# Patient Record
Sex: FEMALE | Race: BLACK OR AFRICAN AMERICAN | NOT HISPANIC OR LATINO | Employment: FULL TIME | URBAN - METROPOLITAN AREA
[De-identification: names, ages, dates, MRNs, and addresses within clinical notes are randomized per-mention and may not be internally consistent; named-entity substitution may affect disease eponyms.]

---

## 2017-02-28 RX ORDER — LEVOTHYROXINE SODIUM 25 UG/1
TABLET ORAL
Qty: 90 TABLET | Refills: 0 | Status: SHIPPED | OUTPATIENT
Start: 2017-02-28 | End: 2017-04-19 | Stop reason: SDUPTHER

## 2017-03-07 RX ORDER — ACYCLOVIR 400 MG/1
TABLET ORAL
Qty: 30 TABLET | Refills: 0 | OUTPATIENT
Start: 2017-03-07

## 2017-03-07 RX ORDER — LEVOTHYROXINE SODIUM 25 UG/1
TABLET ORAL
Qty: 90 TABLET | Refills: 0 | Status: SHIPPED | OUTPATIENT
Start: 2017-03-07 | End: 2017-04-19 | Stop reason: SDUPTHER

## 2017-03-19 RX ORDER — ACYCLOVIR 400 MG/1
TABLET ORAL
Qty: 30 TABLET | Refills: 0 | OUTPATIENT
Start: 2017-03-19

## 2017-03-23 RX ORDER — ACYCLOVIR 400 MG/1
TABLET ORAL
Qty: 30 TABLET | Refills: 0 | OUTPATIENT
Start: 2017-03-23

## 2017-03-27 NOTE — TELEPHONE ENCOUNTER
----- Message from Alyson Crandall sent at 3/27/2017  9:55 AM CDT -----  Contact: Pt  Pt is requesting to speak with the nurse concerning the denial of her Rx for acyclovir. Pt can be reached at [phone]

## 2017-03-28 RX ORDER — ACYCLOVIR 400 MG/1
TABLET ORAL
Qty: 30 TABLET | Refills: 0 | Status: SHIPPED | OUTPATIENT
Start: 2017-03-28 | End: 2017-04-19

## 2017-03-30 ENCOUNTER — TELEPHONE (OUTPATIENT)
Dept: FAMILY MEDICINE | Facility: CLINIC | Age: 52
End: 2017-03-30

## 2017-03-30 NOTE — TELEPHONE ENCOUNTER
----- Message from Sindi Mojica sent at 3/30/2017 12:16 PM CDT -----  Please call pt back at [phone]. Pt was calling you back.

## 2017-03-30 NOTE — TELEPHONE ENCOUNTER
----- Message from Tanner Santana sent at 3/30/2017  9:51 AM CDT -----  Contact: Pt  Pt request call from nurse to get refill on medication for blisters but does not know the name of the medication, I offered to schedule pt with a mid level and pt declined, please contact pt at [phone]

## 2017-04-04 ENCOUNTER — PATIENT OUTREACH (OUTPATIENT)
Dept: ADMINISTRATIVE | Facility: HOSPITAL | Age: 52
End: 2017-04-04

## 2017-04-04 NOTE — LETTER
April 4, 2017    Kylie Urbano  [address]  [place] [address]             [facility]  [address]  [facility] [address]  Phone: [phone] Dear Ms. Urbano:    [facility] is committed to your overall health.  To help you get the most out of each of your visits, we will review your information to make sure you are up to date on all of your recommended tests and/or procedures.      Beverly Parks MD has found that you may be due for   Health Maintenance Due   Topic    TETANUS VACCINE     Pap Smear     Influenza Vaccine     Colonoscopy     Lipid Panel     Mammogram       If you have had any of the above done at another facility, please bring the records or information with you so that your record at [facility] will be complete.    If you are currently taking medication, please bring it with you to your appointment for review.    We will be happy to assist you with scheduling any necessary appointments or you may contact the [facility] appointment desk at [phone] to schedule at your convenience.     Thank you for choosing [facility] for your healthcare needs,      Geno VANCE LPN Care Coordinator  Care Coordination Department  [facility]

## 2017-04-19 ENCOUNTER — OFFICE VISIT (OUTPATIENT)
Dept: FAMILY MEDICINE | Facility: CLINIC | Age: 52
End: 2017-04-19
Payer: COMMERCIAL

## 2017-04-19 ENCOUNTER — LAB VISIT (OUTPATIENT)
Dept: LAB | Facility: HOSPITAL | Age: 52
End: 2017-04-19
Attending: FAMILY MEDICINE
Payer: COMMERCIAL

## 2017-04-19 VITALS
SYSTOLIC BLOOD PRESSURE: 132 MMHG | WEIGHT: 278 LBS | HEART RATE: 75 BPM | HEIGHT: 69 IN | DIASTOLIC BLOOD PRESSURE: 88 MMHG | BODY MASS INDEX: 41.18 KG/M2 | OXYGEN SATURATION: 99 % | TEMPERATURE: 98 F | RESPIRATION RATE: 18 BRPM

## 2017-04-19 DIAGNOSIS — Z00.00 ANNUAL PHYSICAL EXAM: Primary | ICD-10-CM

## 2017-04-19 DIAGNOSIS — E66.01 MORBID OBESITY WITH BMI OF 40.0-44.9, ADULT: ICD-10-CM

## 2017-04-19 DIAGNOSIS — J30.2 SEASONAL ALLERGIC RHINITIS, UNSPECIFIED ALLERGIC RHINITIS TRIGGER: ICD-10-CM

## 2017-04-19 DIAGNOSIS — E89.40 SURGICAL MENOPAUSE: ICD-10-CM

## 2017-04-19 DIAGNOSIS — E03.9 HYPOTHYROIDISM, UNSPECIFIED TYPE: Chronic | ICD-10-CM

## 2017-04-19 DIAGNOSIS — Z12.31 OTHER SCREENING MAMMOGRAM: ICD-10-CM

## 2017-04-19 DIAGNOSIS — B00.9 HSV INFECTION: ICD-10-CM

## 2017-04-19 DIAGNOSIS — Z12.11 COLON CANCER SCREENING: ICD-10-CM

## 2017-04-19 DIAGNOSIS — Z00.00 ANNUAL PHYSICAL EXAM: ICD-10-CM

## 2017-04-19 DIAGNOSIS — E55.9 VITAMIN D DEFICIENCY: ICD-10-CM

## 2017-04-19 DIAGNOSIS — E88.810 INSULIN RESISTANCE SYNDROME: ICD-10-CM

## 2017-04-19 DIAGNOSIS — D50.9 IRON DEFICIENCY ANEMIA, UNSPECIFIED IRON DEFICIENCY ANEMIA TYPE: Chronic | ICD-10-CM

## 2017-04-19 DIAGNOSIS — I10 ESSENTIAL HYPERTENSION: ICD-10-CM

## 2017-04-19 LAB
25(OH)D3+25(OH)D2 SERPL-MCNC: 28 NG/ML
ALBUMIN SERPL BCP-MCNC: 3.8 G/DL
ALP SERPL-CCNC: 101 U/L
ALT SERPL W/O P-5'-P-CCNC: 57 U/L
ANION GAP SERPL CALC-SCNC: 9 MMOL/L
AST SERPL-CCNC: 43 U/L
BASOPHILS # BLD AUTO: 0.01 K/UL
BASOPHILS NFR BLD: 0.2 %
BILIRUB SERPL-MCNC: 0.3 MG/DL
BILIRUB UR QL STRIP: NEGATIVE
BUN SERPL-MCNC: 11 MG/DL
CALCIUM SERPL-MCNC: 9.8 MG/DL
CHLORIDE SERPL-SCNC: 108 MMOL/L
CHOLEST/HDLC SERPL: 3.1 {RATIO}
CLARITY UR REFRACT.AUTO: CLEAR
CO2 SERPL-SCNC: 26 MMOL/L
COLOR UR AUTO: YELLOW
CREAT SERPL-MCNC: 0.8 MG/DL
DIFFERENTIAL METHOD: NORMAL
EOSINOPHIL # BLD AUTO: 0.1 K/UL
EOSINOPHIL NFR BLD: 2.1 %
ERYTHROCYTE [DISTWIDTH] IN BLOOD BY AUTOMATED COUNT: 13.6 %
EST. GFR  (AFRICAN AMERICAN): >60 ML/MIN/1.73 M^2
EST. GFR  (NON AFRICAN AMERICAN): >60 ML/MIN/1.73 M^2
FERRITIN SERPL-MCNC: 49 NG/ML
GLUCOSE SERPL-MCNC: 92 MG/DL
GLUCOSE UR QL STRIP: NEGATIVE
HCT VFR BLD AUTO: 40.7 %
HDL/CHOLESTEROL RATIO: 31.9 %
HDLC SERPL-MCNC: 182 MG/DL
HDLC SERPL-MCNC: 58 MG/DL
HGB BLD-MCNC: 13.8 G/DL
HGB UR QL STRIP: NEGATIVE
INSULIN COLLECTION INTERVAL: NORMAL
INSULIN SERPL-ACNC: 9 UU/ML
KETONES UR QL STRIP: NEGATIVE
LDLC SERPL CALC-MCNC: 108.4 MG/DL
LEUKOCYTE ESTERASE UR QL STRIP: NEGATIVE
LYMPHOCYTES # BLD AUTO: 1.7 K/UL
LYMPHOCYTES NFR BLD: 33.4 %
MCH RBC QN AUTO: 29.2 PG
MCHC RBC AUTO-ENTMCNC: 33.9 %
MCV RBC AUTO: 86 FL
MONOCYTES # BLD AUTO: 0.3 K/UL
MONOCYTES NFR BLD: 4.9 %
NEUTROPHILS # BLD AUTO: 3 K/UL
NEUTROPHILS NFR BLD: 59.2 %
NITRITE UR QL STRIP: NEGATIVE
NONHDLC SERPL-MCNC: 124 MG/DL
PH UR STRIP: 6 [PH] (ref 5–8)
PLATELET # BLD AUTO: 335 K/UL
PMV BLD AUTO: 10.6 FL
POTASSIUM SERPL-SCNC: 4 MMOL/L
PROT SERPL-MCNC: 7.9 G/DL
PROT UR QL STRIP: NEGATIVE
RBC # BLD AUTO: 4.73 M/UL
SODIUM SERPL-SCNC: 143 MMOL/L
SP GR UR STRIP: 1.01 (ref 1–1.03)
T4 FREE SERPL-MCNC: 1.13 NG/DL
TRIGL SERPL-MCNC: 78 MG/DL
TSH SERPL DL<=0.005 MIU/L-ACNC: 4.34 UIU/ML
URN SPEC COLLECT METH UR: NORMAL
UROBILINOGEN UR STRIP-ACNC: NEGATIVE EU/DL
WBC # BLD AUTO: 5.12 K/UL

## 2017-04-19 PROCEDURE — 85025 COMPLETE CBC W/AUTO DIFF WBC: CPT

## 2017-04-19 PROCEDURE — 3075F SYST BP GE 130 - 139MM HG: CPT | Mod: S$GLB,,, | Performed by: FAMILY MEDICINE

## 2017-04-19 PROCEDURE — 99999 PR PBB SHADOW E&M-EST. PATIENT-LVL III: CPT | Mod: PBBFAC,,, | Performed by: FAMILY MEDICINE

## 2017-04-19 PROCEDURE — 84439 ASSAY OF FREE THYROXINE: CPT

## 2017-04-19 PROCEDURE — 3079F DIAST BP 80-89 MM HG: CPT | Mod: S$GLB,,, | Performed by: FAMILY MEDICINE

## 2017-04-19 PROCEDURE — 99396 PREV VISIT EST AGE 40-64: CPT | Mod: S$GLB,,, | Performed by: FAMILY MEDICINE

## 2017-04-19 PROCEDURE — 83036 HEMOGLOBIN GLYCOSYLATED A1C: CPT

## 2017-04-19 PROCEDURE — 82306 VITAMIN D 25 HYDROXY: CPT

## 2017-04-19 PROCEDURE — 82728 ASSAY OF FERRITIN: CPT

## 2017-04-19 PROCEDURE — 80053 COMPREHEN METABOLIC PANEL: CPT

## 2017-04-19 PROCEDURE — 83525 ASSAY OF INSULIN: CPT

## 2017-04-19 PROCEDURE — 36415 COLL VENOUS BLD VENIPUNCTURE: CPT | Mod: PO

## 2017-04-19 PROCEDURE — 84443 ASSAY THYROID STIM HORMONE: CPT

## 2017-04-19 PROCEDURE — 81003 URINALYSIS AUTO W/O SCOPE: CPT

## 2017-04-19 PROCEDURE — 80061 LIPID PANEL: CPT

## 2017-04-19 RX ORDER — LEVOTHYROXINE SODIUM 25 UG/1
25 TABLET ORAL
Qty: 90 TABLET | Refills: 1 | Status: SHIPPED | OUTPATIENT
Start: 2017-04-19 | End: 2017-04-26 | Stop reason: DRUGHIGH

## 2017-04-19 RX ORDER — ACYCLOVIR 400 MG/1
TABLET ORAL
Qty: 30 TABLET | Refills: 1 | Status: SHIPPED | OUTPATIENT
Start: 2017-04-19 | End: 2018-01-04

## 2017-04-19 RX ORDER — LEVOTHYROXINE SODIUM 200 UG/1
200 TABLET ORAL DAILY
Qty: 90 TABLET | Refills: 1 | Status: SHIPPED | OUTPATIENT
Start: 2017-04-19 | End: 2017-06-12 | Stop reason: CLARIF

## 2017-04-19 NOTE — MR AVS SNAPSHOT
[facility]  [address] [address]  Phone: [phone]                  Kylie Urbano   2017 9:30 AM   Office Visit    Description:  Female : 1965   Provider:  Beverly Parks MD   Department:  [facility]           Reason for Visit     Annual Exam           Diagnoses this Visit        Comments    Annual physical exam    -  Primary     Essential hypertension         Insulin resistance syndrome         Hypothyroidism, unspecified type         Vitamin D deficiency         Iron deficiency anemia, unspecified iron deficiency anemia type         HSV infection         Seasonal allergic rhinitis, unspecified allergic rhinitis trigger         Morbid obesity with BMI of 40.0-44.9, adult         Surgical menopause         Colon cancer screening         Other screening mammogram                To Do List           Future Appointments        Provider Department Dept Phone    2017 12:20 PM LABORATORY, [address] [facility] [phone]    2017 9:30 AM [facility]AMMO1-SCR [facility] [phone]    10/19/2017 8:45 AM Beverly Parks MD [facility] [phone]      Goals (5 Years of Data)     None      Follow-Up and Disposition     Return in about 6 months (around 10/19/2017) for b/p, IRS f/u.       These Medications        Disp Refills Start End    SYNTHROID 25 mcg tablet 90 tablet 1 2017     Take 1 tablet (25 mcg total) by mouth before breakfast. - Oral    Pharmacy: [facility] [address] [place] [address] Ph #: [phone]       SYNTHROID 200 mcg tablet 90 tablet 1 2017     Take 1 tablet (200 mcg total) by mouth once daily. - Oral    Pharmacy: [facility] [address] [place] [address] Ph #: [phone]       acyclovir (ZOVIRAX) 400 MG tablet 30 tablet 1 2017     TAKE ONE TABLET BY MOUTH THREE  TIMES DAILY WITH FOOD FOR 5 DAYS PER  EPISODE    Pharmacy: [facility] [address] [place] [address] #: [phone]         [facility] On Call     [facility] On Call Nurse Care Line - 24/7 Assistance  Unless otherwise directed by your provider, please contact [facility], our nurse care line that is available for 24/7 assistance.     Registered nurses in the [facility] provide: appointment scheduling, clinical advisement, health education, and other advisory services.  Call: [phone] (toll free)               Medications           Message regarding Medications     Verify the changes and/or additions to your medication regime listed below are the same as discussed with your clinician today.  If any of these changes or additions are incorrect, please notify your healthcare provider.        CHANGE how you are taking these medications     Start Taking Instead of    SYNTHROID 25 mcg tablet SYNTHROID 25 mcg tablet    Dosage:  Take 1 tablet (25 mcg total) by mouth before breakfast. Dosage:  TAKE ONE TABLET BY MOUTH ONCE DAILY BEFORE BREAKFAST    Reason for Change:  Reorder            Verify that the below list of medications is an accurate representation of the medications you are currently taking.  If none reported, the list may be blank. If incorrect, please contact your healthcare provider. Carry this list with you in case of emergency.           Current Medications     acyclovir (ZOVIRAX) 400 MG tablet TAKE ONE TABLET BY MOUTH THREE TIMES DAILY WITH FOOD FOR 5 DAYS PER  EPISODE    benazepril (LOTENSIN) 10 MG tablet Take 1 tablet (10 mg total) by mouth once daily.    cholecalciferol, vitamin D3, (VITAMIN D) 2,000 unit Cap Take 1.5 capsules by mouth once daily. 1 Capsule Oral Every day    ferrous gluconate (FERGON) 240 (27 FE) MG tablet Take 240 mg by mouth 3 (three) times daily with meals. 1 Tablet Oral Every day    fluticasone (FLONASE) 50 mcg/actuation nasal spray 2 sprays  "by Each Nare route daily as needed for Rhinitis.    hydrochlorothiazide (HYDRODIURIL) 12.5 MG Tab 1 Tablet Oral Every day    ibuprofen (ADVIL,MOTRIN) 800 MG tablet 1 Tablet Oral Twice a day prn  with food    metformin (GLUCOPHAGE) 500 MG tablet TAKE FOUR TABLETS BY MOUTH IN THE EVENING AS DIRECTED    SYNTHROID 200 mcg tablet Take 1 tablet (200 mcg total) by mouth once daily.    SYNTHROID 25 mcg tablet Take 1 tablet (25 mcg total) by mouth before breakfast.    promethazine-dextromethorphan (PROMETHAZINE-DM) 6.25-15 mg/5 mL Syrp Take 1 teaspoon every 4 - 6 hours prn cough.           Clinical Reference Information           Your Vitals Were     BP Pulse Temp Resp Height Weight    132/88 (BP Location: Left arm, Patient Position: Sitting, BP Method: Manual) 75 97.6 °F (36.4 °C) (Tympanic) 18 5' 9" (1.753 m) 126.1 kg (278 lb)    Last Period SpO2 BMI          01/01/2016 99% 41.05 kg/m2        Blood Pressure          Most Recent Value    BP  132/88      Allergies as of 4/19/2017     Lortab [Hydrocodone-acetaminophen]    Amoxicillin    Sulfa (Sulfonamide Antibiotics)      Immunizations Administered on Date of Encounter - 4/19/2017     None      Orders Placed During Today's Visit      Normal Orders This Visit    Case request GI: COLONOSCOPY     Urinalysis     Future Labs/Procedures Expected by Expires    CBC auto differential  4/19/2017 6/18/2018    Comprehensive metabolic panel  4/19/2017 6/18/2018    Ferritin  4/19/2017 6/18/2018    Hemoglobin A1c  4/19/2017 6/18/2018    Insulin, random  4/19/2017 6/18/2018    Lipid panel  4/19/2017 6/18/2018    Mammo Digital Screening Bilat with CAD  4/19/2017 6/19/2018    TSH  4/19/2017 6/18/2018    Vitamin D  4/19/2017 6/18/2018      [facility] Sign-Up     Activating your [facility] account is as easy as 1-2-3!     1) Visit [email or web address], select Sign Up Now, enter this activation code and your date of birth, then select Next.  [email or web address]  Expires: 5/13/2017  9:51 PM      2) Create " a username and password to use when you visit [facility] in the future and select a security question in case you lose your password and select Next.    3) Enter your e-mail address and click Sign Up!    Additional Information  If you have questions, please e-mail [email or web address] or call [phone] to talk to our [employer] staff. Remember, [facility] is NOT to be used for urgent needs. For medical emergencies, dial 911.         Instructions    Please call Dr. Parks for your results.    Thanks.       Language Assistance Services     ATTENTION: Language assistance services are available, free of charge. Please call [phone].      ATENCIÓN: Si betola dhruv, tiene a bailon disposición servicios gratuitos de asistencia lingüística. Llame al [phone].     CHÚ Ý: N?u b?n nói Ti?ng Vi?t, có các d?ch v? h? tr? ngôn ng? mi?n phí dành cho b?n. G?i s? [phone].         [facility] complies with applicable Federal civil rights laws and does not discriminate on the basis of race, color, national origin, age, disability, or sex.

## 2017-04-19 NOTE — PROGRESS NOTES
CHIEF COMPLAINT: Annual wellness examination.     HISTORY OF PRESENT ILLNESS: Ms. Urbano comes in today fasting and with taking blood pressure medication for annual wellness examination.     END OF LIFE DECISION: She has no living will and is not sure about life support (depends on the situation).     Current Outpatient Prescriptions   Medication Sig    acyclovir (ZOVIRAX) 400 MG tablet TAKE ONE TABLET BY MOUTH THREE TIMES DAILY WITH FOOD FOR 5 DAYS PER  EPISODE    benazepril (LOTENSIN) 10 MG tablet Take 1 tablet (10 mg total) by mouth once daily.    cholecalciferol, vitamin D3, (VITAMIN D) 2,000 unit Cap Take 1.5 capsules by mouth once daily. 1 Capsule Oral Every day    ferrous gluconate (FERGON) 240 (27 FE) MG tablet Take 240 mg by mouth 3 (three) times daily with meals. 1 Tablet Oral Every day    fluticasone (FLONASE) 50 mcg/actuation nasal spray 2 sprays by Each Nare route daily as needed for Rhinitis.    hydrochlorothiazide (HYDRODIURIL) 12.5 MG Tab 1 Tablet Oral Every day (Patient taking differently: Take 12.5 mg by mouth every evening. 1 Tablet Oral Every day prn)    ibuprofen (ADVIL,MOTRIN) 800 MG tablet 1 Tablet Oral Twice a day prn  with food (Patient taking differently: Take 800 mg by mouth. 1 Tablet Oral Twice a day prn  with food)    metformin (GLUCOPHAGE) 500 MG tablet TAKE FOUR TABLETS BY MOUTH IN THE EVENING AS DIRECTED (Patient taking differently: TAKE 2 TABLETS BY MOUTH IN THE EVENING AS DIRECTED)    SYNTHROID 200 mcg tablet Take 1 tablet (200 mcg total) by mouth once daily.    SYNTHROID 25 mcg tablet TAKE ONE TABLET BY MOUTH ONCE DAILY BEFORE BREAKFAST    promethazine-dextromethorphan (PROMETHAZINE-DM) 6.25-15 mg/5 mL Syrp Take 1 teaspoon every 4 - 6 hours prn cough.     SCREENINGS:  Cholesterol: November 24, 2015.  FFS/Colonoscopy: Never.  Mammogram: December 15, 2015 - okay.   GYN Exam: November 24, 2015 - okay. Pap smear no longer needed.   Dexa Scan: Never. Will get at age 56.  Eye  "Exam: May 2012.  PPD: Negative in the past.  Immunizations: Tdap - April 22, 2010.  Gardasil - N./A.  Zostavax - N./A.  Pneumovax - Never.  Seasonal Flu - N./A. She declines.     ROS:  GENERAL: Denies fever, chills or unusual weight change. Appetite normal. Weight 123 kg (271 lb 2.7 oz) at June 30, 2016 visit. Occasionally monitors diet but does not exercise. Reports improved fatigue.  SKIN: Denies rashes, itching, changes in mole, color or texture of skin or easy bruising.  HEAD: Denies recent head trauma or headaches.  EYES: Denies change in vision, diplopia, redness or discharge. Wears readers.  EARS: Denies ear pain, discharge, vertigo or decreased hearing.  NOSE: Reports nasal congestion, post-nasal drip; uses OTC nasal spray with help.  MOUTH & THROAT: Denies hoarseness or change in voice. Denies excessive gum bleeding or mouth sores. Denies sore throat.  NODES: Denies swollen glands.  CHEST: Denies SHARIF, wheezing, cough, or sputum production.  CARDIOVASCULAR: Denies chest pain, PND, orthopnea or reduced exercise tolerance. Denies palpitations.  ABDOMEN: Denies diarrhea, constipation, nausea, vomiting, abdominal pain, or blood in stool.  URINARY: Denies flank pain, dysuria or hematuria.  GENITOURINARY: Denies flank pain, dysuria, frequency or hematuria. Occasionally performs monthly breast self exams.  ENDOCRINE: Denies diabetes, cholesterol problems.   HEME/LYMPH: Denies bleeding problems. Reports on iron therapy daily prn.  PERIPHERAL VASCULAR:Denies claudication or cyanosis.  MUSCULOSKELETAL: Denies edema. Reports "aging" joint pain, stiffness.  NEUROLOGIC: Denies history of seizures, tremors, paralysis, alteration of gait or coordination.  PSYCHIATRIC: Denies mood swings, depression, anxiety, homicidal or suicidal thoughts. Denies sleep problems.    PE:   VS: /88 (BP Location: Left arm, Patient Position: Sitting, BP Method: Manual)  Pulse 75  Temp 97.6 °F (36.4 °C) (Tympanic)   Resp 18  Ht 5' 9" " (1.753 m)  Wt 126.1 kg (278 lb)  LMP 01/01/2016  SpO2 99%  BMI 41.05 kg/m2  APPEARANCE: Well nourished, well developed female, obese and pleasant, alert and oriented in no acute distress.  HEAD: Non tender. Full range of motion.  EYES: PERRL, conjunctiva pink, lids no edema.  EARS: External canal patent, no swelling or redness. TM's shiny and clear.  NOSE: Mucosa and turbinates are congested. No discharge. Non tender sinuses.  THROAT: No pharyngeal erythema or exudate. No stridor.  NECK: Supple, no mass, thyroid not enlarged.  NODES: No cervical, axillary lymph node enlargement.  CHEST: Normal respiratory effort. Lungs clear to auscultation.  CARDIOVASCULAR: Normal S1, S2. No rubs, murmurs or gallops. PMI not displaced. No carotid bruit. Pedal pulses palpable bilaterally. No edema.  ABDOMEN: Bowel sounds present. Not distended. Soft. No tenderness, masses or organomegaly.  BREAST EXAM: Symmetrical, no external lesions, no discharge, no masses palpated.  PELVIC EXAM: Not examined as patient has had RAH/BSO due to non cancerous reasons.  RECTAL EXAM: No external hemorrhoids or anal fissures. Heme-negative stool in the rectal vault.  MUSCULOSKELETAL: No joint deformities or stiffness. She is ambulatory without problems.  SKIN: No rashes or suspicious lesions, normal color and turgor.  NEUROLOGIC: Cranial Nerves: II-XII grossly intact. DTR's: Knees, Ankles 2+ and equal bilaterally. Gait & Posture: Normal gait and fine motion.  PSYCHIATRIC: Patient alert, oriented x 3. Mood/Affect normal without acute anxiety and depression noted. Judgment/insight good as she makes appropriate decisions during today's examination.    ASSESSMENT:    ICD-10-CM ICD-9-CM    1. Annual physical exam Z00.00 V70.0 Vitamin D      CBC auto differential      TSH      Urinalysis      Comprehensive metabolic panel      Lipid panel      Insulin, random      Hemoglobin A1c      Ferritin   2. Essential hypertension I10 401.9    3. Insulin resistance  syndrome E88.81 277.7    4. Hypothyroidism, unspecified type E03.9 244.9    5. Vitamin D deficiency E55.9 268.9    6. Iron deficiency anemia, unspecified iron deficiency anemia type D50.9 280.9    7. HSV infection B00.9 054.9    8. Seasonal allergic rhinitis, unspecified allergic rhinitis trigger J30.2 477.9    9. Morbid obesity with BMI of 40.0-44.9, adult E66.01 278.01     Z68.41 V85.41    10. Surgical menopause E89.40 627.4    11. Colon cancer screening Z12.11 V76.51 Case request GI: COLONOSCOPY   12. Other screening mammogram Z12.31 V76.12 Mammo Digital Screening Bilat with CAD     PLAN:  1. Age-appropriate counseling-appropriate low-sodium, low-cholesterol diet and exercise daily as tolerated, monthly breast self exam, annual wellness examination.  2. Patient advised to call for results.  3. Continue medications as noted on medication card.  4. Prescription refills - Acyclovir 400 mg three times daily for 5 days p.r.n. HSV episodes, #30, 1 refill; Synthroid 200 + 25 mcg daily, #90-days, 1 refills.   5. Add MVI daily.  6. See me in 6 months for hypertension, IRS follow up or sooner if medication changes made based on lab results.

## 2017-04-20 LAB
ESTIMATED AVG GLUCOSE: 123 MG/DL
HBA1C MFR BLD HPLC: 5.9 %

## 2017-04-20 RX ORDER — SODIUM, POTASSIUM,MAG SULFATES 17.5-3.13G
SOLUTION, RECONSTITUTED, ORAL ORAL
Qty: 354 ML | Refills: 0 | Status: SHIPPED | OUTPATIENT
Start: 2017-04-20 | End: 2017-06-12 | Stop reason: CLARIF

## 2017-04-26 DIAGNOSIS — R79.89 ELEVATED LIVER FUNCTION TESTS: Primary | ICD-10-CM

## 2017-04-27 RX ORDER — LEVOTHYROXINE SODIUM 50 UG/1
50 TABLET ORAL DAILY
Qty: 90 TABLET | Refills: 1 | Status: SHIPPED | OUTPATIENT
Start: 2017-04-27 | End: 2017-06-12 | Stop reason: CLARIF

## 2017-04-28 ENCOUNTER — HOSPITAL ENCOUNTER (OUTPATIENT)
Dept: RADIOLOGY | Facility: HOSPITAL | Age: 52
Discharge: HOME | End: 2017-04-28
Attending: FAMILY MEDICINE
Payer: COMMERCIAL

## 2017-04-28 DIAGNOSIS — Z12.31 OTHER SCREENING MAMMOGRAM: ICD-10-CM

## 2017-04-28 PROCEDURE — 77063 BREAST TOMOSYNTHESIS BI: CPT | Mod: 26,,, | Performed by: RADIOLOGY

## 2017-04-28 PROCEDURE — 77067 SCR MAMMO BI INCL CAD: CPT | Mod: TC

## 2017-04-28 PROCEDURE — 77067 SCR MAMMO BI INCL CAD: CPT | Mod: 26,,, | Performed by: RADIOLOGY

## 2017-05-01 ENCOUNTER — TELEPHONE (OUTPATIENT)
Dept: RADIOLOGY | Facility: HOSPITAL | Age: 52
End: 2017-05-01

## 2017-05-02 ENCOUNTER — HOSPITAL ENCOUNTER (OUTPATIENT)
Dept: RADIOLOGY | Facility: HOSPITAL | Age: 52
Discharge: HOME | End: 2017-05-02
Attending: FAMILY MEDICINE
Payer: COMMERCIAL

## 2017-05-02 DIAGNOSIS — R79.89 ELEVATED LIVER FUNCTION TESTS: ICD-10-CM

## 2017-05-02 PROCEDURE — 76700 US EXAM ABDOM COMPLETE: CPT | Mod: TC,PO

## 2017-05-02 PROCEDURE — 76700 US EXAM ABDOM COMPLETE: CPT | Mod: 26,,, | Performed by: RADIOLOGY

## 2017-05-03 ENCOUNTER — TELEPHONE (OUTPATIENT)
Dept: RADIOLOGY | Facility: HOSPITAL | Age: 52
End: 2017-05-03

## 2017-05-03 DIAGNOSIS — R92.8 ABNORMAL MAMMOGRAM: Primary | ICD-10-CM

## 2017-05-03 NOTE — TELEPHONE ENCOUNTER
Breast Care Management Follow-Up:    Date of Mammogram:04/28/17    Mammogram Reason:Screening    Mammogram Results:13mm density in the right breast      Referrals/Recommendations:Right dx mammo and u/s - suspicious solid mass in the right breast. Cat 4. Surgery consult rec.        Patient Status:  05/03/17 Left message for pt to call. Results letter and report mailed to pt.  05/04/17 Left message for pt to call. Results given to pt. Mammo and u/s on 5/9/17.  05/10/17 Left message for pt to call. Results letter and report mailed to pt. Results given to pt. Appt with Dr. Mcclendon today.

## 2017-05-08 ENCOUNTER — TELEPHONE (OUTPATIENT)
Dept: RADIOLOGY | Facility: HOSPITAL | Age: 52
End: 2017-05-08

## 2017-05-09 ENCOUNTER — HOSPITAL ENCOUNTER (OUTPATIENT)
Dept: RADIOLOGY | Facility: HOSPITAL | Age: 52
Discharge: HOME | End: 2017-05-09
Attending: FAMILY MEDICINE
Payer: COMMERCIAL

## 2017-05-09 DIAGNOSIS — R92.8 ABNORMAL MAMMOGRAM: ICD-10-CM

## 2017-05-09 PROCEDURE — 76642 ULTRASOUND BREAST LIMITED: CPT | Mod: TC,PO,RT

## 2017-05-09 PROCEDURE — 77061 BREAST TOMOSYNTHESIS UNI: CPT | Mod: TC

## 2017-05-09 PROCEDURE — 77065 DX MAMMO INCL CAD UNI: CPT | Mod: 26,,, | Performed by: RADIOLOGY

## 2017-05-09 PROCEDURE — 77061 BREAST TOMOSYNTHESIS UNI: CPT | Mod: 26,,, | Performed by: RADIOLOGY

## 2017-05-09 PROCEDURE — 76642 ULTRASOUND BREAST LIMITED: CPT | Mod: 26,RT,, | Performed by: RADIOLOGY

## 2017-05-10 ENCOUNTER — OFFICE VISIT (OUTPATIENT)
Dept: SURGERY | Facility: CLINIC | Age: 52
End: 2017-05-10
Payer: COMMERCIAL

## 2017-05-10 VITALS
DIASTOLIC BLOOD PRESSURE: 85 MMHG | BODY MASS INDEX: 40.99 KG/M2 | SYSTOLIC BLOOD PRESSURE: 148 MMHG | HEART RATE: 97 BPM | WEIGHT: 277.56 LBS | TEMPERATURE: 99 F

## 2017-05-10 DIAGNOSIS — N63.10 BREAST MASS, RIGHT: Primary | ICD-10-CM

## 2017-05-10 PROCEDURE — 3079F DIAST BP 80-89 MM HG: CPT | Mod: S$GLB,,, | Performed by: SURGERY

## 2017-05-10 PROCEDURE — 3077F SYST BP >= 140 MM HG: CPT | Mod: S$GLB,,, | Performed by: SURGERY

## 2017-05-10 PROCEDURE — 1160F RVW MEDS BY RX/DR IN RCRD: CPT | Mod: S$GLB,,, | Performed by: SURGERY

## 2017-05-10 PROCEDURE — 99999 PR PBB SHADOW E&M-EST. PATIENT-LVL III: CPT | Mod: PBBFAC,,, | Performed by: SURGERY

## 2017-05-10 PROCEDURE — 99203 OFFICE O/P NEW LOW 30 MIN: CPT | Mod: S$GLB,,, | Performed by: SURGERY

## 2017-05-10 NOTE — MR AVS SNAPSHOT
[facility]  [address] [address]  Phone: [phone]  Fax: [phone]                  Kylie Urbano   5/10/2017 10:20 AM   Office Visit    Description:  Female : 1965   Provider:  Jet Mcclendon MD   Department:  [facility]           Reason for Visit     Consult           Diagnoses this Visit        Comments    Breast mass, right    -  Primary            To Do List           Future Appointments        Provider Department Dept Phone    2017 9:30 AM Jet Mcclendon MD [facility] [phone]    10/19/2017 8:45 AM Beverly Parks MD [facility] [phone]      Your Future Surgeries/Procedures     2017   Surgery with Miguel Felix III, MD   [facility]    [address] [facility] [address] [phone]              Goals (5 Years of Data)     None      [facility]     [facility] Nurse Care Line -  Assistance  Unless otherwise directed by your provider, please contact [facility], our nurse care line that is available for  assistance.     Registered nurses in the [facility] provide: appointment scheduling, clinical advisement, health education, and other advisory services.  Call: [phone] (toll free)               Medications           Message regarding Medications     Verify the changes and/or additions to your medication regime listed below are the same as discussed with your clinician today.  If any of these changes or additions are incorrect, please notify your healthcare provider.             Verify that the below list of medications is an accurate representation of the medications you are currently taking.  If none reported, the list may be blank. If incorrect, please contact your healthcare provider. Carry this list with you in case of emergency.           Current Medications      acyclovir (ZOVIRAX) 400 MG tablet TAKE ONE TABLET BY MOUTH THREE TIMES DAILY WITH FOOD FOR 5 DAYS PER  EPISODE    benazepril (LOTENSIN) 10 MG tablet Take 1 tablet (10 mg total) by mouth once daily.    cholecalciferol, vitamin D3, (VITAMIN D) 2,000 unit Cap Take 1.5 capsules by mouth once daily. 1 Capsule Oral Every day    ferrous gluconate (FERGON) 240 (27 FE) MG tablet Take 240 mg by mouth 3 (three) times daily with meals. 1 Tablet Oral Every day    fluticasone (FLONASE) 50 mcg/actuation nasal spray 2 sprays by Each Nare route daily as needed for Rhinitis.    hydrochlorothiazide (HYDRODIURIL) 12.5 MG Tab 1 Tablet Oral Every day    ibuprofen (ADVIL,MOTRIN) 800 MG tablet 1 Tablet Oral Twice a day prn  with food    levothyroxine (SYNTHROID) 50 MCG tablet Take 1 tablet (50 mcg total) by mouth once daily.    metformin (GLUCOPHAGE) 500 MG tablet TAKE FOUR TABLETS BY MOUTH IN THE EVENING AS DIRECTED    sodium,potassium,mag sulfates (SUPREP BOWEL PREP KIT) 17.5-3.13-1.6 gram SolR Use as directed.    SYNTHROID 200 mcg tablet Take 1 tablet (200 mcg total) by mouth once daily.    promethazine-dextromethorphan (PROMETHAZINE-DM) 6.25-15 mg/5 mL Syrp Take 1 teaspoon every 4 - 6 hours prn cough.           Clinical Reference Information           Your Vitals Were     BP Pulse Temp Weight Last Period BMI    148/85 97 98.9 °F (37.2 °C) (Oral) 125.9 kg (277 lb 9 oz) 01/01/2016 40.99 kg/m2      Blood Pressure          Most Recent Value    BP  (!)  148/85      Allergies as of 5/10/2017     Lortab [Hydrocodone-acetaminophen]    Amoxicillin    Sulfa (Sulfonamide Antibiotics)      Immunizations Administered on Date of Encounter - 5/10/2017     None      Orders Placed During Today's Visit     Future Labs/Procedures Expected by Expires    Mammo Breast Stereotactic Biopsy 1st sit  5/10/2017 7/10/2018      [facility] Sign-Up     Activating your [facility] account is as easy as 1-2-3!     1) Visit [email or web address], select Sign Up Now, enter this  activation code and your date of birth, then select Next.  [email or web address]  Expires: 5/13/2017  9:51 PM      2) Create a username and password to use when you visit [facility] in the future and select a security question in case you lose your password and select Next.    3) Enter your e-mail address and click Sign Up!    Additional Information  If you have questions, please e-mail [email or web address] or call [phone] to talk to our [employer] staff. Remember, [facility] is NOT to be used for urgent needs. For medical emergencies, dial 911.         Language Assistance Services     ATTENTION: Language assistance services are available, free of charge. Please call [phone].      ATENCIÓN: Si habla dhruv, tiene a bailon disposición servicios gratuitos de asistencia lingüística. Llame al [phone].     CHÚ Ý: N?u b?n nói Ti?ng Vi?t, có các d?ch v? h? tr? ngôn ng? mi?n phí dành cho b?n. G?i s? [phone].         [facility] complies with applicable Federal civil rights laws and does not discriminate on the basis of race, color, national origin, age, disability, or sex.

## 2017-05-10 NOTE — LETTER
May 10, 2017      Beverly Parks MD  [address]  [place] [address]           [facility]  [address]  [place] [address]  Phone: [phone]  Fax: [phone]          Patient: Kylie Urbano   MR Number: [id number]   YOB: 1965   Date of Visit: 5/10/2017       Dear Dr. Beverly Parks:    Thank you for referring Kylie Urbano to me for evaluation. Attached you will find relevant portions of my assessment and plan of care.    If you have questions, please do not hesitate to call me. I look forward to following Kylie Urbano along with you.    Sincerely,    Jet Mcclendon MD    Enclosure  CC:  No Recipients    If you would like to receive this communication electronically, please contact [email or web address] or [phone] to request more information on [employer] access.    For providers and/or their staff who would like to refer a patient to [facility], please contact us through our one-stop-shop provider referral line, [facility] , at [phone].    If you feel you have received this communication in error or would no longer like to receive these types of communications, please e-mail [email or web address]

## 2017-05-11 NOTE — PROGRESS NOTES
The patient is seen in consultation for Beverly Parks.    REASON FOR CONSULTATION:  Abnormal mammogram.    HISTORY OF PRESENT ILLNESS:  The patient is a 51-year-old -American   female who was found on routine mammogram to have a somewhat irregular lesion   about 1.5 cm in the upper outer quadrant of the right breast.  The patient has   had no prior biopsies.  She cannot palpate any masses.  She has a cousin with   breast cancer, a sister with ovarian cancer.  The patient denies any nipple   discharge or drainage.  She really denies any breast pain.  She has noticed for   many years that her left breast is slightly larger than the right breast.  She   is not on any hormonal replacement therapy.    PAST MEDICAL HISTORY:  Some hypertension, iron deficiency anemia,   hypothyroidism, insulin resistance, some vitamin D deficiency.    MEDICATIONS:  Include Zovirax, Lotensin, hydrochlorothiazide, occasional Advil,   occasional aspirin.  She is on 250 mg a day of Synthroid.  She takes 1500 mg of   metformin a day.    ALLERGIES:  She is allergic to Lortab, which is not really allergies that causes   nausea and vomiting.  Amoxicillin and sulfa cause a rash.    PAST SURGICAL HISTORY:  Tubal ligation, D and C of the uterus and then, January 2016 underwent a robotic-assisted laparoscopic hysterectomy and BSO.    SOCIAL HISTORY:  She does not smoke, only occasionally drinks alcohol.    FAMILY HISTORY:  She has some family history also of hypertension.    REVIEW OF SYSTEMS:  Denies any chest pains, angina or palpitations.  Denies any   shortness of breath or any cough.  Endocrine:  Hypothyroid and insulin   resistant.  No real significant GI complaints or genitourinary complaints.  No   history of stroke or any seizures.  No significant arthritis or back pains.    Does have some mild anemia.    PHYSICAL EXAMINATION:  VITAL SIGNS:  Blood pressure 140/85, pulse of 97, temperature 98.9.  She is 125   kg and a height of 5 feet 9  inches and puts her BMI of 40.9.  GENERAL:  She is alert and oriented x3.  HEENT:  Extraocular movements were intact.  NECK:  Soft.  There is no adenopathy or bruit.  CHEST:  Clear to auscultation throughout.  HEART:  Reveals normal rate and rhythm.  Both axillae are normal on exam.  BREASTS:  Reveals the left breast is larger than the right breast, but both   breasts, seem appeared to be normal in contour.  Examination of the left breast   reveals some soft fibroglandular changes, but no distinct masses.  The nipple   and areolar are normal.  Examination of the right breast reveals again normal   fibroglandular changes.  Nipple and areola are normal.  However, the upper outer   quadrant of the right breast approximately 10 o'clock position in the same area   as the mammographic abnormality, I do palpate a mass.  It is about 1.5 cm, only   a few centimeters deep to the skin.  It is ill-defined, but it is firm and   irregular.  There is no skin dimpling.   I cannot palpate any other masses there   nor I cannot palpate any abnormalities in the axilla.  ABDOMEN:  Mildly obese.  It is soft and nontender.  EXTREMITIES:  Reveal adequate strength and range of motion.  NEUROLOGIC:  Gross neuro is intact.  No gross deformities in the back.  PSYCHIATRIC:  Appeared to be normal other than she is mildly upset at the   abdomen.  Normal mammogram findings.    IMPRESSION:  Very suspicious lesion, upper outer quadrant of the right breast.    It is irregular and suspicious on mammogram and on physical exam.    RECOMMENDATIONS:  Core biopsy of the mass with ultrasound of the axilla.    Clinically, this is a T1 lesion    I did tell the patient that the next step is the core biopsy and if this shows   malignancy, then we need to consider after I removed the tumor, possibly check   the lymph nodes, but I told he, let us find out what this is exactly first and I   can go over the treatment from there.  I did tell her that the vast  majority is   a breast cancer.  The vast majority are cured with therapy.  She is to hold her   aspirin and ibuprofen over the next week.  We will get a core biopsy.  She will   come back if we find the results and she will notify [facility] or breast   screening nurse over the next week because I will be out that week, but I will   see her back and go ahead and if surgery is needed, we will proceed from there.      LESLIE/BRAYAN  dd: 05/10/2017 19:15:31 (CDT)  td: 05/11/2017 12:46:26 (CDT)  Doc ID   #[id number]  Job ID #[id number]    CC: Beverly Parks M.D

## 2017-05-24 ENCOUNTER — DOCUMENTATION ONLY (OUTPATIENT)
Dept: SURGERY | Facility: CLINIC | Age: 52
End: 2017-05-24

## 2017-05-24 NOTE — PROGRESS NOTES
Notified pt of pathology results from core biopsy performed at [facility] on 5/22/17.    Right breast mass UOQ- positive for mammary adenocarcinoma involving lymphatc spaces.    Right axillary lymph node- positive for metastatic mammary carcinoma    ER - positive  DC - positive  Herceptin - negative    Explained need to follow-up with Dr. Mcclendon next week and to see an Oncologist for recommendations. Explained that it may be recommended that she be treated with chemotherapy first - before any surgery. Pt verbalized understanding. Explained we would arrange for her to see the oncologist after she sees Dr. Mcclendon on Wed. Pt verbalized understanding. States she will be ok. Contact numbers given to call for any questions.

## 2017-05-31 ENCOUNTER — OFFICE VISIT (OUTPATIENT)
Dept: SURGERY | Facility: CLINIC | Age: 52
End: 2017-05-31
Payer: COMMERCIAL

## 2017-05-31 VITALS
HEART RATE: 124 BPM | WEIGHT: 274.69 LBS | SYSTOLIC BLOOD PRESSURE: 161 MMHG | TEMPERATURE: 99 F | BODY MASS INDEX: 40.57 KG/M2 | DIASTOLIC BLOOD PRESSURE: 94 MMHG

## 2017-05-31 DIAGNOSIS — C50.412 MALIGNANT NEOPLASM OF UPPER-OUTER QUADRANT OF LEFT FEMALE BREAST: Primary | ICD-10-CM

## 2017-05-31 DIAGNOSIS — C50.911 BREAST CANCER, RIGHT BREAST: ICD-10-CM

## 2017-05-31 PROCEDURE — 99212 OFFICE O/P EST SF 10 MIN: CPT | Mod: S$GLB,,, | Performed by: SURGERY

## 2017-05-31 PROCEDURE — 99999 PR PBB SHADOW E&M-EST. PATIENT-LVL III: CPT | Mod: PBBFAC,,, | Performed by: SURGERY

## 2017-05-31 RX ORDER — SODIUM CHLORIDE 9 MG/ML
INJECTION, SOLUTION INTRAVENOUS CONTINUOUS
Status: CANCELLED | OUTPATIENT
Start: 2017-05-31

## 2017-06-01 ENCOUNTER — OFFICE VISIT (OUTPATIENT)
Dept: HEMATOLOGY/ONCOLOGY | Facility: CLINIC | Age: 52
End: 2017-06-01
Payer: COMMERCIAL

## 2017-06-01 ENCOUNTER — LAB VISIT (OUTPATIENT)
Dept: LAB | Facility: HOSPITAL | Age: 52
End: 2017-06-01
Attending: INTERNAL MEDICINE
Payer: COMMERCIAL

## 2017-06-01 ENCOUNTER — RESEARCH ENCOUNTER (OUTPATIENT)
Dept: RESEARCH | Facility: HOSPITAL | Age: 52
End: 2017-06-01

## 2017-06-01 VITALS
HEART RATE: 114 BPM | DIASTOLIC BLOOD PRESSURE: 92 MMHG | OXYGEN SATURATION: 99 % | WEIGHT: 276.25 LBS | HEIGHT: 69 IN | RESPIRATION RATE: 20 BRPM | SYSTOLIC BLOOD PRESSURE: 170 MMHG | TEMPERATURE: 98 F | BODY MASS INDEX: 40.91 KG/M2

## 2017-06-01 DIAGNOSIS — Z00.6 RESEARCH SUBJECT: ICD-10-CM

## 2017-06-01 DIAGNOSIS — C50.911 MALIGNANT NEOPLASM OF RIGHT FEMALE BREAST, UNSPECIFIED SITE OF BREAST: Primary | ICD-10-CM

## 2017-06-01 DIAGNOSIS — C50.411 MALIGNANT NEOPLASM OF UPPER-OUTER QUADRANT OF RIGHT FEMALE BREAST: ICD-10-CM

## 2017-06-01 DIAGNOSIS — C50.911 MALIGNANT NEOPLASM OF RIGHT FEMALE BREAST, UNSPECIFIED SITE OF BREAST: ICD-10-CM

## 2017-06-01 DIAGNOSIS — C50.411 MALIGNANT NEOPLASM OF UPPER-OUTER QUADRANT OF RIGHT FEMALE BREAST: Primary | ICD-10-CM

## 2017-06-01 LAB
ALBUMIN SERPL BCP-MCNC: 3.7 G/DL
ALP SERPL-CCNC: 100 U/L
ALT SERPL W/O P-5'-P-CCNC: 43 U/L
ANION GAP SERPL CALC-SCNC: 7 MMOL/L
AST SERPL-CCNC: 26 U/L
BASOPHILS # BLD AUTO: 0.02 K/UL
BASOPHILS NFR BLD: 0.3 %
BILIRUB SERPL-MCNC: 0.2 MG/DL
BUN SERPL-MCNC: 12 MG/DL
CALCIUM SERPL-MCNC: 10.1 MG/DL
CHLORIDE SERPL-SCNC: 105 MMOL/L
CO2 SERPL-SCNC: 30 MMOL/L
CREAT SERPL-MCNC: 0.8 MG/DL
DIFFERENTIAL METHOD: NORMAL
DRUG STUDY SPECIMEN TYPE: NORMAL
EOSINOPHIL # BLD AUTO: 0.1 K/UL
EOSINOPHIL NFR BLD: 1.3 %
ERYTHROCYTE [DISTWIDTH] IN BLOOD BY AUTOMATED COUNT: 13.6 %
EST. GFR  (AFRICAN AMERICAN): >60 ML/MIN/1.73 M^2
EST. GFR  (NON AFRICAN AMERICAN): >60 ML/MIN/1.73 M^2
GLUCOSE SERPL-MCNC: 98 MG/DL
HCT VFR BLD AUTO: 40.7 %
HGB BLD-MCNC: 13.8 G/DL
LDH SERPL L TO P-CCNC: 264 U/L
LYMPHOCYTES # BLD AUTO: 1.8 K/UL
LYMPHOCYTES NFR BLD: 23.9 %
MCH RBC QN AUTO: 29.2 PG
MCHC RBC AUTO-ENTMCNC: 33.9 %
MCV RBC AUTO: 86 FL
MONOCYTES # BLD AUTO: 0.5 K/UL
MONOCYTES NFR BLD: 6.8 %
NEUTROPHILS # BLD AUTO: 5.1 K/UL
NEUTROPHILS NFR BLD: 67.7 %
PLATELET # BLD AUTO: 275 K/UL
PMV BLD AUTO: 9.7 FL
POTASSIUM SERPL-SCNC: 4 MMOL/L
PROT SERPL-MCNC: 8.3 G/DL
RBC # BLD AUTO: 4.72 M/UL
SODIUM SERPL-SCNC: 142 MMOL/L
WBC # BLD AUTO: 7.48 K/UL

## 2017-06-01 PROCEDURE — 86803 HEPATITIS C AB TEST: CPT

## 2017-06-01 PROCEDURE — 87340 HEPATITIS B SURFACE AG IA: CPT

## 2017-06-01 PROCEDURE — 83615 LACTATE (LD) (LDH) ENZYME: CPT | Mod: PO

## 2017-06-01 PROCEDURE — 86703 HIV-1/HIV-2 1 RESULT ANTBDY: CPT

## 2017-06-01 PROCEDURE — 85025 COMPLETE CBC W/AUTO DIFF WBC: CPT | Mod: PO

## 2017-06-01 PROCEDURE — 36415 COLL VENOUS BLD VENIPUNCTURE: CPT | Mod: PO

## 2017-06-01 PROCEDURE — 86704 HEP B CORE ANTIBODY TOTAL: CPT

## 2017-06-01 PROCEDURE — 99245 OFF/OP CONSLTJ NEW/EST HI 55: CPT | Mod: S$GLB,,, | Performed by: INTERNAL MEDICINE

## 2017-06-01 PROCEDURE — 80053 COMPREHEN METABOLIC PANEL: CPT | Mod: PO

## 2017-06-01 PROCEDURE — 99999 PR PBB SHADOW E&M-EST. PATIENT-LVL III: CPT | Mod: PBBFAC,,, | Performed by: INTERNAL MEDICINE

## 2017-06-01 NOTE — LETTER
June 1, 2017      Raquel Bergeron NP  [address] [facility] [address] [place] [address]           [facility] - Hemotology Oncology  [address]  [place] [address]  Phone: [phone]  Fax: [phone]          Patient: Kylie Urbano   MR Number: [id number]   YOB: 1965   Date of Visit: 6/1/2017       Dear Raquel Bergeron:    Thank you for referring Kylie Urbano to me for evaluation. Attached you will find relevant portions of my assessment and plan of care.    If you have questions, please do not hesitate to call me. I look forward to following Kylie Urbano along with you.    Sincerely,    Mason Martinez Jr., MD    Enclosure  CC:  No Recipients    If you would like to receive this communication electronically, please contact [email or web address] or [phone] to request more information on [employer] Link access.    For providers and/or their staff who would like to refer a patient to [facility], please contact us through our one-stop-shop provider referral line, [facility], at [phone].    If you feel you have received this communication in error or would no longer like to receive these types of communications, please e-mail [email or web address]

## 2017-06-01 NOTE — PROGRESS NOTES
Date: 06/01/2017     Study: B-55: A Randomized, Double-Blind, Parallel Group, Placebo-Controlled Multi-Pelham Phase III Study to Assess the Efficacy and Safety of Olaparib vs. Placebo as Adjuvant Treatment in Patients with Germline BRCA 1/2 Mutations and HIgh Risk HER2 Negative Primary Breast Cancer who have Completed Definitive Local Treatment and Neoadjuvant or Adjuvant Chemotherapy.   IRB#[id number]     Consent Note BRCA Testing for Patient with Unknown BRCA Mutation Status for Participation in the B-55 Study  I met with patient today at her appointment with Dr. Martinez to discuss the B-55 study. She was with two family members. Prior to signing the following aspects of the consent were discussed in detail:  -the purpose of this portion of the study, how it will or will not make her eligible for the B-55 study.   -The length of this portion of the study, how many participants will be consented nationwide  -The procedure to obtain the BRCA test, [facility] [employer] and [facility] and what each tube of blood collected will be used for.   -The risks and benefits associated with BRCA testing  -That there will be no cost to her for the BRCA testing, that all other tests and procedures related to her cancer treatment will be billed to her insurance in the normal way (except the tasks associated with the 68793 study, differentiated which tasks will be paid for by that study and which will be billed to insurance when she was consented for that study).  -That she will not be paid to participate  -Alternative options she has related to treatment following chemotherapy  -The contact information for Dr. Jeffers, Dr. Jenkins, and the [facility].   -That participation in this study is completely voluntary, that she may withdraw her consent at any time. That Dr. Kennedy can withdraw her consent if she no longer deems it medically appropriate for her.   -Confidentiality and HIPAA authorization for this study, who may view the information we sent  and why. Examples of what we might send and why. How we try and keep her information as private as possible   -that we keep all documents we share with the study for 10 years after the study closes.      After all of the above was discussed, patient was given the opportunity to ask questions. Verbalized understanding of all information. Patient signed consent, copy of consent given to patient along with my card. Encouraged her to call with any concerns.     After registering patient, she walked to the second floor lab at [facility] where the phlebotomist bethany Ms. Urbano's blood and placed it in the two purple top tubes provided by the study. I then packaged and shipped the ambient BRCA sample to [facility] [employer] via [employer] overnight and froze the additional sample per study protocol.

## 2017-06-01 NOTE — PROGRESS NOTES
Please see my previous notes.  The patient is a 51-year-old black female who was   found on a mammogram to have a 1.5 cm lesion in the upper outer quadrant of the   right breast at about the 10 o'clock position.  She could not palpate it, but I   could palpate it on a physical exam.  At that time, there was a question of any   axillary abnormality.  It is difficult to palpate for certain as the patient is   somewhat obese.  The patient has underwent core biopsy of these lesions at this   area and the upper outer quadrant mass in the right breast was invasive mammary   carcinoma.  There is also noted to be a 1.5 x 0.8 cm lymph node and core biopsy   of this also showed invasive mammary carcinoma.  She is ER/NC positive and   HER-2/bao negative.  The patient had some minimal soreness post-biopsy, but some   tape reactions.    On physical exam today, blood pressure 161/94, pulse is 100, temperature 99.6.    She is 124 kg.  She is alert, somewhat anxious.  Family members are with her,   however.  Her extraocular movements were intact.  Her neck is soft without any   adenopathy.  There is an area in the right axilla, which appears to be about a   centimeter to 2 in size, which is at the axillary biopsy site.  The right upper   outer quadrant mass is unchanged with no significant hematoma in the area.    IMPRESSION:  Invasive mammary carcinoma upper outer quadrant of the right   breast, ER/NC positive with spread to lymph node, which was about 1.5 cm in   size.    RECOMMENDATIONS:  I had a long discussion with the patient and the family.  I   told them options.  I told them there are four modalities of therapy; to be   surgery, chemotherapy, radiation therapy and endocrine therapy.  I told her due   to the fact that there is involvement of the axillary nodes, I would recommend   proceeding with chemotherapy first.  This would possibly sterilize the axillary   nodes so a complete node dissection would not be needed.  It  also would give a   systemic therapy first in case there is any microscopic spread.  I told her that   I would proceed with lumpectomy, sentinel node biopsy and possible node   dissection after the chemotherapy and then we would proceed with radiation   therapy and endocrine therapy.  I did talk to them about what the lumpectomy   would involve and the risks of the lumpectomy.  She is going to see Oncology   tomorrow and if they want to proceed with neoadjuvant chemotherapy, I will place   a MediPort next week.  I counseled the patient as to the placement of the   MediPort, pre and postoperative course, the risks including bleeding, infection   either thrombosis or malposition of the catheter over the vein.  I counseled   possible pneumothorax and the patient agrees with the procedure.  I did stress   to the patient and the family that the vast majority patients with her condition   are going to be cured with the therapy.      LESLIE/BRAYAN  dd: 05/31/2017 19:12:52 (CDT)  td: 06/01/2017 18:31:05 (CDT)  Doc ID   #[id number]  Job ID #[id number]    CC: Beverly Martinez M.D.

## 2017-06-01 NOTE — PROGRESS NOTES
Hematology/Oncology Office Note    Reason for referral:  Locally advanced breast cancer with biopsy proven axillary lymph node involvement    CC:    Referred by:  Raquel Bergeron NP    Diagnosis:  Right sided locally advanced infiltrating ductal carcinoma with biopsy-proven axillary lymph node involvement (ER positive at 95%, IN positive at 95%, HER-2 1+ by IHC and negative by fish    Treatment:    History of present illness:  Ms. Urbano is a 51-year-old female with a past medical history of DM hypertension, hypothyroidism, iron deficiency anemia, who was noted to have abnormal screening mammogram  on 4/28/2017.  Core biopsy of right breast mass at 10:00 6 cm from the nipple demonstrated infiltrating ductal carcinoma and right axillary lymph node core biopsy also demonstrated infiltrating ductal carcinoma.  ER/IN positive, HER-2 negative markers.  The patient has a first cousin with a history of breast cancer and one aunt with a history of breast cancer.  She has some soreness at previous biopsy site, however, denies other significant symptoms.      Past Medical History:   Diagnosis Date    Allergic rhinitis, cause unspecified     Carpal tunnel syndrome of left wrist     Elevated liver function tests     in the past    Fibroid uterus     10/2014    History of sciatica     History of vitamin D deficiency     HSV infection     Type 1 and 2    HTN (hypertension)     Hypothyroidism     Insulin resistance     Iron deficiency anemia, unspecified     Obesity     Tension headache          Social History:  No tobacco, alcohol, or illicit drugs      Family History: family history includes Cancer in her father and sister; Diabetes in her mother; Heart failure in her mother; Hypertension in her mother; No Known Problems in her daughter, daughter, and son.        HPI  Review of Systems   Constitutional: Negative for activity change, appetite change, fatigue, fever and unexpected weight change.   HENT: Negative  for congestion, facial swelling, mouth sores, nosebleeds, sore throat, trouble swallowing and voice change.    Eyes: Negative.  Negative for visual disturbance.   Respiratory: Negative for apnea, cough, choking, chest tightness and shortness of breath.    Cardiovascular: Negative for chest pain, palpitations and leg swelling.   Gastrointestinal: Negative for abdominal distention, abdominal pain, anal bleeding, blood in stool, constipation, diarrhea, nausea and vomiting.   Endocrine: Negative.    Genitourinary: Negative for difficulty urinating, dyspareunia, dysuria, flank pain, frequency, hematuria, pelvic pain and vaginal bleeding.   Musculoskeletal: Negative for arthralgias, back pain, gait problem, joint swelling, myalgias and neck pain.   Skin: Negative for pallor, rash and wound.   Allergic/Immunologic: Negative for environmental allergies and immunocompromised state.   Neurological: Negative for dizziness, tremors, seizures, syncope, facial asymmetry, speech difficulty, weakness, light-headedness, numbness and headaches.   Hematological: Negative for adenopathy. Does not bruise/bleed easily.   Psychiatric/Behavioral: Negative for agitation, behavioral problems, confusion, dysphoric mood and hallucinations. The patient is not nervous/anxious and is not hyperactive.        Objective:      Physical Exam   Constitutional: She is oriented to person, place, and time. She appears well-developed and well-nourished. No distress.   Well groomed   HENT:   Head: Normocephalic and atraumatic.   Right Ear: Tympanic membrane and ear canal normal.   Left Ear: Tympanic membrane and ear canal normal.   Nose: Nose normal. Right sinus exhibits no maxillary sinus tenderness and no frontal sinus tenderness. Left sinus exhibits no maxillary sinus tenderness and no frontal sinus tenderness.   Mouth/Throat: Oropharynx is clear and moist and mucous membranes are normal. No oral lesions. Normal dentition. No oropharyngeal exudate.    Eyes: Conjunctivae, EOM and lids are normal. Pupils are equal, round, and reactive to light. Lids are everted and swept, no foreign bodies found. No scleral icterus.   Neck: Trachea normal and normal range of motion. Neck supple. No JVD present. No tracheal deviation present. No thyroid mass and no thyromegaly present.   No crepitus   Cardiovascular: Normal rate, regular rhythm, normal heart sounds, intact distal pulses and normal pulses.  Exam reveals no gallop and no friction rub.    No murmur heard.  Pulmonary/Chest: Effort normal and breath sounds normal. She has no wheezes. She has no rales. She exhibits no tenderness.   Right breast: Ill-defined induration without discrete mass palpated, 2 cm right axillary node  Left breast: No masses, skin changes, lymphadenopathy   Abdominal: Soft. Normal appearance and bowel sounds are normal. She exhibits no distension and no mass. There is no hepatosplenomegaly. There is no tenderness. There is no rebound and no guarding.   Musculoskeletal: Normal range of motion. She exhibits no edema or tenderness.   Lymphadenopathy:        Head (right side): No submental, no submandibular and no tonsillar adenopathy present.        Head (left side): No submental, no submandibular and no tonsillar adenopathy present.     She has no cervical adenopathy.     She has axillary adenopathy.        Right: No supraclavicular adenopathy present.        Left: No supraclavicular adenopathy present.   Right axillary node 2 cm   Neurological: She is alert and oriented to person, place, and time. No cranial nerve deficit. She exhibits normal muscle tone. Coordination normal.   Skin: Skin is warm and dry. No rash noted. She is not diaphoretic. No cyanosis or erythema. No pallor. Nails show no clubbing.   Psychiatric: She has a normal mood and affect. Her behavior is normal. Judgment and thought content normal.       Lab Results   Component Value Date    WBC 7.48 06/01/2017    HGB 13.8 06/01/2017     HCT 40.7 06/01/2017    MCV 86 06/01/2017     06/01/2017     Lab Results   Component Value Date    CREATININE 0.8 06/01/2017    BUN 12 06/01/2017     06/01/2017    K 4.0 06/01/2017     06/01/2017    CO2 30 (H) 06/01/2017     Lab Results   Component Value Date    ALT 43 06/01/2017    AST 26 06/01/2017    ALKPHOS 100 06/01/2017    BILITOT 0.2 06/01/2017         Assessment:         51-year-old female with a new diagnosis of right-sided infiltrating ductal carcinoma ER/CT positive, HER-2 negative with biopsy-proven right axillary lymph node involvement.  Patient was consented for BRCA testing for participation and B-55 study and BRCA testing will be sent today.  I discussed neoadjuvant therapy at length and the benefits of preoperative chemotherapy.  The patient is agreeable to proceeding with treatment and we will plan to start  ddAC--> following port placement.  We will need to obtain an echocardiogram prior to beginning treatment and the patient will receive chemotherapy teaching.    ER/CT positive, HER-2 negative infiltrating ductal carcinoma the right breast with biopsy proven lymph node involvement clinically stage II/III.  --CT of the chest  --CBC, CMP, HIV, hepatitis B/C serologies  --Echocardiogram  --Mediport placement  --Chemotherapy teaching  --BRCA testing via B-55 Study  --ddAC--> taxol following port placement.     Hypertension:  Continue Lotensin/HCTZ    Hypothyroidism:  Continue Synthroid    Distress Screening Results: Psychosocial Distress screening score of Distress Score: 0 noted and reviewed. No intervention indicated.

## 2017-06-02 ENCOUNTER — TELEPHONE (OUTPATIENT)
Dept: SURGERY | Facility: CLINIC | Age: 52
End: 2017-06-02

## 2017-06-02 LAB
HBV CORE AB SERPL QL IA: NEGATIVE
HBV SURFACE AG SERPL QL IA: NEGATIVE
HCV AB SERPL QL IA: NEGATIVE
HIV 1+2 AB+HIV1 P24 AG SERPL QL IA: NEGATIVE

## 2017-06-05 ENCOUNTER — TELEPHONE (OUTPATIENT)
Dept: SURGERY | Facility: CLINIC | Age: 52
End: 2017-06-05

## 2017-06-05 PROBLEM — C50.411 MALIGNANT NEOPLASM OF UPPER-OUTER QUADRANT OF RIGHT FEMALE BREAST: Status: ACTIVE | Noted: 2017-06-05

## 2017-06-05 NOTE — TELEPHONE ENCOUNTER
----- Message from Alyson Crandall sent at 6/5/2017  7:30 AM CDT -----  Contact: Pt   Pt states she is returning a missed call to reschedule her procedure../ Pt can be reached at [phone] (odbv)

## 2017-06-06 ENCOUNTER — TELEPHONE (OUTPATIENT)
Dept: RADIOLOGY | Facility: HOSPITAL | Age: 52
End: 2017-06-06

## 2017-06-07 ENCOUNTER — CLINICAL SUPPORT (OUTPATIENT)
Dept: CARDIOLOGY | Facility: CLINIC | Age: 52
End: 2017-06-07
Payer: COMMERCIAL

## 2017-06-07 ENCOUNTER — OFFICE VISIT (OUTPATIENT)
Dept: SURGERY | Facility: CLINIC | Age: 52
End: 2017-06-07
Payer: COMMERCIAL

## 2017-06-07 ENCOUNTER — HOSPITAL ENCOUNTER (OUTPATIENT)
Dept: RADIOLOGY | Facility: HOSPITAL | Age: 52
Discharge: HOME | End: 2017-06-07
Attending: INTERNAL MEDICINE
Payer: COMMERCIAL

## 2017-06-07 VITALS — RESPIRATION RATE: 18 BRPM | WEIGHT: 276.44 LBS | BODY MASS INDEX: 41.9 KG/M2 | HEIGHT: 68 IN

## 2017-06-07 DIAGNOSIS — Z71.89 ENCOUNTER FOR MEDICATION COUNSELING: ICD-10-CM

## 2017-06-07 DIAGNOSIS — C50.411 MALIGNANT NEOPLASM OF UPPER-OUTER QUADRANT OF RIGHT FEMALE BREAST: ICD-10-CM

## 2017-06-07 DIAGNOSIS — Z55.9 EDUCATIONAL CIRCUMSTANCE: ICD-10-CM

## 2017-06-07 DIAGNOSIS — I51.7 CARDIOMEGALY: ICD-10-CM

## 2017-06-07 DIAGNOSIS — C50.411 MALIGNANT NEOPLASM OF UPPER-OUTER QUADRANT OF RIGHT FEMALE BREAST: Primary | ICD-10-CM

## 2017-06-07 DIAGNOSIS — Z79.899 HIGH RISK MEDICATION USE: ICD-10-CM

## 2017-06-07 LAB
DIASTOLIC DYSFUNCTION: NO
RETIRED EF AND QEF - SEE NOTES: 60 (ref 55–65)

## 2017-06-07 PROCEDURE — 93306 TTE W/DOPPLER COMPLETE: CPT | Mod: S$GLB,,, | Performed by: INTERNAL MEDICINE

## 2017-06-07 PROCEDURE — 25500020 PHARM REV CODE 255: Mod: PO | Performed by: INTERNAL MEDICINE

## 2017-06-07 PROCEDURE — 99215 OFFICE O/P EST HI 40 MIN: CPT | Mod: S$GLB,,, | Performed by: NURSE PRACTITIONER

## 2017-06-07 PROCEDURE — 71260 CT THORAX DX C+: CPT | Mod: 26,,, | Performed by: RADIOLOGY

## 2017-06-07 PROCEDURE — 71260 CT THORAX DX C+: CPT | Mod: TC,PO

## 2017-06-07 PROCEDURE — 99999 PR PBB SHADOW E&M-EST. PATIENT-LVL III: CPT | Mod: PBBFAC,,, | Performed by: NURSE PRACTITIONER

## 2017-06-07 RX ADMIN — IOHEXOL 75 ML: 350 INJECTION, SOLUTION INTRAVENOUS at 02:06

## 2017-06-07 SDOH — SOCIAL DETERMINANTS OF HEALTH (SDOH): PROBLEMS RELATED TO EDUCATION AND LITERACY, UNSPECIFIED: Z55.9

## 2017-06-07 NOTE — PRE ADMISSION SCREENING
Pre op instructions reviewed with patient per phone:    To confirm, Your surgeon has instructed you:  Surgery is scheduled 06/09/17 at 1300.      Please report to [facility] 1st floor main lobby by 1130  Pre admit office to call afternoon prior to surgery with final arrival time.      INSTRUCTIONS IMPORTANT!!!  ¨ Do not eat, drink, or smoke after 12 midnight, may have water and apple juice until 3 h prior to surgery OK to brush teeth, no gum, candy or mints!    ¨ Take only these medicines with a small swallow of water-morning of surgery.  Synthroid, Benazepril      ____  Do not wear makeup, including mascara.  ____  No powder, lotions or creams to surgical area.  ____  Please remove all jewelry, including piercings and leave at home.  ____  No money or valuables needed. Please leave at home.  ____  Please bring identification and insurance information to hospital.  ____  If going home the same day, arrange for a ride home. You will not be able to   drive if Anesthesia was used.  ____  Children, under 12 years old, must remain in the waiting room with an adult.  They are not allowed in patient areas.  ____  Wear loose fitting clothing. Allow for dressings, bandages.  ____  Stop Aspirin, Ibuprofen, Motrin and Aleve at least 5-7 days before surgery, unless otherwise instructed by your doctor, or the nurse.   You MAY use Tylenol/acetaminophen until day of surgery.  ____  If you take diabetic medication, do not take am of surgery unless instructed by   Doctor.  ____ Stop taking any Fish Oil supplement or any Vitamins that contain Vitamin E at least 5 days prior to surgery.          Bathing Instructions-- The night before surgery and the morning prior to coming to the hospital:   -Do not shave the surgical area.   -Shower and wash your hair and body as usual with anti-bacterial  soap and shampoo.   -Rinse your hair and body completely.   -Use one packet of hibiclens to wash the surgical site  (using your hand) gently for 5 minutes.  Do not scrub you skin too hard.   -Do not use hibiclens on your head, face, or genitals.   -Do not wash with anti-bacterial soap after you use the hibiclens.   -Rinse your body thoroughly.   -Dry with clean, soft towel.  Do not use lotion, cream, deodorant, or powders on   the surgical site.    Use antibacterial soap in place of hibiclens if your surgery is on the head, face or genitals.         Surgical Site Infection    Prevention of surgical site infections:     -Keep incisions clean and dry.   -Do not soak/submerge incisions in water until completely healed.   -Do not apply lotions, powders, creams, or deodorants to site.   -Always make sure hands are cleaned with antibacterial soap/ alcohol-based   prior to touching the surgical site.  (This includes doctors, nurses, staff, and yourself.)    Signs and symptoms:   -Redness and pain around the area where you had surgery   -Drainage of cloudy fluid from your surgical wound   -Fever over 100.4  I have read or had read and explained to me, and understand the above information.

## 2017-06-07 NOTE — PROGRESS NOTES
50 y/o female presents for chemotherapy teaching. Pt given the Navigation Notebook. Explained how to use notebook. Discussed with pt rationale for chemotherapy, how works, the process of treatment, potential side effects and symptoms to report.     Reviewed the specific medications and gave her a handout describing the side effects of each medication.     Discussed potential side effects such as:  Nausea and vomiting  Myelosuppression - neulasta side effects and how to take claritin 10 mg daily to prevent bone pain  Fatigue  Anorexia  Alopecia  Stomatitis  Diarrhea - how to manage with immodium  Cystitis  Gastritis  Fever > 100.0  Antiemetics instructions  Skin care  Constipation  Rash  Hyperpigmentation  Rash  Photosensitivity  Sunscreen  Small freq meals  Increased protein  Increased calories  Vitamin support   Taste alterations  Neuropathys  Hydration  Renal toxicity  Port management  Community resources  Thrombocytopenia precautions  Hand and foot syndrome- symptoms and self care tips  Importance of monitoring blood sugars if diabetic and reporting elevations or lows  Reporting red urine if persists more than 2 days if receiving Adriamycin only    Pt verbalized understanding of the information given.    Verbalizes understanding of contact information during and after clinic hours.     Pt listened and took some notes. Allowed to ask questions.     Community and social resources brought to her attention. Look Good Feel Better.     Time spent with pt and wife was 60 minutes. Please see further documentation in the pt education flow sheet.

## 2017-06-08 ENCOUNTER — ANESTHESIA EVENT (OUTPATIENT)
Dept: SURGERY | Facility: HOSPITAL | Age: 52
End: 2017-06-08
Payer: COMMERCIAL

## 2017-06-09 ENCOUNTER — ANESTHESIA (OUTPATIENT)
Dept: SURGERY | Facility: HOSPITAL | Age: 52
End: 2017-06-09
Payer: COMMERCIAL

## 2017-06-09 ENCOUNTER — SURGERY (OUTPATIENT)
Age: 52
End: 2017-06-09

## 2017-06-09 ENCOUNTER — HOSPITAL ENCOUNTER (OUTPATIENT)
Facility: HOSPITAL | Age: 52
Discharge: HOME | End: 2017-06-09
Attending: SURGERY | Admitting: SURGERY
Payer: COMMERCIAL

## 2017-06-09 VITALS
OXYGEN SATURATION: 100 % | TEMPERATURE: 98 F | RESPIRATION RATE: 18 BRPM | BODY MASS INDEX: 41.46 KG/M2 | HEIGHT: 68 IN | DIASTOLIC BLOOD PRESSURE: 72 MMHG | SYSTOLIC BLOOD PRESSURE: 145 MMHG | HEART RATE: 98 BPM | WEIGHT: 273.56 LBS

## 2017-06-09 DIAGNOSIS — C50.412 MALIGNANT NEOPLASM OF UPPER-OUTER QUADRANT OF LEFT FEMALE BREAST: ICD-10-CM

## 2017-06-09 DIAGNOSIS — C50.411 MALIGNANT NEOPLASM OF UPPER-OUTER QUADRANT OF RIGHT FEMALE BREAST: Primary | ICD-10-CM

## 2017-06-09 DIAGNOSIS — C50.911 BREAST CANCER, RIGHT BREAST: ICD-10-CM

## 2017-06-09 LAB — POCT GLUCOSE: 107 MG/DL (ref 70–110)

## 2017-06-09 PROCEDURE — 36561 INSERT TUNNELED CV CATH: CPT | Mod: ,,, | Performed by: SURGERY

## 2017-06-09 PROCEDURE — 63600175 PHARM REV CODE 636 W HCPCS: Performed by: NURSE ANESTHETIST, CERTIFIED REGISTERED

## 2017-06-09 PROCEDURE — 36000706: Performed by: SURGERY

## 2017-06-09 PROCEDURE — 71000033 HC RECOVERY, INTIAL HOUR: Performed by: SURGERY

## 2017-06-09 PROCEDURE — 71000015 HC POSTOP RECOV 1ST HR: Performed by: SURGERY

## 2017-06-09 PROCEDURE — 25000003 PHARM REV CODE 250: Performed by: ANESTHESIOLOGY

## 2017-06-09 PROCEDURE — 77001 FLUOROGUIDE FOR VEIN DEVICE: CPT | Mod: 26,,, | Performed by: SURGERY

## 2017-06-09 PROCEDURE — 25000003 PHARM REV CODE 250: Performed by: NURSE ANESTHETIST, CERTIFIED REGISTERED

## 2017-06-09 PROCEDURE — 36000707: Performed by: SURGERY

## 2017-06-09 PROCEDURE — 25000003 PHARM REV CODE 250: Performed by: SURGERY

## 2017-06-09 PROCEDURE — 37000008 HC ANESTHESIA 1ST 15 MINUTES: Performed by: SURGERY

## 2017-06-09 PROCEDURE — 37000009 HC ANESTHESIA EA ADD 15 MINS: Performed by: SURGERY

## 2017-06-09 PROCEDURE — C1788 PORT, INDWELLING, IMP: HCPCS | Performed by: SURGERY

## 2017-06-09 PROCEDURE — 63600175 PHARM REV CODE 636 W HCPCS: Performed by: SURGERY

## 2017-06-09 DEVICE — PORT POWER CLEAR VIEW: Type: IMPLANTABLE DEVICE | Site: SUBCLAVIAN | Status: NON-FUNCTIONAL

## 2017-06-09 RX ORDER — PROPOFOL 10 MG/ML
VIAL (ML) INTRAVENOUS
Status: DISCONTINUED | OUTPATIENT
Start: 2017-06-09 | End: 2017-06-09

## 2017-06-09 RX ORDER — HEPARIN SODIUM 1000 [USP'U]/ML
INJECTION, SOLUTION INTRAVENOUS; SUBCUTANEOUS
Status: DISCONTINUED | OUTPATIENT
Start: 2017-06-09 | End: 2017-06-09 | Stop reason: HOSPADM

## 2017-06-09 RX ORDER — HYDROCODONE BITARTRATE AND ACETAMINOPHEN 5; 325 MG/1; MG/1
1 TABLET ORAL EVERY 4 HOURS PRN
Status: DISCONTINUED | OUTPATIENT
Start: 2017-06-09 | End: 2017-06-09 | Stop reason: HOSPADM

## 2017-06-09 RX ORDER — HEPARIN 100 UNIT/ML
SYRINGE INTRAVENOUS
Status: DISCONTINUED | OUTPATIENT
Start: 2017-06-09 | End: 2017-06-09 | Stop reason: HOSPADM

## 2017-06-09 RX ORDER — LIDOCAINE HCL/PF 100 MG/5ML
SYRINGE (ML) INTRAVENOUS
Status: DISCONTINUED | OUTPATIENT
Start: 2017-06-09 | End: 2017-06-09

## 2017-06-09 RX ORDER — MORPHINE SULFATE 10 MG/ML
2 INJECTION INTRAMUSCULAR; INTRAVENOUS; SUBCUTANEOUS EVERY 5 MIN PRN
Status: DISCONTINUED | OUTPATIENT
Start: 2017-06-09 | End: 2017-06-09 | Stop reason: HOSPADM

## 2017-06-09 RX ORDER — CEFAZOLIN SODIUM 2 G/50ML
2 SOLUTION INTRAVENOUS
Status: COMPLETED | OUTPATIENT
Start: 2017-06-09 | End: 2017-06-09

## 2017-06-09 RX ORDER — FENTANYL CITRATE 50 UG/ML
INJECTION, SOLUTION INTRAMUSCULAR; INTRAVENOUS
Status: DISCONTINUED | OUTPATIENT
Start: 2017-06-09 | End: 2017-06-09

## 2017-06-09 RX ORDER — MIDAZOLAM HYDROCHLORIDE 1 MG/ML
INJECTION, SOLUTION INTRAMUSCULAR; INTRAVENOUS
Status: DISCONTINUED | OUTPATIENT
Start: 2017-06-09 | End: 2017-06-09

## 2017-06-09 RX ORDER — LIDOCAINE HYDROCHLORIDE AND EPINEPHRINE 10; 10 MG/ML; UG/ML
INJECTION, SOLUTION INFILTRATION; PERINEURAL
Status: DISCONTINUED | OUTPATIENT
Start: 2017-06-09 | End: 2017-06-09 | Stop reason: HOSPADM

## 2017-06-09 RX ORDER — ONDANSETRON 2 MG/ML
INJECTION INTRAMUSCULAR; INTRAVENOUS
Status: DISCONTINUED | OUTPATIENT
Start: 2017-06-09 | End: 2017-06-09

## 2017-06-09 RX ORDER — ROCURONIUM BROMIDE 10 MG/ML
INJECTION, SOLUTION INTRAVENOUS
Status: DISCONTINUED | OUTPATIENT
Start: 2017-06-09 | End: 2017-06-09

## 2017-06-09 RX ORDER — ONDANSETRON 8 MG/1
8 TABLET, ORALLY DISINTEGRATING ORAL EVERY 8 HOURS PRN
Status: DISCONTINUED | OUTPATIENT
Start: 2017-06-09 | End: 2017-06-09 | Stop reason: HOSPADM

## 2017-06-09 RX ORDER — MEPERIDINE HYDROCHLORIDE 50 MG/ML
12.5 INJECTION INTRAMUSCULAR; INTRAVENOUS; SUBCUTANEOUS ONCE AS NEEDED
Status: DISCONTINUED | OUTPATIENT
Start: 2017-06-09 | End: 2017-06-09 | Stop reason: HOSPADM

## 2017-06-09 RX ORDER — OXYCODONE HYDROCHLORIDE 5 MG/1
5 TABLET ORAL
Status: DISCONTINUED | OUTPATIENT
Start: 2017-06-09 | End: 2017-06-09 | Stop reason: HOSPADM

## 2017-06-09 RX ORDER — SODIUM CHLORIDE 9 MG/ML
INJECTION, SOLUTION INTRAVENOUS CONTINUOUS
Status: DISCONTINUED | OUTPATIENT
Start: 2017-06-09 | End: 2017-06-09 | Stop reason: HOSPADM

## 2017-06-09 RX ORDER — SODIUM CHLORIDE 9 MG/ML
3 INJECTION, SOLUTION INTRAMUSCULAR; INTRAVENOUS; SUBCUTANEOUS
Status: DISCONTINUED | OUTPATIENT
Start: 2017-06-09 | End: 2017-06-09 | Stop reason: HOSPADM

## 2017-06-09 RX ORDER — LIDOCAINE HYDROCHLORIDE 10 MG/ML
1 INJECTION, SOLUTION EPIDURAL; INFILTRATION; INTRACAUDAL; PERINEURAL ONCE
Status: DISCONTINUED | OUTPATIENT
Start: 2017-06-09 | End: 2017-06-09 | Stop reason: HOSPADM

## 2017-06-09 RX ORDER — SODIUM CHLORIDE, SODIUM LACTATE, POTASSIUM CHLORIDE, CALCIUM CHLORIDE 600; 310; 30; 20 MG/100ML; MG/100ML; MG/100ML; MG/100ML
INJECTION, SOLUTION INTRAVENOUS CONTINUOUS
Status: DISCONTINUED | OUTPATIENT
Start: 2017-06-09 | End: 2017-06-09 | Stop reason: HOSPADM

## 2017-06-09 RX ORDER — BUPIVACAINE HYDROCHLORIDE AND EPINEPHRINE 2.5; 5 MG/ML; UG/ML
INJECTION, SOLUTION EPIDURAL; INFILTRATION; INTRACAUDAL; PERINEURAL
Status: DISCONTINUED | OUTPATIENT
Start: 2017-06-09 | End: 2017-06-09 | Stop reason: HOSPADM

## 2017-06-09 RX ADMIN — SODIUM CHLORIDE, SODIUM LACTATE, POTASSIUM CHLORIDE, AND CALCIUM CHLORIDE: 600; 310; 30; 20 INJECTION, SOLUTION INTRAVENOUS at 01:06

## 2017-06-09 RX ADMIN — ROCURONIUM BROMIDE 20 MG: 10 INJECTION, SOLUTION INTRAVENOUS at 02:06

## 2017-06-09 RX ADMIN — PROPOFOL 100 MG: 10 INJECTION, EMULSION INTRAVENOUS at 02:06

## 2017-06-09 RX ADMIN — OXYCODONE HYDROCHLORIDE 5 MG: 5 TABLET ORAL at 03:06

## 2017-06-09 RX ADMIN — MIDAZOLAM HYDROCHLORIDE 2 MG: 1 INJECTION, SOLUTION INTRAMUSCULAR; INTRAVENOUS at 01:06

## 2017-06-09 RX ADMIN — PROPOFOL 200 MG: 10 INJECTION, EMULSION INTRAVENOUS at 01:06

## 2017-06-09 RX ADMIN — LIDOCAINE HYDROCHLORIDE 40 MG: 20 INJECTION, SOLUTION INTRAVENOUS at 01:06

## 2017-06-09 RX ADMIN — LIDOCAINE HYDROCHLORIDE AND EPINEPHRINE 30 ML: 10; 10 INJECTION, SOLUTION INFILTRATION; PERINEURAL at 02:06

## 2017-06-09 RX ADMIN — ONDANSETRON 4 MG: 2 INJECTION, SOLUTION INTRAMUSCULAR; INTRAVENOUS at 02:06

## 2017-06-09 RX ADMIN — CEFAZOLIN SODIUM 2 G: 2 SOLUTION INTRAVENOUS at 01:06

## 2017-06-09 RX ADMIN — FENTANYL CITRATE 100 MCG: 50 INJECTION, SOLUTION INTRAMUSCULAR; INTRAVENOUS at 01:06

## 2017-06-09 RX ADMIN — BUPIVACAINE HYDROCHLORIDE AND EPINEPHRINE BITARTRATE 30 ML: 2.5; .0091 INJECTION, SOLUTION EPIDURAL; INFILTRATION; INTRACAUDAL; PERINEURAL at 02:06

## 2017-06-09 RX ADMIN — HEPARIN SODIUM 10000 UNITS: 1000 INJECTION, SOLUTION INTRAVENOUS; SUBCUTANEOUS at 02:06

## 2017-06-09 RX ADMIN — HEPARIN 15 UNITS: 100 SYRINGE at 02:06

## 2017-06-09 NOTE — PLAN OF CARE
Pt will maintain vital signs WDL. Pt will maintain a patent airway. Pt will remain in the perioperative care setting until PACU discharge criteria is satisfied. Goals will be mutually agreed upon with the pt.

## 2017-06-09 NOTE — BRIEF OP NOTE
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]    Procedure(s) (LRB):  INSERTION-PORT (Left)subclavian vein    Anesthesia: General and 18ml of 2%lido with epi    Description of the findings of the procedure:      Findings/Key Components:      Estimated Blood Loss: 10ml       Specimens:   Specimen (12h ago through future)    None          Discharge Note    SUMMARY     Admit Date: 6/9/2017    Discharge Date and Time:  06/09/2017 3:13 PM    Hospital Course (synopsis of major diagnoses, care, treatment, and services provided during the course of the hospital stay): tolerated procedure well     Final Diagnosis: Post-Op Diagnosis Codes:     * Malignant neoplasm of upper-outer quadrant of left female breast [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  Qty: 90 tablet, Refills: 1      cholecalciferol, vitamin D3, (VITAMIN D) 2,000 unit Cap Take 1.5 capsules by mouth once daily. 1 Capsule Oral Every day      ferrous gluconate (FERGON) 240 (27 FE) MG tablet Take 240 mg by mouth 3 (three) times daily with meals. 1 Tablet Oral Every day      fluticasone (FLONASE) 50 mcg/actuation nasal spray 2 sprays by Each Nare route daily as needed for Rhinitis.  Qty: 16 g, Refills: 5      ibuprofen (ADVIL,MOTRIN) 800 MG tablet 1 Tablet Oral Twice a day prn  with food  Qty: 30 tablet, Refills: 1      !! levothyroxine  (SYNTHROID) 50 MCG tablet Take 1 tablet (50 mcg total) by mouth once daily.  Qty: 90 tablet, Refills: 1      metformin (GLUCOPHAGE) 500 MG tablet TAKE FOUR TABLETS BY MOUTH IN THE EVENING AS DIRECTED  Qty: 360 tablet, Refills: 0      !! SYNTHROID 200 mcg tablet Take 1 tablet (200 mcg total) by mouth once daily.  Qty: 90 tablet, Refills: 1      acyclovir (ZOVIRAX) 400 MG tablet TAKE ONE TABLET BY MOUTH THREE TIMES DAILY WITH FOOD FOR 5 DAYS PER  EPISODE  Qty: 30 tablet, Refills: 1      hydrochlorothiazide (HYDRODIURIL) 12.5 MG Tab 1 Tablet Oral Every day  Qty: 90 tablet, Refills: 1      promethazine-dextromethorphan (PROMETHAZINE-DM) 6.25-15 mg/5 mL Syrp Take 1 teaspoon every 4 - 6 hours prn cough.  Qty: 240 mL, Refills: 0      sodium,potassium,mag sulfates (SUPREP BOWEL PREP KIT) 17.5-3.13-1.6 gram SolR Use as directed.  Qty: 354 mL, Refills: 0       !! - Potential duplicate medications found. Please discuss with provider.          Discharge Procedure Orders  Diet general     Activity as tolerated     Shower on day dressing removed (No bath)     Lifting restrictions     Call MD for:  temperature >100.4     Call MD for:  difficulty breathing, headache or visual disturbances     Call MD for:  redness, tenderness, or signs of infection (pain, swelling, redness, odor or green/yellow discharge around incision site)     Remove dressing in 48 hours       Follow-up Information     Please follow up.    Why:  has appt. with oncology

## 2017-06-09 NOTE — PROGRESS NOTES
Had Dr. Mcclendon assess red raised bumps on patient's right chest wall. No new orders. Ok for patient to go to surgery.

## 2017-06-09 NOTE — INTERVAL H&P NOTE
The patient has been examined and the H&P has been reviewed:    I concur with the findings and no changes have occurred since H&P was written.    Anesthesia/Surgery risks, benefits and alternative options discussed and understood by patient/family.          Active Hospital Problems    Diagnosis  POA    Breast cancer, right breast [C50.911]  Yes      Resolved Hospital Problems    Diagnosis Date Resolved POA   No resolved problems to display.

## 2017-06-09 NOTE — ANESTHESIA POSTPROCEDURE EVALUATION
"Anesthesia Post Evaluation    Patient: Kylie Urbano    Procedure(s) Performed: Procedure(s) (LRB):  INSERTION-PORT (Left)    Final Anesthesia Type: general  Patient location during evaluation: PACU  Patient participation: Yes- Able to Participate  Level of consciousness: awake and alert  Post-procedure vital signs: reviewed and stable  Pain management: adequate  Airway patency: patent  PONV status at discharge: No PONV  Anesthetic complications: no      Cardiovascular status: blood pressure returned to baseline  Respiratory status: unassisted  Hydration status: euvolemic  Follow-up not needed.        Visit Vitals  BP (!) 148/75   Pulse 94   Temp 36.8 °C (98.2 °F) (Temporal)   Resp 18   Ht 5' 8" (1.727 m)   Wt 124.1 kg (273 lb 9.5 oz)   LMP 01/01/2016   SpO2 99%   Breastfeeding? No   BMI 41.60 kg/m²       Pain/Daija Score: Pain Assessment Performed: Yes (6/9/2017  3:30 PM)  Presence of Pain: denies (6/9/2017  3:30 PM)  Pain Rating Prior to Med Admin: 0 (6/9/2017  3:30 PM)  Daija Score: 10 (6/9/2017  3:30 PM)      "

## 2017-06-09 NOTE — ANESTHESIA PREPROCEDURE EVALUATION
06/09/2017  Kylie Urbano is a 51 y.o., female.    Anesthesia Evaluation    I have reviewed the Patient Summary Reports.    I have reviewed the Nursing Notes.   I have reviewed the Medications.     Review of Systems  Anesthesia Hx:  No problems with previous Anesthesia  Neg history of prior surgery. Denies Family Hx of Anesthesia complications.   Denies Personal Hx of Anesthesia complications.   Social:  Non-Smoker, Social Alcohol Use    Hematology/Oncology:  Hematology Normal   Oncology Normal     EENT/Dental:EENT/Dental Normal   Cardiovascular:   Exercise tolerance: poor Hypertension, poorly controlled CONCLUSIONS     1 - Normal left ventricular systolic function (EF 60-65%).     2 - Normal left ventricular diastolic function.     3 - Normal right ventricular systolic function .     4 - Concentric hypertrophy.     Sinus tachycardia  Abnormal R wave progression in the precordial leads  Abnormal ECG   Pulmonary:  Pulmonary Normal    Renal/:  Renal/ Normal     Hepatic/GI:  Hepatic/GI Normal    Neurological:   Neuromuscular Disease, Headaches    Endocrine:   Diabetes (insulin resistance) Hypothyroidism        Physical Exam  General:  Obesity    Airway/Jaw/Neck:  Airway Findings: Mouth Opening: Normal TM Distance: Normal, at least 6 cm        Eyes/Ears/Nose:  EYES/EARS/NOSE FINDINGS: Normal   Dental:  Dental Findings: In tact   Chest/Lungs:  Chest/Lungs Findings: Clear to auscultation, Normal Respiratory Rate     Heart/Vascular:  Heart Findings: Rate: Normal  Rhythm: Regular Rhythm  Sounds: Normal  Heart murmur: negative Vascular Findings: Normal    Abdomen:  Abdomen Findings:  Soft     Musculoskeletal:  Musculoskeletal Findings: Normal   Skin:  Skin Findings: Normal    Mental Status:  Mental Status Findings:  Alert and Oriented, Cooperative         Anesthesia Plan  Type of Anesthesia, risks & benefits  discussed:  Anesthesia Type:  general  Patient's Preference:   Intra-op Monitoring Plan:   Intra-op Monitoring Plan Comments:   Post Op Pain Control Plan:   Post Op Pain Control Plan Comments:   Induction:   IV  Beta Blocker:  Patient is not currently on a Beta-Blocker (No further documentation required).       Informed Consent: Patient understands risks and agrees with Anesthesia plan.  Questions answered. Anesthesia consent signed with patient.  ASA Score: 2     Day of Surgery Review of History & Physical: I have interviewed and examined the patient. I have reviewed the patient's H&P dated:  There are no significant changes. Significant changes noted: Surgeon notified.          Ready For Surgery From Anesthesia Perspective.

## 2017-06-09 NOTE — PLAN OF CARE
Patient and family given discharge instructions and verbalize understanding.  Patient denies pain and vital signs are stable.

## 2017-06-09 NOTE — OP NOTE
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 Item Serial No.  Lot No. LRB No. Used   PORT POWER CLEAR VIEW - [id number]   PORT POWER CLEAR VIEW   [employer] [id number] Left 1       Specimens:   Specimen (12h ago through future)    None                  The patient was placed supine . SCDs were placed for DVT prophylaxis and antibiotics were given  before the incision. SCD on the lower extremities.  The upper chest and neck were prepped and draped with sterile technique.     A time out was completed.    The central catheter was flushed with heparin to ensure function. Landmarks were identified and a skin entry site was chosen 1-2 cm inferior to the distal third of the clavicle. The left chest  wallv skin and subcutaneous tissue were anesthetized with lidocaine. Local anesthesia was carried down to the periosteum of the clavicle.     The patient's head was turned in the contralateral direction and, as the ipsilateral arm was retracted caudad.  The bed was placed in slight Trendelenburg position.  The **left* subclavian vein was then located with the needle and a 10-mL syringe. The needle was inserted at the chosen site with the bevel down and directed toward the sternal notch. With continuous negative pressure in the syringe, the needle was advanced in a stepwise fashion down  the clavicle. The vein was located as the needle passed beneath the bone. As soon as the syringe began to fill with venous blood, the needle was rotated 90 degrees so the bevel was pointed at the foot of the bed. The needle position was secured and the syringe removed. The hub was occluded to prevent venous air embolus. The guide wire passed easily, and its position in the superior vena cava was confirmed by fluoroscopy. The needle was removed while the wire was held in place. A small incision was then made at the point of wire entry. A 3-cm incision was made on the anterior chest wall, and electrocautery was used to create a subcutaneous pocket for the port.     The catheter was then tunneled under the skin from the chest wall incision to the guide wire entry site. The port was placed in the subcutaneous pocket, and the catheter was cut at a suitable length to ensure correct placement of the tip in the superior vena cava. The dilator/introducer was placed over the wire and the tract gently dilated. The dilator and wire were then removed, and the catheter inserted into the introducer, which was split and removed. The tip of the catheter was confirmed in the superior vena cava by fluoroscopy.    The port was secured in place in the subcutaneous pocket with 0-vicryl sutures. The port was aspirated to ensure adequate blood flow and then flushed with heparinized saline. Both skin incisions were then closed in two layers with interrupted 3-0 Vicryl sutures and running 4-0vicryl sutures.     The patient tolerated the procedure well and was taken to the postanesthesia care unit in stable condition.  An upright chest x-ray was ordered to document location of the catheter tip and check for pneumothorax.

## 2017-06-09 NOTE — TRANSFER OF CARE
"Anesthesia Transfer of Care Note    Patient: Kylie Urbano    Procedure(s) Performed: Procedure(s) (LRB):  INSERTION-PORT (Left)    Patient location: PACU    Anesthesia Type: general    Transport from OR: Transported from OR on room air with adequate spontaneous ventilation    Post pain: adequate analgesia    Post assessment: no apparent anesthetic complications    Post vital signs: stable    Level of consciousness: awake    Nausea/Vomiting: no nausea/vomiting    Complications: none    Transfer of care protocol was followed      Last vitals:   Visit Vitals  BP (!) 187/101 (BP Location: Right arm, Patient Position: Sitting, BP Method: Automatic)   Pulse (!) 111   Temp 36.7 °C (98.1 °F) (Temporal)   Resp 18   Ht 5' 8" (1.727 m)   Wt 124.1 kg (273 lb 9.5 oz)   LMP 01/01/2016   Breastfeeding? No   BMI 41.60 kg/m²     "

## 2017-06-09 NOTE — ANESTHESIA RELEASE NOTE
"Anesthesia Release from PACU Note    Patient: Kylie Urbano    Procedure(s) Performed: Procedure(s) (LRB):  INSERTION-PORT (Left)    Anesthesia type: general    Post pain: Adequate analgesia    Post assessment: no apparent anesthetic complications    Last Vitals:   Visit Vitals  BP (!) 187/101 (BP Location: Right arm, Patient Position: Sitting, BP Method: Automatic)   Pulse (!) 111   Temp 36.7 °C (98.1 °F) (Temporal)   Resp 18   Ht 5' 8" (1.727 m)   Wt 124.1 kg (273 lb 9.5 oz)   LMP 01/01/2016   Breastfeeding? No   BMI 41.60 kg/m²       Post vital signs: stable    Level of consciousness: awake    Nausea/Vomiting: no nausea/no vomiting    Complications: none    Airway Patency: patent    Respiratory: unassisted    Cardiovascular: stable and blood pressure at baseline    Hydration: euvolemic  "

## 2017-06-09 NOTE — DISCHARGE INSTRUCTIONS
General Information:    1.  Do not drink alcoholic beverages including beer for 24 hours or as long as you are on pain medication..  2.  Do not drive a motor vehicle, operate machinery or power tools, or signs legal papers for 24 hours or as long as you are on pain medication.   3.  You may experience light-headedness, dizziness, and sleepiness following surgery. Please do not stay alone. A responsible adult should be with you for this 24 hour period.  4.  Go home and rest.    5. Progress slowly to a normal diet unless instructed.  Otherwise, begin with liquids such as soft drinks, then soup and crackers working up to solid foods. Drink plenty of nonalcoholic fluids.  6.  Certain anesthetics and pain medications produce nausea and vomiting in certain       individuals. If nausea becomes a problem at home, call you doctor.    7. A nurse will be calling you sometime after surgery. Do not be alarmed. This is our way of finding out how you are doing.    8. Several times every hour while you are awake, take 2-3 deep breaths and cough. If you had stomach surgery hold a pillow or rolled towel firmly against your stomach before you cough. This will help with any pain the cough might cause.  9. Several times every hour while you are awake, pump and flex your feet 5-6 times and do foot circles. This will help prevent blood clots.    10.Call your doctor for severe pain, bleeding, fever, or signs or symptoms of infection (pain, swelling, redness, foul odor, drainage).    11.You can contact your doctor anytime by [phone] for the [facility] (at [facility]) or [phone] for the [facility] on [facility].   [email or web address] is another way to contact your doctor if you are an active participant online with [facility].        Vascular Access Port Implantation   Port implantation is surgery to place (implant) a port under the skin. For vascular access, it is placed into a vein. The port allows medicines  or nutrition to be sent right into your bloodstream. Blood can also be taken or given through the port. During the procedure, a long, thin tube called a catheter is threaded into one of your large veins. The tube is then attached to the port. This usually sits under the skin of your chest and causes a small bump. To use the port, a special needle is passed through your skin and into the port. The needle can stay in your skin for up to 7 days, if needed. A port can stay in place for weeks or months or longer.    Why is a vascular access port needed?  A vascular access port may allow healthcare providers to give you:  · Chemotherapy or other cancer-fighting drugs  · IV treatments, such as antibiotics or nutrition  · Hemodialysis (for kidney failure)  The port may also be used to draw blood.  Before the procedure  Follow any instructions you are given on how to prepare.  Tell your provider about any medicines you are taking. This includes:  · All prescription medicines  · Over-the-counter medicines such as aspirin or ibuprofen  · Herbs, vitamins, and other supplements  Also be sure your provider knows:  · If you are pregnant or think you may be pregnant  · If you are allergic to any medicines or substances, especially local anesthetics or iodine  · Your full medical history, including why you will need the port  · If you plan on doing any contact sports  During the procedure  · Before the procedure, an IV may be put into a vein in your arm or hand. This gives you fluids and medicines. You may be given medicine through the IV to help you relax during the procedure. This is called sedation. But some surgeons place ports using general anesthesia.  · The chest is used most often for the port. In some cases, your belly (abdomen) or arm will be used instead.  · The skin over the insertion area is numbed with local anesthetic.  · Ultrasound or X-rays are used to help the healthcare provider guide the catheter into the proper  location during the procedure.  · A cut (incision) is made in the skin where the port will be placed. A small pocket for the port is formed under the skin.  · A second small incision is made in the skin near the first incision. A tunnel under the skin is created. The catheter is put through the tunnel and into the blood vessel.  · The skin is closed over the port. It is held shut with stitches (sutures) or surgical glue or tape. The second small incision is also closed.  · A chest X-ray may be done to make sure the port is placed properly.  After the procedure  You may be taken to a recovery room where youll recover from the sedation. Nurses will check on you as you rest. If you have pain, nurses can give you medicine. If you are not staying in the hospital overnight, you will be sent home a few hours after the procedure is done. A healthcare provider will tell you when you can go home. An adult family member or friend will need to drive you home.  Recovering at home  · Take pain medicine as directed by your healthcare provider.  · Take it easy for 24 hours after the procedure. Avoid physical activity and heavy lifting until your healthcare provider says its OK.  · Keep the port clean and dry. Ask when you can shower again. You will need to keep the port dry by covering it when you shower.  · Care for the insertion site as you are directed.  · Dont swim, bathe, or do other activities that cause water to cover the insertion site.  · To keep the port from getting blocked with blood clots, flush it as often as directed. You should be shown the proper way to flush the port before you go home. It is important to follow these directions.     Risks and possible complications of implantation  · Bleeding  · Infection of the insertion site  · Damage to a blood vessel  · Nerve injury or irritation  · Collapsed lung (for chest port placements)  · Skin breakdown over the port  Risks and possible complications of having a  port  · Blocked  port or catheter  · Leakage or breakage of the port or catheter  · The port moves out of position  · Blood clot  · Skin or bloodstream infection  · Skin breakdown over the port      When to seek medical care  Call your healthcare provider right away if you have any of the following:  · A fever of 100.4°F (38.0°C) or higher  · You can't access or use the port properly  · You can't flush the port or get a blood return  · The skin near the port is red, warm, swollen, or broken  · You have shoulder pain on the side where the port is located  · You feel a heart flutter or racing heart   · Swollen arm, if the port is placed in your arm   Date Last Reviewed: 7/1/2016  © 5305-0739 [employer]. [address] [place] [address], [place] [address]. All rights reserved. This information is not intended as a substitute for professional medical care. Always follow your healthcare professional's instructions.

## 2017-06-09 NOTE — H&P (VIEW-ONLY)
Hematology/Oncology Office Note    Reason for referral:  Locally advanced breast cancer with biopsy proven axillary lymph node involvement    CC:    Referred by:  Raquel Bergeron NP    Diagnosis:  Right sided locally advanced infiltrating ductal carcinoma with biopsy-proven axillary lymph node involvement (ER positive at 95%, OH positive at 95%, HER-2 1+ by IHC and negative by fish    Treatment:    History of present illness:  Ms. Urbano is a 51-year-old female with a past medical history of DM hypertension, hypothyroidism, iron deficiency anemia, who was noted to have abnormal screening mammogram  on 4/28/2017.  Core biopsy of right breast mass at 10:00 6 cm from the nipple demonstrated infiltrating ductal carcinoma and right axillary lymph node core biopsy also demonstrated infiltrating ductal carcinoma.  ER/OH positive, HER-2 negative markers.  The patient has a first cousin with a history of breast cancer and one aunt with a history of breast cancer.  She has some soreness at previous biopsy site, however, denies other significant symptoms.      Past Medical History:   Diagnosis Date    Allergic rhinitis, cause unspecified     Carpal tunnel syndrome of left wrist     Elevated liver function tests     in the past    Fibroid uterus     10/2014    History of sciatica     History of vitamin D deficiency     HSV infection     Type 1 and 2    HTN (hypertension)     Hypothyroidism     Insulin resistance     Iron deficiency anemia, unspecified     Obesity     Tension headache          Social History:  No tobacco, alcohol, or illicit drugs      Family History: family history includes Cancer in her father and sister; Diabetes in her mother; Heart failure in her mother; Hypertension in her mother; No Known Problems in her daughter, daughter, and son.        HPI  Review of Systems   Constitutional: Negative for activity change, appetite change, fatigue, fever and unexpected weight change.   HENT: Negative  for congestion, facial swelling, mouth sores, nosebleeds, sore throat, trouble swallowing and voice change.    Eyes: Negative.  Negative for visual disturbance.   Respiratory: Negative for apnea, cough, choking, chest tightness and shortness of breath.    Cardiovascular: Negative for chest pain, palpitations and leg swelling.   Gastrointestinal: Negative for abdominal distention, abdominal pain, anal bleeding, blood in stool, constipation, diarrhea, nausea and vomiting.   Endocrine: Negative.    Genitourinary: Negative for difficulty urinating, dyspareunia, dysuria, flank pain, frequency, hematuria, pelvic pain and vaginal bleeding.   Musculoskeletal: Negative for arthralgias, back pain, gait problem, joint swelling, myalgias and neck pain.   Skin: Negative for pallor, rash and wound.   Allergic/Immunologic: Negative for environmental allergies and immunocompromised state.   Neurological: Negative for dizziness, tremors, seizures, syncope, facial asymmetry, speech difficulty, weakness, light-headedness, numbness and headaches.   Hematological: Negative for adenopathy. Does not bruise/bleed easily.   Psychiatric/Behavioral: Negative for agitation, behavioral problems, confusion, dysphoric mood and hallucinations. The patient is not nervous/anxious and is not hyperactive.        Objective:      Physical Exam   Constitutional: She is oriented to person, place, and time. She appears well-developed and well-nourished. No distress.   Well groomed   HENT:   Head: Normocephalic and atraumatic.   Right Ear: Tympanic membrane and ear canal normal.   Left Ear: Tympanic membrane and ear canal normal.   Nose: Nose normal. Right sinus exhibits no maxillary sinus tenderness and no frontal sinus tenderness. Left sinus exhibits no maxillary sinus tenderness and no frontal sinus tenderness.   Mouth/Throat: Oropharynx is clear and moist and mucous membranes are normal. No oral lesions. Normal dentition. No oropharyngeal exudate.    Eyes: Conjunctivae, EOM and lids are normal. Pupils are equal, round, and reactive to light. Lids are everted and swept, no foreign bodies found. No scleral icterus.   Neck: Trachea normal and normal range of motion. Neck supple. No JVD present. No tracheal deviation present. No thyroid mass and no thyromegaly present.   No crepitus   Cardiovascular: Normal rate, regular rhythm, normal heart sounds, intact distal pulses and normal pulses.  Exam reveals no gallop and no friction rub.    No murmur heard.  Pulmonary/Chest: Effort normal and breath sounds normal. She has no wheezes. She has no rales. She exhibits no tenderness.   Right breast: Ill-defined induration without discrete mass palpated, 2 cm right axillary node  Left breast: No masses, skin changes, lymphadenopathy   Abdominal: Soft. Normal appearance and bowel sounds are normal. She exhibits no distension and no mass. There is no hepatosplenomegaly. There is no tenderness. There is no rebound and no guarding.   Musculoskeletal: Normal range of motion. She exhibits no edema or tenderness.   Lymphadenopathy:        Head (right side): No submental, no submandibular and no tonsillar adenopathy present.        Head (left side): No submental, no submandibular and no tonsillar adenopathy present.     She has no cervical adenopathy.     She has axillary adenopathy.        Right: No supraclavicular adenopathy present.        Left: No supraclavicular adenopathy present.   Right axillary node 2 cm   Neurological: She is alert and oriented to person, place, and time. No cranial nerve deficit. She exhibits normal muscle tone. Coordination normal.   Skin: Skin is warm and dry. No rash noted. She is not diaphoretic. No cyanosis or erythema. No pallor. Nails show no clubbing.   Psychiatric: She has a normal mood and affect. Her behavior is normal. Judgment and thought content normal.       Lab Results   Component Value Date    WBC 7.48 06/01/2017    HGB 13.8 06/01/2017     HCT 40.7 06/01/2017    MCV 86 06/01/2017     06/01/2017     Lab Results   Component Value Date    CREATININE 0.8 06/01/2017    BUN 12 06/01/2017     06/01/2017    K 4.0 06/01/2017     06/01/2017    CO2 30 (H) 06/01/2017     Lab Results   Component Value Date    ALT 43 06/01/2017    AST 26 06/01/2017    ALKPHOS 100 06/01/2017    BILITOT 0.2 06/01/2017         Assessment:         51-year-old female with a new diagnosis of right-sided infiltrating ductal carcinoma ER/GA positive, HER-2 negative with biopsy-proven right axillary lymph node involvement.  Patient was consented for BRCA testing for participation and B-55 study and BRCA testing will be sent today.  I discussed neoadjuvant therapy at length and the benefits of preoperative chemotherapy.  The patient is agreeable to proceeding with treatment and we will plan to start  ddAC--> following port placement.  We will need to obtain an echocardiogram prior to beginning treatment and the patient will receive chemotherapy teaching.    ER/GA positive, HER-2 negative infiltrating ductal carcinoma the right breast with biopsy proven lymph node involvement clinically stage II/III.  --CT of the chest  --CBC, CMP, HIV, hepatitis B/C serologies  --Echocardiogram  --Mediport placement  --Chemotherapy teaching  --BRCA testing via B-55 Study  --ddAC--> taxol following port placement.     Hypertension:  Continue Lotensin/HCTZ    Hypothyroidism:  Continue Synthroid    Distress Screening Results: Psychosocial Distress screening score of Distress Score: 0 noted and reviewed. No intervention indicated.

## 2017-06-12 ENCOUNTER — SOCIAL WORK (OUTPATIENT)
Dept: HEMATOLOGY/ONCOLOGY | Facility: CLINIC | Age: 52
End: 2017-06-12

## 2017-06-12 ENCOUNTER — INFUSION (OUTPATIENT)
Dept: INFUSION THERAPY | Facility: HOSPITAL | Age: 52
End: 2017-06-12
Attending: INTERNAL MEDICINE
Payer: COMMERCIAL

## 2017-06-12 ENCOUNTER — OFFICE VISIT (OUTPATIENT)
Dept: HEMATOLOGY/ONCOLOGY | Facility: CLINIC | Age: 52
End: 2017-06-12
Payer: COMMERCIAL

## 2017-06-12 VITALS
DIASTOLIC BLOOD PRESSURE: 90 MMHG | HEIGHT: 68 IN | BODY MASS INDEX: 41.8 KG/M2 | HEART RATE: 110 BPM | WEIGHT: 275.81 LBS | RESPIRATION RATE: 18 BRPM | TEMPERATURE: 98 F | SYSTOLIC BLOOD PRESSURE: 160 MMHG | OXYGEN SATURATION: 99 %

## 2017-06-12 VITALS — WEIGHT: 275.81 LBS | HEIGHT: 68 IN | BODY MASS INDEX: 41.8 KG/M2

## 2017-06-12 DIAGNOSIS — C50.411 MALIGNANT NEOPLASM OF UPPER-OUTER QUADRANT OF RIGHT FEMALE BREAST: ICD-10-CM

## 2017-06-12 DIAGNOSIS — C50.911 MALIGNANT NEOPLASM OF RIGHT FEMALE BREAST, UNSPECIFIED SITE OF BREAST: Primary | ICD-10-CM

## 2017-06-12 DIAGNOSIS — C50.411 MALIGNANT NEOPLASM OF UPPER-OUTER QUADRANT OF RIGHT FEMALE BREAST: Primary | ICD-10-CM

## 2017-06-12 PROCEDURE — 96411 CHEMO IV PUSH ADDL DRUG: CPT

## 2017-06-12 PROCEDURE — 96413 CHEMO IV INFUSION 1 HR: CPT

## 2017-06-12 PROCEDURE — 99999 PR PBB SHADOW E&M-EST. PATIENT-LVL III: CPT | Mod: PBBFAC,,, | Performed by: INTERNAL MEDICINE

## 2017-06-12 PROCEDURE — 25000003 PHARM REV CODE 250: Performed by: INTERNAL MEDICINE

## 2017-06-12 PROCEDURE — 96367 TX/PROPH/DG ADDL SEQ IV INF: CPT

## 2017-06-12 PROCEDURE — 99215 OFFICE O/P EST HI 40 MIN: CPT | Mod: S$GLB,,, | Performed by: INTERNAL MEDICINE

## 2017-06-12 PROCEDURE — 96375 TX/PRO/DX INJ NEW DRUG ADDON: CPT

## 2017-06-12 PROCEDURE — 63600175 PHARM REV CODE 636 W HCPCS: Performed by: INTERNAL MEDICINE

## 2017-06-12 RX ORDER — DOXORUBICIN HYDROCHLORIDE 2 MG/ML
60 INJECTION, SOLUTION INTRAVENOUS
Status: COMPLETED | OUTPATIENT
Start: 2017-06-12 | End: 2017-06-12

## 2017-06-12 RX ORDER — ACETAMINOPHEN 500 MG
500-1000 TABLET ORAL EVERY 6 HOURS PRN
COMMUNITY
End: 2017-11-13

## 2017-06-12 RX ORDER — DOXORUBICIN HYDROCHLORIDE 2 MG/ML
60 INJECTION, SOLUTION INTRAVENOUS
Status: CANCELLED | OUTPATIENT
Start: 2017-06-12

## 2017-06-12 RX ORDER — PALONOSETRON 0.05 MG/ML
0.25 INJECTION, SOLUTION INTRAVENOUS
Status: COMPLETED | OUTPATIENT
Start: 2017-06-12 | End: 2017-06-12

## 2017-06-12 RX ORDER — SODIUM CHLORIDE 0.9 % (FLUSH) 0.9 %
10 SYRINGE (ML) INJECTION
Status: CANCELLED | OUTPATIENT
Start: 2017-06-12

## 2017-06-12 RX ORDER — IBUPROFEN 200 MG
200-400 TABLET ORAL EVERY 8 HOURS PRN
COMMUNITY
End: 2020-03-26

## 2017-06-12 RX ORDER — HEPARIN 100 UNIT/ML
500 SYRINGE INTRAVENOUS
Status: DISCONTINUED | OUTPATIENT
Start: 2017-06-12 | End: 2017-06-12 | Stop reason: HOSPADM

## 2017-06-12 RX ORDER — PROCHLORPERAZINE MALEATE 10 MG
10 TABLET ORAL EVERY 6 HOURS PRN
Qty: 30 TABLET | Refills: 2 | Status: SHIPPED | OUTPATIENT
Start: 2017-06-12 | End: 2018-06-12

## 2017-06-12 RX ORDER — HEPARIN 100 UNIT/ML
500 SYRINGE INTRAVENOUS
Status: CANCELLED | OUTPATIENT
Start: 2017-06-12

## 2017-06-12 RX ORDER — LEVOTHYROXINE SODIUM 200 UG/1
250 TABLET ORAL DAILY
COMMUNITY
End: 2018-01-04 | Stop reason: SDUPTHER

## 2017-06-12 RX ORDER — ONDANSETRON 8 MG/1
8 TABLET, ORALLY DISINTEGRATING ORAL EVERY 8 HOURS PRN
Qty: 30 TABLET | Refills: 2 | Status: SHIPPED | OUTPATIENT
Start: 2017-06-12 | End: 2017-11-17

## 2017-06-12 RX ADMIN — CYCLOPHOSPHAMIDE 1470 MG: 500 INJECTION, POWDER, FOR SOLUTION INTRAVENOUS; ORAL at 04:06

## 2017-06-12 RX ADMIN — PALONOSETRON HYDROCHLORIDE 0.25 MG: 0.25 INJECTION INTRAVENOUS at 02:06

## 2017-06-12 RX ADMIN — SODIUM CHLORIDE: 0.9 INJECTION, SOLUTION INTRAVENOUS at 02:06

## 2017-06-12 RX ADMIN — DOXORUBICIN HYDROCHLORIDE 148 MG: 2 INJECTION, SOLUTION INTRAVENOUS at 03:06

## 2017-06-12 RX ADMIN — DEXAMETHASONE SODIUM PHOSPHATE: 4 INJECTION, SOLUTION INTRAMUSCULAR; INTRAVENOUS at 03:06

## 2017-06-12 RX ADMIN — SODIUM CHLORIDE 150 MG: 900 INJECTION, SOLUTION INTRAVENOUS at 03:06

## 2017-06-12 RX ADMIN — HEPARIN 500 UNITS: 100 SYRINGE at 05:06

## 2017-06-12 NOTE — PROGRESS NOTES
Hematology/Oncology Office Note    Reason for referral:  Locally advanced breast cancer with biopsy proven axillary lymph node involvement    CC:    Referred by:  No ref. provider found    Diagnosis:  Right sided locally advanced infiltrating ductal carcinoma with biopsy-proven axillary lymph node involvement (ER positive at 95%, OR positive at 95%, HER-2 1+ by IHC and negative by fish    Treatment:    History of present illness:  Ms. Urbano is a 51-year-old female with a past medical history of DM hypertension, hypothyroidism, iron deficiency anemia, who was noted to have abnormal screening mammogram  on 4/28/2017.  Core biopsy of right breast mass at 10:00 6 cm from the nipple demonstrated infiltrating ductal carcinoma and right axillary lymph node core biopsy also demonstrated infiltrating ductal carcinoma.  ER/OR positive, HER-2 negative markers.  The patient has a first cousin with a history of breast cancer and one aunt with a history of breast cancer.  She has some soreness at previous biopsy site, however, denies other significant symptoms.    I have reviewed and updated the HPI, ROS, PMHx, Social Hx, Family Hx and treatment history.    Today's visit:  Patient is without complaints today      Past Medical History:   Diagnosis Date    Allergic rhinitis, cause unspecified     Cancer     R Breast Cancer    Carpal tunnel syndrome of left wrist     Elevated liver function tests     in the past    Fibroid uterus     10/2014    History of sciatica     HSV infection     Type 1 and 2    HTN (hypertension)     Hypothyroidism     Insulin resistance     Iron deficiency anemia, unspecified     Obesity     Tension headache     Vitamin D deficiency          Social History:  No tobacco, alcohol, or illicit drugs      Family History: family history includes Cancer in her father and sister; Diabetes in her mother; Heart failure in her mother; Hypertension in her mother; No Known Problems in her daughter,  daughter, and son.        HPI    Review of Systems   Constitutional: Negative for activity change, appetite change, fatigue, fever and unexpected weight change.   HENT: Negative for congestion, dental problem, drooling, ear pain, facial swelling, mouth sores, nosebleeds, sore throat, trouble swallowing and voice change.    Eyes: Negative.    Respiratory: Negative for apnea, cough, choking, chest tightness and shortness of breath.    Cardiovascular: Negative for chest pain and palpitations.   Gastrointestinal: Negative for abdominal distention, abdominal pain, anal bleeding, blood in stool, constipation, nausea and vomiting.   Endocrine: Negative.    Genitourinary: Negative for difficulty urinating, dyspareunia, dysuria, flank pain, frequency, hematuria, pelvic pain and vaginal bleeding.   Musculoskeletal: Negative for arthralgias, back pain, gait problem, joint swelling, myalgias and neck pain.   Skin: Negative for pallor, rash and wound.   Allergic/Immunologic: Negative.    Neurological: Negative for dizziness, tremors, seizures, syncope, facial asymmetry, speech difficulty, weakness, light-headedness, numbness and headaches.   Hematological: Negative for adenopathy. Does not bruise/bleed easily.   Psychiatric/Behavioral: Negative for agitation, behavioral problems, confusion, dysphoric mood and hallucinations. The patient is nervous/anxious. The patient is not hyperactive.        Objective:      Physical Exam   Constitutional: She is oriented to person, place, and time. She appears well-developed and well-nourished. No distress. She is not intubated.   Well groomed   HENT:   Head: Normocephalic and atraumatic.   Right Ear: Tympanic membrane and ear canal normal.   Left Ear: Tympanic membrane and ear canal normal.   Nose: Nose normal. Right sinus exhibits no maxillary sinus tenderness and no frontal sinus tenderness. Left sinus exhibits no maxillary sinus tenderness and no frontal sinus tenderness.   Mouth/Throat:  Oropharynx is clear and moist and mucous membranes are normal. Mucous membranes are not pale and not dry. No oral lesions. Normal dentition. No oropharyngeal exudate.   Eyes: Conjunctivae, EOM and lids are normal. Pupils are equal, round, and reactive to light. Lids are everted and swept, no foreign bodies found. Right eye exhibits no discharge. Left eye exhibits no discharge. No scleral icterus.   Neck: Trachea normal and normal range of motion. Neck supple. No JVD present. No tracheal deviation present. No thyroid mass and no thyromegaly present.   No crepitus   Cardiovascular: Normal rate, regular rhythm, normal heart sounds, intact distal pulses and normal pulses.  Exam reveals no gallop and no friction rub.    No murmur heard.  Pulmonary/Chest: Effort normal and breath sounds normal. No accessory muscle usage. She is not intubated. No respiratory distress. She has no decreased breath sounds. She has no wheezes. She has no rhonchi. She has no rales. She exhibits no tenderness.   Right breast: Ill-defined induration without discrete mass palpated, 2 cm right axillary node  Left breast: No masses, skin changes, lymphadenopathy   Abdominal: Soft. Normal appearance and bowel sounds are normal. She exhibits no distension. There is no hepatosplenomegaly. There is no tenderness. There is no guarding.   Musculoskeletal: Normal range of motion. She exhibits no edema or tenderness.   Lymphadenopathy:        Head (right side): No submental, no submandibular and no tonsillar adenopathy present.        Head (left side): No submental, no submandibular and no tonsillar adenopathy present.     She has no cervical adenopathy.     She has axillary adenopathy.        Right: No supraclavicular adenopathy present.        Left: No supraclavicular adenopathy present.   Right axillary node 2 cm   Neurological: She is alert and oriented to person, place, and time. No cranial nerve deficit. She exhibits normal muscle tone. Coordination  normal.   Skin: Skin is warm, dry and intact. Capillary refill takes less than 2 seconds. No rash noted. She is not diaphoretic. No erythema. No pallor. Nails show no clubbing.   Psychiatric: She has a normal mood and affect. Her behavior is normal. Judgment and thought content normal.       Lab Results   Component Value Date    WBC 7.48 06/01/2017    HGB 13.8 06/01/2017    HCT 40.7 06/01/2017    MCV 86 06/01/2017     06/01/2017     Lab Results   Component Value Date    CREATININE 0.8 06/01/2017    BUN 12 06/01/2017     06/01/2017    K 4.0 06/01/2017     06/01/2017    CO2 30 (H) 06/01/2017     Lab Results   Component Value Date    ALT 43 06/01/2017    AST 26 06/01/2017    ALKPHOS 100 06/01/2017    BILITOT 0.2 06/01/2017         Assessment:         51-year-old female with a new diagnosis of right-sided infiltrating ductal carcinoma ER/WV positive, HER-2 negative with biopsy-proven right axillary lymph node involvement.  Patient was consented for BRCA testing for participation and B-55 study and BRCA testing will be sent today.  I discussed neoadjuvant therapy at length and the benefits of preoperative chemotherapy.  The patient is agreeable to proceeding with treatment and we will plan to start  ddAC today.  She will receive Neulasta tomorrow in follow-up next week to evaluate for toxicity.          ER/WV positive, HER-2 negative infiltrating ductal carcinoma the right breast with biopsy proven lymph node involvement clinically stage II/III.  --BRCA testing via B-55 Study pending  --ddAC--> taxol   cycle 1 day 1 today  --Neulasta tomorrow  --Follow-up next week to evaluate for toxicity    Hypertension:  Continue Lotensin/HCTZ    Hypothyroidism:  Continue Synthroid    4 mm right upper lobe pulmonary nodule and subpleural nodularity bilateral bases:  --Repeat CT scan  after treatment

## 2017-06-12 NOTE — PLAN OF CARE
Problem: Patient Care Overview  Goal: Plan of Care Review  Outcome: Ongoing (interventions implemented as appropriate)  Pt reports that she is just nervous to get started with treatment.

## 2017-06-12 NOTE — PATIENT INSTRUCTIONS
[facility]  [address]  [address] [address]  [phone] phone     [phone] fax  Hours of Operation: Monday- Friday 8:00am- 5:00pm  After hours phone  [phone]  Hematology / Oncology Physicians on call      Dr. Kulwinder Kennedy                      Please call with any concerns regarding your appointment today.    FALL PREVENTION   Falls often occur due to slipping, tripping or losing your balance. Here are ways to reduce your risk of falling again.   Was there anything that caused your fall that can be fixed, removed or replaced?   Make your home safe by keeping walkways clear of objects you may trip over.   Use non-slip pads under rugs.   Do not walk in poorly lit areas.   Do not stand on chairs or wobbly ladders.   Use caution when reaching overhead or looking upward. This position can cause a loss of balance.   Be sure your shoes fit properly, have non-slip bottoms and are in good condition.   Be cautious when going up and down stairs, curbs, and when walking on uneven sidewalks.   If your balance is poor, consider using a cane or walker.   If your fall was related to alcohol use, stop or limit alcohol intake.   If your fall was related to use of sleeping medicines, talk to your doctor about this. You may need to reduce your dosage at bedtime if you awaken during the night to go to the bathroom.   To reduce the need for nighttime bathroom trips:   Avoid drinking fluids for several hours before going to bed   Empty your bladder before going to bed   Men can keep a urinal at the bedside   © 3513-9652 [employer], [address] [place] [address], [place] [address]. All rights reserved. This information is not intended as a substitute for professional medical care. Always follow your healthcare professional's instructions.    HOME CARE AFTER CHEMOTHERAPY   Meals   Many patients feel sick and lose their appetites during treatment.  Eat small meals several times a day. Choose bland foods with little taste or smell if you have problems with nausea. Be sure to cook all food thoroughly. This kills bacteria and helps you avoid intestinal infection. Soft foods are easier to swallow and digest.   Activity   Exercise keeps you strong and keeps your heart and lungs active. Talk to your doctor about an appropriate exercise program for you.   Skin Care   To prevent a skin infection, bathe or shower once a day. Use a moisturizing soap and wash with warm water. Avoid very hot or cold water. Chemotherapy can make your skin dry . Apply moisturizing lotion to help relieve dry skin. Some drugs used in high doses can cause slight burns to appear (like sunburn). Ask for a special cream to help relieve the burn and protect your skin.   Prevent Mouth Sores   During chemotherapy, many people get mouth sores. Do the following to help prevent mouth sores or to ease discomfort.   Brush your teeth with a soft-bristle toothbrush after every meal.  Don't use dental floss if your platelet count is below 50,000. Your doctor or nurse will tell you if this is the case.  Use an oral swab or special soft toothbrush if your gums bleed during regular brushing.  Use mouthwash as directed. If you can't tolerate commercial mouthwash, use salt and baking soda to clean your mouth. Mix 1 teaspoon of salt and 1 teaspoon of baking soda into a glass of water. Swish and spit.  Call your doctor or return to this facility if you develop any of the following:   Sore throat   White patches in the mouth or throat   Fever of 100.4ºF (38ºC) or higher, or as directed by your healthcare provider  © 2939-3566 [employer], [address] [place] [address], [place] [address]. All rights reserved. This information is not intended as a substitute for professional medical care. Always follow your healthcare professional's     YOU HAVE STARTED ON CHEMOTHERAPY. IF YOU EXPERIENCE ANY OF THE FOLLOWING PROBLEMS,  CALL THE OFFICE IMMEDIATELY.    *FEVER .0 OR GREATER    *CHILLS, ESPECIALLY SHAKING CHILLS, OR SWEATING    *A SEVERE COUGH OR SORE THROAT, OR SINUS PAIN/     PRESSURE    *REDNESS, SWELLING, OR TENDERNESS AROUND A WOUND,     SORE, PIMPLE, RECTAL AREA, OR IV SITE    *SORES OR ULCERS IN THE MOUTH    *BLISTERS ON THE LIPS OR SKIN    *FREQUENT URGENCY TO URINATE OR A BURNING FEELING   WHEN YOU URINATE    *BLOOD IN THE URINE OR STOOL    *ANY UNEXPLAINED BRUISING OR PROLONGED BLEEDING,     (NOSEBLEEDS OR BLEEDING GUMS)    *LOOSE BOWEL MOVEMENTS THAT DO NOT RESPOND TO     IMODIUM OR MORE THAN THREE TIMES A DAY    *VOMITING UNRESPONSIVE TO ANTINAUSEA MEDICINE    *ANY UNUSUAL PHYSICAL SYMPTOMS THAT BEGAN AFTER     CHEMOTHERAPY    DURING WEEKDAYS, CALL AND ASK TO SPEAK DIRECTLY TO A NURSE.  AT OTHER TIMES, CALL THE OFFICE PHONE NUMBER; THE ANSWERING SERVICE WILL CONTACT THE ON-CALL PHYSICIAN.  SOMEONE IS AVAILABLE 24 HOURS A DAY, SEVEN DAYS A WEEK.

## 2017-06-12 NOTE — PROGRESS NOTES
Updated allergy and patient home medication list.    Reviewed schedule and potential side effects from dose dense AC chemotherapy with the patient and family.  This included cardiotoxicity, red urine, nausea/vomiting, hair loss, neutropenia, anemia, thrombocytopenia, insomnia, and increased blood sugar.    Also touched on paclitaxel therapy including additional premedications, infusion reaction, nail changes, and peripheral neuropathy.    Anjali Delaney, [place]va[place], BCPS

## 2017-06-12 NOTE — ADDENDUM NOTE
Encounter addended by: Mason Martinez Jr., MD on: 6/12/2017  5:16 PM<BR>    Actions taken: Diagnosis association updated

## 2017-06-13 ENCOUNTER — INFUSION (OUTPATIENT)
Dept: INFUSION THERAPY | Facility: HOSPITAL | Age: 52
End: 2017-06-13
Attending: INTERNAL MEDICINE
Payer: COMMERCIAL

## 2017-06-13 VITALS
HEART RATE: 101 BPM | DIASTOLIC BLOOD PRESSURE: 87 MMHG | TEMPERATURE: 98 F | RESPIRATION RATE: 18 BRPM | SYSTOLIC BLOOD PRESSURE: 155 MMHG

## 2017-06-13 DIAGNOSIS — C50.411 MALIGNANT NEOPLASM OF UPPER-OUTER QUADRANT OF RIGHT FEMALE BREAST: Primary | ICD-10-CM

## 2017-06-13 PROCEDURE — 96372 THER/PROPH/DIAG INJ SC/IM: CPT | Mod: PO

## 2017-06-13 PROCEDURE — 63600175 PHARM REV CODE 636 W HCPCS: Mod: PO | Performed by: INTERNAL MEDICINE

## 2017-06-13 RX ADMIN — PEGFILGRASTIM 6 MG: 6 INJECTION SUBCUTANEOUS at 03:06

## 2017-06-13 NOTE — PROGRESS NOTES
Oncology Social Work New Patient Assessment     Demographic Information   Name:Kylie Urbano  MRN: [id number]  SSN:    : 1965 Age: 51 y.o. Sex: female   Race: Black or  Marital status:             Home address: [address] [place] [id number]  Mailing address: same  Patient Contact Information:   [phone] (cell)    No e-mail address on record    Emergency Contact Information/Next of kin:  Extended Emergency Contact Information  Primary Emergency Contact: Octavia Urbano (Bianca)  Address: [address] [place] [address] [id number] [place] [address]  Mobile Phone: [phone]  Relation: Daughter  Additional contacts:None    Medical Information   PCP: Beverly Parks MD Primary oncologist: Dr. Martinez     Patient Active Problem List   Diagnosis    HTN (hypertension)    Iron deficiency anemia    Hypothyroidism    Insulin resistance syndrome    Allergic rhinitis    Vitamin D deficiency    HSV infection    Uterine leiomyoma    Status post laparoscopic hysterectomy    Malignant neoplasm of upper-outer quadrant of right female breast    Breast cancer, right breast     Date of cancer diagnosis: 2017 Treatment start date: 2017   New Diagnosis (y/n): yes Previous diagnosis/treatment: N/A   Current treatment meds: Cytoxan Treatment frequency: every 2 weeks   Radiation Treatment: pending chemo  Radiation Provider: N/A   Surgery: pending chemo      Preferred Pharmacy:   [id number]  [address]  [address] [facility]  [address] [facility] [address]  Phone: [phone] Fax: [phone]    [facility]  [address]  [address]  [address] [facility] [address]  Phone: [phone] Fax: [phone]    [facility] - [address]  [address] [place] [address] [address] [address]  [place] [address]  Phone: [phone] Fax: [phone]        Insurance/Benefits Information   Primary Insurance: BCBSLA Secondary Insurance: None   Policy #: [id number] Policy #:    Group #: [id number] Group #:    Subscriber: pt Subscriber:        Prescription Coverage: BCBSLA Additional Policies/Benefits: None   RxID #: [id number] Disability Insurance:    RxBIN #:  Cancer policy:    RxGrp #:   benefits:    RxPCN #:  Long Term Care Benefits:     Other:      Employment/Income/Financial Assistance Information   Employment status: Working p/t currently, but fall will return to f/t Employer/job: [employer]   Sources of income: employment Financial concerns: yes   Available assistance: CS, ACS, CancerCare Met with financial services: No, but informed of their services   Medicaid screening: No      Psychosocial History   Mental status: Alert, oriented x 3 Education level: 12th grade   Work History: [employer] Family involvement: daughter present today    Living Arrangements: pt lives with children Rent/Mortgage: Not addressed   Transportation Concerns: None Nutrition/weight loss concerns: Pt is interested in [employer]    HH: No HME: None, but informed about CS equipment and supplies   ADLs: Mostly independent Literate (y/n): yes   Primary language: English Need : No   Psychiatric history: Not indicated Legal concerns: None; pt was provided information on Advance Directives and encouraged to complete and return   Sources of support: Family, Hindu members, Friends Emotional concerns: Not indicated; however, pt was quiet and seemed anxious throughout the conversation   Druze/spiritual endorsement/concerns: Not indicated Restorationist/Spirituality: Yazidi   Understanding of diagnosis: Good Treatment expectations: Not addressed   Main Concern(s): Financial Communication style: Quiet, friendly, but not talkative   Distress Screening Score: 0 Date given: 6/1/2017     Additional history:   Social History     Social History    Marital status:      Spouse  name: N/A    Number of children: 3    Years of education: N/A     Occupational History    [facility] [employer] [facility]     Social History Main Topics    Smoking status: Never Smoker    Smokeless tobacco: Never Used    Alcohol use 0.0 oz/week      Comment: occasionally  No alchohol for 72h prior to surgery    Drug use: No    Sexual activity: Not Currently     Other Topics Concern    Not on file     Social History Narrative    She wears seatbelt.       Referrals  Cancer Services: Referred 6/12/2017 American Cancer Society: Referred 6/12/2017 Financial Services: Notified on 6/12/2017, but pt will wait until she rec's a bill form [facility] to contact   Medicaid Screening/Application: Not addressed Hem/Onc/Psych: Not indicated Support Groups: Not indicated   Counseling/other supportive care needs: None reported       Additional comments/notes: Met with pt and her daughter Octavia (she referred to her as Bianca, which is her middle name), who is her oldest of three children. Pt reported she also has two younger sons (25, 23). Pt is currently still employed part-time at [employer] as a cook. Pt explained she plans to return to full-time in the fall when school is back in session. Pt does not express need for FMLA paperwork. Her daughter is on worker's comp, but she will ask her brothers if they need any FMLA paperwork done in case they have to bring their mother to her appts. Pt listened quietly as SW explained all available resources. Pt only expressed interest in financial assistance. Pt explained she is not  and relies on her own income to support her (the system indicated she is ). SW indicated CS has one-time financial assistance as well as CancerCare. SW will f/u with pt after she receives her first bill from [facility] to see if any other funding resources are available at that time. Pt was informed about HEM/ONC psychology, [facility] group and  support groups through . She did not express an interest in any of these services. SW provided her contact info and encouraged the pt to contact her should she need any assistance in the future.     Plan: SW will f/u with pt in about a month to see if she needs any additional financial assistance.

## 2017-06-13 NOTE — PATIENT INSTRUCTIONS
[facility]  [address]  [address] [address]  [phone] phone     [phone] fax  Hours of Operation: Monday- Friday 8:00am- 5:00pm  After hours phone  [phone]  Hematology / Oncology Physicians on call      Dr. Kulwinder Kennedy                      Please call with any concerns regarding your appointment today.  HOME CARE AFTER CHEMOTHERAPY   Meals   Many patients feel sick and lose their appetites during treatment. Eat small meals several times a day. Choose bland foods with little taste or smell if you have problems with nausea. Be sure to cook all food thoroughly. This kills bacteria and helps you avoid intestinal infection. Soft foods are easier to swallow and digest.   Activity   Exercise keeps you strong and keeps your heart and lungs active. Talk to your doctor about an appropriate exercise program for you.   Skin Care   To prevent a skin infection, bathe or shower once a day. Use a moisturizing soap and wash with warm water. Avoid very hot or cold water. Chemotherapy can make your skin dry . Apply moisturizing lotion to help relieve dry skin. Some drugs used in high doses can cause slight burns to appear (like sunburn). Ask for a special cream to help relieve the burn and protect your skin.   Prevent Mouth Sores   During chemotherapy, many people get mouth sores. Do the following to help prevent mouth sores or to ease discomfort.   Brush your teeth with a soft-bristle toothbrush after every meal.  Don't use dental floss if your platelet count is below 50,000. Your doctor or nurse will tell you if this is the case.  Use an oral swab or special soft toothbrush if your gums bleed during regular brushing.  Use mouthwash as directed. If you can't tolerate commercial mouthwash, use salt and baking soda to clean your mouth. Mix 1 teaspoon of salt and 1 teaspoon of baking soda into a glass of water. Swish and spit.  Call  your doctor or return to this facility if you develop any of the following:   Sore throat   White patches in the mouth or throat   Fever of 100.4ºF (38ºC) or higher, or as directed by your healthcare provider  © 2000-2011 [facility], [address] [place] [address]. All rights reserved. This information is not intended as a substitute for professional medical care. Always follow your healthcare professional's   FALL PREVENTION   Falls often occur due to slipping, tripping or losing your balance. Here are ways to reduce your risk of falling again.   Was there anything that caused your fall that can be fixed, removed or replaced?   Make your home safe by keeping walkways clear of objects you may trip over.   Use non-slip pads under rugs.   Do not walk in poorly lit areas.   Do not stand on chairs or wobbly ladders.   Use caution when reaching overhead or looking upward. This position can cause a loss of balance.   Be sure your shoes fit properly, have non-slip bottoms and are in good condition.   Be cautious when going up and down stairs, curbs, and when walking on uneven sidewalks.   If your balance is poor, consider using a cane or walker.   If your fall was related to alcohol use, stop or limit alcohol intake.   If your fall was related to use of sleeping medicines, talk to your doctor about this. You may need to reduce your dosage at bedtime if you awaken during the night to go to the bathroom.   To reduce the need for nighttime bathroom trips:   Avoid drinking fluids for several hours before going to bed   Empty your bladder before going to bed   Men can keep a urinal at the bedside   © 2000-2011 [facility], [address] [place] [address]. All rights reserved. This information is not intended as a substitute for professional medical care. Always follow your healthcare professional's instructions.  WAYS TO HELP PREVENT INFECTION         WASH YOUR HANDS OFTEN DURING THE DAY,  ESPECIALLY BEFORE YOU EAT, AFTER USING THE BATHROOM, AND AFTER TOUCHING ANIMALS     STAY AWAY FROM PEOPLE WHO HAVE ILLNESSES YOU CAN CATCH; SUCH AS COLDS, FLU, CHICKEN POX     TRY TO AVOID CROWDS     STAY AWAY FROM CHILDREN WHO RECENTLY HAVE RECEIVED LIVE VIRUS VACCINES     MAINTAIN GOOD MOUTH CARE     DO NOT SQUEEZE OR SCRATCH PIMPLES     CLEAN CUTS & SCRAPES RIGHT AWAY AND DAILY UNTIL HEALED WITH WARM WATER, SOAP & AN ANTISEPTIC     AVOID CONTACT WITH LITTER BOXES, BIRD CAGES, & FISH TANKS     AVOID STANDING WATER, IE., BIRD BATHS, FLOWER POTS/VASES, OR HUMIDIFIERS     WEAR GLOVES WHEN GARDENING OR CLEANING UP AFTER OTHERS, ESPECIALLY BABIES & SMALL CHILDREN     DO NOT EAT RAW FISH, SEAFOOD, MEAT, OR EGGS

## 2017-06-20 ENCOUNTER — OFFICE VISIT (OUTPATIENT)
Dept: HEMATOLOGY/ONCOLOGY | Facility: CLINIC | Age: 52
End: 2017-06-20
Payer: COMMERCIAL

## 2017-06-20 VITALS
HEIGHT: 68 IN | SYSTOLIC BLOOD PRESSURE: 126 MMHG | RESPIRATION RATE: 18 BRPM | TEMPERATURE: 98 F | WEIGHT: 278.25 LBS | BODY MASS INDEX: 42.17 KG/M2 | OXYGEN SATURATION: 99 % | HEART RATE: 97 BPM | DIASTOLIC BLOOD PRESSURE: 78 MMHG

## 2017-06-20 DIAGNOSIS — C50.911 MALIGNANT NEOPLASM OF RIGHT FEMALE BREAST, UNSPECIFIED SITE OF BREAST: Primary | ICD-10-CM

## 2017-06-20 DIAGNOSIS — C50.411 MALIGNANT NEOPLASM OF UPPER-OUTER QUADRANT OF RIGHT FEMALE BREAST: ICD-10-CM

## 2017-06-20 PROCEDURE — 99999 PR PBB SHADOW E&M-EST. PATIENT-LVL III: CPT | Mod: PBBFAC,,, | Performed by: INTERNAL MEDICINE

## 2017-06-20 PROCEDURE — 99214 OFFICE O/P EST MOD 30 MIN: CPT | Mod: S$GLB,,, | Performed by: INTERNAL MEDICINE

## 2017-06-20 RX ORDER — POLYETHYLENE GLYCOL 3350 17 G/17G
POWDER, FOR SOLUTION ORAL
COMMUNITY
End: 2017-11-13

## 2017-06-20 NOTE — PROGRESS NOTES
Hematology/Oncology Office Note    Reason for referral:  Locally advanced breast cancer with biopsy proven axillary lymph node involvement    CC:    Referred by:  No ref. provider found    Diagnosis:  Right sided locally advanced infiltrating ductal carcinoma with biopsy-proven axillary lymph node involvement (ER positive at 95%, VT positive at 95%, HER-2 1+ by IHC and negative by fish    Treatment:    History of present illness:  Ms. Urbano is a 51-year-old female with a past medical history of DM hypertension, hypothyroidism, iron deficiency anemia, who was noted to have abnormal screening mammogram  on 4/28/2017.  Core biopsy of right breast mass at 10:00 6 cm from the nipple demonstrated infiltrating ductal carcinoma and right axillary lymph node core biopsy also demonstrated infiltrating ductal carcinoma.  ER/VT positive, HER-2 negative markers.  The patient has a first cousin with a history of breast cancer and one aunt with a history of breast cancer.  She has some soreness at previous biopsy site, however, denies other significant symptoms.    I have reviewed and updated the HPI, ROS, PMHx, Social Hx, Family Hx and treatment history.    Today's visit:  Patient is without complaints today.  She specifically denies fever, chills, nausea/vomiting/diarrhea.  She experienced mild fatigue which progressed day 3 through 7.      Past Medical History:   Diagnosis Date    Allergic rhinitis, cause unspecified     Cancer     R Breast Cancer    Carpal tunnel syndrome of left wrist     Elevated liver function tests     in the past    Fibroid uterus     10/2014    History of sciatica     HSV infection     Type 1 and 2    HTN (hypertension)     Hypothyroidism     Insulin resistance     Iron deficiency anemia, unspecified     Obesity     Tension headache     Vitamin D deficiency          Social History:  No tobacco, alcohol, or illicit drugs      Family History: family history includes Cancer in her father and  sister; Diabetes in her mother; Heart failure in her mother; Hypertension in her mother; No Known Problems in her daughter, daughter, and son.        HPI    Review of Systems   Constitutional: Positive for fatigue. Negative for activity change, appetite change, chills, diaphoresis, fever and unexpected weight change.   HENT: Negative for congestion, dental problem, drooling, ear pain, facial swelling, mouth sores, nosebleeds, sinus pressure, sneezing, sore throat, tinnitus, trouble swallowing and voice change.    Eyes: Negative.    Respiratory: Negative for apnea, cough, chest tightness, shortness of breath, wheezing and stridor.    Cardiovascular: Negative for chest pain and palpitations.   Gastrointestinal: Negative for abdominal distention, abdominal pain, anal bleeding, blood in stool, constipation, nausea and vomiting.   Endocrine: Negative.    Genitourinary: Negative for difficulty urinating, dyspareunia, dysuria, flank pain, hematuria, pelvic pain, urgency and vaginal bleeding.   Musculoskeletal: Negative for arthralgias, back pain, gait problem, joint swelling, myalgias and neck pain.   Skin: Negative for pallor, rash and wound.   Allergic/Immunologic: Negative.    Neurological: Negative for dizziness, tremors, seizures, syncope, facial asymmetry, speech difficulty, weakness and numbness.   Hematological: Negative for adenopathy. Does not bruise/bleed easily.   Psychiatric/Behavioral: Negative for agitation, behavioral problems, confusion, dysphoric mood and hallucinations. The patient is nervous/anxious. The patient is not hyperactive.        Objective:      Physical Exam   Constitutional: She is oriented to person, place, and time. She appears well-developed and well-nourished. No distress. She is not intubated.   Well groomed   HENT:   Head: Normocephalic and atraumatic.   Right Ear: Tympanic membrane and ear canal normal.   Left Ear: Tympanic membrane and ear canal normal.   Nose: Nose normal. Right  sinus exhibits no maxillary sinus tenderness and no frontal sinus tenderness. Left sinus exhibits no maxillary sinus tenderness and no frontal sinus tenderness.   Mouth/Throat: Oropharynx is clear and moist and mucous membranes are normal. Mucous membranes are not pale and not dry. No oral lesions. Normal dentition. No oropharyngeal exudate.   Eyes: Conjunctivae, EOM and lids are normal. Pupils are equal, round, and reactive to light. Lids are everted and swept, no foreign bodies found. No scleral icterus.   Neck: Trachea normal and normal range of motion. Neck supple. No JVD present. No tracheal deviation present. No thyroid mass and no thyromegaly present.   No crepitus   Cardiovascular: Normal rate, regular rhythm, normal heart sounds, intact distal pulses and normal pulses.  Exam reveals no gallop and no friction rub.    No murmur heard.  Pulmonary/Chest: Effort normal and breath sounds normal. No accessory muscle usage. She is not intubated. No respiratory distress. She has no decreased breath sounds. She has no wheezes. She has no rhonchi. She has no rales. She exhibits no tenderness.   Right breast: Ill-defined induration without discrete mass palpated, 2 cm right axillary node  Left breast: No masses, skin changes, lymphadenopathy   Abdominal: Soft. Normal appearance and bowel sounds are normal. She exhibits no distension. There is no hepatosplenomegaly. There is no tenderness. There is no guarding.   Musculoskeletal: Normal range of motion. She exhibits no edema.   Lymphadenopathy:        Head (right side): No submental, no submandibular and no tonsillar adenopathy present.        Head (left side): No submental, no submandibular and no tonsillar adenopathy present.     She has no cervical adenopathy.     She has axillary adenopathy.        Right: No supraclavicular adenopathy present.        Left: No supraclavicular adenopathy present.   Right axillary node 2 cm   Neurological: She is alert and oriented to  person, place, and time. No cranial nerve deficit. She exhibits normal muscle tone. Coordination normal.   Skin: Skin is warm, dry and intact. Capillary refill takes less than 2 seconds. No abrasion, no bruising and no rash noted. She is not diaphoretic. No cyanosis. No pallor. Nails show no clubbing.   Psychiatric: She has a normal mood and affect. Her behavior is normal. Judgment and thought content normal.       Lab Results   Component Value Date    WBC 3.26 (L) 06/19/2017    HGB 13.0 06/19/2017    HCT 38.6 06/19/2017    MCV 85 06/19/2017     06/19/2017     Lab Results   Component Value Date    CREATININE 0.8 06/19/2017    BUN 9 06/19/2017     06/19/2017    K 4.3 06/19/2017     06/19/2017    CO2 27 06/19/2017     Lab Results   Component Value Date    ALT 45 (H) 06/19/2017    AST 27 06/19/2017    ALKPHOS 131 06/19/2017    BILITOT 0.2 06/19/2017         Assessment:         51-year-old female with a new diagnosis of right-sided infiltrating ductal carcinoma ER/MO positive, HER-2 negative with biopsy-proven right axillary lymph node involvement.  Patient was consented for BRCA testing for participation and B-55 study and BRCA testing will be sent today.  I discussed neoadjuvant therapy at length and the benefits of preoperative chemotherapy.    She received cycle 1 of dense dose Adriamycin/Cytoxan on 6/12/2017 and tolerated treatment exceptionally well.  She will follow-up on 6/26/2017 for cycle 2.  BRCA screening was negative and I discussed results with patient today.            ER/MO positive, HER-2 negative infiltrating ductal carcinoma the right breast with biopsy proven lymph node involvement clinically stage II/III.  --BRCA testing via B-55 Study is negative for mutation  --ddAC--> taxol   cycle 1 day 8 today  --6/26/2017 cycle 2    Hypertension:  Continue Lotensin/HCTZ    Hypothyroidism:  Continue Synthroid    4 mm right upper lobe pulmonary nodule and subpleural nodularity bilateral  bases:  --Repeat CT scan  after treatment

## 2017-06-26 ENCOUNTER — LAB VISIT (OUTPATIENT)
Dept: LAB | Facility: HOSPITAL | Age: 52
End: 2017-06-26
Attending: INTERNAL MEDICINE
Payer: COMMERCIAL

## 2017-06-26 ENCOUNTER — OFFICE VISIT (OUTPATIENT)
Dept: HEMATOLOGY/ONCOLOGY | Facility: CLINIC | Age: 52
End: 2017-06-26
Payer: COMMERCIAL

## 2017-06-26 ENCOUNTER — INFUSION (OUTPATIENT)
Dept: INFUSION THERAPY | Facility: HOSPITAL | Age: 52
End: 2017-06-26
Attending: INTERNAL MEDICINE
Payer: COMMERCIAL

## 2017-06-26 VITALS
BODY MASS INDEX: 41.8 KG/M2 | DIASTOLIC BLOOD PRESSURE: 91 MMHG | OXYGEN SATURATION: 98 % | RESPIRATION RATE: 18 BRPM | SYSTOLIC BLOOD PRESSURE: 155 MMHG | TEMPERATURE: 98 F | HEIGHT: 68 IN | WEIGHT: 275.81 LBS | HEART RATE: 111 BPM

## 2017-06-26 DIAGNOSIS — C50.911 MALIGNANT NEOPLASM OF RIGHT FEMALE BREAST, UNSPECIFIED SITE OF BREAST: Primary | ICD-10-CM

## 2017-06-26 DIAGNOSIS — C50.411 MALIGNANT NEOPLASM OF UPPER-OUTER QUADRANT OF RIGHT FEMALE BREAST: ICD-10-CM

## 2017-06-26 DIAGNOSIS — C50.411 MALIGNANT NEOPLASM OF UPPER-OUTER QUADRANT OF RIGHT FEMALE BREAST: Primary | ICD-10-CM

## 2017-06-26 DIAGNOSIS — C50.911 MALIGNANT NEOPLASM OF RIGHT FEMALE BREAST, UNSPECIFIED SITE OF BREAST: ICD-10-CM

## 2017-06-26 LAB
ALBUMIN SERPL BCP-MCNC: 3.6 G/DL
ALP SERPL-CCNC: 109 U/L
ALT SERPL W/O P-5'-P-CCNC: 53 U/L
ANION GAP SERPL CALC-SCNC: 10 MMOL/L
ANISOCYTOSIS BLD QL SMEAR: SLIGHT
AST SERPL-CCNC: 31 U/L
BASOPHILS # BLD AUTO: ABNORMAL K/UL
BASOPHILS NFR BLD: 1 %
BILIRUB SERPL-MCNC: 0.1 MG/DL
BUN SERPL-MCNC: 12 MG/DL
CALCIUM SERPL-MCNC: 9.5 MG/DL
CHLORIDE SERPL-SCNC: 108 MMOL/L
CO2 SERPL-SCNC: 25 MMOL/L
CREAT SERPL-MCNC: 0.7 MG/DL
DIFFERENTIAL METHOD: ABNORMAL
EOSINOPHIL # BLD AUTO: ABNORMAL K/UL
EOSINOPHIL NFR BLD: 0 %
ERYTHROCYTE [DISTWIDTH] IN BLOOD BY AUTOMATED COUNT: 13.7 %
EST. GFR  (AFRICAN AMERICAN): >60 ML/MIN/1.73 M^2
EST. GFR  (NON AFRICAN AMERICAN): >60 ML/MIN/1.73 M^2
GLUCOSE SERPL-MCNC: 135 MG/DL
HCT VFR BLD AUTO: 37.8 %
HGB BLD-MCNC: 13.1 G/DL
LYMPHOCYTES # BLD AUTO: ABNORMAL K/UL
LYMPHOCYTES NFR BLD: 15 %
MCH RBC QN AUTO: 29.4 PG
MCHC RBC AUTO-ENTMCNC: 34.7 %
MCV RBC AUTO: 85 FL
MONOCYTES # BLD AUTO: ABNORMAL K/UL
MONOCYTES NFR BLD: 3 %
NEUTROPHILS NFR BLD: 81 %
PLATELET # BLD AUTO: 173 K/UL
PLATELET BLD QL SMEAR: ABNORMAL
PMV BLD AUTO: 9 FL
POLYCHROMASIA BLD QL SMEAR: ABNORMAL
POTASSIUM SERPL-SCNC: 3.9 MMOL/L
PROT SERPL-MCNC: 7.8 G/DL
RBC # BLD AUTO: 4.45 M/UL
SODIUM SERPL-SCNC: 143 MMOL/L
WBC # BLD AUTO: 15.54 K/UL

## 2017-06-26 PROCEDURE — 96375 TX/PRO/DX INJ NEW DRUG ADDON: CPT

## 2017-06-26 PROCEDURE — 96411 CHEMO IV PUSH ADDL DRUG: CPT

## 2017-06-26 PROCEDURE — 25000003 PHARM REV CODE 250: Performed by: INTERNAL MEDICINE

## 2017-06-26 PROCEDURE — 96367 TX/PROPH/DG ADDL SEQ IV INF: CPT

## 2017-06-26 PROCEDURE — 36415 COLL VENOUS BLD VENIPUNCTURE: CPT | Mod: PO

## 2017-06-26 PROCEDURE — 99999 PR PBB SHADOW E&M-EST. PATIENT-LVL III: CPT | Mod: PBBFAC,,, | Performed by: INTERNAL MEDICINE

## 2017-06-26 PROCEDURE — 85007 BL SMEAR W/DIFF WBC COUNT: CPT

## 2017-06-26 PROCEDURE — 96377 APPLICATON ON-BODY INJECTOR: CPT

## 2017-06-26 PROCEDURE — 80053 COMPREHEN METABOLIC PANEL: CPT

## 2017-06-26 PROCEDURE — 63600175 PHARM REV CODE 636 W HCPCS: Performed by: INTERNAL MEDICINE

## 2017-06-26 PROCEDURE — 99215 OFFICE O/P EST HI 40 MIN: CPT | Mod: S$GLB,,, | Performed by: INTERNAL MEDICINE

## 2017-06-26 PROCEDURE — 96413 CHEMO IV INFUSION 1 HR: CPT

## 2017-06-26 PROCEDURE — 85027 COMPLETE CBC AUTOMATED: CPT

## 2017-06-26 RX ORDER — HEPARIN 100 UNIT/ML
500 SYRINGE INTRAVENOUS
Status: DISCONTINUED | OUTPATIENT
Start: 2017-06-26 | End: 2017-06-26 | Stop reason: HOSPADM

## 2017-06-26 RX ORDER — HEPARIN 100 UNIT/ML
500 SYRINGE INTRAVENOUS
Status: CANCELLED | OUTPATIENT
Start: 2017-06-26

## 2017-06-26 RX ORDER — DOXORUBICIN HYDROCHLORIDE 2 MG/ML
60 INJECTION, SOLUTION INTRAVENOUS
Status: CANCELLED | OUTPATIENT
Start: 2017-06-26

## 2017-06-26 RX ORDER — SODIUM CHLORIDE 0.9 % (FLUSH) 0.9 %
10 SYRINGE (ML) INJECTION
Status: CANCELLED | OUTPATIENT
Start: 2017-06-26

## 2017-06-26 RX ORDER — SODIUM CHLORIDE 9 MG/ML
10 INJECTION, SOLUTION INTRAMUSCULAR; INTRAVENOUS; SUBCUTANEOUS
Status: DISCONTINUED | OUTPATIENT
Start: 2017-06-26 | End: 2017-06-26 | Stop reason: HOSPADM

## 2017-06-26 RX ORDER — PALONOSETRON 0.05 MG/ML
0.25 INJECTION, SOLUTION INTRAVENOUS
Status: CANCELLED
Start: 2017-06-26 | End: 2017-06-26

## 2017-06-26 RX ORDER — DOXORUBICIN HYDROCHLORIDE 2 MG/ML
60 INJECTION, SOLUTION INTRAVENOUS
Status: COMPLETED | OUTPATIENT
Start: 2017-06-26 | End: 2017-06-26

## 2017-06-26 RX ORDER — PALONOSETRON 0.05 MG/ML
0.25 INJECTION, SOLUTION INTRAVENOUS
Status: COMPLETED | OUTPATIENT
Start: 2017-06-26 | End: 2017-06-26

## 2017-06-26 RX ADMIN — DOXORUBICIN HYDROCHLORIDE 148 MG: 2 INJECTION, SOLUTION INTRAVENOUS at 01:06

## 2017-06-26 RX ADMIN — CYCLOPHOSPHAMIDE 1470 MG: 1 INJECTION, POWDER, FOR SOLUTION INTRAVENOUS; ORAL at 01:06

## 2017-06-26 RX ADMIN — HEPARIN 500 UNITS: 100 SYRINGE at 02:06

## 2017-06-26 RX ADMIN — DEXAMETHASONE SODIUM PHOSPHATE: 4 INJECTION, SOLUTION INTRAMUSCULAR; INTRAVENOUS at 12:06

## 2017-06-26 RX ADMIN — SODIUM CHLORIDE: 0.9 INJECTION, SOLUTION INTRAVENOUS at 11:06

## 2017-06-26 RX ADMIN — PALONOSETRON HYDROCHLORIDE 0.25 MG: 0.25 INJECTION INTRAVENOUS at 12:06

## 2017-06-26 RX ADMIN — PEGFILGRASTIM 6 MG: KIT SUBCUTANEOUS at 02:06

## 2017-06-26 RX ADMIN — SODIUM CHLORIDE 150 MG: 900 INJECTION, SOLUTION INTRAVENOUS at 12:06

## 2017-06-26 NOTE — PROGRESS NOTES
Hematology/Oncology Office Note    Reason for referral:  Locally advanced breast cancer with biopsy proven axillary lymph node involvement    CC:    Referred by:  No ref. provider found    Diagnosis:  Right sided locally advanced infiltrating ductal carcinoma with biopsy-proven axillary lymph node involvement (ER positive at 95%, ND positive at 95%, HER-2 1+ by IHC and negative by fish    Treatment:    History of present illness:  Ms. Urbano is a 51-year-old female with a past medical history of DM hypertension, hypothyroidism, iron deficiency anemia, who was noted to have abnormal screening mammogram  on 4/28/2017.  Core biopsy of right breast mass at 10:00 6 cm from the nipple demonstrated infiltrating ductal carcinoma and right axillary lymph node core biopsy also demonstrated infiltrating ductal carcinoma.  ER/ND positive, HER-2 negative markers.  The patient has a first cousin with a history of breast cancer and one aunt with a history of breast cancer.  She has some soreness at previous biopsy site, however, denies other significant symptoms.    I have reviewed and updated the HPI, ROS, PMHx, Social Hx, Family Hx and treatment history.    Today's visit:  Patient is without complaints today.  She specifically denies fever, chills, nausea/vomiting/diarrhea.    Past Medical History:   Diagnosis Date    Allergic rhinitis, cause unspecified     Cancer     R Breast Cancer    Carpal tunnel syndrome of left wrist     Elevated liver function tests     in the past    Fibroid uterus     10/2014    History of sciatica     HSV infection     Type 1 and 2    HTN (hypertension)     Hypothyroidism     Insulin resistance     Iron deficiency anemia, unspecified     Obesity     Tension headache     Vitamin D deficiency          Social History:  No tobacco, alcohol, or illicit drugs      Family History: family history includes Cancer in her father and sister; Diabetes in her mother; Heart failure in her mother;  Hypertension in her mother; No Known Problems in her daughter, daughter, and son.        HPI    Review of Systems   Constitutional: Negative for activity change, appetite change, diaphoresis, fatigue, fever and unexpected weight change.   HENT: Negative for congestion, drooling, ear pain, facial swelling, hearing loss, mouth sores, sinus pressure, sneezing, sore throat, trouble swallowing and voice change.    Eyes: Negative.    Respiratory: Negative for apnea, cough, choking, shortness of breath, wheezing and stridor.    Cardiovascular: Negative for chest pain and palpitations.   Gastrointestinal: Negative for abdominal distention, abdominal pain, blood in stool, constipation, nausea and vomiting.   Endocrine: Negative.    Genitourinary: Negative for difficulty urinating, dyspareunia, dysuria, enuresis, flank pain, hematuria, pelvic pain, urgency and vaginal bleeding.   Musculoskeletal: Negative for arthralgias, back pain, gait problem, joint swelling, myalgias, neck pain and neck stiffness.   Skin: Negative for pallor, rash and wound.   Allergic/Immunologic: Negative.    Neurological: Negative for dizziness, tremors, seizures, facial asymmetry, weakness and numbness.   Hematological: Negative for adenopathy. Does not bruise/bleed easily.   Psychiatric/Behavioral: Negative for agitation, behavioral problems, confusion, dysphoric mood and hallucinations. The patient is nervous/anxious. The patient is not hyperactive.        Objective:      Physical Exam   Constitutional: She is oriented to person, place, and time. She appears well-developed and well-nourished. No distress. She is not intubated.   Well groomed   HENT:   Head: Normocephalic and atraumatic.   Right Ear: Tympanic membrane and ear canal normal.   Left Ear: Tympanic membrane and ear canal normal.   Nose: Nose normal. Right sinus exhibits no maxillary sinus tenderness and no frontal sinus tenderness. Left sinus exhibits no maxillary sinus tenderness and no  frontal sinus tenderness.   Mouth/Throat: Oropharynx is clear and moist and mucous membranes are normal. Mucous membranes are not pale and not dry. No oral lesions. Normal dentition. No oropharyngeal exudate.   Eyes: Conjunctivae, EOM and lids are normal. Pupils are equal, round, and reactive to light. Lids are everted and swept, no foreign bodies found. No scleral icterus.   Neck: Trachea normal and normal range of motion. Neck supple. No JVD present. No tracheal deviation present. No thyroid mass and no thyromegaly present.   No crepitus   Cardiovascular: Normal rate, regular rhythm, normal heart sounds, intact distal pulses and normal pulses.  Exam reveals no gallop and no friction rub.    No murmur heard.  Pulmonary/Chest: Effort normal and breath sounds normal. No accessory muscle usage. She is not intubated. No respiratory distress. She has no decreased breath sounds. She has no wheezes. She has no rhonchi. She has no rales. She exhibits no tenderness.   Right breast: Ill-defined induration without discrete mass palpated, 2 cm right axillary node  Left breast: No masses, skin changes, lymphadenopathy   Abdominal: Soft. Normal appearance and bowel sounds are normal. She exhibits no distension. There is no hepatosplenomegaly. There is no tenderness. There is no guarding.   Musculoskeletal: Normal range of motion. She exhibits no edema.   Lymphadenopathy:        Head (right side): No submental, no submandibular and no tonsillar adenopathy present.        Head (left side): No submental, no submandibular and no tonsillar adenopathy present.     She has no cervical adenopathy.     She has axillary adenopathy.        Right: No supraclavicular adenopathy present.        Left: No supraclavicular adenopathy present.   Right axillary node 1.5 cm   Neurological: She is alert and oriented to person, place, and time. No cranial nerve deficit. She exhibits normal muscle tone. Coordination normal.   Skin: Skin is warm, dry  and intact. Capillary refill takes less than 2 seconds. No abrasion, no bruising and no rash noted. She is not diaphoretic. No cyanosis. No pallor. Nails show no clubbing.   Psychiatric: She has a normal mood and affect. Her behavior is normal. Judgment and thought content normal.       Lab Results   Component Value Date    WBC 15.54 (H) 06/26/2017    HGB 13.1 06/26/2017    HCT 37.8 06/26/2017    MCV 85 06/26/2017     06/26/2017     Lab Results   Component Value Date    CREATININE 0.8 06/19/2017    BUN 9 06/19/2017     06/19/2017    K 4.3 06/19/2017     06/19/2017    CO2 27 06/19/2017     Lab Results   Component Value Date    ALT 45 (H) 06/19/2017    AST 27 06/19/2017    ALKPHOS 131 06/19/2017    BILITOT 0.2 06/19/2017         Assessment:         51-year-old female with a new diagnosis of right-sided infiltrating ductal carcinoma ER/DC positive, HER-2 negative with biopsy-proven right axillary lymph node involvement.  Patient was consented for BRCA testing for participation and B-55 study and BRCA testing will be sent today.  I discussed neoadjuvant therapy at length and the benefits of preoperative chemotherapy.    She received cycle 1 of dense dose Adriamycin/Cytoxan on 6/12/2017 and tolerated treatment exceptionally well.  Right axillary lymph node has slightly decreased in size likely representing positive response to   She treatment. presents today for cycle 2.  Counts are adequate for treatment today and we will proceed with treatment as scheduled.  She will receive Neulasta via on body injector with cycle 2.  She will follow-up in 2 weeks for cycle 3.          ER/DC positive, HER-2 negative infiltrating ductal carcinoma the right breast with biopsy proven lymph node involvement clinically stage II/III.  --BRCA testing via B-55 Study is negative for mutation  --ddAC--> taxol   cycle 1 day 8 today  --6/26/2017 cycle 2; follow-up in 2 weeks for cycle 3   --Neulasta via on body injector with  cycle 2     Hypertension:  Continue Lotensin/HCTZ    Hypothyroidism:  Continue Synthroid    4 mm right upper lobe pulmonary nodule and subpleural nodularity bilateral bases:  --Repeat CT scan  after treatment

## 2017-06-26 NOTE — PLAN OF CARE
Problem: Patient Care Overview  Goal: Plan of Care Review  Outcome: Ongoing (interventions implemented as appropriate)  States she has had some fatigue and nausea.

## 2017-07-05 RX ORDER — METFORMIN HYDROCHLORIDE 500 MG/1
TABLET ORAL
Qty: 360 TABLET | Refills: 1 | Status: SHIPPED | OUTPATIENT
Start: 2017-07-05 | End: 2018-06-11 | Stop reason: SDUPTHER

## 2017-07-05 RX ORDER — BENAZEPRIL HYDROCHLORIDE 10 MG/1
TABLET ORAL
Qty: 90 TABLET | Refills: 1 | Status: SHIPPED | OUTPATIENT
Start: 2017-07-05 | End: 2017-11-17 | Stop reason: SDUPTHER

## 2017-07-10 ENCOUNTER — OFFICE VISIT (OUTPATIENT)
Dept: HEMATOLOGY/ONCOLOGY | Facility: CLINIC | Age: 52
End: 2017-07-10
Payer: COMMERCIAL

## 2017-07-10 ENCOUNTER — INFUSION (OUTPATIENT)
Dept: INFUSION THERAPY | Facility: HOSPITAL | Age: 52
End: 2017-07-10
Attending: INTERNAL MEDICINE
Payer: COMMERCIAL

## 2017-07-10 VITALS
DIASTOLIC BLOOD PRESSURE: 84 MMHG | WEIGHT: 280.63 LBS | HEIGHT: 68 IN | OXYGEN SATURATION: 99 % | TEMPERATURE: 99 F | RESPIRATION RATE: 18 BRPM | BODY MASS INDEX: 42.53 KG/M2 | HEART RATE: 99 BPM | SYSTOLIC BLOOD PRESSURE: 144 MMHG

## 2017-07-10 VITALS — HEIGHT: 68 IN | WEIGHT: 280.63 LBS | BODY MASS INDEX: 42.53 KG/M2

## 2017-07-10 DIAGNOSIS — C50.411 MALIGNANT NEOPLASM OF UPPER-OUTER QUADRANT OF RIGHT BREAST IN FEMALE, ESTROGEN RECEPTOR POSITIVE: Primary | ICD-10-CM

## 2017-07-10 DIAGNOSIS — Z17.0 MALIGNANT NEOPLASM OF UPPER-OUTER QUADRANT OF RIGHT BREAST IN FEMALE, ESTROGEN RECEPTOR POSITIVE: Primary | ICD-10-CM

## 2017-07-10 PROCEDURE — 96375 TX/PRO/DX INJ NEW DRUG ADDON: CPT | Mod: PO

## 2017-07-10 PROCEDURE — 96413 CHEMO IV INFUSION 1 HR: CPT | Mod: PO

## 2017-07-10 PROCEDURE — 99999 PR PBB SHADOW E&M-EST. PATIENT-LVL III: CPT | Mod: PBBFAC,,, | Performed by: INTERNAL MEDICINE

## 2017-07-10 PROCEDURE — 25000003 PHARM REV CODE 250: Mod: PO | Performed by: INTERNAL MEDICINE

## 2017-07-10 PROCEDURE — 63600175 PHARM REV CODE 636 W HCPCS: Mod: PO | Performed by: INTERNAL MEDICINE

## 2017-07-10 PROCEDURE — 96367 TX/PROPH/DG ADDL SEQ IV INF: CPT | Mod: PO

## 2017-07-10 PROCEDURE — 96377 APPLICATON ON-BODY INJECTOR: CPT | Mod: PO

## 2017-07-10 PROCEDURE — 99215 OFFICE O/P EST HI 40 MIN: CPT | Mod: S$GLB,,, | Performed by: INTERNAL MEDICINE

## 2017-07-10 PROCEDURE — 96411 CHEMO IV PUSH ADDL DRUG: CPT | Mod: PO

## 2017-07-10 RX ORDER — DOXORUBICIN HYDROCHLORIDE 2 MG/ML
60 INJECTION, SOLUTION INTRAVENOUS
Status: CANCELLED | OUTPATIENT
Start: 2017-07-10

## 2017-07-10 RX ORDER — PALONOSETRON 0.05 MG/ML
0.25 INJECTION, SOLUTION INTRAVENOUS
Status: CANCELLED
Start: 2017-07-10 | End: 2017-07-10

## 2017-07-10 RX ORDER — HEPARIN 100 UNIT/ML
500 SYRINGE INTRAVENOUS
Status: DISCONTINUED | OUTPATIENT
Start: 2017-07-10 | End: 2017-07-10 | Stop reason: HOSPADM

## 2017-07-10 RX ORDER — HEPARIN 100 UNIT/ML
500 SYRINGE INTRAVENOUS
Status: CANCELLED | OUTPATIENT
Start: 2017-07-10

## 2017-07-10 RX ORDER — DOXORUBICIN HYDROCHLORIDE 2 MG/ML
60 INJECTION, SOLUTION INTRAVENOUS
Status: COMPLETED | OUTPATIENT
Start: 2017-07-10 | End: 2017-07-10

## 2017-07-10 RX ORDER — PALONOSETRON 0.05 MG/ML
0.25 INJECTION, SOLUTION INTRAVENOUS
Status: COMPLETED | OUTPATIENT
Start: 2017-07-10 | End: 2017-07-10

## 2017-07-10 RX ORDER — SODIUM CHLORIDE 0.9 % (FLUSH) 0.9 %
10 SYRINGE (ML) INJECTION
Status: CANCELLED | OUTPATIENT
Start: 2017-07-10

## 2017-07-10 RX ADMIN — DOXORUBICIN HYDROCHLORIDE 148 MG: 2 INJECTION, SOLUTION INTRAVENOUS at 11:07

## 2017-07-10 RX ADMIN — HEPARIN 500 UNITS: 100 SYRINGE at 12:07

## 2017-07-10 RX ADMIN — PALONOSETRON HYDROCHLORIDE 0.25 MG: 0.25 INJECTION INTRAVENOUS at 10:07

## 2017-07-10 RX ADMIN — CYCLOPHOSPHAMIDE 1480 MG: 1 INJECTION, POWDER, FOR SOLUTION INTRAVENOUS; ORAL at 11:07

## 2017-07-10 RX ADMIN — PEGFILGRASTIM 6 MG: KIT SUBCUTANEOUS at 12:07

## 2017-07-10 RX ADMIN — SODIUM CHLORIDE: 0.9 INJECTION, SOLUTION INTRAVENOUS at 10:07

## 2017-07-10 RX ADMIN — DEXAMETHASONE SODIUM PHOSPHATE: 4 INJECTION, SOLUTION INTRA-ARTICULAR; INTRALESIONAL; INTRAMUSCULAR; INTRAVENOUS; SOFT TISSUE at 10:07

## 2017-07-10 NOTE — PATIENT INSTRUCTIONS
[facility]  [address] [facility]  [id number] [place] [address] Drive  [phone] phone     [phone] fax  Hours of Operation: Monday- Friday 8:00am- 5:00pm  After hours phone  [phone]  Hematology / Oncology Physicians on call      Dr. Kulwinder Kennedy                      Please call with any concerns regarding your appointment today.    IF YOU EXPERIENCE ANY OF THE FOLLOWING PROBLEMS, CALL THE OFFICE IMMEDIATELY.    *FEVER .0 OR GREATER    *CHILLS, ESPECIALLY SHAKING CHILLS, OR SWEATING    *A SEVERE COUGH OR SORE THROAT, OR SINUS PAIN/     PRESSURE    *REDNESS, SWELLING, OR TENDERNESS AROUND A WOUND,     SORE, PIMPLE, RECTAL AREA, OR IV SITE    *SORES OR ULCERS IN THE MOUTH    *BLISTERS ON THE LIPS OR SKIN    *FREQUENT URGENCY TO URINATE OR A BURNING FEELING   WHEN YOU URINATE    *BLOOD IN THE URINE OR STOOL    *ANY UNEXPLAINED BRUISING OR PROLONGED BLEEDING,     (NOSEBLEEDS OR BLEEDING GUMS)    *LOOSE BOWEL MOVEMENTS THAT DO NOT RESPOND TO     IMODIUM OR MORE THAN THREE TIMES A DAY    *VOMITING UNRESPONSIVE TO ANTINAUSEA MEDICINE    *ANY UNUSUAL PHYSICAL SYMPTOMS THAT BEGAN AFTER     CHEMOTHERAPY    DURING WEEKDAYS, CALL AND ASK TO SPEAK DIRECTLY TO A NURSE.  AT OTHER TIMES, CALL THE OFFICE PHONE NUMBER; THE ANSWERING SERVICE WILL CONTACT THE ON-CALL PHYSICIAN.  SOMEONE IS AVAILABLE 24 HOURS A DAY, SEVEN DAYS A WEEK.

## 2017-07-10 NOTE — PLAN OF CARE
Problem: Patient Care Overview  Goal: Plan of Care Review  Pt states she feels well today.  She is feeling emotional about chemo today.  Support given.

## 2017-07-10 NOTE — PROGRESS NOTES
Hematology/Oncology Office Note    Reason for referral:  Locally advanced breast cancer with biopsy proven axillary lymph node involvement    CC:    Referred by:  No ref. provider found    Diagnosis:  Right sided locally advanced infiltrating ductal carcinoma with biopsy-proven axillary lymph node involvement (ER positive at 95%, RI positive at 95%, HER-2 1+ by IHC and negative by fish    Treatment:    History of present illness:  Ms. Urbano is a 51-year-old female with a past medical history of DM hypertension, hypothyroidism, iron deficiency anemia, who was noted to have abnormal screening mammogram  on 4/28/2017.  Core biopsy of right breast mass at 10:00 6 cm from the nipple demonstrated infiltrating ductal carcinoma and right axillary lymph node core biopsy also demonstrated infiltrating ductal carcinoma.  ER/RI positive, HER-2 negative markers.  The patient has a first cousin with a history of breast cancer and one aunt with a history of breast cancer.  She has some soreness at previous biopsy site, however, denies other significant symptoms.    I have reviewed and updated the HPI, ROS, PMHx, Social Hx, Family Hx and treatment history.    Today's visit:  Patient presents for cycle 3 of dose dense Adriamycin/Cytoxan.  She continues to tolerate treatment very well and denies nausea/vomiting/diarrhea, fever, chills, shortness of breath or lower extremity edema.    Past Medical History:   Diagnosis Date    Allergic rhinitis, cause unspecified     Cancer     R Breast Cancer    Carpal tunnel syndrome of left wrist     Elevated liver function tests     in the past    Fatty liver 05/02/2017    on ultrasound    Fibroid uterus     10/2014    Hepatomegaly 05/02/2017    on ultrasound    History of sciatica     HSV infection     Type 1 and 2    HTN (hypertension)     Hypothyroidism     Insulin resistance     Iron deficiency anemia, unspecified     Obesity     Tension headache     Vitamin D deficiency           Social History:  No tobacco, alcohol, or illicit drugs      Family History: family history includes Cancer in her father and sister; Diabetes in her mother; Heart failure in her mother; Hypertension in her mother; No Known Problems in her daughter, daughter, and son.        HPI    Review of Systems   Constitutional: Negative for activity change, appetite change, diaphoresis, fever and unexpected weight change.   HENT: Negative for congestion, drooling, ear pain, facial swelling, mouth sores, nosebleeds, sinus pressure, sneezing, sore throat, trouble swallowing and voice change.    Eyes: Negative.    Respiratory: Negative for apnea, cough, shortness of breath, wheezing and stridor.    Cardiovascular: Negative for chest pain and palpitations.   Gastrointestinal: Negative for abdominal distention, abdominal pain, blood in stool, constipation, diarrhea, nausea, rectal pain and vomiting.   Endocrine: Negative.    Genitourinary: Negative for difficulty urinating, dyspareunia, dysuria, enuresis, flank pain, frequency, genital sores, hematuria, pelvic pain, urgency and vaginal bleeding.   Musculoskeletal: Negative for arthralgias, gait problem, joint swelling, myalgias, neck pain and neck stiffness.   Skin: Negative for pallor, rash and wound.   Allergic/Immunologic: Negative.    Neurological: Negative for dizziness, tremors, seizures, syncope, facial asymmetry, speech difficulty, weakness, light-headedness, numbness and headaches.   Hematological: Negative for adenopathy. Does not bruise/bleed easily.   Psychiatric/Behavioral: Negative for agitation, behavioral problems, confusion, decreased concentration, dysphoric mood and hallucinations. The patient is nervous/anxious. The patient is not hyperactive.        Objective:      Physical Exam   Constitutional: She is oriented to person, place, and time. She appears well-developed and well-nourished. No distress. She is not intubated.   Well groomed   HENT:   Head:  Normocephalic and atraumatic.   Right Ear: Tympanic membrane and ear canal normal.   Left Ear: Tympanic membrane and ear canal normal.   Nose: Nose normal. Right sinus exhibits no maxillary sinus tenderness and no frontal sinus tenderness. Left sinus exhibits no maxillary sinus tenderness and no frontal sinus tenderness.   Mouth/Throat: Oropharynx is clear and moist and mucous membranes are normal. Mucous membranes are not pale and not dry. No oral lesions. Normal dentition. No oropharyngeal exudate.   Eyes: Conjunctivae, EOM and lids are normal. Pupils are equal, round, and reactive to light. Lids are everted and swept, no foreign bodies found. No scleral icterus.   Neck: Trachea normal and normal range of motion. Neck supple. No JVD present. No tracheal deviation present. No thyroid mass and no thyromegaly present.   No crepitus   Cardiovascular: Normal rate, regular rhythm, normal heart sounds, intact distal pulses and normal pulses.  Exam reveals no gallop and no friction rub.    No murmur heard.  Pulmonary/Chest: Effort normal and breath sounds normal. No accessory muscle usage. She is not intubated. No respiratory distress. She has no decreased breath sounds. She has no wheezes. She has no rhonchi. She has no rales. She exhibits no tenderness.   Right breast: Ill-defined induration without discrete mass palpated, 1 cm right axillary node  Left breast: No masses, skin changes, lymphadenopathy   Abdominal: Soft. Normal appearance and bowel sounds are normal. She exhibits no distension. There is no hepatosplenomegaly. There is no tenderness. There is no guarding.   Musculoskeletal: Normal range of motion. She exhibits no edema.   Lymphadenopathy:     She has no cervical adenopathy.     She has axillary adenopathy.        Right: No supraclavicular adenopathy present.        Left: No supraclavicular adenopathy present.   Right axillary node 1.0 cm   Neurological: She is alert and oriented to person, place, and  time. No cranial nerve deficit. She exhibits normal muscle tone. Coordination normal.   Skin: Skin is warm, dry and intact. Capillary refill takes less than 2 seconds. No abrasion, no bruising and no rash noted. Rash is not pustular and not urticarial. She is not diaphoretic. No cyanosis. No pallor. Nails show no clubbing.   Psychiatric: She has a normal mood and affect. Her behavior is normal. Judgment and thought content normal.       Lab Results   Component Value Date    WBC 11.74 07/10/2017    HGB 12.1 07/10/2017    HCT 36.3 (L) 07/10/2017    MCV 87 07/10/2017     (H) 07/10/2017     Lab Results   Component Value Date    CREATININE 0.7 06/26/2017    BUN 12 06/26/2017     06/26/2017    K 3.9 06/26/2017     06/26/2017    CO2 25 06/26/2017     Lab Results   Component Value Date    ALT 53 (H) 06/26/2017    AST 31 06/26/2017    ALKPHOS 109 06/26/2017    BILITOT 0.1 06/26/2017         Assessment:         51-year-old female with a new diagnosis of right-sided infiltrating ductal carcinoma ER/OR positive, HER-2 negative with biopsy-proven right axillary lymph node involvement.  Patient was consented for BRCA testing for participation and B-55 study and BRCA testing will be sent today.  I discussed neoadjuvant therapy at length and the benefits of preoperative chemotherapy.    She received cycle 1 of dense dose Adriamycin/Cytoxan on 6/12/2017 and tolerated treatment exceptionally well.  Right axillary lymph node continues to decrease in size likely representing positive response to  treatment. She presents today for cycle 3 and we will continue treatment as scheduled.  The patient will follow-up in 2 weeks to complete dose dense Adriamycin/Cytoxan.  We will follow with dose dense Taxol.      ER/OR positive, HER-2 negative infiltrating ductal carcinoma the right breast with biopsy proven lymph node involvement clinically stage II/III.  --BRCA testing via B-55 Study is negative for mutation  --ddAC-->  taxol   cycle 1 day 8 today  --6/26/2017 cycle 2; follow-up in 2 weeks for cycle 4   --Neulasta via on body injector  --Follow-up in 2 weeks for final dose of dose dense Adriamycin/Cytoxan    Hypertension:  Continue Lotensin/HCTZ    Hypothyroidism:  Continue Synthroid    4 mm right upper lobe pulmonary nodule and subpleural nodularity bilateral bases:  --Repeat CT scan  after treatment

## 2017-07-24 ENCOUNTER — OFFICE VISIT (OUTPATIENT)
Dept: HEMATOLOGY/ONCOLOGY | Facility: CLINIC | Age: 52
End: 2017-07-24
Payer: COMMERCIAL

## 2017-07-24 ENCOUNTER — INFUSION (OUTPATIENT)
Dept: INFUSION THERAPY | Facility: HOSPITAL | Age: 52
End: 2017-07-24
Attending: INTERNAL MEDICINE
Payer: COMMERCIAL

## 2017-07-24 VITALS
HEART RATE: 111 BPM | WEIGHT: 283.5 LBS | SYSTOLIC BLOOD PRESSURE: 130 MMHG | BODY MASS INDEX: 42.97 KG/M2 | BODY MASS INDEX: 42.89 KG/M2 | TEMPERATURE: 98 F | RESPIRATION RATE: 18 BRPM | DIASTOLIC BLOOD PRESSURE: 80 MMHG | WEIGHT: 283 LBS | HEIGHT: 68 IN | OXYGEN SATURATION: 99 % | HEIGHT: 68 IN

## 2017-07-24 DIAGNOSIS — Z17.0 MALIGNANT NEOPLASM OF UPPER-OUTER QUADRANT OF RIGHT BREAST IN FEMALE, ESTROGEN RECEPTOR POSITIVE: Primary | ICD-10-CM

## 2017-07-24 DIAGNOSIS — C50.411 MALIGNANT NEOPLASM OF UPPER-OUTER QUADRANT OF RIGHT BREAST IN FEMALE, ESTROGEN RECEPTOR POSITIVE: Primary | ICD-10-CM

## 2017-07-24 PROCEDURE — 99215 OFFICE O/P EST HI 40 MIN: CPT | Mod: S$GLB,,, | Performed by: INTERNAL MEDICINE

## 2017-07-24 PROCEDURE — 96411 CHEMO IV PUSH ADDL DRUG: CPT | Mod: PO

## 2017-07-24 PROCEDURE — 63600175 PHARM REV CODE 636 W HCPCS: Mod: PO | Performed by: INTERNAL MEDICINE

## 2017-07-24 PROCEDURE — 96377 APPLICATON ON-BODY INJECTOR: CPT | Mod: PO

## 2017-07-24 PROCEDURE — 96367 TX/PROPH/DG ADDL SEQ IV INF: CPT | Mod: PO

## 2017-07-24 PROCEDURE — 25000003 PHARM REV CODE 250: Mod: PO | Performed by: INTERNAL MEDICINE

## 2017-07-24 PROCEDURE — 96375 TX/PRO/DX INJ NEW DRUG ADDON: CPT | Mod: PO

## 2017-07-24 PROCEDURE — 99999 PR PBB SHADOW E&M-EST. PATIENT-LVL III: CPT | Mod: PBBFAC,,, | Performed by: INTERNAL MEDICINE

## 2017-07-24 PROCEDURE — 96413 CHEMO IV INFUSION 1 HR: CPT | Mod: PO

## 2017-07-24 RX ORDER — HEPARIN 100 UNIT/ML
500 SYRINGE INTRAVENOUS
Status: DISCONTINUED | OUTPATIENT
Start: 2017-07-24 | End: 2017-07-24 | Stop reason: HOSPADM

## 2017-07-24 RX ORDER — PALONOSETRON 0.05 MG/ML
0.25 INJECTION, SOLUTION INTRAVENOUS
Status: COMPLETED | OUTPATIENT
Start: 2017-07-24 | End: 2017-07-24

## 2017-07-24 RX ORDER — PALONOSETRON 0.05 MG/ML
0.25 INJECTION, SOLUTION INTRAVENOUS
Status: CANCELLED
Start: 2017-07-24 | End: 2017-07-24

## 2017-07-24 RX ORDER — DOXORUBICIN HYDROCHLORIDE 2 MG/ML
60 INJECTION, SOLUTION INTRAVENOUS
Status: CANCELLED | OUTPATIENT
Start: 2017-07-24

## 2017-07-24 RX ORDER — HEPARIN 100 UNIT/ML
500 SYRINGE INTRAVENOUS
Status: CANCELLED | OUTPATIENT
Start: 2017-07-24

## 2017-07-24 RX ORDER — DOXORUBICIN HYDROCHLORIDE 2 MG/ML
60 INJECTION, SOLUTION INTRAVENOUS
Status: COMPLETED | OUTPATIENT
Start: 2017-07-24 | End: 2017-07-24

## 2017-07-24 RX ORDER — DEXAMETHASONE 4 MG/1
TABLET ORAL
Qty: 20 TABLET | Refills: 1 | Status: ON HOLD | OUTPATIENT
Start: 2017-07-24 | End: 2017-10-31 | Stop reason: HOSPADM

## 2017-07-24 RX ORDER — SODIUM CHLORIDE 0.9 % (FLUSH) 0.9 %
10 SYRINGE (ML) INJECTION
Status: CANCELLED | OUTPATIENT
Start: 2017-07-24

## 2017-07-24 RX ADMIN — PALONOSETRON HYDROCHLORIDE 0.25 MG: 0.25 INJECTION INTRAVENOUS at 09:07

## 2017-07-24 RX ADMIN — DEXAMETHASONE SODIUM PHOSPHATE: 4 INJECTION, SOLUTION INTRA-ARTICULAR; INTRALESIONAL; INTRAMUSCULAR; INTRAVENOUS; SOFT TISSUE at 09:07

## 2017-07-24 RX ADMIN — HEPARIN 500 UNITS: 100 SYRINGE at 11:07

## 2017-07-24 RX ADMIN — PEGFILGRASTIM 6 MG: KIT SUBCUTANEOUS at 10:07

## 2017-07-24 RX ADMIN — CYCLOPHOSPHAMIDE 1490 MG: 1 INJECTION, POWDER, FOR SOLUTION INTRAVENOUS; ORAL at 09:07

## 2017-07-24 RX ADMIN — DOXORUBICIN HYDROCHLORIDE 148 MG: 2 INJECTION, SOLUTION INTRAVENOUS at 09:07

## 2017-07-24 RX ADMIN — SODIUM CHLORIDE: 900 INJECTION, SOLUTION INTRAVENOUS at 09:07

## 2017-07-24 NOTE — PROGRESS NOTES
Hematology/Oncology Office Note    Reason for referral:  Locally advanced breast cancer with biopsy proven axillary lymph node involvement    CC:    Referred by:  No ref. provider found    Diagnosis:  Right sided locally advanced infiltrating ductal carcinoma with biopsy-proven axillary lymph node involvement (ER positive at 95%, MI positive at 95%, HER-2 1+ by IHC and negative by fish    Treatment:    History of present illness:  Ms. Urbano is a 51-year-old female with a past medical history of DM hypertension, hypothyroidism, iron deficiency anemia, who was noted to have abnormal screening mammogram  on 4/28/2017.  Core biopsy of right breast mass at 10:00 6 cm from the nipple demonstrated infiltrating ductal carcinoma and right axillary lymph node core biopsy also demonstrated infiltrating ductal carcinoma.  ER/MI positive, HER-2 negative markers.  The patient has a first cousin with a history of breast cancer and one aunt with a history of breast cancer.  She has some soreness at previous biopsy site, however, denies other significant symptoms.    I have reviewed and updated the HPI, ROS, PMHx, Social Hx, Family Hx and treatment history.    Today's visit:  Patient presents for cycle 4 of dose dense Adriamycin/Cytoxan.    She continues to tolerate treatment extremely well and specifically denies fever, chills, nausea/vomiting/diarrhea.    Past Medical History:   Diagnosis Date    Allergic rhinitis, cause unspecified     Cancer     R Breast Cancer    Carpal tunnel syndrome of left wrist     Elevated liver function tests     in the past    Fatty liver 05/02/2017    on ultrasound    Fibroid uterus     10/2014    Hepatomegaly 05/02/2017    on ultrasound    History of sciatica     HSV infection     Type 1 and 2    HTN (hypertension)     Hypothyroidism     Insulin resistance     Iron deficiency anemia, unspecified     Obesity     Tension headache     Vitamin D deficiency          Social History:  No  tobacco, alcohol, or illicit drugs      Family History: family history includes Cancer in her father and sister; Diabetes in her mother; Heart failure in her mother; Hypertension in her mother; No Known Problems in her daughter, daughter, and son.        HPI    Review of Systems   Constitutional: Negative for activity change, appetite change, diaphoresis, fever and unexpected weight change.   HENT: Negative for congestion, drooling, ear pain, facial swelling, mouth sores, nosebleeds, postnasal drip, sinus pressure, sneezing, sore throat, trouble swallowing and voice change.    Eyes: Negative.    Respiratory: Negative for apnea, cough, shortness of breath, wheezing and stridor.    Cardiovascular: Negative for chest pain, palpitations and leg swelling.   Gastrointestinal: Negative for abdominal distention, abdominal pain, blood in stool, constipation, diarrhea, nausea, rectal pain and vomiting.   Endocrine: Negative.    Genitourinary: Negative for difficulty urinating, enuresis, flank pain, frequency, genital sores, hematuria, pelvic pain, urgency and vaginal bleeding.   Musculoskeletal: Negative for arthralgias, gait problem, joint swelling, myalgias, neck pain and neck stiffness.   Skin: Negative for pallor, rash and wound.   Allergic/Immunologic: Negative.    Neurological: Negative for dizziness, tremors, seizures, syncope, weakness, light-headedness and numbness.   Hematological: Negative for adenopathy. Does not bruise/bleed easily.   Psychiatric/Behavioral: Negative for agitation, behavioral problems, confusion, decreased concentration, dysphoric mood and hallucinations. The patient is nervous/anxious. The patient is not hyperactive.        Objective:      Physical Exam   Constitutional: She is oriented to person, place, and time. She appears well-developed and well-nourished. No distress. She is not intubated.   Well groomed   HENT:   Head: Normocephalic and atraumatic.   Right Ear: Tympanic membrane and ear  canal normal.   Left Ear: Tympanic membrane and ear canal normal.   Nose: Nose normal. Right sinus exhibits no maxillary sinus tenderness and no frontal sinus tenderness. Left sinus exhibits no maxillary sinus tenderness and no frontal sinus tenderness.   Mouth/Throat: Oropharynx is clear and moist and mucous membranes are normal. Mucous membranes are not pale and not dry. No oral lesions. Normal dentition. No oropharyngeal exudate.   Eyes: Conjunctivae, EOM and lids are normal. Pupils are equal, round, and reactive to light. Lids are everted and swept, no foreign bodies found.   Neck: Trachea normal and normal range of motion. Neck supple. No tracheal deviation present. No thyroid mass and no thyromegaly present.   No crepitus   Cardiovascular: Normal rate, regular rhythm, normal heart sounds, intact distal pulses and normal pulses.  Exam reveals no gallop and no friction rub.    No murmur heard.  Pulmonary/Chest: Effort normal and breath sounds normal. No accessory muscle usage. She is not intubated. No respiratory distress. She has no decreased breath sounds. She has no wheezes. She has no rhonchi. She has no rales. She exhibits no tenderness.   Right breast: Ill-defined induration without discrete mass palpated, 1 cm right axillary node  Left breast: No masses, skin changes, lymphadenopathy   Abdominal: Soft. Normal appearance and bowel sounds are normal. She exhibits no distension. There is no hepatosplenomegaly. There is no tenderness. There is no guarding.   Musculoskeletal: Normal range of motion.   Lymphadenopathy:     She has no cervical adenopathy.     She has axillary adenopathy.        Right: No supraclavicular adenopathy present.        Left: No supraclavicular adenopathy present.   Right axillary node 1.0 cm   Neurological: She is alert and oriented to person, place, and time. No cranial nerve deficit. She exhibits normal muscle tone. Coordination normal.   Skin: Skin is warm and dry. Capillary refill  takes less than 2 seconds. No abrasion, no bruising and no rash noted. She is not diaphoretic. No cyanosis. No pallor.   Psychiatric: She has a normal mood and affect. Her behavior is normal. Judgment and thought content normal.       Lab Results   Component Value Date    WBC 9.86 07/24/2017    HGB 12.2 07/24/2017    HCT 36.6 (L) 07/24/2017    MCV 87 07/24/2017     07/24/2017     Lab Results   Component Value Date    CREATININE 0.8 07/24/2017    BUN 6 07/24/2017     07/24/2017    K 4.0 07/24/2017     07/24/2017    CO2 26 07/24/2017     Lab Results   Component Value Date    ALT 41 07/24/2017    AST 27 07/24/2017    ALKPHOS 118 07/24/2017    BILITOT 0.2 07/24/2017         Assessment:         51-year-old female with a new diagnosis of right-sided infiltrating ductal carcinoma ER/ND positive, HER-2 negative with biopsy-proven right axillary lymph node involvement.  Patient was consented for BRCA testing for participation and B-55 study and BRCA testing will be sent today.  I discussed neoadjuvant therapy at length and the benefits of preoperative chemotherapy.    She received cycle 1 of dense dose Adriamycin/Cytoxan on 6/12/2017 and tolerated treatment exceptionally well.  Right axillary lymph node continues to decrease in size likely representing positive response to  treatment. She presents today for cycle 4 and we will continue treatment as scheduled.    She will follow-up in 2 weeks for dose dense Taxol and I have provided the patient a prescription for Decadron 20 mg 12 and 6 hours prior to Taxol.  She will receive Neulasta via on body injector with treatment.    ER/ND positive, HER-2 negative infiltrating ductal carcinoma the right breast with biopsy proven lymph node involvement clinically stage II/III.  --BRCA testing via B-55 Study is negative for mutation  --ddAC--> taxol   cycle 1--- 6/12/2017  --Cycle 4 on 7/24/2017  --Follow-up in 2 weeks for dose dense Taxol  --Decadron 20 mg by mouth 12  and 6 hours prior to Taxol      Hypertension:  Continue Lotensin/HCTZ    Hypothyroidism:  Continue Synthroid    4 mm right upper lobe pulmonary nodule and subpleural nodularity bilateral bases:  --Repeat CT scan  after treatment

## 2017-07-24 NOTE — PATIENT INSTRUCTIONS
.  [facility]  [address]  [address] [address]  [phone] phone     [phone] fax  Hours of Operation: Monday- Friday 8:00am- 5:00pm  After hours phone  [phone]  Hematology / Oncology Physicians on call      Dr. Kulwinder Kennedy    Please call with any concerns regarding your appointment today.    .FALL PREVENTION   Falls often occur due to slipping, tripping or losing your balance. Here are ways to reduce your risk of falling again.   Was there anything that caused your fall that can be fixed, removed or replaced?   Make your home safe by keeping walkways clear of objects you may trip over.   Use non-slip pads under rugs.   Do not walk in poorly lit areas.   Do not stand on chairs or wobbly ladders.   Use caution when reaching overhead or looking upward. This position can cause a loss of balance.   Be sure your shoes fit properly, have non-slip bottoms and are in good condition.   Be cautious when going up and down stairs, curbs, and when walking on uneven sidewalks.   If your balance is poor, consider using a cane or walker.   If your fall was related to alcohol use, stop or limit alcohol intake.   If your fall was related to use of sleeping medicines, talk to your doctor about this. You may need to reduce your dosage at bedtime if you awaken during the night to go to the bathroom.   To reduce the need for nighttime bathroom trips:   Avoid drinking fluids for several hours before going to bed   Empty your bladder before going to bed   Men can keep a urinal at the bedside   © 7915-1899 [employer], [address] [place] [address], [place] [address]. All rights reserved. This information is not intended as a substitute for professional medical care. Always follow your healthcare professional's instructions.    .WAYS TO HELP PREVENT INFECTION         WASH YOUR HANDS OFTEN DURING THE DAY, ESPECIALLY BEFORE YOU EAT, AFTER USING THE  BATHROOM, AND AFTER TOUCHING ANIMALS     STAY AWAY FROM PEOPLE WHO HAVE ILLNESSES YOU CAN CATCH; SUCH AS COLDS, FLU, CHICKEN POX     TRY TO AVOID CROWDS     STAY AWAY FROM CHILDREN WHO RECENTLY HAVE RECEIVED LIVE VIRUS VACCINES     MAINTAIN GOOD MOUTH CARE     DO NOT SQUEEZE OR SCRATCH PIMPLES     CLEAN CUTS & SCRAPES RIGHT AWAY AND DAILY UNTIL HEALED WITH WARM WATER, SOAP & AN ANTISEPTIC     AVOID CONTACT WITH LITTER BOXES, BIRD CAGES, & FISH TANKS     AVOID STANDING WATER, IE., BIRD BATHS, FLOWER POTS/VASES, OR HUMIDIFIERS     WEAR GLOVES WHEN GARDENING OR CLEANING UP AFTER OTHERS, ESPECIALLY BABIES & SMALL CHILDREN     DO NOT EAT RAW FISH, SEAFOOD, MEAT, OR EGGS    .HOME CARE AFTER CHEMOTHERAPY   Meals   Many patients feel sick and lose their appetites during treatment. Eat small meals several times a day. Choose bland foods with little taste or smell if you have problems with nausea. Be sure to cook all food thoroughly. This kills bacteria and helps you avoid intestinal infection. Soft foods are easier to swallow and digest.   Activity   Exercise keeps you strong and keeps your heart and lungs active. Talk to your doctor about an appropriate exercise program for you.   Skin Care   To prevent a skin infection, bathe or shower once a day. Use a moisturizing soap and wash with warm water. Avoid very hot or cold water. Chemotherapy can make your skin dry . Apply moisturizing lotion to help relieve dry skin. Some drugs used in high doses can cause slight burns to appear (like sunburn). Ask for a special cream to help relieve the burn and protect your skin.   Prevent Mouth Sores   During chemotherapy, many people get mouth sores. Do the following to help prevent mouth sores or to ease discomfort.   Brush your teeth with a soft-bristle toothbrush after every meal.  Don't use dental floss if your platelet count is below 50,000. Your doctor or nurse will tell you if this is the case.  Use an oral swab or  special soft toothbrush if your gums bleed during regular brushing.  Use mouthwash as directed. If you can't tolerate commercial mouthwash, use salt and baking soda to clean your mouth. Mix 1 teaspoon of salt and 1 teaspoon of baking soda into a glass of water. Swish and spit.  Call your doctor or return to this facility if you develop any of the following:   Sore throat   White patches in the mouth or throat   Fever of 100.4ºF (38ºC) or higher, or as directed by your healthcare provider  © 9202-0434 [employer], [address] [place] [address], [place] [address]. All rights reserved. This information is not intended as a substitute for professional medical care. Always follow your healthcare professional's

## 2017-08-07 ENCOUNTER — OFFICE VISIT (OUTPATIENT)
Dept: HEMATOLOGY/ONCOLOGY | Facility: CLINIC | Age: 52
End: 2017-08-07
Payer: COMMERCIAL

## 2017-08-07 ENCOUNTER — INFUSION (OUTPATIENT)
Dept: INFUSION THERAPY | Facility: HOSPITAL | Age: 52
End: 2017-08-07
Attending: INTERNAL MEDICINE
Payer: COMMERCIAL

## 2017-08-07 ENCOUNTER — LAB VISIT (OUTPATIENT)
Dept: LAB | Facility: HOSPITAL | Age: 52
End: 2017-08-07
Attending: INTERNAL MEDICINE
Payer: COMMERCIAL

## 2017-08-07 VITALS — DIASTOLIC BLOOD PRESSURE: 81 MMHG | HEART RATE: 108 BPM | SYSTOLIC BLOOD PRESSURE: 134 MMHG

## 2017-08-07 VITALS
HEIGHT: 68 IN | TEMPERATURE: 99 F | OXYGEN SATURATION: 98 % | DIASTOLIC BLOOD PRESSURE: 90 MMHG | RESPIRATION RATE: 18 BRPM | BODY MASS INDEX: 42.44 KG/M2 | HEART RATE: 128 BPM | WEIGHT: 280 LBS | SYSTOLIC BLOOD PRESSURE: 172 MMHG

## 2017-08-07 DIAGNOSIS — Z17.0 MALIGNANT NEOPLASM OF UPPER-OUTER QUADRANT OF RIGHT BREAST IN FEMALE, ESTROGEN RECEPTOR POSITIVE: Primary | ICD-10-CM

## 2017-08-07 DIAGNOSIS — C50.411 MALIGNANT NEOPLASM OF UPPER-OUTER QUADRANT OF RIGHT BREAST IN FEMALE, ESTROGEN RECEPTOR POSITIVE: Primary | ICD-10-CM

## 2017-08-07 DIAGNOSIS — Z17.0 MALIGNANT NEOPLASM OF UPPER-OUTER QUADRANT OF RIGHT BREAST IN FEMALE, ESTROGEN RECEPTOR POSITIVE: ICD-10-CM

## 2017-08-07 DIAGNOSIS — C50.411 MALIGNANT NEOPLASM OF UPPER-OUTER QUADRANT OF RIGHT BREAST IN FEMALE, ESTROGEN RECEPTOR POSITIVE: ICD-10-CM

## 2017-08-07 LAB
ALBUMIN SERPL BCP-MCNC: 3.8 G/DL
ALP SERPL-CCNC: 123 U/L
ALT SERPL W/O P-5'-P-CCNC: 43 U/L
ANION GAP SERPL CALC-SCNC: 14 MMOL/L
ANISOCYTOSIS BLD QL SMEAR: SLIGHT
AST SERPL-CCNC: 27 U/L
BASOPHILS NFR BLD: 0 %
BILIRUB SERPL-MCNC: 0.2 MG/DL
BUN SERPL-MCNC: 12 MG/DL
CALCIUM SERPL-MCNC: 10.2 MG/DL
CHLORIDE SERPL-SCNC: 106 MMOL/L
CO2 SERPL-SCNC: 21 MMOL/L
CREAT SERPL-MCNC: 0.8 MG/DL
DIFFERENTIAL METHOD: ABNORMAL
EOSINOPHIL NFR BLD: 0 %
ERYTHROCYTE [DISTWIDTH] IN BLOOD BY AUTOMATED COUNT: 17.7 %
EST. GFR  (AFRICAN AMERICAN): >60 ML/MIN/1.73 M^2
EST. GFR  (NON AFRICAN AMERICAN): >60 ML/MIN/1.73 M^2
GLUCOSE SERPL-MCNC: 214 MG/DL
HCT VFR BLD AUTO: 38.2 %
HGB BLD-MCNC: 13 G/DL
HOWELL-JOLLY BOD BLD QL SMEAR: ABNORMAL
LYMPHOCYTES NFR BLD: 12 %
MCH RBC QN AUTO: 30.1 PG
MCHC RBC AUTO-ENTMCNC: 34 G/DL
MCV RBC AUTO: 88 FL
METAMYELOCYTES NFR BLD MANUAL: 1 %
MONOCYTES NFR BLD: 2 %
MYELOCYTES NFR BLD MANUAL: 7 %
NEUTROPHILS NFR BLD: 68 %
NEUTS BAND NFR BLD MANUAL: 10 %
PLATELET # BLD AUTO: 366 K/UL
PLATELET BLD QL SMEAR: ABNORMAL
PMV BLD AUTO: 9 FL
POIKILOCYTOSIS BLD QL SMEAR: SLIGHT
POLYCHROMASIA BLD QL SMEAR: ABNORMAL
POTASSIUM SERPL-SCNC: 4.4 MMOL/L
PROT SERPL-MCNC: 8 G/DL
RBC # BLD AUTO: 4.32 M/UL
SODIUM SERPL-SCNC: 141 MMOL/L
WBC # BLD AUTO: 15.39 K/UL

## 2017-08-07 PROCEDURE — 96375 TX/PRO/DX INJ NEW DRUG ADDON: CPT | Mod: PO

## 2017-08-07 PROCEDURE — 99215 OFFICE O/P EST HI 40 MIN: CPT | Mod: S$GLB,,, | Performed by: INTERNAL MEDICINE

## 2017-08-07 PROCEDURE — 63600175 PHARM REV CODE 636 W HCPCS: Mod: PO | Performed by: INTERNAL MEDICINE

## 2017-08-07 PROCEDURE — 85027 COMPLETE CBC AUTOMATED: CPT | Mod: PO

## 2017-08-07 PROCEDURE — 96367 TX/PROPH/DG ADDL SEQ IV INF: CPT | Mod: PO

## 2017-08-07 PROCEDURE — 36415 COLL VENOUS BLD VENIPUNCTURE: CPT | Mod: PO

## 2017-08-07 PROCEDURE — S0028 INJECTION, FAMOTIDINE, 20 MG: HCPCS | Mod: PO | Performed by: INTERNAL MEDICINE

## 2017-08-07 PROCEDURE — 3008F BODY MASS INDEX DOCD: CPT | Mod: S$GLB,,, | Performed by: INTERNAL MEDICINE

## 2017-08-07 PROCEDURE — 96413 CHEMO IV INFUSION 1 HR: CPT | Mod: PO

## 2017-08-07 PROCEDURE — 80053 COMPREHEN METABOLIC PANEL: CPT | Mod: PO

## 2017-08-07 PROCEDURE — 96377 APPLICATON ON-BODY INJECTOR: CPT | Mod: PO

## 2017-08-07 PROCEDURE — 96415 CHEMO IV INFUSION ADDL HR: CPT | Mod: PO

## 2017-08-07 PROCEDURE — 85007 BL SMEAR W/DIFF WBC COUNT: CPT | Mod: PO

## 2017-08-07 PROCEDURE — 25000003 PHARM REV CODE 250: Mod: PO | Performed by: INTERNAL MEDICINE

## 2017-08-07 PROCEDURE — 99999 PR PBB SHADOW E&M-EST. PATIENT-LVL III: CPT | Mod: PBBFAC,,, | Performed by: INTERNAL MEDICINE

## 2017-08-07 RX ORDER — DIPHENHYDRAMINE HYDROCHLORIDE 50 MG/ML
50 INJECTION INTRAMUSCULAR; INTRAVENOUS ONCE AS NEEDED
Status: CANCELLED | OUTPATIENT
Start: 2017-08-07 | End: 2017-08-07

## 2017-08-07 RX ORDER — EPINEPHRINE 0.3 MG/.3ML
0.3 INJECTION SUBCUTANEOUS ONCE AS NEEDED
Status: CANCELLED | OUTPATIENT
Start: 2017-08-07 | End: 2017-08-07

## 2017-08-07 RX ORDER — FAMOTIDINE 20 MG/50ML
20 INJECTION, SOLUTION INTRAVENOUS
Status: COMPLETED | OUTPATIENT
Start: 2017-08-07 | End: 2017-08-07

## 2017-08-07 RX ORDER — PALONOSETRON 0.05 MG/ML
0.25 INJECTION, SOLUTION INTRAVENOUS
Status: COMPLETED | OUTPATIENT
Start: 2017-08-07 | End: 2017-08-07

## 2017-08-07 RX ORDER — HEPARIN 100 UNIT/ML
500 SYRINGE INTRAVENOUS
Status: DISCONTINUED | OUTPATIENT
Start: 2017-08-07 | End: 2017-08-07 | Stop reason: HOSPADM

## 2017-08-07 RX ORDER — SODIUM CHLORIDE 0.9 % (FLUSH) 0.9 %
10 SYRINGE (ML) INJECTION
Status: CANCELLED | OUTPATIENT
Start: 2017-08-07

## 2017-08-07 RX ORDER — PALONOSETRON 0.05 MG/ML
0.25 INJECTION, SOLUTION INTRAVENOUS
Status: CANCELLED
Start: 2017-08-07 | End: 2017-08-07

## 2017-08-07 RX ORDER — FAMOTIDINE 20 MG/50ML
20 INJECTION, SOLUTION INTRAVENOUS
Status: CANCELLED
Start: 2017-08-07

## 2017-08-07 RX ORDER — HEPARIN 100 UNIT/ML
500 SYRINGE INTRAVENOUS
Status: CANCELLED | OUTPATIENT
Start: 2017-08-07

## 2017-08-07 RX ADMIN — PACLITAXEL 432 MG: 6 INJECTION, SOLUTION, CONCENTRATE INTRAVENOUS at 10:08

## 2017-08-07 RX ADMIN — PALONOSETRON HYDROCHLORIDE 0.25 MG: 0.25 INJECTION INTRAVENOUS at 09:08

## 2017-08-07 RX ADMIN — DEXAMETHASONE SODIUM PHOSPHATE 20 MG: 4 INJECTION, SOLUTION INTRA-ARTICULAR; INTRALESIONAL; INTRAMUSCULAR; INTRAVENOUS; SOFT TISSUE at 09:08

## 2017-08-07 RX ADMIN — HEPARIN 500 UNITS: 100 SYRINGE at 02:08

## 2017-08-07 RX ADMIN — FAMOTIDINE 20 MG: 20 INJECTION, SOLUTION INTRAVENOUS at 10:08

## 2017-08-07 RX ADMIN — DIPHENHYDRAMINE HYDROCHLORIDE 50 MG: 50 INJECTION INTRAMUSCULAR; INTRAVENOUS at 10:08

## 2017-08-07 RX ADMIN — PEGFILGRASTIM 6 MG: KIT SUBCUTANEOUS at 01:08

## 2017-08-07 NOTE — PLAN OF CARE
Problem: Patient Care Overview  Goal: Plan of Care Review  Outcome: Ongoing (interventions implemented as appropriate)  I feel ok , I took my steroids last night and this morning and I didn't sleep to well

## 2017-08-07 NOTE — PATIENT INSTRUCTIONS
[facility]  [address]  [address] [address]  [phone] phone     [phone] fax  Hours of Operation: Monday- Friday 8:00am- 5:00pm  After hours phone  [phone]  Hematology / Oncology Physicians on call      Dr. Kulwinder Kennedy                      Please call with any concerns regarding your appointment today.  FALL PREVENTION   Falls often occur due to slipping, tripping or losing your balance. Here are ways to reduce your risk of falling again.   Was there anything that caused your fall that can be fixed, removed or replaced?   Make your home safe by keeping walkways clear of objects you may trip over.   Use non-slip pads under rugs.   Do not walk in poorly lit areas.   Do not stand on chairs or wobbly ladders.   Use caution when reaching overhead or looking upward. This position can cause a loss of balance.   Be sure your shoes fit properly, have non-slip bottoms and are in good condition.   Be cautious when going up and down stairs, curbs, and when walking on uneven sidewalks.   If your balance is poor, consider using a cane or walker.   If your fall was related to alcohol use, stop or limit alcohol intake.   If your fall was related to use of sleeping medicines, talk to your doctor about this. You may need to reduce your dosage at bedtime if you awaken during the night to go to the bathroom.   To reduce the need for nighttime bathroom trips:   Avoid drinking fluids for several hours before going to bed   Empty your bladder before going to bed   Men can keep a urinal at the bedside   © 6232-7895 [employer] Black, [address] [place] [address], [place] [address]. All rights reserved. This information is not intended as a substitute for professional medical care. Always follow your healthcare professional's instructions.  HOME CARE AFTER CHEMOTHERAPY   Meals   Many patients feel sick and lose their appetites during treatment. Eat  small meals several times a day. Choose bland foods with little taste or smell if you have problems with nausea. Be sure to cook all food thoroughly. This kills bacteria and helps you avoid intestinal infection. Soft foods are easier to swallow and digest.   Activity   Exercise keeps you strong and keeps your heart and lungs active. Talk to your doctor about an appropriate exercise program for you.   Skin Care   To prevent a skin infection, bathe or shower once a day. Use a moisturizing soap and wash with warm water. Avoid very hot or cold water. Chemotherapy can make your skin dry . Apply moisturizing lotion to help relieve dry skin. Some drugs used in high doses can cause slight burns to appear (like sunburn). Ask for a special cream to help relieve the burn and protect your skin.   Prevent Mouth Sores   During chemotherapy, many people get mouth sores. Do the following to help prevent mouth sores or to ease discomfort.   Brush your teeth with a soft-bristle toothbrush after every meal.  Don't use dental floss if your platelet count is below 50,000. Your doctor or nurse will tell you if this is the case.  Use an oral swab or special soft toothbrush if your gums bleed during regular brushing.  Use mouthwash as directed. If you can't tolerate commercial mouthwash, use salt and baking soda to clean your mouth. Mix 1 teaspoon of salt and 1 teaspoon of baking soda into a glass of water. Swish and spit.  Call your doctor or return to this facility if you develop any of the following:   Sore throat   White patches in the mouth or throat   Fever of 100.4ºF (38ºC) or higher, or as directed by your healthcare provider  © 7484-4229 Lesa Black, [address] [place] [address], [place] [address]. All rights reserved. This information is not intended as a substitute for professional medical care. Always follow your healthcare professional's   WAYS TO HELP PREVENT INFECTION         WASH YOUR HANDS OFTEN DURING THE DAY, ESPECIALLY  BEFORE YOU EAT, AFTER USING THE BATHROOM, AND AFTER TOUCHING ANIMALS     STAY AWAY FROM PEOPLE WHO HAVE ILLNESSES YOU CAN CATCH; SUCH AS COLDS, FLU, CHICKEN POX     TRY TO AVOID CROWDS     STAY AWAY FROM CHILDREN WHO RECENTLY HAVE RECEIVED LIVE VIRUS VACCINES     MAINTAIN GOOD MOUTH CARE     DO NOT SQUEEZE OR SCRATCH PIMPLES     CLEAN CUTS & SCRAPES RIGHT AWAY AND DAILY UNTIL HEALED WITH WARM WATER, SOAP & AN ANTISEPTIC     AVOID CONTACT WITH LITTER BOXES, BIRD CAGES, & FISH TANKS     AVOID STANDING WATER, IE., BIRD BATHS, FLOWER POTS/VASES, OR HUMIDIFIERS     WEAR GLOVES WHEN GARDENING OR CLEANING UP AFTER OTHERS, ESPECIALLY BABIES & SMALL CHILDREN    DO NOT EAT RAW FISH, SEAFOOD, MEAT, OR EGGS  Constipation (Adult)  Constipation means that you have bowel movements that are less frequent than usual. Stools often become very hard and difficult to pass.  Constipation is very common. At some point in life it affects almost everyone. Since everyone's bowel habits are different, what is constipation to one person may not be to another. Your healthcare provider may do tests to diagnose constipation. It depends on what he or she finds when evaluating you.    Symptoms of constipation include:  Abdominal pain  Bloating  Vomiting  Painful bowel movements  Itching, swelling, bleeding, or pain around the anus  Causes  Constipation can have many causes. These include:  Diet low in fiber  Too much dairy  Not drinking enough liquids  Lack of exercise or physical activity. This is especially true for older adults.  Changes in lifestyle or daily routine, including pregnancy, aging, work, and travel  Frequent use or misuse of laxatives  Ignoring the urge to have a bowel movement or delaying it until later  Medicines, such as certain prescription pain medicines, iron supplements, antacids, certain antidepressants, and calcium supplements  Diseases like irritable bowel syndrome, bowel obstructions, stroke, diabetes, thyroid  disease, Parkinson disease, hemorrhoids, and colon cancer  Complications  Potential complications of constipation can include:  Hemorrhoids  Rectal bleeding from hemorrhoids or anal fissures (skin tears)  Hernias  Dependency on laxatives  Chronic constipation  Fecal impaction  Bowel obstruction or perforation  Home care  All treatment should be done after talking with your healthcare provider. This is especially true if you have another medical problems, are taking prescription medicines, or are an older adult. Treatment most often involves lifestyle changes. You may also need medicines. Your healthcare provider will tell you which will work best for you. Follow the advice below to help avoid this problem in the future.  Lifestyle changes  These lifestyle changes can help prevent constipation:  Diet. Eat a high-fiber diet, with fresh fruit and vegetables, and reduce dairy intake, meats, and processed foods  Fluids. It's important to get enough fluids each day. Drink plenty of water when you eat more fiber. If you are on diet that limits the amount of fluid you can have, talk about this with your healthcare provider.  Regular exercise. Check with your healthcare provider first.  Medications  Take any medicines as directed. Some laxatives are safe to use only every now and then. Others can be taken on a regular basis. Talk with your doctor or pharmacist if you have questions.  Prescription pain medicines can cause constipation. If you are taking this kind of medicine, ask your healthcare provider if you should also take a stool softener.  Medicines you may take to treat constipation include:  Fiber supplements  Stool softeners  Laxatives  Enemas  Rectal suppositories  Follow-up care  Follow up with your healthcare provider if symptoms don't get better in the next few days. You may need to have more tests or see a specialist.  Call 911  Call 911 if any of these occur:  Trouble breathing  Stiff, rigid abdomen that is  severely painful to touch  Confusion  Fainting or loss of consciousness  Rapid heart rate  Chest pain  When to seek medical advice  Call your healthcare provider right away if any of these occur:  Fever over 100.4°F (38°C)  Failure to resume normal bowel movements  Pain in your abdomen or back gets worse  Nausea or vomiting  Swelling in your abdomen  Blood in the stool  Black, tarry stool  Involuntary weight loss  Weakness  Date Last Reviewed: 12/30/2015 © 2000-2016 [employer] [address] [place] [address], [place] [address]. All rights reserved. This information is not intended as a substitute for professional medical care. Always follow your healthcare professional's instructions.        Constipation (Adult)  Constipation means that you have bowel movements that are less frequent than usual. Stools often become very hard and difficult to pass.  Constipation is very common. At some point in life it affects almost everyone. Since everyone's bowel habits are different, what is constipation to one person may not be to another. Your healthcare provider may do tests to diagnose constipation. It depends on what he or she finds when evaluating you.    Symptoms of constipation include:  Abdominal pain  Bloating  Vomiting  Painful bowel movements  Itching, swelling, bleeding, or pain around the anus  Causes  Constipation can have many causes. These include:  Diet low in fiber  Too much dairy  Not drinking enough liquids  Lack of exercise or physical activity. This is especially true for older adults.  Changes in lifestyle or daily routine, including pregnancy, aging, work, and travel  Frequent use or misuse of laxatives  Ignoring the urge to have a bowel movement or delaying it until later  Medicines, such as certain prescription pain medicines, iron supplements, antacids, certain antidepressants, and calcium supplements  Diseases like irritable bowel syndrome, bowel obstructions, stroke, diabetes, thyroid  severely painful to touch  Confusion  Fainting or loss of consciousness  Rapid heart rate  Chest pain  When to seek medical advice  Call your healthcare provider right away if any of these occur:  Fever over 100.4°F (38°C)  Failure to resume normal bowel movements  Pain in your abdomen or back gets worse  Nausea or vomiting  Swelling in your abdomen  Blood in the stool  Black, tarry stool  Involuntary weight loss  Weakness  Date Last Reviewed: 12/30/2015  © 4562-8265 [employer]. [address] [place] [address], [place] [id number]. All rights reserved. This information is not intended as a substitute for professional medical care. Always follow your healthcare professional's instructions.      

## 2017-08-07 NOTE — NURSING
12:55 pt went to bathroom with assistance from nurse. Pt returned to chair and noticed the line was wet and end piece was cracked. Infusion was stopped and bag was sent to pharmacy to have line changed. Area was cleaned with sani cloth. Patient was not exposed.

## 2017-08-07 NOTE — PROGRESS NOTES
Hematology/Oncology Office Note    Reason for referral:  Locally advanced breast cancer with biopsy proven axillary lymph node involvement    CC:    Referred by:  No ref. provider found    Diagnosis:  Right sided locally advanced infiltrating ductal carcinoma with biopsy-proven axillary lymph node involvement (ER positive at 95%, IN positive at 95%, HER-2 1+ by IHC and negative by fish    Treatment:    History of present illness:  Ms. Urbano is a 51-year-old female with a past medical history of DM hypertension, hypothyroidism, iron deficiency anemia, who was noted to have abnormal screening mammogram  on 4/28/2017.  Core biopsy of right breast mass at 10:00 6 cm from the nipple demonstrated infiltrating ductal carcinoma and right axillary lymph node core biopsy also demonstrated infiltrating ductal carcinoma.  ER/IN positive, HER-2 negative markers.  The patient has a first cousin with a history of breast cancer and one aunt with a history of breast cancer.  She has some soreness at previous biopsy site, however, denies other significant symptoms.    I have reviewed and updated the HPI, ROS, PMHx, Social Hx, Family Hx and treatment history.    Today's visit:  Patient presents for cycle 1 of dose dense Taxol.  She has no complaints today and specifically denies fever, chills, night sweats or weight loss.    Past Medical History:   Diagnosis Date    Allergic rhinitis, cause unspecified     Cancer     R Breast Cancer    Carpal tunnel syndrome of left wrist     Elevated liver function tests     in the past    Fatty liver 05/02/2017    on ultrasound    Fibroid uterus     10/2014    Hepatomegaly 05/02/2017    on ultrasound    History of sciatica     HSV infection     Type 1 and 2    HTN (hypertension)     Hypothyroidism     Insulin resistance     Iron deficiency anemia, unspecified     Obesity     Tension headache     Vitamin D deficiency          Social History:  No tobacco, alcohol, or illicit  "drugs      Family History: family history includes Cancer in her father and sister; Diabetes in her mother; Heart failure in her mother; Hypertension in her mother; No Known Problems in her daughter, daughter, and son.        HPI    Review of Systems   Constitutional: Negative for activity change, appetite change, chills, diaphoresis and unexpected weight change.   HENT: Negative for congestion, drooling, ear pain, facial swelling, nosebleeds, postnasal drip, sneezing, sore throat, trouble swallowing and voice change.    Eyes: Negative.    Respiratory: Negative for apnea, chest tightness, shortness of breath, wheezing and stridor.    Cardiovascular: Negative for chest pain, palpitations and leg swelling.   Gastrointestinal: Negative for abdominal distention, abdominal pain, blood in stool, constipation, diarrhea, nausea, rectal pain and vomiting.   Endocrine: Negative.    Genitourinary: Negative for difficulty urinating, dysuria, enuresis, frequency, hematuria and urgency.   Musculoskeletal: Negative for arthralgias, gait problem, joint swelling, myalgias, neck pain and neck stiffness.   Skin: Negative for color change, pallor, rash and wound.   Allergic/Immunologic: Negative.    Neurological: Negative for dizziness, tremors, syncope, facial asymmetry, weakness, light-headedness and headaches.   Hematological: Negative for adenopathy. Does not bruise/bleed easily.   Psychiatric/Behavioral: Negative for agitation, behavioral problems, confusion, decreased concentration, dysphoric mood, hallucinations and sleep disturbance. The patient is nervous/anxious. The patient is not hyperactive.        Objective:       Vitals:    08/07/17 0851   BP: (!) 172/90   Pulse: (!) 128   Resp: 18   Temp: 98.5 °F (36.9 °C)   SpO2: 98%   Weight: 127 kg (279 lb 15.8 oz)   Height: 5' 8" (1.727 m)         Physical Exam   Constitutional: She is oriented to person, place, and time. She appears well-developed and well-nourished. No distress. "   Well groomed   HENT:   Head: Normocephalic and atraumatic.   Right Ear: Tympanic membrane and ear canal normal.   Left Ear: Tympanic membrane and ear canal normal.   Nose: Nose normal. Right sinus exhibits no maxillary sinus tenderness and no frontal sinus tenderness. Left sinus exhibits no maxillary sinus tenderness and no frontal sinus tenderness.   Mouth/Throat: Oropharynx is clear and moist and mucous membranes are normal. Mucous membranes are not pale and not dry. No oral lesions. Normal dentition. No oropharyngeal exudate.   Eyes: Conjunctivae, EOM and lids are normal. Pupils are equal, round, and reactive to light. Lids are everted and swept, no foreign bodies found.   Neck: Trachea normal and normal range of motion. Neck supple. No tracheal deviation present. No thyroid mass and no thyromegaly present.   No crepitus   Cardiovascular: Normal rate, regular rhythm, normal heart sounds, intact distal pulses and normal pulses.  Exam reveals no gallop and no friction rub.    No murmur heard.  Pulmonary/Chest: Effort normal and breath sounds normal. No accessory muscle usage. No respiratory distress. She has no decreased breath sounds. She has no wheezes. She has no rhonchi. She has no rales. She exhibits no tenderness.   Right breast: Ill-defined induration without discrete mass palpated, 1 cm right axillary node   Abdominal: Soft. Normal appearance and bowel sounds are normal. She exhibits no distension. There is no hepatosplenomegaly. There is no tenderness. There is no guarding.   Musculoskeletal: Normal range of motion.   Lymphadenopathy:        Head (right side): No submental adenopathy present.        Head (left side): No submental adenopathy present.     She has no cervical adenopathy.     She has no axillary adenopathy.        Right: No supraclavicular adenopathy present.        Left: No supraclavicular adenopathy present.   Right axillary node 1.0 cm   Neurological: She is alert and oriented to person,  place, and time. No cranial nerve deficit. She exhibits normal muscle tone. Coordination normal.   Skin: Skin is warm, dry and intact. Capillary refill takes less than 2 seconds. She is not diaphoretic.   Psychiatric: She has a normal mood and affect. Her behavior is normal. Judgment and thought content normal.       Lab Results   Component Value Date    WBC 15.39 (H) 08/07/2017    HGB 13.0 08/07/2017    HCT 38.2 08/07/2017    MCV 88 08/07/2017     (H) 08/07/2017     Lab Results   Component Value Date    CREATININE 0.8 08/07/2017    BUN 12 08/07/2017     08/07/2017    K 4.4 08/07/2017     08/07/2017    CO2 21 (L) 08/07/2017     Lab Results   Component Value Date    ALT 43 08/07/2017    AST 27 08/07/2017    ALKPHOS 123 08/07/2017    BILITOT 0.2 08/07/2017         Assessment:         51-year-old female with a new diagnosis of right-sided infiltrating ductal carcinoma ER/IN positive, HER-2 negative with biopsy-proven right axillary lymph node involvement.  Patient was consented for BRCA testing for participation and B-55 study and BRCA testing will be sent today.  I discussed neoadjuvant therapy at length and the benefits of preoperative chemotherapy.    She received cycle 1 of dense dose Adriamycin/Cytoxan on 6/12/2017 and tolerated treatment exceptionally well.  Right axillary lymph node continues to decrease in size likely representing positive response to  treatment.   She presents for cycle 1 of dose dense Taxol.  Counts are adequate to proceed with treatment with Neulasta via on body injector.  She will follow-up in 2 weeks for cycle 2.      ER/IN positive, HER-2 negative infiltrating ductal carcinoma the right breast with biopsy proven lymph node involvement clinically stage II/III.  --BRCA testing via B-55 Study is negative for mutation  --ddAC--> taxol   cycle 1--- 6/12/2017  --Cycle 4 on 7/24/2017  --Proceed with cycle 1 of dose dense Taxol 8/7/2017      Hypertension:  Continue  Lotensin/HCTZ  --Blood pressure poorly controlled this morning we'll continue to monitor gel's medications as needed    Hypothyroidism:  Continue Synthroid    4 mm right upper lobe pulmonary nodule and subpleural nodularity bilateral bases:  --Repeat CT scan  after treatment

## 2017-08-21 ENCOUNTER — INFUSION (OUTPATIENT)
Dept: INFUSION THERAPY | Facility: HOSPITAL | Age: 52
End: 2017-08-21
Attending: INTERNAL MEDICINE
Payer: COMMERCIAL

## 2017-08-21 ENCOUNTER — OFFICE VISIT (OUTPATIENT)
Dept: HEMATOLOGY/ONCOLOGY | Facility: CLINIC | Age: 52
End: 2017-08-21
Payer: COMMERCIAL

## 2017-08-21 VITALS
OXYGEN SATURATION: 96 % | HEIGHT: 68 IN | BODY MASS INDEX: 43.1 KG/M2 | TEMPERATURE: 99 F | WEIGHT: 284.38 LBS | RESPIRATION RATE: 18 BRPM | SYSTOLIC BLOOD PRESSURE: 155 MMHG | DIASTOLIC BLOOD PRESSURE: 96 MMHG | HEART RATE: 133 BPM

## 2017-08-21 VITALS
HEART RATE: 107 BPM | HEIGHT: 68 IN | WEIGHT: 284.38 LBS | DIASTOLIC BLOOD PRESSURE: 86 MMHG | SYSTOLIC BLOOD PRESSURE: 139 MMHG | BODY MASS INDEX: 43.1 KG/M2

## 2017-08-21 DIAGNOSIS — Z17.0 MALIGNANT NEOPLASM OF UPPER-OUTER QUADRANT OF RIGHT BREAST IN FEMALE, ESTROGEN RECEPTOR POSITIVE: ICD-10-CM

## 2017-08-21 DIAGNOSIS — C50.411 MALIGNANT NEOPLASM OF UPPER-OUTER QUADRANT OF RIGHT BREAST IN FEMALE, ESTROGEN RECEPTOR POSITIVE: ICD-10-CM

## 2017-08-21 DIAGNOSIS — G62.9 NEUROPATHY: Primary | ICD-10-CM

## 2017-08-21 DIAGNOSIS — C50.411 MALIGNANT NEOPLASM OF UPPER-OUTER QUADRANT OF RIGHT BREAST IN FEMALE, ESTROGEN RECEPTOR POSITIVE: Primary | ICD-10-CM

## 2017-08-21 DIAGNOSIS — Z17.0 MALIGNANT NEOPLASM OF UPPER-OUTER QUADRANT OF RIGHT BREAST IN FEMALE, ESTROGEN RECEPTOR POSITIVE: Primary | ICD-10-CM

## 2017-08-21 PROCEDURE — 96415 CHEMO IV INFUSION ADDL HR: CPT

## 2017-08-21 PROCEDURE — 3077F SYST BP >= 140 MM HG: CPT | Mod: S$GLB,,, | Performed by: INTERNAL MEDICINE

## 2017-08-21 PROCEDURE — 96375 TX/PRO/DX INJ NEW DRUG ADDON: CPT

## 2017-08-21 PROCEDURE — 63600175 PHARM REV CODE 636 W HCPCS: Performed by: INTERNAL MEDICINE

## 2017-08-21 PROCEDURE — 96377 APPLICATON ON-BODY INJECTOR: CPT

## 2017-08-21 PROCEDURE — 3008F BODY MASS INDEX DOCD: CPT | Mod: S$GLB,,, | Performed by: INTERNAL MEDICINE

## 2017-08-21 PROCEDURE — 99999 PR PBB SHADOW E&M-EST. PATIENT-LVL III: CPT | Mod: PBBFAC,,, | Performed by: INTERNAL MEDICINE

## 2017-08-21 PROCEDURE — 3080F DIAST BP >= 90 MM HG: CPT | Mod: S$GLB,,, | Performed by: INTERNAL MEDICINE

## 2017-08-21 PROCEDURE — 96413 CHEMO IV INFUSION 1 HR: CPT

## 2017-08-21 PROCEDURE — S0028 INJECTION, FAMOTIDINE, 20 MG: HCPCS | Performed by: INTERNAL MEDICINE

## 2017-08-21 PROCEDURE — 25000003 PHARM REV CODE 250: Performed by: INTERNAL MEDICINE

## 2017-08-21 PROCEDURE — 99215 OFFICE O/P EST HI 40 MIN: CPT | Mod: S$GLB,,, | Performed by: INTERNAL MEDICINE

## 2017-08-21 PROCEDURE — 96367 TX/PROPH/DG ADDL SEQ IV INF: CPT

## 2017-08-21 RX ORDER — FAMOTIDINE 20 MG/50ML
20 INJECTION, SOLUTION INTRAVENOUS
Status: CANCELLED
Start: 2017-08-21

## 2017-08-21 RX ORDER — PALONOSETRON 0.05 MG/ML
0.25 INJECTION, SOLUTION INTRAVENOUS
Status: COMPLETED | OUTPATIENT
Start: 2017-08-21 | End: 2017-08-21

## 2017-08-21 RX ORDER — HEPARIN 100 UNIT/ML
500 SYRINGE INTRAVENOUS
Status: DISCONTINUED | OUTPATIENT
Start: 2017-08-21 | End: 2017-08-21 | Stop reason: HOSPADM

## 2017-08-21 RX ORDER — SODIUM CHLORIDE 9 MG/ML
10 INJECTION, SOLUTION INTRAMUSCULAR; INTRAVENOUS; SUBCUTANEOUS
Status: DISCONTINUED | OUTPATIENT
Start: 2017-08-21 | End: 2017-08-21 | Stop reason: HOSPADM

## 2017-08-21 RX ORDER — EPINEPHRINE 0.3 MG/.3ML
0.3 INJECTION SUBCUTANEOUS ONCE AS NEEDED
Status: CANCELLED | OUTPATIENT
Start: 2017-08-21 | End: 2017-08-21

## 2017-08-21 RX ORDER — PALONOSETRON 0.05 MG/ML
0.25 INJECTION, SOLUTION INTRAVENOUS
Status: CANCELLED
Start: 2017-08-21 | End: 2017-08-21

## 2017-08-21 RX ORDER — FAMOTIDINE 20 MG/50ML
20 INJECTION, SOLUTION INTRAVENOUS
Status: COMPLETED | OUTPATIENT
Start: 2017-08-21 | End: 2017-08-21

## 2017-08-21 RX ORDER — DIPHENHYDRAMINE HYDROCHLORIDE 50 MG/ML
50 INJECTION INTRAMUSCULAR; INTRAVENOUS ONCE AS NEEDED
Status: CANCELLED | OUTPATIENT
Start: 2017-08-21 | End: 2017-08-21

## 2017-08-21 RX ORDER — HEPARIN 100 UNIT/ML
500 SYRINGE INTRAVENOUS
Status: CANCELLED | OUTPATIENT
Start: 2017-08-21

## 2017-08-21 RX ORDER — SODIUM CHLORIDE 0.9 % (FLUSH) 0.9 %
10 SYRINGE (ML) INJECTION
Status: CANCELLED | OUTPATIENT
Start: 2017-08-21

## 2017-08-21 RX ORDER — GABAPENTIN 100 MG/1
100 CAPSULE ORAL 3 TIMES DAILY
Qty: 90 CAPSULE | Refills: 0 | Status: SHIPPED | OUTPATIENT
Start: 2017-08-21 | End: 2017-11-17

## 2017-08-21 RX ADMIN — FAMOTIDINE 20 MG: 20 INJECTION, SOLUTION INTRAVENOUS at 11:08

## 2017-08-21 RX ADMIN — PACLITAXEL 432 MG: 6 INJECTION, SOLUTION, CONCENTRATE INTRAVENOUS at 12:08

## 2017-08-21 RX ADMIN — HEPARIN 500 UNITS: 100 SYRINGE at 04:08

## 2017-08-21 RX ADMIN — DIPHENHYDRAMINE HYDROCHLORIDE 50 MG: 50 INJECTION INTRAMUSCULAR; INTRAVENOUS at 11:08

## 2017-08-21 RX ADMIN — DEXAMETHASONE SODIUM PHOSPHATE 20 MG: 4 INJECTION, SOLUTION INTRA-ARTICULAR; INTRALESIONAL; INTRAMUSCULAR; INTRAVENOUS; SOFT TISSUE at 11:08

## 2017-08-21 RX ADMIN — PEGFILGRASTIM 6 MG: KIT SUBCUTANEOUS at 04:08

## 2017-08-21 RX ADMIN — PALONOSETRON HYDROCHLORIDE 0.25 MG: 0.25 INJECTION INTRAVENOUS at 10:08

## 2017-08-21 NOTE — PATIENT INSTRUCTIONS
[facility]  [address]  [address]  [phone] phone     [phone] fax  Hours of Operation: Monday- Friday 8:00am- 5:00pm  After hours phone  [phone]  Hematology / Oncology Physicians on call      Dr. Kulwinder Martinez                        Please call with any concerns regarding your appointment today.      HOME CARE AFTER CHEMOTHERAPY   Meals   Many patients feel sick and lose their appetites during treatment. Eat small meals several times a day. Choose bland foods with little taste or smell if you have problems with nausea. Be sure to cook all food thoroughly. This kills bacteria and helps you avoid intestinal infection. Soft foods are easier to swallow and digest.   Activity   Exercise keeps you strong and keeps your heart and lungs active. Talk to your doctor about an appropriate exercise program for you.   Skin Care   To prevent a skin infection, bathe or shower once a day. Use a moisturizing soap and wash with warm water. Avoid very hot or cold water. Chemotherapy can make your skin dry . Apply moisturizing lotion to help relieve dry skin. Some drugs used in high doses can cause slight burns to appear (like sunburn). Ask for a special cream to help relieve the burn and protect your skin.   Prevent Mouth Sores   During chemotherapy, many people get mouth sores. Do the following to help prevent mouth sores or to ease discomfort.   Brush your teeth with a soft-bristle toothbrush after every meal.  Don't use dental floss if your platelet count is below 50,000. Your doctor or nurse will tell you if this is the case.  Use an oral swab or special soft toothbrush if your gums bleed during regular brushing.  Use mouthwash as directed. If you can't tolerate commercial mouthwash, use salt and baking soda to clean your mouth. Mix 1 teaspoon of salt and 1 teaspoon of baking soda into a glass of water. Swish and spit.  Call your  doctor or return to this facility if you develop any of the following:   Sore throat   White patches in the mouth or throat   Fever of 100.4ºF (38ºC) or higher, or as directed by your healthcare provider  © 6963-9612 [employer], [address] [place] [address], [place] [address]. All rights reserved. This information is not intended as a substitute for professional medical care. Always follow your healthcare professional's       YOU HAVE STARTED ON CHEMOTHERAPY. IF YOU EXPERIENCE ANY OF THE FOLLOWING PROBLEMS, CALL THE OFFICE IMMEDIATELY.    *FEVER .0 OR GREATER    *CHILLS, ESPECIALLY SHAKING CHILLS, OR SWEATING    *A SEVERE COUGH OR SORE THROAT, OR SINUS PAIN/     PRESSURE    *REDNESS, SWELLING, OR TENDERNESS AROUND A WOUND,     SORE, PIMPLE, RECTAL AREA, OR IV SITE    *SORES OR ULCERS IN THE MOUTH    *BLISTERS ON THE LIPS OR SKIN    *FREQUENT URGENCY TO URINATE OR A BURNING FEELING   WHEN YOU URINATE    *BLOOD IN THE URINE OR STOOL    *ANY UNEXPLAINED BRUISING OR PROLONGED BLEEDING,     (NOSEBLEEDS OR BLEEDING GUMS)    *LOOSE BOWEL MOVEMENTS THAT DO NOT RESPOND TO     IMODIUM OR MORE THAN THREE TIMES A DAY    *VOMITING UNRESPONSIVE TO ANTINAUSEA MEDICINE    *ANY UNUSUAL PHYSICAL SYMPTOMS THAT BEGAN AFTER     CHEMOTHERAPY    DURING WEEKDAYS, CALL AND ASK TO SPEAK DIRECTLY TO A NURSE.  AT OTHER TIMES, CALL THE OFFICE PHONE NUMBER; THE ANSWERING SERVICE WILL CONTACT THE ON-CALL PHYSICIAN.  SOMEONE IS AVAILABLE 24 HOURS A DAY, SEVEN DAYS A WEEK.    WAYS TO HELP PREVENT INFECTION         WASH YOUR HANDS OFTEN DURING THE DAY, ESPECIALLY BEFORE YOU EAT, AFTER USING THE BATHROOM, AND AFTER TOUCHING ANIMALS     STAY AWAY FROM PEOPLE WHO HAVE ILLNESSES YOU CAN CATCH; SUCH AS COLDS, FLU, CHICKEN POX     TRY TO AVOID CROWDS     STAY AWAY FROM CHILDREN WHO RECENTLY HAVE RECEIVED LIVE VIRUS VACCINES     MAINTAIN GOOD MOUTH CARE     DO NOT SQUEEZE OR SCRATCH PIMPLES     CLEAN CUTS & SCRAPES RIGHT AWAY AND DAILY UNTIL  HEALED WITH WARM WATER, SOAP & AN ANTISEPTIC     AVOID CONTACT WITH LITTER BOXES, BIRD CAGES, & FISH TANKS     AVOID STANDING WATER, IE., BIRD BATHS, FLOWER POTS/VASES, OR HUMIDIFIERS     WEAR GLOVES WHEN GARDENING OR CLEANING UP AFTER OTHERS, ESPECIALLY BABIES & SMALL CHILDREN     DO NOT EAT RAW FISH, SEAFOOD, MEAT, OR EGGS    FALL PREVENTION   Falls often occur due to slipping, tripping or losing your balance. Here are ways to reduce your risk of falling again.   Was there anything that caused your fall that can be fixed, removed or replaced?   Make your home safe by keeping walkways clear of objects you may trip over.   Use non-slip pads under rugs.   Do not walk in poorly lit areas.   Do not stand on chairs or wobbly ladders.   Use caution when reaching overhead or looking upward. This position can cause a loss of balance.   Be sure your shoes fit properly, have non-slip bottoms and are in good condition.   Be cautious when going up and down stairs, curbs, and when walking on uneven sidewalks.   If your balance is poor, consider using a cane or walker.   If your fall was related to alcohol use, stop or limit alcohol intake.   If your fall was related to use of sleeping medicines, talk to your doctor about this. You may need to reduce your dosage at bedtime if you awaken during the night to go to the bathroom.   To reduce the need for nighttime bathroom trips:   Avoid drinking fluids for several hours before going to bed   Empty your bladder before going to bed   Men can keep a urinal at the bedside   © 7843-5602 [employer] [facility], [address] [place] [address], [place] [address]. All rights reserved. This information is not intended as a substitute for professional medical care. Always follow your healthcare professional's instructions.

## 2017-08-21 NOTE — PROGRESS NOTES
Hematology/Oncology Office Note    Reason for referral:  Locally advanced breast cancer with biopsy proven axillary lymph node involvement    CC:    Referred by:  No ref. provider found    Diagnosis:  Right sided locally advanced infiltrating ductal carcinoma with biopsy-proven axillary lymph node involvement (ER positive at 95%, SD positive at 95%, HER-2 1+ by IHC and negative by fish    Treatment:    History of present illness:  Ms. Urbano is a 51-year-old female with a past medical history of DM hypertension, hypothyroidism, iron deficiency anemia, who was noted to have abnormal screening mammogram  on 4/28/2017.  Core biopsy of right breast mass at 10:00 6 cm from the nipple demonstrated infiltrating ductal carcinoma and right axillary lymph node core biopsy also demonstrated infiltrating ductal carcinoma.  ER/SD positive, HER-2 negative markers.  The patient has a first cousin with a history of breast cancer and one aunt with a history of breast cancer.  She has some soreness at previous biopsy site, however, denies other significant symptoms.    I have reviewed and updated the HPI, ROS, PMHx, Social Hx, Family Hx and treatment history.    Today's visit:  Patient presents for cycle 2 of ddTaxol.     Past Medical History:   Diagnosis Date    Allergic rhinitis, cause unspecified     Cancer     R Breast Cancer    Carpal tunnel syndrome of left wrist     Elevated liver function tests     in the past    Fatty liver 05/02/2017    on ultrasound    Fibroid uterus     10/2014    Hepatomegaly 05/02/2017    on ultrasound    History of sciatica     HSV infection     Type 1 and 2    HTN (hypertension)     Hypothyroidism     Insulin resistance     Iron deficiency anemia, unspecified     Obesity     Tension headache     Vitamin D deficiency          Social History:  No tobacco, alcohol, or illicit drugs      Family History: family history includes Cancer in her father and sister; Diabetes in her mother; Heart  "failure in her mother; Hypertension in her mother; No Known Problems in her daughter, daughter, and son.        HPI    Review of Systems   Constitutional: Negative for activity change, appetite change, chills, diaphoresis and unexpected weight change.   HENT: Negative for congestion, drooling, ear pain, facial swelling, hearing loss, mouth sores, nosebleeds, postnasal drip, sneezing and sore throat.    Eyes: Negative.    Respiratory: Negative for apnea, cough, chest tightness, shortness of breath, wheezing and stridor.    Cardiovascular: Negative for chest pain, palpitations and leg swelling.   Gastrointestinal: Negative for abdominal distention, blood in stool, constipation, diarrhea, nausea, rectal pain and vomiting.   Endocrine: Negative.    Genitourinary: Negative for difficulty urinating, dysuria, enuresis, frequency, hematuria and urgency.   Musculoskeletal: Negative for arthralgias, back pain, gait problem, joint swelling, myalgias, neck pain and neck stiffness.   Skin: Negative for color change, pallor, rash and wound.   Allergic/Immunologic: Negative.    Neurological: Negative for dizziness, seizures, syncope, facial asymmetry, speech difficulty, light-headedness, numbness and headaches.   Hematological: Negative for adenopathy. Does not bruise/bleed easily.   Psychiatric/Behavioral: Negative for agitation, behavioral problems, dysphoric mood, hallucinations, self-injury and sleep disturbance. The patient is not nervous/anxious and is not hyperactive.        Objective:       Vitals:    08/21/17 0924   BP: (!) 155/96   Pulse: (!) 133   Resp: 18   Temp: 98.5 °F (36.9 °C)   TempSrc: Oral   SpO2: 96%   Weight: 129 kg (284 lb 6.3 oz)   Height: 5' 8" (1.727 m)         Physical Exam   Constitutional: She is oriented to person, place, and time. She appears well-developed and well-nourished. No distress.   Well groomed   HENT:   Head: Normocephalic and atraumatic. Not macrocephalic.   Right Ear: Tympanic " membrane and ear canal normal.   Left Ear: Tympanic membrane and ear canal normal.   Nose: Nose normal. Right sinus exhibits no maxillary sinus tenderness and no frontal sinus tenderness. Left sinus exhibits no maxillary sinus tenderness and no frontal sinus tenderness.   Mouth/Throat: Oropharynx is clear and moist and mucous membranes are normal. Mucous membranes are not pale and not dry. No oral lesions. Normal dentition. No oropharyngeal exudate.   Eyes: Conjunctivae, EOM and lids are normal. Pupils are equal, round, and reactive to light. Lids are everted and swept, no foreign bodies found. Right eye exhibits no discharge. Left eye exhibits no discharge.   Neck: Trachea normal and normal range of motion. Neck supple. No tracheal deviation present. No thyroid mass and no thyromegaly present.   No crepitus   Cardiovascular: Normal rate, regular rhythm, normal heart sounds, intact distal pulses and normal pulses.  Exam reveals no gallop and no friction rub.    No murmur heard.  Pulmonary/Chest: Effort normal and breath sounds normal. No accessory muscle usage. No respiratory distress. She has no decreased breath sounds. She has no wheezes. She has no rhonchi. She has no rales. She exhibits no tenderness.   Right breast: Ill-defined induration without discrete mass    Abdominal: Soft. Normal appearance and bowel sounds are normal. She exhibits no distension and no mass. There is no hepatosplenomegaly. There is no tenderness. There is no rebound and no guarding.   Musculoskeletal: Normal range of motion. She exhibits no edema or tenderness.   Lymphadenopathy:        Head (right side): No submental adenopathy present.        Head (left side): No submental adenopathy present.     She has no cervical adenopathy.     She has no axillary adenopathy.        Right: No supraclavicular adenopathy present.        Left: No supraclavicular adenopathy present.   Neurological: She is alert and oriented to person, place, and time.  No cranial nerve deficit. She exhibits normal muscle tone. Coordination normal.   Skin: Skin is warm, dry and intact. Capillary refill takes less than 2 seconds. She is not diaphoretic. No cyanosis. No pallor. Nails show no clubbing.   Psychiatric: She has a normal mood and affect. Her behavior is normal. Judgment and thought content normal.       Lab Results   Component Value Date    WBC 37.22 (H) 08/21/2017    HGB 13.0 08/21/2017    HCT 39.5 08/21/2017    MCV 89 08/21/2017     08/21/2017     Lab Results   Component Value Date    CREATININE 0.8 08/21/2017    BUN 11 08/21/2017     08/21/2017    K 3.9 08/21/2017     08/21/2017    CO2 23 08/21/2017     Lab Results   Component Value Date    ALT 55 (H) 08/21/2017    AST 30 08/21/2017    ALKPHOS 127 08/21/2017    BILITOT 0.3 08/21/2017         Assessment:         51-year-old female with a new diagnosis of right-sided infiltrating ductal carcinoma ER/CO positive, HER-2 negative with biopsy-proven right axillary lymph node involvement.  Patient was consented for BRCA testing for participation and B-55 study and BRCA testing will be sent today.  I discussed neoadjuvant therapy at length and the benefits of preoperative chemotherapy.    She received cycle 1 of dense dose Adriamycin/Cytoxan on 6/12/2017 and tolerated treatment exceptionally well.  Right axillary lymph node continues to decrease in size likely representing positive response to  treatment.   She presents for cycle 1 of dose dense Taxol.  Counts are adequate to proceed with treatment with Neulasta via on body injector.  She will follow-up in 2 weeks for cycle 2.      ER/CO positive, HER-2 negative infiltrating ductal carcinoma the right breast with biopsy proven lymph node involvement clinically stage II/III.  --BRCA testing via B-55 Study is negative for mutation  --ddAC--> taxol   cycle 1--- 6/12/2017  --Cycle 4 on 7/24/2017  --Proceed with cycle 1 of dose dense Taxol  8/7/2017      Hypertension:  Continue Lotensin/HCTZ  --Blood pressure poorly controlled this morning we'll continue to monitor gel's medications as needed    Hypothyroidism:  Continue Synthroid    4 mm right upper lobe pulmonary nodule and subpleural nodularity bilateral bases:  --Repeat CT scan  after treatment

## 2017-08-21 NOTE — PLAN OF CARE
"Problem: Patient Care Overview  Goal: Plan of Care Review  Outcome: Ongoing (interventions implemented as appropriate)  Pt states "tired" and neuropathy in feet.        "

## 2017-08-22 DIAGNOSIS — G62.9 NEUROPATHY: Primary | ICD-10-CM

## 2017-08-22 DIAGNOSIS — C50.411 MALIGNANT NEOPLASM OF UPPER-OUTER QUADRANT OF RIGHT BREAST IN FEMALE, ESTROGEN RECEPTOR POSITIVE: ICD-10-CM

## 2017-08-22 DIAGNOSIS — Z17.0 MALIGNANT NEOPLASM OF UPPER-OUTER QUADRANT OF RIGHT BREAST IN FEMALE, ESTROGEN RECEPTOR POSITIVE: ICD-10-CM

## 2017-09-05 ENCOUNTER — OFFICE VISIT (OUTPATIENT)
Dept: HEMATOLOGY/ONCOLOGY | Facility: CLINIC | Age: 52
End: 2017-09-05
Payer: COMMERCIAL

## 2017-09-05 ENCOUNTER — INFUSION (OUTPATIENT)
Dept: INFUSION THERAPY | Facility: HOSPITAL | Age: 52
End: 2017-09-05
Attending: INTERNAL MEDICINE
Payer: COMMERCIAL

## 2017-09-05 ENCOUNTER — LAB VISIT (OUTPATIENT)
Dept: LAB | Facility: HOSPITAL | Age: 52
End: 2017-09-05
Attending: INTERNAL MEDICINE
Payer: COMMERCIAL

## 2017-09-05 VITALS
HEART RATE: 103 BPM | SYSTOLIC BLOOD PRESSURE: 156 MMHG | TEMPERATURE: 99 F | RESPIRATION RATE: 20 BRPM | OXYGEN SATURATION: 93 % | DIASTOLIC BLOOD PRESSURE: 94 MMHG

## 2017-09-05 VITALS
DIASTOLIC BLOOD PRESSURE: 95 MMHG | RESPIRATION RATE: 18 BRPM | HEART RATE: 121 BPM | WEIGHT: 285.25 LBS | OXYGEN SATURATION: 98 % | TEMPERATURE: 99 F | HEIGHT: 68 IN | SYSTOLIC BLOOD PRESSURE: 171 MMHG | BODY MASS INDEX: 43.23 KG/M2

## 2017-09-05 DIAGNOSIS — G62.9 NEUROPATHY: ICD-10-CM

## 2017-09-05 DIAGNOSIS — C50.411 MALIGNANT NEOPLASM OF UPPER-OUTER QUADRANT OF RIGHT BREAST IN FEMALE, ESTROGEN RECEPTOR POSITIVE: ICD-10-CM

## 2017-09-05 DIAGNOSIS — G62.9 NEUROPATHY: Primary | ICD-10-CM

## 2017-09-05 DIAGNOSIS — Z17.0 MALIGNANT NEOPLASM OF UPPER-OUTER QUADRANT OF RIGHT BREAST IN FEMALE, ESTROGEN RECEPTOR POSITIVE: Primary | ICD-10-CM

## 2017-09-05 DIAGNOSIS — Z17.0 MALIGNANT NEOPLASM OF UPPER-OUTER QUADRANT OF RIGHT BREAST IN FEMALE, ESTROGEN RECEPTOR POSITIVE: ICD-10-CM

## 2017-09-05 DIAGNOSIS — C50.411 MALIGNANT NEOPLASM OF UPPER-OUTER QUADRANT OF RIGHT BREAST IN FEMALE, ESTROGEN RECEPTOR POSITIVE: Primary | ICD-10-CM

## 2017-09-05 LAB
ALBUMIN SERPL BCP-MCNC: 3.7 G/DL
ALP SERPL-CCNC: 127 U/L
ALT SERPL W/O P-5'-P-CCNC: 47 U/L
ANION GAP SERPL CALC-SCNC: 12 MMOL/L
ANISOCYTOSIS BLD QL SMEAR: SLIGHT
AST SERPL-CCNC: 31 U/L
BASOPHILS NFR BLD: 0 %
BILIRUB SERPL-MCNC: 0.3 MG/DL
BUN SERPL-MCNC: 17 MG/DL
CALCIUM SERPL-MCNC: 10 MG/DL
CHLORIDE SERPL-SCNC: 109 MMOL/L
CO2 SERPL-SCNC: 19 MMOL/L
CREAT SERPL-MCNC: 0.8 MG/DL
DACRYOCYTES BLD QL SMEAR: ABNORMAL
DIFFERENTIAL METHOD: ABNORMAL
EOSINOPHIL NFR BLD: 0 %
ERYTHROCYTE [DISTWIDTH] IN BLOOD BY AUTOMATED COUNT: 18.3 %
EST. GFR  (AFRICAN AMERICAN): >60 ML/MIN/1.73 M^2
EST. GFR  (NON AFRICAN AMERICAN): >60 ML/MIN/1.73 M^2
GLUCOSE SERPL-MCNC: 233 MG/DL
HCT VFR BLD AUTO: 36 %
HGB BLD-MCNC: 12.2 G/DL
HYPOCHROMIA BLD QL SMEAR: ABNORMAL
LYMPHOCYTES NFR BLD: 3 %
MCH RBC QN AUTO: 30 PG
MCHC RBC AUTO-ENTMCNC: 33.9 G/DL
MCV RBC AUTO: 89 FL
METAMYELOCYTES NFR BLD MANUAL: 2 %
MONOCYTES NFR BLD: 0 %
MYELOCYTES NFR BLD MANUAL: 1 %
NEUTROPHILS NFR BLD: 88 %
NEUTS BAND NFR BLD MANUAL: 6 %
OVALOCYTES BLD QL SMEAR: ABNORMAL
PLATELET # BLD AUTO: 211 K/UL
PLATELET BLD QL SMEAR: ABNORMAL
PMV BLD AUTO: 9.4 FL
POIKILOCYTOSIS BLD QL SMEAR: SLIGHT
POLYCHROMASIA BLD QL SMEAR: ABNORMAL
POTASSIUM SERPL-SCNC: 3.8 MMOL/L
PROT SERPL-MCNC: 8.2 G/DL
RBC # BLD AUTO: 4.06 M/UL
SODIUM SERPL-SCNC: 140 MMOL/L
WBC # BLD AUTO: 29.83 K/UL

## 2017-09-05 PROCEDURE — 3077F SYST BP >= 140 MM HG: CPT | Mod: S$GLB,,, | Performed by: INTERNAL MEDICINE

## 2017-09-05 PROCEDURE — 3080F DIAST BP >= 90 MM HG: CPT | Mod: S$GLB,,, | Performed by: INTERNAL MEDICINE

## 2017-09-05 PROCEDURE — 25000003 PHARM REV CODE 250: Performed by: INTERNAL MEDICINE

## 2017-09-05 PROCEDURE — 63600175 PHARM REV CODE 636 W HCPCS: Performed by: INTERNAL MEDICINE

## 2017-09-05 PROCEDURE — 99999 PR PBB SHADOW E&M-EST. PATIENT-LVL III: CPT | Mod: PBBFAC,,, | Performed by: INTERNAL MEDICINE

## 2017-09-05 PROCEDURE — 85027 COMPLETE CBC AUTOMATED: CPT

## 2017-09-05 PROCEDURE — 96377 APPLICATON ON-BODY INJECTOR: CPT

## 2017-09-05 PROCEDURE — 36415 COLL VENOUS BLD VENIPUNCTURE: CPT | Mod: PO

## 2017-09-05 PROCEDURE — 96367 TX/PROPH/DG ADDL SEQ IV INF: CPT

## 2017-09-05 PROCEDURE — 96413 CHEMO IV INFUSION 1 HR: CPT

## 2017-09-05 PROCEDURE — S0028 INJECTION, FAMOTIDINE, 20 MG: HCPCS | Performed by: INTERNAL MEDICINE

## 2017-09-05 PROCEDURE — 96415 CHEMO IV INFUSION ADDL HR: CPT

## 2017-09-05 PROCEDURE — 80053 COMPREHEN METABOLIC PANEL: CPT

## 2017-09-05 PROCEDURE — 96375 TX/PRO/DX INJ NEW DRUG ADDON: CPT

## 2017-09-05 PROCEDURE — 85007 BL SMEAR W/DIFF WBC COUNT: CPT

## 2017-09-05 PROCEDURE — 3008F BODY MASS INDEX DOCD: CPT | Mod: S$GLB,,, | Performed by: INTERNAL MEDICINE

## 2017-09-05 PROCEDURE — 99215 OFFICE O/P EST HI 40 MIN: CPT | Mod: S$GLB,,, | Performed by: INTERNAL MEDICINE

## 2017-09-05 RX ORDER — FAMOTIDINE 20 MG/50ML
20 INJECTION, SOLUTION INTRAVENOUS
Status: CANCELLED
Start: 2017-09-05

## 2017-09-05 RX ORDER — HEPARIN 100 UNIT/ML
500 SYRINGE INTRAVENOUS
Status: CANCELLED | OUTPATIENT
Start: 2017-09-05

## 2017-09-05 RX ORDER — FAMOTIDINE 20 MG/50ML
20 INJECTION, SOLUTION INTRAVENOUS
Status: COMPLETED | OUTPATIENT
Start: 2017-09-05 | End: 2017-09-05

## 2017-09-05 RX ORDER — DIPHENHYDRAMINE HYDROCHLORIDE 50 MG/ML
50 INJECTION INTRAMUSCULAR; INTRAVENOUS ONCE AS NEEDED
Status: CANCELLED | OUTPATIENT
Start: 2017-09-05 | End: 2017-09-05

## 2017-09-05 RX ORDER — HEPARIN 100 UNIT/ML
500 SYRINGE INTRAVENOUS
Status: DISCONTINUED | OUTPATIENT
Start: 2017-09-05 | End: 2017-09-05 | Stop reason: HOSPADM

## 2017-09-05 RX ORDER — EPINEPHRINE 0.3 MG/.3ML
0.3 INJECTION SUBCUTANEOUS ONCE AS NEEDED
Status: CANCELLED | OUTPATIENT
Start: 2017-09-05 | End: 2017-09-05

## 2017-09-05 RX ORDER — SODIUM CHLORIDE 0.9 % (FLUSH) 0.9 %
10 SYRINGE (ML) INJECTION
Status: CANCELLED | OUTPATIENT
Start: 2017-09-05

## 2017-09-05 RX ORDER — PALONOSETRON 0.05 MG/ML
0.25 INJECTION, SOLUTION INTRAVENOUS
Status: CANCELLED
Start: 2017-09-05 | End: 2017-09-05

## 2017-09-05 RX ORDER — PALONOSETRON 0.05 MG/ML
0.25 INJECTION, SOLUTION INTRAVENOUS
Status: COMPLETED | OUTPATIENT
Start: 2017-09-05 | End: 2017-09-05

## 2017-09-05 RX ADMIN — HEPARIN 500 UNITS: 100 SYRINGE at 04:09

## 2017-09-05 RX ADMIN — PALONOSETRON HYDROCHLORIDE 0.25 MG: 0.25 INJECTION INTRAVENOUS at 11:09

## 2017-09-05 RX ADMIN — PEGFILGRASTIM 6 MG: KIT SUBCUTANEOUS at 04:09

## 2017-09-05 RX ADMIN — DEXAMETHASONE SODIUM PHOSPHATE 20 MG: 4 INJECTION, SOLUTION INTRA-ARTICULAR; INTRALESIONAL; INTRAMUSCULAR; INTRAVENOUS; SOFT TISSUE at 11:09

## 2017-09-05 RX ADMIN — DIPHENHYDRAMINE HYDROCHLORIDE 50 MG: 50 INJECTION INTRAMUSCULAR; INTRAVENOUS at 11:09

## 2017-09-05 RX ADMIN — FAMOTIDINE 20 MG: 20 INJECTION, SOLUTION INTRAVENOUS at 12:09

## 2017-09-05 RX ADMIN — PACLITAXEL 348 MG: 6 INJECTION, SOLUTION INTRAVENOUS at 12:09

## 2017-09-05 NOTE — PLAN OF CARE
Problem: Patient Care Overview  Goal: Plan of Care Review  Outcome: Ongoing (interventions implemented as appropriate)  I feel pretty good , this last chemo made my tingling more in my hands and feet

## 2017-09-05 NOTE — PROGRESS NOTES
Hematology/Oncology Office Note    Reason for referral:  Locally advanced breast cancer with biopsy proven axillary lymph node involvement    CC:    Referred by:  No ref. provider found    Diagnosis:  Right sided locally advanced infiltrating ductal carcinoma with biopsy-proven axillary lymph node involvement (ER positive at 95%, OK positive at 95%, HER-2 1+ by IHC and negative by fish    Treatment:    History of present illness:  Ms. Urbano is a 51-year-old female with a past medical history of DM hypertension, hypothyroidism, iron deficiency anemia, who was noted to have abnormal screening mammogram  on 4/28/2017.  Core biopsy of right breast mass at 10:00 6 cm from the nipple demonstrated infiltrating ductal carcinoma and right axillary lymph node core biopsy also demonstrated infiltrating ductal carcinoma.  ER/OK positive, HER-2 negative markers.  The patient has a first cousin with a history of breast cancer and one aunt with a history of breast cancer.  She has some soreness at previous biopsy site, however, denies other significant symptoms.    I have reviewed and updated the HPI, ROS, PMHx, Social Hx, Family Hx and treatment history.    Today's visit:  Patient presents for cycle 3 of ddTaxol.   She has complaints of severe fatigue and severe neuropathy which caused her significant dysfunction the week following treatment.  It is since improved but remains grade 1/2.    Past Medical History:   Diagnosis Date    Allergic rhinitis, cause unspecified     Cancer     R Breast Cancer    Carpal tunnel syndrome of left wrist     Elevated liver function tests     in the past    Fatty liver 05/02/2017    on ultrasound    Fibroid uterus     10/2014    Hepatomegaly 05/02/2017    on ultrasound    History of sciatica     HSV infection     Type 1 and 2    HTN (hypertension)     Hypothyroidism     Insulin resistance     Iron deficiency anemia, unspecified     Obesity     Tension headache     Vitamin D  "deficiency          Social History:  No tobacco, alcohol, or illicit drugs      Family History: family history includes Cancer in her father and sister; Diabetes in her mother; Heart failure in her mother; Hypertension in her mother; No Known Problems in her daughter, daughter, and son.        HPI    Review of Systems   Constitutional: Positive for activity change and fatigue. Negative for appetite change, chills, diaphoresis, fever and unexpected weight change.   HENT: Negative for congestion, drooling, ear pain, facial swelling, hearing loss, mouth sores, nosebleeds and sore throat.    Eyes: Negative.    Respiratory: Negative for apnea, shortness of breath, wheezing and stridor.    Cardiovascular: Negative for chest pain, palpitations and leg swelling.   Gastrointestinal: Negative for abdominal distention, blood in stool, constipation, diarrhea, nausea, rectal pain and vomiting.   Endocrine: Negative.    Genitourinary: Negative for difficulty urinating, dysuria, enuresis, frequency, hematuria and urgency.   Musculoskeletal: Negative for arthralgias, joint swelling, myalgias, neck pain and neck stiffness.   Skin: Negative for color change, pallor, rash and wound.   Allergic/Immunologic: Negative.    Neurological: Positive for numbness (improved over the previous week). Negative for dizziness, seizures, syncope, facial asymmetry, speech difficulty, light-headedness and headaches.   Hematological: Negative for adenopathy. Does not bruise/bleed easily.   Psychiatric/Behavioral: Negative for agitation, behavioral problems, dysphoric mood, hallucinations, self-injury and sleep disturbance. The patient is not nervous/anxious and is not hyperactive.        Objective:       Vitals:    09/05/17 1020   BP: (!) 171/95   Pulse: (!) 121   Resp: 18   Temp: 99.1 °F (37.3 °C)   TempSrc: Oral   SpO2: 98%   Weight: 129.4 kg (285 lb 4.4 oz)   Height: 5' 8" (1.727 m)         Physical Exam   Constitutional: She is oriented to " person, place, and time. She appears well-developed and well-nourished. No distress.   Well groomed   HENT:   Head: Normocephalic and atraumatic. Not macrocephalic.   Right Ear: Tympanic membrane and ear canal normal.   Left Ear: Tympanic membrane and ear canal normal.   Nose: Nose normal. Right sinus exhibits no maxillary sinus tenderness and no frontal sinus tenderness. Left sinus exhibits no maxillary sinus tenderness and no frontal sinus tenderness.   Mouth/Throat: Oropharynx is clear and moist and mucous membranes are normal. Mucous membranes are not pale and not dry. No oral lesions. Normal dentition. No oropharyngeal exudate.   Eyes: Conjunctivae, EOM and lids are normal. Pupils are equal, round, and reactive to light. Lids are everted and swept, no foreign bodies found. Right eye exhibits no discharge. Left eye exhibits no discharge.   Neck: Trachea normal and normal range of motion. Neck supple. No tracheal deviation present. No thyroid mass and no thyromegaly present.   No crepitus   Cardiovascular: Normal rate, regular rhythm, normal heart sounds, intact distal pulses and normal pulses.  Exam reveals no gallop and no friction rub.    No murmur heard.  Pulmonary/Chest: Effort normal and breath sounds normal. No accessory muscle usage. No respiratory distress. She has no decreased breath sounds. She has no wheezes. She has no rhonchi. She has no rales. She exhibits no tenderness.   Right breast: Ill-defined induration without discrete mass    Abdominal: Soft. Normal appearance and bowel sounds are normal. She exhibits no distension and no mass. There is no hepatosplenomegaly. There is no tenderness. There is no rebound and no guarding.   Musculoskeletal: Normal range of motion. She exhibits no edema or tenderness.   Lymphadenopathy:        Head (right side): No submental and no submandibular adenopathy present.        Head (left side): No submental and no submandibular adenopathy present.     She has no  cervical adenopathy.        Right cervical: No superficial cervical and no deep cervical adenopathy present.       Left cervical: No superficial cervical and no deep cervical adenopathy present.     She has no axillary adenopathy.        Right: No supraclavicular adenopathy present.        Left: No supraclavicular adenopathy present.   Neurological: She is alert and oriented to person, place, and time. No cranial nerve deficit. She exhibits normal muscle tone. Coordination normal.   Skin: Skin is warm, dry and intact. Capillary refill takes less than 2 seconds. No abrasion, no bruising and no ecchymosis noted. She is not diaphoretic. No cyanosis. No pallor. Nails show no clubbing.   Psychiatric: She has a normal mood and affect. Her behavior is normal. Judgment and thought content normal.       Lab Results   Component Value Date    WBC 29.83 (H) 09/05/2017    HGB 12.2 09/05/2017    HCT 36.0 (L) 09/05/2017    MCV 89 09/05/2017     09/05/2017     Lab Results   Component Value Date    CREATININE 0.8 09/05/2017    BUN 17 09/05/2017     09/05/2017    K 3.8 09/05/2017     09/05/2017    CO2 19 (L) 09/05/2017     Lab Results   Component Value Date    ALT 47 (H) 09/05/2017    AST 31 09/05/2017    ALKPHOS 127 09/05/2017    BILITOT 0.3 09/05/2017         Assessment:         51-year-old female with a new diagnosis of right-sided infiltrating ductal carcinoma ER/NC positive, HER-2 negative with biopsy-proven right axillary lymph node involvement.  Patient was consented for BRCA testing for participation and B-55 study and BRCA testing will be sent today.  I discussed neoadjuvant therapy at length and the benefits of preoperative chemotherapy.    She received cycle 1 of dense dose Adriamycin/Cytoxan on 6/12/2017 and tolerated treatment exceptionally well.  Right axillary lymph node continues to decrease in size likely representing positive response to  treatment.   She presents for cycle 3 of dose dense Taxol.   She developed significant neuropathy and fatigue following cycle 2.  Neuropathy has improved over the previous 5 days, however, she was unable to work or carry on normal activities for the week following treatment.  We will proceed with an percent dose reduction with future cycles.  She will proceed with cycle 3 today    ER/NH positive, HER-2 negative infiltrating ductal carcinoma the right breast with biopsy proven lymph node involvement clinically stage II/III.  --BRCA testing via B-55 Study is negative for mutation  --ddAC--> taxol   cycle 1--- 6/12/2017  --Cycle 4 on 7/24/2017  --10% dose reduction of Taxol due to neuropathy/severe fatigue  --Proceed with cycle 3 of dose dense Taxol 8/7/2017  --Follow-up in 2 weeks for final dose of dose dense Taxol      Hypertension:  Continue Lotensin/HCTZ  --Blood pressure poorly controlled this morning we'll continue to monitor gel's medications as needed    Hypothyroidism:  Continue Synthroid    4 mm right upper lobe pulmonary nodule and subpleural nodularity bilateral bases:  --Repeat CT scan  after treatment

## 2017-09-05 NOTE — PATIENT INSTRUCTIONS
[facility]  [address]  [address] [address]  [phone] phone     [phone] fax  Hours of Operation: Monday- Friday 8:00am- 5:00pm  After hours phone  [phone]  Hematology / Oncology Physicians on call      Dr. Kulwinder Martinez                        Please call with any concerns regarding your appointment today.  FALL PREVENTION   Falls often occur due to slipping, tripping or losing your balance. Here are ways to reduce your risk of falling again.   Was there anything that caused your fall that can be fixed, removed or replaced?   Make your home safe by keeping walkways clear of objects you may trip over.   Use non-slip pads under rugs.   Do not walk in poorly lit areas.   Do not stand on chairs or wobbly ladders.   Use caution when reaching overhead or looking upward. This position can cause a loss of balance.   Be sure your shoes fit properly, have non-slip bottoms and are in good condition.   Be cautious when going up and down stairs, curbs, and when walking on uneven sidewalks.   If your balance is poor, consider using a cane or walker.   If your fall was related to alcohol use, stop or limit alcohol intake.   If your fall was related to use of sleeping medicines, talk to your doctor about this. You may need to reduce your dosage at bedtime if you awaken during the night to go to the bathroom.   To reduce the need for nighttime bathroom trips:   Avoid drinking fluids for several hours before going to bed   Empty your bladder before going to bed   Men can keep a urinal at the bedside   © 0322-4037 [facility], [address] [place] [address]. All rights reserved. This information is not intended as a substitute for professional medical care. Always follow your healthcare professional's instructions.  HOME CARE AFTER CHEMOTHERAPY   Meals   Many patients feel sick and lose their appetites during treatment. Eat small  meals several times a day. Choose bland foods with little taste or smell if you have problems with nausea. Be sure to cook all food thoroughly. This kills bacteria and helps you avoid intestinal infection. Soft foods are easier to swallow and digest.   Activity   Exercise keeps you strong and keeps your heart and lungs active. Talk to your doctor about an appropriate exercise program for you.   Skin Care   To prevent a skin infection, bathe or shower once a day. Use a moisturizing soap and wash with warm water. Avoid very hot or cold water. Chemotherapy can make your skin dry . Apply moisturizing lotion to help relieve dry skin. Some drugs used in high doses can cause slight burns to appear (like sunburn). Ask for a special cream to help relieve the burn and protect your skin.   Prevent Mouth Sores   During chemotherapy, many people get mouth sores. Do the following to help prevent mouth sores or to ease discomfort.   Brush your teeth with a soft-bristle toothbrush after every meal.  Don't use dental floss if your platelet count is below 50,000. Your doctor or nurse will tell you if this is the case.  Use an oral swab or special soft toothbrush if your gums bleed during regular brushing.  Use mouthwash as directed. If you can't tolerate commercial mouthwash, use salt and baking soda to clean your mouth. Mix 1 teaspoon of salt and 1 teaspoon of baking soda into a glass of water. Swish and spit.  Call your doctor or return to this facility if you develop any of the following:   Sore throat   White patches in the mouth or throat   Fever of 100.4ºF (38ºC) or higher, or as directed by your healthcare provider  © 7490-2914 [facility], [address] [place] [address], [place] [address]. All rights reserved. This information is not intended as a substitute for professional medical care. Always follow your healthcare professional's   WAYS TO HELP PREVENT INFECTION         WASH YOUR HANDS OFTEN DURING THE DAY, ESPECIALLY BEFORE  YOU EAT, AFTER USING THE BATHROOM, AND AFTER TOUCHING ANIMALS     STAY AWAY FROM PEOPLE WHO HAVE ILLNESSES YOU CAN CATCH; SUCH AS COLDS, FLU, CHICKEN POX     TRY TO AVOID CROWDS     STAY AWAY FROM CHILDREN WHO RECENTLY HAVE RECEIVED LIVE VIRUS VACCINES     MAINTAIN GOOD MOUTH CARE     DO NOT SQUEEZE OR SCRATCH PIMPLES     CLEAN CUTS & SCRAPES RIGHT AWAY AND DAILY UNTIL HEALED WITH WARM WATER, SOAP & AN ANTISEPTIC     AVOID CONTACT WITH LITTER BOXES, BIRD CAGES, & FISH TANKS     AVOID STANDING WATER, IE., BIRD BATHS, FLOWER POTS/VASES, OR HUMIDIFIERS     WEAR GLOVES WHEN GARDENING OR CLEANING UP AFTER OTHERS, ESPECIALLY BABIES & SMALL CHILDREN     DO NOT EAT RAW FISH, SEAFOOD, MEAT, OR EGGS

## 2017-09-19 ENCOUNTER — OFFICE VISIT (OUTPATIENT)
Dept: HEMATOLOGY/ONCOLOGY | Facility: CLINIC | Age: 52
End: 2017-09-19
Payer: COMMERCIAL

## 2017-09-19 ENCOUNTER — INFUSION (OUTPATIENT)
Dept: INFUSION THERAPY | Facility: HOSPITAL | Age: 52
End: 2017-09-19
Attending: INTERNAL MEDICINE
Payer: COMMERCIAL

## 2017-09-19 ENCOUNTER — SOCIAL WORK (OUTPATIENT)
Dept: HEMATOLOGY/ONCOLOGY | Facility: CLINIC | Age: 52
End: 2017-09-19

## 2017-09-19 VITALS
WEIGHT: 284.63 LBS | DIASTOLIC BLOOD PRESSURE: 95 MMHG | HEIGHT: 68 IN | RESPIRATION RATE: 20 BRPM | BODY MASS INDEX: 43.14 KG/M2 | HEART RATE: 113 BPM | SYSTOLIC BLOOD PRESSURE: 176 MMHG | TEMPERATURE: 99 F | OXYGEN SATURATION: 96 %

## 2017-09-19 VITALS
SYSTOLIC BLOOD PRESSURE: 172 MMHG | OXYGEN SATURATION: 98 % | WEIGHT: 284.63 LBS | BODY MASS INDEX: 43.14 KG/M2 | HEIGHT: 68 IN | TEMPERATURE: 99 F | HEART RATE: 125 BPM | DIASTOLIC BLOOD PRESSURE: 96 MMHG | RESPIRATION RATE: 18 BRPM

## 2017-09-19 DIAGNOSIS — Z17.0 MALIGNANT NEOPLASM OF UPPER-OUTER QUADRANT OF RIGHT BREAST IN FEMALE, ESTROGEN RECEPTOR POSITIVE: ICD-10-CM

## 2017-09-19 DIAGNOSIS — C50.411 MALIGNANT NEOPLASM OF UPPER-OUTER QUADRANT OF RIGHT BREAST IN FEMALE, ESTROGEN RECEPTOR POSITIVE: Primary | ICD-10-CM

## 2017-09-19 DIAGNOSIS — C50.411 MALIGNANT NEOPLASM OF UPPER-OUTER QUADRANT OF RIGHT BREAST IN FEMALE, ESTROGEN RECEPTOR POSITIVE: ICD-10-CM

## 2017-09-19 DIAGNOSIS — Z17.0 MALIGNANT NEOPLASM OF UPPER-OUTER QUADRANT OF RIGHT BREAST IN FEMALE, ESTROGEN RECEPTOR POSITIVE: Primary | ICD-10-CM

## 2017-09-19 DIAGNOSIS — G62.9 NEUROPATHY: Primary | ICD-10-CM

## 2017-09-19 PROCEDURE — 96415 CHEMO IV INFUSION ADDL HR: CPT | Mod: PO

## 2017-09-19 PROCEDURE — 3080F DIAST BP >= 90 MM HG: CPT | Mod: S$GLB,,, | Performed by: INTERNAL MEDICINE

## 2017-09-19 PROCEDURE — 3008F BODY MASS INDEX DOCD: CPT | Mod: S$GLB,,, | Performed by: INTERNAL MEDICINE

## 2017-09-19 PROCEDURE — 96377 APPLICATON ON-BODY INJECTOR: CPT | Mod: PO

## 2017-09-19 PROCEDURE — 96367 TX/PROPH/DG ADDL SEQ IV INF: CPT | Mod: PO

## 2017-09-19 PROCEDURE — 63600175 PHARM REV CODE 636 W HCPCS: Mod: PO | Performed by: INTERNAL MEDICINE

## 2017-09-19 PROCEDURE — 96413 CHEMO IV INFUSION 1 HR: CPT | Mod: PO

## 2017-09-19 PROCEDURE — 99999 PR PBB SHADOW E&M-EST. PATIENT-LVL III: CPT | Mod: PBBFAC,,, | Performed by: INTERNAL MEDICINE

## 2017-09-19 PROCEDURE — S0028 INJECTION, FAMOTIDINE, 20 MG: HCPCS | Mod: PO | Performed by: INTERNAL MEDICINE

## 2017-09-19 PROCEDURE — 96368 THER/DIAG CONCURRENT INF: CPT | Mod: PO

## 2017-09-19 PROCEDURE — 99215 OFFICE O/P EST HI 40 MIN: CPT | Mod: S$GLB,,, | Performed by: INTERNAL MEDICINE

## 2017-09-19 PROCEDURE — 96375 TX/PRO/DX INJ NEW DRUG ADDON: CPT | Mod: PO

## 2017-09-19 PROCEDURE — 3077F SYST BP >= 140 MM HG: CPT | Mod: S$GLB,,, | Performed by: INTERNAL MEDICINE

## 2017-09-19 PROCEDURE — 25000003 PHARM REV CODE 250: Mod: PO | Performed by: INTERNAL MEDICINE

## 2017-09-19 RX ORDER — FAMOTIDINE 20 MG/50ML
20 INJECTION, SOLUTION INTRAVENOUS
Status: COMPLETED | OUTPATIENT
Start: 2017-09-19 | End: 2017-09-19

## 2017-09-19 RX ORDER — PALONOSETRON 0.05 MG/ML
0.25 INJECTION, SOLUTION INTRAVENOUS
Status: COMPLETED | OUTPATIENT
Start: 2017-09-19 | End: 2017-09-19

## 2017-09-19 RX ORDER — SODIUM CHLORIDE 0.9 % (FLUSH) 0.9 %
10 SYRINGE (ML) INJECTION
Status: CANCELLED | OUTPATIENT
Start: 2017-09-19

## 2017-09-19 RX ORDER — DIPHENHYDRAMINE HYDROCHLORIDE 50 MG/ML
50 INJECTION INTRAMUSCULAR; INTRAVENOUS ONCE AS NEEDED
Status: CANCELLED | OUTPATIENT
Start: 2017-09-19 | End: 2017-09-19

## 2017-09-19 RX ORDER — EPINEPHRINE 0.3 MG/.3ML
0.3 INJECTION SUBCUTANEOUS ONCE AS NEEDED
Status: CANCELLED | OUTPATIENT
Start: 2017-09-19 | End: 2017-09-19

## 2017-09-19 RX ORDER — HEPARIN 100 UNIT/ML
500 SYRINGE INTRAVENOUS
Status: CANCELLED | OUTPATIENT
Start: 2017-09-19

## 2017-09-19 RX ORDER — PALONOSETRON 0.05 MG/ML
0.25 INJECTION, SOLUTION INTRAVENOUS
Status: CANCELLED
Start: 2017-09-19 | End: 2017-09-19

## 2017-09-19 RX ORDER — FAMOTIDINE 20 MG/50ML
20 INJECTION, SOLUTION INTRAVENOUS
Status: CANCELLED
Start: 2017-09-19

## 2017-09-19 RX ORDER — HEPARIN 100 UNIT/ML
500 SYRINGE INTRAVENOUS
Status: DISCONTINUED | OUTPATIENT
Start: 2017-09-19 | End: 2017-09-19 | Stop reason: HOSPADM

## 2017-09-19 RX ADMIN — FAMOTIDINE 20 MG: 20 INJECTION, SOLUTION INTRAVENOUS at 09:09

## 2017-09-19 RX ADMIN — PEGFILGRASTIM 6 MG: KIT SUBCUTANEOUS at 12:09

## 2017-09-19 RX ADMIN — DIPHENHYDRAMINE HYDROCHLORIDE 50 MG: 50 INJECTION INTRAMUSCULAR; INTRAVENOUS at 09:09

## 2017-09-19 RX ADMIN — SODIUM CHLORIDE: 900 INJECTION, SOLUTION INTRAVENOUS at 09:09

## 2017-09-19 RX ADMIN — PACLITAXEL 306 MG: 6 INJECTION, SOLUTION, CONCENTRATE INTRAVENOUS at 10:09

## 2017-09-19 RX ADMIN — DEXAMETHASONE SODIUM PHOSPHATE: 4 INJECTION, SOLUTION INTRA-ARTICULAR; INTRALESIONAL; INTRAMUSCULAR; INTRAVENOUS; SOFT TISSUE at 10:09

## 2017-09-19 RX ADMIN — HEPARIN 500 UNITS: 100 SYRINGE at 02:09

## 2017-09-19 RX ADMIN — PALONOSETRON HYDROCHLORIDE 0.25 MG: 0.25 INJECTION INTRAVENOUS at 09:09

## 2017-09-19 NOTE — PROGRESS NOTES
Hematology/Oncology Office Note    Reason for referral:  Locally advanced breast cancer with biopsy proven axillary lymph node involvement    CC:    Referred by:  No ref. provider found    Diagnosis:  Right sided locally advanced infiltrating ductal carcinoma with biopsy-proven axillary lymph node involvement (ER positive at 95%, DE positive at 95%, HER-2 1+ by IHC and negative by fish    Treatment:    History of present illness:  Ms. Urbano is a 51-year-old female with a past medical history of DM hypertension, hypothyroidism, iron deficiency anemia, who was noted to have abnormal screening mammogram  on 4/28/2017.  Core biopsy of right breast mass at 10:00 6 cm from the nipple demonstrated infiltrating ductal carcinoma and right axillary lymph node core biopsy also demonstrated infiltrating ductal carcinoma.  ER/DE positive, HER-2 negative markers.  The patient has a first cousin with a history of breast cancer and one aunt with a history of breast cancer.  She has some soreness at previous biopsy site, however, denies other significant symptoms.    I have reviewed and updated the HPI, ROS, PMHx, Social Hx, Family Hx and treatment history.    Today's visit:  Patient presents for cycle 4 (final cycle) of ddTaxol.   She continues to have grade 1/2 neuropathy.  She denies fever, chills, nausea/vomiting/diarrhea.    Past Medical History:   Diagnosis Date    Allergic rhinitis, cause unspecified     Cancer     R Breast Cancer    Carpal tunnel syndrome of left wrist     Elevated liver function tests     in the past    Fatty liver 05/02/2017    on ultrasound    Fibroid uterus     10/2014    Hepatomegaly 05/02/2017    on ultrasound    History of sciatica     HSV infection     Type 1 and 2    HTN (hypertension)     Hypothyroidism     Insulin resistance     Iron deficiency anemia, unspecified     Obesity     Tension headache     Vitamin D deficiency          Social History:  No tobacco, alcohol, or illicit  "drugs      Family History: family history includes Cancer in her father and sister; Diabetes in her mother; Heart failure in her mother; Hypertension in her mother; No Known Problems in her daughter, daughter, and son.        HPI    Review of Systems   Constitutional: Positive for activity change and fatigue. Negative for appetite change, chills, diaphoresis, fever and unexpected weight change.   HENT: Negative for congestion, drooling, ear pain, facial swelling, hearing loss, mouth sores, nosebleeds, sneezing and sore throat.    Eyes: Negative.    Respiratory: Negative for apnea, shortness of breath, wheezing and stridor.    Cardiovascular: Negative for chest pain, palpitations and leg swelling.   Gastrointestinal: Negative for abdominal distention, blood in stool, constipation, diarrhea, nausea and rectal pain.   Endocrine: Negative.    Genitourinary: Negative for difficulty urinating, dysuria, enuresis, frequency, hematuria, menstrual problem, pelvic pain and urgency.   Musculoskeletal: Negative for arthralgias, joint swelling, myalgias, neck pain and neck stiffness.   Skin: Negative for color change, pallor, rash and wound.   Allergic/Immunologic: Negative.    Neurological: Positive for numbness (Grade 1/2 neuropathy). Negative for dizziness, seizures, syncope, facial asymmetry, speech difficulty, light-headedness and headaches.   Hematological: Negative for adenopathy. Does not bruise/bleed easily.   Psychiatric/Behavioral: Negative for agitation, behavioral problems and suicidal ideas.       Objective:       Vitals:    09/19/17 0854   BP: (!) 172/96   Pulse: (!) 125   Resp: 18   Temp: 99.4 °F (37.4 °C)   TempSrc: Oral   SpO2: 98%   Weight: 129.1 kg (284 lb 9.8 oz)   Height: 5' 8" (1.727 m)         Physical Exam   Constitutional: She is oriented to person, place, and time. She appears well-developed and well-nourished. No distress.   Well groomed   HENT:   Head: Normocephalic and atraumatic. Not " macrocephalic.   Right Ear: Tympanic membrane and ear canal normal.   Left Ear: Tympanic membrane and ear canal normal.   Nose: Nose normal. Right sinus exhibits no maxillary sinus tenderness and no frontal sinus tenderness. Left sinus exhibits no maxillary sinus tenderness and no frontal sinus tenderness.   Mouth/Throat: Oropharynx is clear and moist and mucous membranes are normal. Mucous membranes are not pale and not dry. No oral lesions. Normal dentition. No oropharyngeal exudate.   Eyes: Conjunctivae, EOM and lids are normal. Pupils are equal, round, and reactive to light. Lids are everted and swept, no foreign bodies found. Right eye exhibits no discharge. Left eye exhibits no discharge.   Neck: Trachea normal and normal range of motion. Neck supple. No tracheal deviation present. No thyroid mass and no thyromegaly present.   No crepitus   Cardiovascular: Normal rate, regular rhythm, normal heart sounds, intact distal pulses and normal pulses.  Exam reveals no gallop and no friction rub.    No murmur heard.  Pulmonary/Chest: Effort normal and breath sounds normal. No accessory muscle usage. No respiratory distress. She has no decreased breath sounds. She has no wheezes. She has no rhonchi. She has no rales. She exhibits no tenderness.   Right breast: Ill-defined induration without discrete mass    Abdominal: Soft. Normal appearance and bowel sounds are normal. She exhibits no distension and no mass. There is no hepatosplenomegaly. There is no tenderness. There is no rebound and no guarding.   Musculoskeletal: Normal range of motion. She exhibits no edema or tenderness.   Lymphadenopathy:        Head (right side): No submental and no submandibular adenopathy present.        Head (left side): No submental and no submandibular adenopathy present.     She has no cervical adenopathy.        Right cervical: No superficial cervical and no deep cervical adenopathy present.       Left cervical: No superficial cervical  and no deep cervical adenopathy present.     She has no axillary adenopathy.        Right: No supraclavicular adenopathy present.        Left: No supraclavicular adenopathy present.   Neurological: She is alert and oriented to person, place, and time. No cranial nerve deficit. She exhibits normal muscle tone. Coordination normal.   Skin: Skin is warm, dry and intact. Capillary refill takes less than 2 seconds. No abrasion, no bruising and no ecchymosis noted. She is not diaphoretic. No cyanosis. No pallor. Nails show no clubbing.   Psychiatric: She has a normal mood and affect. Her behavior is normal. Judgment and thought content normal.       Lab Results   Component Value Date    WBC 40.11 (H) 09/19/2017    HGB 12.1 09/19/2017    HCT 36.0 (L) 09/19/2017    MCV 91 09/19/2017     09/19/2017     Lab Results   Component Value Date    CREATININE 0.7 09/19/2017    BUN 13 09/19/2017     09/19/2017    K 4.1 09/19/2017     (H) 09/19/2017    CO2 19 (L) 09/19/2017     Lab Results   Component Value Date    ALT 53 (H) 09/19/2017    AST 38 09/19/2017    ALKPHOS 136 (H) 09/19/2017    BILITOT 0.3 09/19/2017         Assessment:         51-year-old female with a new diagnosis of right-sided infiltrating ductal carcinoma ER/GA positive, HER-2 negative with biopsy-proven right axillary lymph node involvement.  Patient was consented for BRCA testing for participation and B-55 study and BRCA testing will be sent today.  I discussed neoadjuvant therapy at length and the benefits of preoperative chemotherapy.    She received cycle 1 of dense dose Adriamycin/Cytoxan on 6/12/2017 and tolerated treatment exceptionally well.  Right axillary lymph node continues to decrease in size likely representing positive response to  treatment.   She presents for cycle 3 of dose dense Taxol.  She developed significant neuropathy and fatigue following cycle 2 which required 20% dose reduction.  We will proceed with final cycle of dose  dense Taxol today and refer back for surgery.    ER/WV positive, HER-2 negative infiltrating ductal carcinoma the right breast with biopsy proven lymph node involvement clinically stage II/III.  --BRCA testing via B-55 Study is negative for mutation  --ddAC--> taxol   cycle 1--- 6/12/2017  --Cycle 4 on 7/24/2017  --20% dose reduction of Taxol due to neuropathy/severe fatigue  --Proceed with cycle 4 of dose dense Taxol today 9/19/2017  --Follow-up in 2 weeks to check counts      Hypertension:  Continue Lotensin/HCTZ  --Blood pressure poorly controlled this morning we'll continue to monitor and adjust medications as needed    Hypothyroidism:  Continue Synthroid    4 mm right upper lobe pulmonary nodule and subpleural nodularity bilateral bases:  --Repeat CT scan  after treatment

## 2017-09-19 NOTE — PLAN OF CARE
Problem: Patient Care Overview  Goal: Plan of Care Review  Outcome: Ongoing (interventions implemented as appropriate)  Pt states she feels well today, except for neuropathy in fingers and toes.  She is happy yet anxious that today is her last chemo treatment.

## 2017-09-19 NOTE — PROGRESS NOTES
Pt requested to meet with SW regarding paperwork for her employer. Pt thought SW had forms, but SW explained she has to request the forms from her HR dept at her job. SW explained SW can assist with completing forms and returning them to her employer when she returns with them to the clinic. Pt verbalized understanding and will bring them next clinic visit.

## 2017-10-05 ENCOUNTER — PATIENT OUTREACH (OUTPATIENT)
Dept: ADMINISTRATIVE | Facility: HOSPITAL | Age: 52
End: 2017-10-05

## 2017-10-05 ENCOUNTER — OFFICE VISIT (OUTPATIENT)
Dept: HEMATOLOGY/ONCOLOGY | Facility: CLINIC | Age: 52
End: 2017-10-05
Payer: COMMERCIAL

## 2017-10-05 ENCOUNTER — LAB VISIT (OUTPATIENT)
Dept: LAB | Facility: HOSPITAL | Age: 52
End: 2017-10-05
Attending: INTERNAL MEDICINE
Payer: COMMERCIAL

## 2017-10-05 VITALS
WEIGHT: 282.44 LBS | HEART RATE: 92 BPM | SYSTOLIC BLOOD PRESSURE: 140 MMHG | DIASTOLIC BLOOD PRESSURE: 90 MMHG | TEMPERATURE: 99 F | OXYGEN SATURATION: 98 % | RESPIRATION RATE: 18 BRPM | BODY MASS INDEX: 42.8 KG/M2 | HEIGHT: 68 IN

## 2017-10-05 DIAGNOSIS — Z17.0 MALIGNANT NEOPLASM OF UPPER-OUTER QUADRANT OF RIGHT BREAST IN FEMALE, ESTROGEN RECEPTOR POSITIVE: ICD-10-CM

## 2017-10-05 DIAGNOSIS — C50.411 MALIGNANT NEOPLASM OF UPPER-OUTER QUADRANT OF RIGHT BREAST IN FEMALE, ESTROGEN RECEPTOR POSITIVE: ICD-10-CM

## 2017-10-05 DIAGNOSIS — Z17.0 MALIGNANT NEOPLASM OF UPPER-OUTER QUADRANT OF RIGHT BREAST IN FEMALE, ESTROGEN RECEPTOR POSITIVE: Primary | ICD-10-CM

## 2017-10-05 DIAGNOSIS — G62.9 NEUROPATHY: ICD-10-CM

## 2017-10-05 DIAGNOSIS — R91.1 PULMONARY NODULE: ICD-10-CM

## 2017-10-05 DIAGNOSIS — C50.411 MALIGNANT NEOPLASM OF UPPER-OUTER QUADRANT OF RIGHT BREAST IN FEMALE, ESTROGEN RECEPTOR POSITIVE: Primary | ICD-10-CM

## 2017-10-05 LAB
ALBUMIN SERPL BCP-MCNC: 3.6 G/DL
ALP SERPL-CCNC: 110 U/L
ALT SERPL W/O P-5'-P-CCNC: 48 U/L
ANION GAP SERPL CALC-SCNC: 6 MMOL/L
AST SERPL-CCNC: 30 U/L
BASOPHILS # BLD AUTO: 0.02 K/UL
BASOPHILS NFR BLD: 0.3 %
BILIRUB SERPL-MCNC: 0.3 MG/DL
BUN SERPL-MCNC: 12 MG/DL
CALCIUM SERPL-MCNC: 9.8 MG/DL
CHLORIDE SERPL-SCNC: 109 MMOL/L
CO2 SERPL-SCNC: 27 MMOL/L
CREAT SERPL-MCNC: 0.8 MG/DL
DIFFERENTIAL METHOD: ABNORMAL
EOSINOPHIL # BLD AUTO: 0.2 K/UL
EOSINOPHIL NFR BLD: 2.2 %
ERYTHROCYTE [DISTWIDTH] IN BLOOD BY AUTOMATED COUNT: 16 %
EST. GFR  (AFRICAN AMERICAN): >60 ML/MIN/1.73 M^2
EST. GFR  (NON AFRICAN AMERICAN): >60 ML/MIN/1.73 M^2
GLUCOSE SERPL-MCNC: 104 MG/DL
HCT VFR BLD AUTO: 33 %
HGB BLD-MCNC: 11.1 G/DL
LYMPHOCYTES # BLD AUTO: 1.3 K/UL
LYMPHOCYTES NFR BLD: 18 %
MCH RBC QN AUTO: 30.4 PG
MCHC RBC AUTO-ENTMCNC: 33.6 G/DL
MCV RBC AUTO: 90 FL
MONOCYTES # BLD AUTO: 0.8 K/UL
MONOCYTES NFR BLD: 10.8 %
NEUTROPHILS # BLD AUTO: 4.9 K/UL
NEUTROPHILS NFR BLD: 68.7 %
PLATELET # BLD AUTO: 212 K/UL
PMV BLD AUTO: 9.2 FL
POTASSIUM SERPL-SCNC: 3.7 MMOL/L
PROT SERPL-MCNC: 7.3 G/DL
RBC # BLD AUTO: 3.65 M/UL
SODIUM SERPL-SCNC: 142 MMOL/L
WBC # BLD AUTO: 7.13 K/UL

## 2017-10-05 PROCEDURE — 99214 OFFICE O/P EST MOD 30 MIN: CPT | Mod: S$GLB,,, | Performed by: INTERNAL MEDICINE

## 2017-10-05 PROCEDURE — 99999 PR PBB SHADOW E&M-EST. PATIENT-LVL III: CPT | Mod: PBBFAC,,, | Performed by: INTERNAL MEDICINE

## 2017-10-05 PROCEDURE — 80053 COMPREHEN METABOLIC PANEL: CPT | Mod: PO

## 2017-10-05 PROCEDURE — 36415 COLL VENOUS BLD VENIPUNCTURE: CPT | Mod: PO

## 2017-10-05 PROCEDURE — 85025 COMPLETE CBC W/AUTO DIFF WBC: CPT | Mod: PO

## 2017-10-06 NOTE — PROGRESS NOTES
Hematology/Oncology Office Note    Reason for referral:  Locally advanced breast cancer with biopsy proven axillary lymph node involvement    CC:    Referred by:  No ref. provider found    Diagnosis:  Right sided locally advanced infiltrating ductal carcinoma with biopsy-proven axillary lymph node involvement (ER positive at 95%, NY positive at 95%, HER-2 1+ by IHC and negative by fish    Treatment:    History of present illness:  Ms. Urbano is a 51-year-old female with a past medical history of DM hypertension, hypothyroidism, iron deficiency anemia, who was noted to have abnormal screening mammogram  on 4/28/2017.  Core biopsy of right breast mass at 10:00 6 cm from the nipple demonstrated infiltrating ductal carcinoma and right axillary lymph node core biopsy also demonstrated infiltrating ductal carcinoma.  ER/NY positive, HER-2 negative markers.  The patient has a first cousin with a history of breast cancer and one aunt with a history of breast cancer.  She has some soreness at previous biopsy site, however, denies other significant symptoms.    I have reviewed and updated the HPI, ROS, PMHx, Social Hx, Family Hx and treatment history.    Today's visit:  Patient has completed neoadjuvant dose dense CA/Taxol.  She denies fever, chills, nausea/vomiting/diarrhea.  She continues to have fatigue but reports this is improving.  Stable grade 1 neuropathy    Past Medical History:   Diagnosis Date    Allergic rhinitis, cause unspecified     Cancer     R Breast Cancer    Carpal tunnel syndrome of left wrist     Elevated liver function tests     in the past    Fatty liver 05/02/2017    on ultrasound    Fibroid uterus     10/2014    Hepatomegaly 05/02/2017    on ultrasound    History of sciatica     HSV infection     Type 1 and 2    HTN (hypertension)     Hypothyroidism     Insulin resistance     Iron deficiency anemia, unspecified     Obesity     Tension headache     Vitamin D deficiency          Social  "History:  No tobacco, alcohol, or illicit drugs      Family History: family history includes Cancer in her father and sister; Diabetes in her mother; Heart failure in her mother; Hypertension in her mother; No Known Problems in her daughter, daughter, and son.        HPI    Review of Systems   Constitutional: Positive for fatigue. Negative for activity change, appetite change, chills, diaphoresis, fever and unexpected weight change.   HENT: Negative for congestion, dental problem, drooling, ear discharge, ear pain, facial swelling, hearing loss, mouth sores, nosebleeds, sneezing and sore throat.    Eyes: Negative.    Respiratory: Negative for apnea, shortness of breath, wheezing and stridor.    Cardiovascular: Negative for chest pain, palpitations and leg swelling.   Gastrointestinal: Negative for abdominal distention, blood in stool, constipation, diarrhea, nausea and rectal pain.   Endocrine: Negative.    Genitourinary: Negative for difficulty urinating, dysuria, enuresis, frequency, hematuria, menstrual problem, pelvic pain and urgency.   Musculoskeletal: Negative for arthralgias, joint swelling, myalgias, neck pain and neck stiffness.   Skin: Negative for color change, pallor, rash and wound.   Allergic/Immunologic: Negative.    Neurological: Positive for numbness (Grade 1/2 neuropathy). Negative for dizziness, seizures, syncope, facial asymmetry, speech difficulty, light-headedness and headaches.   Hematological: Negative for adenopathy. Does not bruise/bleed easily.   Psychiatric/Behavioral: Negative for agitation, behavioral problems and suicidal ideas.       Objective:       Vitals:    10/05/17 1409   BP: (!) 140/90   BP Location: Right arm   Patient Position: Sitting   BP Method: Large (Manual)   Pulse: 92   Resp: 18   Temp: 98.6 °F (37 °C)   TempSrc: Oral   SpO2: 98%   Weight: 128.1 kg (282 lb 6.6 oz)   Height: 5' 8" (1.727 m)         Physical Exam   Constitutional: She is oriented to person, place, " and time. She appears well-developed and well-nourished. No distress.   Well groomed   HENT:   Head: Normocephalic and atraumatic. Not macrocephalic.   Right Ear: Tympanic membrane and ear canal normal.   Left Ear: Tympanic membrane and ear canal normal.   Nose: Nose normal. Right sinus exhibits no maxillary sinus tenderness and no frontal sinus tenderness. Left sinus exhibits no maxillary sinus tenderness and no frontal sinus tenderness.   Mouth/Throat: Uvula is midline, oropharynx is clear and moist and mucous membranes are normal.   Eyes: Conjunctivae, EOM and lids are normal. Pupils are equal, round, and reactive to light. Lids are everted and swept, no foreign bodies found. Right eye exhibits no discharge. Left eye exhibits no discharge.   Neck: Trachea normal and normal range of motion. Neck supple. No JVD present. No tracheal deviation present. No thyroid mass and no thyromegaly present.   No crepitus   Cardiovascular: Normal rate, regular rhythm, normal heart sounds, intact distal pulses and normal pulses.  Exam reveals no gallop and no friction rub.    No murmur heard.  Pulmonary/Chest: Effort normal and breath sounds normal. No accessory muscle usage or stridor. No respiratory distress. She has no decreased breath sounds. She has no wheezes. She has no rhonchi. She has no rales. She exhibits no tenderness.   Right breast: Ill-defined induration without discrete mass    Abdominal: Soft. Normal appearance and bowel sounds are normal. She exhibits no distension and no mass. There is no hepatosplenomegaly. There is no tenderness. There is no rebound and no guarding.   Musculoskeletal: Normal range of motion. She exhibits no edema, tenderness or deformity.   Lymphadenopathy:        Head (right side): No submental and no submandibular adenopathy present.        Head (left side): No submental and no submandibular adenopathy present.     She has no cervical adenopathy.        Right cervical: No superficial  cervical and no deep cervical adenopathy present.       Left cervical: No superficial cervical and no deep cervical adenopathy present.     She has no axillary adenopathy.        Right: No supraclavicular adenopathy present.        Left: No supraclavicular adenopathy present.   Neurological: She is alert and oriented to person, place, and time. She displays normal reflexes. No cranial nerve deficit. She exhibits normal muscle tone. Coordination normal.   Skin: Skin is warm, dry and intact. Capillary refill takes less than 2 seconds. She is not diaphoretic. No pallor.   Psychiatric: She has a normal mood and affect. Her behavior is normal. Judgment and thought content normal.       Lab Results   Component Value Date    WBC 7.13 10/05/2017    HGB 11.1 (L) 10/05/2017    HCT 33.0 (L) 10/05/2017    MCV 90 10/05/2017     10/05/2017     Lab Results   Component Value Date    CREATININE 0.8 10/05/2017    BUN 12 10/05/2017     10/05/2017    K 3.7 10/05/2017     10/05/2017    CO2 27 10/05/2017     Lab Results   Component Value Date    ALT 48 (H) 10/05/2017    AST 30 10/05/2017    ALKPHOS 110 10/05/2017    BILITOT 0.3 10/05/2017         Assessment:         51-year-old female with a new diagnosis of right-sided infiltrating ductal carcinoma ER/MT positive, HER-2 negative with biopsy-proven right axillary lymph node involvement.  Patient was consented for BRCA testing for participation and B-55 study and BRCA testing will be sent today.  I discussed neoadjuvant therapy at length and the benefits of preoperative chemotherapy.    She received cycle 1 of dense dose Adriamycin/Cytoxan on 6/12/2017 and tolerated treatment exceptionally well.  Right axillary lymph node continues to decrease in size likely representing positive response to  treatment.   She presents after completing dose dense Taxol and we will refer the patient back to Dr. Rivas for definitive surgery.  She will follow-up in 6 weeks to discuss  final pathology and additional adjuvant treatment with radiation as indicated and anti-estrogen therapy.  Repeat CT of chest to evaluate small pulmonary nodule.        ER/HI positive, HER-2 negative infiltrating ductal carcinoma the right breast with biopsy proven lymph node involvement clinically stage II/III.  --BRCA testing via B-55 Study is negative for mutation  --ddAC--> taxol   cycle 1--- 6/12/2017  --Final cycle Cycle 4 on 9/19/2017  --20% dose reduction of Taxol due to neuropathy/severe fatigue  --Proceed with cycle 4 of dose dense Taxol today 9/19/2017  --Follow-up in 2 weeks to check counts      Hypertension:  Continue Lotensin/HCTZ  --Blood pressure poorly controlled this morning we'll continue to monitor and adjust medications as needed    Hypothyroidism:  Continue Synthroid    4 mm right upper lobe pulmonary nodule and subpleural nodularity bilateral bases:  --Repeat CT scan

## 2017-10-10 ENCOUNTER — TELEPHONE (OUTPATIENT)
Dept: RADIOLOGY | Facility: HOSPITAL | Age: 52
End: 2017-10-10

## 2017-10-11 ENCOUNTER — HOSPITAL ENCOUNTER (OUTPATIENT)
Dept: RADIOLOGY | Facility: HOSPITAL | Age: 52
Discharge: HOME | End: 2017-10-11
Attending: INTERNAL MEDICINE
Payer: COMMERCIAL

## 2017-10-11 ENCOUNTER — OFFICE VISIT (OUTPATIENT)
Dept: SURGERY | Facility: CLINIC | Age: 52
End: 2017-10-11
Payer: COMMERCIAL

## 2017-10-11 ENCOUNTER — CLINICAL SUPPORT (OUTPATIENT)
Dept: CARDIOLOGY | Facility: CLINIC | Age: 52
End: 2017-10-11
Payer: COMMERCIAL

## 2017-10-11 VITALS
TEMPERATURE: 98 F | SYSTOLIC BLOOD PRESSURE: 194 MMHG | WEIGHT: 281.06 LBS | HEART RATE: 90 BPM | BODY MASS INDEX: 42.74 KG/M2 | DIASTOLIC BLOOD PRESSURE: 101 MMHG

## 2017-10-11 DIAGNOSIS — Z17.0 MALIGNANT NEOPLASM OF UPPER-OUTER QUADRANT OF RIGHT BREAST IN FEMALE, ESTROGEN RECEPTOR POSITIVE: Primary | ICD-10-CM

## 2017-10-11 DIAGNOSIS — R91.1 PULMONARY NODULE: ICD-10-CM

## 2017-10-11 DIAGNOSIS — C50.411 MALIGNANT NEOPLASM OF UPPER-OUTER QUADRANT OF RIGHT BREAST IN FEMALE, ESTROGEN RECEPTOR POSITIVE: Primary | ICD-10-CM

## 2017-10-11 DIAGNOSIS — Z17.0 MALIGNANT NEOPLASM OF UPPER-OUTER QUADRANT OF RIGHT BREAST IN FEMALE, ESTROGEN RECEPTOR POSITIVE: ICD-10-CM

## 2017-10-11 DIAGNOSIS — C50.411 MALIGNANT NEOPLASM OF UPPER-OUTER QUADRANT OF RIGHT BREAST IN FEMALE, ESTROGEN RECEPTOR POSITIVE: ICD-10-CM

## 2017-10-11 PROCEDURE — 93000 ELECTROCARDIOGRAM COMPLETE: CPT | Mod: S$GLB,,, | Performed by: INTERNAL MEDICINE

## 2017-10-11 PROCEDURE — 71260 CT THORAX DX C+: CPT | Mod: TC,PO

## 2017-10-11 PROCEDURE — 99999 PR PBB SHADOW E&M-EST. PATIENT-LVL IV: CPT | Mod: PBBFAC,,, | Performed by: SURGERY

## 2017-10-11 PROCEDURE — 71260 CT THORAX DX C+: CPT | Mod: 26,,, | Performed by: RADIOLOGY

## 2017-10-11 PROCEDURE — 25500020 PHARM REV CODE 255: Mod: PO | Performed by: INTERNAL MEDICINE

## 2017-10-11 PROCEDURE — 99212 OFFICE O/P EST SF 10 MIN: CPT | Mod: S$GLB,,, | Performed by: SURGERY

## 2017-10-11 RX ORDER — ONDANSETRON 4 MG/1
8 TABLET, ORALLY DISINTEGRATING ORAL EVERY 8 HOURS PRN
Status: CANCELLED | OUTPATIENT
Start: 2017-10-11

## 2017-10-11 RX ORDER — SODIUM CHLORIDE 9 MG/ML
INJECTION, SOLUTION INTRAVENOUS CONTINUOUS
Status: CANCELLED | OUTPATIENT
Start: 2017-10-11

## 2017-10-11 RX ADMIN — IOHEXOL 75 ML: 350 INJECTION, SOLUTION INTRAVENOUS at 12:10

## 2017-10-13 NOTE — PROGRESS NOTES
Patient returns now in follow-up.  5 months ago she was found to have a 1.5 cm mass in the upper outer quadrant of her right breast.  [facility] this mass to contain infiltrating carcinoma that was ER/FL positive and HER-2 negative.  In addition she had a 2 cm axillary lymph node which was also malignant.  The patient about 3 weeks ago finished her neoadjuvant chemotherapy.  She presents now for surgical resection.  The patient does have a cousin with breast cancer and a sister with ovarian cancer.  The patient underwent genetic testing and was found not to have any genetic mutations.        Past medical history is reviewed and stable.  She has hypertension iron deficiency anemia hypothyroidism insulin resistance syndrome.  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  Past surgical history was reviewed.  She's had a laparoscopic hysterectomy BSO and tubal ligation in the past.  Patient doesn't smoke or drink alcohol.  Review of systems is unchanged.  She has no cardiac or respiratory problems.  No significant GI or  complaints.  Medications were reviewed.  Physical exam reveals vital signs are stable except mildly elevated blood pressure.  Patient is alert and oriented ×3.  Extraocular motor intact.  Neck is soft without any adenopathy.  Chest is clear to auscultation with good respiratory excursion.  Heart reveals normal rate and rhythm.  Abdomen is mildly obese soft and nontender.  Breast exam: Reveals normal left breast with no palpable abnormalities.  Right breast is slightly smaller than the left breast has been that way since birth.  At this point I cannot palpate the breast cancer nor the axillary lymph node that was malignant.    Impression: Patient with upper outer quadrant right breast was cancer and positive right axillary lymph node.  She has had an excellent response to chemotherapy with decrease in the size of lesion to the point where I cannot palpate  any more.  Recommendations: As to options with the patient including mastectomy versus partial mastectomy and the need for sentinel node biopsy and possible axillary node dissection.  This point desires a partial mastectomy and sentinel node biopsy.  Did tell her as the lesion is no longer palpable she will need needle localization of the breast lesion preoperatively.  I also told her although he no longer feel the axillary node most the time will remain positive on dissection.  If it is positive then she'll need a complete axillary node dissection.  Counseled her to that surgery.  Postoperative course including bleeding infection scarring distortion of the breast need for a drain in full axillary node dissection as done which would require stay in hospital overnight.  Also counseled about possible possible postoperative arm swelling and shoulder weakness.  Patient agrees with the procedure.  She is to hold her NSAIDs for 5 days before surgery.  On the morning of surgery she is just to take her blood pressure pill but not her diuretic or metformin.

## 2017-10-27 NOTE — PRE-PROCEDURE INSTRUCTIONS
Pre op instructions reviewed with patient per phone:    To confirm, Your surgeon has instructed you:  Surgery is scheduled 10/31/17 at 1300 .      Please report to [facility] 1st floor main lobby by 0930 To Rad @ 1100 Pre admit office will call afternoon prior to surgery for final arrival time.      INSTRUCTIONS IMPORTANT!!!  ¨ No smoking after 12 midnight, the night before surgery.  ¨ No solid food after 12 midnight, but you may have clear liquids up until 3 hours prior to surgery.  This includes: grape, cranberry, and apple juice (not orange, and no coffee.)   ¨ OK to brush teeth, but no gum, candy or mints!    ¨ Take only these medicines with a small swallow of water-morning of surgery.  Benazepril, Synthroid    ____  Do not wear makeup, including mascara.  ____  No powder, lotions or creams to surgical area.  ____  Please remove all jewelry, including piercings and leave at home.  ____  No money or valuables needed. Please leave at home.  ____  Please bring identification and insurance information to hospital.  ____  If going home the same day, arrange for a ride home. You will not be able to   drive if Anesthesia was used.  ____  Children, under 12 years old, must remain in the waiting room with an adult.  They are not allowed in patient areas.  ____  Wear loose fitting clothing. Allow for dressings, bandages.  ____  Stop Aspirin, Ibuprofen, Motrin and Aleve at least 5-7 days before surgery, unless otherwise instructed by your doctor, or the nurse.   You MAY use Tylenol/acetaminophen until day of surgery.  ____  If you take diabetic medication, do not take am of surgery unless instructed by   Doctor.  ____ Stop taking any Fish Oil supplement or any Vitamins that contain Vitamin E at least 5 days prior to surgery.          Bathing Instructions-- The night before surgery and the morning prior to coming to the hospital:   -Do not shave the surgical area.   -Shower and wash your hair and  body as usual with your regular soap and shampoo.   -Rinse your hair and body completely.   -Use one packet of hibiclens to wash the surgical site (using your hand) gently for 5 minutes.  Do not scrub you skin too hard.   -Do not use hibiclens on your head, face, or genitals.   -Do not wash with regular soap after you use the hibiclens.   -Rinse your body thoroughly.   -Dry with clean, soft towel.  Do not use lotion, cream, deodorant, or powders on   the surgical site.    Use antibacterial soap in place of hibiclens if your surgery is on the head, face or genitals.         Surgical Site Infection    Prevention of surgical site infections:     -Keep incisions clean and dry.   -Do not soak/submerge incisions in water until completely healed.   -Do not apply lotions, powders, creams, or deodorants to site.   -Always make sure hands are cleaned with antibacterial soap/ alcohol-based   prior to touching the surgical site.  (This includes doctors, nurses, staff, and yourself.)    Signs and symptoms:   -Redness and pain around the area where you had surgery   -Drainage of cloudy fluid from your surgical wound   -Fever over 100.4  I have read or had read and explained to me, and understand the above information.

## 2017-10-30 ENCOUNTER — ANESTHESIA EVENT (OUTPATIENT)
Dept: SURGERY | Facility: HOSPITAL | Age: 52
End: 2017-10-30
Payer: COMMERCIAL

## 2017-10-31 ENCOUNTER — HOSPITAL ENCOUNTER (OUTPATIENT)
Dept: RADIOLOGY | Facility: HOSPITAL | Age: 52
Discharge: HOME | End: 2017-10-31
Attending: SURGERY | Admitting: SURGERY
Payer: COMMERCIAL

## 2017-10-31 ENCOUNTER — ANESTHESIA (OUTPATIENT)
Dept: SURGERY | Facility: HOSPITAL | Age: 52
End: 2017-10-31
Payer: COMMERCIAL

## 2017-10-31 ENCOUNTER — SURGERY (OUTPATIENT)
Age: 52
End: 2017-10-31

## 2017-10-31 ENCOUNTER — HOSPITAL ENCOUNTER (OUTPATIENT)
Facility: HOSPITAL | Age: 52
Discharge: HOME | End: 2017-10-31
Attending: SURGERY | Admitting: SURGERY
Payer: COMMERCIAL

## 2017-10-31 DIAGNOSIS — C50.411 MALIGNANT NEOPLASM OF UPPER-OUTER QUADRANT OF RIGHT BREAST IN FEMALE, ESTROGEN RECEPTOR POSITIVE: ICD-10-CM

## 2017-10-31 DIAGNOSIS — Z17.0 MALIGNANT NEOPLASM OF UPPER-OUTER QUADRANT OF RIGHT BREAST IN FEMALE, ESTROGEN RECEPTOR POSITIVE: ICD-10-CM

## 2017-10-31 PROCEDURE — 71000033 HC RECOVERY, INTIAL HOUR: Performed by: SURGERY

## 2017-10-31 PROCEDURE — 38900 IO MAP OF SENT LYMPH NODE: CPT | Mod: RT,,, | Performed by: SURGERY

## 2017-10-31 PROCEDURE — 71000039 HC RECOVERY, EACH ADD'L HOUR: Performed by: SURGERY

## 2017-10-31 PROCEDURE — 63600175 PHARM REV CODE 636 W HCPCS: Performed by: SURGERY

## 2017-10-31 PROCEDURE — 19281 PERQ DEVICE BREAST 1ST IMAG: CPT | Mod: TC

## 2017-10-31 PROCEDURE — C1769 GUIDE WIRE: HCPCS

## 2017-10-31 PROCEDURE — 88305 TISSUE EXAM BY PATHOLOGIST: CPT | Performed by: PATHOLOGY

## 2017-10-31 PROCEDURE — 63600175 PHARM REV CODE 636 W HCPCS: Performed by: NURSE ANESTHETIST, CERTIFIED REGISTERED

## 2017-10-31 PROCEDURE — 88331 PATH CONSLTJ SURG 1 BLK 1SPC: CPT | Mod: 26,,, | Performed by: PATHOLOGY

## 2017-10-31 PROCEDURE — 63600175 PHARM REV CODE 636 W HCPCS: Performed by: ANESTHESIOLOGY

## 2017-10-31 PROCEDURE — 37000009 HC ANESTHESIA EA ADD 15 MINS: Performed by: SURGERY

## 2017-10-31 PROCEDURE — 36000707: Performed by: SURGERY

## 2017-10-31 PROCEDURE — 88342 IMHCHEM/IMCYTCHM 1ST ANTB: CPT | Mod: 26,,, | Performed by: PATHOLOGY

## 2017-10-31 PROCEDURE — 88305 TISSUE EXAM BY PATHOLOGIST: CPT | Mod: 26,,, | Performed by: PATHOLOGY

## 2017-10-31 PROCEDURE — 88341 IMHCHEM/IMCYTCHM EA ADD ANTB: CPT | Mod: 26,,, | Performed by: PATHOLOGY

## 2017-10-31 PROCEDURE — 25000003 PHARM REV CODE 250: Performed by: NURSE ANESTHETIST, CERTIFIED REGISTERED

## 2017-10-31 PROCEDURE — 88307 TISSUE EXAM BY PATHOLOGIST: CPT | Mod: 26,,, | Performed by: PATHOLOGY

## 2017-10-31 PROCEDURE — A9520 TC99 TILMANOCEPT DIAG 0.5MCI: HCPCS

## 2017-10-31 PROCEDURE — 36000706: Performed by: SURGERY

## 2017-10-31 PROCEDURE — 19303 MAST SIMPLE COMPLETE: CPT | Mod: RT,,, | Performed by: SURGERY

## 2017-10-31 PROCEDURE — 37000008 HC ANESTHESIA 1ST 15 MINUTES: Performed by: SURGERY

## 2017-10-31 PROCEDURE — 88331 PATH CONSLTJ SURG 1 BLK 1SPC: CPT | Performed by: PATHOLOGY

## 2017-10-31 PROCEDURE — 25000003 PHARM REV CODE 250: Performed by: SURGERY

## 2017-10-31 PROCEDURE — 38525 BIOPSY/REMOVAL LYMPH NODES: CPT | Mod: 51,RT,, | Performed by: SURGERY

## 2017-10-31 PROCEDURE — 71000015 HC POSTOP RECOV 1ST HR: Performed by: SURGERY

## 2017-10-31 RX ORDER — DIPHENHYDRAMINE HYDROCHLORIDE 50 MG/ML
25 INJECTION INTRAMUSCULAR; INTRAVENOUS EVERY 6 HOURS PRN
Status: DISCONTINUED | OUTPATIENT
Start: 2017-10-31 | End: 2017-10-31 | Stop reason: HOSPADM

## 2017-10-31 RX ORDER — ISOSULFAN BLUE 50 MG/5ML
INJECTION, SOLUTION SUBCUTANEOUS
Status: DISCONTINUED | OUTPATIENT
Start: 2017-10-31 | End: 2017-10-31 | Stop reason: HOSPADM

## 2017-10-31 RX ORDER — SUCCINYLCHOLINE CHLORIDE 20 MG/ML
INJECTION INTRAMUSCULAR; INTRAVENOUS
Status: DISCONTINUED | OUTPATIENT
Start: 2017-10-31 | End: 2017-10-31

## 2017-10-31 RX ORDER — ONDANSETRON 2 MG/ML
4 INJECTION INTRAMUSCULAR; INTRAVENOUS DAILY PRN
Status: DISCONTINUED | OUTPATIENT
Start: 2017-10-31 | End: 2017-10-31 | Stop reason: HOSPADM

## 2017-10-31 RX ORDER — SODIUM CHLORIDE 9 MG/ML
INJECTION, SOLUTION INTRAVENOUS CONTINUOUS
Status: DISCONTINUED | OUTPATIENT
Start: 2017-10-31 | End: 2017-10-31 | Stop reason: HOSPADM

## 2017-10-31 RX ORDER — FENTANYL CITRATE 50 UG/ML
25 INJECTION, SOLUTION INTRAMUSCULAR; INTRAVENOUS EVERY 5 MIN PRN
Status: DISCONTINUED | OUTPATIENT
Start: 2017-10-31 | End: 2017-10-31 | Stop reason: HOSPADM

## 2017-10-31 RX ORDER — HYDROMORPHONE HYDROCHLORIDE 1 MG/ML
0.2 INJECTION, SOLUTION INTRAMUSCULAR; INTRAVENOUS; SUBCUTANEOUS EVERY 5 MIN PRN
Status: DISCONTINUED | OUTPATIENT
Start: 2017-10-31 | End: 2017-10-31 | Stop reason: HOSPADM

## 2017-10-31 RX ORDER — DEXAMETHASONE SODIUM PHOSPHATE 4 MG/ML
INJECTION, SOLUTION INTRA-ARTICULAR; INTRALESIONAL; INTRAMUSCULAR; INTRAVENOUS; SOFT TISSUE
Status: DISCONTINUED | OUTPATIENT
Start: 2017-10-31 | End: 2017-10-31

## 2017-10-31 RX ORDER — ONDANSETRON 8 MG/1
8 TABLET, ORALLY DISINTEGRATING ORAL EVERY 8 HOURS PRN
Status: DISCONTINUED | OUTPATIENT
Start: 2017-10-31 | End: 2017-10-31 | Stop reason: HOSPADM

## 2017-10-31 RX ORDER — ONDANSETRON 2 MG/ML
INJECTION INTRAMUSCULAR; INTRAVENOUS
Status: DISCONTINUED | OUTPATIENT
Start: 2017-10-31 | End: 2017-10-31

## 2017-10-31 RX ORDER — MEPERIDINE HYDROCHLORIDE 50 MG/ML
12.5 INJECTION INTRAMUSCULAR; INTRAVENOUS; SUBCUTANEOUS ONCE AS NEEDED
Status: DISCONTINUED | OUTPATIENT
Start: 2017-10-31 | End: 2017-10-31 | Stop reason: HOSPADM

## 2017-10-31 RX ORDER — ROCURONIUM BROMIDE 10 MG/ML
INJECTION, SOLUTION INTRAVENOUS
Status: DISCONTINUED | OUTPATIENT
Start: 2017-10-31 | End: 2017-10-31

## 2017-10-31 RX ORDER — FENTANYL CITRATE 50 UG/ML
INJECTION, SOLUTION INTRAMUSCULAR; INTRAVENOUS
Status: DISCONTINUED | OUTPATIENT
Start: 2017-10-31 | End: 2017-10-31

## 2017-10-31 RX ORDER — OXYCODONE AND ACETAMINOPHEN 5; 325 MG/1; MG/1
1 TABLET ORAL
Qty: 15 TABLET | Refills: 0 | Status: SHIPPED | OUTPATIENT
Start: 2017-10-31 | End: 2017-11-13 | Stop reason: ALTCHOICE

## 2017-10-31 RX ORDER — METHYLENE BLUE 5 MG/ML
5 INJECTION INTRAVENOUS ONCE
Status: DISCONTINUED | OUTPATIENT
Start: 2017-10-31 | End: 2017-10-31 | Stop reason: HOSPADM

## 2017-10-31 RX ORDER — LIDOCAINE HYDROCHLORIDE 10 MG/ML
1 INJECTION, SOLUTION EPIDURAL; INFILTRATION; INTRACAUDAL; PERINEURAL ONCE
Status: DISCONTINUED | OUTPATIENT
Start: 2017-10-31 | End: 2017-10-31 | Stop reason: HOSPADM

## 2017-10-31 RX ORDER — MIDAZOLAM HYDROCHLORIDE 1 MG/ML
INJECTION, SOLUTION INTRAMUSCULAR; INTRAVENOUS
Status: DISCONTINUED | OUTPATIENT
Start: 2017-10-31 | End: 2017-10-31

## 2017-10-31 RX ORDER — SODIUM CHLORIDE, SODIUM LACTATE, POTASSIUM CHLORIDE, CALCIUM CHLORIDE 600; 310; 30; 20 MG/100ML; MG/100ML; MG/100ML; MG/100ML
INJECTION, SOLUTION INTRAVENOUS CONTINUOUS PRN
Status: DISCONTINUED | OUTPATIENT
Start: 2017-10-31 | End: 2017-10-31

## 2017-10-31 RX ORDER — PROPOFOL 10 MG/ML
VIAL (ML) INTRAVENOUS
Status: DISCONTINUED | OUTPATIENT
Start: 2017-10-31 | End: 2017-10-31

## 2017-10-31 RX ORDER — LIDOCAINE HCL/PF 100 MG/5ML
SYRINGE (ML) INTRAVENOUS
Status: DISCONTINUED | OUTPATIENT
Start: 2017-10-31 | End: 2017-10-31

## 2017-10-31 RX ADMIN — ONDANSETRON 4 MG: 2 INJECTION, SOLUTION INTRAMUSCULAR; INTRAVENOUS at 02:10

## 2017-10-31 RX ADMIN — LIDOCAINE HYDROCHLORIDE 60 MG: 20 INJECTION, SOLUTION INTRAVENOUS at 01:10

## 2017-10-31 RX ADMIN — FENTANYL CITRATE 50 MCG: 50 INJECTION, SOLUTION INTRAMUSCULAR; INTRAVENOUS at 04:10

## 2017-10-31 RX ADMIN — SODIUM CHLORIDE, SODIUM LACTATE, POTASSIUM CHLORIDE, AND CALCIUM CHLORIDE: 600; 310; 30; 20 INJECTION, SOLUTION INTRAVENOUS at 01:10

## 2017-10-31 RX ADMIN — MIDAZOLAM HYDROCHLORIDE 2 MG: 1 INJECTION, SOLUTION INTRAMUSCULAR; INTRAVENOUS at 01:10

## 2017-10-31 RX ADMIN — SUCCINYLCHOLINE CHLORIDE 120 MG: 20 INJECTION, SOLUTION INTRAMUSCULAR; INTRAVENOUS at 01:10

## 2017-10-31 RX ADMIN — DEXAMETHASONE SODIUM PHOSPHATE 4 MG: 4 INJECTION, SOLUTION INTRA-ARTICULAR; INTRALESIONAL; INTRAMUSCULAR; INTRAVENOUS; SOFT TISSUE at 01:10

## 2017-10-31 RX ADMIN — SODIUM CHLORIDE, SODIUM LACTATE, POTASSIUM CHLORIDE, AND CALCIUM CHLORIDE: 600; 310; 30; 20 INJECTION, SOLUTION INTRAVENOUS at 02:10

## 2017-10-31 RX ADMIN — LIDOCAINE HYDROCHLORIDE 30 ML: 10; .005 INJECTION, SOLUTION EPIDURAL; INFILTRATION; INTRACAUDAL; PERINEURAL at 02:10

## 2017-10-31 RX ADMIN — ONDANSETRON 4 MG: 2 INJECTION INTRAMUSCULAR; INTRAVENOUS at 04:10

## 2017-10-31 RX ADMIN — FENTANYL CITRATE 50 MCG: 50 INJECTION, SOLUTION INTRAMUSCULAR; INTRAVENOUS at 03:10

## 2017-10-31 RX ADMIN — PROPOFOL 200 MG: 10 INJECTION, EMULSION INTRAVENOUS at 01:10

## 2017-10-31 RX ADMIN — PROPOFOL 50 MG: 10 INJECTION, EMULSION INTRAVENOUS at 03:10

## 2017-10-31 RX ADMIN — FENTANYL CITRATE 25 MCG: 50 INJECTION INTRAMUSCULAR; INTRAVENOUS at 05:10

## 2017-10-31 RX ADMIN — ISOSULFAN BLUE 5 MG: 10 INJECTION, SOLUTION SUBCUTANEOUS at 02:10

## 2017-10-31 RX ADMIN — ROCURONIUM BROMIDE 5 MG: 10 INJECTION, SOLUTION INTRAVENOUS at 01:10

## 2017-10-31 RX ADMIN — FENTANYL CITRATE 100 MCG: 50 INJECTION, SOLUTION INTRAMUSCULAR; INTRAVENOUS at 01:10

## 2017-10-31 RX ADMIN — CEFAZOLIN SODIUM 3 ML: 2 SOLUTION INTRAVENOUS at 01:10

## 2017-10-31 NOTE — BRIEF OP NOTE
[facility]  Brief Operative Note     SUMMARY     Surgery Date: 10/31/2017     Surgeon(s) and Role:     * Jet Mcclendon MD - Primary    Assisting Surgeon: None    Pre-op Diagnosis:  Malignant neoplasm of upper-outer quadrant of right breast in female, estrogen receptor positive [C50.411, Z17.0]    Post-op Diagnosis:  Post-Op Diagnosis Codes:     * Malignant neoplasm of upper-outer quadrant of right breast in female, estrogen receptor positive [C50.411, Z17.0]    Procedure(s) (LRB):  MASTECTOMY-PARTIAL (Right)  BIOPSY-LYMPH NODE-SENTINEL possible node dissection (Right)    Anesthesia: General and 28 ml of marcaine .25% locally    Description of the findings of the procedure:      Findings/Key Components: 2 sentinela nodes were normal and one non sentinel node . Sn counts 14k  And 43 k    Estimated Blood Loss: 10ml       Specimens:   Specimen (12h ago through future)    Start     Ordered    10/31/17 1540  Specimen to Pathology - Surgery  Once     Comments:  1.  [place] node #1 right breast (frozen)2.  Non-sentinel node #1 (frozen)3.  [place] node #2 right breast (frozen)4.  Right breast partial mastectomy--long suture lateral, short superior (fresh for margins)5. Right breast - blue markings, new medial inferior margins (perm)Dx:  Right breast cancer      10/31/17 1541          Discharge Note    SUMMARY     Admit Date: 10/31/2017    Discharge Date and Time:  10/31/2017 4:19 PM    Hospital Course (synopsis of major diagnoses, care, treatment, and services provided during the course of the hospital stay): tolerated procedure well     Final Diagnosis: Post-Op Diagnosis Codes:     * Malignant neoplasm of upper-outer quadrant of right breast in female, estrogen receptor positive [C50.411, Z17.0]  [place] nodes were normal.   Disposition: Home or Self Care    Follow Up/Patient Instructions:     Medications:  Reconciled Home Medications:   Current Discharge Medication List      START taking these  medications    Details   oxyCODONE-acetaminophen (PERCOCET) 5-325 mg per tablet Take 1 tablet by mouth every 4 to 6 hours as needed for Pain.  Qty: 15 tablet, Refills: 0         CONTINUE these medications which have NOT CHANGED    Details   acetaminophen (TYLENOL) 500 MG tablet Take 500-1,000 mg by mouth every 6 (six) hours as needed for Pain.      benazepril (LOTENSIN) 10 MG tablet TAKE ONE TABLET BY MOUTH ONCE DAILY  Qty: 90 tablet, Refills: 1      cholecalciferol, vitamin D3, (VITAMIN D) 2,000 unit Cap Take 1.5 capsules by mouth once daily. 1 Capsule Oral Every day      ferrous gluconate (FERGON) 240 (27 FE) MG tablet Take 240 mg by mouth 3 (three) times daily with meals. 1 Tablet Oral Every day      fluticasone (FLONASE) 50 mcg/actuation nasal spray 2 sprays by Each Nare route daily as needed for Rhinitis.  Qty: 16 g, Refills: 5      ibuprofen (ADVIL,MOTRIN) 200 MG tablet Take 200-400 mg by mouth every 8 (eight) hours as needed for Pain.      levothyroxine (SYNTHROID) 200 MCG tablet Take 250 mcg by mouth once daily. Takes one 200 mcg and one 50 mcg tablet for a total of 250 mcg daily      metformin (GLUCOPHAGE) 500 MG tablet TAKE FOUR TABLETS BY MOUTH IN THE EVENING AS DIRECTED  Qty: 360 tablet, Refills: 1      ondansetron (ZOFRAN-ODT) 8 MG TbDL Take 1 tablet (8 mg total) by mouth every 8 (eight) hours as needed.  Qty: 30 tablet, Refills: 2    Associated Diagnoses: Malignant neoplasm of right female breast, unspecified site of breast; Malignant neoplasm of upper-outer quadrant of right female breast      polyethylene glycol (GLYCOLAX) 17 gram PwPk Take by mouth.      prochlorperazine (COMPAZINE) 10 MG tablet Take 1 tablet (10 mg total) by mouth every 6 (six) hours as needed.  Qty: 30 tablet, Refills: 2    Associated Diagnoses: Malignant neoplasm of right female breast, unspecified site of breast; Malignant neoplasm of upper-outer quadrant of right female breast      acyclovir (ZOVIRAX) 400 MG tablet TAKE ONE  TABLET BY MOUTH THREE TIMES DAILY WITH FOOD FOR 5 DAYS PER  EPISODE  Qty: 30 tablet, Refills: 1      gabapentin (NEURONTIN) 100 MG capsule Take 1 capsule (100 mg total) by mouth 3 (three) times daily.  Qty: 90 capsule, Refills: 0    Associated Diagnoses: Neuropathy      hydrochlorothiazide (HYDRODIURIL) 12.5 MG Tab 1 Tablet Oral Every day  Qty: 90 tablet, Refills: 1         STOP taking these medications       dexamethasone (DECADRON) 4 MG Tab Comments:   Reason for Stopping:               Discharge Procedure Orders  Diet general     Activity as tolerated     Shower on day dressing removed (No bath)     Ice to affected area     Lifting restrictions     Call MD for:  temperature >100.4     Call MD for:  persistent nausea and vomiting     Call MD for:  redness, tenderness, or signs of infection (pain, swelling, redness, odor or green/yellow discharge around incision site)     Remove dressing in 48 hours     WAIT 24 HRS BEFORE RESTARTING MOTRIN AND METFORMIN  Follow-up Information     Jet Mcclendon MD. Schedule an appointment as soon as possible for a visit in 10 days.    Specialty:  General Surgery  Contact information:  [address]  [place] [address] [phone]

## 2017-10-31 NOTE — H&P (VIEW-ONLY)
Patient returns now in follow-up.  5 months ago she was found to have a 1.5 cm mass in the upper outer quadrant of her right breast.  [facility] showed this mass to contain infiltrating carcinoma that was ER/VT positive and HER-2 negative.  In addition she had a 2 cm axillary lymph node which was also malignant.  The patient about 3 weeks ago finished her neoadjuvant chemotherapy.  She presents now for surgical resection.  The patient does have a cousin with breast cancer and a sister with ovarian cancer.  The patient underwent genetic testing and was found not to have any genetic mutations.        Past medical history is reviewed and stable.  She has hypertension iron deficiency anemia hypothyroidism insulin resistance syndrome.  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  Past surgical history was reviewed.  She's had a laparoscopic hysterectomy BSO and tubal ligation in the past.  Patient doesn't smoke or drink alcohol.  Review of systems is unchanged.  She has no cardiac or respiratory problems.  No significant GI or  complaints.  Medications were reviewed.  Physical exam reveals vital signs are stable except mildly elevated blood pressure.  Patient is alert and oriented ×3.  Extraocular motor intact.  Neck is soft without any adenopathy.  Chest is clear to auscultation with good respiratory excursion.  Heart reveals normal rate and rhythm.  Abdomen is mildly obese soft and nontender.  Breast exam: Reveals normal left breast with no palpable abnormalities.  Right breast is slightly smaller than the left breast has been that way since birth.  At this point I cannot palpate the breast cancer nor the axillary lymph node that was malignant.    Impression: Patient with upper outer quadrant right breast was cancer and positive right axillary lymph node.  She has had an excellent response to chemotherapy with decrease in the size of lesion to the point where I cannot palpate  any more.  Recommendations: As to options with the patient including mastectomy versus partial mastectomy and the need for sentinel node biopsy and possible axillary node dissection.  This point desires a partial mastectomy and sentinel node biopsy.  Did tell her as the lesion is no longer palpable she will need needle localization of the breast lesion preoperatively.  I also told her although he no longer feel the axillary node most the time will remain positive on dissection.  If it is positive then she'll need a complete axillary node dissection.  Counseled her to that surgery.  Postoperative course including bleeding infection scarring distortion of the breast need for a drain in full axillary node dissection as done which would require stay in hospital overnight.  Also counseled about possible possible postoperative arm swelling and shoulder weakness.  Patient agrees with the procedure.  She is to hold her NSAIDs for 5 days before surgery.  On the morning of surgery she is just to take her blood pressure pill but not her diuretic or metformin.

## 2017-10-31 NOTE — ANESTHESIA PREPROCEDURE EVALUATION
10/31/2017  Kylie Urbano is a 52 y.o., female.    Anesthesia Evaluation    I have reviewed the Patient Summary Reports.    I have reviewed the Nursing Notes.   I have reviewed the Medications.     Review of Systems  Anesthesia Hx:  No problems with previous Anesthesia    Hematology/Oncology:        Current/Recent Cancer. Breast right   Cardiovascular:   Hypertension ECG has been reviewed.    Pulmonary:  Pulmonary Normal    Renal/:  Renal/ Normal     Hepatic/GI:   Liver Disease, (Fatty liver)    Neurological:   Headaches    Endocrine:   Hypothyroidism Insulin resistance       Physical Exam  General:  Obesity    Airway/Jaw/Neck:  Airway Findings: Mouth Opening: Normal Tongue: Normal  General Airway Assessment: Adult  Mallampati: I  TM Distance: Normal, at least 6 cm  Jaw/Neck Findings:  Neck ROM: Normal ROM      Dental:  Dental Findings: In tact   Chest/Lungs:  Chest/Lungs Findings: Clear to auscultation, Normal Respiratory Rate     Heart/Vascular:  Heart Findings: Rate: Normal  Rhythm: Regular Rhythm  Sounds: Normal        Mental Status:  Mental Status Findings:  Cooperative, Alert and Oriented         Anesthesia Plan  Type of Anesthesia, risks & benefits discussed:  Anesthesia Type:  general  Patient's Preference:   Intra-op Monitoring Plan:   Intra-op Monitoring Plan Comments:   Post Op Pain Control Plan: multimodal analgesia  Post Op Pain Control Plan Comments:   Induction:   IV  Beta Blocker:  Patient is not currently on a Beta-Blocker (No further documentation required).       Informed Consent: Patient understands risks and agrees with Anesthesia plan.  Questions answered. Anesthesia consent signed with patient.  ASA Score: 2     Day of Surgery Review of History & Physical: I have interviewed and examined the patient. I have reviewed the patient's H&P dated:  There are no significant changes.           Ready For Surgery From Anesthesia Perspective.

## 2017-10-31 NOTE — ANESTHESIA POSTPROCEDURE EVALUATION
"Anesthesia Post Evaluation    Patient: Kylie Urbano    Procedure(s) Performed: Procedure(s) (LRB):  MASTECTOMY-PARTIAL (Right)  BIOPSY-LYMPH NODE-SENTINEL possible node dissection (Right)    Final Anesthesia Type: general  Patient location during evaluation: PACU  Patient participation: Yes- Able to Participate  Level of consciousness: awake  Post-procedure vital signs: reviewed and stable  Pain management: adequate  Airway patency: patent  PONV status at discharge: No PONV  Anesthetic complications: no      Cardiovascular status: blood pressure returned to baseline  Respiratory status: unassisted, spontaneous ventilation and room air  Hydration status: euvolemic  Follow-up not needed.        Visit Vitals  BP (!) 156/70   Pulse 108   Temp 36.4 °C (97.5 °F) (Temporal)   Resp 20   Ht 5' 8" (1.727 m)   Wt 125 kg (275 lb 9.2 oz)   LMP 01/01/2016   SpO2 99%   Breastfeeding? No   BMI 41.90 kg/m²       Pain/Daija Score: Pain Assessment Performed: Yes (10/31/2017  5:15 PM)  Presence of Pain: complains of pain/discomfort (10/31/2017  5:15 PM)  Pain Rating Prior to Med Admin: 7 (10/31/2017  5:02 PM)  Daija Score: 10 (10/31/2017  5:15 PM)      "

## 2017-10-31 NOTE — DISCHARGE INSTRUCTIONS
Wait 24 hours before resuming Motrin and Metformin    General Information:  1.  Do not drink alcoholic beverages including beer for 24 hours or as long as you are on pain medication..  2.  Do not drive a motor vehicle, operate machinery or power tools, or signs legal papers for 24 hours or as long as you are on pain medication.   3.  You may experience light-headedness, dizziness, and sleepiness following surgery. Please do not stay alone. A responsible adult should be with you for this 24 hour period.  4.  Go home and rest.  5. Progress slowly to a normal diet unless instructed.  Otherwise, begin with liquids such as soft drinks, then soup and crackers working up to solid foods. Drink plenty of nonalcoholic fluids.  6.  Certain anesthetics and pain medications produce nausea and vomiting in certain       individuals. If nausea becomes a problem at home, call you doctor.  7. A nurse will be calling you sometime after surgery. Do not be alarmed. This is our way of finding out how you are doing.  8. Several times every hour while you are awake, take 2-3 deep breaths and cough. If you had stomach surgery hold a pillow or rolled towel firmly against your stomach before you cough. This will help with any pain the cough might cause.  9. Several times every hour while you are awake, pump and flex your feet 5-6 times and do foot circles. This will help prevent blood clots.  10.Call your doctor for severe pain, bleeding, fever, or signs or symptoms of infection (pain, swelling, redness, foul odor, drainage).  11.You can contact your doctor anytime by [phone] for the [facility] (at [facility]) or [phone] for the [facility] on [facility].   [email or web address] is another way to contact your doctor if you are an active participant online with [facility].

## 2017-10-31 NOTE — INTERVAL H&P NOTE
The patient has been examined and the H&P has been reviewed:    I concur with the findings and no changes have occurred since H&P was written.    Anesthesia/Surgery risks, benefits and alternative options discussed and understood by patient/family.          Active Hospital Problems    Diagnosis  POA    Malignant neoplasm of upper-outer quadrant of right breast in female, estrogen receptor positive [C50.411, Z17.0]  Not Applicable      Resolved Hospital Problems    Diagnosis Date Resolved POA   No resolved problems to display.

## 2017-10-31 NOTE — TRANSFER OF CARE
"Anesthesia Transfer of Care Note    Patient: Kylie Urbano    Procedure(s) Performed: Procedure(s) (LRB):  MASTECTOMY-PARTIAL (Right)  BIOPSY-LYMPH NODE-SENTINEL possible node dissection (Right)    Patient location: GI    Anesthesia Type: MAC    Transport from OR: Transported from OR on room air with adequate spontaneous ventilation    Post pain: adequate analgesia    Post assessment: no apparent anesthetic complications    Post vital signs: stable    Level of consciousness: awake, alert and oriented    Nausea/Vomiting: no nausea/vomiting    Complications: none    Transfer of care protocol was followed      Last vitals:   Visit Vitals  /69 (BP Location: Left arm, Patient Position: Lying)   Pulse 97   Temp 36.5 °C (97.7 °F) (Temporal)   Resp 19   Ht 5' 8" (1.727 m)   Wt 125 kg (275 lb 9.2 oz)   LMP 01/01/2016   SpO2 97%   Breastfeeding? No   BMI 41.90 kg/m²     "

## 2017-10-31 NOTE — OP NOTE
Operative Note       SURGERY DATE:  10/31/2017     PRE-OP DIAGNOSIS:  Malignant neoplasm of upper-outer quadrant of right breast in female, estrogen receptor positive [C50.411, Z17.0]    POST-OP DIAGNOSIS:  Malignant neoplasm of upper-outer quadrant of right breast in female, estrogen receptor positive [C50.411, Z17.0]    Procedure(s) (LRB):  MASTECTOMY-PARTIAL (Right)  BIOPSY-LYMPH NODE-SENTINEL possible node dissection (Right)    Surgeon(s) and Role:     * Jet Mcclendon MD - Primary    ASSISTANT:  None    TASKS PERFORMED BY ASSISTANT:  No assistant    ANESTHESIA: General and 28 mL's of quarter percent Marcaine injected locally    FINDINGS:  2 sentinel nodes and an additional third node was palpated.  The sentinel lymph nodes had counts of 14, and 43,000.  These nodes were benign on frozen section.  The patient had had prior neoadjuvant chemotherapy and her primary tumor in the upper outer quadrant of the right breast had shrunk considerably to where was no longer palpable.  Needle localization was done preoperatively.  At the time of surgery no distinct lump could be palpated at the excision site.  The clip placed at the time of core biopsy of this right upper outer quadrant cancer, was contained in today's partial mastectomy specimen.  Additional margins appeared to be a little close to the medial inferior margin and several more tissue was removed in this area.    GRAFTS/IMPLANTS:  None    ESTIMATED BLOOD LOSS: 10 mL              COMPLICATIONS:  None    SPECIMEN:  2 sentinel nodes, one non-sentinel node which were benign on frozen section.  Right partial mastectomy specimen and additional inferomedial margin.    INDICATION:   Removal of right breast cancer and biopsy of the sentinel nodes    DESCRIPTION OF PROCEDURE:     After returning from the radiology suite having lymphoscintigraphy performed for done of education of sentinel node and needle localization at the site of the right upper outer quadrant  breast cancer, the patient was then taken to the operating room.  After adequate general endotracheal anesthesia obtained the patient's right breast and right upper extremity and axilla were prepped and draped in a sterile fashion.  Preoperatively she had some SCD hose and IV antibiotics.  The gamma counter was then placed in the axillary area and high counts were noted in an area of the axilla and the inferior hairbearing area.  A small transverse incision was made directly over this area.  Scared to skin using knife and into the subcutaneous tissue using cautery.  Using careful blunt dissection dissection was carried deeper into the axilla.  Using the gamma counter as a guide dissection was carried deeper than the lymphatics were identified.  This blue lymphatic was dissected out and traced to a fairly large lymph node about a centimeter in size.  Using careful blunt dissection this node was dissected out.  There were couple of different lymphatics that were going into this node and these were all clamped cut and tied with 2-0 silk ties and the node was removed.  It was sent and on all counts were consistent with a positive sentinel node.  Dissection was carried deeper no other counts were noted although there was a palpable node which appeared to have elevated counts.  This node was deeper in the axilla.  It was dissected out and clamped at its pedicle and ligated with 2-0 silk.  After removing the node however the counts appeared to be in the normal range and it did not appear to be a distinct sentinel node.  The gamma counter was then placed in the axilla again only activity was at the area of the previous lymphatic channels.  These lymphatic channels were actually between the breast and the previous lymph node.  On palpation appeared to be another lymph node and among these lymphatic channels.  This lymph node was also clamped at its base ligated with 2-0 silk and removed.  This counts were also consistent  with a sentinel node.  All 3 notes frozen section returned benign.  The wound was then irrigated and then injected with local anesthetic.  The deep axillary area was closed with interrupted 3-0 Vicryl.  The subcutaneous tissues tissue was closed using running 3-0 Vicryl.  In the skin was closed using running 4-0 Vicryl subcuticular.  Attention was then turned toward the breast.  An incision was made curvilinear on the upper outer quadrant of the right breast.  Scared to skin using knife into the breast parenchyma using cautery.  The guidewire was just adjacent to this area.  External portion wire was excised and then the internal portion could be seen going superiorly and medially.  Using careful blunt dissection and the cautery core of breast tissue was excised around the guidewire and mainly anteriorly guidewire.  This core tissue was then sent for specimen radiography and appeared the clip was well within the specimen.  The specimen was sent to pathologist and he felt that medially and inferiorly the fibrotic area where the tumor had been was close.  Therefore a further margin was obtained inferiorly medially and this was also marked and sent for permanent.  After this was done the wound was irrigated.  The deep breast parenchyma was closed using interrupted 3-0 Vicryl suture subcuticular tissue was closed using running 3-0 Vicryl suture.  Skin was closed using running 4-0 Vicryl subcuticular.  Steri-Strips and pressure dressing consisting of fluff 4 x 4's were applied and Surgi-Bra.  Patient was then awakened in the operating room taken recovery in stable condition.               CONDITION: Good    DISPOSITION: PACU - hemodynamically stable.

## 2017-10-31 NOTE — PLAN OF CARE
Gave home care instructions to patient, daughter and sisters, verbalized understanding.  All questions answered to the satisfaction of all.  To POV via WC, into POV without difficulty, distress or assist.

## 2017-11-08 ENCOUNTER — OFFICE VISIT (OUTPATIENT)
Dept: SURGERY | Facility: CLINIC | Age: 52
End: 2017-11-08
Payer: COMMERCIAL

## 2017-11-08 VITALS
WEIGHT: 280.63 LBS | TEMPERATURE: 99 F | HEART RATE: 95 BPM | DIASTOLIC BLOOD PRESSURE: 90 MMHG | BODY MASS INDEX: 42.67 KG/M2 | SYSTOLIC BLOOD PRESSURE: 156 MMHG

## 2017-11-08 VITALS
SYSTOLIC BLOOD PRESSURE: 156 MMHG | OXYGEN SATURATION: 99 % | TEMPERATURE: 98 F | RESPIRATION RATE: 20 BRPM | HEART RATE: 108 BPM | WEIGHT: 275.56 LBS | HEIGHT: 68 IN | BODY MASS INDEX: 41.76 KG/M2 | DIASTOLIC BLOOD PRESSURE: 70 MMHG

## 2017-11-08 DIAGNOSIS — Z98.890 POSTOPERATIVE STATE: Primary | ICD-10-CM

## 2017-11-08 PROCEDURE — 99999 PR PBB SHADOW E&M-EST. PATIENT-LVL III: CPT | Mod: PBBFAC,,, | Performed by: SURGERY

## 2017-11-08 PROCEDURE — 99024 POSTOP FOLLOW-UP VISIT: CPT | Mod: S$GLB,,, | Performed by: SURGERY

## 2017-11-09 NOTE — PROGRESS NOTES
Patient returns now 8 days status post right partial mastectomy and sentinel node biopsy.  She had neoadjuvant chemotherapy.  Postchemotherapy the right upper outer quadrant breast cancer and the axillary lymph node that was also palpable became no longer palpable.  Patient denies really any significant complaints except for some mild soreness postoperatively.    Objective: Vital signs are stable.  She is alert and oriented ×3.  Neck is soft.  Chest is clear and heart reveals normal rate.  The right breast partial mastectomy incision is clean and dry with no bruising or seroma.  The right axillary incision appears to be healing in well without any fluid in the axilla.    Pathology report: Initially at the time of surgery the margin on the partial mastectomy site appeared to be close to the anterior medial inferior margin so a wraparound margin area was obtained and that margin is now clear see #6 on the pathology report it was only 8 mm residual tumor there.  The lymph nodes showed that one sentinel node had a 3 mm residual focus of cancer.  There was some distortion probably from the prior chemotherapy.  Another sentinel node showed a micrometastasis.  Third node removed was benign.    Impression: Stable status post right partial mastectomy with sentinel node biopsy.  The final margin at the partial mastectomy site is clear.  However, she does have 2 nodes that shows some residual tumor.  One shows a focus of 3 mm and the other is a micrometastasis.  Frozen section of the time of surgery was benign.    Plan: Patient was encouraged to continue range of motion exercises.  The told the patient about the pathology results.  I think at this point should have her see radiation therapy and decided on the next step therapy.  The margin and the breast is clear on final margin and so she would definitely need radiation to the breast.  I will discuss with the radiation therapist and Dr. Ramírez whether we should perform  completion axillary dissection and then actually radiation would just proceed with axillary radiation only as she will receive breast radiation and lower axillary radiation anyway.  The patient will see me back in 3-4 weeks.

## 2017-11-13 ENCOUNTER — INITIAL CONSULT (OUTPATIENT)
Dept: RADIATION ONCOLOGY | Facility: CLINIC | Age: 52
End: 2017-11-13
Payer: COMMERCIAL

## 2017-11-13 VITALS
HEART RATE: 95 BPM | TEMPERATURE: 98 F | RESPIRATION RATE: 18 BRPM | HEIGHT: 68 IN | DIASTOLIC BLOOD PRESSURE: 92 MMHG | BODY MASS INDEX: 42.44 KG/M2 | WEIGHT: 280 LBS | SYSTOLIC BLOOD PRESSURE: 145 MMHG

## 2017-11-13 DIAGNOSIS — Z17.0 MALIGNANT NEOPLASM OF UPPER-OUTER QUADRANT OF RIGHT BREAST IN FEMALE, ESTROGEN RECEPTOR POSITIVE: Primary | ICD-10-CM

## 2017-11-13 DIAGNOSIS — C50.411 MALIGNANT NEOPLASM OF UPPER-OUTER QUADRANT OF RIGHT BREAST IN FEMALE, ESTROGEN RECEPTOR POSITIVE: Primary | ICD-10-CM

## 2017-11-13 PROCEDURE — 99203 OFFICE O/P NEW LOW 30 MIN: CPT | Mod: S$GLB,,, | Performed by: RADIOLOGY

## 2017-11-13 PROCEDURE — 99999 PR PBB SHADOW E&M-EST. PATIENT-LVL III: CPT | Mod: PBBFAC,,, | Performed by: RADIOLOGY

## 2017-11-13 NOTE — LETTER
November 13, 2017      Jet Mcclendon MD  [address]  [facility] [address]           [facility]iation [facility]  [address] [place] [address]  Phone: [phone]  Fax: [phone]          Patient: Kylie Urbano   MR Number: [id number]   YOB: 1965   Date of Visit: 11/13/2017       Dear Dr. Jet Mcclendon:    Thank you for referring Kylie Urbano to me for evaluation. Attached you will find relevant portions of my assessment and plan of care.    If you have questions, please do not hesitate to call me. I look forward to following Kylie Urbano along with you.    Sincerely,    Arik Wynn Jr., MD    Enclosure  CC:  No Recipients    If you would like to receive this communication electronically, please contact [email or web address] or [phone] to request more information on [facility] [facility] access.    For providers and/or their staff who would like to refer a patient to [facility], please contact us through our one-stop-shop provider referral line, [facility], at [phone].    If you feel you have received this communication in error or would no longer like to receive these types of communications, please e-mail [email or web address]

## 2017-11-13 NOTE — PROGRESS NOTES
HISTORY OF PRESENT ILLNESS:   This patient presents for discussion of adjuvant therapy for an infiltrating ductal carcinoma of the Rt. breast.      Ms. Urbano was referred for further evaluation after diagnostic MMG on 5/9/17 confirmed a focal asymmetry measuring 15 mm in the posterior region of the Rt. breast at the 10 o'clock position.  The mass was solid on ultrasound.  Core biopsies of the mass and an enlarged Rt. axillary node were performed at [facility] on 5/22/17.  Pathology revealed ductal carcinoma with lymphovascular invasion.  The tumor was strongly (95% ) ER and HI positive, Her - 2 was negative.  Biopsy of the lymph node revealed metastatic mammary carcinoma.  The patient was referred for neoadjuvant chemotherapy.  CT of the chest on 6/7/17 revealed a 1.4 cm axillary node with the biopsy clip.  There was also a mildly prominent subpectoral node measuring 7 mm.  There was no mediastinal or hilar adenopathy.  The was a faint 4 mm Rt. upper lobe  pulmonary nodule of low suspicion and no other evidence of metastatic disease.  The patient completed a course of neoadjuvant chemotherapy with dose dense AC followed by Taxol. She was referred to Dr. Mcclendon on underwent Rt. partial mastectomy with sentinel node biopsy on 10/31/17.  Pathology from the Rt. breast revealed an 8 mm focus of infiltrating ductal carcinoma, histologic grade 3, nuclear grade 3 and mitotic index 2.  There was no associated DCIS.  There was lymphovascular invasion. Tumor was present at the anterior margin.  Two sentinel and one non sentinel nodes were examined, one sentinel node contained a 3 mm focus of metastatic carcinoma, extranodal extension was indeterminate.  The other sentinel node was positive for a micrometastatic deposit measuring 1 mm without extra capsular extension. The non sentinel node was negative for tumor involvement.  The patient is now referred for discussion of adjuvant radiotherapy.  Today, the patient  states she feels well.     REVIEW OF SYSTEMS:   Review of Systems   Constitutional: Negative for chills, diaphoresis, fever and malaise/fatigue.   HENT: Negative for sinus pain and sore throat.    Respiratory: Negative for cough, hemoptysis and shortness of breath.    Cardiovascular: Negative for chest pain and palpitations.   Gastrointestinal: Negative for abdominal pain, nausea and vomiting.       GYN HISTORY:  menarche at age 13   first child at age 21.  Status post robotic assisted hysterectomy with BSO in .  No history of HRT.     PAST MEDICAL HISTORY:  Past Medical History:   Diagnosis Date    Allergic rhinitis, cause unspecified     Cancer     R Breast Cancer    Carpal tunnel syndrome of left wrist     Elevated liver function tests     in the past    Fatty liver 2017    on ultrasound    Fibroid uterus     10/2014    Hepatomegaly 2017    on ultrasound    History of sciatica     HSV infection     Type 1 and 2    HTN (hypertension)     Hypothyroidism     Insulin resistance     Iron deficiency anemia, unspecified     Obesity     Tension headache     Vitamin D deficiency        PAST SURGICAL HISTORY:  Past Surgical History:   Procedure Laterality Date    BTL      DILATION AND CURETTAGE OF UTERUS      x 1    HYSTERECTOMY      Laproscopic robot assissted with BSO       ALLERGIES:   Review of patient's allergies indicates:   Allergen Reactions    Lortab [hydrocodone-acetaminophen] Nausea And Vomiting     Historical.    Amoxicillin Rash     Historical    Sulfa (sulfonamide antibiotics) Rash       MEDICATIONS:  Current Outpatient Prescriptions   Medication Sig    acyclovir (ZOVIRAX) 400 MG tablet TAKE ONE TABLET BY MOUTH THREE TIMES DAILY WITH FOOD FOR 5 DAYS PER  EPISODE (Patient taking differently: Take 400 mg by mouth. TAKE ONE TABLET BY MOUTH THREE TIMES DAILY WITH FOOD FOR 5 DAYS PER  EPISODE)    benazepril (LOTENSIN) 10 MG tablet TAKE ONE TABLET BY MOUTH ONCE DAILY     cholecalciferol, vitamin D3, (VITAMIN D) 2,000 unit Cap Take 1.5 capsules by mouth once daily. 1 Capsule Oral Every day    ferrous gluconate (FERGON) 240 (27 FE) MG tablet Take 240 mg by mouth 3 (three) times daily with meals. 1 Tablet Oral Every day    fluticasone (FLONASE) 50 mcg/actuation nasal spray 2 sprays by Each Nare route daily as needed for Rhinitis.    gabapentin (NEURONTIN) 100 MG capsule Take 1 capsule (100 mg total) by mouth 3 (three) times daily.    ibuprofen (ADVIL,MOTRIN) 200 MG tablet Take 200-400 mg by mouth every 8 (eight) hours as needed for Pain.    levothyroxine (SYNTHROID) 200 MCG tablet Take 250 mcg by mouth once daily. Takes one 200 mcg and one 50 mcg tablet for a total of 250 mcg daily    metformin (GLUCOPHAGE) 500 MG tablet TAKE FOUR TABLETS BY MOUTH IN THE EVENING AS DIRECTED (Patient taking differently: TAKE TWO TABLETS BY MOUTH IN THE EVENING AS DIRECTED)    prochlorperazine (COMPAZINE) 10 MG tablet Take 1 tablet (10 mg total) by mouth every 6 (six) hours as needed.    ondansetron (ZOFRAN-ODT) 8 MG TbDL Take 1 tablet (8 mg total) by mouth every 8 (eight) hours as needed.     No current facility-administered medications for this visit.        SOCIAL HISTORY:  Social History     Social History    Marital status: Single     Spouse name: N/A    Number of children: 3    Years of education: N/A     Occupational History    [employer]     Social History Main Topics    Smoking status: Never Smoker    Smokeless tobacco: Never Used    Alcohol use 0.0 oz/week      Comment: occasionally  No alchohol for 72h prior to surgery    Drug use: No    Sexual activity: Not Currently     Other Topics Concern    Not on file     Social History Narrative    She wears seatbelt.       FAMILY HISTORY:  Family History   Problem Relation Age of Onset    Diabetes Mother     Hypertension Mother     Heart failure Mother     Cancer Father       Stomach cancer    Cancer Sister      Ovarian cancer    No Known Problems Daughter     No Known Problems Son     No Known Problems Daughter     Stroke Neg Hx     Heart disease Neg Hx      MI/CAD         PHYSICAL EXAMINATION:  Vitals:    17 0914   BP: (!) 145/92   Pulse: 95   Resp: 18   Temp: 98 °F (36.7 °C)     Physical Exam   Constitutional: She is well-developed, well-nourished, and in no distress.   HENT:   Head: Normocephalic and atraumatic.   Eyes: EOM are normal. Pupils are equal, round, and reactive to light.   Neck: Normal range of motion. Neck supple.   Pulmonary/Chest:       Lymphadenopathy:     She has no cervical adenopathy.     She has no axillary adenopathy.        Right: No supraclavicular adenopathy present.        Left: No supraclavicular adenopathy present.       ASSESSMENT/PLAN:  clinical stage IIA (T1c, N1, M0) yp stage T1b, N1b ER positive breast cancer    ECO    I had a long discussion with the patient and her family.  Discussed the significance of positive disease following neoadjuvant chemotherapy.   Discussed the options of radiotherapy to the breast and regional  lymphatics.   We discussed whether further surgery was necessary in her case.  Explained that despite the fact her disease is strongly ER and MN positive, given her age and the finding of disease in more than one lymph node, I would favor further surgery with axillary dissection.  Discussed the procedures, risks and benefits of radiotherapy including the long term risk of lymphedema following radiotherapy and axillary dissection.  The patient was agreeable to proceeding with surgery.  Will plan to discuss further with Jaun Mcclendon and Michelle.      I spent approximately 40 minutes reviewing the available records and evaluating the patient, out of which over 50% of the time was spent face to face with the patient in counseling and coordinating this patient's care.

## 2017-11-17 ENCOUNTER — OFFICE VISIT (OUTPATIENT)
Dept: FAMILY MEDICINE | Facility: CLINIC | Age: 52
End: 2017-11-17
Payer: COMMERCIAL

## 2017-11-17 ENCOUNTER — LAB VISIT (OUTPATIENT)
Dept: LAB | Facility: HOSPITAL | Age: 52
End: 2017-11-17
Attending: FAMILY MEDICINE
Payer: COMMERCIAL

## 2017-11-17 VITALS
BODY MASS INDEX: 42.2 KG/M2 | HEART RATE: 80 BPM | HEIGHT: 68 IN | SYSTOLIC BLOOD PRESSURE: 142 MMHG | OXYGEN SATURATION: 98 % | RESPIRATION RATE: 18 BRPM | DIASTOLIC BLOOD PRESSURE: 90 MMHG | TEMPERATURE: 97 F | WEIGHT: 278.44 LBS

## 2017-11-17 DIAGNOSIS — C50.411 MALIGNANT NEOPLASM OF UPPER-OUTER QUADRANT OF RIGHT BREAST IN FEMALE, ESTROGEN RECEPTOR POSITIVE: ICD-10-CM

## 2017-11-17 DIAGNOSIS — I10 ESSENTIAL HYPERTENSION: Chronic | ICD-10-CM

## 2017-11-17 DIAGNOSIS — E03.9 HYPOTHYROIDISM, UNSPECIFIED TYPE: Chronic | ICD-10-CM

## 2017-11-17 DIAGNOSIS — Z17.0 MALIGNANT NEOPLASM OF UPPER-OUTER QUADRANT OF RIGHT BREAST IN FEMALE, ESTROGEN RECEPTOR POSITIVE: ICD-10-CM

## 2017-11-17 DIAGNOSIS — E88.810 INSULIN RESISTANCE SYNDROME: ICD-10-CM

## 2017-11-17 LAB
ESTIMATED AVG GLUCOSE: 108 MG/DL
HBA1C MFR BLD HPLC: 5.4 %
TSH SERPL DL<=0.005 MIU/L-ACNC: 0.61 UIU/ML

## 2017-11-17 PROCEDURE — 36415 COLL VENOUS BLD VENIPUNCTURE: CPT | Mod: PO

## 2017-11-17 PROCEDURE — 99214 OFFICE O/P EST MOD 30 MIN: CPT | Mod: S$GLB,,, | Performed by: FAMILY MEDICINE

## 2017-11-17 PROCEDURE — 99999 PR PBB SHADOW E&M-EST. PATIENT-LVL III: CPT | Mod: PBBFAC,,, | Performed by: FAMILY MEDICINE

## 2017-11-17 PROCEDURE — 84443 ASSAY THYROID STIM HORMONE: CPT

## 2017-11-17 PROCEDURE — 83036 HEMOGLOBIN GLYCOSYLATED A1C: CPT

## 2017-11-17 RX ORDER — BENAZEPRIL HYDROCHLORIDE 20 MG/1
20 TABLET ORAL DAILY
Qty: 90 TABLET | Refills: 1 | Status: SHIPPED | OUTPATIENT
Start: 2017-11-17 | End: 2018-06-25 | Stop reason: SDUPTHER

## 2017-11-17 NOTE — PROGRESS NOTES
Subjective:       Patient ID: Kylie Urbano is a 52 y.o. female.    Chief Complaint: Insulin Resistance; Hypertension; Hypothyroidism; and Follow-up    Ms. Urbano comes in today for 6-month hypertension, insulin resistance syndrome, hypothyroidism follow-up.  She has taken medication today.  She is not fasting.  She does not exercise but monitors her diet.    She reports having headache today but states her blood pressure was up this morning (152/104); she states she took Lotensin 10 mg ×2 today.  She reports having occasional palpitations without chest pain, leg swelling, shortness of breath, cough, wheezing, fever, chills, fatigue, abdominal pain, nausea, vomiting, diarrhea, constipation, unusual urinary symptoms, cold intolerance, back pain, headaches, anxiety, depression, homicidal or suicidal thoughts.    She reports having hot flashes and states she has hand and feet neuropathy due to 8 rounds of chemotherapy for treatment of right breast cancer status post lumpectomy.  She states she has not been taking over-the-counter iron or vitamin D for 2 weeks following surgery.    She saw Dr. Martinez, oncologist, on October 5, 2017 for surveillance and treatment of malignant neoplasm of upper-outer quadrant of right breast in female, estrogen receptor positive and pulmonary nodule.  She states she received chemotherapy, then lumpectomy and is now waiting on radiation therapy but states she may need surgery again.      Current Outpatient Prescriptions:  acyclovir (ZOVIRAX) 400 MG tablet, TAKE ONE TABLET BY MOUTH THREE TIMES DAILY WITH FOOD FOR 5 DAYS PER  EPISODE (Patient taking differently: Take 400 mg by mouth. TAKE ONE TABLET BY MOUTH THREE TIMES DAILY WITH FOOD FOR 5 DAYS PER  EPISODE)  benazepril (LOTENSIN) 10 MG tablet, TAKE ONE TABLET BY MOUTH ONCE DAILY  fluticasone (FLONASE) 50 mcg/actuation nasal spray, 2 sprays by Each Nare route daily as needed for Rhinitis.  ibuprofen (ADVIL,MOTRIN) 200 MG tablet, Take  200-400 mg by mouth every 8 (eight) hours as needed for Pain.  levothyroxine (SYNTHROID) 200 MCG tablet, Take 250 mcg by mouth once daily. Takes one 200 mcg and one 50 mcg tablet for a total of 250 mcg daily  metformin (GLUCOPHAGE) 500 MG tablet, TAKE FOUR TABLETS BY MOUTH IN THE EVENING AS DIRECTED (Patient taking differently: TAKE TWO TABLETS BY MOUTH IN THE EVENING AS DIRECTED)  prochlorperazine (COMPAZINE) 10 MG tablet, Take 1 tablet (10 mg total) by mouth every 6 (six) hours as needed.  cholecalciferol, vitamin D3, (VITAMIN D) 2,000 unit Cap, Take 1.5 capsules by mouth once daily. 1 Capsule Oral Every day  ferrous gluconate (FERGON) 240 (27 FE) MG tablet, Take 240 mg by mouth 3 (three) times daily with meals. 1 Tablet Oral Every day    Labs:                   WBC                      7.13                10/05/2017                 HGB                      11.1 (L)            10/05/2017                 HCT                      33.0 (L)            10/05/2017                 PLT                      212                 10/05/2017                  LDLCALC                  108.4               04/19/2017                         CHOL                     182                 04/19/2017                 TRIG                     78                  04/19/2017                 HDL                      58                  04/19/2017                 ALT                      48 (H)              10/05/2017                 AST                      30                  10/05/2017                 NA                       142                 10/05/2017                 K                        3.7                 10/05/2017                 CL                       109                 10/05/2017                 CREATININE               0.8                 10/05/2017                 BUN                      12                  10/05/2017                 CO2                      27                  10/05/2017                 TSH                       4.340 (H)           04/19/2017                 GLUF                     82                  04/06/2005                 HGBA1C                   5.9                 04/19/2017                  Hypertension   Associated symptoms include headaches and palpitations. Pertinent negatives include no chest pain or shortness of breath.     Review of Systems   Constitutional: Negative for activity change, appetite change, chills, fatigue and fever.        Weight 126.1 kg (278 lb) at April 19, 2017 visit.   Respiratory: Negative for cough, shortness of breath and wheezing.    Cardiovascular: Positive for palpitations. Negative for chest pain and leg swelling.   Gastrointestinal: Negative for abdominal pain, constipation, diarrhea, nausea and vomiting.   Endocrine: Positive for heat intolerance. Negative for cold intolerance.        See history of present illness.   Genitourinary: Negative for difficulty urinating.        See history of present illness.   Musculoskeletal: Negative for back pain.        Occasional muscle spasms.   Neurological: Positive for numbness and headaches.   Hematological:        See history of present illness.   Psychiatric/Behavioral: Negative for dysphoric mood and suicidal ideas. The patient is not nervous/anxious.         Negative for homicidal thoughts.         Objective:      Physical Exam   Constitutional: She is oriented to person, place, and time. She appears well-developed and well-nourished. No distress.   Pleasant.   Eyes: EOM are normal.   Neck: Normal range of motion. Neck supple. No thyromegaly present.   Cardiovascular: Normal rate, regular rhythm, normal heart sounds and intact distal pulses.    No murmur heard.  Pulmonary/Chest: Effort normal and breath sounds normal. No respiratory distress. She has no wheezes.   Abdominal: Soft. Bowel sounds are normal. She exhibits no distension and no mass. There is no tenderness. There is no rebound and no guarding.   Musculoskeletal:  Normal range of motion. She exhibits no edema or tenderness.   She is ambulatory without problems.   Lymphadenopathy:     She has no cervical adenopathy.   Neurological: She is alert and oriented to person, place, and time. She has normal reflexes.   Skin: She is not diaphoretic.   Psychiatric: She has a normal mood and affect. Her behavior is normal. Judgment and thought content normal.   Vitals reviewed.      Assessment:       1. Insulin resistance syndrome    2. Essential hypertension    3. Hypothyroidism, unspecified type    4. Malignant neoplasm of upper-outer quadrant of right breast in female, estrogen receptor positive        Plan:       1.  Labs:  TSH, A1c.  Patient advised to call for results.  2.  Increase Lotensin 10 mg daily to 20 mg daily, #90, 1 refill.  3.  Continue current medications, follow low sodium, low cholesterol, low carb diet, daily walks.  4.  Keep follow up with specialists.  5.  Patient will check with oncologist for time to receive flu shot.  6.  See me in April 2018 for fasting annual wellness examination.

## 2017-11-21 ENCOUNTER — TELEPHONE (OUTPATIENT)
Dept: FAMILY MEDICINE | Facility: CLINIC | Age: 52
End: 2017-11-21

## 2017-11-21 ENCOUNTER — OFFICE VISIT (OUTPATIENT)
Dept: HEMATOLOGY/ONCOLOGY | Facility: CLINIC | Age: 52
End: 2017-11-21
Payer: COMMERCIAL

## 2017-11-21 VITALS
WEIGHT: 282.88 LBS | BODY MASS INDEX: 43.01 KG/M2 | OXYGEN SATURATION: 98 % | TEMPERATURE: 99 F | RESPIRATION RATE: 18 BRPM | SYSTOLIC BLOOD PRESSURE: 140 MMHG | HEART RATE: 86 BPM | DIASTOLIC BLOOD PRESSURE: 84 MMHG

## 2017-11-21 DIAGNOSIS — G62.9 NEUROPATHY: ICD-10-CM

## 2017-11-21 DIAGNOSIS — C50.411 MALIGNANT NEOPLASM OF UPPER-OUTER QUADRANT OF RIGHT BREAST IN FEMALE, ESTROGEN RECEPTOR POSITIVE: Primary | ICD-10-CM

## 2017-11-21 DIAGNOSIS — Z17.0 MALIGNANT NEOPLASM OF UPPER-OUTER QUADRANT OF RIGHT BREAST IN FEMALE, ESTROGEN RECEPTOR POSITIVE: Primary | ICD-10-CM

## 2017-11-21 PROCEDURE — 99999 PR PBB SHADOW E&M-EST. PATIENT-LVL III: CPT | Mod: PBBFAC,,, | Performed by: INTERNAL MEDICINE

## 2017-11-21 PROCEDURE — 99215 OFFICE O/P EST HI 40 MIN: CPT | Mod: S$GLB,,, | Performed by: INTERNAL MEDICINE

## 2017-11-21 NOTE — PROGRESS NOTES
Hematology/Oncology Office Note    Reason for referral:  Locally advanced breast cancer with biopsy proven axillary lymph node involvement    CC:    Referred by:  No ref. provider found    Diagnosis:  Right sided locally advanced infiltrating ductal carcinoma with biopsy-proven axillary lymph node involvement (ER positive at 95%, LA positive at 95%, HER-2 1+ by IHC and negative by fish    Treatment:    History of present illness:  Ms. Urbano is a 51-year-old female with a past medical history of DM hypertension, hypothyroidism, iron deficiency anemia, who was noted to have abnormal screening mammogram  on 4/28/2017.  Core biopsy of right breast mass at 10:00 6 cm from the nipple demonstrated infiltrating ductal carcinoma and right axillary lymph node core biopsy also demonstrated infiltrating ductal carcinoma.  ER/LA positive, HER-2 negative markers.  The patient has a first cousin with a history of breast cancer and one aunt with a history of breast cancer.  She has some soreness at previous biopsy site, however, denies other significant symptoms.    I have reviewed and updated the HPI, ROS, PMHx, Social Hx, Family Hx and treatment history.    Today's visit:  Patient has completed neoadjuvant dose dense CA/Taxol.  Patient is status post lumpectomy with sentinel lymph node biopsy on 10/31/2017.  Patient is recovering well from surgery and has no specific complaints today.    Past Medical History:   Diagnosis Date    Allergic rhinitis, cause unspecified     Cancer     R Breast Cancer    Carpal tunnel syndrome of left wrist     Elevated liver function tests     in the past    Fatty liver 05/02/2017    on ultrasound    Fibroid uterus     10/2014    Hepatomegaly 05/02/2017    on ultrasound    History of sciatica     HSV infection     Type 1 and 2    HTN (hypertension)     Hypothyroidism     Insulin resistance     Iron deficiency anemia, unspecified     Obesity     Tension headache     Vitamin D deficiency           Social History:  No tobacco, alcohol, or illicit drugs      Family History: family history includes Cancer in her father and sister; Diabetes in her mother; Heart failure in her mother; Hypertension in her mother; No Known Problems in her daughter, daughter, and son.        HPI    Review of Systems   Constitutional: Positive for fatigue. Negative for activity change, appetite change, chills, diaphoresis, fever and unexpected weight change.   HENT: Negative for congestion, dental problem, drooling, ear discharge, ear pain, facial swelling, hearing loss, mouth sores, nosebleeds, postnasal drip, rhinorrhea, sneezing and sore throat.    Eyes: Negative.    Respiratory: Negative for apnea, shortness of breath, wheezing and stridor.    Cardiovascular: Negative for leg swelling.   Gastrointestinal: Negative for abdominal distention, blood in stool, constipation, diarrhea, nausea and rectal pain.   Endocrine: Negative.    Genitourinary: Negative for decreased urine volume, difficulty urinating, dysuria, enuresis, frequency, hematuria, menstrual problem, pelvic pain and urgency.   Musculoskeletal: Negative for arthralgias, joint swelling, myalgias, neck pain and neck stiffness.   Skin: Negative for color change and wound.   Allergic/Immunologic: Negative.    Neurological: Positive for numbness (Grade 1/2 neuropathy). Negative for dizziness, seizures, syncope, facial asymmetry, speech difficulty, light-headedness and headaches.   Hematological: Negative for adenopathy. Does not bruise/bleed easily.   Psychiatric/Behavioral: Negative for agitation, behavioral problems, decreased concentration, dysphoric mood and suicidal ideas.       Objective:       Vitals:    11/21/17 1358   BP: (!) 140/84   Pulse: 86   Resp: 18   Temp: 98.5 °F (36.9 °C)   TempSrc: Oral   SpO2: 98%   Weight: 128.3 kg (282 lb 13.6 oz)       Physical Exam   Constitutional: She is oriented to person, place, and time. She appears well-developed and  well-nourished. No distress.   Well groomed   HENT:   Head: Normocephalic and atraumatic. Not macrocephalic.   Right Ear: Tympanic membrane and ear canal normal.   Left Ear: Tympanic membrane and ear canal normal.   Nose: Nose normal. Right sinus exhibits no maxillary sinus tenderness and no frontal sinus tenderness. Left sinus exhibits no maxillary sinus tenderness and no frontal sinus tenderness.   Mouth/Throat: Uvula is midline, oropharynx is clear and moist and mucous membranes are normal.   Eyes: Conjunctivae, EOM and lids are normal. Pupils are equal, round, and reactive to light. Lids are everted and swept, no foreign bodies found. Right eye exhibits no discharge. Left eye exhibits no discharge.   Neck: Trachea normal and normal range of motion. Neck supple. No JVD present. No tracheal deviation present. No thyroid mass and no thyromegaly present.   No crepitus   Cardiovascular: Normal rate, regular rhythm, normal heart sounds, intact distal pulses and normal pulses.  Exam reveals no gallop and no friction rub.    No murmur heard.  Pulmonary/Chest: Effort normal and breath sounds normal. No accessory muscle usage or stridor. No respiratory distress. She has no decreased breath sounds. She has no wheezes. She has no rhonchi. She has no rales. She exhibits no tenderness.   Right breast: Ill-defined induration without discrete mass    Abdominal: Soft. Normal appearance and bowel sounds are normal. She exhibits no distension and no mass. There is no hepatosplenomegaly. There is no tenderness. There is no rebound and no guarding.   Musculoskeletal: Normal range of motion. She exhibits no edema, tenderness or deformity.   Lymphadenopathy:        Head (right side): No submental and no submandibular adenopathy present.        Head (left side): No submental and no submandibular adenopathy present.     She has no cervical adenopathy.        Right cervical: No superficial cervical and no deep cervical adenopathy  present.       Left cervical: No superficial cervical and no deep cervical adenopathy present.     She has no axillary adenopathy.        Right: No supraclavicular adenopathy present.        Left: No supraclavicular adenopathy present.   Neurological: She is alert and oriented to person, place, and time. She displays normal reflexes. No cranial nerve deficit. She exhibits normal muscle tone. Coordination normal.   Skin: Skin is warm, dry and intact. Capillary refill takes less than 2 seconds. No abrasion, no bruising and no rash noted. She is not diaphoretic. No cyanosis. No pallor. Nails show no clubbing.   Psychiatric: She has a normal mood and affect. Her behavior is normal. Judgment and thought content normal.       Lab Results   Component Value Date    WBC 7.13 10/05/2017    HGB 11.1 (L) 10/05/2017    HCT 33.0 (L) 10/05/2017    MCV 90 10/05/2017     10/05/2017     Lab Results   Component Value Date    CREATININE 0.8 10/05/2017    BUN 12 10/05/2017     10/05/2017    K 3.7 10/05/2017     10/05/2017    CO2 27 10/05/2017     Lab Results   Component Value Date    ALT 48 (H) 10/05/2017    AST 30 10/05/2017    ALKPHOS 110 10/05/2017    BILITOT 0.3 10/05/2017         Assessment:         52-year-old female with a new diagnosis of right-sided infiltrating ductal carcinoma ER/VT positive, HER-2 negative with biopsy-proven right axillary lymph node involvement.  Patient was consented for BRCA testing for participation and B-55 study with negative BRCA testing.    She received cycle 1 of dense dose Adriamycin/Cytoxan on 6/12/2017 and tolerated treatment exceptionally well.  She completed cycle 4 of dose dense Taxol on 9/19/2017.  She underwent lumpectomy with sentinel lymph node biopsy on 10/31/2017.   Final pathology demonstrated [id number].  The primary measured 0.8 cm and 2 sentinel lymph nodes were positive with 3 mm macrometastasis within first sentinel lymph node and 1 mm micrometastasis within the  second sentinel lymph node.  Current guidelines recommend proceeding with full axillary lymph node dissection if nodes are positive following neoadjuvant chemotherapy.  There are ongoing studies to evaluate adjuvant radiation of axillary nodes in this setting, however, the studies are not mature at this point.  The patient would like to discuss proceeding with axillary lymph node dissection with Dr. Cobian    ER/PA positive, HER-2 negative infiltrating ductal carcinoma the right breast with biopsy proven lymph node involvement clinically stage II/III.  Pathology following neoadjuvant therapy demonstrated 1 sentinel lymph node with micrometastasis (3 mm) and second lymph node with micrometastasis (1 mm)  --BRCA testing via B-55 Study is negative for mutation  --ddAC--> taxol   cycle 1--- 6/12/2017  --Final cycle Cycle 4 on 9/19/2017  --Patient would like to discuss full axillary lymph node dissection with Dr. Rivas    Hypertension:  Continue Lotensin/HCTZ  --Blood pressure poorly controlled this morning we'll continue to monitor and adjust medications as needed    Hypothyroidism:  Continue Synthroid    4 mm right upper lobe pulmonary nodule and subpleural nodularity bilateral bases:  --Repeat CT scan demonstrates stable findings  --Continue to monitor

## 2017-11-21 NOTE — TELEPHONE ENCOUNTER
----- Message from Abigail Cintron sent at 11/21/2017  1:18 PM CST -----  Contact: self [phone]  Would like to consult with nurse regarding lab results.  Please call back at [phone].  Md Marti

## 2017-11-22 ENCOUNTER — OFFICE VISIT (OUTPATIENT)
Dept: SURGERY | Facility: CLINIC | Age: 52
End: 2017-11-22
Payer: COMMERCIAL

## 2017-11-22 ENCOUNTER — LAB VISIT (OUTPATIENT)
Dept: LAB | Facility: HOSPITAL | Age: 52
End: 2017-11-22
Attending: SURGERY
Payer: COMMERCIAL

## 2017-11-22 VITALS
DIASTOLIC BLOOD PRESSURE: 83 MMHG | RESPIRATION RATE: 18 BRPM | HEIGHT: 68 IN | WEIGHT: 282.44 LBS | HEART RATE: 78 BPM | TEMPERATURE: 99 F | SYSTOLIC BLOOD PRESSURE: 150 MMHG | BODY MASS INDEX: 42.8 KG/M2

## 2017-11-22 DIAGNOSIS — C50.411 MALIGNANT NEOPLASM OF UPPER-OUTER QUADRANT OF RIGHT BREAST IN FEMALE, ESTROGEN RECEPTOR POSITIVE: ICD-10-CM

## 2017-11-22 DIAGNOSIS — C50.411 MALIGNANT NEOPLASM OF UPPER-OUTER QUADRANT OF RIGHT BREAST IN FEMALE, ESTROGEN RECEPTOR POSITIVE: Primary | ICD-10-CM

## 2017-11-22 DIAGNOSIS — Z17.0 MALIGNANT NEOPLASM OF UPPER-OUTER QUADRANT OF RIGHT BREAST IN FEMALE, ESTROGEN RECEPTOR POSITIVE: ICD-10-CM

## 2017-11-22 DIAGNOSIS — Z17.0 MALIGNANT NEOPLASM OF UPPER-OUTER QUADRANT OF RIGHT BREAST IN FEMALE, ESTROGEN RECEPTOR POSITIVE: Primary | ICD-10-CM

## 2017-11-22 LAB
BASOPHILS # BLD AUTO: 0.01 K/UL
BASOPHILS NFR BLD: 0.2 %
DIFFERENTIAL METHOD: NORMAL
EOSINOPHIL # BLD AUTO: 0.2 K/UL
EOSINOPHIL NFR BLD: 4.1 %
ERYTHROCYTE [DISTWIDTH] IN BLOOD BY AUTOMATED COUNT: 12.8 %
HCT VFR BLD AUTO: 40.1 %
HGB BLD-MCNC: 13.5 G/DL
LYMPHOCYTES # BLD AUTO: 2.3 K/UL
LYMPHOCYTES NFR BLD: 38.5 %
MCH RBC QN AUTO: 29.2 PG
MCHC RBC AUTO-ENTMCNC: 33.7 G/DL
MCV RBC AUTO: 87 FL
MONOCYTES # BLD AUTO: 0.4 K/UL
MONOCYTES NFR BLD: 6.8 %
NEUTROPHILS # BLD AUTO: 3 K/UL
NEUTROPHILS NFR BLD: 50.4 %
PLATELET # BLD AUTO: 243 K/UL
PMV BLD AUTO: 9.9 FL
RBC # BLD AUTO: 4.63 M/UL
WBC # BLD AUTO: 5.92 K/UL

## 2017-11-22 PROCEDURE — 80053 COMPREHEN METABOLIC PANEL: CPT | Mod: PO

## 2017-11-22 PROCEDURE — 99999 PR PBB SHADOW E&M-EST. PATIENT-LVL IV: CPT | Mod: PBBFAC,,, | Performed by: SURGERY

## 2017-11-22 PROCEDURE — 85025 COMPLETE CBC W/AUTO DIFF WBC: CPT | Mod: PO

## 2017-11-22 PROCEDURE — 36415 COLL VENOUS BLD VENIPUNCTURE: CPT | Mod: PO

## 2017-11-22 PROCEDURE — 99024 POSTOP FOLLOW-UP VISIT: CPT | Mod: S$GLB,,, | Performed by: SURGERY

## 2017-11-22 RX ORDER — ONDANSETRON 4 MG/1
8 TABLET, ORALLY DISINTEGRATING ORAL EVERY 8 HOURS PRN
Status: CANCELLED | OUTPATIENT
Start: 2017-11-22

## 2017-11-22 RX ORDER — SODIUM CHLORIDE 9 MG/ML
INJECTION, SOLUTION INTRAVENOUS CONTINUOUS
Status: CANCELLED | OUTPATIENT
Start: 2017-11-22

## 2017-11-22 NOTE — PROGRESS NOTES
Patient returns now in follow-up.  She is now 22 days status post right partial mastectomy and sentinel node biopsy for right breast cancer.  The patient had neoadjuvant chemotherapy before this.  After the neoadjuvant chemotherapy the palpable upper outer quadrant right breast cancer and palpable axillary lymph node were no longer palpable.  At surgery the lymph nodes were normal on frozen section.  Initially at the time of lumpectomy the anterior medial margin appeared to be close and so a new margin was obtained.  C #5 on the pathology report this new margin was normal.  However on final pathology there was one node with a macro metastases and one node with a Micromet and one normal node.  The patient saw Dr. Wynn for evaluation for radiation of the breast and possible axillary node radiation.  Dr. Wynn recommended completion axillary node dissection.  Currently the patient feels well with some minimal soreness in the right partial mastectomy site.  She denies any limitations in arm function.  She denies any arm swelling.    Objective blood pressure 150/83 temperature and pulse are normal.  She is alert and oriented ×3.  Neck is soft without any adenopathy.  Heart reveals normal rate and she has normal respirations.  The right breast incision is clean and dry healing well without any palpable seroma.  Right axillary incision is clean and dry healing well with some minimal firmness just deep to the incision.  There is no arm edema and she has good range of motion of the right upper extremity    Impression: Patient is doing well status post right partial mastectomy and sentinel node biopsy.  The final margins at the lumpectomy site( see #5 on pathology report) are clear.  However she does have a macro metastases and a Micromet in her two sentinel nodes.  I told the patient this time standard of care is to do a completion axillary node dissection.  Did  her as to the surgery the pre-and  postoperative course and risks including arm numbness or weakness on an arm swelling.  I also counseled her that she'll have a drain in place for around 5-8 days on average.  She can use her arm but no heavy lifting or straining during that time.  Also counseled her as to the risk of bleeding and infection.  Patient agrees with the surgery.  She is to hold her Advil for 5 days before and take her blood pressure pill on the morning of surgery

## 2017-11-24 RX ORDER — LEVOTHYROXINE SODIUM 50 UG/1
TABLET ORAL
Qty: 90 TABLET | Refills: 1 | Status: SHIPPED | OUTPATIENT
Start: 2017-11-24 | End: 2018-06-25 | Stop reason: SDUPTHER

## 2017-11-24 RX ORDER — LEVOTHYROXINE SODIUM 200 UG/1
TABLET ORAL
Qty: 90 TABLET | Refills: 1 | Status: SHIPPED | OUTPATIENT
Start: 2017-11-24 | End: 2018-06-25 | Stop reason: SDUPTHER

## 2017-11-27 LAB
ALBUMIN SERPL BCP-MCNC: 3.9 G/DL
ALP SERPL-CCNC: 97 U/L
ALT SERPL W/O P-5'-P-CCNC: 45 U/L
ANION GAP SERPL CALC-SCNC: 9 MMOL/L
AST SERPL-CCNC: 33 U/L
BILIRUB SERPL-MCNC: 0.2 MG/DL
BUN SERPL-MCNC: 9 MG/DL
CALCIUM SERPL-MCNC: 10.2 MG/DL
CHLORIDE SERPL-SCNC: 105 MMOL/L
CO2 SERPL-SCNC: 29 MMOL/L
CREAT SERPL-MCNC: 0.7 MG/DL
EST. GFR  (AFRICAN AMERICAN): >60 ML/MIN/1.73 M^2
EST. GFR  (NON AFRICAN AMERICAN): >60 ML/MIN/1.73 M^2
GLUCOSE SERPL-MCNC: 84 MG/DL
POTASSIUM SERPL-SCNC: 3.9 MMOL/L
PROT SERPL-MCNC: 8 G/DL
SODIUM SERPL-SCNC: 143 MMOL/L

## 2017-11-28 ENCOUNTER — TELEPHONE (OUTPATIENT)
Dept: SURGERY | Facility: CLINIC | Age: 52
End: 2017-11-28

## 2017-11-28 NOTE — TELEPHONE ENCOUNTER
----- Message from Nai Amaya sent at 11/28/2017  9:44 AM CST -----  Contact: Pt   Pt called and requested a release form to go back to work faxed to [phone] callback number to verify..[phone] (home)   Need to have yesterdays date and any restrictions.

## 2017-12-06 ENCOUNTER — ANESTHESIA EVENT (OUTPATIENT)
Dept: SURGERY | Facility: HOSPITAL | Age: 52
End: 2017-12-06
Payer: COMMERCIAL

## 2017-12-06 NOTE — PRE-PROCEDURE INSTRUCTIONS
Pre op instructions reviewed with patient per phone:    To confirm, Your surgeon has instructed you:  Surgery is scheduled 12/7/17 at 1200.      Please report to [facility] 1st floor main lobby by 1030 Pre admit office will call this afternoon only if arrival time for surgery changes.      INSTRUCTIONS IMPORTANT!!!  ¨ No smoking after 12 midnight, the night before surgery.  ¨ No solid food after 12 midnight, but you may have clear liquids up until 3 hours prior to surgery.  This includes: grape, cranberry, and apple juice (not orange, and no coffee.)   ¨ OK to brush teeth, but no gum, candy or mints!    ¨ Take only these medicines with a small swallow of water-morning of surgery.  Benazepril, Synthroid    ____  Do not wear makeup, including mascara.  ____  No powder, lotions or creams to surgical area.  ____  Please remove all jewelry, including piercings and leave at home.  ____  No money or valuables needed. Please leave at home.  ____  Please bring identification and insurance information to hospital.  ____  If going home the same day, arrange for a ride home. You will not be able to   drive if Anesthesia was used.  ____  Children, under 12 years old, must remain in the waiting room with an adult.  They are not allowed in patient areas.  ____  Wear loose fitting clothing. Allow for dressings, bandages.  ____  Stop Aspirin, Ibuprofen, Motrin and Aleve at least 5-7 days before surgery, unless otherwise instructed by your doctor, or the nurse.   You MAY use Tylenol/acetaminophen until day of surgery.  ____  If you take diabetic medication, do not take am of surgery unless instructed by   Doctor.  ____ Stop taking any Fish Oil supplement or any Vitamins that contain Vitamin E at least 5 days prior to surgery.          Bathing Instructions-- The night before surgery and the morning prior to coming to the hospital:   -Do not shave the surgical area.   -Shower and wash your hair and body as  usual with your regular soap and shampoo.   -Rinse your hair and body completely.   -Use one packet of hibiclens to wash the surgical site (using your hand) gently for 5 minutes.  Do not scrub you skin too hard.   -Do not use hibiclens on your head, face, or genitals.   -Do not wash with regular soap after you use the hibiclens.   -Rinse your body thoroughly.   -Dry with clean, soft towel.  Do not use lotion, cream, deodorant, or powders on   the surgical site.    Use antibacterial soap in place of hibiclens if your surgery is on the head, face or genitals.         Surgical Site Infection    Prevention of surgical site infections:     -Keep incisions clean and dry.   -Do not soak/submerge incisions in water until completely healed.   -Do not apply lotions, powders, creams, or deodorants to site.   -Always make sure hands are cleaned with antibacterial soap/ alcohol-based   prior to touching the surgical site.  (This includes doctors, nurses, staff, and yourself.)    Signs and symptoms:   -Redness and pain around the area where you had surgery   -Drainage of cloudy fluid from your surgical wound   -Fever over 100.4  I have read or had read and explained to me, and understand the above information.

## 2017-12-07 ENCOUNTER — ANESTHESIA (OUTPATIENT)
Dept: SURGERY | Facility: HOSPITAL | Age: 52
End: 2017-12-07
Payer: COMMERCIAL

## 2017-12-07 ENCOUNTER — HOSPITAL ENCOUNTER (OUTPATIENT)
Facility: HOSPITAL | Age: 52
Discharge: HOME | End: 2017-12-08
Attending: SURGERY | Admitting: SURGERY
Payer: COMMERCIAL

## 2017-12-07 DIAGNOSIS — Z17.0 MALIGNANT NEOPLASM OF UPPER-OUTER QUADRANT OF RIGHT BREAST IN FEMALE, ESTROGEN RECEPTOR POSITIVE: ICD-10-CM

## 2017-12-07 DIAGNOSIS — C50.411 MALIGNANT NEOPLASM OF UPPER-OUTER QUADRANT OF RIGHT BREAST IN FEMALE, ESTROGEN RECEPTOR POSITIVE: ICD-10-CM

## 2017-12-07 LAB — POCT GLUCOSE: 107 MG/DL (ref 70–110)

## 2017-12-07 PROCEDURE — 63600175 PHARM REV CODE 636 W HCPCS: Performed by: NURSE ANESTHETIST, CERTIFIED REGISTERED

## 2017-12-07 PROCEDURE — G0378 HOSPITAL OBSERVATION PER HR: HCPCS

## 2017-12-07 PROCEDURE — 25000003 PHARM REV CODE 250: Performed by: SURGERY

## 2017-12-07 PROCEDURE — 38745 REMOVE ARMPIT LYMPH NODES: CPT | Mod: 58,RT,, | Performed by: SURGERY

## 2017-12-07 PROCEDURE — 88307 TISSUE EXAM BY PATHOLOGIST: CPT | Mod: 26,,, | Performed by: PATHOLOGY

## 2017-12-07 PROCEDURE — 37000009 HC ANESTHESIA EA ADD 15 MINS: Performed by: SURGERY

## 2017-12-07 PROCEDURE — 25000003 PHARM REV CODE 250: Performed by: ANESTHESIOLOGY

## 2017-12-07 PROCEDURE — 63600175 PHARM REV CODE 636 W HCPCS: Performed by: SURGERY

## 2017-12-07 PROCEDURE — 63600175 PHARM REV CODE 636 W HCPCS: Performed by: ANESTHESIOLOGY

## 2017-12-07 PROCEDURE — 37000008 HC ANESTHESIA 1ST 15 MINUTES: Performed by: SURGERY

## 2017-12-07 PROCEDURE — 71000039 HC RECOVERY, EACH ADD'L HOUR: Performed by: SURGERY

## 2017-12-07 PROCEDURE — 88307 TISSUE EXAM BY PATHOLOGIST: CPT | Performed by: PATHOLOGY

## 2017-12-07 PROCEDURE — 94799 UNLISTED PULMONARY SVC/PX: CPT

## 2017-12-07 PROCEDURE — 27201423 OPTIME MED/SURG SUP & DEVICES STERILE SUPPLY: Performed by: SURGERY

## 2017-12-07 PROCEDURE — 71000033 HC RECOVERY, INTIAL HOUR: Performed by: SURGERY

## 2017-12-07 PROCEDURE — 36000706: Performed by: SURGERY

## 2017-12-07 PROCEDURE — 38745 REMOVE ARMPIT LYMPH NODES: CPT | Mod: AS,58,RT, | Performed by: PHYSICIAN ASSISTANT

## 2017-12-07 PROCEDURE — C1729 CATH, DRAINAGE: HCPCS | Performed by: SURGERY

## 2017-12-07 PROCEDURE — 36000707: Performed by: SURGERY

## 2017-12-07 PROCEDURE — 25000003 PHARM REV CODE 250: Performed by: NURSE ANESTHETIST, CERTIFIED REGISTERED

## 2017-12-07 RX ORDER — HYDRALAZINE HYDROCHLORIDE 20 MG/ML
10 INJECTION INTRAMUSCULAR; INTRAVENOUS ONCE
Status: COMPLETED | OUTPATIENT
Start: 2017-12-07 | End: 2017-12-07

## 2017-12-07 RX ORDER — HYDROMORPHONE HYDROCHLORIDE 1 MG/ML
1 INJECTION, SOLUTION INTRAMUSCULAR; INTRAVENOUS; SUBCUTANEOUS EVERY 4 HOURS PRN
Status: DISCONTINUED | OUTPATIENT
Start: 2017-12-07 | End: 2017-12-08 | Stop reason: HOSPADM

## 2017-12-07 RX ORDER — MIDAZOLAM HYDROCHLORIDE 1 MG/ML
INJECTION, SOLUTION INTRAMUSCULAR; INTRAVENOUS
Status: DISCONTINUED | OUTPATIENT
Start: 2017-12-07 | End: 2017-12-07

## 2017-12-07 RX ORDER — SODIUM CHLORIDE, SODIUM LACTATE, POTASSIUM CHLORIDE, CALCIUM CHLORIDE 600; 310; 30; 20 MG/100ML; MG/100ML; MG/100ML; MG/100ML
INJECTION, SOLUTION INTRAVENOUS CONTINUOUS
Status: DISCONTINUED | OUTPATIENT
Start: 2017-12-07 | End: 2017-12-07

## 2017-12-07 RX ORDER — LIDOCAINE HYDROCHLORIDE 10 MG/ML
INJECTION INFILTRATION; PERINEURAL
Status: DISCONTINUED | OUTPATIENT
Start: 2017-12-07 | End: 2017-12-07

## 2017-12-07 RX ORDER — KETOROLAC TROMETHAMINE 30 MG/ML
INJECTION, SOLUTION INTRAMUSCULAR; INTRAVENOUS
Status: DISCONTINUED | OUTPATIENT
Start: 2017-12-07 | End: 2017-12-07

## 2017-12-07 RX ORDER — SUCCINYLCHOLINE CHLORIDE 20 MG/ML
INJECTION INTRAMUSCULAR; INTRAVENOUS
Status: DISCONTINUED | OUTPATIENT
Start: 2017-12-07 | End: 2017-12-07

## 2017-12-07 RX ORDER — LEVOTHYROXINE SODIUM 100 UG/1
200 TABLET ORAL
Status: DISCONTINUED | OUTPATIENT
Start: 2017-12-08 | End: 2017-12-08 | Stop reason: HOSPADM

## 2017-12-07 RX ORDER — PROPOFOL 10 MG/ML
VIAL (ML) INTRAVENOUS
Status: DISCONTINUED | OUTPATIENT
Start: 2017-12-07 | End: 2017-12-07

## 2017-12-07 RX ORDER — MEPERIDINE HYDROCHLORIDE 50 MG/ML
12.5 INJECTION INTRAMUSCULAR; INTRAVENOUS; SUBCUTANEOUS ONCE AS NEEDED
Status: DISCONTINUED | OUTPATIENT
Start: 2017-12-07 | End: 2017-12-07 | Stop reason: HOSPADM

## 2017-12-07 RX ORDER — SODIUM CHLORIDE 0.9 % (FLUSH) 0.9 %
3 SYRINGE (ML) INJECTION
Status: DISCONTINUED | OUTPATIENT
Start: 2017-12-07 | End: 2017-12-07 | Stop reason: HOSPADM

## 2017-12-07 RX ORDER — FENTANYL CITRATE 50 UG/ML
INJECTION, SOLUTION INTRAMUSCULAR; INTRAVENOUS
Status: DISCONTINUED | OUTPATIENT
Start: 2017-12-07 | End: 2017-12-07

## 2017-12-07 RX ORDER — ROCURONIUM BROMIDE 10 MG/ML
INJECTION, SOLUTION INTRAVENOUS
Status: DISCONTINUED | OUTPATIENT
Start: 2017-12-07 | End: 2017-12-07

## 2017-12-07 RX ORDER — CLONIDINE HYDROCHLORIDE 0.2 MG/1
0.2 TABLET ORAL EVERY 6 HOURS PRN
Status: DISCONTINUED | OUTPATIENT
Start: 2017-12-07 | End: 2017-12-08 | Stop reason: HOSPADM

## 2017-12-07 RX ORDER — HYDROMORPHONE HYDROCHLORIDE 1 MG/ML
0.2 INJECTION, SOLUTION INTRAMUSCULAR; INTRAVENOUS; SUBCUTANEOUS EVERY 5 MIN PRN
Status: DISCONTINUED | OUTPATIENT
Start: 2017-12-07 | End: 2017-12-07 | Stop reason: HOSPADM

## 2017-12-07 RX ORDER — ONDANSETRON 8 MG/1
8 TABLET, ORALLY DISINTEGRATING ORAL EVERY 8 HOURS PRN
Status: DISCONTINUED | OUTPATIENT
Start: 2017-12-07 | End: 2017-12-07 | Stop reason: HOSPADM

## 2017-12-07 RX ORDER — ONDANSETRON 2 MG/ML
4 INJECTION INTRAMUSCULAR; INTRAVENOUS ONCE AS NEEDED
Status: COMPLETED | OUTPATIENT
Start: 2017-12-07 | End: 2017-12-07

## 2017-12-07 RX ORDER — PHENYLEPHRINE HYDROCHLORIDE 10 MG/ML
INJECTION INTRAVENOUS
Status: DISCONTINUED | OUTPATIENT
Start: 2017-12-07 | End: 2017-12-07

## 2017-12-07 RX ORDER — LABETALOL HYDROCHLORIDE 5 MG/ML
5 INJECTION, SOLUTION INTRAVENOUS EVERY 5 MIN PRN
Status: COMPLETED | OUTPATIENT
Start: 2017-12-07 | End: 2017-12-07

## 2017-12-07 RX ORDER — SODIUM CHLORIDE, SODIUM LACTATE, POTASSIUM CHLORIDE, CALCIUM CHLORIDE 600; 310; 30; 20 MG/100ML; MG/100ML; MG/100ML; MG/100ML
1000 INJECTION, SOLUTION INTRAVENOUS CONTINUOUS
Status: ACTIVE | OUTPATIENT
Start: 2017-12-07 | End: 2017-12-08

## 2017-12-07 RX ORDER — LEVOTHYROXINE SODIUM 50 UG/1
50 TABLET ORAL
Status: DISCONTINUED | OUTPATIENT
Start: 2017-12-08 | End: 2017-12-08 | Stop reason: HOSPADM

## 2017-12-07 RX ORDER — SCOLOPAMINE TRANSDERMAL SYSTEM 1 MG/1
1 PATCH, EXTENDED RELEASE TRANSDERMAL ONCE
Status: COMPLETED | OUTPATIENT
Start: 2017-12-07 | End: 2017-12-07

## 2017-12-07 RX ORDER — ONDANSETRON 2 MG/ML
INJECTION INTRAMUSCULAR; INTRAVENOUS
Status: DISCONTINUED | OUTPATIENT
Start: 2017-12-07 | End: 2017-12-07

## 2017-12-07 RX ORDER — FENTANYL CITRATE 50 UG/ML
25 INJECTION, SOLUTION INTRAMUSCULAR; INTRAVENOUS EVERY 5 MIN PRN
Status: COMPLETED | OUTPATIENT
Start: 2017-12-07 | End: 2017-12-07

## 2017-12-07 RX ORDER — HYDRALAZINE HYDROCHLORIDE 20 MG/ML
10 INJECTION INTRAMUSCULAR; INTRAVENOUS EVERY 6 HOURS PRN
Status: DISCONTINUED | OUTPATIENT
Start: 2017-12-07 | End: 2017-12-08 | Stop reason: HOSPADM

## 2017-12-07 RX ORDER — BUPIVACAINE HYDROCHLORIDE 2.5 MG/ML
INJECTION, SOLUTION EPIDURAL; INFILTRATION; INTRACAUDAL
Status: DISCONTINUED | OUTPATIENT
Start: 2017-12-07 | End: 2017-12-07 | Stop reason: HOSPADM

## 2017-12-07 RX ORDER — OXYCODONE AND ACETAMINOPHEN 5; 325 MG/1; MG/1
1 TABLET ORAL EVERY 4 HOURS PRN
Status: DISCONTINUED | OUTPATIENT
Start: 2017-12-07 | End: 2017-12-08 | Stop reason: HOSPADM

## 2017-12-07 RX ORDER — KETOROLAC TROMETHAMINE 30 MG/ML
30 INJECTION, SOLUTION INTRAMUSCULAR; INTRAVENOUS EVERY 6 HOURS
Status: COMPLETED | OUTPATIENT
Start: 2017-12-07 | End: 2017-12-08

## 2017-12-07 RX ORDER — CHLORHEXIDINE GLUCONATE ORAL RINSE 1.2 MG/ML
10 SOLUTION DENTAL 2 TIMES DAILY
Status: DISCONTINUED | OUTPATIENT
Start: 2017-12-07 | End: 2017-12-08 | Stop reason: HOSPADM

## 2017-12-07 RX ORDER — SODIUM CHLORIDE 9 MG/ML
INJECTION, SOLUTION INTRAVENOUS CONTINUOUS
Status: DISCONTINUED | OUTPATIENT
Start: 2017-12-07 | End: 2017-12-07

## 2017-12-07 RX ORDER — CEFAZOLIN SODIUM 1 G/3ML
3 INJECTION, POWDER, FOR SOLUTION INTRAMUSCULAR; INTRAVENOUS ONCE
Status: COMPLETED | OUTPATIENT
Start: 2017-12-07 | End: 2017-12-07

## 2017-12-07 RX ORDER — PROCHLORPERAZINE MALEATE 5 MG
10 TABLET ORAL 3 TIMES DAILY PRN
Status: DISCONTINUED | OUTPATIENT
Start: 2017-12-07 | End: 2017-12-08 | Stop reason: HOSPADM

## 2017-12-07 RX ORDER — BENAZEPRIL HYDROCHLORIDE 20 MG/1
20 TABLET ORAL DAILY
Status: DISCONTINUED | OUTPATIENT
Start: 2017-12-08 | End: 2017-12-08 | Stop reason: HOSPADM

## 2017-12-07 RX ORDER — FLUTICASONE PROPIONATE 50 MCG
2 SPRAY, SUSPENSION (ML) NASAL DAILY PRN
Status: DISCONTINUED | OUTPATIENT
Start: 2017-12-07 | End: 2017-12-08 | Stop reason: HOSPADM

## 2017-12-07 RX ADMIN — FENTANYL CITRATE 25 MCG: 50 INJECTION INTRAMUSCULAR; INTRAVENOUS at 12:12

## 2017-12-07 RX ADMIN — SUCCINYLCHOLINE CHLORIDE 120 MG: 20 INJECTION, SOLUTION INTRAMUSCULAR; INTRAVENOUS at 09:12

## 2017-12-07 RX ADMIN — HYDRALAZINE HYDROCHLORIDE 10 MG: 20 INJECTION INTRAMUSCULAR; INTRAVENOUS at 12:12

## 2017-12-07 RX ADMIN — PROPOFOL 100 MG: 10 INJECTION, EMULSION INTRAVENOUS at 09:12

## 2017-12-07 RX ADMIN — CHLORHEXIDINE GLUCONATE 10 ML: 1.2 RINSE ORAL at 09:12

## 2017-12-07 RX ADMIN — SCOPOLAMINE 1 PATCH: 1 PATCH, EXTENDED RELEASE TRANSDERMAL at 09:12

## 2017-12-07 RX ADMIN — LIDOCAINE HYDROCHLORIDE 80 MG: 10 INJECTION, SOLUTION INFILTRATION; PERINEURAL at 09:12

## 2017-12-07 RX ADMIN — KETOROLAC TROMETHAMINE 30 MG: 30 INJECTION, SOLUTION INTRAMUSCULAR; INTRAVENOUS at 11:12

## 2017-12-07 RX ADMIN — KETOROLAC TROMETHAMINE 30 MG: 30 INJECTION, SOLUTION INTRAMUSCULAR at 05:12

## 2017-12-07 RX ADMIN — LABETALOL HYDROCHLORIDE 5 MG: 5 INJECTION, SOLUTION INTRAVENOUS at 12:12

## 2017-12-07 RX ADMIN — Medication 1 MG: at 09:12

## 2017-12-07 RX ADMIN — SODIUM CHLORIDE, SODIUM LACTATE, POTASSIUM CHLORIDE, AND CALCIUM CHLORIDE: 600; 310; 30; 20 INJECTION, SOLUTION INTRAVENOUS at 09:12

## 2017-12-07 RX ADMIN — MIDAZOLAM HYDROCHLORIDE 2 MG: 1 INJECTION, SOLUTION INTRAMUSCULAR; INTRAVENOUS at 09:12

## 2017-12-07 RX ADMIN — SODIUM CHLORIDE, SODIUM LACTATE, POTASSIUM CHLORIDE, AND CALCIUM CHLORIDE 1000 ML: .6; .31; .03; .02 INJECTION, SOLUTION INTRAVENOUS at 01:12

## 2017-12-07 RX ADMIN — PHENYLEPHRINE HYDROCHLORIDE 100 MCG: 10 INJECTION INTRAVENOUS at 10:12

## 2017-12-07 RX ADMIN — FENTANYL CITRATE 100 MCG: 50 INJECTION, SOLUTION INTRAMUSCULAR; INTRAVENOUS at 09:12

## 2017-12-07 RX ADMIN — CEFAZOLIN 3 G: 1 INJECTION, POWDER, FOR SOLUTION INTRAMUSCULAR; INTRAVENOUS at 10:12

## 2017-12-07 RX ADMIN — PROPOFOL 200 MG: 10 INJECTION, EMULSION INTRAVENOUS at 09:12

## 2017-12-07 RX ADMIN — ONDANSETRON 4 MG: 2 INJECTION, SOLUTION INTRAMUSCULAR; INTRAVENOUS at 11:12

## 2017-12-07 RX ADMIN — ONDANSETRON 4 MG: 2 SOLUTION INTRAMUSCULAR; INTRAVENOUS at 12:12

## 2017-12-07 RX ADMIN — ROCURONIUM BROMIDE 5 MG: 10 INJECTION, SOLUTION INTRAVENOUS at 09:12

## 2017-12-07 NOTE — BRIEF OP NOTE
[facility]  Brief Operative Note    SUMMARY     Surgery Date: 12/7/2017     Surgeon(s) and Role:     * Jet Mcclendon MD - Primary    Assisting Surgeon:yehuda fischer    Pre-op Diagnosis:  Malignant neoplasm of upper-outer quadrant of right breast in female, estrogen receptor positive [C50.411, Z17.0]  With 2 positive lymph nodes on sentinel node biopsy  Post-op Diagnosis:  Post-Op Diagnosis Codes:     * Malignant neoplasm of upper-outer quadrant of right breast in female, estrogen receptor positive [C50.411, Z17.0]  With 2 positive lymph nodes for cancer on sentinel node biopsy  Procedure(s) (LRB):  DISSECTION-LYMPH NODE-AXILLARY (Right) complete    Anesthesia: General and 15 mL's of quarter percent Marcaine to inject muscle fascia subcutaneous and skin    Description of Procedure:      Description of the findings of the procedure: Some mild scarring at prior sentinel node biopsy site some palpable lymph nodes but they were soft    Estimated Blood Loss: 25 mL's       Specimens:   Specimen (12h ago through future)    Start     Ordered    12/07/17 1125  Specimen to Pathology - Surgery  Once     Comments:  1. Right axillary contents (pt had previous SNB) Dx: Malignant neoplasm of upper-outer quadrant of right breast      12/07/17 1132

## 2017-12-07 NOTE — INTERVAL H&P NOTE
The patient has been examined and the H&P has been reviewed:    I concur with the findings and no changes have occurred since H&P was written.see my recent counseling note    Anesthesia/Surgery risks, benefits and alternative options discussed and understood by patient/family.          Active Hospital Problems    Diagnosis  POA    Malignant neoplasm of upper-outer quadrant of right breast in female, estrogen receptor positive [C50.411, Z17.0]  Not Applicable      Resolved Hospital Problems    Diagnosis Date Resolved POA   No resolved problems to display.

## 2017-12-07 NOTE — OP NOTE
Operative Note       SURGERY DATE:  12/7/2017     PRE-OP DIAGNOSIS:  Malignant neoplasm of upper-outer quadrant of right breast in female, estrogen receptor positive [C50.411, Z17.0]    POST-OP DIAGNOSIS:  Malignant neoplasm of upper-outer quadrant of right breast in female, estrogen receptor positive [C50.411, Z17.0]    Procedure(s) (LRB):  DISSECTION-LYMPH NODE-AXILLARY (Right)    Surgeon(s) and Role:     * Jet Mcclendon MD - Primary    ASSISTANT:  Nancy Hernandez PA-C (Assistant needed due to complexity of the case)    TASKS PERFORMED BY ASSISTANT:  Retraction, Exposure, Hemostasis, Closure    ANESTHESIA: General and 15 mL's of quarter percent Marcaine injected locally    FINDINGS:  Some mild scarring from prior sentinel node biopsy.  Some soft lymph nodes in axilla.    GRAFTS/IMPLANTS:  None    ESTIMATED BLOOD LOSS: 25 mL's mL              COMPLICATIONS:  None    SPECIMEN:  Right axillary contents    DESCRIPTION OF PROCEDURE:     Patient was taken the operating room for completion axillary node dissection.  The patient received neoadjuvant chemotherapy and then underwent right partial mastectomy and sentinel node biopsy.  The sentinel node biopsy on frozen section was okay however, permanent sections to the sentinel nodes had malignancy so it was felt that she would do best with completion right axillary node dissection.  After adequate general endotracheal anesthesia retained in the supine position the patient's right breast right chest axilla and proximal upper arm were prepped and draped in sterile fashion.  Preoperatively she had SCD hose and IV antibiotics.  Next a transverse incision was made in the right axilla carrying it through the prior scar from 6 weeks ago.  Incision was carried to the skin using knife through the subcutaneous tissue using cautery down to the axillary fat.  Superiorly the axillary fat was elevated away from the underlying axillary contents were a few centimeters in order to expose  the axillary contents.  The prior node biopsy area was identified.  Using careful blunt dissection, cautery this node biopsy area was  away from the tail of the breast.  This was done the pectoralis muscle was elevated superiorly and medially axillary contents behind it and medial to it were dissected vertically down to the chest wall.  Next dissection was carried superiorly down to the axillary vein and all dissection was carried inferior to this axillary vein.  After this was done the axillary contents were dissected away from the lateral chest wall.  Small vessels were transected using the Harmonic scalpel.  Using careful blunt dissection intercostal brachial nerve was identified and preserved.  Dissection was carried up to the area of level II and just above.  His highest nodes were dissected out and lymphatics tumor divided proximally so the nodes could be swept inferolaterally.  In dissecting the contents inferolaterally the long thoracic nerve was identified and preserved and contents were dissected off of the nerve down to the latissimus dorsi.  Dissection was then carried further laterally deep and the thoracodorsal artery nerve and vein were identified and dissection was carried anterior to them laterally.  Harmonic scalpel was used to divide the lymphatics and small vessels.  After this was done all other x-ray contents inferior to the vein and superficial to the nerves were dissected out using Harmonic.  Smaller veins were clipped proximally twice and then divided with Harmonic scalpel.  After this was done contents of then swept laterally to latissimus dorsi and using Harmonic scalpel the last contents removed from the axilla.  The axilla was irrigated and no bleeding was noted.  The nerves were checked and were functional.  The large flat Phill-Romo drain was placed into the axilla and brought out through a separate stab wound inferior to the incision and secured to the skin with 2-0 silk  suture.  The wound was then injected with the Marcaine.  The deep axillary portion of the wound was closed by reapproximating the axillary fat using interrupted 3-0 Vicryl suture.  The subcutaneous tissue was closed using running 3-0 Vicryl suture.  The skin was closed using 4-0 Vicryl subcuticular.  Steri-Strips are applied 4 x 4's and tapes around the drain the patient was awakened in the operating room taken recovery room in stable condition.           CONDITION: Good    DISPOSITION: PACU - hemodynamically stable.

## 2017-12-07 NOTE — PLAN OF CARE
Attempted to meet with patient for Discharge Assessment and patient nauseated and requests that this  return tomorrow to see her.

## 2017-12-07 NOTE — ANESTHESIA PREPROCEDURE EVALUATION
12/07/2017  Kylie Urbano is a 52 y.o., female.    Anesthesia Evaluation    I have reviewed the Patient Summary Reports.    I have reviewed the Nursing Notes.   I have reviewed the Medications.     Review of Systems  Anesthesia Hx:  No problems with previous Anesthesia  History of prior surgery of interest to airway management or planning: Denies Family Hx of Anesthesia complications.   Denies Personal Hx of Anesthesia complications.   Social:  Non-Smoker    Hematology/Oncology:         -- Anemia: Current/Recent Cancer. Breast right   Cardiovascular:   Hypertension ECG has been reviewed. Echo 06/17  CONCLUSIONS     1 - Normal left ventricular systolic function (EF 60-65%).     2 - Normal left ventricular diastolic function.     3 - Normal right ventricular systolic function .     4 - Concentric hypertrophy.    Pulmonary:  Pulmonary Normal    Renal/:  Renal/ Normal     Hepatic/GI:   Liver Disease,    Musculoskeletal:  Musculoskeletal Normal    Neurological:   Neuromuscular Disease, Headaches Hx Sciatica   Endocrine:   Hypothyroidism    Psych:  Psychiatric Normal           Physical Exam  General:  Obesity    Airway/Jaw/Neck:  Airway Findings: Mouth Opening: Normal Tongue: Normal  General Airway Assessment: Adult  Mallampati: II  TM Distance: Normal, at least 6 cm          Chest/Lungs:  Chest/Lungs Findings: Normal Respiratory Rate     Heart/Vascular:  Heart Findings: Rate: Normal             Anesthesia Plan  Type of Anesthesia, risks & benefits discussed:  Anesthesia Type:  general  Patient's Preference:   Intra-op Monitoring Plan: standard ASA monitors  Intra-op Monitoring Plan Comments:   Post Op Pain Control Plan:   Post Op Pain Control Plan Comments:   Induction:   IV  Beta Blocker:  Patient is not currently on a Beta-Blocker (No further documentation required).       Informed Consent: Patient  understands risks and agrees with Anesthesia plan.  Questions answered. Anesthesia consent signed with patient.  ASA Score: 3     Day of Surgery Review of History & Physical:    H&P update referred to the surgeon.         Ready For Surgery From Anesthesia Perspective.

## 2017-12-07 NOTE — TRANSFER OF CARE
"Anesthesia Transfer of Care Note    Patient: Kylie Urbano    Procedure(s) Performed: Procedure(s) (LRB):  DISSECTION-LYMPH NODE-AXILLARY (Right)    Patient location: PACU    Anesthesia Type: general    Transport from OR: Transported from OR on room air with adequate spontaneous ventilation    Post pain: adequate analgesia    Post assessment: no apparent anesthetic complications    Post vital signs: stable    Level of consciousness: awake    Nausea/Vomiting: no nausea/vomiting    Complications: none    Transfer of care protocol was followed      Last vitals:   Visit Vitals  BP (!) 190/88 (BP Location: Left arm, Patient Position: Sitting)   Pulse 104   Temp 37.1 °C (98.8 °F) (Oral)   Resp 18   Ht 5' 9" (1.753 m)   Wt 127.2 kg (280 lb 6.8 oz)   LMP 01/01/2015   Breastfeeding? No   BMI 41.41 kg/m²     "

## 2017-12-07 NOTE — PROGRESS NOTES
RT educated patient about IS and its uses. IS X 5 @ 1750 per patient tolerance at this time. Please reinforce patient education with continued attempts.   Thank you much, Respiratory.

## 2017-12-07 NOTE — PROGRESS NOTES
Received patient from recovery via stretcher, bedside report given by LISSY Ramirez. Patient is nauseated and gagging. j-P drain noted  To right axillary with serosanguinous drainage.

## 2017-12-07 NOTE — ANESTHESIA POSTPROCEDURE EVALUATION
"Anesthesia Post Evaluation    Patient: Kylie Urbano    Procedure(s) Performed: Procedure(s) (LRB):  DISSECTION-LYMPH NODE-AXILLARY (Right)    Final Anesthesia Type: general  Patient location during evaluation: PACU  Patient participation: Yes- Able to Participate  Level of consciousness: awake and alert  Post-procedure vital signs: reviewed and stable  Pain management: adequate  Airway patency: patent  PONV status at discharge: No PONV  Anesthetic complications: no      Cardiovascular status: blood pressure returned to baseline  Respiratory status: unassisted and spontaneous ventilation  Hydration status: euvolemic  Follow-up not needed.        Visit Vitals  BP (!) 162/83 (Patient Position: Lying)   Pulse 106   Temp 36.6 °C (97.8 °F) (Axillary)   Resp 18   Ht 5' 9" (1.753 m)   Wt 127.2 kg (280 lb 6.8 oz)   LMP 01/01/2015   SpO2 100%   Breastfeeding? No   BMI 41.41 kg/m²       Pain/Daija Score: Pain Assessment Performed: Yes (12/7/2017  1:25 PM)  Presence of Pain: denies (12/7/2017  1:25 PM)  Pain Rating Prior to Med Admin: 7 (12/7/2017 12:55 PM)  Daija Score: 8 (12/7/2017  1:00 PM)      "

## 2017-12-08 VITALS
HEART RATE: 95 BPM | SYSTOLIC BLOOD PRESSURE: 149 MMHG | HEIGHT: 69 IN | WEIGHT: 280.44 LBS | OXYGEN SATURATION: 98 % | DIASTOLIC BLOOD PRESSURE: 71 MMHG | BODY MASS INDEX: 41.54 KG/M2 | RESPIRATION RATE: 18 BRPM | TEMPERATURE: 98 F

## 2017-12-08 PROCEDURE — 63600175 PHARM REV CODE 636 W HCPCS: Performed by: SURGERY

## 2017-12-08 PROCEDURE — 25000003 PHARM REV CODE 250: Performed by: SURGERY

## 2017-12-08 PROCEDURE — 94761 N-INVAS EAR/PLS OXIMETRY MLT: CPT

## 2017-12-08 PROCEDURE — 94799 UNLISTED PULMONARY SVC/PX: CPT

## 2017-12-08 RX ORDER — OXYCODONE AND ACETAMINOPHEN 5; 325 MG/1; MG/1
1 TABLET ORAL
Qty: 15 TABLET | Refills: 0 | Status: SHIPPED | OUTPATIENT
Start: 2017-12-08 | End: 2018-07-12 | Stop reason: SDUPTHER

## 2017-12-08 RX ADMIN — KETOROLAC TROMETHAMINE 30 MG: 30 INJECTION, SOLUTION INTRAMUSCULAR at 01:12

## 2017-12-08 RX ADMIN — OXYCODONE HYDROCHLORIDE AND ACETAMINOPHEN 1 TABLET: 5; 325 TABLET ORAL at 05:12

## 2017-12-08 RX ADMIN — LEVOTHYROXINE SODIUM 200 MCG: 100 TABLET ORAL at 05:12

## 2017-12-08 RX ADMIN — KETOROLAC TROMETHAMINE 30 MG: 30 INJECTION, SOLUTION INTRAMUSCULAR at 05:12

## 2017-12-08 RX ADMIN — CHLORHEXIDINE GLUCONATE 10 ML: 1.2 RINSE ORAL at 08:12

## 2017-12-08 RX ADMIN — BENAZEPRIL HYDROCHLORIDE 20 MG: 20 TABLET, FILM COATED ORAL at 08:12

## 2017-12-08 RX ADMIN — LEVOTHYROXINE SODIUM 50 MCG: 50 TABLET ORAL at 05:12

## 2017-12-08 NOTE — DISCHARGE SUMMARY
[facility] -   General Surgery  Discharge Summary      Patient Name: Kylie Urbano  MRN: [id number]  Admission Date: 12/7/2017  Hospital Length of Stay: 0 days  Discharge Date and Time: 12/8/2017 11:40 AM  Attending Physician: No att. providers found   Discharging Provider: Jet Mcclendon MD  Primary Care Provider: Beverly Parks MD     HPI:  See history and physical.  The patient 6 weeks ago had a right partial mastectomy and sentinel node biopsy.  Final margins at the partial mastectomy site were clear.  However to the sentinel nodes which were benign by frozen section turned out to be malignant by permanent.  The patient presents now today for completion right axillary lymph node dissection    Procedure(s) (LRB):  DISSECTION-LYMPH NODE-AXILLARY (Right)     Hospital Course: On date of admission the patient underwent completion right axillary lymph node dissection.  This was done through previous right sentinel node biopsy incision.  She had a drain placed at the time of surgery.  By the first postoperative day she was eating well, ambulating well, voiding well and moving her arm well.  Incision was clean and dry and the drain fluid was getting clear and not an excessive amount.  He was instructed in care and measuring of the drain.  Her vital signs are stable and she was discharged home.  She'll keep the wound clean and dry and measure the drainage twice a day.  She will call in 3 days about drain output.    Consults:     Significant Diagnostic Studies: None    Pending Diagnostic Studies:     None        Final Active Diagnoses:    Diagnosis Date Noted POA    PRINCIPAL PROBLEM:  Malignant neoplasm of upper-outer quadrant of right female breast [C50.411] 06/05/2017 Yes    Malignant neoplasm of upper-outer quadrant of right breast in female, estrogen receptor positive [C50.411, Z17.0] 10/31/2017 Not Applicable    HTN (hypertension) [I10] 08/20/2013 Yes     Chronic    Iron deficiency anemia [D50.9]  08/20/2013 Yes     Chronic      Problems Resolved During this Admission:    Diagnosis Date Noted Date Resolved POA      Discharged Condition: good    Disposition: Home or Self Care    Follow Up:  Follow-up Information     Please follow up.    Why:  DO NOT MAKE APPT. PATIENT WILL CALL IN AM MONDAY WITH DRAIN OUTPUT               Patient Instructions:     Diet general     Activity as tolerated     Sponge bath only until clinic visit     Weight bearing restrictions (specify)     Lifting restrictions     Call MD for:  temperature >100.4     Call MD for:  redness, tenderness, or signs of infection (pain, swelling, redness, odor or green/yellow discharge around incision site)     Leave dressing on - Keep it clean, dry, and intact until clinic visit       Medications:  Reconciled Home Medications:   Discharge Medication List as of 12/8/2017  8:48 AM      START taking these medications    Details   oxyCODONE-acetaminophen (PERCOCET) 5-325 mg per tablet Take 1 tablet by mouth every 4 to 6 hours as needed., Starting Fri 12/8/2017, Print         CONTINUE these medications which have NOT CHANGED    Details   acyclovir (ZOVIRAX) 400 MG tablet TAKE ONE TABLET BY MOUTH THREE TIMES DAILY WITH FOOD FOR 5 DAYS PER  EPISODE, Normal      benazepril (LOTENSIN) 20 MG tablet Take 1 tablet (20 mg total) by mouth once daily., Starting Fri 11/17/2017, Normal      cholecalciferol, vitamin D3, (VITAMIN D) 2,000 unit Cap Take 1.5 capsules by mouth once daily. 1 Capsule Oral Every day, Until Discontinued, Historical Med      ferrous gluconate (FERGON) 240 (27 FE) MG tablet Take 240 mg by mouth 3 (three) times daily with meals. 1 Tablet Oral Every day, Until Discontinued, Historical Med      fluticasone (FLONASE) 50 mcg/actuation nasal spray 2 sprays by Each Nare route daily as needed for Rhinitis., Starting 11/25/2015, Until Discontinued, Normal      ibuprofen (ADVIL,MOTRIN) 200 MG tablet Take 200-400 mg by mouth every 8 (eight) hours as needed  for Pain., Historical Med      !! levothyroxine (SYNTHROID) 200 MCG tablet Take 250 mcg by mouth once daily. Takes one 200 mcg and one 50 mcg tablet for a total of 250 mcg daily, Historical Med      metformin (GLUCOPHAGE) 500 MG tablet TAKE FOUR TABLETS BY MOUTH IN THE EVENING AS DIRECTED, Normal      prochlorperazine (COMPAZINE) 10 MG tablet Take 1 tablet (10 mg total) by mouth every 6 (six) hours as needed., Starting Mon 6/12/2017, Until Tue 6/12/2018, Normal      !! SYNTHROID 200 mcg tablet TAKE ONE TABLET BY MOUTH ONCE DAILY, Normal      !! SYNTHROID 50 mcg tablet TAKE ONE TABLET BY MOUTH ONCE DAILY, Normal       !! - Potential duplicate medications found. Please discuss with provider.          Jet Mcclendon MD  General Surgery  [facility] [place]

## 2017-12-08 NOTE — PLAN OF CARE
Problem: Patient Care Overview  Goal: Plan of Care Review  Outcome: Ongoing (interventions implemented as appropriate)  Patient on room air tolerating well. No distress noted at this time.

## 2017-12-08 NOTE — PROGRESS NOTES
Went over discharge instructions with patient.   Stressed importance of making and keeping all follow ups.   Educated patient on ADRIEL drainage.  Patient verbalized understanding and has no questions in regards to discharge.  IV removed, catheter intact.  Telemetry box removed.  Patient dressed and awaiting ride.

## 2017-12-08 NOTE — PLAN OF CARE
Problem: Patient Care Overview  Goal: Plan of Care Review  Outcome: Ongoing (interventions implemented as appropriate)  Patient stable. Plan of care reviewed. Patient verbalizes understanding.Pain controlled. No episodes of vomiting noted. Bed low, wheels locked, bed alarm on, call light in reach. Patient instructed to call for assistance. Will continue to monitor.

## 2017-12-08 NOTE — PLAN OF CARE
Problem: Patient Care Overview  Goal: Plan of Care Review  Outcome: Ongoing (interventions implemented as appropriate)  Patient vomited 200 ml of emesis  Patient c/o slight nausea after eating dinner  IV fluids  Patient ambulated to the bathroom  40 ml drained from J-P drain, teaching readiness assessed.

## 2017-12-11 ENCOUNTER — TELEPHONE (OUTPATIENT)
Dept: SURGERY | Facility: CLINIC | Age: 52
End: 2017-12-11

## 2017-12-11 NOTE — TELEPHONE ENCOUNTER
----- Message from Emmy Crandall sent at 12/11/2017 12:20 PM CST -----  Contact: pt  The pt request a return call, no additional info given, pt can be reached at [phone]///[email or web address]

## 2017-12-13 ENCOUNTER — OFFICE VISIT (OUTPATIENT)
Dept: SURGERY | Facility: CLINIC | Age: 52
End: 2017-12-13
Payer: COMMERCIAL

## 2017-12-13 VITALS
WEIGHT: 283.94 LBS | BODY MASS INDEX: 42.05 KG/M2 | DIASTOLIC BLOOD PRESSURE: 94 MMHG | SYSTOLIC BLOOD PRESSURE: 154 MMHG | HEART RATE: 94 BPM | HEIGHT: 69 IN | TEMPERATURE: 99 F

## 2017-12-13 DIAGNOSIS — Z98.890 POSTOPERATIVE STATE: Primary | ICD-10-CM

## 2017-12-13 PROCEDURE — 99999 PR PBB SHADOW E&M-EST. PATIENT-LVL III: CPT | Mod: PBBFAC,,, | Performed by: SURGERY

## 2017-12-13 PROCEDURE — 99024 POSTOP FOLLOW-UP VISIT: CPT | Mod: S$GLB,,, | Performed by: SURGERY

## 2017-12-13 NOTE — PROGRESS NOTES
Patient returns now on follow-up.  She is 1 week status post right axillary lymph node dissection.  She feels well, is eating well ambulating well.  Over the last couple days he's had minimal drainage out her axillary drain.  Only complaint is some burning on the upper inner aspect of her right upper extremity.  Otherwise she moves arm well.    Objective: Vital signs are stable patient is alert and oriented.  Chest is clear throughout.  Heart reveals normal rate and rhythm.  The axillary incision is clean and dry.  There is no seroma or fluid collection palpable in the axilla.  The ADRIEL drainage is serosanguineous and only about 10 mL's over the last 24 hours.    Pathology returned that she had 10 lymph nodes residual in the axilla and all 10 nodes were normal.    Impression is stable status post completion right axillary lymph node dissection.  It is explained to her that there is a small nerve that runs through the axilla to the skin in the upper inner arm.  I told this irritation should go away over the next few weeks.    Plan: I removed the axillary drain.  She may start range of motion exercises tomorrow.  I will see her back in 1 month.  Hopefully in about 3 weeks she'll be ready for radiation treatment to the breast and axilla.  I will see her back in 1 month.

## 2017-12-29 ENCOUNTER — TELEPHONE (OUTPATIENT)
Dept: RADIATION ONCOLOGY | Facility: CLINIC | Age: 52
End: 2017-12-29

## 2017-12-29 NOTE — TELEPHONE ENCOUNTER
Called pt re: missed appt today.  No answer at home.  Called mobile; s/w family member who said she would relay message to call back and reschedule.  Left cancer center #.  Verbalized understanding.  Will f.u.

## 2018-01-03 ENCOUNTER — HOSPITAL ENCOUNTER (EMERGENCY)
Facility: HOSPITAL | Age: 53
Discharge: HOME | End: 2018-01-03
Attending: EMERGENCY MEDICINE
Payer: COMMERCIAL

## 2018-01-03 VITALS
HEIGHT: 68 IN | HEART RATE: 79 BPM | WEIGHT: 289.56 LBS | TEMPERATURE: 98 F | RESPIRATION RATE: 20 BRPM | DIASTOLIC BLOOD PRESSURE: 69 MMHG | OXYGEN SATURATION: 98 % | BODY MASS INDEX: 43.88 KG/M2 | SYSTOLIC BLOOD PRESSURE: 155 MMHG

## 2018-01-03 DIAGNOSIS — R07.9 CHEST PAIN, UNSPECIFIED TYPE: Primary | ICD-10-CM

## 2018-01-03 DIAGNOSIS — I10 ESSENTIAL HYPERTENSION: ICD-10-CM

## 2018-01-03 LAB
ALBUMIN SERPL BCP-MCNC: 3.4 G/DL
ALP SERPL-CCNC: 92 U/L
ALT SERPL W/O P-5'-P-CCNC: 34 U/L
AMYLASE SERPL-CCNC: 66 U/L
ANION GAP SERPL CALC-SCNC: 11 MMOL/L
AST SERPL-CCNC: 25 U/L
BASOPHILS # BLD AUTO: 0.01 K/UL
BASOPHILS NFR BLD: 0.2 %
BILIRUB SERPL-MCNC: 0.3 MG/DL
BILIRUB UR QL STRIP: NEGATIVE
BNP SERPL-MCNC: 48 PG/ML
BUN SERPL-MCNC: 7 MG/DL
CALCIUM SERPL-MCNC: 9.6 MG/DL
CHLORIDE SERPL-SCNC: 107 MMOL/L
CK MB SERPL-MCNC: 1.7 NG/ML
CK MB SERPL-RTO: 0.6 %
CK SERPL-CCNC: 298 U/L
CLARITY UR: CLEAR
CO2 SERPL-SCNC: 24 MMOL/L
COLOR UR: YELLOW
CREAT SERPL-MCNC: 0.8 MG/DL
DIFFERENTIAL METHOD: NORMAL
EOSINOPHIL # BLD AUTO: 0.2 K/UL
EOSINOPHIL NFR BLD: 3.4 %
ERYTHROCYTE [DISTWIDTH] IN BLOOD BY AUTOMATED COUNT: 14 %
EST. GFR  (AFRICAN AMERICAN): >60 ML/MIN/1.73 M^2
EST. GFR  (NON AFRICAN AMERICAN): >60 ML/MIN/1.73 M^2
GLUCOSE SERPL-MCNC: 119 MG/DL
GLUCOSE UR QL STRIP: NEGATIVE
HCT VFR BLD AUTO: 38.3 %
HGB BLD-MCNC: 13.1 G/DL
HGB UR QL STRIP: NEGATIVE
KETONES UR QL STRIP: NEGATIVE
LEUKOCYTE ESTERASE UR QL STRIP: NEGATIVE
LIPASE SERPL-CCNC: 23 U/L
LYMPHOCYTES # BLD AUTO: 1.8 K/UL
LYMPHOCYTES NFR BLD: 29.2 %
MCH RBC QN AUTO: 27.9 PG
MCHC RBC AUTO-ENTMCNC: 34.2 G/DL
MCV RBC AUTO: 82 FL
MONOCYTES # BLD AUTO: 0.6 K/UL
MONOCYTES NFR BLD: 10.1 %
NEUTROPHILS # BLD AUTO: 3.5 K/UL
NEUTROPHILS NFR BLD: 57.1 %
NITRITE UR QL STRIP: NEGATIVE
PH UR STRIP: 7 [PH] (ref 5–8)
PLATELET # BLD AUTO: 256 K/UL
PMV BLD AUTO: 9.3 FL
POTASSIUM SERPL-SCNC: 3.7 MMOL/L
PROT SERPL-MCNC: 7.5 G/DL
PROT UR QL STRIP: NEGATIVE
RBC # BLD AUTO: 4.69 M/UL
SODIUM SERPL-SCNC: 142 MMOL/L
SP GR UR STRIP: <=1.005 (ref 1–1.03)
TROPONIN I SERPL DL<=0.01 NG/ML-MCNC: <0.006 NG/ML
URN SPEC COLLECT METH UR: ABNORMAL
UROBILINOGEN UR STRIP-ACNC: NEGATIVE EU/DL
WBC # BLD AUTO: 6.14 K/UL

## 2018-01-03 PROCEDURE — 36000 PLACE NEEDLE IN VEIN: CPT

## 2018-01-03 PROCEDURE — 84484 ASSAY OF TROPONIN QUANT: CPT

## 2018-01-03 PROCEDURE — 82553 CREATINE MB FRACTION: CPT

## 2018-01-03 PROCEDURE — 99284 EMERGENCY DEPT VISIT MOD MDM: CPT | Mod: 25

## 2018-01-03 PROCEDURE — 81003 URINALYSIS AUTO W/O SCOPE: CPT

## 2018-01-03 PROCEDURE — 85025 COMPLETE CBC W/AUTO DIFF WBC: CPT

## 2018-01-03 PROCEDURE — 83880 ASSAY OF NATRIURETIC PEPTIDE: CPT

## 2018-01-03 PROCEDURE — 93010 ELECTROCARDIOGRAM REPORT: CPT | Mod: ,,, | Performed by: INTERNAL MEDICINE

## 2018-01-03 PROCEDURE — 83690 ASSAY OF LIPASE: CPT

## 2018-01-03 PROCEDURE — 82150 ASSAY OF AMYLASE: CPT

## 2018-01-03 PROCEDURE — 25000003 PHARM REV CODE 250: Performed by: EMERGENCY MEDICINE

## 2018-01-03 PROCEDURE — 80053 COMPREHEN METABOLIC PANEL: CPT

## 2018-01-03 RX ORDER — OMEPRAZOLE 20 MG/1
20 CAPSULE, DELAYED RELEASE ORAL DAILY
Qty: 30 CAPSULE | Refills: 11 | Status: SHIPPED | OUTPATIENT
Start: 2018-01-03 | End: 2018-08-01

## 2018-01-03 RX ADMIN — DICYCLOMINE HYDROCHLORIDE 50 ML: 10 SOLUTION ORAL at 04:01

## 2018-01-03 NOTE — ED PROVIDER NOTES
"SCRIBE #1 NOTE: I, Jody Sequeira, am scribing for, and in the presence of, Alban Muniz MD. I have scribed the entire note.      History      Chief Complaint   Patient presents with    Hypertension     " my chest aches"        Review of patient's allergies indicates:   Allergen Reactions    Lortab [hydrocodone-acetaminophen] Nausea And Vomiting     Historical.    Amoxicillin Rash     Historical    Sulfa (sulfonamide antibiotics) Rash        HPI   HPI    1/3/2018, 3:51 AM   History obtained from the patient      History of Present Illness: Kylie Urbano is a 52 y.o. female patient with PMHx of HTN and breast cancer presents to the Emergency Department for abdominal pain which onset gradually a couple days ago. Symptoms are constant and moderate in severity. Pt reports that her BP has been high. Pain is located to epigastric region of abdomen and radiates up to chest. No mitigating or exacerbating factors reported. Associated sxs include HA. Patient denies any fever, n/v/d, blood in stool, SOB, cough, recent travel, leg swelling, chills, dizziness, and all other sxs at this time. No further complaints or concerns at this time.     Arrival mode: Personal vehicle    PCP: Beverly Parks MD       Past Medical History:  Past Medical History:   Diagnosis Date    Allergic rhinitis, cause unspecified     Cancer     R Breast Cancer    Carpal tunnel syndrome of left wrist     Elevated liver function tests     in the past    Fatty liver 05/02/2017    on ultrasound    Fibroid uterus     10/2014    Hepatomegaly 05/02/2017    on ultrasound    History of sciatica     HSV infection     Type 1 and 2    HTN (hypertension)     Hypothyroidism     Insulin resistance     Iron deficiency anemia, unspecified     Obesity     PONV (postoperative nausea and vomiting)     Tension headache     Vitamin D deficiency        Past Surgical History:  Past Surgical History:   Procedure Laterality Date    BREAST LUMPECTOMY " Right     BTL      DILATION AND CURETTAGE OF UTERUS      x 1    HYSTERECTOMY      Laproscopic robot assissted with BSO         Family History:  Family History   Problem Relation Age of Onset    Diabetes Mother     Hypertension Mother     Heart failure Mother     Cancer Father      Stomach cancer    Cancer Sister      Ovarian cancer    No Known Problems Daughter     No Known Problems Son     No Known Problems Daughter     Stroke Neg Hx     Heart disease Neg Hx      MI/CAD       Social History:  Social History     Social History Main Topics    Smoking status: Never Smoker    Smokeless tobacco: Never Used    Alcohol use 0.0 oz/week      Comment: occasionally     Drug use: No    Sexual activity: Not Currently       ROS   Review of Systems   Constitutional: Negative for fever.        (+) HTN   HENT: Negative for sore throat.    Respiratory: Negative for cough and shortness of breath.    Cardiovascular: Negative for chest pain and palpitations.   Gastrointestinal: Positive for abdominal pain. Negative for nausea and vomiting.   Genitourinary: Negative for dysuria.   Musculoskeletal: Negative for back pain.   Skin: Negative for rash.   Neurological: Positive for headaches. Negative for weakness.   Hematological: Does not bruise/bleed easily.       Physical Exam      Initial Vitals [01/03/18 0344]   BP Pulse Resp Temp SpO2   (!) 187/93 99 20 98.3 °F (36.8 °C) 98 %      MAP       124.33          Physical Exam  Nursing Notes and Vital Signs Reviewed.  Constitutional: Patient is in no apparent distress. Well-developed and well-nourished.  Head: Atraumatic. Normocephalic.  Eyes: PERRL. EOM intact. Conjunctivae are not pale. No scleral icterus.  ENT: Mucous membranes are moist. Oropharynx is clear and symmetric.    Neck: Supple. Full ROM. No lymphadenopathy.  Cardiovascular: Regular rate. Regular rhythm. No murmurs, rubs, or gallops. Distal pulses are 2+ and symmetric.  Pulmonary/Chest: No respiratory distress.  "Clear to auscultation bilaterally. No wheezing or rales.  Abdominal: Soft and non-distended.  There is no tenderness.  No rebound, guarding, or rigidity. Good bowel sounds.  Genitourinary: No CVA tenderness  Musculoskeletal: Moves all extremities. No obvious deformities. No edema. No calf tenderness.  Skin: Warm and dry.  Neurological:  Alert, awake, and appropriate.  Normal speech.  No acute focal neurological deficits are appreciated.  Psychiatric: Normal affect. Good eye contact. Appropriate in content.    ED Course    Procedures  ED Vital Signs:  Vitals:    01/03/18 0344 01/03/18 0358 01/03/18 0446   BP: (!) 187/93 (!) 180/88    Pulse: 99 86 92   Resp: 20 20    Temp: 98.3 °F (36.8 °C)     TempSrc: Oral     SpO2: 98% 97%    Weight: 131.4 kg (289 lb 9.2 oz)     Height: 5' 8" (1.727 m)         Abnormal Lab Results:  Labs Reviewed   URINALYSIS - Abnormal; Notable for the following:        Result Value    Specific Gravity, UA <=1.005 (*)     All other components within normal limits   COMPREHENSIVE METABOLIC PANEL - Abnormal; Notable for the following:     Glucose 119 (*)     Albumin 3.4 (*)     All other components within normal limits   CK-MB - Abnormal; Notable for the following:      (*)     All other components within normal limits   CBC W/ AUTO DIFFERENTIAL   B-TYPE NATRIURETIC PEPTIDE   LIPASE   AMYLASE   TROPONIN I        All Lab Results:  Results for orders placed or performed during the hospital encounter of 01/03/18   CBC auto differential   Result Value Ref Range    WBC 6.14 3.90 - 12.70 K/uL    RBC 4.69 4.00 - 5.40 M/uL    Hemoglobin 13.1 12.0 - 16.0 g/dL    Hematocrit 38.3 37.0 - 48.5 %    MCV 82 82 - 98 fL    MCH 27.9 27.0 - 31.0 pg    MCHC 34.2 32.0 - 36.0 g/dL    RDW 14.0 11.5 - 14.5 %    Platelets 256 150 - 350 K/uL    MPV 9.3 9.2 - 12.9 fL    Gran # 3.5 1.8 - 7.7 K/uL    Lymph # 1.8 1.0 - 4.8 K/uL    Mono # 0.6 0.3 - 1.0 K/uL    Eos # 0.2 0.0 - 0.5 K/uL    Baso # 0.01 0.00 - 0.20 K/uL    " Gran% 57.1 38.0 - 73.0 %    Lymph% 29.2 18.0 - 48.0 %    Mono% 10.1 4.0 - 15.0 %    Eosinophil% 3.4 0.0 - 8.0 %    Basophil% 0.2 0.0 - 1.9 %    Differential Method Automated    Urinalysis   Result Value Ref Range    Specimen UA Urine, Clean Catch     Color, UA Yellow Yellow, Straw, Angie    Appearance, UA Clear Clear    pH, UA 7.0 5.0 - 8.0    Specific Gravity, UA <=1.005 (A) 1.005 - 1.030    Protein, UA Negative Negative    Glucose, UA Negative Negative    Ketones, UA Negative Negative    Bilirubin (UA) Negative Negative    Occult Blood UA Negative Negative    Nitrite, UA Negative Negative    Urobilinogen, UA Negative <2.0 EU/dL    Leukocytes, UA Negative Negative   Brain natriuretic peptide   Result Value Ref Range    BNP 48 0 - 99 pg/mL   Comprehensive metabolic panel   Result Value Ref Range    Sodium 142 136 - 145 mmol/L    Potassium 3.7 3.5 - 5.1 mmol/L    Chloride 107 95 - 110 mmol/L    CO2 24 23 - 29 mmol/L    Glucose 119 (H) 70 - 110 mg/dL    BUN, Bld 7 6 - 20 mg/dL    Creatinine 0.8 0.5 - 1.4 mg/dL    Calcium 9.6 8.7 - 10.5 mg/dL    Total Protein 7.5 6.0 - 8.4 g/dL    Albumin 3.4 (L) 3.5 - 5.2 g/dL    Total Bilirubin 0.3 0.1 - 1.0 mg/dL    Alkaline Phosphatase 92 55 - 135 U/L    AST 25 10 - 40 U/L    ALT 34 10 - 44 U/L    Anion Gap 11 8 - 16 mmol/L    eGFR if African American >60 >60 mL/min/1.73 m^2    eGFR if non African American >60 >60 mL/min/1.73 m^2   Lipase   Result Value Ref Range    Lipase 23 4 - 60 U/L   Amylase   Result Value Ref Range    Amylase 66 20 - 110 U/L   Troponin I   Result Value Ref Range    Troponin I <0.006 0.000 - 0.026 ng/mL   CK-MB   Result Value Ref Range     (H) 20 - 180 U/L    CPK MB 1.7 0.1 - 6.5 ng/mL    MB% 0.6 0.0 - 5.0 %         Imaging Results:  Imaging Results          X-Ray Chest PA And Lateral (In process)                The EKG was ordered, reviewed, and independently interpreted by the ED provider.  Interpretation time: 4:04  Rate: 87 BPM  Rhythm: normal sinus  rhythm  Interpretation: no acute ST changes. No STEMI.    Ordered, reviewed, and independently interpreted by the ED provider.  Study: X-Ray Chest PA And Lateral  Findings: normal           The Emergency Provider reviewed the vital signs and test results, which are outlined above.    ED Discussion       5:18 AM: Reassessed pt at this time. Pt is in no distress. Discussed with pt all pertinent ED information and results. Discussed pt diagnosis and plan of treatment. Gave pt all f/u and return to the ED instructions. All questions and concerns were addressed at this time. Pt expresses understanding of information and instructions, and is comfortable with plan to discharge. Pt is stable for discharge.    I discussed with patient and/or family/caretaker that evaluation in the ED does not suggest any emergent or life threatening medical conditions requiring immediate intervention beyond what was provided in the ED, and I believe patient is safe for discharge.  Regardless, an unremarkable evaluation in the ED does not preclude the development or presence of a serious of life threatening condition. As such, patient was instructed to return immediately for any worsening or change in current symptoms.        ED Medication(s):  Medications   GI cocktail (mylanta 30 mL, lidocaine 2 % viscous 10 mL, dicyclomine 10 mL) 50 mL (50 mLs Oral Given 1/3/18 0405)       New Prescriptions    OMEPRAZOLE (PRILOSEC) 20 MG CAPSULE    Take 1 capsule (20 mg total) by mouth once daily.       Follow-up Information     Beverly Parks MD In 2 days.    Specialty:  Family Medicine  Contact information:  [address] [place] [address] [phone]                     Medical Decision Making    Medical Decision Making:   Clinical Tests:   Lab Tests: Reviewed and Ordered  Radiological Study: Reviewed and Ordered           Scribe Attestation:   Scribe #1: I performed the above scribed service and the documentation accurately describes the  services I performed. I attest to the accuracy of the note.    Attending:   Physician Attestation Statement for Scribe #1: I, Alban Muniz MD, personally performed the services described in this documentation, as scribed by Jody Sequeira, in my presence, and it is both accurate and complete.          Clinical Impression       ICD-10-CM ICD-9-CM   1. Chest pain, unspecified type R07.9 786.50   2. Essential hypertension I10 401.9       Disposition:   Disposition: Discharged  Condition: Stable           Alban Muniz MD  01/03/18 0520

## 2018-01-04 ENCOUNTER — DOCUMENTATION ONLY (OUTPATIENT)
Dept: RADIATION ONCOLOGY | Facility: CLINIC | Age: 53
End: 2018-01-04

## 2018-01-04 ENCOUNTER — OFFICE VISIT (OUTPATIENT)
Dept: RADIATION ONCOLOGY | Facility: CLINIC | Age: 53
End: 2018-01-04
Payer: COMMERCIAL

## 2018-01-04 ENCOUNTER — HOSPITAL ENCOUNTER (OUTPATIENT)
Dept: RADIATION THERAPY | Facility: HOSPITAL | Age: 53
Discharge: HOME | End: 2018-01-04
Attending: SURGERY
Payer: COMMERCIAL

## 2018-01-04 ENCOUNTER — HOSPITAL ENCOUNTER (OUTPATIENT)
Dept: RADIOLOGY | Facility: HOSPITAL | Age: 53
Discharge: HOME | End: 2018-01-04
Attending: RADIOLOGY
Payer: COMMERCIAL

## 2018-01-04 VITALS
BODY MASS INDEX: 43.54 KG/M2 | HEART RATE: 111 BPM | DIASTOLIC BLOOD PRESSURE: 120 MMHG | WEIGHT: 286.38 LBS | TEMPERATURE: 99 F | RESPIRATION RATE: 20 BRPM | SYSTOLIC BLOOD PRESSURE: 195 MMHG

## 2018-01-04 DIAGNOSIS — Z17.0 MALIGNANT NEOPLASM OF UPPER-OUTER QUADRANT OF RIGHT BREAST IN FEMALE, ESTROGEN RECEPTOR POSITIVE: Primary | ICD-10-CM

## 2018-01-04 DIAGNOSIS — C50.411 MALIGNANT NEOPLASM OF UPPER-OUTER QUADRANT OF RIGHT BREAST IN FEMALE, ESTROGEN RECEPTOR POSITIVE: Primary | ICD-10-CM

## 2018-01-04 PROCEDURE — 77290 THER RAD SIMULAJ FIELD CPLX: CPT | Mod: TC | Performed by: RADIOLOGY

## 2018-01-04 PROCEDURE — 77334 RADIATION TREATMENT AID(S): CPT | Mod: TC | Performed by: RADIOLOGY

## 2018-01-04 PROCEDURE — 77263 THER RADIOLOGY TX PLNG CPLX: CPT | Mod: ,,, | Performed by: RADIOLOGY

## 2018-01-04 PROCEDURE — 99212 OFFICE O/P EST SF 10 MIN: CPT | Mod: S$GLB,,, | Performed by: RADIOLOGY

## 2018-01-04 PROCEDURE — 99999 PR PBB SHADOW E&M-EST. PATIENT-LVL III: CPT | Mod: PBBFAC,,, | Performed by: RADIOLOGY

## 2018-01-04 PROCEDURE — 77290 THER RAD SIMULAJ FIELD CPLX: CPT | Mod: 26,,, | Performed by: RADIOLOGY

## 2018-01-04 PROCEDURE — 77334 RADIATION TREATMENT AID(S): CPT | Mod: 26,,, | Performed by: RADIOLOGY

## 2018-01-04 PROCEDURE — 77014 HC CT GUIDANCE RADIATION THERAPY FLDS PLACEMENT: CPT | Mod: TC | Performed by: RADIOLOGY

## 2018-01-04 NOTE — PROGRESS NOTES
Subjective:       Patient ID: Kylie Urbano is a 52 y.o. female.    Chief Complaint: Breast Cancer (Radiation Planning)    This patient returns to begin her radiotherapy.     Ms. Urbano has a history of clinical stage IIA (T1c, N1, M0) yp stage T1b, N1b ductal carcinoma of the Rt. breast.  The was referred for further evaluation after diagnostic MMG on 5/9/17 confirmed a focal asymmetry measuring 15 mm in the posterior region of the Rt. breast at the 10 o'clock position.  The mass was solid on ultrasound.  Core biopsies of the mass and an enlarged Rt. axillary node were performed at [facility] on 5/22/17.  Pathology revealed ductal carcinoma with lymphovascular invasion.  The tumor was strongly (95% ) ER and UT positive, Her - 2 was negative.  Biopsy of the lymph node revealed metastatic mammary carcinoma.  The patient was referred for neoadjuvant chemotherapy.  CT of the chest on 6/7/17 revealed a 1.4 cm axillary node with the biopsy clip.  There was also a mildly prominent subpectoral node measuring 7 mm.  There was no mediastinal or hilar adenopathy.  The was a faint 4 mm Rt. upper lobe  pulmonary nodule of low suspicion and no other evidence of metastatic disease.  The patient completed a course of neoadjuvant chemotherapy with dose dense AC followed by Taxol. She was referred to Dr. Mcclendon on underwent Rt. partial mastectomy with sentinel node biopsy on 10/31/17.  Pathology from the Rt. breast revealed an 8 mm focus of infiltrating ductal carcinoma, histologic grade 3, nuclear grade 3 and mitotic index 2.  There was no associated DCIS.  There was lymphovascular invasion. Tumor was present at the anterior margin.  Two sentinel and one non sentinel nodes were examined, one sentinel node contained a 3 mm focus of metastatic carcinoma, extranodal extension was indeterminate.  The other sentinel node was positive for a micrometastatic deposit measuring 1 mm without extra capsular extension. The non  sentinel node was negative for tumor involvement. The patient returned to OR and underwent completion Rt. axillary dissection.  Pathology revealed 10 negative lymph nodes.  She presents today to begin her radiotherapy.  Today, the patient states she feels well.   She noted increased B/P at home yesterday which prompted a visit to the ED.  Work up was negative.  No complaints of chest pain today.  The patient returned to work today.        Review of Systems   Constitutional: Negative for activity change, appetite change, chills, fatigue and fever.   HENT: Negative for sore throat and trouble swallowing.    Respiratory: Negative for cough, choking, chest tightness and shortness of breath.    Cardiovascular: Negative for chest pain, palpitations and leg swelling.   Gastrointestinal: Negative for abdominal pain, nausea and vomiting.       Objective:      Physical Exam   Constitutional: She appears well-developed and well-nourished. No distress.   Neck: Normal range of motion. Neck supple.   Pulmonary/Chest: Effort normal. No respiratory distress.       Lymphadenopathy:     She has no cervical adenopathy.     I repeated her B/P which returned at 180 /100 manually.    Assessment:       Infiltrating ductal carcinoma of the Rt. breast.    Plan:       Again discussed the rational for postoperative radiotherapy in this setting.  Discussed the procedures, risks and benefits of therapy.  Will plan simulation today with treatment to begin thereafter.  The patient is planned to receive 50.4 Gy followed by a boost to the primary site.

## 2018-01-05 ENCOUNTER — OFFICE VISIT (OUTPATIENT)
Dept: FAMILY MEDICINE | Facility: CLINIC | Age: 53
End: 2018-01-05
Payer: COMMERCIAL

## 2018-01-05 VITALS
OXYGEN SATURATION: 99 % | RESPIRATION RATE: 18 BRPM | DIASTOLIC BLOOD PRESSURE: 90 MMHG | BODY MASS INDEX: 42.66 KG/M2 | WEIGHT: 281.5 LBS | HEART RATE: 97 BPM | TEMPERATURE: 99 F | SYSTOLIC BLOOD PRESSURE: 144 MMHG | HEIGHT: 68 IN

## 2018-01-05 DIAGNOSIS — C50.411 MALIGNANT NEOPLASM OF UPPER-OUTER QUADRANT OF RIGHT BREAST IN FEMALE, ESTROGEN RECEPTOR POSITIVE: ICD-10-CM

## 2018-01-05 DIAGNOSIS — I10 ESSENTIAL HYPERTENSION: Primary | Chronic | ICD-10-CM

## 2018-01-05 DIAGNOSIS — Z17.0 MALIGNANT NEOPLASM OF UPPER-OUTER QUADRANT OF RIGHT BREAST IN FEMALE, ESTROGEN RECEPTOR POSITIVE: ICD-10-CM

## 2018-01-05 PROCEDURE — 99999 PR PBB SHADOW E&M-EST. PATIENT-LVL III: CPT | Mod: PBBFAC,,, | Performed by: FAMILY MEDICINE

## 2018-01-05 PROCEDURE — 99214 OFFICE O/P EST MOD 30 MIN: CPT | Mod: S$GLB,,, | Performed by: FAMILY MEDICINE

## 2018-01-05 RX ORDER — AMLODIPINE BESYLATE 5 MG/1
5 TABLET ORAL DAILY
Qty: 90 TABLET | Refills: 1 | Status: SHIPPED | OUTPATIENT
Start: 2018-01-05 | End: 2018-06-26 | Stop reason: SDUPTHER

## 2018-01-05 NOTE — PROGRESS NOTES
Subjective:       Patient ID: Kylie Urbano is a 52 y.o. female.    Chief Complaint: Hypertension    Ms. Urbano comes in today for evaluation of elevated blood pressure.  She states for the last few days her blood pressure has been elevated as checked by her home wrist blood pressure monitor.  She reports blood pressure 159/102 on yesterday but 141/97 this morning.  Also, yesterday during visit with radiation-oncologist Dr. Arik Wynn Jr. her blood pressure was 195/120.  She was advised to follow-up with me for further evaluation.  She states she is not scheduled to start radiation for treatment of right breast cancer and 2 January 15, 2018.    She was evaluated at [facility] [place] [place] [facility] on January 3, 2018 for chest pain, hypertension with unremarkable workup and was advised to see me in 2 days for follow-up.    She states she has slight headache.  She denies having chest pain, palpitations, leg swelling, shortness of breath, cough, wheezing, fever, chills, fatigue, change of appetite, unusual urinary symptoms, abdominal pain, nausea, vomiting, diarrhea, constipation, sinus symptoms, numbness, back pain.    Ms. Urbano checked her blood pressure during visit with wrist monitor at left wrist with the following level noted: 162/102; I then checked her blood pressure using office wall monitor with the following level noted: 144/90.    Current Outpatient Prescriptions:  benazepril (LOTENSIN) 20 MG tablet, Take 1 tablet (20 mg total) by mouth once daily.  cholecalciferol, vitamin D3, (VITAMIN D) 2,000 unit Cap, Take 1.5 capsules by mouth once daily. 1 Capsule Oral Every day  ferrous gluconate (FERGON) 240 (27 FE) MG tablet, Take 240 mg by mouth 3 (three) times daily with meals. 1 Tablet Oral Every day  fluticasone (FLONASE) 50 mcg/actuation nasal spray, 2 sprays by Each Nare route daily as needed for Rhinitis.  ibuprofen (ADVIL,MOTRIN) 200 MG tablet, Take 200-400 mg by mouth every 8 (eight) hours as needed for Pain.  metformin  (GLUCOPHAGE) 500 MG tablet, TAKE FOUR TABLETS BY MOUTH IN THE EVENING AS DIRECTED (Patient taking differently: TAKE TWO TABLETS BY MOUTH IN THE EVENING AS DIRECTED)  omeprazole (PRILOSEC) 20 MG capsule, Take 1 capsule (20 mg total) by mouth once daily.  oxyCODONE-acetaminophen (PERCOCET) 5-325 mg per tablet, Take 1 tablet by mouth every 4 to 6 hours as needed.  prochlorperazine (COMPAZINE) 10 MG tablet, Take 1 tablet (10 mg total) by mouth every 6 (six) hours as needed.  SYNTHROID 200 mcg tablet, TAKE ONE TABLET BY MOUTH ONCE DAILY  SYNTHROID 50 mcg tablet, TAKE ONE TABLET BY MOUTH ONCE DAILY            Hypertension   Associated symptoms include headaches. Pertinent negatives include no chest pain, palpitations or shortness of breath.     Review of Systems   Constitutional: Negative for appetite change, chills, fatigue and fever.   Respiratory: Negative for cough, shortness of breath and wheezing.    Cardiovascular: Negative for chest pain, palpitations and leg swelling.        See history of present illness.   Gastrointestinal: Negative for abdominal pain, constipation, diarrhea, nausea and vomiting.   Genitourinary: Negative for difficulty urinating.   Musculoskeletal: Negative for back pain.   Neurological: Positive for headaches.       Objective:      Physical Exam   Constitutional: She is oriented to person, place, and time. She appears well-developed and well-nourished. No distress.   Pleasant.   Eyes: EOM are normal.   Neck: Normal range of motion. Neck supple. No thyromegaly present.   Cardiovascular: Normal rate, regular rhythm, normal heart sounds and intact distal pulses.    No murmur heard.  Pulmonary/Chest: Effort normal and breath sounds normal. No respiratory distress. She has no wheezes.   Abdominal: Soft. Bowel sounds are normal. She exhibits no distension and no mass. There is no tenderness. There is no rebound and no guarding.   Musculoskeletal: Normal range of motion. She exhibits no edema or  tenderness.   She is ambulatory without problems.   Lymphadenopathy:     She has no cervical adenopathy.   Neurological: She is alert and oriented to person, place, and time. She has normal reflexes.   Skin: She is not diaphoretic.   Psychiatric: She has a normal mood and affect. Her behavior is normal. Judgment and thought content normal.   Vitals reviewed.      Assessment:       1. Essential hypertension    2. Malignant neoplasm of upper-outer quadrant of right breast in female, estrogen receptor positive        Plan:       1.  Add Amlodipine 5 mg daily, #90, 1 refill; medication precautions discussed patient.  2.  Continue current medications, follow low sodium, low cholesterol, low carb diet, daily walks.  3.  Continue home blood pressure monitoring.  4.  Keep follow up with specialists.  5.  Keep 4/20/2018 scheduled appointment with me for hypertension follow up but call sooner if problems.

## 2018-01-09 ENCOUNTER — OFFICE VISIT (OUTPATIENT)
Dept: HEMATOLOGY/ONCOLOGY | Facility: CLINIC | Age: 53
End: 2018-01-09
Payer: COMMERCIAL

## 2018-01-09 ENCOUNTER — LAB VISIT (OUTPATIENT)
Dept: LAB | Facility: HOSPITAL | Age: 53
End: 2018-01-09
Attending: INTERNAL MEDICINE
Payer: COMMERCIAL

## 2018-01-09 VITALS
HEIGHT: 68 IN | SYSTOLIC BLOOD PRESSURE: 124 MMHG | DIASTOLIC BLOOD PRESSURE: 84 MMHG | TEMPERATURE: 99 F | OXYGEN SATURATION: 97 % | HEART RATE: 96 BPM | BODY MASS INDEX: 43.2 KG/M2 | RESPIRATION RATE: 18 BRPM | WEIGHT: 285.06 LBS

## 2018-01-09 DIAGNOSIS — G62.9 NEUROPATHY: ICD-10-CM

## 2018-01-09 DIAGNOSIS — Z17.0 MALIGNANT NEOPLASM OF UPPER-OUTER QUADRANT OF RIGHT BREAST IN FEMALE, ESTROGEN RECEPTOR POSITIVE: Primary | ICD-10-CM

## 2018-01-09 DIAGNOSIS — C50.411 MALIGNANT NEOPLASM OF UPPER-OUTER QUADRANT OF RIGHT BREAST IN FEMALE, ESTROGEN RECEPTOR POSITIVE: Primary | ICD-10-CM

## 2018-01-09 DIAGNOSIS — C50.411 MALIGNANT NEOPLASM OF UPPER-OUTER QUADRANT OF RIGHT BREAST IN FEMALE, ESTROGEN RECEPTOR POSITIVE: ICD-10-CM

## 2018-01-09 DIAGNOSIS — Z17.0 MALIGNANT NEOPLASM OF UPPER-OUTER QUADRANT OF RIGHT BREAST IN FEMALE, ESTROGEN RECEPTOR POSITIVE: ICD-10-CM

## 2018-01-09 LAB
ALBUMIN SERPL BCP-MCNC: 3.9 G/DL
ALP SERPL-CCNC: 97 U/L
ALT SERPL W/O P-5'-P-CCNC: 47 U/L
ANION GAP SERPL CALC-SCNC: 9 MMOL/L
AST SERPL-CCNC: 37 U/L
BASOPHILS # BLD AUTO: 0.02 K/UL
BASOPHILS NFR BLD: 0.4 %
BILIRUB SERPL-MCNC: 0.2 MG/DL
BUN SERPL-MCNC: 10 MG/DL
CALCIUM SERPL-MCNC: 9.8 MG/DL
CHLORIDE SERPL-SCNC: 106 MMOL/L
CO2 SERPL-SCNC: 24 MMOL/L
CREAT SERPL-MCNC: 0.7 MG/DL
DIFFERENTIAL METHOD: NORMAL
EOSINOPHIL # BLD AUTO: 0.2 K/UL
EOSINOPHIL NFR BLD: 2.9 %
ERYTHROCYTE [DISTWIDTH] IN BLOOD BY AUTOMATED COUNT: 13.8 %
EST. GFR  (AFRICAN AMERICAN): >60 ML/MIN/1.73 M^2
EST. GFR  (NON AFRICAN AMERICAN): >60 ML/MIN/1.73 M^2
GLUCOSE SERPL-MCNC: 94 MG/DL
HCT VFR BLD AUTO: 38.2 %
HGB BLD-MCNC: 13.1 G/DL
LYMPHOCYTES # BLD AUTO: 2.2 K/UL
LYMPHOCYTES NFR BLD: 40.5 %
MCH RBC QN AUTO: 28.2 PG
MCHC RBC AUTO-ENTMCNC: 34.3 G/DL
MCV RBC AUTO: 82 FL
MONOCYTES # BLD AUTO: 0.5 K/UL
MONOCYTES NFR BLD: 8.4 %
NEUTROPHILS # BLD AUTO: 2.6 K/UL
NEUTROPHILS NFR BLD: 47.8 %
PLATELET # BLD AUTO: 242 K/UL
PMV BLD AUTO: 9.6 FL
POTASSIUM SERPL-SCNC: 3.8 MMOL/L
PROT SERPL-MCNC: 7.6 G/DL
RBC # BLD AUTO: 4.65 M/UL
SODIUM SERPL-SCNC: 139 MMOL/L
WBC # BLD AUTO: 5.5 K/UL

## 2018-01-09 PROCEDURE — 86300 IMMUNOASSAY TUMOR CA 15-3: CPT

## 2018-01-09 PROCEDURE — 36415 COLL VENOUS BLD VENIPUNCTURE: CPT | Mod: PO

## 2018-01-09 PROCEDURE — 85025 COMPLETE CBC W/AUTO DIFF WBC: CPT | Mod: PO

## 2018-01-09 PROCEDURE — 99214 OFFICE O/P EST MOD 30 MIN: CPT | Mod: S$GLB,,, | Performed by: INTERNAL MEDICINE

## 2018-01-09 PROCEDURE — 99999 PR PBB SHADOW E&M-EST. PATIENT-LVL III: CPT | Mod: PBBFAC,,, | Performed by: INTERNAL MEDICINE

## 2018-01-09 PROCEDURE — 80053 COMPREHEN METABOLIC PANEL: CPT | Mod: PO

## 2018-01-09 NOTE — PROGRESS NOTES
Hematology/Oncology Office Note    Reason for referral:  Locally advanced breast cancer with biopsy proven axillary lymph node involvement    CC:    Referred by:  No ref. provider found    Diagnosis:  Right sided locally advanced infiltrating ductal carcinoma with biopsy-proven axillary lymph node involvement (ER positive at 95%, MT positive at 95%, HER-2 1+ by IHC and negative by fish    10/31/17: lumpectomy with sentinel lymph node biopsy on 10/31/2017.   Final pathology demonstrated [id number].  The primary measured 0.8 cm and 2 sentinel lymph nodes were positive with 3 mm macrometastasis within first sentinel lymph node and 1 mm micrometastasis within the second sentinel lymph node.    12/7/2017 right axillary lymph node dissection with 0 nodes positive      Treatment:    History of present illness:  Ms. Urbano is a 51-year-old female with a past medical history of DM hypertension, hypothyroidism, iron deficiency anemia, who was noted to have abnormal screening mammogram  on 4/28/2017.  Core biopsy of right breast mass at 10:00 6 cm from the nipple demonstrated infiltrating ductal carcinoma and right axillary lymph node core biopsy also demonstrated infiltrating ductal carcinoma.  ER/MT positive, HER-2 negative markers.  The patient has a first cousin with a history of breast cancer and one aunt with a history of breast cancer.  She has some soreness at previous biopsy site, however, denies other significant symptoms.    I have reviewed and updated the HPI, ROS, PMHx, Social Hx, Family Hx and treatment history.    Today's visit:  Patient is without complaints today and planning to start adjuvant radiation next week.  Recovered well from right axillary lymph node dissection.    Past Medical History:   Diagnosis Date    Allergic rhinitis, cause unspecified     Cancer     R Breast Cancer    Carpal tunnel syndrome of left wrist     Elevated liver function tests     in the past    Fatty liver 05/02/2017    on  ultrasound    Fibroid uterus     10/2014    Hepatomegaly 05/02/2017    on ultrasound    History of sciatica     HSV infection     Type 1 and 2    HTN (hypertension)     Hypothyroidism     Insulin resistance     Iron deficiency anemia, unspecified     Obesity     PONV (postoperative nausea and vomiting)     Tension headache     Vitamin D deficiency          Social History:  No tobacco, alcohol, or illicit drugs      Family History: family history includes Cancer in her father and sister; Diabetes in her mother; Heart failure in her mother; Hypertension in her mother; No Known Problems in her daughter, daughter, and son.        HPI    Review of Systems   Constitutional: Positive for fatigue. Negative for activity change, appetite change, chills, diaphoresis, fever and unexpected weight change.   HENT: Negative for congestion, ear discharge, hearing loss, mouth sores, nosebleeds, postnasal drip, rhinorrhea, sneezing and sore throat.    Eyes: Negative.    Respiratory: Negative for apnea, shortness of breath, wheezing and stridor.    Cardiovascular: Negative for leg swelling.   Gastrointestinal: Negative for abdominal distention, blood in stool, diarrhea, nausea and rectal pain.   Endocrine: Negative.    Genitourinary: Negative for decreased urine volume, difficulty urinating, dyspareunia, dysuria, enuresis, frequency, pelvic pain and urgency.   Musculoskeletal: Negative for arthralgias, back pain, gait problem, joint swelling, myalgias, neck pain and neck stiffness.   Skin: Negative for color change and wound.   Allergic/Immunologic: Negative.    Neurological: Positive for numbness (Grade 1/2 neuropathy). Negative for dizziness, seizures, syncope, speech difficulty and light-headedness.   Hematological: Negative for adenopathy. Does not bruise/bleed easily.   Psychiatric/Behavioral: Negative for agitation, behavioral problems, confusion, decreased concentration, dysphoric mood and suicidal ideas.      "  Objective:       Vitals:    01/09/18 1408   BP: 124/84   Pulse: 96   Resp: 18   Temp: 98.7 °F (37.1 °C)   TempSrc: Oral   SpO2: 97%   Weight: 129.3 kg (285 lb 0.9 oz)   Height: 5' 8" (1.727 m)       Physical Exam   Constitutional: She is oriented to person, place, and time. She appears well-developed and well-nourished. No distress.   Well groomed   HENT:   Head: Normocephalic and atraumatic. Not macrocephalic.   Right Ear: Tympanic membrane and ear canal normal.   Left Ear: Tympanic membrane and ear canal normal.   Nose: Nose normal. Right sinus exhibits no maxillary sinus tenderness and no frontal sinus tenderness. Left sinus exhibits no maxillary sinus tenderness and no frontal sinus tenderness.   Mouth/Throat: Uvula is midline, oropharynx is clear and moist and mucous membranes are normal.   Eyes: Conjunctivae, EOM and lids are normal. Pupils are equal, round, and reactive to light. Lids are everted and swept, no foreign bodies found. Right eye exhibits no discharge. Left eye exhibits no discharge.   Neck: Trachea normal and normal range of motion. Neck supple. No JVD present. No tracheal deviation present. No thyroid mass and no thyromegaly present.   No crepitus   Cardiovascular: Normal rate, regular rhythm, normal heart sounds, intact distal pulses and normal pulses.  Exam reveals no gallop and no friction rub.    No murmur heard.  Pulmonary/Chest: Effort normal and breath sounds normal. No accessory muscle usage or stridor. No respiratory distress. She has no decreased breath sounds. She has no wheezes. She has no rhonchi. She has no rales. She exhibits no tenderness.   Right breast: Ill-defined induration without discrete mass    Abdominal: Soft. Normal appearance and bowel sounds are normal. She exhibits no distension and no mass. There is no hepatosplenomegaly. There is no tenderness. There is no rebound and no guarding.   Musculoskeletal: Normal range of motion. She exhibits no edema, tenderness or " deformity.   Lymphadenopathy:        Head (right side): No submental and no submandibular adenopathy present.        Head (left side): No submental and no submandibular adenopathy present.     She has no cervical adenopathy.        Right cervical: No superficial cervical and no deep cervical adenopathy present.       Left cervical: No superficial cervical and no deep cervical adenopathy present.     She has no axillary adenopathy.        Right: No supraclavicular adenopathy present.        Left: No supraclavicular adenopathy present.   Neurological: She is alert and oriented to person, place, and time. She displays normal reflexes. No cranial nerve deficit. She exhibits normal muscle tone. Coordination normal.   Skin: Skin is warm, dry and intact. Capillary refill takes less than 2 seconds. No rash noted. Rash is not pustular and not urticarial. She is not diaphoretic. No cyanosis. No pallor. Nails show no clubbing.   Psychiatric: She has a normal mood and affect. Her behavior is normal. Judgment and thought content normal.       Lab Results   Component Value Date    WBC 5.50 01/09/2018    HGB 13.1 01/09/2018    HCT 38.2 01/09/2018    MCV 82 01/09/2018     01/09/2018     Lab Results   Component Value Date    CREATININE 0.7 01/09/2018    BUN 10 01/09/2018     01/09/2018    K 3.8 01/09/2018     01/09/2018    CO2 24 01/09/2018     Lab Results   Component Value Date    ALT 47 (H) 01/09/2018    AST 37 01/09/2018    ALKPHOS 97 01/09/2018    BILITOT 0.2 01/09/2018         Assessment:         52-year-old female with a new diagnosis of right-sided infiltrating ductal carcinoma ER/[place] positive, HER-2 negative with biopsy-proven right axillary lymph node involvement.  Patient was consented for BRCA testing for participation and B-55 study with negative BRCA testing.    She received cycle 1 of dense dose Adriamycin/Cytoxan on 6/12/2017 and tolerated treatment exceptionally well.  She completed cycle 4 of dose  dense Taxol on 9/19/2017.  She underwent lumpectomy with sentinel lymph node biopsy on 10/31/2017.   Final pathology demonstrated [id number].  The primary measured 0.8 cm and 2 sentinel lymph nodes were positive with 3 mm macrometastasis within first sentinel lymph node and 1 mm micrometastasis within the second sentinel lymph node.  She underwent a full right axillary lymph node dissection on 11/8/2017 which demonstrated 0 positive nodes.  She has been evaluated by Dr. Wynn with plans to initiate adjuvant radiation next week.  We will schedule the patient to follow-up in 2 months at which time we will begin antiestrogen therapy if radiation is complete.    ER/GA positive, HER-2 negative infiltrating ductal carcinoma the right breast with biopsy proven lymph node involvement clinically stage II/III  [id number].  Pathology following neoadjuvant therapy demonstrated 1 sentinel lymph node with micrometastasis (3 mm) and second lymph node with micrometastasis (1 mm)  --BRCA testing via B-55 Study is negative for mutation  --ddAC--> taxol   cycle 1--- 6/12/2017  --Final cycle Cycle 4 on 9/19/2017  --Planning to start adjuvant radiation next week  --Follow-up in 2 months to begin antiestrogen therapy at the completion of radiation    Hypertension:  --Continue current medical management    Hypothyroidism:  Continue Synthroid    4 mm right upper lobe pulmonary nodule and subpleural nodularity bilateral bases:  --Repeat CT scan demonstrates stable findings  --Continue to monitor

## 2018-01-11 LAB — CANCER AG27-29 SERPL-ACNC: 22.1 U/ML

## 2018-01-15 PROCEDURE — 77280 THER RAD SIMULAJ FIELD SMPL: CPT | Mod: TC | Performed by: RADIOLOGY

## 2018-01-15 PROCEDURE — 77280 THER RAD SIMULAJ FIELD SMPL: CPT | Mod: 26,,, | Performed by: RADIOLOGY

## 2018-01-16 PROCEDURE — 77338 DESIGN MLC DEVICE FOR IMRT: CPT | Mod: TC | Performed by: RADIOLOGY

## 2018-01-16 PROCEDURE — 77300 RADIATION THERAPY DOSE PLAN: CPT | Mod: TC | Performed by: RADIOLOGY

## 2018-01-16 PROCEDURE — 77385 HC IMRT, SIMPLE: CPT | Performed by: RADIOLOGY

## 2018-01-16 PROCEDURE — 77387 GUIDANCE FOR RADJ TX DLVR: CPT | Mod: 26,,, | Performed by: RADIOLOGY

## 2018-01-16 PROCEDURE — 77301 RADIOTHERAPY DOSE PLAN IMRT: CPT | Mod: 26,,, | Performed by: RADIOLOGY

## 2018-01-16 PROCEDURE — 77300 RADIATION THERAPY DOSE PLAN: CPT | Mod: 26,,, | Performed by: RADIOLOGY

## 2018-01-16 PROCEDURE — 77338 DESIGN MLC DEVICE FOR IMRT: CPT | Mod: 26,,, | Performed by: RADIOLOGY

## 2018-01-16 PROCEDURE — 77301 RADIOTHERAPY DOSE PLAN IMRT: CPT | Mod: TC | Performed by: RADIOLOGY

## 2018-01-18 PROCEDURE — 77387 GUIDANCE FOR RADJ TX DLVR: CPT | Mod: 26,,, | Performed by: RADIOLOGY

## 2018-01-18 PROCEDURE — 77385 HC IMRT, SIMPLE: CPT | Performed by: RADIOLOGY

## 2018-01-19 ENCOUNTER — OFFICE VISIT (OUTPATIENT)
Dept: SURGERY | Facility: CLINIC | Age: 53
End: 2018-01-19
Payer: COMMERCIAL

## 2018-01-19 VITALS
WEIGHT: 285.06 LBS | HEART RATE: 102 BPM | BODY MASS INDEX: 43.34 KG/M2 | TEMPERATURE: 98 F | SYSTOLIC BLOOD PRESSURE: 157 MMHG | DIASTOLIC BLOOD PRESSURE: 99 MMHG

## 2018-01-19 DIAGNOSIS — Z98.890 POSTOPERATIVE STATE: Primary | ICD-10-CM

## 2018-01-19 PROCEDURE — 99024 POSTOP FOLLOW-UP VISIT: CPT | Mod: S$GLB,,, | Performed by: SURGERY

## 2018-01-19 PROCEDURE — 77385 HC IMRT, SIMPLE: CPT | Performed by: RADIOLOGY

## 2018-01-19 PROCEDURE — 77387 GUIDANCE FOR RADJ TX DLVR: CPT | Mod: 26,,, | Performed by: RADIOLOGY

## 2018-01-19 PROCEDURE — 99999 PR PBB SHADOW E&M-EST. PATIENT-LVL II: CPT | Mod: PBBFAC,,, | Performed by: SURGERY

## 2018-01-19 NOTE — PROGRESS NOTES
Patient returns now in follow-up.  She is one month status post right axillary lymph node dissection.  She had neoadjuvant chemotherapy starting in June of last year.  At the end of neoadjuvant chemotherapy she had right breast partial mastectomy and sentinel node biopsy on 10/31/17.  The final margins on the lumpectomy site were clear see past notes.  However she did have a 3 mm metastasis in 1 sentinel nodes which was read as normal on frozen section and a micro-met another sentinel node which was read as normal on frozen section.  After discussion with radiation therapy was felt that she needed a completion axillary node dissection which occurred on 12/7/2017.  She is ER/TX positive.  She returns now doing well with some minimal right upper medial arm occasional discomfort.  She is eating well ambulating well and denies any wound problems.    Objective vital signs are stable.  Patient is alert and oriented.  The right axillary incision and the right breast partial mastectomy incisions are clean and dry and healing in well.  There is no significant breast distortion and there is no underlying fluid collection under the incisions.  She has good range of motion of the right upper extremity.  There is no breast or right upper extremity edema.    Impression: Doing well status post right partial mastectomy and subsequent axillary node dissection.  Plan: She may resume all her regular physical activities he is return to see me when necessary.  I told the patient that when Dr. Allen refills her Mediport easily be removed we can do that in the office in the future.

## 2018-01-22 PROCEDURE — 77385 HC IMRT, SIMPLE: CPT | Performed by: RADIOLOGY

## 2018-01-22 PROCEDURE — 77336 RADIATION PHYSICS CONSULT: CPT | Performed by: RADIOLOGY

## 2018-01-22 PROCEDURE — 77387 GUIDANCE FOR RADJ TX DLVR: CPT | Mod: 26,,, | Performed by: RADIOLOGY

## 2018-01-23 ENCOUNTER — DOCUMENTATION ONLY (OUTPATIENT)
Dept: RADIATION ONCOLOGY | Facility: CLINIC | Age: 53
End: 2018-01-23

## 2018-01-23 PROCEDURE — 77387 GUIDANCE FOR RADJ TX DLVR: CPT | Mod: 26,,, | Performed by: RADIOLOGY

## 2018-01-23 PROCEDURE — 77385 HC IMRT, SIMPLE: CPT | Performed by: RADIOLOGY

## 2018-01-23 NOTE — PLAN OF CARE
Problem: Patient Care Overview  Goal: Plan of Care Review  Outcome: Ongoing (interventions implemented as appropriate)  Pt here for RT to R breast and SC #5/28.  Tolerating well with intact skin and no complaints at this time. Reminded continued usage of miaderm.  Verbalized understanding.  Saw Dr. Wynn today.  Will continue to monitor and f.u.

## 2018-01-23 NOTE — PLAN OF CARE
Problem: Patient Care Overview  Goal: Plan of Care Review  Outcome: Ongoing (interventions implemented as appropriate)  Breast radiation side effects reviewed and h/o given as well as miaderm with instructions and clinic numbers/when to call; all to which pt verbalized understanding.  Will continue to monitor.

## 2018-01-24 PROCEDURE — 77417 THER RADIOLOGY PORT IMAGE(S): CPT | Performed by: RADIOLOGY

## 2018-01-24 PROCEDURE — 77385 HC IMRT, SIMPLE: CPT | Performed by: RADIOLOGY

## 2018-01-24 PROCEDURE — 77387 GUIDANCE FOR RADJ TX DLVR: CPT | Mod: 26,,, | Performed by: RADIOLOGY

## 2018-01-25 PROCEDURE — 77387 GUIDANCE FOR RADJ TX DLVR: CPT | Mod: 26,,, | Performed by: RADIOLOGY

## 2018-01-25 PROCEDURE — 77385 HC IMRT, SIMPLE: CPT | Performed by: RADIOLOGY

## 2018-01-26 PROCEDURE — 77385 HC IMRT, SIMPLE: CPT | Performed by: RADIOLOGY

## 2018-01-26 PROCEDURE — 77387 GUIDANCE FOR RADJ TX DLVR: CPT | Mod: 26,,, | Performed by: RADIOLOGY

## 2018-01-29 ENCOUNTER — DOCUMENTATION ONLY (OUTPATIENT)
Dept: RADIATION ONCOLOGY | Facility: CLINIC | Age: 53
End: 2018-01-29

## 2018-01-29 PROCEDURE — 77387 GUIDANCE FOR RADJ TX DLVR: CPT | Mod: 26,,, | Performed by: RADIOLOGY

## 2018-01-29 PROCEDURE — 77336 RADIATION PHYSICS CONSULT: CPT | Performed by: RADIOLOGY

## 2018-01-29 PROCEDURE — 77385 HC IMRT, SIMPLE: CPT | Performed by: RADIOLOGY

## 2018-01-30 PROCEDURE — 77387 GUIDANCE FOR RADJ TX DLVR: CPT | Mod: 26,,, | Performed by: RADIOLOGY

## 2018-01-30 PROCEDURE — 77385 HC IMRT, SIMPLE: CPT | Performed by: RADIOLOGY

## 2018-01-31 PROCEDURE — 77387 GUIDANCE FOR RADJ TX DLVR: CPT | Mod: 26,,, | Performed by: RADIOLOGY

## 2018-01-31 PROCEDURE — 77385 HC IMRT, SIMPLE: CPT | Performed by: RADIOLOGY

## 2018-01-31 PROCEDURE — 77417 THER RADIOLOGY PORT IMAGE(S): CPT | Performed by: RADIOLOGY

## 2018-02-01 ENCOUNTER — HOSPITAL ENCOUNTER (OUTPATIENT)
Dept: RADIATION THERAPY | Facility: HOSPITAL | Age: 53
Discharge: HOME | End: 2018-02-01
Attending: RADIOLOGY
Payer: COMMERCIAL

## 2018-02-01 PROCEDURE — 77385 HC IMRT, SIMPLE: CPT | Performed by: RADIOLOGY

## 2018-02-01 PROCEDURE — 77387 GUIDANCE FOR RADJ TX DLVR: CPT | Mod: 26,,, | Performed by: RADIOLOGY

## 2018-02-02 PROCEDURE — 77385 HC IMRT, SIMPLE: CPT | Performed by: RADIOLOGY

## 2018-02-02 PROCEDURE — 77387 GUIDANCE FOR RADJ TX DLVR: CPT | Mod: 26,,, | Performed by: RADIOLOGY

## 2018-02-05 PROCEDURE — 77387 GUIDANCE FOR RADJ TX DLVR: CPT | Mod: 26,,, | Performed by: RADIOLOGY

## 2018-02-05 PROCEDURE — 77336 RADIATION PHYSICS CONSULT: CPT | Performed by: RADIOLOGY

## 2018-02-05 PROCEDURE — 77385 HC IMRT, SIMPLE: CPT | Performed by: RADIOLOGY

## 2018-02-05 NOTE — PLAN OF CARE
Problem: Patient Care Overview  Goal: Plan of Care Review  Outcome: Ongoing (interventions implemented as appropriate)  Pt here for RT to R Br/Ac/Sc #9/28.  Tolerating well so far with no complaints or skin changes at this time.  Using miaderm.  Saw Dr. Wynn today.  Will continue to monitor and f.u daily.

## 2018-02-06 ENCOUNTER — DOCUMENTATION ONLY (OUTPATIENT)
Dept: RADIATION ONCOLOGY | Facility: CLINIC | Age: 53
End: 2018-02-06

## 2018-02-06 PROCEDURE — 77387 GUIDANCE FOR RADJ TX DLVR: CPT | Mod: 26,,, | Performed by: RADIOLOGY

## 2018-02-06 PROCEDURE — 77385 HC IMRT, SIMPLE: CPT | Performed by: RADIOLOGY

## 2018-02-07 PROCEDURE — 77385 HC IMRT, SIMPLE: CPT | Performed by: RADIOLOGY

## 2018-02-07 PROCEDURE — 77387 GUIDANCE FOR RADJ TX DLVR: CPT | Mod: 26,,, | Performed by: RADIOLOGY

## 2018-02-08 PROCEDURE — 77417 THER RADIOLOGY PORT IMAGE(S): CPT | Performed by: RADIOLOGY

## 2018-02-08 PROCEDURE — 77387 GUIDANCE FOR RADJ TX DLVR: CPT | Mod: 26,,, | Performed by: RADIOLOGY

## 2018-02-08 PROCEDURE — 77385 HC IMRT, SIMPLE: CPT | Performed by: RADIOLOGY

## 2018-02-09 PROCEDURE — 77385 HC IMRT, SIMPLE: CPT | Performed by: RADIOLOGY

## 2018-02-09 PROCEDURE — 77387 GUIDANCE FOR RADJ TX DLVR: CPT | Mod: 26,,, | Performed by: RADIOLOGY

## 2018-02-09 NOTE — PLAN OF CARE
Problem: Patient Care Overview  Goal: Plan of Care Review  Outcome: Ongoing (interventions implemented as appropriate)  Pt here for RT #15/28 to R breast.  Tolerating well so far.  Faint erythema noted.  Using miaderm as instructed.  Saw Dr. Wynn today.  Will continue to monitor and f.u .

## 2018-02-12 PROCEDURE — 77387 GUIDANCE FOR RADJ TX DLVR: CPT | Mod: 26,,, | Performed by: RADIOLOGY

## 2018-02-12 PROCEDURE — 77385 HC IMRT, SIMPLE: CPT | Performed by: RADIOLOGY

## 2018-02-13 PROCEDURE — 77385 HC IMRT, SIMPLE: CPT | Performed by: RADIOLOGY

## 2018-02-13 PROCEDURE — 77387 GUIDANCE FOR RADJ TX DLVR: CPT | Mod: 26,,, | Performed by: RADIOLOGY

## 2018-02-14 PROCEDURE — 77387 GUIDANCE FOR RADJ TX DLVR: CPT | Mod: 26,,, | Performed by: RADIOLOGY

## 2018-02-14 PROCEDURE — 77385 HC IMRT, SIMPLE: CPT | Performed by: RADIOLOGY

## 2018-02-14 PROCEDURE — 77336 RADIATION PHYSICS CONSULT: CPT | Performed by: RADIOLOGY

## 2018-02-14 PROCEDURE — 77417 THER RADIOLOGY PORT IMAGE(S): CPT | Performed by: RADIOLOGY

## 2018-02-15 PROCEDURE — 77387 GUIDANCE FOR RADJ TX DLVR: CPT | Mod: 26,,, | Performed by: RADIOLOGY

## 2018-02-15 PROCEDURE — 77385 HC IMRT, SIMPLE: CPT | Performed by: RADIOLOGY

## 2018-02-16 ENCOUNTER — DOCUMENTATION ONLY (OUTPATIENT)
Dept: RADIATION ONCOLOGY | Facility: CLINIC | Age: 53
End: 2018-02-16

## 2018-02-16 PROCEDURE — 77385 HC IMRT, SIMPLE: CPT | Performed by: RADIOLOGY

## 2018-02-16 PROCEDURE — 77387 GUIDANCE FOR RADJ TX DLVR: CPT | Mod: 26,,, | Performed by: RADIOLOGY

## 2018-02-16 NOTE — PLAN OF CARE
Problem: Patient Care Overview  Goal: Plan of Care Review  Outcome: Ongoing (interventions implemented as appropriate)  Day 23 XRT right breast. Tolerating therapy well. No skin break downs

## 2018-02-19 PROCEDURE — 77387 GUIDANCE FOR RADJ TX DLVR: CPT | Mod: 26,,, | Performed by: RADIOLOGY

## 2018-02-19 PROCEDURE — 77385 HC IMRT, SIMPLE: CPT | Performed by: RADIOLOGY

## 2018-02-20 PROCEDURE — 77300 RADIATION THERAPY DOSE PLAN: CPT | Mod: TC | Performed by: RADIOLOGY

## 2018-02-20 PROCEDURE — 77385 HC IMRT, SIMPLE: CPT | Performed by: RADIOLOGY

## 2018-02-20 PROCEDURE — 77387 GUIDANCE FOR RADJ TX DLVR: CPT | Mod: 26,,, | Performed by: RADIOLOGY

## 2018-02-20 PROCEDURE — 77300 RADIATION THERAPY DOSE PLAN: CPT | Mod: 26,,, | Performed by: RADIOLOGY

## 2018-02-21 PROCEDURE — 77385 HC IMRT, SIMPLE: CPT | Performed by: RADIOLOGY

## 2018-02-21 PROCEDURE — 77387 GUIDANCE FOR RADJ TX DLVR: CPT | Mod: 26,,, | Performed by: RADIOLOGY

## 2018-02-21 PROCEDURE — 77336 RADIATION PHYSICS CONSULT: CPT | Performed by: RADIOLOGY

## 2018-02-22 ENCOUNTER — DOCUMENTATION ONLY (OUTPATIENT)
Dept: RADIATION ONCOLOGY | Facility: CLINIC | Age: 53
End: 2018-02-22

## 2018-02-22 DIAGNOSIS — Z17.0 MALIGNANT NEOPLASM OF UPPER-OUTER QUADRANT OF RIGHT BREAST IN FEMALE, ESTROGEN RECEPTOR POSITIVE: Primary | ICD-10-CM

## 2018-02-22 DIAGNOSIS — C50.411 MALIGNANT NEOPLASM OF UPPER-OUTER QUADRANT OF RIGHT BREAST IN FEMALE, ESTROGEN RECEPTOR POSITIVE: Primary | ICD-10-CM

## 2018-02-22 PROCEDURE — 77387 GUIDANCE FOR RADJ TX DLVR: CPT | Mod: 26,,, | Performed by: RADIOLOGY

## 2018-02-22 PROCEDURE — 77385 HC IMRT, SIMPLE: CPT | Performed by: RADIOLOGY

## 2018-02-22 RX ORDER — TRIAMCINOLONE ACETONIDE 1 MG/G
CREAM TOPICAL 2 TIMES DAILY
Qty: 45 G | Refills: 0 | Status: SHIPPED | OUTPATIENT
Start: 2018-02-22 | End: 2018-10-26

## 2018-02-22 NOTE — PLAN OF CARE
Problem: Patient Care Overview  Goal: Plan of Care Review  Outcome: Ongoing (interventions implemented as appropriate)  Day 27 XRT to right breast/axilla. C/o nipple discomfort and right axillary discomfort. Using miaderm cream and gel packs. No desquamation. Brisk hyperpigmentation. Will continue to monitor.

## 2018-02-23 PROCEDURE — 77385 HC IMRT, SIMPLE: CPT | Performed by: RADIOLOGY

## 2018-02-23 PROCEDURE — 77387 GUIDANCE FOR RADJ TX DLVR: CPT | Mod: 26,,, | Performed by: RADIOLOGY

## 2018-02-26 PROCEDURE — 77014 HC CT GUIDANCE RADIATION THERAPY FLDS PLACEMENT: CPT | Mod: TC | Performed by: RADIOLOGY

## 2018-02-26 PROCEDURE — 77385 HC IMRT, SIMPLE: CPT | Performed by: RADIOLOGY

## 2018-02-27 PROCEDURE — 77014 HC CT GUIDANCE RADIATION THERAPY FLDS PLACEMENT: CPT | Mod: TC | Performed by: RADIOLOGY

## 2018-02-27 PROCEDURE — 77385 HC IMRT, SIMPLE: CPT | Performed by: RADIOLOGY

## 2018-02-28 PROCEDURE — 77014 HC CT GUIDANCE RADIATION THERAPY FLDS PLACEMENT: CPT | Mod: TC | Performed by: RADIOLOGY

## 2018-02-28 PROCEDURE — 77385 HC IMRT, SIMPLE: CPT | Performed by: RADIOLOGY

## 2018-02-28 PROCEDURE — 77336 RADIATION PHYSICS CONSULT: CPT | Performed by: RADIOLOGY

## 2018-02-28 RX ORDER — ACYCLOVIR 400 MG/1
TABLET ORAL
Qty: 30 TABLET | Refills: 1 | Status: SHIPPED | OUTPATIENT
Start: 2018-02-28 | End: 2018-09-19 | Stop reason: SDUPTHER

## 2018-03-01 ENCOUNTER — HOSPITAL ENCOUNTER (OUTPATIENT)
Dept: RADIATION THERAPY | Facility: HOSPITAL | Age: 53
Discharge: HOME | End: 2018-03-01
Attending: RADIOLOGY
Payer: COMMERCIAL

## 2018-03-01 ENCOUNTER — DOCUMENTATION ONLY (OUTPATIENT)
Dept: RADIATION ONCOLOGY | Facility: CLINIC | Age: 53
End: 2018-03-01

## 2018-03-01 PROCEDURE — 77385 HC IMRT, SIMPLE: CPT | Performed by: RADIOLOGY

## 2018-03-01 PROCEDURE — 77014 HC CT GUIDANCE RADIATION THERAPY FLDS PLACEMENT: CPT | Mod: TC | Performed by: RADIOLOGY

## 2018-03-01 NOTE — PLAN OF CARE
Problem: Patient Care Overview  Goal: Plan of Care Review  Outcome: Ongoing (interventions implemented as appropriate)  Day 32 XRT to right breast. Tolerating therapy well. Some tenderness to area, no desquamation. Will continue to monitor.

## 2018-03-02 ENCOUNTER — DOCUMENTATION ONLY (OUTPATIENT)
Dept: RADIATION ONCOLOGY | Facility: CLINIC | Age: 53
End: 2018-03-02

## 2018-03-02 PROCEDURE — 77385 HC IMRT, SIMPLE: CPT | Performed by: RADIOLOGY

## 2018-03-02 PROCEDURE — 77014 HC CT GUIDANCE RADIATION THERAPY FLDS PLACEMENT: CPT | Mod: TC | Performed by: RADIOLOGY

## 2018-03-02 NOTE — PLAN OF CARE
Problem: Patient Care Overview  Goal: Plan of Care Review  Outcome: Outcome(s) achieved Date Met: 03/02/18  Completed XRT to right breast today 3-2-18. F/u appt made

## 2018-03-09 ENCOUNTER — TELEPHONE (OUTPATIENT)
Dept: RADIATION ONCOLOGY | Facility: CLINIC | Age: 53
End: 2018-03-09

## 2018-03-09 NOTE — TELEPHONE ENCOUNTER
Made 1 week f/u call to Mrs Urbano. No answer, left message on VM to call back if she had any questions or concerns.

## 2018-03-13 ENCOUNTER — LAB VISIT (OUTPATIENT)
Dept: LAB | Facility: HOSPITAL | Age: 53
End: 2018-03-13
Attending: INTERNAL MEDICINE
Payer: COMMERCIAL

## 2018-03-13 ENCOUNTER — OFFICE VISIT (OUTPATIENT)
Dept: HEMATOLOGY/ONCOLOGY | Facility: CLINIC | Age: 53
End: 2018-03-13
Payer: COMMERCIAL

## 2018-03-13 VITALS
RESPIRATION RATE: 18 BRPM | BODY MASS INDEX: 44.21 KG/M2 | TEMPERATURE: 98 F | HEIGHT: 68 IN | HEART RATE: 97 BPM | OXYGEN SATURATION: 99 % | WEIGHT: 291.69 LBS | SYSTOLIC BLOOD PRESSURE: 124 MMHG | DIASTOLIC BLOOD PRESSURE: 80 MMHG

## 2018-03-13 DIAGNOSIS — R91.1 PULMONARY NODULE: ICD-10-CM

## 2018-03-13 DIAGNOSIS — C50.411 MALIGNANT NEOPLASM OF UPPER-OUTER QUADRANT OF RIGHT BREAST IN FEMALE, ESTROGEN RECEPTOR POSITIVE: Primary | ICD-10-CM

## 2018-03-13 DIAGNOSIS — Z17.0 MALIGNANT NEOPLASM OF UPPER-OUTER QUADRANT OF RIGHT BREAST IN FEMALE, ESTROGEN RECEPTOR POSITIVE: ICD-10-CM

## 2018-03-13 DIAGNOSIS — Z17.0 MALIGNANT NEOPLASM OF UPPER-OUTER QUADRANT OF RIGHT BREAST IN FEMALE, ESTROGEN RECEPTOR POSITIVE: Primary | ICD-10-CM

## 2018-03-13 DIAGNOSIS — G62.9 NEUROPATHY: ICD-10-CM

## 2018-03-13 DIAGNOSIS — C50.411 MALIGNANT NEOPLASM OF UPPER-OUTER QUADRANT OF RIGHT BREAST IN FEMALE, ESTROGEN RECEPTOR POSITIVE: ICD-10-CM

## 2018-03-13 DIAGNOSIS — N91.2 AMENORRHEA: ICD-10-CM

## 2018-03-13 LAB
ALBUMIN SERPL BCP-MCNC: 3.7 G/DL
ALP SERPL-CCNC: 110 U/L
ALT SERPL W/O P-5'-P-CCNC: 78 U/L
ANION GAP SERPL CALC-SCNC: 5 MMOL/L
AST SERPL-CCNC: 49 U/L
BASOPHILS # BLD AUTO: 0.01 K/UL
BASOPHILS NFR BLD: 0.3 %
BILIRUB SERPL-MCNC: 0.2 MG/DL
BUN SERPL-MCNC: 14 MG/DL
CALCIUM SERPL-MCNC: 9.6 MG/DL
CHLORIDE SERPL-SCNC: 106 MMOL/L
CO2 SERPL-SCNC: 29 MMOL/L
CREAT SERPL-MCNC: 0.7 MG/DL
DIFFERENTIAL METHOD: ABNORMAL
EOSINOPHIL # BLD AUTO: 0.1 K/UL
EOSINOPHIL NFR BLD: 3.5 %
ERYTHROCYTE [DISTWIDTH] IN BLOOD BY AUTOMATED COUNT: 15.8 %
EST. GFR  (AFRICAN AMERICAN): >60 ML/MIN/1.73 M^2
EST. GFR  (NON AFRICAN AMERICAN): >60 ML/MIN/1.73 M^2
FSH SERPL-ACNC: 44.5 MIU/ML
GLUCOSE SERPL-MCNC: 103 MG/DL
HCT VFR BLD AUTO: 36.6 %
HGB BLD-MCNC: 12.5 G/DL
LYMPHOCYTES # BLD AUTO: 0.8 K/UL
LYMPHOCYTES NFR BLD: 21.3 %
MCH RBC QN AUTO: 29 PG
MCHC RBC AUTO-ENTMCNC: 34.2 G/DL
MCV RBC AUTO: 85 FL
MONOCYTES # BLD AUTO: 0.3 K/UL
MONOCYTES NFR BLD: 7.2 %
NEUTROPHILS # BLD AUTO: 2.6 K/UL
NEUTROPHILS NFR BLD: 67.7 %
PLATELET # BLD AUTO: 194 K/UL
PMV BLD AUTO: 8.9 FL
POTASSIUM SERPL-SCNC: 3.7 MMOL/L
PROT SERPL-MCNC: 7.5 G/DL
RBC # BLD AUTO: 4.31 M/UL
SODIUM SERPL-SCNC: 140 MMOL/L
WBC # BLD AUTO: 3.76 K/UL

## 2018-03-13 PROCEDURE — 83001 ASSAY OF GONADOTROPIN (FSH): CPT

## 2018-03-13 PROCEDURE — 99214 OFFICE O/P EST MOD 30 MIN: CPT | Mod: S$GLB,,, | Performed by: INTERNAL MEDICINE

## 2018-03-13 PROCEDURE — 99999 PR PBB SHADOW E&M-EST. PATIENT-LVL III: CPT | Mod: PBBFAC,,, | Performed by: INTERNAL MEDICINE

## 2018-03-13 PROCEDURE — 86300 IMMUNOASSAY TUMOR CA 15-3: CPT

## 2018-03-13 PROCEDURE — 80053 COMPREHEN METABOLIC PANEL: CPT | Mod: PO

## 2018-03-13 PROCEDURE — 36415 COLL VENOUS BLD VENIPUNCTURE: CPT | Mod: PO

## 2018-03-13 PROCEDURE — 3079F DIAST BP 80-89 MM HG: CPT | Mod: CPTII,S$GLB,, | Performed by: INTERNAL MEDICINE

## 2018-03-13 PROCEDURE — 85025 COMPLETE CBC W/AUTO DIFF WBC: CPT | Mod: PO

## 2018-03-13 PROCEDURE — 3074F SYST BP LT 130 MM HG: CPT | Mod: CPTII,S$GLB,, | Performed by: INTERNAL MEDICINE

## 2018-03-13 NOTE — PROGRESS NOTES
Hematology/Oncology Office Note    Reason for referral:  Locally advanced breast cancer with biopsy proven axillary lymph node involvement    CC:    Referred by:  No ref. provider found    Diagnosis:  Right sided locally advanced infiltrating ductal carcinoma with biopsy-proven axillary lymph node involvement (ER positive at 95%, GA positive at 95%, HER-2 1+ by IHC and negative by fish    10/31/17: lumpectomy with sentinel lymph node biopsy on 10/31/2017.   Final pathology demonstrated [id number].  The primary measured 0.8 cm and 2 sentinel lymph nodes were positive with 3 mm macrometastasis within first sentinel lymph node and 1 mm micrometastasis within the second sentinel lymph node.    12/7/2017 right axillary lymph node dissection with 0 nodes positive      Treatment:    History of present illness:  Ms. Urbano is a 51-year-old female with a past medical history of DM hypertension, hypothyroidism, iron deficiency anemia, who was noted to have abnormal screening mammogram  on 4/28/2017.  Core biopsy of right breast mass at 10:00 6 cm from the nipple demonstrated infiltrating ductal carcinoma and right axillary lymph node core biopsy also demonstrated infiltrating ductal carcinoma.  ER/GA positive, HER-2 negative markers.  The patient has a first cousin with a history of breast cancer and one aunt with a history of breast cancer.  She has some soreness at previous biopsy site, however, denies other significant symptoms.    I have reviewed and updated the HPI, ROS, PMHx, Social Hx, Family Hx and treatment history.    Today's visit:  Patient is without complaints today.  She completed adjuvant radiation on 2/27/2018  --She presents today to discuss adjuvant treatment has no new complaints today    Past Medical History:   Diagnosis Date    Allergic rhinitis, cause unspecified     Cancer     R Breast Cancer    Carpal tunnel syndrome of left wrist     Elevated liver function tests     in the past    Fatty liver  05/02/2017    on ultrasound    Fibroid uterus     10/2014    Hepatomegaly 05/02/2017    on ultrasound    History of sciatica     HSV infection     Type 1 and 2    HTN (hypertension)     Hypothyroidism     Insulin resistance     Iron deficiency anemia, unspecified     Obesity     PONV (postoperative nausea and vomiting)     Tension headache     Vitamin D deficiency          Social History:  No tobacco, alcohol, or illicit drugs      Family History: family history includes Cancer in her father and sister; Diabetes in her mother; Heart failure in her mother; Hypertension in her mother; No Known Problems in her daughter, daughter, and son.        HPI    Review of Systems   Constitutional: Positive for fatigue. Negative for activity change, appetite change, chills, diaphoresis, fever and unexpected weight change.   HENT: Negative for congestion, dental problem, drooling, ear pain, facial swelling, nosebleeds, postnasal drip, rhinorrhea, sneezing and sore throat.    Eyes: Negative.    Respiratory: Negative for apnea, cough, choking, shortness of breath, wheezing and stridor.    Cardiovascular: Negative for chest pain, palpitations and leg swelling.   Gastrointestinal: Negative for abdominal distention, blood in stool, diarrhea, nausea and rectal pain.   Endocrine: Negative.    Genitourinary: Negative for decreased urine volume, difficulty urinating, enuresis, frequency, pelvic pain and urgency.   Musculoskeletal: Negative for arthralgias, gait problem, joint swelling, myalgias and neck stiffness.   Skin: Negative for color change and wound.   Allergic/Immunologic: Negative.    Neurological: Positive for numbness (Grade 1/2 neuropathy). Negative for dizziness, seizures, syncope, speech difficulty and light-headedness.   Hematological: Negative for adenopathy. Does not bruise/bleed easily.   Psychiatric/Behavioral: Negative for agitation, confusion, decreased concentration, dysphoric mood, hallucinations and  "suicidal ideas. The patient is not nervous/anxious and is not hyperactive.        Objective:       Vitals:    03/13/18 1401   BP: 124/80   BP Location: Right arm   Patient Position: Sitting   BP Method: Large (Manual)   Pulse: 97   Resp: 18   Temp: 98.1 °F (36.7 °C)   TempSrc: Oral   SpO2: 99%   Weight: 132.3 kg (291 lb 10.7 oz)   Height: 5' 8" (1.727 m)       Physical Exam   Constitutional: She is oriented to person, place, and time. She appears well-developed and well-nourished. No distress.   Well groomed   HENT:   Head: Normocephalic and atraumatic. Not macrocephalic.   Right Ear: Tympanic membrane and ear canal normal.   Left Ear: Tympanic membrane and ear canal normal.   Nose: Nose normal. Right sinus exhibits no maxillary sinus tenderness and no frontal sinus tenderness. Left sinus exhibits no maxillary sinus tenderness and no frontal sinus tenderness.   Mouth/Throat: Uvula is midline, oropharynx is clear and moist and mucous membranes are normal.   Eyes: Conjunctivae, EOM and lids are normal. Pupils are equal, round, and reactive to light. Lids are everted and swept, no foreign bodies found. Right eye exhibits no discharge. Left eye exhibits no discharge.   Neck: Trachea normal and normal range of motion. Neck supple. No JVD present. No tracheal deviation present. No thyroid mass and no thyromegaly present.   No crepitus   Cardiovascular: Normal rate, regular rhythm, normal heart sounds, intact distal pulses and normal pulses.  Exam reveals no gallop and no friction rub.    No murmur heard.  Pulmonary/Chest: Effort normal and breath sounds normal. No accessory muscle usage or stridor. No respiratory distress. She has no decreased breath sounds. She has no wheezes. She has no rhonchi. She has no rales. She exhibits no tenderness.   Right breast: Ill-defined induration without discrete mass    Abdominal: Soft. Normal appearance and bowel sounds are normal. She exhibits no distension and no mass. There is no " hepatosplenomegaly. There is no tenderness. There is no rebound and no guarding.   Musculoskeletal: Normal range of motion. She exhibits no edema, tenderness or deformity.   Lymphadenopathy:        Head (right side): No submental and no submandibular adenopathy present.        Head (left side): No submental and no submandibular adenopathy present.     She has no cervical adenopathy.        Right cervical: No superficial cervical and no deep cervical adenopathy present.       Left cervical: No superficial cervical and no deep cervical adenopathy present.     She has no axillary adenopathy.        Right: No supraclavicular adenopathy present.        Left: No supraclavicular adenopathy present.   Neurological: She is alert and oriented to person, place, and time. She displays normal reflexes. No cranial nerve deficit. She exhibits normal muscle tone. Coordination normal.   Skin: Skin is warm, dry and intact. Capillary refill takes less than 2 seconds. No abrasion, no bruising and no rash noted. She is not diaphoretic. No cyanosis. No pallor. Nails show no clubbing.   Psychiatric: She has a normal mood and affect. Her behavior is normal. Judgment and thought content normal.       Lab Results   Component Value Date    WBC 3.76 (L) 03/13/2018    HGB 12.5 03/13/2018    HCT 36.6 (L) 03/13/2018    MCV 85 03/13/2018     03/13/2018     Lab Results   Component Value Date    CREATININE 0.7 03/13/2018    BUN 14 03/13/2018     03/13/2018    K 3.7 03/13/2018     03/13/2018    CO2 29 03/13/2018     Lab Results   Component Value Date    ALT 78 (H) 03/13/2018    AST 49 (H) 03/13/2018    ALKPHOS 110 03/13/2018    BILITOT 0.2 03/13/2018         Assessment:         52-year-old female with a new diagnosis of right-sided infiltrating ductal carcinoma ER/[place] positive, HER-2 negative with biopsy-proven right axillary lymph node involvement.  Patient was consented for BRCA testing for participation and B-55 study with  negative BRCA testing.    She received cycle 1 of dense dose Adriamycin/Cytoxan on 6/12/2017 and tolerated treatment exceptionally well.  She completed cycle 4 of dose dense Taxol on 9/19/2017.  She underwent lumpectomy with sentinel lymph node biopsy on 10/31/2017.   Final pathology demonstrated [id number].  The primary measured 0.8 cm and 2 sentinel lymph nodes were positive with 3 mm macrometastasis within first sentinel lymph node and 1 mm micrometastasis within the second sentinel lymph node.  She underwent a full right axillary lymph node dissection on 11/8/2017 which demonstrated 0 positive nodes.  She completed adjuvant radiation on 2/27/2018 and presents today to discuss antiestrogen therapy.  The patient had a hysterectomy approximately 6/20/14.  We will check FSH level, however, I suspect that she is perimenopausal and not a candidate for AI treatment.  We will also check bone density study and she will follow-up in 2-3 weeks to discuss results.      ER/UT positive, HER-2 negative infiltrating ductal carcinoma the right breast with biopsy proven lymph node involvement clinically stage II/III  [id number].  Pathology following neoadjuvant therapy demonstrated 1 sentinel lymph node with micrometastasis (3 mm) and second lymph node with micrometastasis (1 mm)  --BRCA testing via B-55 Study is negative for mutation  --ddAC--> taxol   cycle 1--- 6/12/2017  --Final cycle Cycle 4 on 9/19/2017  --Radiation completed on 2/27/2018  --Check FSH with results determining tamoxifen versus AI  --Bone density study    Hypertension:  --Continue current medical management    Hypothyroidism:  Continue Synthroid    4 mm right upper lobe pulmonary nodule and subpleural nodularity bilateral bases:  --Repeat CT scan demonstrates stable findings  --Continue to monitor

## 2018-03-15 LAB — CANCER AG27-29 SERPL-ACNC: 18 U/ML

## 2018-03-16 ENCOUNTER — TELEPHONE (OUTPATIENT)
Dept: RADIATION ONCOLOGY | Facility: CLINIC | Age: 53
End: 2018-03-16

## 2018-03-19 ENCOUNTER — TELEPHONE (OUTPATIENT)
Dept: RADIATION ONCOLOGY | Facility: CLINIC | Age: 53
End: 2018-03-19

## 2018-03-21 ENCOUNTER — TELEPHONE (OUTPATIENT)
Dept: RADIATION ONCOLOGY | Facility: CLINIC | Age: 53
End: 2018-03-21

## 2018-03-21 NOTE — TELEPHONE ENCOUNTER
Made call to patient to schedule after treatment f/u with Dr Wynn. No answer, voicemail not available. Will try again at a later date.

## 2018-03-27 ENCOUNTER — OFFICE VISIT (OUTPATIENT)
Dept: HEMATOLOGY/ONCOLOGY | Facility: CLINIC | Age: 53
End: 2018-03-27
Payer: COMMERCIAL

## 2018-03-27 VITALS
HEIGHT: 68 IN | SYSTOLIC BLOOD PRESSURE: 156 MMHG | HEART RATE: 91 BPM | RESPIRATION RATE: 20 BRPM | BODY MASS INDEX: 44.07 KG/M2 | TEMPERATURE: 99 F | DIASTOLIC BLOOD PRESSURE: 86 MMHG | WEIGHT: 290.81 LBS | OXYGEN SATURATION: 96 %

## 2018-03-27 DIAGNOSIS — C50.411 MALIGNANT NEOPLASM OF UPPER-OUTER QUADRANT OF RIGHT BREAST IN FEMALE, ESTROGEN RECEPTOR POSITIVE: Primary | ICD-10-CM

## 2018-03-27 DIAGNOSIS — Z17.0 MALIGNANT NEOPLASM OF UPPER-OUTER QUADRANT OF RIGHT BREAST IN FEMALE, ESTROGEN RECEPTOR POSITIVE: Primary | ICD-10-CM

## 2018-03-27 PROCEDURE — 99214 OFFICE O/P EST MOD 30 MIN: CPT | Mod: S$GLB,,, | Performed by: INTERNAL MEDICINE

## 2018-03-27 PROCEDURE — 3077F SYST BP >= 140 MM HG: CPT | Mod: CPTII,S$GLB,, | Performed by: INTERNAL MEDICINE

## 2018-03-27 PROCEDURE — 99999 PR PBB SHADOW E&M-EST. PATIENT-LVL III: CPT | Mod: PBBFAC,,, | Performed by: INTERNAL MEDICINE

## 2018-03-27 PROCEDURE — 3079F DIAST BP 80-89 MM HG: CPT | Mod: CPTII,S$GLB,, | Performed by: INTERNAL MEDICINE

## 2018-03-27 RX ORDER — TAMOXIFEN CITRATE 20 MG/1
20 TABLET ORAL DAILY
Qty: 30 TABLET | Refills: 11 | Status: SHIPPED | OUTPATIENT
Start: 2018-03-27 | End: 2019-02-18

## 2018-03-27 NOTE — PROGRESS NOTES
Hematology/Oncology Office Note    Reason for referral:  Locally advanced breast cancer with biopsy proven axillary lymph node involvement    CC:    Referred by:  No ref. provider found    Diagnosis:  Right sided locally advanced infiltrating ductal carcinoma with biopsy-proven axillary lymph node involvement (ER positive at 95%, KS positive at 95%, HER-2 1+ by IHC and negative by fish    10/31/17: lumpectomy with sentinel lymph node biopsy on 10/31/2017.   Final pathology demonstrated [id number].  The primary measured 0.8 cm and 2 sentinel lymph nodes were positive with 3 mm macrometastasis within first sentinel lymph node and 1 mm micrometastasis within the second sentinel lymph node.    12/7/2017 right axillary lymph node dissection with 0 nodes positive      Treatment:    History of present illness:  Ms. Urbano is a 51-year-old female with a past medical history of DM hypertension, hypothyroidism, iron deficiency anemia, who was noted to have abnormal screening mammogram  on 4/28/2017.  Core biopsy of right breast mass at 10:00 6 cm from the nipple demonstrated infiltrating ductal carcinoma and right axillary lymph node core biopsy also demonstrated infiltrating ductal carcinoma.  ER/KS positive, HER-2 negative markers.  The patient has a first cousin with a history of breast cancer and one aunt with a history of breast cancer.  She has some soreness at previous biopsy site, however, denies other significant symptoms.    I have reviewed and updated the HPI, ROS, PMHx, Social Hx, Family Hx and treatment history.    Today's visit:  Patient is without complaints today.  She completed adjuvant radiation on 2/27/2018  She presents today to discuss antiestrogen therapy.  Bone density performed last month was within normal limits    Past Medical History:   Diagnosis Date    Allergic rhinitis, cause unspecified     Cancer     R Breast Cancer    Carpal tunnel syndrome of left wrist     Elevated liver function tests      in the past    Fatty liver 05/02/2017    on ultrasound    Fibroid uterus     10/2014    Hepatomegaly 05/02/2017    on ultrasound    History of sciatica     HSV infection     Type 1 and 2    HTN (hypertension)     Hypothyroidism     Insulin resistance     Iron deficiency anemia, unspecified     Obesity     PONV (postoperative nausea and vomiting)     Tension headache     Vitamin D deficiency          Social History:  No tobacco, alcohol, or illicit drugs      Family History: family history includes Cancer in her father and sister; Diabetes in her mother; Heart failure in her mother; Hypertension in her mother; No Known Problems in her daughter, daughter, and son.        HPI    Review of Systems   Constitutional: Positive for fatigue. Negative for activity change, appetite change, chills, diaphoresis, fever and unexpected weight change.   HENT: Negative for congestion, dental problem, ear pain, facial swelling, mouth sores, nosebleeds, postnasal drip, rhinorrhea, sinus pain and sinus pressure.    Eyes: Negative.    Respiratory: Negative for apnea, cough, shortness of breath, wheezing and stridor.    Cardiovascular: Negative for chest pain and leg swelling.   Gastrointestinal: Negative for abdominal distention, anal bleeding, blood in stool, diarrhea, nausea and rectal pain.   Endocrine: Negative.    Genitourinary: Negative for difficulty urinating, dyspareunia, dysuria, enuresis, flank pain and frequency.   Musculoskeletal: Negative for arthralgias, gait problem, joint swelling, myalgias, neck pain and neck stiffness.   Skin: Negative for color change and wound.   Allergic/Immunologic: Negative.    Neurological: Positive for numbness (Grade 1/2 neuropathy). Negative for dizziness, tremors, seizures, syncope, speech difficulty, weakness and light-headedness.   Hematological: Negative for adenopathy. Does not bruise/bleed easily.   Psychiatric/Behavioral: Negative for agitation, behavioral problems,  "confusion, decreased concentration, dysphoric mood, hallucinations and suicidal ideas. The patient is not nervous/anxious and is not hyperactive.        Objective:       Vitals:    03/27/18 1455   BP: (!) 156/86   Pulse: 91   Resp: 20   Temp: 98.8 °F (37.1 °C)   TempSrc: Oral   SpO2: 96%   Weight: 131.9 kg (290 lb 12.6 oz)   Height: 5' 8" (1.727 m)       Physical Exam   Constitutional: She is oriented to person, place, and time. She appears well-developed and well-nourished. No distress.   Well groomed   HENT:   Head: Normocephalic and atraumatic. Not macrocephalic.   Right Ear: Tympanic membrane and ear canal normal.   Left Ear: Tympanic membrane and ear canal normal.   Nose: Nose normal. Right sinus exhibits no maxillary sinus tenderness and no frontal sinus tenderness. Left sinus exhibits no maxillary sinus tenderness and no frontal sinus tenderness.   Mouth/Throat: Uvula is midline, oropharynx is clear and moist and mucous membranes are normal.   Eyes: Conjunctivae, EOM and lids are normal. Pupils are equal, round, and reactive to light. Lids are everted and swept, no foreign bodies found. Right eye exhibits no discharge. Left eye exhibits no discharge.   Neck: Trachea normal and normal range of motion. Neck supple. No JVD present. No tracheal deviation present. No thyroid mass and no thyromegaly present.   No crepitus   Cardiovascular: Normal rate, regular rhythm, normal heart sounds, intact distal pulses and normal pulses.  Exam reveals no gallop and no friction rub.    No murmur heard.  Pulmonary/Chest: Effort normal and breath sounds normal. No accessory muscle usage or stridor. No respiratory distress. She has no decreased breath sounds. She has no wheezes. She has no rhonchi. She has no rales. She exhibits no tenderness.   Right breast: Ill-defined induration without discrete mass    Abdominal: Soft. Normal appearance and bowel sounds are normal. She exhibits no distension and no mass. There is no " hepatosplenomegaly. There is no tenderness. There is no rebound and no guarding.   Musculoskeletal: Normal range of motion. She exhibits no edema, tenderness or deformity.   Lymphadenopathy:        Head (right side): No submental and no submandibular adenopathy present.        Head (left side): No submental and no submandibular adenopathy present.     She has no cervical adenopathy.        Right cervical: No superficial cervical and no deep cervical adenopathy present.       Left cervical: No superficial cervical and no deep cervical adenopathy present.     She has no axillary adenopathy.        Right: No supraclavicular adenopathy present.        Left: No supraclavicular adenopathy present.   Neurological: She is alert and oriented to person, place, and time. She displays normal reflexes. No cranial nerve deficit. She exhibits normal muscle tone. Coordination normal.   Skin: Skin is warm, dry and intact. Capillary refill takes less than 2 seconds. No abrasion and no rash noted. She is not diaphoretic. No pallor.   Psychiatric: She has a normal mood and affect. Her behavior is normal. Judgment and thought content normal.       Lab Results   Component Value Date    WBC 3.76 (L) 03/13/2018    HGB 12.5 03/13/2018    HCT 36.6 (L) 03/13/2018    MCV 85 03/13/2018     03/13/2018     Lab Results   Component Value Date    CREATININE 0.7 03/13/2018    BUN 14 03/13/2018     03/13/2018    K 3.7 03/13/2018     03/13/2018    CO2 29 03/13/2018     Lab Results   Component Value Date    ALT 78 (H) 03/13/2018    AST 49 (H) 03/13/2018    ALKPHOS 110 03/13/2018    BILITOT 0.2 03/13/2018         Assessment:         52-year-old female with a new diagnosis of right-sided infiltrating ductal carcinoma ER/NH positive, HER-2 negative with biopsy-proven right axillary lymph node involvement.  Patient was consented for BRCA testing for participation and B-55 study with negative BRCA testing.    She received cycle 1 of dense  dose Adriamycin/Cytoxan on 6/12/2017 and tolerated treatment exceptionally well.  She completed cycle 4 of dose dense Taxol on 9/19/2017.  She underwent lumpectomy with sentinel lymph node biopsy on 10/31/2017.   Final pathology demonstrated [id number].  The primary measured 0.8 cm and 2 sentinel lymph nodes were positive with 3 mm macrometastasis within first sentinel lymph node and 1 mm micrometastasis within the second sentinel lymph node.  She underwent a full right axillary lymph node dissection on 11/8/2017 which demonstrated 0 positive nodes.  She completed adjuvant radiation on 2/27/2018 and presents today to discuss antiestrogen therapy.  The patient had a hysterectomy approximately 6/20/14.    We will proceed with tamoxifen 20 mg daily ×2 years and then transitioned to Arimidex 1 mg by mouth daily.  She will follow-up in 4-6 weeks to evaluate for toxicity/side effects.    ER/IA positive, HER-2 negative infiltrating ductal carcinoma the right breast with biopsy proven lymph node involvement clinically stage II/III  [id number].  Pathology following neoadjuvant therapy demonstrated 1 sentinel lymph node with micrometastasis (3 mm) and second lymph node with micrometastasis (1 mm)  --BRCA testing via B-55 Study is negative for mutation  --ddAC--> taxol   cycle 1--- 6/12/2017  --Final cycle Cycle 4 on 9/19/2017  --Radiation completed on 2/27/2018  --Tamoxifen 20 mg by mouth daily  --Follow-up in 4-6 weeks to evaluate for toxicity    Hypertension:  --Continue current medical management    Hypothyroidism:  Continue Synthroid    4 mm right upper lobe pulmonary nodule and subpleural nodularity bilateral bases:  --Repeat CT scan demonstrates stable findings  --Continue to monitor

## 2018-04-16 ENCOUNTER — OFFICE VISIT (OUTPATIENT)
Dept: HEMATOLOGY/ONCOLOGY | Facility: CLINIC | Age: 53
End: 2018-04-16
Payer: COMMERCIAL

## 2018-04-16 ENCOUNTER — LAB VISIT (OUTPATIENT)
Dept: LAB | Facility: HOSPITAL | Age: 53
End: 2018-04-16
Attending: INTERNAL MEDICINE
Payer: COMMERCIAL

## 2018-04-16 VITALS
DIASTOLIC BLOOD PRESSURE: 82 MMHG | SYSTOLIC BLOOD PRESSURE: 124 MMHG | HEART RATE: 96 BPM | TEMPERATURE: 99 F | OXYGEN SATURATION: 99 % | HEIGHT: 68 IN | RESPIRATION RATE: 18 BRPM | WEIGHT: 288.13 LBS | BODY MASS INDEX: 43.67 KG/M2

## 2018-04-16 DIAGNOSIS — C50.411 MALIGNANT NEOPLASM OF UPPER-OUTER QUADRANT OF RIGHT BREAST IN FEMALE, ESTROGEN RECEPTOR POSITIVE: Primary | ICD-10-CM

## 2018-04-16 DIAGNOSIS — Z17.0 MALIGNANT NEOPLASM OF UPPER-OUTER QUADRANT OF RIGHT BREAST IN FEMALE, ESTROGEN RECEPTOR POSITIVE: ICD-10-CM

## 2018-04-16 DIAGNOSIS — Z17.0 MALIGNANT NEOPLASM OF UPPER-OUTER QUADRANT OF RIGHT BREAST IN FEMALE, ESTROGEN RECEPTOR POSITIVE: Primary | ICD-10-CM

## 2018-04-16 DIAGNOSIS — C50.411 MALIGNANT NEOPLASM OF UPPER-OUTER QUADRANT OF RIGHT BREAST IN FEMALE, ESTROGEN RECEPTOR POSITIVE: ICD-10-CM

## 2018-04-16 LAB
ALBUMIN SERPL BCP-MCNC: 3.7 G/DL
ALP SERPL-CCNC: 99 U/L
ALT SERPL W/O P-5'-P-CCNC: 37 U/L
ANION GAP SERPL CALC-SCNC: 7 MMOL/L
AST SERPL-CCNC: 28 U/L
BASOPHILS # BLD AUTO: 0.01 K/UL
BASOPHILS NFR BLD: 0.2 %
BILIRUB SERPL-MCNC: 0.3 MG/DL
BUN SERPL-MCNC: 12 MG/DL
CALCIUM SERPL-MCNC: 9.9 MG/DL
CHLORIDE SERPL-SCNC: 107 MMOL/L
CO2 SERPL-SCNC: 27 MMOL/L
CREAT SERPL-MCNC: 0.7 MG/DL
DIFFERENTIAL METHOD: NORMAL
EOSINOPHIL # BLD AUTO: 0.1 K/UL
EOSINOPHIL NFR BLD: 2.7 %
ERYTHROCYTE [DISTWIDTH] IN BLOOD BY AUTOMATED COUNT: 14.1 %
EST. GFR  (AFRICAN AMERICAN): >60 ML/MIN/1.73 M^2
EST. GFR  (NON AFRICAN AMERICAN): >60 ML/MIN/1.73 M^2
GLUCOSE SERPL-MCNC: 98 MG/DL
HCT VFR BLD AUTO: 39.4 %
HGB BLD-MCNC: 13.6 G/DL
LYMPHOCYTES # BLD AUTO: 1.3 K/UL
LYMPHOCYTES NFR BLD: 26.1 %
MCH RBC QN AUTO: 29.3 PG
MCHC RBC AUTO-ENTMCNC: 34.5 G/DL
MCV RBC AUTO: 85 FL
MONOCYTES # BLD AUTO: 0.5 K/UL
MONOCYTES NFR BLD: 11.2 %
NEUTROPHILS # BLD AUTO: 2.9 K/UL
NEUTROPHILS NFR BLD: 59.8 %
PLATELET # BLD AUTO: 194 K/UL
PMV BLD AUTO: 9.5 FL
POTASSIUM SERPL-SCNC: 3.7 MMOL/L
PROT SERPL-MCNC: 7.6 G/DL
RBC # BLD AUTO: 4.64 M/UL
SODIUM SERPL-SCNC: 141 MMOL/L
WBC # BLD AUTO: 4.83 K/UL

## 2018-04-16 PROCEDURE — 80053 COMPREHEN METABOLIC PANEL: CPT | Mod: PO

## 2018-04-16 PROCEDURE — 3079F DIAST BP 80-89 MM HG: CPT | Mod: CPTII,S$GLB,, | Performed by: INTERNAL MEDICINE

## 2018-04-16 PROCEDURE — 99214 OFFICE O/P EST MOD 30 MIN: CPT | Mod: S$GLB,,, | Performed by: INTERNAL MEDICINE

## 2018-04-16 PROCEDURE — 86300 IMMUNOASSAY TUMOR CA 15-3: CPT

## 2018-04-16 PROCEDURE — 85025 COMPLETE CBC W/AUTO DIFF WBC: CPT | Mod: PO

## 2018-04-16 PROCEDURE — 36415 COLL VENOUS BLD VENIPUNCTURE: CPT | Mod: PO

## 2018-04-16 PROCEDURE — 3074F SYST BP LT 130 MM HG: CPT | Mod: CPTII,S$GLB,, | Performed by: INTERNAL MEDICINE

## 2018-04-16 PROCEDURE — 99999 PR PBB SHADOW E&M-EST. PATIENT-LVL III: CPT | Mod: PBBFAC,,, | Performed by: INTERNAL MEDICINE

## 2018-04-18 LAB — CANCER AG27-29 SERPL-ACNC: 28.3 U/ML

## 2018-04-19 ENCOUNTER — OFFICE VISIT (OUTPATIENT)
Dept: RADIATION ONCOLOGY | Facility: CLINIC | Age: 53
End: 2018-04-19
Payer: COMMERCIAL

## 2018-04-19 VITALS
DIASTOLIC BLOOD PRESSURE: 93 MMHG | SYSTOLIC BLOOD PRESSURE: 148 MMHG | TEMPERATURE: 98 F | BODY MASS INDEX: 44.3 KG/M2 | RESPIRATION RATE: 18 BRPM | HEART RATE: 90 BPM | HEIGHT: 68 IN | WEIGHT: 292.31 LBS

## 2018-04-19 DIAGNOSIS — Z17.0 MALIGNANT NEOPLASM OF UPPER-OUTER QUADRANT OF RIGHT BREAST IN FEMALE, ESTROGEN RECEPTOR POSITIVE: Primary | ICD-10-CM

## 2018-04-19 DIAGNOSIS — C50.411 MALIGNANT NEOPLASM OF UPPER-OUTER QUADRANT OF RIGHT BREAST IN FEMALE, ESTROGEN RECEPTOR POSITIVE: Primary | ICD-10-CM

## 2018-04-19 PROCEDURE — 99999 PR PBB SHADOW E&M-EST. PATIENT-LVL III: CPT | Mod: PBBFAC,,, | Performed by: RADIOLOGY

## 2018-04-19 PROCEDURE — 99499 UNLISTED E&M SERVICE: CPT | Mod: S$GLB,,, | Performed by: RADIOLOGY

## 2018-04-19 NOTE — PROGRESS NOTES
Subjective:       Patient ID: Kylie Urbano is a 52 y.o. female.    Chief Complaint: Follow-up (8 wk f/u after tx)    This patient presents for her initial follow up visit.     Ms. Urbano has a history of clinical stage IIA (T1c, N1, M0) yp stage T1b, N1b ductal carcinoma of the Rt. breast.  The was referred for further evaluation after diagnostic MMG on 5/9/17 confirmed a focal asymmetry measuring 15 mm in the posterior region of the Rt. breast at the 10 o'clock position.  The mass was solid on ultrasound.  Core biopsies of the mass and an enlarged Rt. axillary node were performed at [facility] on 5/22/17.  Pathology revealed ductal carcinoma with lymphovascular invasion.  The tumor was strongly (95% ) ER and UT positive, Her - 2 was negative.  Biopsy of the lymph node revealed metastatic mammary carcinoma.  The patient was referred for neoadjuvant chemotherapy.  CT of the chest on 6/7/17 revealed a 1.4 cm axillary node with the biopsy clip.  There was also a mildly prominent subpectoral node measuring 7 mm.  There was no mediastinal or hilar adenopathy.  The was a faint 4 mm Rt. upper lobe  pulmonary nodule of low suspicion and no other evidence of metastatic disease.  The patient completed a course of neoadjuvant chemotherapy with dose dense AC followed by Taxol. She was referred to Dr. Mcclendon on underwent Rt. partial mastectomy with sentinel node biopsy on 10/31/17.  Pathology from the Rt. breast revealed an 8 mm focus of infiltrating ductal carcinoma, histologic grade 3, nuclear grade 3 and mitotic index 2.  There was no associated DCIS.  There was lymphovascular invasion. Tumor was present at the anterior margin.  Two sentinel and one non sentinel nodes were examined, one sentinel node contained a 3 mm focus of metastatic carcinoma, extranodal extension was indeterminate.  The other sentinel node was positive for a micrometastatic deposit measuring 1 mm without extra capsular extension. The non  sentinel node was negative for tumor involvement. The patient returned to OR and underwent completion Rt. axillary dissection.  Pathology revealed 10 negative lymph nodes. Postoperatively she was referred to our department and completed adjuvant breast irradiation on 3/2/18.  Today the patient states she feels well.  No breast complaints.  She is currently receiving hormonal therapy with Tamoxifen.       Review of Systems   Constitutional: Negative for activity change, appetite change, chills and fatigue.   HENT: Negative for sore throat and trouble swallowing.    Respiratory: Negative for cough and shortness of breath.    Cardiovascular: Negative for chest pain and palpitations.   Gastrointestinal: Negative for abdominal pain, nausea and vomiting.       Objective:      Physical Exam   Constitutional: She appears well-developed and well-nourished.   Neck: Normal range of motion. Neck supple.   Pulmonary/Chest: Effort normal. No respiratory distress. Right breast exhibits skin change (mild tanning). Right breast exhibits no inverted nipple, no mass, no nipple discharge and no tenderness.       Abdominal: Soft. She exhibits no distension.       Assessment:       1. Malignant neoplasm of upper-outer quadrant of right breast in female, estrogen receptor positive        Plan:       recovered from the acute effects of radiotherapy. She is planned for MMG in May.  She will continue follow up with medical oncology.  Plan follow up with us PRN.

## 2018-05-10 ENCOUNTER — HOSPITAL ENCOUNTER (OUTPATIENT)
Dept: RADIOLOGY | Facility: HOSPITAL | Age: 53
Discharge: HOME | End: 2018-05-10
Attending: INTERNAL MEDICINE
Payer: COMMERCIAL

## 2018-05-10 DIAGNOSIS — Z17.0 MALIGNANT NEOPLASM OF UPPER-OUTER QUADRANT OF RIGHT BREAST IN FEMALE, ESTROGEN RECEPTOR POSITIVE: ICD-10-CM

## 2018-05-10 DIAGNOSIS — C50.411 MALIGNANT NEOPLASM OF UPPER-OUTER QUADRANT OF RIGHT BREAST IN FEMALE, ESTROGEN RECEPTOR POSITIVE: ICD-10-CM

## 2018-05-10 PROCEDURE — 77062 BREAST TOMOSYNTHESIS BI: CPT | Mod: 26,,, | Performed by: RADIOLOGY

## 2018-05-10 PROCEDURE — 77066 DX MAMMO INCL CAD BI: CPT | Mod: TC,PO

## 2018-05-10 PROCEDURE — 77066 DX MAMMO INCL CAD BI: CPT | Mod: 26,,, | Performed by: RADIOLOGY

## 2018-06-11 NOTE — TELEPHONE ENCOUNTER
Advise pt needs/overdue for physical and lab w/me. Confirm how she takes Metformin. Will only be able to continue to honor refills if sees me

## 2018-06-20 RX ORDER — METFORMIN HYDROCHLORIDE 500 MG/1
TABLET ORAL
Qty: 360 TABLET | Refills: 1 | Status: SHIPPED | OUTPATIENT
Start: 2018-06-20 | End: 2019-07-24 | Stop reason: SDUPTHER

## 2018-06-25 RX ORDER — BENAZEPRIL HYDROCHLORIDE 20 MG/1
TABLET ORAL
Qty: 90 TABLET | Refills: 1 | Status: SHIPPED | OUTPATIENT
Start: 2018-06-25 | End: 2018-06-28 | Stop reason: SDUPTHER

## 2018-06-25 RX ORDER — LEVOTHYROXINE SODIUM 50 UG/1
TABLET ORAL
Qty: 90 TABLET | Refills: 1 | Status: SHIPPED | OUTPATIENT
Start: 2018-06-25 | End: 2018-06-28 | Stop reason: SDUPTHER

## 2018-06-25 RX ORDER — LEVOTHYROXINE SODIUM 200 UG/1
TABLET ORAL
Qty: 90 TABLET | Refills: 1 | Status: SHIPPED | OUTPATIENT
Start: 2018-06-25 | End: 2018-06-28 | Stop reason: SDUPTHER

## 2018-06-26 RX ORDER — AMLODIPINE BESYLATE 5 MG/1
TABLET ORAL
Qty: 90 TABLET | Refills: 1 | Status: SHIPPED | OUTPATIENT
Start: 2018-06-26 | End: 2018-12-21 | Stop reason: SDUPTHER

## 2018-06-28 ENCOUNTER — OFFICE VISIT (OUTPATIENT)
Dept: HEMATOLOGY/ONCOLOGY | Facility: CLINIC | Age: 53
End: 2018-06-28
Payer: COMMERCIAL

## 2018-06-28 ENCOUNTER — LAB VISIT (OUTPATIENT)
Dept: LAB | Facility: HOSPITAL | Age: 53
End: 2018-06-28
Attending: INTERNAL MEDICINE
Payer: COMMERCIAL

## 2018-06-28 VITALS
SYSTOLIC BLOOD PRESSURE: 134 MMHG | HEART RATE: 95 BPM | RESPIRATION RATE: 18 BRPM | DIASTOLIC BLOOD PRESSURE: 82 MMHG | WEIGHT: 290.38 LBS | TEMPERATURE: 98 F | BODY MASS INDEX: 44.01 KG/M2 | OXYGEN SATURATION: 98 % | HEIGHT: 68 IN

## 2018-06-28 DIAGNOSIS — C50.411 MALIGNANT NEOPLASM OF UPPER-OUTER QUADRANT OF RIGHT BREAST IN FEMALE, ESTROGEN RECEPTOR POSITIVE: ICD-10-CM

## 2018-06-28 DIAGNOSIS — R91.1 PULMONARY NODULE: ICD-10-CM

## 2018-06-28 DIAGNOSIS — C50.411 MALIGNANT NEOPLASM OF UPPER-OUTER QUADRANT OF RIGHT BREAST IN FEMALE, ESTROGEN RECEPTOR POSITIVE: Primary | ICD-10-CM

## 2018-06-28 DIAGNOSIS — Z17.0 MALIGNANT NEOPLASM OF UPPER-OUTER QUADRANT OF RIGHT BREAST IN FEMALE, ESTROGEN RECEPTOR POSITIVE: ICD-10-CM

## 2018-06-28 DIAGNOSIS — Z17.0 MALIGNANT NEOPLASM OF UPPER-OUTER QUADRANT OF RIGHT BREAST IN FEMALE, ESTROGEN RECEPTOR POSITIVE: Primary | ICD-10-CM

## 2018-06-28 LAB
ALBUMIN SERPL BCP-MCNC: 3.7 G/DL
ALP SERPL-CCNC: 76 U/L
ALT SERPL W/O P-5'-P-CCNC: 37 U/L
ANION GAP SERPL CALC-SCNC: 11 MMOL/L
AST SERPL-CCNC: 28 U/L
BASOPHILS # BLD AUTO: 0.01 K/UL
BASOPHILS NFR BLD: 0.1 %
BILIRUB SERPL-MCNC: 0.3 MG/DL
BUN SERPL-MCNC: 18 MG/DL
CALCIUM SERPL-MCNC: 9.4 MG/DL
CHLORIDE SERPL-SCNC: 108 MMOL/L
CO2 SERPL-SCNC: 21 MMOL/L
CREAT SERPL-MCNC: 0.7 MG/DL
DIFFERENTIAL METHOD: NORMAL
EOSINOPHIL # BLD AUTO: 0.1 K/UL
EOSINOPHIL NFR BLD: 1.8 %
ERYTHROCYTE [DISTWIDTH] IN BLOOD BY AUTOMATED COUNT: 14.3 %
EST. GFR  (AFRICAN AMERICAN): >60 ML/MIN/1.73 M^2
EST. GFR  (NON AFRICAN AMERICAN): >60 ML/MIN/1.73 M^2
GLUCOSE SERPL-MCNC: 88 MG/DL
HCT VFR BLD AUTO: 38.9 %
HGB BLD-MCNC: 13.2 G/DL
LYMPHOCYTES # BLD AUTO: 1.6 K/UL
LYMPHOCYTES NFR BLD: 23.3 %
MCH RBC QN AUTO: 29.1 PG
MCHC RBC AUTO-ENTMCNC: 33.9 G/DL
MCV RBC AUTO: 86 FL
MONOCYTES # BLD AUTO: 0.4 K/UL
MONOCYTES NFR BLD: 5.7 %
NEUTROPHILS # BLD AUTO: 4.9 K/UL
NEUTROPHILS NFR BLD: 69.1 %
PLATELET # BLD AUTO: 221 K/UL
PMV BLD AUTO: 9.2 FL
POTASSIUM SERPL-SCNC: 3.7 MMOL/L
PROT SERPL-MCNC: 7.7 G/DL
RBC # BLD AUTO: 4.54 M/UL
SODIUM SERPL-SCNC: 140 MMOL/L
WBC # BLD AUTO: 7.04 K/UL

## 2018-06-28 PROCEDURE — 3008F BODY MASS INDEX DOCD: CPT | Mod: CPTII,S$GLB,, | Performed by: INTERNAL MEDICINE

## 2018-06-28 PROCEDURE — 99999 PR PBB SHADOW E&M-EST. PATIENT-LVL III: CPT | Mod: PBBFAC,,, | Performed by: INTERNAL MEDICINE

## 2018-06-28 PROCEDURE — 85025 COMPLETE CBC W/AUTO DIFF WBC: CPT | Mod: PO

## 2018-06-28 PROCEDURE — 36415 COLL VENOUS BLD VENIPUNCTURE: CPT | Mod: PO

## 2018-06-28 PROCEDURE — 86300 IMMUNOASSAY TUMOR CA 15-3: CPT

## 2018-06-28 PROCEDURE — 3075F SYST BP GE 130 - 139MM HG: CPT | Mod: CPTII,S$GLB,, | Performed by: INTERNAL MEDICINE

## 2018-06-28 PROCEDURE — 99214 OFFICE O/P EST MOD 30 MIN: CPT | Mod: S$GLB,,, | Performed by: INTERNAL MEDICINE

## 2018-06-28 PROCEDURE — 80053 COMPREHEN METABOLIC PANEL: CPT | Mod: PO

## 2018-06-28 PROCEDURE — 3079F DIAST BP 80-89 MM HG: CPT | Mod: CPTII,S$GLB,, | Performed by: INTERNAL MEDICINE

## 2018-06-28 RX ORDER — BENAZEPRIL HYDROCHLORIDE 20 MG/1
20 TABLET ORAL DAILY
Qty: 90 TABLET | Refills: 1 | Status: SHIPPED | OUTPATIENT
Start: 2018-06-28 | End: 2019-12-20

## 2018-06-28 RX ORDER — LEVOTHYROXINE SODIUM 50 UG/1
50 TABLET ORAL DAILY
Qty: 90 TABLET | Refills: 1 | Status: SHIPPED | OUTPATIENT
Start: 2018-06-28 | End: 2018-10-26

## 2018-06-28 RX ORDER — LEVOTHYROXINE SODIUM 200 UG/1
200 TABLET ORAL DAILY
Qty: 90 TABLET | Refills: 1 | Status: SHIPPED | OUTPATIENT
Start: 2018-06-28 | End: 2018-10-26 | Stop reason: SDUPTHER

## 2018-06-28 NOTE — PROGRESS NOTES
Hematology/Oncology Office Note    Reason for referral:  Locally advanced breast cancer with biopsy proven axillary lymph node involvement    CC:    Referred by:  No ref. provider found    Diagnosis:  Right sided locally advanced infiltrating ductal carcinoma with biopsy-proven axillary lymph node involvement (ER positive at 95%, IL positive at 95%, HER-2 1+ by IHC and negative by fish    10/31/17: lumpectomy with sentinel lymph node biopsy on 10/31/2017.   Final pathology demonstrated [id number].  The primary measured 0.8 cm and 2 sentinel lymph nodes were positive with 3 mm macrometastasis within first sentinel lymph node and 1 mm micrometastasis within the second sentinel lymph node.    12/7/2017 right axillary lymph node dissection with 0 nodes positive      Treatment:    History of present illness:  Ms. Urbano is a 51-year-old female with a past medical history of DM hypertension, hypothyroidism, iron deficiency anemia, who was noted to have abnormal screening mammogram  on 4/28/2017.  Core biopsy of right breast mass at 10:00 6 cm from the nipple demonstrated infiltrating ductal carcinoma and right axillary lymph node core biopsy also demonstrated infiltrating ductal carcinoma.  ER/IL positive, HER-2 negative markers.  The patient has a first cousin with a history of breast cancer and one aunt with a history of breast cancer.  She has some soreness at previous biopsy site, however, denies other significant symptoms.    I have reviewed and updated the HPI, ROS, PMHx, Social Hx, Family Hx and treatment history.    Today's visit:  Patient is without complaints today.  She started adjuvant tamoxifen in 3/2018 and is tolerating very well.    Past Medical History:   Diagnosis Date    Allergic rhinitis, cause unspecified     Breast cancer 2017    Cancer     R Breast Cancer    Carpal tunnel syndrome of left wrist     Elevated liver function tests     in the past    Fatty liver 05/02/2017    on ultrasound     Fibroid uterus     10/2014    Hepatomegaly 05/02/2017    on ultrasound    History of sciatica     HSV infection     Type 1 and 2    HTN (hypertension)     Hypothyroidism     Insulin resistance     Iron deficiency anemia, unspecified     Obesity     PONV (postoperative nausea and vomiting)     Tension headache     Vitamin D deficiency          Social History:  No tobacco, alcohol, or illicit drugs      Family History: family history includes Breast cancer in her paternal cousin; Cancer in her father and sister; Diabetes in her mother; Heart failure in her mother; Hypertension in her mother; No Known Problems in her daughter, daughter, and son.        HPI    Review of Systems   Constitutional: Positive for fatigue. Negative for chills, diaphoresis, fever and unexpected weight change.   HENT: Negative for congestion, dental problem, drooling, ear pain, facial swelling, hearing loss, mouth sores, nosebleeds, sinus pressure, sore throat, tinnitus and trouble swallowing.    Eyes: Negative.    Respiratory: Negative for apnea, cough, shortness of breath, wheezing and stridor.    Cardiovascular: Negative for chest pain and leg swelling.   Gastrointestinal: Negative for abdominal distention, blood in stool, constipation, diarrhea and nausea.   Endocrine: Negative.    Genitourinary: Negative for difficulty urinating, dyspareunia, dysuria, enuresis, flank pain, frequency, genital sores and hematuria.   Musculoskeletal: Negative for arthralgias, gait problem, joint swelling, myalgias, neck pain and neck stiffness.   Skin: Negative for color change, pallor, rash and wound.   Allergic/Immunologic: Negative.    Neurological: Negative for dizziness, seizures, syncope, facial asymmetry, speech difficulty, light-headedness, numbness (Grade 1/2 neuropathy) and headaches.   Hematological: Negative for adenopathy. Does not bruise/bleed easily.   Psychiatric/Behavioral: Negative for agitation, behavioral problems, dysphoric  "mood, self-injury, sleep disturbance and suicidal ideas. The patient is not nervous/anxious and is not hyperactive.        Objective:       Vitals:    06/28/18 1432   BP: 134/82   Pulse: 95   Resp: 18   Temp: 98.1 °F (36.7 °C)   TempSrc: Oral   SpO2: 98%   Weight: 131.7 kg (290 lb 5.5 oz)   Height: 5' 8" (1.727 m)       Physical Exam   Constitutional: She is oriented to person, place, and time. She appears well-developed and well-nourished. No distress.   Well groomed   HENT:   Head: Normocephalic and atraumatic. Not macrocephalic.   Right Ear: Tympanic membrane, external ear and ear canal normal.   Left Ear: Tympanic membrane, external ear and ear canal normal.   Nose: Nose normal. Right sinus exhibits no maxillary sinus tenderness and no frontal sinus tenderness. Left sinus exhibits no maxillary sinus tenderness and no frontal sinus tenderness.   Mouth/Throat: Uvula is midline, oropharynx is clear and moist and mucous membranes are normal. No oropharyngeal exudate.   Eyes: Conjunctivae, EOM and lids are normal. Pupils are equal, round, and reactive to light. Lids are everted and swept, no foreign bodies found. Right eye exhibits no discharge. Left eye exhibits no discharge.   Neck: Trachea normal and normal range of motion. Neck supple. No tracheal deviation present. No thyroid mass and no thyromegaly present.   No crepitus   Cardiovascular: Normal rate, regular rhythm, normal heart sounds, intact distal pulses and normal pulses.  Exam reveals no gallop and no friction rub.    No murmur heard.  Pulmonary/Chest: Effort normal and breath sounds normal. No accessory muscle usage. No respiratory distress. She has no decreased breath sounds. She has no wheezes. She has no rhonchi. She has no rales. She exhibits no tenderness.   Right breast: Ill-defined induration without discrete mass    Abdominal: Soft. Normal appearance and bowel sounds are normal. She exhibits no distension and no mass. There is no " hepatosplenomegaly. There is no tenderness. There is no guarding.   Musculoskeletal: Normal range of motion. She exhibits no edema, tenderness or deformity.   Lymphadenopathy:        Head (right side): No submental and no submandibular adenopathy present.        Head (left side): No submental and no submandibular adenopathy present.     She has no cervical adenopathy.        Right cervical: No superficial cervical and no deep cervical adenopathy present.       Left cervical: No superficial cervical and no deep cervical adenopathy present.     She has no axillary adenopathy.        Right: No supraclavicular adenopathy present.        Left: No supraclavicular adenopathy present.   Neurological: She is alert and oriented to person, place, and time. She displays normal reflexes. No cranial nerve deficit or sensory deficit. She exhibits normal muscle tone. Coordination normal.   Skin: Skin is warm, dry and intact. Capillary refill takes less than 2 seconds. No rash noted. She is not diaphoretic. No cyanosis or erythema. No pallor. Nails show no clubbing.   Psychiatric: She has a normal mood and affect. Her behavior is normal. Judgment and thought content normal.       Lab Results   Component Value Date    WBC 7.04 06/28/2018    HGB 13.2 06/28/2018    HCT 38.9 06/28/2018    MCV 86 06/28/2018     06/28/2018     Lab Results   Component Value Date    CREATININE 0.7 06/28/2018    BUN 18 06/28/2018     06/28/2018    K 3.7 06/28/2018     06/28/2018    CO2 21 (L) 06/28/2018     Lab Results   Component Value Date    ALT 37 06/28/2018    AST 28 06/28/2018    ALKPHOS 76 06/28/2018    BILITOT 0.3 06/28/2018         Assessment:         52-year-old female with a new diagnosis of right-sided infiltrating ductal carcinoma ER/MD positive, HER-2 negative with biopsy-proven right axillary lymph node involvement.  Patient was consented for BRCA testing for participation and B-55 study with negative BRCA testing.    She  received cycle 1 of dense dose Adriamycin/Cytoxan on 6/12/2017 and tolerated treatment exceptionally well.  She completed cycle 4 of dose dense Taxol on 9/19/2017.  She underwent lumpectomy with sentinel lymph node biopsy on 10/31/2017.   Final pathology demonstrated [id number].  The primary measured 0.8 cm and 2 sentinel lymph nodes were positive with 3 mm macrometastasis within first sentinel lymph node and 1 mm micrometastasis within the second sentinel lymph node.  She underwent a full right axillary lymph node dissection on 11/8/2017 which demonstrated 0 positive nodes.  She completed adjuvant radiation on 2/27/2018 and presents today to discuss antiestrogen therapy.  The patient had a hysterectomy approximately 6/20/14.    We will proceed with tamoxifen 20 mg daily ×2 years and then transitioned to Arimidex 1 mg by mouth daily.    Patient started adjuvant tamoxifen in 3/2018 and is tolerating very well.    Surveillance mammograms remain in ED and we will have the patient follow up in 4 months with repeat labs/FSH.  Plan to transition the AI at 2 year estefani if patient is clearly postmenopausal.    ER/SC positive, HER-2 negative infiltrating ductal carcinoma the right breast with biopsy proven lymph node involvement clinically stage II/III  [id number].  Pathology following neoadjuvant therapy demonstrated 1 sentinel lymph node with micrometastasis (3 mm) and second lymph node with micrometastasis (1 mm)  --BRCA testing via B-55 Study is negative for mutation  --ddAC--> taxol   cycle 1--- 6/12/2017  --Final cycle Cycle 4 on 9/19/2017  --Radiation completed on 2/27/2018  --Tamoxifen 20 mg by mouth daily started in 3/2018  --follow-up in 4 months with repeat labs    Hypertension:  --Continue current medical management    Hypothyroidism:  Continue Synthroid    4 mm right upper lobe pulmonary nodule and subpleural nodularity bilateral bases:  --Repeat CT scan demonstrates stable findings--no need for additional scans at  this point

## 2018-06-30 LAB — CANCER AG27-29 SERPL-ACNC: 22.4 U/ML

## 2018-07-12 ENCOUNTER — PROCEDURE VISIT (OUTPATIENT)
Dept: SURGERY | Facility: CLINIC | Age: 53
End: 2018-07-12
Payer: COMMERCIAL

## 2018-07-12 VITALS
WEIGHT: 289.44 LBS | SYSTOLIC BLOOD PRESSURE: 145 MMHG | HEIGHT: 68 IN | DIASTOLIC BLOOD PRESSURE: 81 MMHG | BODY MASS INDEX: 43.87 KG/M2 | HEART RATE: 100 BPM | TEMPERATURE: 99 F

## 2018-07-12 DIAGNOSIS — C50.411 MALIGNANT NEOPLASM OF UPPER-OUTER QUADRANT OF RIGHT BREAST IN FEMALE, ESTROGEN RECEPTOR POSITIVE: Primary | ICD-10-CM

## 2018-07-12 DIAGNOSIS — Z17.0 MALIGNANT NEOPLASM OF UPPER-OUTER QUADRANT OF RIGHT BREAST IN FEMALE, ESTROGEN RECEPTOR POSITIVE: Primary | ICD-10-CM

## 2018-07-12 PROCEDURE — 36590 REMOVAL TUNNELED CV CATH: CPT | Mod: S$GLB,,, | Performed by: SURGERY

## 2018-07-12 RX ORDER — OXYCODONE AND ACETAMINOPHEN 5; 325 MG/1; MG/1
1 TABLET ORAL
Qty: 15 TABLET | Refills: 0 | Status: SHIPPED | OUTPATIENT
Start: 2018-07-12 | End: 2019-02-18

## 2018-07-12 NOTE — PROCEDURES
"Removal, [place]  Date/Time: 7/12/2018 5:06 PM  Performed by: FATMATA DUBON  Authorized by: FATMATA DUBON     Consent Done?:  Yes (Written)  Time out: Immediately prior to procedure a "time out" was called to verify the correct patient, procedure, equipment, support staff and site/side marked as required.      STAFF:  Assistants?: No    INDICATIONS:: MediPort removal.  LOCATION:  Body area:  Chestleft    PREP:Position:  Supine    ANESTHESIA:  Anesthesia:  Local infiltration  Local anesthetic:  Lidocaine 1% with epinephrine    PROCEDURE DETAILS:  Excision type:  Skin  Incision type:  Single straight  Specimens?: No      Hemostasis was obtained.  Estimated blood loss (cc):  5  Wound closure:  Intermediate layered  Wound repair size (cm):  3  Sutures:  3-0 Vicryl (For 0 Vicryl in the skin)  Needle, instrument, and sponge counts were correct.  Patient tolerated the procedure well with no immediate complications.  Post-operative instructions were provided for the patient.  Patient was discharged and will follow up for wound check and pathology results. and Patient was discharged and will follow up for wound check.   MediPort Removal    Informed consent was obtained.  MediPort location:  Left chest wall  1% lidocaine with epinephrine:  8 mL    The left chest was prepped and draped in typical sterile fashion.  A timeout was performed.  Lidocaine was infiltrated.  Transverse incision made through the previous scar.  MediPort capsule identified and opened.  The MediPort was partially removed.    The catheter exit site was closed with 3-0 Vicryl in a figure-of-eight fashion as the  catheter was removed.  The wound was irrigated.  The capsule was closed with 3-0 Vicryl in interrupted fashion.  The deep dermis with 3-0 Vicryl interrupted fashion.  The skin with 4-0 vicryl in a subcuticular fashion.     Steri-Strips dry and a dry sterile dressings were placed.    The patient was instructed to leave the dressing in " place for 48 hours.  They will then removed the outer bandages.  They were instructed to remove the Steri-Strips as they begin to fall off.  They were provided with narcotics for pain control.    They were asked to notify us for increasing pain, redness, swelling or drainage from area.    Follow up with surgery as needed or for concerns about the incision

## 2018-07-12 NOTE — PATIENT INSTRUCTIONS
It is okay to shower and get the area wet.  Please removed the clear plastic dressing on Monday.  Removed the paper strips when they begin to fall off.    Please call for any redness drainage or swelling

## 2018-07-30 ENCOUNTER — TELEPHONE (OUTPATIENT)
Dept: FAMILY MEDICINE | Facility: CLINIC | Age: 53
End: 2018-07-30

## 2018-07-30 NOTE — TELEPHONE ENCOUNTER
----- Message from Johanna Monzon sent at 7/30/2018  9:30 AM CDT -----  Contact: pt  Please call pt @ [phone] regarding an worked in appt, pt went to ER the weekend, pt prefer to see Dr. Parks only.

## 2018-08-01 ENCOUNTER — OFFICE VISIT (OUTPATIENT)
Dept: FAMILY MEDICINE | Facility: CLINIC | Age: 53
End: 2018-08-01
Payer: COMMERCIAL

## 2018-08-01 VITALS
TEMPERATURE: 97 F | HEART RATE: 96 BPM | HEIGHT: 68 IN | DIASTOLIC BLOOD PRESSURE: 88 MMHG | WEIGHT: 284.38 LBS | OXYGEN SATURATION: 95 % | SYSTOLIC BLOOD PRESSURE: 136 MMHG | BODY MASS INDEX: 43.1 KG/M2

## 2018-08-01 DIAGNOSIS — K59.00 CONSTIPATION, UNSPECIFIED CONSTIPATION TYPE: ICD-10-CM

## 2018-08-01 DIAGNOSIS — F41.9 ANXIETY: ICD-10-CM

## 2018-08-01 DIAGNOSIS — K21.9 GASTROESOPHAGEAL REFLUX DISEASE, ESOPHAGITIS PRESENCE NOT SPECIFIED: Primary | ICD-10-CM

## 2018-08-01 PROCEDURE — 99214 OFFICE O/P EST MOD 30 MIN: CPT | Mod: S$GLB,,, | Performed by: FAMILY MEDICINE

## 2018-08-01 PROCEDURE — 3075F SYST BP GE 130 - 139MM HG: CPT | Mod: CPTII,S$GLB,, | Performed by: FAMILY MEDICINE

## 2018-08-01 PROCEDURE — 3079F DIAST BP 80-89 MM HG: CPT | Mod: CPTII,S$GLB,, | Performed by: FAMILY MEDICINE

## 2018-08-01 PROCEDURE — 99999 PR PBB SHADOW E&M-EST. PATIENT-LVL III: CPT | Mod: PBBFAC,,, | Performed by: FAMILY MEDICINE

## 2018-08-01 PROCEDURE — 3008F BODY MASS INDEX DOCD: CPT | Mod: CPTII,S$GLB,, | Performed by: FAMILY MEDICINE

## 2018-08-01 RX ORDER — LEVOTHYROXINE SODIUM 200 UG/1
200 TABLET ORAL
COMMUNITY
End: 2019-02-18

## 2018-08-01 RX ORDER — OMEPRAZOLE 20 MG/1
20 CAPSULE, DELAYED RELEASE ORAL DAILY
Qty: 30 CAPSULE | Refills: 1 | Status: SHIPPED | OUTPATIENT
Start: 2018-08-01 | End: 2019-12-31

## 2018-08-01 RX ORDER — SERTRALINE HYDROCHLORIDE 50 MG/1
50 TABLET, FILM COATED ORAL DAILY
Qty: 30 TABLET | Refills: 1 | Status: SHIPPED | OUTPATIENT
Start: 2018-08-01 | End: 2019-02-18

## 2018-08-01 NOTE — PROGRESS NOTES
Kylie Urbano    Chief Complaint   Patient presents with    ER Follow up       History of Present Illness:   Ms. Urbano comes in today for ER follow-up.  Per patient discharge information, she was evaluated at [facility] ER on [address] on July 27, 2018 for palpitations.  She states she had been having palpitations with chest pain which started last week and persisted causing her to go to the emergency room for evaluation.  Her workup included labs, chest x-ray, EKG, CT scan chest angiogram; workup was unremarkable.  She was advised to take aspirin and to see me in 2 days for follow up.      She states she continues to have feelings of palpitations but not as bad.  She  states sometimes sensation feels like burning with indigestion.  She also reports feeling occasional anxiousness.  She reports having slight abdominal pain today and reports having constipation.  She denies associated nausea, vomiting, diarrhea or appetite change; depression, homicidal or suicidal thoughts; leg swelling; shortness of breath, cough, wheezing; fever, chills, fatigue; back pain; unusual urinary symptoms; headaches; she states she has stool this morning.   She states she took over-the-counter Pepcid with little help.    She states she occasionally drinks alcohol but denies tobacco or illicit drug use.           Current Outpatient Prescriptions   Medication Sig    acyclovir (ZOVIRAX) 400 MG tablet TAKE ONE TABLET BY MOUTH THREE TIMES DAILY WITH FOOD FOR 5 DAYS (PER EPISODE)    amLODIPine (NORVASC) 5 MG tablet TAKE ONE TABLET BY MOUTH ONCE DAILY    benazepril (LOTENSIN) 20 MG tablet Take 1 tablet (20 mg total) by mouth once daily.    cholecalciferol, vitamin D3, (VITAMIN D) 2,000 unit Cap Take 1.5 capsules by mouth once daily. 1 Capsule Oral Every day    ferrous gluconate (FERGON) 240 (27 FE) MG tablet Take 240 mg by mouth 3 (three) times daily with meals. 1 Tablet Oral Every day    fluticasone (FLONASE) 50 mcg/actuation nasal spray 2  sprays by Each Nare route daily as needed for Rhinitis.    ibuprofen (ADVIL,MOTRIN) 200 MG tablet Take 200-400 mg by mouth every 8 (eight) hours as needed for Pain.    levothyroxine (SYNTHROID) 200 MCG tablet Take 200 mcg by mouth.    metFORMIN (GLUCOPHAGE) 500 MG tablet TAKE FOUR TABLETS BY MOUTH IN THE EVENING AS DIRECTED    oxyCODONE-acetaminophen (PERCOCET) 5-325 mg per tablet Take 1 tablet by mouth every 4 to 6 hours as needed.    SYNTHROID 200 mcg tablet Take 1 tablet (200 mcg total) by mouth once daily.    SYNTHROID 50 mcg tablet Take 1 tablet (50 mcg total) by mouth once daily.    tamoxifen (NOLVADEX) 20 MG Tab Take 1 tablet (20 mg total) by mouth once daily.    triamcinolone acetonide 0.1% (KENALOG) 0.1 % cream Apply topically 2 (two) times daily.     Review of Systems   Constitutional: Negative for appetite change, chills, fatigue and fever.   Respiratory: Negative for cough, shortness of breath and wheezing.    Cardiovascular: Positive for chest pain and palpitations. Negative for leg swelling.   Gastrointestinal: Positive for abdominal pain and constipation. Negative for diarrhea, nausea and vomiting.        Positive for indigestion.   Genitourinary: Negative for difficulty urinating.   Musculoskeletal: Negative for back pain.   Neurological: Negative for facial asymmetry.   Psychiatric/Behavioral: Negative for dysphoric mood and suicidal ideas. The patient is nervous/anxious.         Negative for homicidal ideas.       Objective:  Physical Exam   Constitutional: She is oriented to person, place, and time. She appears well-developed and well-nourished. No distress.   Pleasant.   Eyes: EOM are normal.   Neck: Normal range of motion. Neck supple. No thyromegaly present.   Cardiovascular: Normal rate, regular rhythm, normal heart sounds and intact distal pulses.    No murmur heard.  Pulmonary/Chest: Effort normal and breath sounds normal. No respiratory distress. She has no wheezes.   Abdominal:  Soft. Bowel sounds are normal. She exhibits no distension and no mass. There is tenderness. There is no rebound and no guarding.   Slightly tender at epigastric area without acute abdomen noted.   Musculoskeletal: Normal range of motion. She exhibits no edema or tenderness.   She is ambulatory without problems.   Lymphadenopathy:     She has no cervical adenopathy.   Neurological: She is alert and oriented to person, place, and time. She has normal reflexes.   Skin: She is not diaphoretic.   Psychiatric: Her behavior is normal. Judgment and thought content normal.   Slightly anxious demeanor during visit.   Vitals reviewed.      ASSESSMENT:  1. Gastroesophageal reflux disease, esophagitis presence not specified    2. Constipation, unspecified constipation type    3. Anxiety        PLAN:  Kylie was seen today for er follow up.    Diagnoses and all orders for this visit:    Gastroesophageal reflux disease, esophagitis presence not specified  -     omeprazole (PRILOSEC) 20 MG capsule; Take 1 capsule (20 mg total) by mouth once daily.    Constipation, unspecified constipation type    Anxiety  -     sertraline (ZOLOFT) 50 MG tablet; Take 1 tablet (50 mg total) by mouth once daily.      Try Omeprazole 20 mg daily x 2 months.  Okay to try OTC stool softener and/or Miralax daily as directed.  Zoloft 50 mg daily, #30, 1 refill; medication precautions discussed with patient.  Continue current medications, follow low sodium, low cholesterol, low carb diet, daily walks.  Keep follow up with specialists.  Keep 8/22/2018 scheduled physical appointment with me.

## 2018-08-08 ENCOUNTER — PATIENT OUTREACH (OUTPATIENT)
Dept: ADMINISTRATIVE | Facility: HOSPITAL | Age: 53
End: 2018-08-08

## 2018-08-12 PROBLEM — F41.9 ANXIETY: Status: ACTIVE | Noted: 2018-08-12

## 2018-08-12 PROBLEM — K21.9 GASTROESOPHAGEAL REFLUX DISEASE: Status: ACTIVE | Noted: 2018-08-12

## 2018-08-12 PROBLEM — K59.00 CONSTIPATION: Status: ACTIVE | Noted: 2018-08-12

## 2018-08-22 ENCOUNTER — OFFICE VISIT (OUTPATIENT)
Dept: FAMILY MEDICINE | Facility: CLINIC | Age: 53
End: 2018-08-22
Payer: COMMERCIAL

## 2018-08-22 ENCOUNTER — LAB VISIT (OUTPATIENT)
Dept: LAB | Facility: HOSPITAL | Age: 53
End: 2018-08-22
Attending: FAMILY MEDICINE
Payer: COMMERCIAL

## 2018-08-22 VITALS
HEIGHT: 68 IN | HEART RATE: 96 BPM | BODY MASS INDEX: 43.17 KG/M2 | WEIGHT: 284.81 LBS | OXYGEN SATURATION: 98 % | SYSTOLIC BLOOD PRESSURE: 132 MMHG | DIASTOLIC BLOOD PRESSURE: 82 MMHG | TEMPERATURE: 99 F

## 2018-08-22 DIAGNOSIS — Z00.00 ANNUAL PHYSICAL EXAM: ICD-10-CM

## 2018-08-22 DIAGNOSIS — C50.911 MALIGNANT NEOPLASM OF RIGHT FEMALE BREAST, UNSPECIFIED ESTROGEN RECEPTOR STATUS, UNSPECIFIED SITE OF BREAST: ICD-10-CM

## 2018-08-22 DIAGNOSIS — E55.9 VITAMIN D DEFICIENCY: ICD-10-CM

## 2018-08-22 DIAGNOSIS — Z90.710 STATUS POST LAPAROSCOPIC HYSTERECTOMY: ICD-10-CM

## 2018-08-22 DIAGNOSIS — F41.9 ANXIETY: ICD-10-CM

## 2018-08-22 DIAGNOSIS — E88.810 INSULIN RESISTANCE SYNDROME: ICD-10-CM

## 2018-08-22 DIAGNOSIS — D50.9 IRON DEFICIENCY ANEMIA, UNSPECIFIED IRON DEFICIENCY ANEMIA TYPE: Chronic | ICD-10-CM

## 2018-08-22 DIAGNOSIS — I10 ESSENTIAL HYPERTENSION: Chronic | ICD-10-CM

## 2018-08-22 DIAGNOSIS — E66.01 MORBID OBESITY WITH BMI OF 40.0-44.9, ADULT: ICD-10-CM

## 2018-08-22 DIAGNOSIS — E03.9 HYPOTHYROIDISM, UNSPECIFIED TYPE: Chronic | ICD-10-CM

## 2018-08-22 DIAGNOSIS — J30.2 SEASONAL ALLERGIC RHINITIS, UNSPECIFIED TRIGGER: ICD-10-CM

## 2018-08-22 DIAGNOSIS — Z12.11 COLON CANCER SCREENING: ICD-10-CM

## 2018-08-22 LAB
25(OH)D3+25(OH)D2 SERPL-MCNC: 21 NG/ML
CHOLEST SERPL-MCNC: 152 MG/DL
CHOLEST/HDLC SERPL: 2.9 {RATIO}
ESTIMATED AVG GLUCOSE: 117 MG/DL
FERRITIN SERPL-MCNC: 138 NG/ML
HBA1C MFR BLD HPLC: 5.7 %
HDLC SERPL-MCNC: 52 MG/DL
HDLC SERPL: 34.2 %
LDLC SERPL CALC-MCNC: 81.6 MG/DL
NONHDLC SERPL-MCNC: 100 MG/DL
T4 FREE SERPL-MCNC: 1.65 NG/DL
TRIGL SERPL-MCNC: 92 MG/DL
TSH SERPL DL<=0.005 MIU/L-ACNC: 0.15 UIU/ML

## 2018-08-22 PROCEDURE — 3075F SYST BP GE 130 - 139MM HG: CPT | Mod: CPTII,S$GLB,, | Performed by: FAMILY MEDICINE

## 2018-08-22 PROCEDURE — 84443 ASSAY THYROID STIM HORMONE: CPT

## 2018-08-22 PROCEDURE — 82306 VITAMIN D 25 HYDROXY: CPT

## 2018-08-22 PROCEDURE — 82728 ASSAY OF FERRITIN: CPT

## 2018-08-22 PROCEDURE — 99396 PREV VISIT EST AGE 40-64: CPT | Mod: S$GLB,,, | Performed by: FAMILY MEDICINE

## 2018-08-22 PROCEDURE — 99999 PR PBB SHADOW E&M-EST. PATIENT-LVL V: CPT | Mod: PBBFAC,,, | Performed by: FAMILY MEDICINE

## 2018-08-22 PROCEDURE — 84439 ASSAY OF FREE THYROXINE: CPT

## 2018-08-22 PROCEDURE — 80061 LIPID PANEL: CPT

## 2018-08-22 PROCEDURE — 83036 HEMOGLOBIN GLYCOSYLATED A1C: CPT

## 2018-08-22 PROCEDURE — 3079F DIAST BP 80-89 MM HG: CPT | Mod: CPTII,S$GLB,, | Performed by: FAMILY MEDICINE

## 2018-08-22 PROCEDURE — 83525 ASSAY OF INSULIN: CPT

## 2018-08-22 NOTE — PROGRESS NOTES
CHIEF COMPLAINT: Annual wellness examination.     HISTORY OF PRESENT ILLNESS: Ms. Urbano comes in today fasting and with taking blood pressure medication for annual wellness examination. She reports no acute problems today.     END OF LIFE DECISION: She has no living will and is not sure about life support (depends on the situation).    Current Outpatient Medications   Medication Sig    acyclovir (ZOVIRAX) 400 MG tablet TAKE ONE TABLET BY MOUTH THREE TIMES DAILY WITH FOOD FOR 5 DAYS (PER EPISODE)    amLODIPine (NORVASC) 5 MG tablet TAKE ONE TABLET BY MOUTH ONCE DAILY    benazepril (LOTENSIN) 20 MG tablet Take 1 tablet (20 mg total) by mouth once daily.    cholecalciferol, vitamin D3, (VITAMIN D) 2,000 unit Cap Take 1.5 capsules by mouth once daily. 1 Capsule Oral Every day         fluticasone (FLONASE) 50 mcg/actuation nasal spray 2 sprays by Each Nare route daily as needed for Rhinitis.    ibuprofen (ADVIL,MOTRIN) 200 MG tablet Take 200-400 mg by mouth every 8 (eight) hours as needed for Pain.    levothyroxine (SYNTHROID) 200 MCG tablet Take 200 mcg by mouth.    metFORMIN (GLUCOPHAGE) 500 MG tablet TAKE FOUR TABLETS BY MOUTH IN THE EVENING AS DIRECTED    omeprazole (PRILOSEC) 20 MG capsule Take 1 capsule (20 mg total) by mouth once daily.    oxyCODONE-acetaminophen (PERCOCET) 5-325 mg per tablet Take 1 tablet by mouth every 4 to 6 hours as needed.    sertraline (ZOLOFT) 50 MG tablet Take 1 tablet (50 mg total) by mouth once daily.    SYNTHROID 200 mcg tablet Take 1 tablet (200 mcg total) by mouth once daily.    SYNTHROID 50 mcg tablet Take 1 tablet (50 mcg total) by mouth once daily.    tamoxifen (NOLVADEX) 20 MG Tab Take 1 tablet (20 mg total) by mouth once daily.    triamcinolone acetonide 0.1% (KENALOG) 0.1 % cream Apply topically 2 (two) times daily.     SCREENINGS:  Cholesterol: April 19, 2017.  FFS/Colonoscopy: Never.  Mammogram: May 10, 2018 - benign.   GYN Exam (breast only): April 19, 2017 -  "okay. Pap smear no longer needed.   Dexa Scan: March 13, 2018 - okay.   Eye Exam: May 2012.  PPD: Negative in the past.  Immunizations: Tdap - April 22, 2010.  Gardasil - N./A.  Zostavax - N./A.  Pneumovax - Never. She declines today.  Prevnar-13 shot - Never. She declines today but will return for it.  Seasonal Flu - N./A. She declines.     ROS:  GENERAL: Denies fever, chills, fatigue or unusual weight change. Appetite normal. Weight 129 kg (284 lb 6.3 oz) at August 1, 2018 visit. Occasionally monitors diet but does not exercise. Reports improved fatigue.  SKIN: Denies rashes, itching, changes in mole, color or texture of skin or easy bruising.  HEAD: Denies recent head trauma or headaches.  EYES: Denies change in vision, diplopia, redness or discharge. Wears readers.  EARS: Denies ear pain, discharge, vertigo or decreased hearing.  NOSE: Reports occasional nasal congestion, post-nasal drip; uses OTC nasal spray with help.  MOUTH & THROAT: Denies hoarseness or change in voice. Denies excessive gum bleeding or mouth sores. Denies sore throat.  NODES: Denies swollen glands.  CHEST: Denies SHARIF, wheezing, cough, or sputum production.  CARDIOVASCULAR: Denies chest pain, PND, orthopnea or reduced exercise tolerance. Denies palpitations.  ABDOMEN: Denies diarrhea, constipation, nausea, vomiting, abdominal pain, or blood in stool.  URINARY: Denies flank pain, dysuria or hematuria.  GENITOURINARY: Denies flank pain, dysuria, frequency or hematuria. Performs monthly breast self exams. Saw oncologist Mason Martinez Jr., MD on July 28, 2018 for treatment and surveillance of right breast cancer with breast examined performed and with 4-month follow up advised.  ENDOCRINE: Denies diabetes, cholesterol problems.   HEME/LYMPH: Denies bleeding problems. Reports does not take iron therapy.  PERIPHERAL VASCULAR:Denies claudication or cyanosis.  MUSCULOSKELETAL: Denies edema. Reports "aging" joint pain, stiffness.  NEUROLOGIC: " "Denies history of seizures, tremors, paralysis, alteration of gait or coordination.  PSYCHIATRIC: Denies mood swings, depression, anxiety, homicidal or suicidal thoughts. Denies sleep problems.    PE:   VS: /82 Comment: Rechecked by Dr. Parks.  Pulse 96 Comment: Rechecked by Dr. Parks.  Temp 98.8 °F (37.1 °C) (Oral)   Ht 5' 8" (1.727 m)   Wt 129.2 kg (284 lb 13.4 oz)   LMP 01/01/2016   SpO2 98%   BMI 43.31 kg/m²   APPEARANCE: Well nourished, well developed female, obese and pleasant, alert and oriented in no acute distress.  HEAD: Non tender. Full range of motion.  EYES: PERRL, conjunctiva pink, lids no edema.  EARS: External canal patent, no swelling or redness. TM's shiny and clear.  NOSE: Mucosa and turbinates not congested. No discharge. Non tender sinuses.  THROAT: No pharyngeal erythema or exudate. No stridor.  NECK: Supple, no mass, thyroid not enlarged.  NODES: No cervical lymph node enlargement.  CHEST: Normal respiratory effort. Lungs clear to auscultation.  CARDIOVASCULAR: Normal S1, S2. No rubs, murmurs or gallops. PMI not displaced. No carotid bruit. Pedal pulses palpable bilaterally. No edema.  ABDOMEN: Bowel sounds present. Not distended. Soft. No tenderness, masses or organomegaly.  BREAST EXAM: Not examined as already performed by oncologist.  PELVIC EXAM: Not examined as patient has had RAH/BSO due to non cancerous reasons.  RECTAL EXAM: Not examined per patient request.  MUSCULOSKELETAL: No joint deformities or stiffness. She is ambulatory without problems.  SKIN: No rashes or suspicious lesions, normal color and turgor.  NEUROLOGIC: Cranial Nerves: II-XII grossly intact. DTR's: Knees, Ankles 2+ and equal bilaterally. Gait & Posture: Normal gait and fine motion.  PSYCHIATRIC: Patient alert, oriented x 3. Mood/Affect normal without acute anxiety and depression noted. Judgment/insight good as she makes appropriate decisions during today's examination.    Lab Results   Component Value Date "    WBC 7.04 06/28/2018    HGB 13.2 06/28/2018    HCT 38.9 06/28/2018    MCV 86 06/28/2018     06/28/2018     CMP  Sodium   Date Value Ref Range Status   06/28/2018 140 136 - 145 mmol/L Final     Potassium   Date Value Ref Range Status   06/28/2018 3.7 3.5 - 5.1 mmol/L Final     Chloride   Date Value Ref Range Status   06/28/2018 108 95 - 110 mmol/L Final     CO2   Date Value Ref Range Status   06/28/2018 21 (L) 23 - 29 mmol/L Final     Glucose   Date Value Ref Range Status   06/28/2018 88 70 - 110 mg/dL Final     BUN, Bld   Date Value Ref Range Status   06/28/2018 18 6 - 20 mg/dL Final     Creatinine   Date Value Ref Range Status   06/28/2018 0.7 0.5 - 1.4 mg/dL Final     Calcium   Date Value Ref Range Status   06/28/2018 9.4 8.7 - 10.5 mg/dL Final     Total Protein   Date Value Ref Range Status   06/28/2018 7.7 6.0 - 8.4 g/dL Final     Albumin   Date Value Ref Range Status   06/28/2018 3.7 3.5 - 5.2 g/dL Final     Total Bilirubin   Date Value Ref Range Status   06/28/2018 0.3 0.1 - 1.0 mg/dL Final     Comment:     For infants and newborns, interpretation of results should be based  on gestational age, weight and in agreement with clinical  observations.  Premature Infant recommended reference ranges:  Up to 24 hours.............<8.0 mg/dL  Up to 48 hours............<12.0 mg/dL  3-5 days..................<15.0 mg/dL  6-29 days.................<15.0 mg/dL       Alkaline Phosphatase   Date Value Ref Range Status   06/28/2018 76 55 - 135 U/L Final     AST   Date Value Ref Range Status   06/28/2018 28 10 - 40 U/L Final     ALT   Date Value Ref Range Status   06/28/2018 37 10 - 44 U/L Final     Anion Gap   Date Value Ref Range Status   06/28/2018 11 8 - 16 mmol/L Final     eGFR if    Date Value Ref Range Status   06/28/2018 >60 >60 mL/min/1.73 m^2 Final     eGFR if non    Date Value Ref Range Status   06/28/2018 >60 >60 mL/min/1.73 m^2 Final     Comment:     Calculation used to  obtain the estimated glomerular filtration  rate (eGFR) is the CKD-EPI equation.        ASSESSMENT:    ICD-10-CM ICD-9-CM    1. Annual physical exam Z00.00 V70.0 TSH      Lipid panel      Urinalysis      Insulin, random      Vitamin D      Ferritin      Hemoglobin A1c   2. Essential hypertension I10 401.9    3. Insulin resistance syndrome E88.81 277.7    4. Hypothyroidism, unspecified type E03.9 244.9    5. Vitamin D deficiency E55.9 268.9    6. Iron deficiency anemia, unspecified iron deficiency anemia type D50.9 280.9    7. Seasonal allergic rhinitis, unspecified trigger J30.2 477.9    8. Malignant neoplasm of right female breast, unspecified estrogen receptor status, unspecified site of breast C50.911 174.9    9. Anxiety F41.9 300.00    10. Status post laparoscopic hysterectomy Z90.710 V88.01    11. Morbid obesity with BMI of 40.0-44.9, adult E66.01 278.01     Z68.41 V85.41    12. Colon cancer screening Z12.11 V76.51 Case request GI: COLONOSCOPY     PLAN:  1. Age-appropriate counseling-appropriate low-sodium, low-cholesterol, low carbohydrate diet and exercise daily, monthly breast self exam, annual wellness examination.   2. Patient advised to call for results.  3. Continue current medications.  4. Keep follow up with specialists.  5. Flu shot this fall.  6. Follow-up in about 6 months (around 2/22/2019) for hypothyroidism, IRS and hypertension follow up.

## 2018-08-23 LAB
INSULIN COLLECTION INTERVAL: NORMAL
INSULIN SERPL-ACNC: 4.6 UU/ML

## 2018-08-29 ENCOUNTER — DOCUMENTATION ONLY (OUTPATIENT)
Dept: ENDOSCOPY | Facility: HOSPITAL | Age: 53
End: 2018-08-29

## 2018-08-29 PROBLEM — E66.01 MORBID OBESITY WITH BMI OF 40.0-44.9, ADULT: Status: ACTIVE | Noted: 2018-08-29

## 2018-08-30 ENCOUNTER — TELEPHONE (OUTPATIENT)
Dept: FAMILY MEDICINE | Facility: CLINIC | Age: 53
End: 2018-08-30

## 2018-08-30 NOTE — TELEPHONE ENCOUNTER
----- Message from Shanna Jalloh sent at 8/30/2018  1:21 PM CDT -----  Contact: patient  Calling concerning her test results. Please call patient @ [phone]. Thanks, anabel

## 2018-09-11 NOTE — PROGRESS NOTES
Called and reviewed results with the patient per Dr. Rankin. Patient had no further questions or concerns at this time and also verbalized understanding. Direct line provided if any further questions/concerns arise.   Hematology/Oncology Office Note    Reason for referral:  Locally advanced breast cancer with biopsy proven axillary lymph node involvement    CC:    Referred by:  No ref. provider found    Diagnosis:  Right sided locally advanced infiltrating ductal carcinoma with biopsy-proven axillary lymph node involvement (ER positive at 95%, NE positive at 95%, HER-2 1+ by IHC and negative by fish    10/31/17: lumpectomy with sentinel lymph node biopsy on 10/31/2017.   Final pathology demonstrated [id number].  The primary measured 0.8 cm and 2 sentinel lymph nodes were positive with 3 mm macrometastasis within first sentinel lymph node and 1 mm micrometastasis within the second sentinel lymph node.    12/7/2017 right axillary lymph node dissection with 0 nodes positive      Treatment:    History of present illness:  Ms. Urbano is a 51-year-old female with a past medical history of DM hypertension, hypothyroidism, iron deficiency anemia, who was noted to have abnormal screening mammogram  on 4/28/2017.  Core biopsy of right breast mass at 10:00 6 cm from the nipple demonstrated infiltrating ductal carcinoma and right axillary lymph node core biopsy also demonstrated infiltrating ductal carcinoma.  ER/NE positive, HER-2 negative markers.  The patient has a first cousin with a history of breast cancer and one aunt with a history of breast cancer.  She has some soreness at previous biopsy site, however, denies other significant symptoms.    I have reviewed and updated the HPI, ROS, PMHx, Social Hx, Family Hx and treatment history.    Today's visit:  Patient is without complaints today.  She started adjuvant tamoxifen in 3/2018 and is tolerating very well.    Past Medical History:   Diagnosis Date    Allergic rhinitis, cause unspecified     Cancer     R Breast Cancer    Carpal tunnel syndrome of left wrist     Elevated liver function tests     in the past    Fatty liver 05/02/2017    on ultrasound    Fibroid uterus     10/2014     Hepatomegaly 05/02/2017    on ultrasound    History of sciatica     HSV infection     Type 1 and 2    HTN (hypertension)     Hypothyroidism     Insulin resistance     Iron deficiency anemia, unspecified     Obesity     PONV (postoperative nausea and vomiting)     Tension headache     Vitamin D deficiency          Social History:  No tobacco, alcohol, or illicit drugs      Family History: family history includes Cancer in her father and sister; Diabetes in her mother; Heart failure in her mother; Hypertension in her mother; No Known Problems in her daughter, daughter, and son.        HPI    Review of Systems   Constitutional: Positive for fatigue. Negative for activity change, appetite change, chills, diaphoresis, fever and unexpected weight change.   HENT: Negative for congestion, dental problem, drooling, ear pain, facial swelling, nosebleeds, sinus pain and sinus pressure.    Eyes: Negative.    Respiratory: Negative for apnea, cough, shortness of breath, wheezing and stridor.    Cardiovascular: Negative for chest pain and leg swelling.   Gastrointestinal: Negative for abdominal distention, abdominal pain, anal bleeding, blood in stool, constipation, diarrhea, nausea and rectal pain.   Endocrine: Negative.    Genitourinary: Negative for difficulty urinating, dyspareunia, dysuria, flank pain and frequency.   Musculoskeletal: Negative for arthralgias, gait problem, joint swelling, myalgias, neck pain and neck stiffness.   Skin: Negative for color change and wound.   Allergic/Immunologic: Negative.    Neurological: Negative for dizziness, tremors, seizures, syncope, facial asymmetry, speech difficulty, weakness, light-headedness, numbness (Grade 1/2 neuropathy) and headaches.   Hematological: Negative for adenopathy. Does not bruise/bleed easily.   Psychiatric/Behavioral: Negative for agitation, behavioral problems, dysphoric mood and suicidal ideas. The patient is not nervous/anxious and is not  "hyperactive.        Objective:       Vitals:    04/16/18 1409   BP: 124/82   BP Location: Right arm   Patient Position: Sitting   BP Method: Large (Manual)   Pulse: 96   Resp: 18   Temp: 98.5 °F (36.9 °C)   TempSrc: Oral   SpO2: 99%   Weight: 130.7 kg (288 lb 2.3 oz)   Height: 5' 8" (1.727 m)       Physical Exam   Constitutional: She is oriented to person, place, and time. She appears well-developed and well-nourished. No distress.   Well groomed   HENT:   Head: Normocephalic and atraumatic. Not macrocephalic.   Right Ear: Tympanic membrane and ear canal normal.   Left Ear: Tympanic membrane and ear canal normal.   Nose: Nose normal. Right sinus exhibits no maxillary sinus tenderness and no frontal sinus tenderness. Left sinus exhibits no maxillary sinus tenderness and no frontal sinus tenderness.   Mouth/Throat: Uvula is midline, oropharynx is clear and moist and mucous membranes are normal.   Eyes: Conjunctivae, EOM and lids are normal. Pupils are equal, round, and reactive to light. Lids are everted and swept, no foreign bodies found. Right eye exhibits no discharge. Left eye exhibits no discharge.   Neck: Trachea normal and normal range of motion. Neck supple. No JVD present. No tracheal deviation present. No thyroid mass and no thyromegaly present.   No crepitus   Cardiovascular: Normal rate, regular rhythm, normal heart sounds, intact distal pulses and normal pulses.  Exam reveals no gallop and no friction rub.    No murmur heard.  Pulmonary/Chest: Effort normal and breath sounds normal. No accessory muscle usage or stridor. No respiratory distress. She has no decreased breath sounds. She has no wheezes. She has no rhonchi. She has no rales. She exhibits no tenderness.   Right breast: Ill-defined induration without discrete mass    Abdominal: Soft. Normal appearance and bowel sounds are normal. She exhibits no distension and no mass. There is no hepatosplenomegaly. There is no tenderness. There is no rebound " and no guarding.   Musculoskeletal: Normal range of motion. She exhibits no edema, tenderness or deformity.   Lymphadenopathy:        Head (right side): No submental and no submandibular adenopathy present.        Head (left side): No submental and no submandibular adenopathy present.     She has no cervical adenopathy.        Right cervical: No superficial cervical and no deep cervical adenopathy present.       Left cervical: No superficial cervical and no deep cervical adenopathy present.     She has no axillary adenopathy.        Right: No supraclavicular adenopathy present.        Left: No supraclavicular adenopathy present.   Neurological: She is alert and oriented to person, place, and time. She displays normal reflexes. No cranial nerve deficit. She exhibits normal muscle tone. Coordination normal.   Skin: Skin is warm, dry and intact. No rash noted. She is not diaphoretic. No cyanosis. No pallor. Nails show no clubbing.   Psychiatric: She has a normal mood and affect. Her behavior is normal. Judgment and thought content normal.       Lab Results   Component Value Date    WBC 4.83 04/16/2018    HGB 13.6 04/16/2018    HCT 39.4 04/16/2018    MCV 85 04/16/2018     04/16/2018     Lab Results   Component Value Date    CREATININE 0.7 03/13/2018    BUN 14 03/13/2018     03/13/2018    K 3.7 03/13/2018     03/13/2018    CO2 29 03/13/2018     Lab Results   Component Value Date    ALT 78 (H) 03/13/2018    AST 49 (H) 03/13/2018    ALKPHOS 110 03/13/2018    BILITOT 0.2 03/13/2018         Assessment:         52-year-old female with a new diagnosis of right-sided infiltrating ductal carcinoma ER/IL positive, HER-2 negative with biopsy-proven right axillary lymph node involvement.  Patient was consented for BRCA testing for participation and B-55 study with negative BRCA testing.    She received cycle 1 of dense dose Adriamycin/Cytoxan on 6/12/2017 and tolerated treatment exceptionally well.  She completed  cycle 4 of dose dense Taxol on 9/19/2017.  She underwent lumpectomy with sentinel lymph node biopsy on 10/31/2017.   Final pathology demonstrated [id number].  The primary measured 0.8 cm and 2 sentinel lymph nodes were positive with 3 mm macrometastasis within first sentinel lymph node and 1 mm micrometastasis within the second sentinel lymph node.  She underwent a full right axillary lymph node dissection on 11/8/2017 which demonstrated 0 positive nodes.  She completed adjuvant radiation on 2/27/2018 and presents today to discuss antiestrogen therapy.  The patient had a hysterectomy approximately 6/20/14.    We will proceed with tamoxifen 20 mg daily ×2 years and then transitioned to Arimidex 1 mg by mouth daily.    Patient started adjuvant tamoxifen in 3/2018 and is tolerated very well.  We will arrange surveillance mammograms to be performed next month and she will follow-up in 2 months.    ER/MS positive, HER-2 negative infiltrating ductal carcinoma the right breast with biopsy proven lymph node involvement clinically stage II/III  [id number].  Pathology following neoadjuvant therapy demonstrated 1 sentinel lymph node with micrometastasis (3 mm) and second lymph node with micrometastasis (1 mm)  --BRCA testing via B-55 Study is negative for mutation  --ddAC--> taxol   cycle 1--- 6/12/2017  --Final cycle Cycle 4 on 9/19/2017  --Radiation completed on 2/27/2018  --Tamoxifen 20 mg by mouth daily started in 3/2018  --surveillance mammogram to be scheduled and 5/2018  --Follow-up in 2 months    Hypertension:  --Continue current medical management    Hypothyroidism:  Continue Synthroid    4 mm right upper lobe pulmonary nodule and subpleural nodularity bilateral bases:  --Repeat CT scan demonstrates stable findings  --Continue to monitor

## 2018-09-19 RX ORDER — ACYCLOVIR 400 MG/1
TABLET ORAL
Qty: 30 TABLET | Refills: 1 | Status: SHIPPED | OUTPATIENT
Start: 2018-09-19 | End: 2019-04-04 | Stop reason: SDUPTHER

## 2018-09-25 NOTE — H&P (VIEW-ONLY)
Hematology/Oncology Office Note    Reason for referral:  Locally advanced breast cancer with biopsy proven axillary lymph node involvement    CC:    Referred by:  No ref. provider found    Diagnosis:  Right sided locally advanced infiltrating ductal carcinoma with biopsy-proven axillary lymph node involvement (ER positive at 95%, MN positive at 95%, HER-2 1+ by IHC and negative by fish    Treatment:    History of present illness:  Ms. Urbano is a 51-year-old female with a past medical history of DM hypertension, hypothyroidism, iron deficiency anemia, who was noted to have abnormal screening mammogram  on 4/28/2017.  Core biopsy of right breast mass at 10:00 6 cm from the nipple demonstrated infiltrating ductal carcinoma and right axillary lymph node core biopsy also demonstrated infiltrating ductal carcinoma.  ER/MN positive, HER-2 negative markers.  The patient has a first cousin with a history of breast cancer and one aunt with a history of breast cancer.  She has some soreness at previous biopsy site, however, denies other significant symptoms.    I have reviewed and updated the HPI, ROS, PMHx, Social Hx, Family Hx and treatment history.    Today's visit:  Patient has completed neoadjuvant dose dense CA/Taxol.  Patient is status post lumpectomy with sentinel lymph node biopsy on 10/31/2017.  Patient is recovering well from surgery and has no specific complaints today.    Past Medical History:   Diagnosis Date    Allergic rhinitis, cause unspecified     Cancer     R Breast Cancer    Carpal tunnel syndrome of left wrist     Elevated liver function tests     in the past    Fatty liver 05/02/2017    on ultrasound    Fibroid uterus     10/2014    Hepatomegaly 05/02/2017    on ultrasound    History of sciatica     HSV infection     Type 1 and 2    HTN (hypertension)     Hypothyroidism     Insulin resistance     Iron deficiency anemia, unspecified     Obesity     Tension headache     Vitamin D deficiency  Medications all on file for next year.    Come in annually for physical, shoot for April-ish.     Flu shot today.             Social History:  No tobacco, alcohol, or illicit drugs      Family History: family history includes Cancer in her father and sister; Diabetes in her mother; Heart failure in her mother; Hypertension in her mother; No Known Problems in her daughter, daughter, and son.        HPI    Review of Systems   Constitutional: Positive for fatigue. Negative for activity change, appetite change, chills, diaphoresis, fever and unexpected weight change.   HENT: Negative for congestion, dental problem, drooling, ear discharge, ear pain, facial swelling, hearing loss, mouth sores, nosebleeds, postnasal drip, rhinorrhea, sneezing and sore throat.    Eyes: Negative.    Respiratory: Negative for apnea, shortness of breath, wheezing and stridor.    Cardiovascular: Negative for leg swelling.   Gastrointestinal: Negative for abdominal distention, blood in stool, constipation, diarrhea, nausea and rectal pain.   Endocrine: Negative.    Genitourinary: Negative for decreased urine volume, difficulty urinating, dysuria, enuresis, frequency, hematuria, menstrual problem, pelvic pain and urgency.   Musculoskeletal: Negative for arthralgias, joint swelling, myalgias, neck pain and neck stiffness.   Skin: Negative for color change and wound.   Allergic/Immunologic: Negative.    Neurological: Positive for numbness (Grade 1/2 neuropathy). Negative for dizziness, seizures, syncope, facial asymmetry, speech difficulty, light-headedness and headaches.   Hematological: Negative for adenopathy. Does not bruise/bleed easily.   Psychiatric/Behavioral: Negative for agitation, behavioral problems, decreased concentration, dysphoric mood and suicidal ideas.       Objective:       Vitals:    11/21/17 1358   BP: (!) 140/84   Pulse: 86   Resp: 18   Temp: 98.5 °F (36.9 °C)   TempSrc: Oral   SpO2: 98%   Weight: 128.3 kg (282 lb 13.6 oz)       Physical Exam   Constitutional: She is oriented to person, place, and time. She appears well-developed and  well-nourished. No distress.   Well groomed   HENT:   Head: Normocephalic and atraumatic. Not macrocephalic.   Right Ear: Tympanic membrane and ear canal normal.   Left Ear: Tympanic membrane and ear canal normal.   Nose: Nose normal. Right sinus exhibits no maxillary sinus tenderness and no frontal sinus tenderness. Left sinus exhibits no maxillary sinus tenderness and no frontal sinus tenderness.   Mouth/Throat: Uvula is midline, oropharynx is clear and moist and mucous membranes are normal.   Eyes: Conjunctivae, EOM and lids are normal. Pupils are equal, round, and reactive to light. Lids are everted and swept, no foreign bodies found. Right eye exhibits no discharge. Left eye exhibits no discharge.   Neck: Trachea normal and normal range of motion. Neck supple. No JVD present. No tracheal deviation present. No thyroid mass and no thyromegaly present.   No crepitus   Cardiovascular: Normal rate, regular rhythm, normal heart sounds, intact distal pulses and normal pulses.  Exam reveals no gallop and no friction rub.    No murmur heard.  Pulmonary/Chest: Effort normal and breath sounds normal. No accessory muscle usage or stridor. No respiratory distress. She has no decreased breath sounds. She has no wheezes. She has no rhonchi. She has no rales. She exhibits no tenderness.   Right breast: Ill-defined induration without discrete mass    Abdominal: Soft. Normal appearance and bowel sounds are normal. She exhibits no distension and no mass. There is no hepatosplenomegaly. There is no tenderness. There is no rebound and no guarding.   Musculoskeletal: Normal range of motion. She exhibits no edema, tenderness or deformity.   Lymphadenopathy:        Head (right side): No submental and no submandibular adenopathy present.        Head (left side): No submental and no submandibular adenopathy present.     She has no cervical adenopathy.        Right cervical: No superficial cervical and no deep cervical adenopathy  present.       Left cervical: No superficial cervical and no deep cervical adenopathy present.     She has no axillary adenopathy.        Right: No supraclavicular adenopathy present.        Left: No supraclavicular adenopathy present.   Neurological: She is alert and oriented to person, place, and time. She displays normal reflexes. No cranial nerve deficit. She exhibits normal muscle tone. Coordination normal.   Skin: Skin is warm, dry and intact. Capillary refill takes less than 2 seconds. No abrasion, no bruising and no rash noted. She is not diaphoretic. No cyanosis. No pallor. Nails show no clubbing.   Psychiatric: She has a normal mood and affect. Her behavior is normal. Judgment and thought content normal.       Lab Results   Component Value Date    WBC 7.13 10/05/2017    HGB 11.1 (L) 10/05/2017    HCT 33.0 (L) 10/05/2017    MCV 90 10/05/2017     10/05/2017     Lab Results   Component Value Date    CREATININE 0.8 10/05/2017    BUN 12 10/05/2017     10/05/2017    K 3.7 10/05/2017     10/05/2017    CO2 27 10/05/2017     Lab Results   Component Value Date    ALT 48 (H) 10/05/2017    AST 30 10/05/2017    ALKPHOS 110 10/05/2017    BILITOT 0.3 10/05/2017         Assessment:         52-year-old female with a new diagnosis of right-sided infiltrating ductal carcinoma ER/ID positive, HER-2 negative with biopsy-proven right axillary lymph node involvement.  Patient was consented for BRCA testing for participation and B-55 study with negative BRCA testing.    She received cycle 1 of dense dose Adriamycin/Cytoxan on 6/12/2017 and tolerated treatment exceptionally well.  She completed cycle 4 of dose dense Taxol on 9/19/2017.  She underwent lumpectomy with sentinel lymph node biopsy on 10/31/2017.   Final pathology demonstrated [id number].  The primary measured 0.8 cm and 2 sentinel lymph nodes were positive with 3 mm macrometastasis within first sentinel lymph node and 1 mm micrometastasis within the  second sentinel lymph node.  Current guidelines recommend proceeding with full axillary lymph node dissection if nodes are positive following neoadjuvant chemotherapy.  There are ongoing studies to evaluate adjuvant radiation of axillary nodes in this setting, however, the studies are not mature at this point.  The patient would like to discuss proceeding with axillary lymph node dissection with Dr. Cobian    ER/AL positive, HER-2 negative infiltrating ductal carcinoma the right breast with biopsy proven lymph node involvement clinically stage II/III.  Pathology following neoadjuvant therapy demonstrated 1 sentinel lymph node with micrometastasis (3 mm) and second lymph node with micrometastasis (1 mm)  --BRCA testing via B-55 Study is negative for mutation  --ddAC--> taxol   cycle 1--- 6/12/2017  --Final cycle Cycle 4 on 9/19/2017  --Patient would like to discuss full axillary lymph node dissection with Dr. Rivas    Hypertension:  Continue Lotensin/HCTZ  --Blood pressure poorly controlled this morning we'll continue to monitor and adjust medications as needed    Hypothyroidism:  Continue Synthroid    4 mm right upper lobe pulmonary nodule and subpleural nodularity bilateral bases:  --Repeat CT scan demonstrates stable findings  --Continue to monitor

## 2018-10-26 ENCOUNTER — LAB VISIT (OUTPATIENT)
Dept: LAB | Facility: HOSPITAL | Age: 53
End: 2018-10-26
Attending: INTERNAL MEDICINE
Payer: COMMERCIAL

## 2018-10-26 ENCOUNTER — OFFICE VISIT (OUTPATIENT)
Dept: HEMATOLOGY/ONCOLOGY | Facility: CLINIC | Age: 53
End: 2018-10-26
Payer: COMMERCIAL

## 2018-10-26 VITALS
RESPIRATION RATE: 18 BRPM | TEMPERATURE: 98 F | SYSTOLIC BLOOD PRESSURE: 148 MMHG | DIASTOLIC BLOOD PRESSURE: 84 MMHG | HEART RATE: 96 BPM | OXYGEN SATURATION: 98 % | WEIGHT: 288.38 LBS | BODY MASS INDEX: 43.71 KG/M2 | HEIGHT: 68 IN

## 2018-10-26 DIAGNOSIS — Z17.0 MALIGNANT NEOPLASM OF UPPER-OUTER QUADRANT OF RIGHT BREAST IN FEMALE, ESTROGEN RECEPTOR POSITIVE: ICD-10-CM

## 2018-10-26 DIAGNOSIS — C50.411 MALIGNANT NEOPLASM OF UPPER-OUTER QUADRANT OF RIGHT BREAST IN FEMALE, ESTROGEN RECEPTOR POSITIVE: Primary | ICD-10-CM

## 2018-10-26 DIAGNOSIS — C50.411 MALIGNANT NEOPLASM OF UPPER-OUTER QUADRANT OF RIGHT BREAST IN FEMALE, ESTROGEN RECEPTOR POSITIVE: ICD-10-CM

## 2018-10-26 DIAGNOSIS — Z17.0 MALIGNANT NEOPLASM OF UPPER-OUTER QUADRANT OF RIGHT BREAST IN FEMALE, ESTROGEN RECEPTOR POSITIVE: Primary | ICD-10-CM

## 2018-10-26 DIAGNOSIS — R91.1 PULMONARY NODULE: ICD-10-CM

## 2018-10-26 LAB
ALBUMIN SERPL BCP-MCNC: 3.7 G/DL
ALP SERPL-CCNC: 78 U/L
ALT SERPL W/O P-5'-P-CCNC: 46 U/L
ANION GAP SERPL CALC-SCNC: 12 MMOL/L
AST SERPL-CCNC: 36 U/L
BASOPHILS # BLD AUTO: 0.01 K/UL
BASOPHILS NFR BLD: 0.2 %
BILIRUB SERPL-MCNC: 0.2 MG/DL
BUN SERPL-MCNC: 13 MG/DL
CALCIUM SERPL-MCNC: 9.4 MG/DL
CHLORIDE SERPL-SCNC: 107 MMOL/L
CO2 SERPL-SCNC: 22 MMOL/L
CREAT SERPL-MCNC: 0.8 MG/DL
DIFFERENTIAL METHOD: NORMAL
EOSINOPHIL # BLD AUTO: 0.2 K/UL
EOSINOPHIL NFR BLD: 3.1 %
ERYTHROCYTE [DISTWIDTH] IN BLOOD BY AUTOMATED COUNT: 13.3 %
EST. GFR  (AFRICAN AMERICAN): >60 ML/MIN/1.73 M^2
EST. GFR  (NON AFRICAN AMERICAN): >60 ML/MIN/1.73 M^2
GLUCOSE SERPL-MCNC: 121 MG/DL
HCT VFR BLD AUTO: 39 %
HGB BLD-MCNC: 13.2 G/DL
LDH SERPL L TO P-CCNC: 334 U/L
LYMPHOCYTES # BLD AUTO: 2.1 K/UL
LYMPHOCYTES NFR BLD: 37.3 %
MCH RBC QN AUTO: 29.1 PG
MCHC RBC AUTO-ENTMCNC: 33.8 G/DL
MCV RBC AUTO: 86 FL
MONOCYTES # BLD AUTO: 0.3 K/UL
MONOCYTES NFR BLD: 5.9 %
NEUTROPHILS # BLD AUTO: 3.1 K/UL
NEUTROPHILS NFR BLD: 53.5 %
PLATELET # BLD AUTO: 224 K/UL
PMV BLD AUTO: 9.9 FL
POTASSIUM SERPL-SCNC: 3.8 MMOL/L
PROT SERPL-MCNC: 7.6 G/DL
RBC # BLD AUTO: 4.54 M/UL
SODIUM SERPL-SCNC: 141 MMOL/L
WBC # BLD AUTO: 5.74 K/UL

## 2018-10-26 PROCEDURE — 3077F SYST BP >= 140 MM HG: CPT | Mod: CPTII,S$GLB,, | Performed by: INTERNAL MEDICINE

## 2018-10-26 PROCEDURE — 36415 COLL VENOUS BLD VENIPUNCTURE: CPT | Mod: PO

## 2018-10-26 PROCEDURE — 83001 ASSAY OF GONADOTROPIN (FSH): CPT

## 2018-10-26 PROCEDURE — 85025 COMPLETE CBC W/AUTO DIFF WBC: CPT | Mod: PO

## 2018-10-26 PROCEDURE — 3008F BODY MASS INDEX DOCD: CPT | Mod: CPTII,S$GLB,, | Performed by: INTERNAL MEDICINE

## 2018-10-26 PROCEDURE — 3079F DIAST BP 80-89 MM HG: CPT | Mod: CPTII,S$GLB,, | Performed by: INTERNAL MEDICINE

## 2018-10-26 PROCEDURE — 86300 IMMUNOASSAY TUMOR CA 15-3: CPT

## 2018-10-26 PROCEDURE — 99999 PR PBB SHADOW E&M-EST. PATIENT-LVL III: CPT | Mod: PBBFAC,,, | Performed by: INTERNAL MEDICINE

## 2018-10-26 PROCEDURE — 80053 COMPREHEN METABOLIC PANEL: CPT | Mod: PO

## 2018-10-26 PROCEDURE — 83615 LACTATE (LD) (LDH) ENZYME: CPT | Mod: PO

## 2018-10-26 PROCEDURE — 99214 OFFICE O/P EST MOD 30 MIN: CPT | Mod: S$GLB,,, | Performed by: INTERNAL MEDICINE

## 2018-10-26 NOTE — PROGRESS NOTES
Hematology/Oncology Office Note    Reason for referral:  Locally advanced breast cancer with biopsy proven axillary lymph node involvement    CC:    Referred by:  No ref. provider found    Diagnosis:  Right sided locally advanced infiltrating ductal carcinoma with biopsy-proven axillary lymph node involvement (ER positive at 95%, GA positive at 95%, HER-2 1+ by IHC and negative by fish    10/31/17: lumpectomy with sentinel lymph node biopsy on 10/31/2017.   Final pathology demonstrated [id number].  The primary measured 0.8 cm and 2 sentinel lymph nodes were positive with 3 mm macrometastasis within first sentinel lymph node and 1 mm micrometastasis within the second sentinel lymph node.    12/7/2017 right axillary lymph node dissection with 0 nodes positive      Treatment:    History of present illness:  Ms. Urbano is a 51-year-old female with a past medical history of DM hypertension, hypothyroidism, iron deficiency anemia, who was noted to have abnormal screening mammogram  on 4/28/2017.  Core biopsy of right breast mass at 10:00 6 cm from the nipple demonstrated infiltrating ductal carcinoma and right axillary lymph node core biopsy also demonstrated infiltrating ductal carcinoma.  ER/GA positive, HER-2 negative markers.  The patient has a first cousin with a history of breast cancer and one aunt with a history of breast cancer.  She has some soreness at previous biopsy site, however, denies other significant symptoms.    I have reviewed and updated the HPI, ROS, PMHx, Social Hx, Family Hx and treatment history.    Today's visit:  Patient is without new complaints today.  The she continues to take tamoxifen and continues to tolerate well without significant side effects or toxicity.    Past Medical History:   Diagnosis Date    Allergic rhinitis, cause unspecified     Breast cancer 2017    Cancer     R Breast Cancer    Carpal tunnel syndrome of left wrist     Elevated liver function tests     in the past     Fatty liver 05/02/2017    on ultrasound    Fibroid uterus     10/2014    Hepatomegaly 05/02/2017    on ultrasound    History of sciatica     HSV infection     Type 1 and 2    HTN (hypertension)     Hypothyroidism     Insulin resistance     Iron deficiency anemia, unspecified     Obesity     PONV (postoperative nausea and vomiting)     Tension headache     Vitamin D deficiency          Social History:  No tobacco, alcohol, or illicit drugs      Family History: family history includes Breast cancer in her paternal cousin; Cancer in her father and sister; Diabetes in her mother; Heart failure in her mother; Hypertension in her mother; No Known Problems in her daughter, daughter, and son.        HPI    Review of Systems   Constitutional: Negative for activity change, appetite change, chills, diaphoresis, fatigue, fever and unexpected weight change.   HENT: Negative for congestion, ear pain, facial swelling, hearing loss, mouth sores, nosebleeds, postnasal drip, rhinorrhea, sinus pressure and sinus pain.    Eyes: Negative.    Respiratory: Negative for apnea, cough, choking, chest tightness, shortness of breath and wheezing.    Cardiovascular: Negative for chest pain and leg swelling.   Gastrointestinal: Negative for abdominal distention, abdominal pain, anal bleeding, blood in stool, constipation, diarrhea, nausea and rectal pain.   Endocrine: Negative.    Genitourinary: Negative for difficulty urinating, dyspareunia, dysuria, enuresis and flank pain.   Musculoskeletal: Negative for arthralgias, back pain, gait problem, joint swelling, myalgias, neck pain and neck stiffness.   Skin: Negative for color change and pallor.   Allergic/Immunologic: Negative.    Neurological: Negative for dizziness, seizures, facial asymmetry, speech difficulty, light-headedness, numbness and headaches.   Hematological: Negative for adenopathy. Does not bruise/bleed easily.   Psychiatric/Behavioral: Negative for agitation,  "behavioral problems, confusion, decreased concentration and dysphoric mood. The patient is not nervous/anxious and is not hyperactive.        Objective:       Vitals:    10/26/18 1427   BP: (!) 148/84   Pulse: 96   Resp: 18   Temp: 98.1 °F (36.7 °C)   TempSrc: Oral   SpO2: 98%   Weight: 130.8 kg (288 lb 5.8 oz)   Height: 5' 8" (1.727 m)       Physical Exam   Constitutional: She is oriented to person, place, and time. She appears well-developed and well-nourished. No distress.   Well groomed   HENT:   Head: Normocephalic and atraumatic. Not macrocephalic.   Right Ear: Tympanic membrane and ear canal normal.   Left Ear: Tympanic membrane and ear canal normal.   Nose: Nose normal. Right sinus exhibits no maxillary sinus tenderness and no frontal sinus tenderness. Left sinus exhibits no maxillary sinus tenderness and no frontal sinus tenderness.   Mouth/Throat: Uvula is midline, oropharynx is clear and moist and mucous membranes are normal. No oropharyngeal exudate.   Eyes: Conjunctivae, EOM and lids are normal. Pupils are equal, round, and reactive to light. Lids are everted and swept, no foreign bodies found. Right eye exhibits no discharge. Left eye exhibits no discharge.   Neck: Trachea normal and normal range of motion. Neck supple. No tracheal deviation present. No thyroid mass and no thyromegaly present.   No crepitus   Cardiovascular: Normal rate, regular rhythm, normal heart sounds, intact distal pulses and normal pulses. Exam reveals no gallop and no friction rub.   No murmur heard.  Pulmonary/Chest: Effort normal and breath sounds normal. No accessory muscle usage. No respiratory distress. She has no decreased breath sounds. She has no wheezes. She has no rhonchi. She has no rales. She exhibits no tenderness.   Right breast: Ill-defined induration without discrete mass (radiation changes) no masses or lymphadenopathy noted     Abdominal: Soft. Normal appearance and bowel sounds are normal. She exhibits no " distension and no mass. There is no hepatosplenomegaly. There is no tenderness. There is no rebound and no guarding. No hernia.   Musculoskeletal: Normal range of motion. She exhibits no edema or tenderness.   Lymphadenopathy:        Head (right side): No submental and no submandibular adenopathy present.        Head (left side): No submental and no submandibular adenopathy present.        Right cervical: No superficial cervical and no deep cervical adenopathy present.       Left cervical: No superficial cervical and no deep cervical adenopathy present.     She has no axillary adenopathy.        Right: No supraclavicular adenopathy present.        Left: No supraclavicular adenopathy present.   Neurological: She is alert and oriented to person, place, and time. She displays normal reflexes. No cranial nerve deficit. Coordination normal.   Skin: Skin is warm, dry and intact. Capillary refill takes less than 2 seconds. No abrasion, no bruising, no ecchymosis and no rash noted. She is not diaphoretic. No cyanosis. No pallor. Nails show no clubbing.   Psychiatric: She has a normal mood and affect. Her behavior is normal. Judgment and thought content normal.       Lab Results   Component Value Date    WBC 5.74 10/26/2018    HGB 13.2 10/26/2018    HCT 39.0 10/26/2018    MCV 86 10/26/2018     10/26/2018     Lab Results   Component Value Date    CREATININE 0.8 10/26/2018    BUN 13 10/26/2018     10/26/2018    K 3.8 10/26/2018     10/26/2018    CO2 22 (L) 10/26/2018     Lab Results   Component Value Date    ALT 46 (H) 10/26/2018    AST 36 10/26/2018    ALKPHOS 78 10/26/2018    BILITOT 0.2 10/26/2018         Assessment:         52-year-old female with a new diagnosis of right-sided infiltrating ductal carcinoma ER/AZ positive, HER-2 negative with biopsy-proven right axillary lymph node involvement.  Patient was consented for BRCA testing for participation and B-55 study with negative BRCA testing.    She  received cycle 1 of dense dose Adriamycin/Cytoxan on 6/12/2017 and tolerated treatment exceptionally well.  She completed cycle 4 of dose dense Taxol on 9/19/2017.  She underwent lumpectomy with sentinel lymph node biopsy on 10/31/2017.   Final pathology demonstrated [id number].  The primary measured 0.8 cm and 2 sentinel lymph nodes were positive with 3 mm macrometastasis within first sentinel lymph node and 1 mm micrometastasis within the second sentinel lymph node.  She underwent a full right axillary lymph node dissection on 11/8/2017 which demonstrated 0 positive nodes.  She completed adjuvant radiation on 2/27/2018 and presents today to discuss antiestrogen therapy.  The patient had a hysterectomy approximately 6/20/14.    We will proceed with tamoxifen 20 mg daily ×2 years and then transitioned to Arimidex 1 mg by mouth daily.    Patient started adjuvant tamoxifen in 3/2018 and is tolerating very well.    Surveillance mammograms remains [facility] in 5/2018.    Patient continues to tolerate tamoxifen very well and we have checked FSH with today's labs.  She will follow up in 4 months with repeat labs.  May transition to AI after 2 years/postmenopausal.        ER/WV positive, HER-2 negative infiltrating ductal carcinoma the right breast with biopsy proven lymph node involvement clinically stage II/III  [id number].  Pathology following neoadjuvant therapy demonstrated 1 sentinel lymph node with micrometastasis (3 mm) and second lymph node with micrometastasis (1 mm)  --BRCA testing via B-55 Study is negative for mutation  --ddAC--> taxol   cycle 1--- 6/12/2017  --Final cycle Cycle 4 on 9/19/2017  --Radiation completed on 2/27/2018  --Tamoxifen 20 mg by mouth daily started in 3/2018  --FSH ordered today  --follow-up in 4 months with repeat labs    Hypertension:  --Continue current medical management    Hypothyroidism:  Continue Synthroid    4 mm right upper lobe pulmonary nodule and subpleural nodularity bilateral  bases:  --Repeat CT scan demonstrates stable findings--no need for additional scans at this point

## 2018-10-27 LAB — FSH SERPL-ACNC: 17.3 MIU/ML

## 2018-10-29 LAB — CANCER AG27-29 SERPL-ACNC: 28.9 U/ML

## 2018-11-21 ENCOUNTER — TELEPHONE (OUTPATIENT)
Dept: GASTROENTEROLOGY | Facility: CLINIC | Age: 53
End: 2018-11-21

## 2018-11-21 NOTE — TELEPHONE ENCOUNTER
----- Message from Cayden Quintana LPN sent at 11/16/2018 12:34 PM CST -----  Please schedule pt for Colonoscopy orders were placed 08.22.2018        Thank you   Geno VANCE LPN Care Coordinator  Care Coordination Department  [facility]  [phone];

## 2018-12-19 ENCOUNTER — TELEPHONE (OUTPATIENT)
Dept: ENDOSCOPY | Facility: HOSPITAL | Age: 53
End: 2018-12-19

## 2018-12-19 RX ORDER — SODIUM, POTASSIUM,MAG SULFATES 17.5-3.13G
SOLUTION, RECONSTITUTED, ORAL ORAL
Qty: 1 KIT | Refills: 0 | Status: ON HOLD | OUTPATIENT
Start: 2018-12-19 | End: 2019-02-22 | Stop reason: HOSPADM

## 2018-12-19 NOTE — TELEPHONE ENCOUNTER
Endoscopy Scheduling Questionnaire:    Call Type:Outgoing call    1. Have you been admitted overnight to the hospital in the past 3 months? no  2. Do you get CP and SOB while walking up a flight of stairs? no  3. Have you had a stent placed in the past 12 months? no  4. Have you had a stroke or heart attack in the past 6 months? no  5. Have you had any chest pain in the past 3 months? no      If so, have you been evaluated by your PCP or Cardiologist? no  6. Do you take weight loss medications? no  7. Have you been diagnosed with Diverticulitis within the past 3 months? no  8. Are you having any GI symptoms that you feel need to be evaluated prior to your procedure? no  9. Are you on dialysis? no  10. Are you diabetic? no  11. Do you have any other health issues that you feel might limit your ability to safely have the procedure and/or sedation? no  12. Is the patient over 81 yo? no        If so, has the patient been seen by their PCP or GI in the last 3 months? N/A       -I have reviewed the last colonoscopy for recommendations regarding surveillance and bowel prep  is NA  -I have reviewed the patient's medications and allergies. She is not on high risk medications and will require cardiac clearance. A clearance request NA  -I have verified the pharmacy information. The prep being used is Suprep. The patient's prep instructions were sent via mail..    Date Endoscopy Scheduled: Date: 2-22-19  Or  Date Gastro office visit Scheduled: NA

## 2018-12-21 RX ORDER — LEVOTHYROXINE SODIUM 200 UG/1
TABLET ORAL
Qty: 90 TABLET | Refills: 1 | Status: SHIPPED | OUTPATIENT
Start: 2018-12-21 | End: 2019-02-26 | Stop reason: DRUGHIGH

## 2018-12-21 RX ORDER — AMLODIPINE BESYLATE 5 MG/1
TABLET ORAL
Qty: 90 TABLET | Refills: 1 | Status: SHIPPED | OUTPATIENT
Start: 2018-12-21 | End: 2019-07-23 | Stop reason: SDUPTHER

## 2019-01-22 ENCOUNTER — LAB VISIT (OUTPATIENT)
Dept: LAB | Facility: HOSPITAL | Age: 54
End: 2019-01-22
Attending: INTERNAL MEDICINE
Payer: COMMERCIAL

## 2019-01-22 ENCOUNTER — OFFICE VISIT (OUTPATIENT)
Dept: HEMATOLOGY/ONCOLOGY | Facility: CLINIC | Age: 54
End: 2019-01-22
Payer: COMMERCIAL

## 2019-01-22 VITALS
HEIGHT: 68 IN | OXYGEN SATURATION: 100 % | BODY MASS INDEX: 43.01 KG/M2 | DIASTOLIC BLOOD PRESSURE: 88 MMHG | HEART RATE: 96 BPM | SYSTOLIC BLOOD PRESSURE: 150 MMHG | TEMPERATURE: 99 F | WEIGHT: 283.75 LBS | RESPIRATION RATE: 18 BRPM

## 2019-01-22 DIAGNOSIS — Z17.0 MALIGNANT NEOPLASM OF UPPER-OUTER QUADRANT OF RIGHT BREAST IN FEMALE, ESTROGEN RECEPTOR POSITIVE: Primary | ICD-10-CM

## 2019-01-22 DIAGNOSIS — C50.411 MALIGNANT NEOPLASM OF UPPER-OUTER QUADRANT OF RIGHT BREAST IN FEMALE, ESTROGEN RECEPTOR POSITIVE: Primary | ICD-10-CM

## 2019-01-22 DIAGNOSIS — Z17.0 MALIGNANT NEOPLASM OF UPPER-OUTER QUADRANT OF RIGHT BREAST IN FEMALE, ESTROGEN RECEPTOR POSITIVE: ICD-10-CM

## 2019-01-22 DIAGNOSIS — C50.411 MALIGNANT NEOPLASM OF UPPER-OUTER QUADRANT OF RIGHT BREAST IN FEMALE, ESTROGEN RECEPTOR POSITIVE: ICD-10-CM

## 2019-01-22 LAB
ALBUMIN SERPL BCP-MCNC: 3.6 G/DL
ALP SERPL-CCNC: 65 U/L
ALT SERPL W/O P-5'-P-CCNC: 48 U/L
ANION GAP SERPL CALC-SCNC: 9 MMOL/L
AST SERPL-CCNC: 35 U/L
BASOPHILS # BLD AUTO: 0.03 K/UL
BASOPHILS NFR BLD: 0.5 %
BILIRUB SERPL-MCNC: 0.2 MG/DL
BUN SERPL-MCNC: 11 MG/DL
CALCIUM SERPL-MCNC: 9.1 MG/DL
CHLORIDE SERPL-SCNC: 110 MMOL/L
CO2 SERPL-SCNC: 24 MMOL/L
CREAT SERPL-MCNC: 0.7 MG/DL
DIFFERENTIAL METHOD: NORMAL
EOSINOPHIL # BLD AUTO: 0.2 K/UL
EOSINOPHIL NFR BLD: 2.8 %
ERYTHROCYTE [DISTWIDTH] IN BLOOD BY AUTOMATED COUNT: 13.6 %
EST. GFR  (AFRICAN AMERICAN): >60 ML/MIN/1.73 M^2
EST. GFR  (NON AFRICAN AMERICAN): >60 ML/MIN/1.73 M^2
GLUCOSE SERPL-MCNC: 113 MG/DL
HCT VFR BLD AUTO: 37.4 %
HGB BLD-MCNC: 12.2 G/DL
IMM GRANULOCYTES # BLD AUTO: 0.01 K/UL
IMM GRANULOCYTES NFR BLD AUTO: 0.2 %
LYMPHOCYTES # BLD AUTO: 2.4 K/UL
LYMPHOCYTES NFR BLD: 37.5 %
MCH RBC QN AUTO: 28.8 PG
MCHC RBC AUTO-ENTMCNC: 32.6 G/DL
MCV RBC AUTO: 88 FL
MONOCYTES # BLD AUTO: 0.5 K/UL
MONOCYTES NFR BLD: 8.5 %
NEUTROPHILS # BLD AUTO: 3.2 K/UL
NEUTROPHILS NFR BLD: 50.7 %
NRBC BLD-RTO: 0 /100 WBC
PLATELET # BLD AUTO: 194 K/UL
PMV BLD AUTO: 9.7 FL
POTASSIUM SERPL-SCNC: 3.7 MMOL/L
PROT SERPL-MCNC: 7.1 G/DL
RBC # BLD AUTO: 4.23 M/UL
SODIUM SERPL-SCNC: 143 MMOL/L
WBC # BLD AUTO: 6.34 K/UL

## 2019-01-22 PROCEDURE — 3079F PR MOST RECENT DIASTOLIC BLOOD PRESSURE 80-89 MM HG: ICD-10-PCS | Mod: CPTII,S$GLB,, | Performed by: INTERNAL MEDICINE

## 2019-01-22 PROCEDURE — 85025 COMPLETE CBC W/AUTO DIFF WBC: CPT

## 2019-01-22 PROCEDURE — 80053 COMPREHEN METABOLIC PANEL: CPT

## 2019-01-22 PROCEDURE — 99214 OFFICE O/P EST MOD 30 MIN: CPT | Mod: S$GLB,,, | Performed by: INTERNAL MEDICINE

## 2019-01-22 PROCEDURE — 99999 PR PBB SHADOW E&M-EST. PATIENT-LVL IV: CPT | Mod: PBBFAC,,, | Performed by: INTERNAL MEDICINE

## 2019-01-22 PROCEDURE — 36415 COLL VENOUS BLD VENIPUNCTURE: CPT

## 2019-01-22 PROCEDURE — 99999 PR PBB SHADOW E&M-EST. PATIENT-LVL IV: ICD-10-PCS | Mod: PBBFAC,,, | Performed by: INTERNAL MEDICINE

## 2019-01-22 PROCEDURE — 3079F DIAST BP 80-89 MM HG: CPT | Mod: CPTII,S$GLB,, | Performed by: INTERNAL MEDICINE

## 2019-01-22 PROCEDURE — 3077F PR MOST RECENT SYSTOLIC BLOOD PRESSURE >= 140 MM HG: ICD-10-PCS | Mod: CPTII,S$GLB,, | Performed by: INTERNAL MEDICINE

## 2019-01-22 PROCEDURE — 99214 PR OFFICE/OUTPT VISIT, EST, LEVL IV, 30-39 MIN: ICD-10-PCS | Mod: S$GLB,,, | Performed by: INTERNAL MEDICINE

## 2019-01-22 PROCEDURE — 3077F SYST BP >= 140 MM HG: CPT | Mod: CPTII,S$GLB,, | Performed by: INTERNAL MEDICINE

## 2019-01-22 PROCEDURE — 3008F PR BODY MASS INDEX (BMI) DOCUMENTED: ICD-10-PCS | Mod: CPTII,S$GLB,, | Performed by: INTERNAL MEDICINE

## 2019-01-22 PROCEDURE — 3008F BODY MASS INDEX DOCD: CPT | Mod: CPTII,S$GLB,, | Performed by: INTERNAL MEDICINE

## 2019-01-22 NOTE — PROGRESS NOTES
Hematology/Oncology Office Note    Reason for referral:  Locally advanced breast cancer with biopsy proven axillary lymph node involvement    CC:    Referred by:  No ref. provider found    Diagnosis:  Right sided locally advanced infiltrating ductal carcinoma with biopsy-proven axillary lymph node involvement (ER positive at 95%, DC positive at 95%, HER-2 1+ by IHC and negative by fish    10/31/17: lumpectomy with sentinel lymph node biopsy on 10/31/2017.   Final pathology demonstrated [id number].  The primary measured 0.8 cm and 2 sentinel lymph nodes were positive with 3 mm macrometastasis within first sentinel lymph node and 1 mm micrometastasis within the second sentinel lymph node.    12/7/2017 right axillary lymph node dissection with 0 nodes positive      Treatment:    History of present illness:  Ms. Urbano is a 51-year-old female with a past medical history of DM hypertension, hypothyroidism, iron deficiency anemia, who was noted to have abnormal screening mammogram  on 4/28/2017.  Core biopsy of right breast mass at 10:00 6 cm from the nipple demonstrated infiltrating ductal carcinoma and right axillary lymph node core biopsy also demonstrated infiltrating ductal carcinoma.  ER/DC positive, HER-2 negative markers.  The patient has a first cousin with a history of breast cancer and one aunt with a history of breast cancer.  She has some soreness at previous biopsy site, however, denies other significant symptoms.    I have reviewed and updated the HPI, ROS, PMHx, Social Hx, Family Hx and treatment history.    Today's visit:  Patient is without complaints today she continues to tolerate tamoxifen well without significant side effects or toxicity.    Past Medical History:   Diagnosis Date    Allergic rhinitis, cause unspecified     Breast cancer 2017    Cancer     R Breast Cancer    Carpal tunnel syndrome of left wrist     Elevated liver function tests     in the past    Fatty liver 05/02/2017    on  ultrasound    Fibroid uterus     10/2014    Hepatomegaly 05/02/2017    on ultrasound    History of sciatica     HSV infection     Type 1 and 2    HTN (hypertension)     Hypothyroidism     Insulin resistance     Iron deficiency anemia, unspecified     Obesity     PONV (postoperative nausea and vomiting)     Tension headache     Vitamin D deficiency          Social History:  No tobacco, alcohol, or illicit drugs      Family History: family history includes Breast cancer in her paternal cousin; Cancer in her father and sister; Diabetes in her mother; Heart failure in her mother; Hypertension in her mother; No Known Problems in her daughter, daughter, and son.        HPI    Review of Systems   Constitutional: Negative for activity change, appetite change, chills and fatigue.   HENT: Negative for congestion, dental problem, drooling, ear discharge, ear pain, facial swelling, hearing loss, mouth sores and nosebleeds.    Eyes: Negative.    Respiratory: Negative for apnea, cough, choking, chest tightness, shortness of breath, wheezing and stridor.    Cardiovascular: Negative for chest pain and leg swelling.   Gastrointestinal: Negative for abdominal distention, anal bleeding, blood in stool, constipation, diarrhea, nausea and rectal pain.   Endocrine: Negative.    Genitourinary: Negative for difficulty urinating, enuresis and flank pain.   Musculoskeletal: Negative for arthralgias, back pain, gait problem and neck stiffness.   Skin: Negative for color change and pallor.   Allergic/Immunologic: Negative.    Neurological: Negative for tremors, seizures, speech difficulty, light-headedness, numbness and headaches.   Hematological: Negative for adenopathy. Does not bruise/bleed easily.   Psychiatric/Behavioral: Negative for agitation, behavioral problems and dysphoric mood. The patient is not nervous/anxious and is not hyperactive.        Objective:       Vitals:    01/22/19 1511   BP: (!) 150/88   Pulse: 96   Resp:  "18   Temp: 99 °F (37.2 °C)   TempSrc: Oral   SpO2: 100%   Weight: 128.7 kg (283 lb 11.7 oz)   Height: 5' 8" (1.727 m)       Physical Exam   Constitutional: She is oriented to person, place, and time. She appears well-developed and well-nourished. No distress.   Well groomed   HENT:   Head: Normocephalic and atraumatic. Not macrocephalic.   Right Ear: Tympanic membrane and ear canal normal.   Left Ear: Tympanic membrane and ear canal normal.   Nose: Nose normal. Right sinus exhibits no maxillary sinus tenderness and no frontal sinus tenderness. Left sinus exhibits no maxillary sinus tenderness and no frontal sinus tenderness.   Mouth/Throat: Uvula is midline, oropharynx is clear and moist and mucous membranes are normal. No oropharyngeal exudate.   Eyes: Conjunctivae, EOM and lids are normal. Pupils are equal, round, and reactive to light. Lids are everted and swept, no foreign bodies found. Right eye exhibits no discharge. Left eye exhibits no discharge.   Neck: Trachea normal and normal range of motion. Neck supple. No tracheal deviation present. No thyroid mass and no thyromegaly present.   No crepitus   Cardiovascular: Normal rate, regular rhythm, normal heart sounds, intact distal pulses and normal pulses. Exam reveals no gallop and no friction rub.   No murmur heard.  Pulmonary/Chest: Effort normal and breath sounds normal. No accessory muscle usage. No respiratory distress. She has no decreased breath sounds. She has no wheezes. She has no rhonchi. She has no rales. She exhibits no tenderness.   Right breast: Ill-defined induration without discrete mass (radiation changes) no masses or lymphadenopathy noted  Left breast no masses, skin changes, or lymphadenopathy noted   Abdominal: Soft. Normal appearance and bowel sounds are normal. She exhibits no distension. There is no hepatosplenomegaly. There is no tenderness. There is no guarding.   Musculoskeletal: Normal range of motion. She exhibits no edema. "   Lymphadenopathy:        Head (right side): No submental and no submandibular adenopathy present.        Head (left side): No submental and no submandibular adenopathy present.        Right cervical: No superficial cervical and no deep cervical adenopathy present.       Left cervical: No superficial cervical and no deep cervical adenopathy present.     She has no axillary adenopathy.        Right: No supraclavicular adenopathy present.        Left: No supraclavicular adenopathy present.   Neurological: She is alert and oriented to person, place, and time. She displays normal reflexes. No cranial nerve deficit or sensory deficit. She exhibits normal muscle tone. Coordination normal.   Skin: Skin is warm, dry and intact. Capillary refill takes less than 2 seconds. No ecchymosis noted. No erythema. No pallor. Nails show no clubbing.   Psychiatric: She has a normal mood and affect. Her behavior is normal. Judgment and thought content normal.       Lab Results   Component Value Date    WBC 6.34 01/22/2019    HGB 12.2 01/22/2019    HCT 37.4 01/22/2019    MCV 88 01/22/2019     01/22/2019     Lab Results   Component Value Date    CREATININE 0.7 01/22/2019    BUN 11 01/22/2019     01/22/2019    K 3.7 01/22/2019     01/22/2019    CO2 24 01/22/2019     Lab Results   Component Value Date    ALT 48 (H) 01/22/2019    AST 35 01/22/2019    ALKPHOS 65 01/22/2019    BILITOT 0.2 01/22/2019         Assessment:         52-year-old female with a new diagnosis of right-sided infiltrating ductal carcinoma ER/OH positive, HER-2 negative with biopsy-proven right axillary lymph node involvement.  Patient was consented for BRCA testing for participation and B-55 study with negative BRCA testing.    She received cycle 1 of dense dose Adriamycin/Cytoxan on 6/12/2017 and tolerated treatment exceptionally well.  She completed cycle 4 of dose dense Taxol on 9/19/2017.  She underwent lumpectomy with sentinel lymph node biopsy on  10/31/2017.   Final pathology demonstrated [id number].  The primary measured 0.8 cm and 2 sentinel lymph nodes were positive with 3 mm macrometastasis within first sentinel lymph node and 1 mm micrometastasis within the second sentinel lymph node.  She underwent a full right axillary lymph node dissection on 11/8/2017 which demonstrated 0 positive nodes.  She completed adjuvant radiation on 2/27/2018 and presents today to discuss antiestrogen therapy.  The patient had a hysterectomy approximately 6/20/14.  FSH was in the intermediate range which likely means the patient is perimenopausal.  We will proceed with tamoxifen 20 mg daily ×2 years and then plan to transition to Arimidex 1 mg by mouth daily.    Patient started adjuvant tamoxifen in 3/2018 and is tolerating very well.    Surveillance mammograms remains MAGALIE in 5/2018--we will order surveillance mammogram to be performed in 05/2019   She continues to tolerate tamoxifen very well and we will continue treatment as planned.  We will check FSH next year and plan to transition to aromatase inhibitor next year if she is found to be postmenopausal.        ER/MO positive, HER-2 negative infiltrating ductal carcinoma the right breast with biopsy proven lymph node involvement clinically stage II/III  [id number].  Pathology following neoadjuvant therapy demonstrated 1 sentinel lymph node with micrometastasis (3 mm) and second lymph node with micrometastasis (1 mm)  --BRCA testing via B-55 Study is negative for mutation  --ddAC--> taxol   cycle 1--- 6/12/2017  --Final cycle Cycle 4 on 9/19/2017  --Radiation completed on 2/27/2018  --Tamoxifen 20 mg by mouth daily started in 3/2018  --surveillance bilateral mammogram in 05/2019  --followup after bilateral mammogram to discuss results  --repeat FSH next year and plan to transition to aromatase inhibitor      Hypertension:  --Continue current medical management    Hypothyroidism:  Continue Synthroid    4 mm right upper lobe  pulmonary nodule and subpleural nodularity bilateral bases:  --Repeat CT scan demonstrates stable findings--no need for additional scans at this point

## 2019-01-28 RX ORDER — BENAZEPRIL HYDROCHLORIDE 20 MG/1
TABLET ORAL
Qty: 90 TABLET | Refills: 0 | Status: SHIPPED | OUTPATIENT
Start: 2019-01-28 | End: 2019-02-18 | Stop reason: SDUPTHER

## 2019-02-18 ENCOUNTER — LAB VISIT (OUTPATIENT)
Dept: LAB | Facility: HOSPITAL | Age: 54
End: 2019-02-18
Attending: FAMILY MEDICINE
Payer: COMMERCIAL

## 2019-02-18 ENCOUNTER — OFFICE VISIT (OUTPATIENT)
Dept: FAMILY MEDICINE | Facility: CLINIC | Age: 54
End: 2019-02-18
Payer: COMMERCIAL

## 2019-02-18 VITALS
BODY MASS INDEX: 42.11 KG/M2 | OXYGEN SATURATION: 98 % | TEMPERATURE: 98 F | DIASTOLIC BLOOD PRESSURE: 88 MMHG | SYSTOLIC BLOOD PRESSURE: 118 MMHG | RESPIRATION RATE: 17 BRPM | HEART RATE: 98 BPM | WEIGHT: 277.88 LBS | HEIGHT: 68 IN

## 2019-02-18 DIAGNOSIS — I10 ESSENTIAL HYPERTENSION: Primary | Chronic | ICD-10-CM

## 2019-02-18 DIAGNOSIS — E03.9 HYPOTHYROIDISM, UNSPECIFIED TYPE: Chronic | ICD-10-CM

## 2019-02-18 DIAGNOSIS — C50.411 MALIGNANT NEOPLASM OF UPPER-OUTER QUADRANT OF RIGHT BREAST IN FEMALE, ESTROGEN RECEPTOR POSITIVE: ICD-10-CM

## 2019-02-18 DIAGNOSIS — E66.01 MORBID OBESITY WITH BMI OF 40.0-44.9, ADULT: ICD-10-CM

## 2019-02-18 DIAGNOSIS — E88.810 INSULIN RESISTANCE SYNDROME: ICD-10-CM

## 2019-02-18 DIAGNOSIS — Z17.0 MALIGNANT NEOPLASM OF UPPER-OUTER QUADRANT OF RIGHT BREAST IN FEMALE, ESTROGEN RECEPTOR POSITIVE: ICD-10-CM

## 2019-02-18 DIAGNOSIS — E55.9 VITAMIN D DEFICIENCY: ICD-10-CM

## 2019-02-18 LAB
ESTIMATED AVG GLUCOSE: 123 MG/DL
HBA1C MFR BLD HPLC: 5.9 %
T4 FREE SERPL-MCNC: 1.63 NG/DL
TSH SERPL DL<=0.005 MIU/L-ACNC: 0.08 UIU/ML

## 2019-02-18 PROCEDURE — 36415 COLL VENOUS BLD VENIPUNCTURE: CPT | Mod: PO

## 2019-02-18 PROCEDURE — 99999 PR PBB SHADOW E&M-EST. PATIENT-LVL IV: CPT | Mod: PBBFAC,,, | Performed by: FAMILY MEDICINE

## 2019-02-18 PROCEDURE — 99214 OFFICE O/P EST MOD 30 MIN: CPT | Mod: S$GLB,,, | Performed by: FAMILY MEDICINE

## 2019-02-18 PROCEDURE — 3079F DIAST BP 80-89 MM HG: CPT | Mod: CPTII,S$GLB,, | Performed by: FAMILY MEDICINE

## 2019-02-18 PROCEDURE — 83036 HEMOGLOBIN GLYCOSYLATED A1C: CPT

## 2019-02-18 PROCEDURE — 84439 ASSAY OF FREE THYROXINE: CPT

## 2019-02-18 PROCEDURE — 84443 ASSAY THYROID STIM HORMONE: CPT

## 2019-02-18 PROCEDURE — 99214 PR OFFICE/OUTPT VISIT, EST, LEVL IV, 30-39 MIN: ICD-10-PCS | Mod: S$GLB,,, | Performed by: FAMILY MEDICINE

## 2019-02-18 PROCEDURE — 3074F PR MOST RECENT SYSTOLIC BLOOD PRESSURE < 130 MM HG: ICD-10-PCS | Mod: CPTII,S$GLB,, | Performed by: FAMILY MEDICINE

## 2019-02-18 PROCEDURE — 99999 PR PBB SHADOW E&M-EST. PATIENT-LVL IV: ICD-10-PCS | Mod: PBBFAC,,, | Performed by: FAMILY MEDICINE

## 2019-02-18 PROCEDURE — 3008F PR BODY MASS INDEX (BMI) DOCUMENTED: ICD-10-PCS | Mod: CPTII,S$GLB,, | Performed by: FAMILY MEDICINE

## 2019-02-18 PROCEDURE — 83525 ASSAY OF INSULIN: CPT

## 2019-02-18 PROCEDURE — 3008F BODY MASS INDEX DOCD: CPT | Mod: CPTII,S$GLB,, | Performed by: FAMILY MEDICINE

## 2019-02-18 PROCEDURE — 3079F PR MOST RECENT DIASTOLIC BLOOD PRESSURE 80-89 MM HG: ICD-10-PCS | Mod: CPTII,S$GLB,, | Performed by: FAMILY MEDICINE

## 2019-02-18 PROCEDURE — 3074F SYST BP LT 130 MM HG: CPT | Mod: CPTII,S$GLB,, | Performed by: FAMILY MEDICINE

## 2019-02-18 NOTE — PROGRESS NOTES
Kylie Urbano    Chief Complaint   Patient presents with    Hypertension    Insulin Resistance    Follow-up       History of Present Illness:   Ms. Urbano comes in today for 6-month hypertension and insulin resistance syndrome follow-up.  She is fasting and has taken medications today.  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  She states she does not monitor what she eats.    She reports having hot intolerance at night.  Otherwise, she states she feels okay today and denies having fever, chills, fatigue, appetite changes; cold intolerance, polydipsia, polyuria, polyphagia; cough, wheezing, shortness of breath; chest pain, palpitations, leg swelling; abdominal pain, nausea, vomiting, diarrhea, constipation; unusual urinary symptoms; back pain; headache; anxiety, depression, homicidal or suicidal thoughts.    She saw Dr. Martinez, hematologist/oncologist, on January 22, 2019 for surveillance of right breast cancer with 6-month follow-up advised.  She states she completed chemotherapy in September 2017 and completed radiation therapy in February 2018.      Labs:                     WBC                      6.34                01/22/2019                 HGB                      12.2                01/22/2019                 HCT                      37.4                01/22/2019                 PLT                      194                 01/22/2019                 CHOL                     152                 08/22/2018                 TRIG                     92                  08/22/2018                 HDL                      52                  08/22/2018                 ALT                      48 (H)              01/22/2019                 AST                      35                  01/22/2019                 NA                       143                 01/22/2019                 K                        3.7                 01/22/2019                 CL                       110                  01/22/2019                 CREATININE               0.7                 01/22/2019                 BUN                      11                  01/22/2019                 CO2                      24                  01/22/2019                 TSH                      0.149 (L)           08/22/2018                 GLUF                     82                  04/06/2005                 HGBA1C                   5.7 (H)             08/22/2018               LDLCALC                  81.6                08/22/2018         Vit D, 25-Hydroxy                 21             08/22/2018        Current Outpatient Medications   Medication Sig    acyclovir (ZOVIRAX) 400 MG tablet TAKE 1 TABLET BY MOUTH THREE TIMES DAILY WITH FOOD FOR 5 DAYS(PER EPISODE)    amLODIPine (NORVASC) 5 MG tablet TAKE 1 TABLET BY MOUTH ONCE DAILY    benazepril (LOTENSIN) 20 MG tablet Take 1 tablet (20 mg total) by mouth once daily.    cholecalciferol, vitamin D3, (VITAMIN D) 2,000 unit Cap Take 1.5 capsules by mouth once daily. 1 Capsule Oral Every day    fluticasone (FLONASE) 50 mcg/actuation nasal spray 2 sprays by Each Nare route daily as needed for Rhinitis.    ibuprofen (ADVIL,MOTRIN) 200 MG tablet Take 200-400 mg by mouth every 8 (eight) hours as needed for Pain.    omeprazole (PRILOSEC) 20 MG capsule Take 1 capsule (20 mg total) by mouth once daily.    sodium,potassium,mag sulfates (SUPREP BOWEL PREP KIT) 17.5-3.13-1.6 gram SolR Take as directed    SYNTHROID 200 mcg tablet TAKE 1 TABLET BY MOUTH ONCE DAILY    metFORMIN (GLUCOPHAGE) 500 MG tablet TAKE FOUR TABLETS BY MOUTH IN THE EVENING AS DIRECTED       Review of Systems   Constitutional: Positive for activity change. Negative for appetite change, chills, fatigue and fever.        Weight 129.2 kg (284 lb 13.4 oz) at August 22, 2018 visit.   Respiratory: Negative for cough, shortness of breath and wheezing.    Cardiovascular: Negative for chest pain, palpitations and leg swelling.    Gastrointestinal: Negative for abdominal pain, constipation, diarrhea, nausea and vomiting.   Endocrine: Positive for heat intolerance. Negative for cold intolerance, polydipsia, polyphagia and polyuria.        See history of present illness.   Genitourinary: Negative for difficulty urinating.   Musculoskeletal: Negative for back pain.   Neurological: Negative for headaches.   Psychiatric/Behavioral: Negative for dysphoric mood and suicidal ideas. The patient is not nervous/anxious.         Negative for homicidal ideas.       Objective:  Physical Exam   Constitutional: She is oriented to person, place, and time. She appears well-developed and well-nourished. No distress.   Pleasant.   Eyes: EOM are normal.   Neck: Normal range of motion. Neck supple. No thyromegaly present.   Cardiovascular: Normal rate, regular rhythm, normal heart sounds and intact distal pulses.   No murmur heard.  Pulmonary/Chest: Effort normal and breath sounds normal. No respiratory distress. She has no wheezes.   Abdominal: Soft. Bowel sounds are normal. She exhibits no distension and no mass. There is no tenderness. There is no rebound and no guarding.   Musculoskeletal: Normal range of motion. She exhibits no edema or tenderness.   She is ambulatory without problems.   Lymphadenopathy:     She has no cervical adenopathy.   Neurological: She is alert and oriented to person, place, and time. She has normal reflexes. She displays normal reflexes.   Skin: She is not diaphoretic.   Psychiatric: She has a normal mood and affect. Her behavior is normal. Judgment and thought content normal.   Vitals reviewed.      ASSESSMENT:  1. Essential hypertension    2. Insulin resistance syndrome    3. Hypothyroidism, unspecified type    4. Vitamin D deficiency    5. Malignant neoplasm of upper-outer quadrant of right breast in female, estrogen receptor positive    6. Morbid obesity with BMI of 40.0-44.9, adult        PLAN:  Kylie was seen today for  hypertension, insulin resistance and follow-up.    Diagnoses and all orders for this visit:    Essential hypertension    Insulin resistance syndrome  -     Insulin, random; Future  -     Hemoglobin A1c; Future    Hypothyroidism, unspecified type  -     TSH; Future    Vitamin D deficiency    Malignant neoplasm of upper-outer quadrant of right breast in female, estrogen receptor positive    Morbid obesity with BMI of 40.0-44.9, adult       Patient advised to call for results.  Continue current medications, follow low sodium, low cholesterol, low carb diet, daily walks.  Keep follow up with specialist.  Work excuse for today with return today.   Follow-up in about 6 months (around 8/22/2019) for physical.

## 2019-02-18 NOTE — LETTER
February 18, 2019      [facility]  [address] [place] [address]  Phone: [phone]       Patient: Kylie Urbano   YOB: 1965  Date of Visit: 02/18/2019    To Whom It May Concern:    Nick Urbano  was at [facility] on 02/18/2019. She may return to work on 02/18/2019 with no restrictions. If you have any questions or concerns, or if I can be of further assistance, please do not hesitate to contact me.    Sincerely,    Beverly Parks MD

## 2019-02-19 LAB
INSULIN COLLECTION INTERVAL: NORMAL
INSULIN SERPL-ACNC: 11.4 UU/ML

## 2019-02-22 ENCOUNTER — ANESTHESIA EVENT (OUTPATIENT)
Dept: ENDOSCOPY | Facility: HOSPITAL | Age: 54
End: 2019-02-22
Payer: COMMERCIAL

## 2019-02-22 ENCOUNTER — HOSPITAL ENCOUNTER (OUTPATIENT)
Facility: HOSPITAL | Age: 54
Discharge: HOME | End: 2019-02-22
Attending: COLON & RECTAL SURGERY | Admitting: COLON & RECTAL SURGERY
Payer: COMMERCIAL

## 2019-02-22 ENCOUNTER — ANESTHESIA (OUTPATIENT)
Dept: ENDOSCOPY | Facility: HOSPITAL | Age: 54
End: 2019-02-22
Payer: COMMERCIAL

## 2019-02-22 VITALS
SYSTOLIC BLOOD PRESSURE: 128 MMHG | DIASTOLIC BLOOD PRESSURE: 83 MMHG | RESPIRATION RATE: 18 BRPM | OXYGEN SATURATION: 99 % | HEART RATE: 82 BPM | TEMPERATURE: 98 F

## 2019-02-22 DIAGNOSIS — Z12.11 SCREENING FOR COLON CANCER: ICD-10-CM

## 2019-02-22 PROCEDURE — 88305 TISSUE EXAM BY PATHOLOGIST: CPT | Performed by: PATHOLOGY

## 2019-02-22 PROCEDURE — 45380 COLONOSCOPY AND BIOPSY: CPT | Performed by: COLON & RECTAL SURGERY

## 2019-02-22 PROCEDURE — 25000003 PHARM REV CODE 250: Performed by: COLON & RECTAL SURGERY

## 2019-02-22 PROCEDURE — 45380 COLONOSCOPY AND BIOPSY: CPT | Mod: 33,,, | Performed by: COLON & RECTAL SURGERY

## 2019-02-22 PROCEDURE — 88305 TISSUE EXAM BY PATHOLOGIST: CPT | Mod: 26,,, | Performed by: PATHOLOGY

## 2019-02-22 PROCEDURE — 37000008 HC ANESTHESIA 1ST 15 MINUTES: Performed by: COLON & RECTAL SURGERY

## 2019-02-22 PROCEDURE — 37000009 HC ANESTHESIA EA ADD 15 MINS: Performed by: COLON & RECTAL SURGERY

## 2019-02-22 PROCEDURE — 45380 PR COLONOSCOPY,BIOPSY: ICD-10-PCS | Mod: 33,,, | Performed by: COLON & RECTAL SURGERY

## 2019-02-22 PROCEDURE — 63600175 PHARM REV CODE 636 W HCPCS: Performed by: NURSE ANESTHETIST, CERTIFIED REGISTERED

## 2019-02-22 PROCEDURE — 88305 TISSUE SPECIMEN TO PATHOLOGY - SURGERY: ICD-10-PCS | Mod: 26,,, | Performed by: PATHOLOGY

## 2019-02-22 PROCEDURE — 27201012 HC FORCEPS, HOT/COLD, DISP: Performed by: COLON & RECTAL SURGERY

## 2019-02-22 RX ORDER — PROPOFOL 10 MG/ML
INJECTION, EMULSION INTRAVENOUS
Status: DISCONTINUED | OUTPATIENT
Start: 2019-02-22 | End: 2019-02-22

## 2019-02-22 RX ORDER — SODIUM CHLORIDE, SODIUM LACTATE, POTASSIUM CHLORIDE, CALCIUM CHLORIDE 600; 310; 30; 20 MG/100ML; MG/100ML; MG/100ML; MG/100ML
INJECTION, SOLUTION INTRAVENOUS CONTINUOUS
Status: DISCONTINUED | OUTPATIENT
Start: 2019-02-22 | End: 2019-02-22 | Stop reason: HOSPADM

## 2019-02-22 RX ORDER — LIDOCAINE HCL/PF 100 MG/5ML
SYRINGE (ML) INTRAVENOUS
Status: DISCONTINUED | OUTPATIENT
Start: 2019-02-22 | End: 2019-02-22

## 2019-02-22 RX ADMIN — SODIUM CHLORIDE, SODIUM LACTATE, POTASSIUM CHLORIDE, AND CALCIUM CHLORIDE: 600; 310; 30; 20 INJECTION, SOLUTION INTRAVENOUS at 09:02

## 2019-02-22 RX ADMIN — PROPOFOL 40 MG: 10 INJECTION, EMULSION INTRAVENOUS at 10:02

## 2019-02-22 RX ADMIN — SODIUM CHLORIDE, SODIUM LACTATE, POTASSIUM CHLORIDE, AND CALCIUM CHLORIDE: 600; 310; 30; 20 INJECTION, SOLUTION INTRAVENOUS at 10:02

## 2019-02-22 RX ADMIN — LIDOCAINE HYDROCHLORIDE 100 MG: 20 INJECTION, SOLUTION INTRAVENOUS at 10:02

## 2019-02-22 RX ADMIN — PROPOFOL 30 MG: 10 INJECTION, EMULSION INTRAVENOUS at 10:02

## 2019-02-22 RX ADMIN — PROPOFOL 60 MG: 10 INJECTION, EMULSION INTRAVENOUS at 10:02

## 2019-02-22 RX ADMIN — PROPOFOL 140 MG: 10 INJECTION, EMULSION INTRAVENOUS at 10:02

## 2019-02-22 NOTE — H&P
[facility]  Colon and Rectal Surgery  History & Physical    Patient Name: Kylie Urbano  MRN: [id number]  Admission Date: 2/22/2019  Attending Physician: Mariano Santamaria MD  Primary Care Provider: Beverly Parks MD    Patient information was obtained from patient and medical records.    Subjective:     Chief Complaint/Reason for Admission: Here for Colonoscopy    History of Present Illness:  Patient is a 53 y.o. female presents for colonoscopy. No previous colonoscopy. No BRBPR, hematochezia, melena or change in bowel habits. No personal or fam hx of CRC, polyps or IBD.     No current facility-administered medications on file prior to encounter.      Current Outpatient Medications on File Prior to Encounter   Medication Sig    benazepril (LOTENSIN) 20 MG tablet Take 1 tablet (20 mg total) by mouth once daily.    cholecalciferol, vitamin D3, (VITAMIN D) 2,000 unit Cap Take 1.5 capsules by mouth once daily. 1 Capsule Oral Every day    fluticasone (FLONASE) 50 mcg/actuation nasal spray 2 sprays by Each Nare route daily as needed for Rhinitis.    ibuprofen (ADVIL,MOTRIN) 200 MG tablet Take 200-400 mg by mouth every 8 (eight) hours as needed for Pain.    metFORMIN (GLUCOPHAGE) 500 MG tablet TAKE FOUR TABLETS BY MOUTH IN THE EVENING AS DIRECTED    omeprazole (PRILOSEC) 20 MG capsule Take 1 capsule (20 mg total) by mouth once daily.       Review of patient's allergies indicates:   Allergen Reactions    Lortab [hydrocodone-acetaminophen] Nausea And Vomiting     Historical.    Amoxicillin Rash     Historical    Sulfa (sulfonamide antibiotics) Rash       Past Medical History:   Diagnosis Date    Allergic rhinitis, cause unspecified     Breast cancer 2017    Cancer     R Breast Cancer    Carpal tunnel syndrome of left wrist     Elevated liver function tests     in the past    Fatty liver 05/02/2017    on ultrasound    Fibroid uterus     10/2014    Hepatomegaly 05/02/2017    on ultrasound     History of sciatica     HSV infection     Type 1 and 2    HTN (hypertension)     Hypothyroidism     Insulin resistance     Iron deficiency anemia, unspecified     Obesity     PONV (postoperative nausea and vomiting)     Tension headache     Vitamin D deficiency      Past Surgical History:   Procedure Laterality Date    BIOPSY-LYMPH NODE-SENTINEL possible node dissection Right 10/31/2017    [facility]    BREAST LUMPECTOMY Right 2017    BTL      DILATION AND CURETTAGE OF UTERUS      x 1    DISSECTION-LYMPH NODE Right 10/31/2017    Performed [facility] at [facility] [place]    DISSECTION-LYMPH NODE-AXILLARY Right 12/7/2017    Performed by Jet [facility]on [facility] at [facility] [place]    HYSTERECTOMY      Laproscopic robot assissted with BSO    INSERTION-PORT Left 6/9/2017    Performed by Jet [facility]cl[facility]on [facility] at [facility] [place]    MASTECTOMY-PARTIAL Right 10/31/2017    Performed by Jet [facility]on [facility] at [facility] [place]    ROBOT ASSISTED LAPAROSCOPIC SALPINGO-OOPHERECTOMY Bilateral 1/11/2016    Performed by Abilio Blanton MD at [facility] [place]    ROBOTIC ASSISTED LAPAROSCOPIC HYSTERECTOMY Bilateral 1/11/2016    Performed by Abilio Blanton [facility] at [facility] [place]     Family History     Problem Relation (Age of Onset)    Breast cancer Paternal Cousin    Cancer Father, Sister    Diabetes Mother    Heart failure Mother    Hypertension Mother    No Known Problems Daughter, Son, Daughter        Tobacco Use    Smoking status: Never Smoker    Smokeless tobacco: Never Used   Substance and Sexual Activity    Alcohol use: Yes     Alcohol/week: 0.0 oz     Frequency: 2-4 times a month     Drinks per session: 1 or 2     Binge frequency: Never     Comment: occasionally     Drug use: No    Sexual activity: Not Currently     Review of Systems   Constitutional: Negative for activity change, appetite change, chills, fatigue, fever and unexpected weight change.   HENT: Negative for congestion, ear pain, sore throat and  trouble swallowing.    Eyes: Negative for pain, redness and itching.   Respiratory: Negative for cough, shortness of breath and wheezing.    Cardiovascular: Negative for chest pain, palpitations and leg swelling.   Gastrointestinal: Negative for abdominal distention, abdominal pain, anal bleeding, blood in stool, constipation, diarrhea, nausea, rectal pain and vomiting.   Endocrine: Negative for cold intolerance, heat intolerance and polyuria.   Genitourinary: Negative for dysuria, flank pain, frequency and hematuria.   Musculoskeletal: Negative for gait problem, joint swelling and neck pain.   Skin: Negative for color change, rash and wound.   Allergic/Immunologic: Negative for environmental allergies and immunocompromised state.   Neurological: Negative for dizziness, speech difficulty, weakness and numbness.   Psychiatric/Behavioral: Negative for agitation, confusion and hallucinations.     Objective:       Physical Exam   Constitutional: She is oriented to person, place, and time. She appears well-developed.   HENT:   Head: Normocephalic and atraumatic.   Eyes: Conjunctivae and EOM are normal.   Neck: Normal range of motion. No thyromegaly present.   Cardiovascular: Normal rate and regular rhythm.   Pulmonary/Chest: Effort normal. No respiratory distress.   Abdominal: Soft. She exhibits no distension and no mass. There is no tenderness.   Musculoskeletal: Normal range of motion. She exhibits no edema or tenderness.   Neurological: She is alert and oriented to person, place, and time.   Skin: Skin is warm and dry. Capillary refill takes less than 2 seconds. No rash noted.   Psychiatric: She has a normal mood and affect.         Assessment/Plan:     Patient is a 53 y.o. female who presents for colonoscopy     - Ok to proceed to endoscopy suite for colonoscopy  - Consent obtained. All risks, benefits and alternatives fully explained to patient, including but not limited to bleeding, infection, perforation, and  missed polyps. All questions appropriately answered to patient's satisfaction. Consent signed and placed on chart.    Active Diagnoses:    Diagnosis Date Noted POA    Screening for colon cancer [Z12.11] 02/22/2019 Not Applicable      Problems Resolved During this Admission:     VTE Risk Mitigation (From admission, onward)    None          Mariano Santamaria MD  Colon and Rectal Surgery  [facility]

## 2019-02-22 NOTE — TRANSFER OF CARE
Anesthesia Transfer of Care Note    Patient: Kylie Urbano    Procedure(s) Performed: Procedure(s) (LRB):  COLONOSCOPY (N/A)    Patient location: PACU    Anesthesia Type: MAC    Transport from OR: Transported from OR on room air with adequate spontaneous ventilation    Post pain: adequate analgesia    Post assessment: no apparent anesthetic complications    Post vital signs: stable    Level of consciousness: awake    Nausea/Vomiting: no nausea/vomiting    Complications: none    Transfer of care protocol was followed      Last vitals:   Visit Vitals  /77 (BP Location: Right leg, Patient Position: Lying)   Pulse 92   Temp 36.8 °C (98.2 °F) (Oral)   Resp 18   LMP 01/01/2016   SpO2 97%   Breastfeeding? No

## 2019-02-22 NOTE — ANESTHESIA PREPROCEDURE EVALUATION
02/22/2019  Kylie Urbano is a 53 y.o., female.    Pre-op Assessment    I have reviewed the Patient Summary Reports.     I have reviewed the Nursing Notes.   I have reviewed the Medications.     Review of Systems  Anesthesia Hx:  Hx of Anesthetic complications    Social:  Non-Smoker, Social Alcohol Use    Hematology/Oncology:         -- Anemia: --  Cancer in past history:  Breast right surgery, chemotherapy and radiation  Oncology Comments: 2017 breast cancer     EENT/Dental:   chronic allergic rhinitis   Cardiovascular:   Exercise tolerance: poor Hypertension, well controlled    Pulmonary:   Snore   Renal/:  Renal/ Normal     Hepatic/GI:   Bowel Prep. GERD, well controlled Liver Disease, 0700 last drink of fluid.  Wednesday last solid meal.  Hepatomegaly   Neurological:   Neuromuscular Disease, Headaches History of sciatica   Endocrine:   Hypothyroidism Insulin resistance syndrome   Psych:   anxiety          Physical Exam  General:  Morbid Obesity, Well nourished    Airway/Jaw/Neck:  Airway Findings: Mallampati: III                Anesthesia Plan  Type of Anesthesia, risks & benefits discussed:  Anesthesia Type:  MAC  Patient's Preference:   Intra-op Monitoring Plan:   Intra-op Monitoring Plan Comments:   Post Op Pain Control Plan:   Post Op Pain Control Plan Comments:   Induction:   IV  Beta Blocker:  Patient is not currently on a Beta-Blocker (No further documentation required).       Informed Consent: Patient understands risks and agrees with Anesthesia plan.  Questions answered. Anesthesia consent signed with patient.  ASA Score: 3     Day of Surgery Review of History & Physical: I have interviewed and examined the patient. I have reviewed the patient's H&P dated: 02/22/19. There are no significant changes.

## 2019-02-22 NOTE — ANESTHESIA POSTPROCEDURE EVALUATION
Anesthesia Post Evaluation    Patient: Kylie Urbano    Procedure(s) Performed: Procedure(s) (LRB):  COLONOSCOPY (N/A)    Final Anesthesia Type: MAC  Patient location during evaluation: PACU  Patient participation: Yes- Able to Participate  Level of consciousness: awake and alert and oriented  Post-procedure vital signs: reviewed and stable  Pain management: adequate  Airway patency: patent  PONV status at discharge: No PONV  Anesthetic complications: no      Cardiovascular status: blood pressure returned to baseline  Respiratory status: unassisted, spontaneous ventilation and room air  Hydration status: euvolemic  Follow-up not needed.        Visit Vitals  /77 (BP Location: Right leg, Patient Position: Lying)   Pulse 92   Temp 36.8 °C (98.2 °F) (Oral)   Resp 18   LMP 01/01/2016   SpO2 97%   Breastfeeding? No       Pain/Daija Score: No Data Recorded

## 2019-02-22 NOTE — [employer] PATIENT INSTRUCTIONS
Discharge Summary/Instructions after an Endoscopic Procedure  Patient Name: Kylie Urbano  Patient MRN: [id number]  Patient YOB: 1965 Friday, February 22, 2019 Mariano Santamaria MD  RESTRICTIONS:  During your procedure today, you received medications for sedation.  These   medications may affect your judgment, balance and coordination.  Therefore,   for 24 hours, you have the following restrictions:   - DO NOT drive a car, operate machinery, make legal/financial decisions,   sign important papers or drink alcohol.    ACTIVITY:  Today: no heavy lifting, straining or running due to procedural   sedation/anesthesia.  The following day: return to full activity including work.  DIET:  Eat and drink normally unless instructed otherwise.     TREATMENT FOR COMMON SIDE EFFECTS:  - Mild abdominal pain, nausea, belching, bloating or excessive gas:  rest,   eat lightly and use a heating pad.  - Sore Throat: treat with throat lozenges and/or gargle with warm salt   water.  - Because air was used during the procedure, expelling large amounts of air   from your rectum or belching is normal.  - If a bowel prep was taken, you may not have a bowel movement for 1-3 days.    This is normal.  SYMPTOMS TO WATCH FOR AND REPORT TO YOUR PHYSICIAN:  1. Abdominal pain or bloating, other than gas cramps.  2. Chest pain.  3. Back pain.  4. Signs of infection such as: chills or fever occurring within 24 hours   after the procedure.  5. Rectal bleeding, which would show as bright red, maroon, or black stools.   (A tablespoon of blood from the rectum is not serious, especially if   hemorrhoids are present.)  6. Vomiting.  7. Weakness or dizziness.  GO DIRECTLY TO THE NEAREST EMERGENCY ROOM IF YOU HAVE ANY OF THE FOLLOWING:      Difficulty breathing              Chills and/or fever over 101 F   Persistent vomiting and/or vomiting blood   Severe abdominal pain   Severe chest pain   Black, tarry stools   Bleeding- more than one  tablespoon   Any other symptom or condition that you feel may need urgent attention  Your doctor recommends these additional instructions:  If any biopsies were taken, your doctors clinic will contact you in 1 to 2   weeks with any results.  - Discharge patient to home.   - High fiber diet.   - Continue present medications.   - Repeat colonoscopy date to be determined after pending pathology results   are reviewed for surveillance based on pathology results.   - Return to primary care physician as previously scheduled.  For questions, problems or results please call your physician Mariano Santamaria MD at Work:  [phone]  If you have any questions about the above instructions, call the GI   department at [phone] or call the endoscopy unit at [phone]   from 7am until 3 pm.  [facility], EMERGENCY ROOM PHONE NUMBER:   [phone]  IF A COMPLICATION OR EMERGENCY SITUATION ARISES AND YOU ARE UNABLE TO REACH   YOUR PHYSICIAN - GO DIRECTLY TO THE EMERGENCY ROOM.  I have read or have had read to me these discharge instructions for my   procedure and have received a written copy.  I understand these   instructions and will follow-up with my physician if I have any questions.     __________________________________       _____________________________________  Nurse Signature                                          Patient/Designated   Responsible Party Signature  MD Mariano Hunter MD  2/22/2019 10:37:17 AM  This report has been verified and signed electronically.  [employer]

## 2019-02-22 NOTE — BRIEF OP NOTE
[facility] -   Brief Operative Note     SUMMARY     Surgery Date: 2/22/2019     Surgeon(s) and Role:     * Mariano Santamaria MD - Primary    Assisting Surgeon: None    Pre-op Diagnosis:  Colon cancer screening [Z12.11]    Post-op Diagnosis:  Post-Op Diagnosis Codes:     * Colon cancer screening [Z12.11]    Procedure(s) (LRB):  COLONOSCOPY (N/A)    Anesthesia: Choice    Description of the findings of the procedure: See Op Note    Findings/Key Components: See Op Note    Estimated Blood Loss: * No values recorded between 2/22/2019 12:00 AM and 2/22/2019 10:33 AM *         Specimens:   Specimen (12h ago, onward)    Start     Ordered    02/22/19 1027  Specimen to Pathology - Surgery  Once     Comments:  #1 transverse colon polyp     Start Status   02/22/19 1027 Collected (02/22/19 1035)       02/22/19 1034          Discharge Note    SUMMARY     Admit Date: 2/22/2019    Discharge Date and Time: 2/22/2019 10:37 AM    Hospital Course Patient was seen in the preoperative area by both myself and anesthesia. All consents were verified and all questions appropriately answered. All risks, benefits and alternatives explained to patient. Patient proceeded to endoscopy suite for colonoscopy and was discharged home postoperative once cleared by anesthesia.    Final Diagnosis: Post-Op Diagnosis Codes:     * Colon cancer screening [Z12.11]    Disposition: Home or Self Care    Follow Up/Patient Instructions: See Provation report    Medications:  Reconciled Home Medications:      Medication List      CONTINUE taking these medications    acyclovir 400 MG tablet  Commonly known as:  ZOVIRAX  TAKE 1 TABLET BY MOUTH THREE TIMES DAILY WITH FOOD FOR 5 DAYS(PER EPISODE)     amLODIPine 5 MG tablet  Commonly known as:  NORVASC  TAKE 1 TABLET BY MOUTH ONCE DAILY     benazepril 20 MG tablet  Commonly known as:  LOTENSIN  Take 1 tablet (20 mg total) by mouth once daily.     fluticasone 50 mcg/actuation nasal spray  Commonly known as:   FLONASE  2 sprays by Each Nare route daily as needed for Rhinitis.     ibuprofen 200 MG tablet  Commonly known as:  ADVIL,MOTRIN  Take 200-400 mg by mouth every 8 (eight) hours as needed for Pain.     metFORMIN 500 MG tablet  Commonly known as:  GLUCOPHAGE  TAKE FOUR TABLETS BY MOUTH IN THE EVENING AS DIRECTED     omeprazole 20 MG capsule  Commonly known as:  PRILOSEC  Take 1 capsule (20 mg total) by mouth once daily.     SYNTHROID 200 MCG tablet  Generic drug:  levothyroxine  TAKE 1 TABLET BY MOUTH ONCE DAILY     VITAMIN D 2,000 unit Cap  Generic drug:  cholecalciferol (vitamin D3)  Take 1.5 capsules by mouth once daily. 1 Capsule Oral Every day        STOP taking these medications    sodium,potassium,mag sulfates 17.5-3.13-1.6 gram Solr  Commonly known as:  SUPREP BOWEL PREP KIT          Discharge Procedure Orders   Diet general     Call MD for:  temperature >100.4     Call MD for:  persistent nausea and vomiting     Call MD for:  severe uncontrolled pain     Call MD for:  difficulty breathing, headache or visual disturbances     Call MD for:  redness, tenderness, or signs of infection (pain, swelling, redness, odor or green/yellow discharge around incision site)     Call MD for:  hives     Call MD for:  persistent dizziness or light-headedness     Call MD for:  extreme fatigue     Activity as tolerated     Follow-up Information     Beverly Parks MD.    Specialty:  Family Medicine  Why:  As needed  Contact information:  [address] [address] [phone]

## 2019-02-22 NOTE — DISCHARGE INSTRUCTIONS
Diverticulosis    Diverticulosis means that small pouches have formed in the wall of your large intestine (colon). Most often, this problem causes no symptoms and is common as people age. But the pouches in the colon are at risk of becoming infected. When this happens, the condition is called diverticulitis. Although most people with diverticulosis never develop diverticulitis, it is still not uncommon. Rectal bleeding can also occur and in less common situations, a type of colon inflammation called colitis.  While most people do not have symptoms, some people with diverticulosis may have:  · Abdominal cramps and pain  · Bloating  · Constipation  · Change in bowel habits  Causes  The exact cause of diverticulosis (and diverticulitis) has not been proved, but a few things are associated with the condition:  · Low-fiber diet  · Constipation  · Lack of exercise  Your healthcare provider will talk with you about how to manage your condition. Diet changes may be all that are needed to help control diverticulosis and prevent progression to diverticulitis. If you develop diverticulitis, you will likely need other treatments.  Home care  You may be told to take fiber supplements daily. Fiber adds bulk to the stool so that it passes through the colon more easily. Stool softeners may be recommended. You may also be given medications for pain relief. Be sure to take all medications as directed.  In the past, people were told to avoid corn, nuts, and seeds. This is no longer necessary.  Follow these guidelines when caring for yourself at home:  · Eat unprocessed foods that are high in fiber. Whole grains, fruits, and vegetables are good choices.  · Drink 6 to 8 glasses of water every day unless your healthcare provider has you limit how much fluid you should have.  · Watch for changes in your bowel movements. Tell your provider if you notice any changes.  · Begin an exercise program. Ask your provider how to get started.  Generally, walking is the best.  · Get plenty of rest and sleep.  Follow-up care  Follow up with your healthcare provider, or as advised. Regular visits may be needed to check on your health. Sometimes special procedures such as colonoscopy, are needed after an episode of diverticulitis or blooding. Be sure to keep all your appointments.  If a stool sample was taken, or cultures were done, you should be told if they are positive, or if your treatment needs to be changed. You can call as directed for the results.  If X-rays were done, a radiologist will look at them. You will be told if there is a change in your treatment.  If antibiotics were prescribed, be sure to finish them all.  When to seek medical advice  Call your healthcare provider right away if any of these occur:  · Fever of 100.4°F (38°C) or higher, or as directed by your healthcare provider  · Severe cramps in the lower left side of the abdomen or pain that is getting worse  · Tenderness in the lower left side of the abdomen or worsening pain throughout the abdomen  · Diarrhea or constipation that doesn't get better within 24 hours  · Nausea and vomiting  · Bleeding from the rectum  Call 911  Call emergency services if any of the following occur:  · Trouble breathing  · Confusion  · Very drowsy or trouble awakening  · Fainting or loss of consciousness  · Rapid heart rate  · Chest pain  Date Last Reviewed: 12/30/2015 © 2000-2017 [employer]. [address] [place] [address]. All rights reserved. This information is not intended as a substitute for professional medical care. Always follow your healthcare professional's instructions.      Understanding Colon and Rectal Polyps    The colon (also called the large intestine) is a muscular tube that forms the last part of the digestive tract. It absorbs water and stores food waste. The colon is about 4 to 6 feet long. The rectum is the last 6 inches of the colon. The colon and rectum  have a smooth lining composed of millions of cells. Changes in these cells can lead to growths in the colon that can become cancerous and should be removed. Multiple tests are available to screen for colon cancer, but the colonoscopy is the most recommended test. During colonoscopy, these polyps can be removed. How often you need this test depends on many things including your condition, your family history, symptoms, and what the findings were at the previous colonoscopy.   When the colon lining changes  Changes that happen in the cells that line the colon or rectum can lead to growths called polyps. Over a period of years, polyps can turn cancerous. Removing polyps early may prevent cancer from ever forming.  Polyps  Polyps are fleshy clumps of tissue that form on the lining of the colon or rectum. Small polyps are usually benign (not cancerous). However, over time, cells in a polyp can change and become cancerous. Certain types of polyps known as adenomatous polyps are premalignant. The risk for invasive cancer increases with the size of the polyp and certain cell and gene features. This means that they can become cancerous if they're not removed. Hyperplastic polyps are benign. They can grow quite large and not turn cancerous.   Cancer  Almost all colorectal cancers start when polyp cells begin growing abnormally. As a cancerous tumor grows, it may involve more and more of the colon or rectum. In time, cancer can also grow beyond the colon or rectum and spread to nearby organs or to glands called lymph nodes. The cells can also travel to other parts of the body. This is known as metastasis. The earlier a cancerous tumor is removed, the better the chance of preventing its spread.    Date Last Reviewed: 8/1/2016  © 9037-4168 [employer], [employer]. [address] [place] [address], [place] [address]. All rights reserved. This information is not intended as a substitute for professional medical care. Always follow your  healthcare professional's instructions.      Hemorrhoids    Hemorrhoids are swollen and inflamed veins inside the rectum and near the anus. The rectum is the last several inches of the colon. The anus is the passage between the rectum and the outside of the body.  Causes  The veins can become swollen due to increased pressure in them. This is most often caused by:  · Chronic constipation or diarrhea  · Straining when having a bowel movement  · Sitting too long on the toilet  · A low-fiber diet  · Pregnancy  Symptoms  · Bleeding from the rectum (this may be noticeable after bowel movements)  · Lump near the anus  · Itching around the anus  · Pain around the anus  There are different types of hemorrhoids. Depending on the type you have and the severity, you may be able to treat yourself at home. In some cases, a procedure may be the best treatment option. Your healthcare provider can tell you more about this, if needed.  Home care  General care  · To get relief from pain or itching, try:  ¨ Topical products. Your healthcare provider may prescribe or recommend creams, ointments, or pads that can be applied to the hemorrhoid. Use these exactly as directed.  ¨ Medicines. Your healthcare provider may recommend stool softeners, suppositories, or laxatives to help manage constipation. Use these exactly as directed.  ¨ Sitz baths. A sitz bath involves sitting in a few inches of warm bath water. Be careful not to make the water so hot that you burn yourself--test it before sitting in it. Soak for about 10 to 15 minutes a few times a day. This may help relieve pain.  Tips to help prevent hemorrhoids  · Eat more fiber. Fiber adds bulk to stool and absorbs water as it moves through your colon. This makes stool softer and easier to pass.  ¨ Increase the fiber in your diet with more fiber-rich foods. These include fresh fruit, vegetables, and whole grains.  ¨ Take a fiber supplement or bulking agent, if advised to by your  provider. These include products such as psyllium or methylcellulose.  · Drink plenty of water, if directed to by your provider. This can help keep stool soft.  · Be more active. Frequent exercise aids digestion and helps prevent constipation. It may also help make bowel movements more regular.  · Dont strain during bowel movements. This can make hemorrhoids more likely. Also, dont sit on the toilet for long periods of time.  Follow-up care  Follow up with your healthcare provider, or as advised. If a culture or imaging tests were done, you will be notified of the results when they are ready. This may take a few days or longer.  When to seek medical advice  Call your healthcare provider right away if any of these occur:  · Increased bleeding from the rectum  · Increased pain around the rectum or anus  · Weakness or dizziness  Call 911  Call 911 or return to the emergency department right away if any of these occur:  · Trouble breathing or swallowing  · Fainting or loss of consciousness  · Unusually fast heart rate  · Vomiting blood  · Large amounts of blood in stool  Date Last Reviewed: 6/22/2015  © 0626-9772 [employer]. [address] [place] [address], [place] [address]. All rights reserved. This information is not intended as a substitute for professional medical care. Always follow your healthcare professional's instructions.

## 2019-02-22 NOTE — ANESTHESIA RELEASE NOTE
Anesthesia Release from PACU Note    Patient: Kylie Urbano    Procedure(s) Performed: Procedure(s) (LRB):  COLONOSCOPY (N/A)    Anesthesia type: MAC    Post pain: Adequate analgesia    Post assessment: no apparent anesthetic complications, tolerated procedure well and no evidence of recall    Last Vitals:   Visit Vitals  /77 (BP Location: Right leg, Patient Position: Lying)   Pulse 92   Temp 36.8 °C (98.2 °F) (Oral)   Resp 18   LMP 01/01/2016   SpO2 97%   Breastfeeding? No       Post vital signs: stable    Level of consciousness: awake, alert  and oriented    Nausea/Vomiting: no nausea/no vomiting    Complications: none    Airway Patency: patent    Respiratory: unassisted, spontaneous ventilation, room air    Cardiovascular: stable    Hydration: euvolemic

## 2019-02-26 DIAGNOSIS — E03.9 HYPOTHYROIDISM, UNSPECIFIED TYPE: Primary | Chronic | ICD-10-CM

## 2019-02-26 RX ORDER — LEVOTHYROXINE SODIUM 200 UG/1
200 TABLET ORAL DAILY
Qty: 90 TABLET | Refills: 1 | OUTPATIENT
Start: 2019-02-26

## 2019-02-26 RX ORDER — LEVOTHYROXINE SODIUM 175 UG/1
175 TABLET ORAL DAILY
Qty: 90 TABLET | Refills: 0 | Status: SHIPPED | OUTPATIENT
Start: 2019-02-26 | End: 2019-06-10 | Stop reason: SDUPTHER

## 2019-02-28 ENCOUNTER — TELEPHONE (OUTPATIENT)
Dept: FAMILY MEDICINE | Facility: CLINIC | Age: 54
End: 2019-02-28

## 2019-02-28 NOTE — TELEPHONE ENCOUNTER
----- Message from Vipul Fregoso sent at 2/28/2019  1:26 PM CST -----  Contact: [facility]  Type:  Pharmacy Calling to Clarify an RX    Name of Caller: Maurizio  Pharmacy Name:[facility] on [facility]  Prescription Name:Synthroid 1758 mg  What do they need to clarify?: She is calling in regards to getting the pt the actual brand medication instead of the generic medication.  Best Call Back Number: [phone]  Additional Information: n/a

## 2019-03-26 DIAGNOSIS — Z17.0 MALIGNANT NEOPLASM OF UPPER-OUTER QUADRANT OF RIGHT BREAST IN FEMALE, ESTROGEN RECEPTOR POSITIVE: ICD-10-CM

## 2019-03-26 DIAGNOSIS — C50.411 MALIGNANT NEOPLASM OF UPPER-OUTER QUADRANT OF RIGHT BREAST IN FEMALE, ESTROGEN RECEPTOR POSITIVE: ICD-10-CM

## 2019-03-26 RX ORDER — TAMOXIFEN CITRATE 20 MG/1
TABLET ORAL
Qty: 30 TABLET | Refills: 11 | Status: SHIPPED | OUTPATIENT
Start: 2019-03-26 | End: 2019-06-24

## 2019-04-05 RX ORDER — ACYCLOVIR 400 MG/1
TABLET ORAL
Qty: 30 TABLET | Refills: 1 | Status: SHIPPED | OUTPATIENT
Start: 2019-04-05 | End: 2020-05-14

## 2019-04-26 RX ORDER — BENAZEPRIL HYDROCHLORIDE 20 MG/1
TABLET ORAL
Qty: 90 TABLET | Refills: 1 | Status: SHIPPED | OUTPATIENT
Start: 2019-04-26 | End: 2019-05-16 | Stop reason: SDUPTHER

## 2019-05-13 ENCOUNTER — HOSPITAL ENCOUNTER (OUTPATIENT)
Dept: RADIOLOGY | Facility: HOSPITAL | Age: 54
Discharge: HOME | End: 2019-05-13
Attending: INTERNAL MEDICINE
Payer: COMMERCIAL

## 2019-05-13 VITALS — HEIGHT: 68 IN | BODY MASS INDEX: 41.98 KG/M2 | WEIGHT: 277 LBS

## 2019-05-13 DIAGNOSIS — Z17.0 MALIGNANT NEOPLASM OF UPPER-OUTER QUADRANT OF RIGHT BREAST IN FEMALE, ESTROGEN RECEPTOR POSITIVE: ICD-10-CM

## 2019-05-13 DIAGNOSIS — C50.411 MALIGNANT NEOPLASM OF UPPER-OUTER QUADRANT OF RIGHT BREAST IN FEMALE, ESTROGEN RECEPTOR POSITIVE: ICD-10-CM

## 2019-05-13 PROCEDURE — 77062 MAMMO DIGITAL DIAGNOSTIC BILAT WITH TOMOSYNTHESIS_CAD: ICD-10-PCS | Mod: 26,,, | Performed by: RADIOLOGY

## 2019-05-13 PROCEDURE — 77062 BREAST TOMOSYNTHESIS BI: CPT | Mod: 26,,, | Performed by: RADIOLOGY

## 2019-05-13 PROCEDURE — 77066 DX MAMMO INCL CAD BI: CPT | Mod: TC

## 2019-05-13 PROCEDURE — 77066 DX MAMMO INCL CAD BI: CPT | Mod: 26,,, | Performed by: RADIOLOGY

## 2019-05-13 PROCEDURE — 77066 MAMMO DIGITAL DIAGNOSTIC BILAT WITH TOMOSYNTHESIS_CAD: ICD-10-PCS | Mod: 26,,, | Performed by: RADIOLOGY

## 2019-05-16 ENCOUNTER — LAB VISIT (OUTPATIENT)
Dept: LAB | Facility: HOSPITAL | Age: 54
End: 2019-05-16
Payer: COMMERCIAL

## 2019-05-16 ENCOUNTER — OFFICE VISIT (OUTPATIENT)
Dept: HEMATOLOGY/ONCOLOGY | Facility: CLINIC | Age: 54
End: 2019-05-16
Payer: COMMERCIAL

## 2019-05-16 VITALS
OXYGEN SATURATION: 99 % | SYSTOLIC BLOOD PRESSURE: 151 MMHG | HEIGHT: 68 IN | BODY MASS INDEX: 42.1 KG/M2 | HEART RATE: 104 BPM | DIASTOLIC BLOOD PRESSURE: 87 MMHG | WEIGHT: 277.75 LBS | TEMPERATURE: 98 F

## 2019-05-16 DIAGNOSIS — C50.411 MALIGNANT NEOPLASM OF UPPER-OUTER QUADRANT OF RIGHT BREAST IN FEMALE, ESTROGEN RECEPTOR POSITIVE: ICD-10-CM

## 2019-05-16 DIAGNOSIS — Z17.0 MALIGNANT NEOPLASM OF UPPER-OUTER QUADRANT OF RIGHT BREAST IN FEMALE, ESTROGEN RECEPTOR POSITIVE: ICD-10-CM

## 2019-05-16 DIAGNOSIS — J06.9 URI, ACUTE: ICD-10-CM

## 2019-05-16 DIAGNOSIS — Z17.0 MALIGNANT NEOPLASM OF UPPER-OUTER QUADRANT OF RIGHT BREAST IN FEMALE, ESTROGEN RECEPTOR POSITIVE: Primary | ICD-10-CM

## 2019-05-16 DIAGNOSIS — C50.411 MALIGNANT NEOPLASM OF UPPER-OUTER QUADRANT OF RIGHT BREAST IN FEMALE, ESTROGEN RECEPTOR POSITIVE: Primary | ICD-10-CM

## 2019-05-16 LAB
ALBUMIN SERPL BCP-MCNC: 3.4 G/DL (ref 3.5–5.2)
ALP SERPL-CCNC: 57 U/L (ref 55–135)
ALT SERPL W/O P-5'-P-CCNC: 32 U/L (ref 10–44)
ANION GAP SERPL CALC-SCNC: 9 MMOL/L (ref 8–16)
AST SERPL-CCNC: 27 U/L (ref 10–40)
BASOPHILS # BLD AUTO: 0.02 K/UL (ref 0–0.2)
BASOPHILS NFR BLD: 0.3 % (ref 0–1.9)
BILIRUB SERPL-MCNC: 0.2 MG/DL (ref 0.1–1)
BUN SERPL-MCNC: 12 MG/DL (ref 6–20)
CALCIUM SERPL-MCNC: 9.2 MG/DL (ref 8.7–10.5)
CHLORIDE SERPL-SCNC: 110 MMOL/L (ref 95–110)
CO2 SERPL-SCNC: 24 MMOL/L (ref 23–29)
CREAT SERPL-MCNC: 0.8 MG/DL (ref 0.5–1.4)
DIFFERENTIAL METHOD: ABNORMAL
EOSINOPHIL # BLD AUTO: 0.2 K/UL (ref 0–0.5)
EOSINOPHIL NFR BLD: 2.5 % (ref 0–8)
ERYTHROCYTE [DISTWIDTH] IN BLOOD BY AUTOMATED COUNT: 13.7 % (ref 11.5–14.5)
EST. GFR  (AFRICAN AMERICAN): >60 ML/MIN/1.73 M^2
EST. GFR  (NON AFRICAN AMERICAN): >60 ML/MIN/1.73 M^2
GLUCOSE SERPL-MCNC: 98 MG/DL (ref 70–110)
HCT VFR BLD AUTO: 37.3 % (ref 37–48.5)
HGB BLD-MCNC: 11.9 G/DL (ref 12–16)
IMM GRANULOCYTES # BLD AUTO: 0.01 K/UL (ref 0–0.04)
IMM GRANULOCYTES NFR BLD AUTO: 0.1 % (ref 0–0.5)
LYMPHOCYTES # BLD AUTO: 3.2 K/UL (ref 1–4.8)
LYMPHOCYTES NFR BLD: 40.1 % (ref 18–48)
MCH RBC QN AUTO: 28 PG (ref 27–31)
MCHC RBC AUTO-ENTMCNC: 31.9 G/DL (ref 32–36)
MCV RBC AUTO: 88 FL (ref 82–98)
MONOCYTES # BLD AUTO: 0.6 K/UL (ref 0.3–1)
MONOCYTES NFR BLD: 7.5 % (ref 4–15)
NEUTROPHILS # BLD AUTO: 3.9 K/UL (ref 1.8–7.7)
NEUTROPHILS NFR BLD: 49.6 % (ref 38–73)
NRBC BLD-RTO: 0 /100 WBC
PLATELET # BLD AUTO: 226 K/UL (ref 150–350)
PMV BLD AUTO: 9.9 FL (ref 9.2–12.9)
POTASSIUM SERPL-SCNC: 3.9 MMOL/L (ref 3.5–5.1)
PROT SERPL-MCNC: 7 G/DL (ref 6–8.4)
RBC # BLD AUTO: 4.25 M/UL (ref 4–5.4)
SODIUM SERPL-SCNC: 143 MMOL/L (ref 136–145)
WBC # BLD AUTO: 7.91 K/UL (ref 3.9–12.7)

## 2019-05-16 PROCEDURE — 99999 PR PBB SHADOW E&M-EST. PATIENT-LVL III: CPT | Mod: PBBFAC,,, | Performed by: INTERNAL MEDICINE

## 2019-05-16 PROCEDURE — 3079F DIAST BP 80-89 MM HG: CPT | Mod: CPTII,S$GLB,, | Performed by: INTERNAL MEDICINE

## 2019-05-16 PROCEDURE — 3008F BODY MASS INDEX DOCD: CPT | Mod: CPTII,S$GLB,, | Performed by: INTERNAL MEDICINE

## 2019-05-16 PROCEDURE — 3008F PR BODY MASS INDEX (BMI) DOCUMENTED: ICD-10-PCS | Mod: CPTII,S$GLB,, | Performed by: INTERNAL MEDICINE

## 2019-05-16 PROCEDURE — 3079F PR MOST RECENT DIASTOLIC BLOOD PRESSURE 80-89 MM HG: ICD-10-PCS | Mod: CPTII,S$GLB,, | Performed by: INTERNAL MEDICINE

## 2019-05-16 PROCEDURE — 3077F PR MOST RECENT SYSTOLIC BLOOD PRESSURE >= 140 MM HG: ICD-10-PCS | Mod: CPTII,S$GLB,, | Performed by: INTERNAL MEDICINE

## 2019-05-16 PROCEDURE — 99999 PR PBB SHADOW E&M-EST. PATIENT-LVL III: ICD-10-PCS | Mod: PBBFAC,,, | Performed by: INTERNAL MEDICINE

## 2019-05-16 PROCEDURE — 99214 OFFICE O/P EST MOD 30 MIN: CPT | Mod: S$GLB,,, | Performed by: INTERNAL MEDICINE

## 2019-05-16 PROCEDURE — 3077F SYST BP >= 140 MM HG: CPT | Mod: CPTII,S$GLB,, | Performed by: INTERNAL MEDICINE

## 2019-05-16 PROCEDURE — 80053 COMPREHEN METABOLIC PANEL: CPT

## 2019-05-16 PROCEDURE — 85025 COMPLETE CBC W/AUTO DIFF WBC: CPT

## 2019-05-16 PROCEDURE — 99214 PR OFFICE/OUTPT VISIT, EST, LEVL IV, 30-39 MIN: ICD-10-PCS | Mod: S$GLB,,, | Performed by: INTERNAL MEDICINE

## 2019-05-16 PROCEDURE — 36415 COLL VENOUS BLD VENIPUNCTURE: CPT

## 2019-05-16 RX ORDER — LEVOFLOXACIN 500 MG/1
500 TABLET, FILM COATED ORAL DAILY
Qty: 14 TABLET | Refills: 0 | Status: SHIPPED | OUTPATIENT
Start: 2019-05-16 | End: 2019-05-30

## 2019-05-16 RX ORDER — FLUTICASONE PROPIONATE 50 MCG
2 SPRAY, SUSPENSION (ML) NASAL DAILY PRN
Qty: 16 G | Refills: 5 | Status: SHIPPED | OUTPATIENT
Start: 2019-05-16 | End: 2020-01-06 | Stop reason: SDUPTHER

## 2019-05-16 NOTE — PROGRESS NOTES
Hematology/Oncology Office Note    Reason for referral:  Locally advanced breast cancer with biopsy proven axillary lymph node involvement    CC:    Referred by:  No ref. provider found    Diagnosis:  Right sided locally advanced infiltrating ductal carcinoma with biopsy-proven axillary lymph node involvement (ER positive at 95%, MO positive at 95%, HER-2 1+ by IHC and negative by fish    10/31/17: lumpectomy with sentinel lymph node biopsy on 10/31/2017.   Final pathology demonstrated [id number].  The primary measured 0.8 cm and 2 sentinel lymph nodes were positive with 3 mm macrometastasis within first sentinel lymph node and 1 mm micrometastasis within the second sentinel lymph node.    12/7/2017 right axillary lymph node dissection with 0 nodes positive      Treatment:    History of present illness:  Ms. Urbano is a 51-year-old female with a past medical history of DM hypertension, hypothyroidism, iron deficiency anemia, who was noted to have abnormal screening mammogram  on 4/28/2017.  Core biopsy of right breast mass at 10:00 6 cm from the nipple demonstrated infiltrating ductal carcinoma and right axillary lymph node core biopsy also demonstrated infiltrating ductal carcinoma.  ER/MO positive, HER-2 negative markers.  The patient has a first cousin with a history of breast cancer and one aunt with a history of breast cancer.  She has some soreness at previous biopsy site, however, denies other significant symptoms.    I have reviewed and updated the HPI, ROS, PMHx, Social Hx, Family Hx and treatment history.    Today's visit:  Patient is without complaints today.      Past Medical History:   Diagnosis Date    Allergic rhinitis, cause unspecified     Breast cancer 2017    Cancer     R Breast Cancer    Carpal tunnel syndrome of left wrist     Elevated liver function tests     in the past    Fatty liver 05/02/2017    on ultrasound    Fibroid uterus     10/2014    Hepatomegaly 05/02/2017    on ultrasound     History of sciatica     HSV infection     Type 1 and 2    HTN (hypertension)     Hypothyroidism     Insulin resistance     Iron deficiency anemia, unspecified     Obesity     PONV (postoperative nausea and vomiting)     Tension headache     Vitamin D deficiency          Social History:  No tobacco, alcohol, or illicit drugs      Family History: family history includes Breast cancer in her paternal cousin; Cancer in her father and sister; Diabetes in her mother; Heart failure in her mother; Hypertension in her mother; No Known Problems in her daughter, daughter, and son; Ovarian cancer in her sister.        HPI    Review of Systems   Constitutional: Negative for activity change, appetite change, chills, diaphoresis, fatigue, fever and unexpected weight change.   HENT: Negative for congestion, ear pain, facial swelling, hearing loss, mouth sores, nosebleeds, rhinorrhea, sinus pressure, sinus pain and sneezing.    Eyes: Negative.    Respiratory: Negative for apnea, cough, shortness of breath, wheezing and stridor.    Cardiovascular: Negative for chest pain and leg swelling.   Gastrointestinal: Negative for abdominal distention, abdominal pain, anal bleeding, constipation, diarrhea, nausea and rectal pain.   Endocrine: Negative.    Genitourinary: Negative for difficulty urinating, dyspareunia, enuresis, flank pain, genital sores, hematuria, menstrual problem and pelvic pain.   Musculoskeletal: Negative for arthralgias, back pain, gait problem and neck stiffness.   Skin: Negative for color change and pallor.   Allergic/Immunologic: Negative.    Neurological: Negative for dizziness, tremors, seizures, facial asymmetry, speech difficulty, light-headedness and numbness.   Hematological: Negative for adenopathy. Does not bruise/bleed easily.   Psychiatric/Behavioral: Negative for agitation, behavioral problems, confusion and dysphoric mood. The patient is not nervous/anxious and is not hyperactive.        Objective:  "      Vitals:    05/16/19 1445   BP: (!) 151/87   Pulse: 104   Temp: 98.2 °F (36.8 °C)   SpO2: 99%   Weight: 126 kg (277 lb 12.5 oz)   Height: 5' 8" (1.727 m)       Physical Exam   Constitutional: She is oriented to person, place, and time. She appears well-developed and well-nourished. No distress.   Well groomed   HENT:   Head: Normocephalic and atraumatic. Not macrocephalic.   Right Ear: Tympanic membrane and ear canal normal.   Left Ear: Tympanic membrane and ear canal normal.   Nose: Nose normal. Right sinus exhibits no maxillary sinus tenderness and no frontal sinus tenderness. Left sinus exhibits no maxillary sinus tenderness and no frontal sinus tenderness.   Mouth/Throat: Uvula is midline, oropharynx is clear and moist and mucous membranes are normal. No oropharyngeal exudate.   Eyes: Pupils are equal, round, and reactive to light. Conjunctivae, EOM and lids are normal. Lids are everted and swept, no foreign bodies found.   Neck: Trachea normal and normal range of motion. Neck supple. No tracheal deviation present. No thyroid mass and no thyromegaly present.   No crepitus   Cardiovascular: Normal rate, regular rhythm, normal heart sounds, intact distal pulses and normal pulses. Exam reveals no gallop and no friction rub.   No murmur heard.  Pulmonary/Chest: Effort normal and breath sounds normal. No stridor. No respiratory distress. She has no decreased breath sounds. She has no wheezes. She has no rhonchi. She has no rales. She exhibits no tenderness.   Right breast: without discrete mass no masses or lymphadenopathy noted   Abdominal: Soft. Normal appearance and bowel sounds are normal. She exhibits no distension. There is no hepatosplenomegaly. There is no tenderness. There is no guarding.   Musculoskeletal: Normal range of motion. She exhibits no edema or deformity.   Lymphadenopathy:        Head (right side): No submental and no submandibular adenopathy present.        Head (left side): No submental " and no submandibular adenopathy present.        Right cervical: No superficial cervical and no deep cervical adenopathy present.       Left cervical: No superficial cervical and no deep cervical adenopathy present.     She has no axillary adenopathy.        Right: No supraclavicular adenopathy present.        Left: No supraclavicular adenopathy present.   Neurological: She is alert and oriented to person, place, and time. No cranial nerve deficit or sensory deficit. She exhibits normal muscle tone.   Skin: Skin is warm, dry and intact. Capillary refill takes less than 2 seconds. No rash noted. She is not diaphoretic. No pallor.   Psychiatric: She has a normal mood and affect. Her behavior is normal. Judgment and thought content normal.       Lab Results   Component Value Date    WBC 7.91 05/16/2019    HGB 11.9 (L) 05/16/2019    HCT 37.3 05/16/2019    MCV 88 05/16/2019     05/16/2019     Lab Results   Component Value Date    CREATININE 0.7 01/22/2019    BUN 11 01/22/2019     01/22/2019    K 3.7 01/22/2019     01/22/2019    CO2 24 01/22/2019     Lab Results   Component Value Date    ALT 48 (H) 01/22/2019    AST 35 01/22/2019    ALKPHOS 65 01/22/2019    BILITOT 0.2 01/22/2019         Assessment:         52-year-old female with a new diagnosis of right-sided infiltrating ductal carcinoma ER/MN positive, HER-2 negative with biopsy-proven right axillary lymph node involvement.  Patient was consented for BRCA testing for participation and B-55 study with negative BRCA testing.    She received cycle 1 of dense dose Adriamycin/Cytoxan on 6/12/2017 and tolerated treatment exceptionally well.  She completed cycle 4 of dose dense Taxol on 9/19/2017.  She underwent lumpectomy with sentinel lymph node biopsy on 10/31/2017.   Final pathology demonstrated [id number].  The primary measured 0.8 cm and 2 sentinel lymph nodes were positive with 3 mm macrometastasis within first sentinel lymph node and 1 mm  micrometastasis within the second sentinel lymph node.  She underwent a full right axillary lymph node dissection on 11/8/2017 which demonstrated 0 positive nodes.  She completed adjuvant radiation on 2/27/2018 and presents today to discuss antiestrogen therapy.  The patient had a hysterectomy approximately 6/20/14.  FSH was in the intermediate range which likely means the patient is perimenopausal.  We will proceed with tamoxifen 20 mg daily ×2 years and then plan to transition to Arimidex 1 mg by mouth daily.    Patient started adjuvant tamoxifen in 3/2018 and is tolerating very well.    Surveillance mammograms remains MAGALIE in 5/2019-  We will continue active surveillance and have the patient follow up in 6 months with repeat labs      ER/GA positive, HER-2 negative infiltrating ductal carcinoma the right breast with biopsy proven lymph node involvement clinically stage II/III  [id number].  Pathology following neoadjuvant therapy demonstrated 1 sentinel lymph node with micrometastasis (3 mm) and second lymph node with micrometastasis (1 mm)  --BRCA testing via B-55 Study is negative for mutation  --ddAC--> taxol   cycle 1--- 6/12/2017  --Final cycle Cycle 4 on 9/19/2017  --Radiation completed on 2/27/2018  --Tamoxifen 20 mg by mouth daily started in 3/2018  --surveillance bilateral mammogram in 05/2020  --repeat FSH next year and plan to transition to aromatase inhibitor accordingly      Hypertension:  --Continue current medical management    Hypothyroidism:  Continue Synthroid    4 mm right upper lobe pulmonary nodule and subpleural nodularity bilateral bases:  --Repeat CT scan demonstrates stable findings--no need for additional scans at this point

## 2019-06-11 RX ORDER — LEVOTHYROXINE SODIUM 175 UG/1
TABLET ORAL
Qty: 90 TABLET | Refills: 0 | Status: SHIPPED | OUTPATIENT
Start: 2019-06-11 | End: 2019-09-14 | Stop reason: SDUPTHER

## 2019-06-16 ENCOUNTER — HOSPITAL ENCOUNTER (EMERGENCY)
Facility: HOSPITAL | Age: 54
Discharge: HOME | End: 2019-06-16
Attending: EMERGENCY MEDICINE
Payer: COMMERCIAL

## 2019-06-16 VITALS
WEIGHT: 268.5 LBS | HEART RATE: 106 BPM | RESPIRATION RATE: 18 BRPM | TEMPERATURE: 99 F | DIASTOLIC BLOOD PRESSURE: 89 MMHG | SYSTOLIC BLOOD PRESSURE: 174 MMHG | BODY MASS INDEX: 40.69 KG/M2 | HEIGHT: 68 IN | OXYGEN SATURATION: 95 %

## 2019-06-16 DIAGNOSIS — L53.9 BREAST ERYTHEMA: ICD-10-CM

## 2019-06-16 DIAGNOSIS — N64.4 BREAST PAIN: Primary | ICD-10-CM

## 2019-06-16 PROCEDURE — 99283 EMERGENCY DEPT VISIT LOW MDM: CPT

## 2019-06-16 PROCEDURE — 25000003 PHARM REV CODE 250: Performed by: EMERGENCY MEDICINE

## 2019-06-16 RX ORDER — CLINDAMYCIN HYDROCHLORIDE 150 MG/1
450 CAPSULE ORAL EVERY 8 HOURS
Qty: 90 CAPSULE | Refills: 0 | Status: SHIPPED | OUTPATIENT
Start: 2019-06-16 | End: 2019-06-26

## 2019-06-16 RX ORDER — CLINDAMYCIN HYDROCHLORIDE 150 MG/1
450 CAPSULE ORAL
Status: COMPLETED | OUTPATIENT
Start: 2019-06-16 | End: 2019-06-16

## 2019-06-16 RX ORDER — CLINDAMYCIN HYDROCHLORIDE 150 MG/1
450 CAPSULE ORAL EVERY 8 HOURS
Qty: 90 CAPSULE | Refills: 0 | Status: SHIPPED | OUTPATIENT
Start: 2019-06-16 | End: 2019-06-16 | Stop reason: SDUPTHER

## 2019-06-16 RX ADMIN — CLINDAMYCIN HYDROCHLORIDE 450 MG: 150 CAPSULE ORAL at 10:06

## 2019-06-17 NOTE — ED PROVIDER NOTES
SCRIBE #1 NOTE: I, Vincent Alarcon/Talisha Zhou, am scribing for, and in the presence of, Carlos Maxwell MD. I have scribed the entire note.       History     Chief Complaint   Patient presents with    Breast Pain     pt c/o R breast pain, warmth, redness, and irritation that began yesterday and has worsened; hx R breast cancer with lumpectomy, chemo, and radiation in 2017     Review of patient's allergies indicates:   Allergen Reactions    Lortab [hydrocodone-acetaminophen] Nausea And Vomiting     Historical.    Amoxicillin Rash     Historical    Sulfa (sulfonamide antibiotics) Rash         History of Present Illness     HPI    6/16/2019, 9:11 PM  History obtained from the patient      History of Present Illness: Kylie Urbano is a 53 y.o. female patient with a PMHx of breast cancer and HTN who presents to the Emergency Department for evaluation of R breast pain which onset gradually this morning. Symptoms are constant and moderate in severity. No mitigating or exacerbating factors reported. Associated sxs include R breast erythema. Patient denies any CP, back pain, abd pain, dysuria, hematuria, fever, chills, drainage, nipple discharge, and all other sxs at this time. No prior Tx reported. Pt had normal mammogram in May 2019. Pt reports she was treated for R breast CA 1 year ago. She was treated with radiation and chemotherapy and has been in remission. She reports she took x2 500mg Levaquin today for sinusitis symptoms.. She reports she did not have erythema the last time she was diagnosed with breast CA. No further complaints or concerns at this time.       Arrival mode: Personal vehice    PCP: Beverly Parks MD        Past Medical History:  Past Medical History:   Diagnosis Date    Allergic rhinitis, cause unspecified     Breast cancer 2017    Cancer     R Breast Cancer    Carpal tunnel syndrome of left wrist     Elevated liver function tests     in the past    Fatty liver 05/02/2017    on ultrasound     Fibroid uterus     10/2014    Hepatomegaly 05/02/2017    on ultrasound    History of sciatica     HSV infection     Type 1 and 2    HTN (hypertension)     Hypothyroidism     Insulin resistance     Iron deficiency anemia, unspecified     Obesity     PONV (postoperative nausea and vomiting)     Tension headache     Vitamin D deficiency        Past Surgical History:  Past Surgical History:   Procedure Laterality Date    BIOPSY-LYMPH NODE-SENTINEL possible node dissection Right 10/31/2017    [facility]    BREAST LUMPECTOMY Right 2017    BTL      COLONOSCOPY N/A 2/22/2019    [facility]    DILATION AND CURETTAGE OF UTERUS      x 1    DISSECTION-LYMPH NODE Right 10/31/2017    [facility]    DISSECTION-LYMPH NODE-AXILLARY Right 12/7/2017    [facility]    HYSTERECTOMY      Laproscopic robot assissted with BSO    INSERTION-PORT Left 6/9/2017    [facility]    MASTECTOMY-PARTIAL Right 10/31/2017    [facility]    ROBOT ASSISTED LAPAROSCOPIC SALPINGO-OOPHERECTOMY Bilateral 1/11/2016    [facility]    ROBOTIC ASSISTED LAPAROSCOPIC HYSTERECTOMY Bilateral 1/11/2016    [facility]         Family History:  Family History   Problem Relation Age of Onset    Diabetes Mother     Hypertension Mother     Heart failure Mother     Cancer Father         Stomach cancer    Cancer Sister         Ovarian cancer    Ovarian cancer Sister     No Known Problems Daughter     No Known Problems Son     No Known Problems Daughter     Breast cancer Paternal Cousin     Stroke Neg Hx     Heart disease Neg Hx         MI/CAD       Social History:  Social History     Tobacco Use    Smoking status: Never Smoker    Smokeless tobacco: Never Used   Substance and  Sexual Activity    Alcohol use: Yes     Alcohol/week: 0.0 oz     Frequency: 2-4 times a month     Drinks per session: 1 or 2     Binge frequency: Never     Comment: occasionally     Drug use: No    Sexual activity: Not Currently        Review of Systems     Review of Systems   Constitutional: Negative for chills and fever.   HENT: Negative for sore throat.    Respiratory: Negative for shortness of breath.    Cardiovascular: Negative for chest pain.   Gastrointestinal: Negative for nausea and vomiting.   Genitourinary: Negative for dysuria and hematuria.   Musculoskeletal: Positive for myalgias (R breast). Negative for back pain.   Skin: Positive for color change (R breast erythema). Negative for rash.   Neurological: Negative for weakness.   Hematological: Does not bruise/bleed easily.   All other systems reviewed and are negative.     Physical Exam     Initial Vitals [06/16/19 2011]   BP Pulse Resp Temp SpO2   (!) 170/69 (!) 126 18 99.9 °F (37.7 °C) 96 %      MAP       --          Physical Exam  Nursing Notes and Vital Signs Reviewed.  Constitutional: Patient is in no acute distress. Well-developed and well-nourished.  Head: Atraumatic. Normocephalic.  Eyes: PERRL. EOM intact. Conjunctivae are not pale. No scleral icterus.  ENT: Mucous membranes are moist. Oropharynx is clear and symmetric.    Neck: Supple. Full ROM. No lymphadenopathy.  Cardiovascular: Regular rate. Regular rhythm. No murmurs, rubs, or gallops. Distal pulses are 2+ and symmetric.  Pulmonary/Chest: No respiratory distress. Clear to auscultation bilaterally. No wheezing or rales.  Abdominal: Soft and non-distended.  There is no tenderness.  No rebound, guarding, or rigidity.   Musculoskeletal: Moves all extremities. No obvious deformities. No edema.  Breast: Female chaperone was present for exam. Diffuse R breast erythema with induration and a peau d'orange appearance. Normal Left breast. No abnormal masses. No significant tenderness.   Skin:  "Warm and dry.  Neurological:  Alert, awake, and appropriate.  Normal speech.  No acute focal neurological deficits are appreciated.  Psychiatric: Normal affect. Good eye contact. Appropriate in content.     ED Course   Procedures  ED Vital Signs:  Vitals:    06/16/19 2011   BP: (!) 170/69   Pulse: (!) 126   Resp: 18   Temp: 99.9 °F (37.7 °C)   TempSrc: Oral   SpO2: 96%   Weight: 121.8 kg (268 lb 8.3 oz)   Height: 5' 8" (1.727 m)              The Emergency Provider reviewed the vital signs and test results, which are outlined above.     ED Discussion     9:34 PM: Initial assessment of pt.  Pt is awake, alert, and in NAD at this time. Discussed with pt all pertinent ED information. Advised pt to f/u with her hem/onc doctor if the erythema does not improve with abx. Discussed pt dx and plan of tx. Gave pt all f/u and return to the ED instructions. All questions and concerns were addressed at this time. Pt expresses understanding of information and instructions, and is comfortable with plan to discharge. Pt is stable for discharge.    I discussed with patient and/or family/caretaker that evaluation in the ED does not suggest any emergent or life threatening medical conditions requiring immediate intervention beyond what was provided in the ED, and I believe patient is safe for discharge.  Regardless, an unremarkable evaluation in the ED does not preclude the development or presence of a serious of life threatening condition. As such, patient was instructed to return immediately for any worsening or change in current symptoms.    ED Medication(s):  Medications   clindamycin capsule 450 mg (has no administration in time range)       Current Discharge Medication List      START taking these medications    Details   clindamycin (CLEOCIN) 150 MG capsule Take 3 capsules (450 mg total) by mouth every 8 (eight) hours. for 10 days  Qty: 90 capsule, Refills: 0             Medical Decision Making:   Patient with known history of " breast cancer; it was reportedly not inflammatory breast cancer; today patient presents with symptoms concerning for mastitis versus inflammatory breast cancer; explained the differential diagnosis to patient; explain to patient that we will try a trial of antibiotics and if it is mastitis, it should improve, however if it is inflammatory breast cancer, and will not improve and she will need to follow up with her oncologist as soon as possible for further evaluation and management; patient advised to arrange and Oncology follow-up this week; patient voiced understanding; discharged home with clindamycin for 10 days             Scribe Attestation:   Scribe #1: I performed the above scribed service and the documentation accurately describes the services I performed. I attest to the accuracy of the note.     Attending:   Physician Attestation Statement for Scribe #1: I, Carlos Maxwell MD, personally performed the services described in this documentation, as scribed by Vincent Alarcon/Talisha Zhou, in my presence, and it is both accurate and complete.           Clinical Impression       ICD-10-CM ICD-9-CM   1. Breast pain N64.4 611.71   2. Breast erythema L53.9 695.89       Disposition:   Disposition: Discharged  Condition: Stable         Carlos Maxwell MD  06/16/19 [id number]

## 2019-06-17 NOTE — ED NOTES
Armband checked, patient verified. Verified by spelling and stated name on armband along with .   LOC: The patient is awake, alert and aware of environment with an appropriate affect, the patient is oriented x 3 and speaking appropriately.  APPEARANCE: Patient resting comfortably and in no acute distress, patient is clean and well groomed  SKIN: The skin is warm and dry, color consistent with ethnicity, patient has normal skin turgor and moist mucus membranes. Pt states noticed right breast red, hot and swollen this am. Pt has hx of breast cancer to right breast 2017.  Breast is red, hot to touch and swollen, tender to palpation. Pt states has been running fever this am.  States took Motrin at 6pm. Pt states had last mammogram and check up in May.   MUSCULOSKELETAL: Patient moving all extremities to command  RESPIRATORY: Airway is open and patent, respirations are regular, even and non labored.  CARDIAC: Patient has a normal rate, no periphreal edema noted, capillary refill < 3 seconds.  ABDOMEN: Soft and non tender to palpation.  Pt family at bedside. Call light in reach.

## 2019-06-24 ENCOUNTER — OFFICE VISIT (OUTPATIENT)
Dept: HEMATOLOGY/ONCOLOGY | Facility: CLINIC | Age: 54
End: 2019-06-24
Payer: COMMERCIAL

## 2019-06-24 VITALS
RESPIRATION RATE: 18 BRPM | SYSTOLIC BLOOD PRESSURE: 147 MMHG | BODY MASS INDEX: 41.3 KG/M2 | OXYGEN SATURATION: 98 % | HEIGHT: 68 IN | WEIGHT: 272.5 LBS | TEMPERATURE: 99 F | DIASTOLIC BLOOD PRESSURE: 89 MMHG | HEART RATE: 106 BPM

## 2019-06-24 DIAGNOSIS — C50.411 MALIGNANT NEOPLASM OF UPPER-OUTER QUADRANT OF RIGHT BREAST IN FEMALE, ESTROGEN RECEPTOR POSITIVE: Primary | ICD-10-CM

## 2019-06-24 DIAGNOSIS — M81.8 OSTEOPOROSIS DUE TO AROMATASE INHIBITOR: ICD-10-CM

## 2019-06-24 DIAGNOSIS — B37.31 VAGINAL YEAST INFECTION: ICD-10-CM

## 2019-06-24 DIAGNOSIS — T38.6X5A OSTEOPOROSIS DUE TO AROMATASE INHIBITOR: ICD-10-CM

## 2019-06-24 DIAGNOSIS — Z17.0 MALIGNANT NEOPLASM OF UPPER-OUTER QUADRANT OF RIGHT BREAST IN FEMALE, ESTROGEN RECEPTOR POSITIVE: Primary | ICD-10-CM

## 2019-06-24 PROCEDURE — 99214 PR OFFICE/OUTPT VISIT, EST, LEVL IV, 30-39 MIN: ICD-10-PCS | Mod: S$GLB,,, | Performed by: INTERNAL MEDICINE

## 2019-06-24 PROCEDURE — 3008F BODY MASS INDEX DOCD: CPT | Mod: CPTII,S$GLB,, | Performed by: INTERNAL MEDICINE

## 2019-06-24 PROCEDURE — 3077F SYST BP >= 140 MM HG: CPT | Mod: CPTII,S$GLB,, | Performed by: INTERNAL MEDICINE

## 2019-06-24 PROCEDURE — 99214 OFFICE O/P EST MOD 30 MIN: CPT | Mod: S$GLB,,, | Performed by: INTERNAL MEDICINE

## 2019-06-24 PROCEDURE — 3079F PR MOST RECENT DIASTOLIC BLOOD PRESSURE 80-89 MM HG: ICD-10-PCS | Mod: CPTII,S$GLB,, | Performed by: INTERNAL MEDICINE

## 2019-06-24 PROCEDURE — 99999 PR PBB SHADOW E&M-EST. PATIENT-LVL III: CPT | Mod: PBBFAC,,, | Performed by: INTERNAL MEDICINE

## 2019-06-24 PROCEDURE — 3079F DIAST BP 80-89 MM HG: CPT | Mod: CPTII,S$GLB,, | Performed by: INTERNAL MEDICINE

## 2019-06-24 PROCEDURE — 3077F PR MOST RECENT SYSTOLIC BLOOD PRESSURE >= 140 MM HG: ICD-10-PCS | Mod: CPTII,S$GLB,, | Performed by: INTERNAL MEDICINE

## 2019-06-24 PROCEDURE — 3008F PR BODY MASS INDEX (BMI) DOCUMENTED: ICD-10-PCS | Mod: CPTII,S$GLB,, | Performed by: INTERNAL MEDICINE

## 2019-06-24 PROCEDURE — 99999 PR PBB SHADOW E&M-EST. PATIENT-LVL III: ICD-10-PCS | Mod: PBBFAC,,, | Performed by: INTERNAL MEDICINE

## 2019-06-24 RX ORDER — FLUCONAZOLE 100 MG/1
100 TABLET ORAL DAILY
Qty: 1 TABLET | Refills: 0 | Status: SHIPPED | OUTPATIENT
Start: 2019-06-24 | End: 2019-06-25

## 2019-06-24 RX ORDER — ANASTROZOLE 1 MG/1
1 TABLET ORAL DAILY
Qty: 90 TABLET | Refills: 3 | Status: SHIPPED | OUTPATIENT
Start: 2019-06-24 | End: 2020-03-26

## 2019-06-24 NOTE — PROGRESS NOTES
Subjective:       Patient ID: Kyile Urbano is a 53 y.o. female.    Chief Complaint: Follow-up and Results    HPI 53-year-old female history of T1 N1 breast treated with systemic chemotherapy followed by initially tamoxifen patient has had CARMEN Ace BSO is currently on Arimidex 1 mg daily completion in 2027 patient had cellulitis involving right breast follow-up ER ECOG 1    Past Medical History:   Diagnosis Date    Allergic rhinitis, cause unspecified     Breast cancer 2017    Cancer     R Breast Cancer    Carpal tunnel syndrome of left wrist     Elevated liver function tests     in the past    Fatty liver 05/02/2017    on ultrasound    Fibroid uterus     10/2014    Hepatomegaly 05/02/2017    on ultrasound    History of sciatica     HSV infection     Type 1 and 2    HTN (hypertension)     Hypothyroidism     Insulin resistance     Iron deficiency anemia, unspecified     Obesity     PONV (postoperative nausea and vomiting)     Tension headache     Vitamin D deficiency      Family History   Problem Relation Age of Onset    Diabetes Mother     Hypertension Mother     Heart failure Mother     Cancer Father         Stomach cancer    Cancer Sister         Ovarian cancer    Ovarian cancer Sister     No Known Problems Daughter     No Known Problems Son     No Known Problems Daughter     Breast cancer Paternal Cousin     Stroke Neg Hx     Heart disease Neg Hx         MI/CAD     Social History     Socioeconomic History    Marital status: Single     Spouse name: Not on file    Number of children: 3    Years of education: Not on file    Highest education level: Not on file   Occupational History    Occupation: [employer]     Employer: [employer]     Comment: [employer]   Social Needs    Financial resource strain: Somewhat hard    Food insecurity:     Worry: Sometimes true     Inability: Sometimes true    Transportation needs:     Medical: No      Non-medical: No   Tobacco Use    Smoking status: Never Smoker    Smokeless tobacco: Never Used   Substance and Sexual Activity    Alcohol use: Yes     Alcohol/week: 0.0 oz     Frequency: 2-4 times a month     Drinks per session: 1 or 2     Binge frequency: Never     Comment: occasionally     Drug use: No    Sexual activity: Not Currently   Lifestyle    Physical activity:     Days per week: 5 days     Minutes per session: Not on file    Stress: Not at all   Relationships    Social connections:     Talks on phone: Twice a week     Gets together: Once a week     Attends Buddhism service: More than 4 times per year     Active member of club or organization: No     Attends meetings of clubs or organizations: Never     Relationship status:    Other Topics Concern    Not on file   Social History Narrative    She wears seatbelt.     Past Surgical History:   Procedure Laterality Date    BIOPSY-LYMPH NODE-SENTINEL possible node dissection Right 10/31/2017    [facility]    BREAST LUMPECTOMY Right 2017    BTL      COLONOSCOPY N/A 2/22/2019    [facility]    DILATION AND CURETTAGE OF UTERUS      x 1    DISSECTION-LYMPH NODE Right 10/31/2017    [facility]    DISSECTION-LYMPH NODE-AXILLARY Right 12/7/2017    [facility]cled[facility]    HYSTERECTOMY      Laproscopic robot assissted with BSO    INSERTION-PORT Left 6/9/2017    [facility]    MASTECTOMY-PARTIAL Right 10/31/2017    [facility]    ROBOT ASSISTED LAPAROSCOPIC SALPINGO-OOPHERECTOMY Bilateral 1/11/2016    [facility]    ROBOTIC ASSISTED LAPAROSCOPIC HYSTERECTOMY Bilateral 1/11/2016    [facility]       Labs:  Lab Results   Component Value Date    WBC 7.91 05/16/2019    HGB 11.9 (L) 05/16/2019     HCT 37.3 05/16/2019    MCV 88 05/16/2019     05/16/2019     BMP  Lab Results   Component Value Date     05/16/2019    K 3.9 05/16/2019     05/16/2019    CO2 24 05/16/2019    BUN 12 05/16/2019    CREATININE 0.8 05/16/2019    CALCIUM 9.2 05/16/2019    ANIONGAP 9 05/16/2019    ESTGFRAFRICA >60 05/16/2019    EGFRNONAA >60 05/16/2019     Lab Results   Component Value Date    ALT 32 05/16/2019    AST 27 05/16/2019    ALKPHOS 57 05/16/2019    BILITOT 0.2 05/16/2019       Lab Results   Component Value Date    IRON 56 09/29/2006    TIBC 532 (H) 09/29/2006    FERRITIN 138 08/22/2018     No results found for: [id number]  No results found for: FOLATE  Lab Results   Component Value Date    TSH 0.078 (L) 02/18/2019         Review of Systems   Constitutional: Negative for activity change, appetite change, chills, diaphoresis, fatigue, fever and unexpected weight change.   HENT: Negative for congestion, dental problem, drooling, ear discharge, ear pain, facial swelling, hearing loss, mouth sores, nosebleeds, postnasal drip, rhinorrhea, sinus pressure, sneezing, sore throat, tinnitus, trouble swallowing and voice change.    Eyes: Negative for photophobia, pain, discharge, redness, itching and visual disturbance.   Respiratory: Negative for cough, choking, chest tightness, shortness of breath, wheezing and stridor.    Cardiovascular: Negative for chest pain, palpitations and leg swelling.   Gastrointestinal: Negative for abdominal distention, abdominal pain, anal bleeding, blood in stool, constipation, diarrhea, nausea, rectal pain and vomiting.   Endocrine: Negative for cold intolerance, heat intolerance, polydipsia, polyphagia and polyuria.   Genitourinary: Positive for vaginal discharge. Negative for decreased urine volume, difficulty urinating, dyspareunia, dysuria, enuresis, flank pain, frequency, genital sores, hematuria, menstrual problem, pelvic pain, urgency, vaginal bleeding and vaginal pain.    Musculoskeletal: Negative for arthralgias, back pain, gait problem, joint swelling, myalgias, neck pain and neck stiffness.   Skin: Negative for color change, pallor and rash.   Allergic/Immunologic: Negative for environmental allergies, food allergies and immunocompromised state.   Neurological: Positive for weakness. Negative for dizziness, tremors, seizures, syncope, facial asymmetry, speech difficulty, light-headedness, numbness and headaches.   Hematological: Negative for adenopathy. Does not bruise/bleed easily.   Psychiatric/Behavioral: Positive for dysphoric mood. Negative for agitation, behavioral problems, confusion, decreased concentration, hallucinations, self-injury, sleep disturbance and suicidal ideas. The patient is nervous/anxious. The patient is not hyperactive.        Objective:      Physical Exam   Constitutional: She is oriented to person, place, and time. She appears well-developed and well-nourished. She appears distressed.   HENT:   Head: Normocephalic and atraumatic.   Right Ear: External ear normal.   Left Ear: External ear normal.   Nose: Nose normal. Right sinus exhibits no maxillary sinus tenderness and no frontal sinus tenderness. Left sinus exhibits no maxillary sinus tenderness and no frontal sinus tenderness.   Mouth/Throat: Oropharynx is clear and moist. No oropharyngeal exudate.   Eyes: Pupils are equal, round, and reactive to light. Conjunctivae, EOM and lids are normal. Right eye exhibits no discharge. Left eye exhibits no discharge. Right conjunctiva is not injected. Right conjunctiva has no hemorrhage. Left conjunctiva is not injected. Left conjunctiva has no hemorrhage. No scleral icterus.   Neck: Normal range of motion. Neck supple. No JVD present. No tracheal deviation present. No thyromegaly present.   Cardiovascular: Normal rate, regular rhythm, normal heart sounds and intact distal pulses.   Pulmonary/Chest: Effort normal and breath sounds normal. No stridor. No  respiratory distress. She exhibits no tenderness.       Abdominal: Soft. Bowel sounds are normal. She exhibits no distension and no mass. There is no splenomegaly or hepatomegaly. There is no tenderness. There is no rebound.   Musculoskeletal: Normal range of motion. She exhibits no edema or tenderness.   Lymphadenopathy:     She has no cervical adenopathy.     She has no axillary adenopathy.        Right: No supraclavicular adenopathy present.        Left: No supraclavicular adenopathy present.   Neurological: She is alert and oriented to person, place, and time. No cranial nerve deficit. Coordination normal.   Skin: Skin is dry. No rash noted. She is not diaphoretic. No erythema.   Psychiatric: She has a normal mood and affect. Her behavior is normal. Judgment and thought content normal.   Vitals reviewed.          Assessment:      1. Malignant neoplasm of upper-outer quadrant of right breast in female, estrogen receptor positive    2. Osteoporosis due to aromatase inhibitor    3. Vaginal yeast infection           Plan:     Patient reports vaginal discharge will treat empirically with Diflucan 150 mg 1 time dose if not resolved will ask for GYN evaluation.  Hyperpigmentation noted in right breast no active infection no tenderness completion of antibiotics through ER.  At this point follow-up in 3 months with DEXA scan will also ask for evaluation to see whether not patient has had genetic testing completion of Arimidex 1 mg daily in 2027 with node positive disease        Herbie Miramontes Jr, MD FACP

## 2019-07-09 ENCOUNTER — TELEPHONE (OUTPATIENT)
Dept: FAMILY MEDICINE | Facility: CLINIC | Age: 54
End: 2019-07-09

## 2019-07-09 NOTE — TELEPHONE ENCOUNTER
----- Message from Sherrill Kerr sent at 7/9/2019  7:06 AM CDT -----  Contact: Self  Type:  Sooner Apoointment Request    Caller is requesting a sooner appointment.  Caller declined first available appointment listed below.  Caller will not accept being placed on the waitlist and is requesting a message be sent to doctor.  Name of Caller:Kylie  When is the first available appointment?08/06/19  Symptoms:3 month f/u Thyroid  Would the patient rather a call back or a response via [facility]? call  Best Call Back Number:[phone]  Additional Information: patient had to cancel her 07/09/19 appointment. She would like something scheduled on or after 07/19/19 if possible

## 2019-07-23 RX ORDER — AMLODIPINE BESYLATE 5 MG/1
TABLET ORAL
Qty: 90 TABLET | Refills: 1 | Status: SHIPPED | OUTPATIENT
Start: 2019-07-23 | End: 2020-03-10

## 2019-07-24 ENCOUNTER — OFFICE VISIT (OUTPATIENT)
Dept: FAMILY MEDICINE | Facility: CLINIC | Age: 54
End: 2019-07-24
Payer: COMMERCIAL

## 2019-07-24 ENCOUNTER — LAB VISIT (OUTPATIENT)
Dept: LAB | Facility: HOSPITAL | Age: 54
End: 2019-07-24
Payer: COMMERCIAL

## 2019-07-24 VITALS
HEIGHT: 68 IN | HEART RATE: 84 BPM | BODY MASS INDEX: 41.17 KG/M2 | DIASTOLIC BLOOD PRESSURE: 83 MMHG | WEIGHT: 271.63 LBS | SYSTOLIC BLOOD PRESSURE: 125 MMHG | TEMPERATURE: 98 F | OXYGEN SATURATION: 99 %

## 2019-07-24 DIAGNOSIS — E66.01 MORBID OBESITY WITH BMI OF 40.0-44.9, ADULT: ICD-10-CM

## 2019-07-24 DIAGNOSIS — E88.810 INSULIN RESISTANCE SYNDROME: ICD-10-CM

## 2019-07-24 DIAGNOSIS — Z17.0 MALIGNANT NEOPLASM OF UPPER-OUTER QUADRANT OF RIGHT BREAST IN FEMALE, ESTROGEN RECEPTOR POSITIVE: ICD-10-CM

## 2019-07-24 DIAGNOSIS — E03.9 HYPOTHYROIDISM, UNSPECIFIED TYPE: Chronic | ICD-10-CM

## 2019-07-24 DIAGNOSIS — E03.9 HYPOTHYROIDISM, UNSPECIFIED TYPE: Primary | Chronic | ICD-10-CM

## 2019-07-24 DIAGNOSIS — C50.411 MALIGNANT NEOPLASM OF UPPER-OUTER QUADRANT OF RIGHT BREAST IN FEMALE, ESTROGEN RECEPTOR POSITIVE: ICD-10-CM

## 2019-07-24 LAB — TSH SERPL DL<=0.005 MIU/L-ACNC: 2.9 UIU/ML (ref 0.4–4)

## 2019-07-24 PROCEDURE — 3074F PR MOST RECENT SYSTOLIC BLOOD PRESSURE < 130 MM HG: ICD-10-PCS | Mod: CPTII,S$GLB,, | Performed by: FAMILY MEDICINE

## 2019-07-24 PROCEDURE — 3008F BODY MASS INDEX DOCD: CPT | Mod: CPTII,S$GLB,, | Performed by: FAMILY MEDICINE

## 2019-07-24 PROCEDURE — 84443 ASSAY THYROID STIM HORMONE: CPT

## 2019-07-24 PROCEDURE — 3079F PR MOST RECENT DIASTOLIC BLOOD PRESSURE 80-89 MM HG: ICD-10-PCS | Mod: CPTII,S$GLB,, | Performed by: FAMILY MEDICINE

## 2019-07-24 PROCEDURE — 99214 OFFICE O/P EST MOD 30 MIN: CPT | Mod: S$GLB,,, | Performed by: FAMILY MEDICINE

## 2019-07-24 PROCEDURE — 99999 PR PBB SHADOW E&M-EST. PATIENT-LVL III: CPT | Mod: PBBFAC,,, | Performed by: FAMILY MEDICINE

## 2019-07-24 PROCEDURE — 36415 COLL VENOUS BLD VENIPUNCTURE: CPT | Mod: PO

## 2019-07-24 PROCEDURE — 99214 PR OFFICE/OUTPT VISIT, EST, LEVL IV, 30-39 MIN: ICD-10-PCS | Mod: S$GLB,,, | Performed by: FAMILY MEDICINE

## 2019-07-24 PROCEDURE — 99999 PR PBB SHADOW E&M-EST. PATIENT-LVL III: ICD-10-PCS | Mod: PBBFAC,,, | Performed by: FAMILY MEDICINE

## 2019-07-24 PROCEDURE — 3008F PR BODY MASS INDEX (BMI) DOCUMENTED: ICD-10-PCS | Mod: CPTII,S$GLB,, | Performed by: FAMILY MEDICINE

## 2019-07-24 PROCEDURE — 3079F DIAST BP 80-89 MM HG: CPT | Mod: CPTII,S$GLB,, | Performed by: FAMILY MEDICINE

## 2019-07-24 PROCEDURE — 3074F SYST BP LT 130 MM HG: CPT | Mod: CPTII,S$GLB,, | Performed by: FAMILY MEDICINE

## 2019-07-24 RX ORDER — METFORMIN HYDROCHLORIDE 500 MG/1
TABLET ORAL
Qty: 360 TABLET | Refills: 1 | Status: SHIPPED | OUTPATIENT
Start: 2019-07-24 | End: 2020-03-26

## 2019-07-24 NOTE — PROGRESS NOTES
Kylie Urbano    Chief Complaint   Patient presents with    Hypothyroidism    Follow-up       History of Present Illness:   Ms. Urbano comes in today for 3-month hypothyroidism follow-up.  She is fasting and has taken medications today.  On or about February 26, 2019 I recommended she decrease Synthroid 200 mcg daily to Synthroid 175 mcg daily based on TSH lab result. She states she has been taking medication and reports no problems with the change. She states she continues to exercise/walk and monitors her diet by calorie counting.     She states she feels good today. She denies having fever, chills, fatigue, appetite changes; hot or cold intolerance, polydipsia, polyuria, polyphagia; cough, wheezing, shortness of breath; chest pain, palpitations, leg swelling; abdominal pain, nausea, vomiting, diarrhea, constipation; unusual urinary symptoms; back pain; headache; anxiety, depression, homicidal or suicidal thoughts.    She states she has not been taking Metformin for insulin resistance syndrome.     She saw Dr. Miramontes, hematologist/oncologist, on June 24, 2019 for surveillance of right breast cancer with 3-month follow-up with DEXA scan advised. She states she completed chemotherapy in September 2017 and completed radiation therapy in February 2018; she continues Arimidex daily therapy.     Labs:                      WBC                      7.91                05/16/2019                 HGB                      11.9 (L)            05/16/2019                 HCT                      37.3                05/16/2019                 PLT                      226                 05/16/2019                 CHOL                     152                 08/22/2018                 TRIG                     92                  08/22/2018                 HDL                      52                  08/22/2018                 ALT                      32                  05/16/2019                 AST                      27                   05/16/2019                 NA                       143                 05/16/2019                 K                        3.9                 05/16/2019                 CL                       110                 05/16/2019                 CREATININE               0.8                 05/16/2019                 BUN                      12                  05/16/2019                 CO2                      24                  05/16/2019                 TSH                      0.078 (L)           02/18/2019                 GLUF                     82                  04/06/2005                 HGBA1C                   5.9 (H)             02/18/2019          LDLCALC                  81.6                08/22/2018            Insulin                  11.4                       02/18/2019      Current Outpatient Medications   Medication Sig    acyclovir (ZOVIRAX) 400 MG tablet TAKE 1 TABLET BY MOUTH THREE TIMES DAILY WITH FOOD FOR 5 DAYS(PER EPISODE)    amLODIPine (NORVASC) 5 MG tablet TAKE 1 TABLET BY MOUTH ONCE DAILY    anastrozole (ARIMIDEX) 1 mg Tab Take 1 tablet (1 mg total) by mouth once daily.    benazepril (LOTENSIN) 20 MG tablet Take 1 tablet (20 mg total) by mouth once daily.    cholecalciferol, vitamin D3, (VITAMIN D) 2,000 unit Cap Take 1.5 capsules by mouth once daily. 1 Capsule Oral Every day    fluticasone propionate (FLONASE) 50 mcg/actuation nasal spray Spray 2 sprays (100 mcg total) in each nostril daily as needed for Rhinitis.    ibuprofen (ADVIL,MOTRIN) 200 MG tablet Take 200-400 mg by mouth every 8 (eight) hours as needed for Pain.    omeprazole (PRILOSEC) 20 MG capsule Take 1 capsule (20 mg total) by mouth once daily.    SYNTHROID 175 mcg tablet TAKE 1 TABLET BY MOUTH ONCE DAILY    metFORMIN (GLUCOPHAGE) 500 MG tablet TAKE FOUR TABLETS BY MOUTH IN THE EVENING AS DIRECTED (not currently taking)       Review of Systems   Constitutional: Negative for activity change,  appetite change, chills, fatigue and fever.        Weight 126.1 kg (277 lb 14.2 oz) at February 18, 2019 visit.   Respiratory: Negative for cough, shortness of breath and wheezing.    Cardiovascular: Negative for chest pain, palpitations and leg swelling.   Gastrointestinal: Negative for abdominal pain, constipation, diarrhea, nausea and vomiting.   Endocrine: Negative for cold intolerance, heat intolerance, polydipsia, polyphagia and polyuria.        See history of present illness.   Genitourinary: Negative for difficulty urinating.   Musculoskeletal: Negative for back pain.   Neurological: Negative for headaches.   Hematological:        See history of present illness.   Psychiatric/Behavioral: Negative for dysphoric mood and suicidal ideas. The patient is not nervous/anxious.         Negative for homicidal ideas.       Objective:  Physical Exam   Constitutional: She is oriented to person, place, and time. She appears well-developed and well-nourished. No distress.   Pleasant.   Eyes: EOM are normal.   Neck: Normal range of motion. Neck supple. No thyromegaly present.   Cardiovascular: Normal rate, regular rhythm, normal heart sounds and intact distal pulses.   No murmur heard.  Pulmonary/Chest: Effort normal and breath sounds normal. No respiratory distress. She has no wheezes.   Abdominal: Soft. Bowel sounds are normal. She exhibits no distension and no mass. There is no tenderness. There is no rebound and no guarding.   Musculoskeletal: Normal range of motion. She exhibits no edema or tenderness.   She is ambulatory without problems.   Lymphadenopathy:     She has no cervical adenopathy.   Neurological: She is alert and oriented to person, place, and time. She has normal reflexes. She displays normal reflexes.   Skin: She is not diaphoretic.   Psychiatric: She has a normal mood and affect. Her behavior is normal. Judgment and thought content normal.   Vitals reviewed.      ASSESSMENT:  1. Hypothyroidism,  unspecified type    2. Insulin resistance syndrome    3. Malignant neoplasm of upper-outer quadrant of right breast in female, estrogen receptor positive    4. Morbid obesity with BMI of 40.0-44.9, adult        PLAN:  Kylie was seen today for hypothyroidism and follow-up.    Diagnoses and all orders for this visit:    Hypothyroidism, unspecified type  -     TSH; Future    Insulin resistance syndrome  -     metFORMIN (GLUCOPHAGE) 500 MG tablet; TAKE FOUR TABLETS BY MOUTH IN THE EVENING AS DIRECTED    Malignant neoplasm of upper-outer quadrant of right breast in female, estrogen receptor positive    Morbid obesity with BMI of 40.0-44.9, adult       Patient advised to call for results.  Continue current medications (including take Metformin daily as directed), follow low sodium, low cholesterol, low carb diet, daily walks.  Prescription refill as noted above.  Keep follow up with specialists.  Flu shot this fall.  Follow up if symptoms worsen or fail to improve. But, keep 8/22/2019 scheduled physical appointment with me.

## 2019-07-26 ENCOUNTER — TELEPHONE (OUTPATIENT)
Dept: FAMILY MEDICINE | Facility: CLINIC | Age: 54
End: 2019-07-26

## 2019-07-26 NOTE — TELEPHONE ENCOUNTER
----- Message from Beverly Parks MD sent at 7/25/2019  5:36 PM CDT -----  Notify pt thyroid level is good; continue same medication. Thanks.

## 2019-08-22 ENCOUNTER — OFFICE VISIT (OUTPATIENT)
Dept: FAMILY MEDICINE | Facility: CLINIC | Age: 54
End: 2019-08-22
Payer: COMMERCIAL

## 2019-08-22 ENCOUNTER — LAB VISIT (OUTPATIENT)
Dept: LAB | Facility: HOSPITAL | Age: 54
End: 2019-08-22
Attending: FAMILY MEDICINE
Payer: COMMERCIAL

## 2019-08-22 VITALS
DIASTOLIC BLOOD PRESSURE: 82 MMHG | HEIGHT: 68 IN | BODY MASS INDEX: 41.17 KG/M2 | TEMPERATURE: 99 F | HEART RATE: 97 BPM | WEIGHT: 271.63 LBS | OXYGEN SATURATION: 98 % | SYSTOLIC BLOOD PRESSURE: 138 MMHG

## 2019-08-22 DIAGNOSIS — C50.411 MALIGNANT NEOPLASM OF UPPER-OUTER QUADRANT OF RIGHT BREAST IN FEMALE, ESTROGEN RECEPTOR POSITIVE: ICD-10-CM

## 2019-08-22 DIAGNOSIS — Z17.0 MALIGNANT NEOPLASM OF UPPER-OUTER QUADRANT OF RIGHT BREAST IN FEMALE, ESTROGEN RECEPTOR POSITIVE: ICD-10-CM

## 2019-08-22 DIAGNOSIS — E66.01 MORBID OBESITY WITH BMI OF 40.0-44.9, ADULT: ICD-10-CM

## 2019-08-22 DIAGNOSIS — E88.810 INSULIN RESISTANCE SYNDROME: ICD-10-CM

## 2019-08-22 DIAGNOSIS — D50.9 IRON DEFICIENCY ANEMIA, UNSPECIFIED IRON DEFICIENCY ANEMIA TYPE: Chronic | ICD-10-CM

## 2019-08-22 DIAGNOSIS — E55.9 VITAMIN D DEFICIENCY: ICD-10-CM

## 2019-08-22 DIAGNOSIS — I10 ESSENTIAL HYPERTENSION: Chronic | ICD-10-CM

## 2019-08-22 DIAGNOSIS — Z00.00 ANNUAL PHYSICAL EXAM: Primary | ICD-10-CM

## 2019-08-22 DIAGNOSIS — J30.2 SEASONAL ALLERGIC RHINITIS, UNSPECIFIED TRIGGER: ICD-10-CM

## 2019-08-22 DIAGNOSIS — Z90.710 STATUS POST LAPAROSCOPIC HYSTERECTOMY: ICD-10-CM

## 2019-08-22 DIAGNOSIS — K21.9 GASTROESOPHAGEAL REFLUX DISEASE, ESOPHAGITIS PRESENCE NOT SPECIFIED: ICD-10-CM

## 2019-08-22 DIAGNOSIS — Z00.00 ANNUAL PHYSICAL EXAM: ICD-10-CM

## 2019-08-22 DIAGNOSIS — E03.9 HYPOTHYROIDISM, UNSPECIFIED TYPE: Chronic | ICD-10-CM

## 2019-08-22 LAB
25(OH)D3+25(OH)D2 SERPL-MCNC: 37 NG/ML (ref 30–96)
BILIRUB UR QL STRIP: NEGATIVE
CHOLEST SERPL-MCNC: 169 MG/DL (ref 120–199)
CHOLEST/HDLC SERPL: 2.8 {RATIO} (ref 2–5)
CLARITY UR REFRACT.AUTO: CLEAR
COLOR UR AUTO: YELLOW
ESTIMATED AVG GLUCOSE: 117 MG/DL (ref 68–131)
GLUCOSE UR QL STRIP: NEGATIVE
HBA1C MFR BLD HPLC: 5.7 % (ref 4–5.6)
HDLC SERPL-MCNC: 60 MG/DL (ref 40–75)
HDLC SERPL: 35.5 % (ref 20–50)
HGB UR QL STRIP: NEGATIVE
INSULIN COLLECTION INTERVAL: NORMAL
INSULIN SERPL-ACNC: 6.5 UU/ML
KETONES UR QL STRIP: NEGATIVE
LDLC SERPL CALC-MCNC: 92.4 MG/DL (ref 63–159)
LEUKOCYTE ESTERASE UR QL STRIP: NEGATIVE
NITRITE UR QL STRIP: NEGATIVE
NONHDLC SERPL-MCNC: 109 MG/DL
PH UR STRIP: 6 [PH] (ref 5–8)
PROT UR QL STRIP: NEGATIVE
SP GR UR STRIP: 1.01 (ref 1–1.03)
TRIGL SERPL-MCNC: 83 MG/DL (ref 30–150)
URN SPEC COLLECT METH UR: NORMAL

## 2019-08-22 PROCEDURE — 99999 PR PBB SHADOW E&M-EST. PATIENT-LVL III: ICD-10-PCS | Mod: PBBFAC,,, | Performed by: FAMILY MEDICINE

## 2019-08-22 PROCEDURE — 3079F DIAST BP 80-89 MM HG: CPT | Mod: CPTII,S$GLB,, | Performed by: FAMILY MEDICINE

## 2019-08-22 PROCEDURE — 3075F SYST BP GE 130 - 139MM HG: CPT | Mod: CPTII,S$GLB,, | Performed by: FAMILY MEDICINE

## 2019-08-22 PROCEDURE — 3079F PR MOST RECENT DIASTOLIC BLOOD PRESSURE 80-89 MM HG: ICD-10-PCS | Mod: CPTII,S$GLB,, | Performed by: FAMILY MEDICINE

## 2019-08-22 PROCEDURE — 82306 VITAMIN D 25 HYDROXY: CPT

## 2019-08-22 PROCEDURE — 83525 ASSAY OF INSULIN: CPT

## 2019-08-22 PROCEDURE — 99999 PR PBB SHADOW E&M-EST. PATIENT-LVL III: CPT | Mod: PBBFAC,,, | Performed by: FAMILY MEDICINE

## 2019-08-22 PROCEDURE — 3075F PR MOST RECENT SYSTOLIC BLOOD PRESS GE 130-139MM HG: ICD-10-PCS | Mod: CPTII,S$GLB,, | Performed by: FAMILY MEDICINE

## 2019-08-22 PROCEDURE — 81003 URINALYSIS AUTO W/O SCOPE: CPT

## 2019-08-22 PROCEDURE — 99396 PR PREVENTIVE VISIT,EST,40-64: ICD-10-PCS | Mod: S$GLB,,, | Performed by: FAMILY MEDICINE

## 2019-08-22 PROCEDURE — 99396 PREV VISIT EST AGE 40-64: CPT | Mod: S$GLB,,, | Performed by: FAMILY MEDICINE

## 2019-08-22 PROCEDURE — 80061 LIPID PANEL: CPT

## 2019-08-22 PROCEDURE — 83036 HEMOGLOBIN GLYCOSYLATED A1C: CPT

## 2019-08-22 NOTE — PATIENT INSTRUCTIONS
Please call Dr. Parks for your results.    Please call Dr. Parks in 4 to 6 weeks for availability of Shingrix (shingles), Prevnar (pneumonia) shots.     Thanks.

## 2019-08-22 NOTE — PROGRESS NOTES
CHIEF COMPLAINT: Annual wellness examination.     HISTORY OF PRESENT ILLNESS: Ms. Urbano comes in today fasting and with taking blood pressure medication for annual wellness examination. She reports no acute problems today - [birth year]!     END OF LIFE DECISION: She has no living will and is not sure about life support (depends on the situation).    Current Outpatient Medications   Medication Sig    acyclovir (ZOVIRAX) 400 MG tablet TAKE 1 TABLET BY MOUTH THREE TIMES DAILY WITH FOOD FOR 5 DAYS(PER EPISODE)    amLODIPine (NORVASC) 5 MG tablet TAKE 1 TABLET BY MOUTH ONCE DAILY    anastrozole (ARIMIDEX) 1 mg Tab Take 1 tablet (1 mg total) by mouth once daily.    benazepril (LOTENSIN) 20 MG tablet Take 1 tablet (20 mg total) by mouth once daily.    cholecalciferol, vitamin D3, (VITAMIN D) 2,000 unit Cap Take 1.5 capsules by mouth once daily. 1 Capsule Oral Every day    fluticasone propionate (FLONASE) 50 mcg/actuation nasal spray Spray 2 sprays (100 mcg total) in each nostril daily as needed for Rhinitis.    ibuprofen (ADVIL,MOTRIN) 200 MG tablet Take 200-400 mg by mouth every 8 (eight) hours as needed for Pain.    metFORMIN (GLUCOPHAGE) 500 MG tablet TAKE FOUR TABLETS BY MOUTH IN THE EVENING AS DIRECTED    omeprazole (PRILOSEC) 20 MG capsule Take 1 capsule (20 mg total) by mouth once daily. (Patient taking differently: Take 20 mg by mouth daily as needed. )    SYNTHROID 175 mcg tablet TAKE 1 TABLET BY MOUTH ONCE DAILY     SCREENINGS:  Cholesterol: April 19, 2017.  FFS/Colonoscopy: February 22, 2019 - benign colon polyp, diverticulosis, hemorrhoids - repeat in 5 years.  Mammogram: May 13, 2019 - benign.   GYN Exam (breast only): May 16, 2019 with oncology. Pap smear no longer needed.   Dexa Scan: March 13, 2018 - okay. Scheduled for October 1, 2019.  Eye Exam: May 2012.  PPD: Negative in the past.  Immunizations: Tdap - April 22, 2010.  Gardasil - N./A.  Zostavax - N./A.  Shingrix - Never. Reports history of  "varicella not zoster. She desires when available.  Pneumovax - Never. She declines today.  Prevnar-13 shot - Never. She declines today but will return for it.  Seasonal Flu - N./A. She declines.     ROS:  GENERAL: Denies fever, chills, fatigue or unusual weight change. Appetite normal. Weight 123.2 kg (271 lb 9.7 oz) at July 24, 2019 visit. Monitors diet but does exercise 3 to 4 times per week, 30 to 60 minutes each time.   SKIN: Denies rashes, itching, changes in mole, color or texture of skin or easy bruising.  HEAD: Denies recent head trauma or headaches.  EYES: Denies change in vision, diplopia, redness or discharge. Wears readers.  EARS: Denies ear pain, discharge, vertigo or decreased hearing.  NOSE: Reports occasional nasal congestion, post-nasal drip; uses OTC nasal spray with help.  MOUTH & THROAT: Denies hoarseness or change in voice. Denies excessive gum bleeding or mouth sores. Denies sore throat.  NODES: Denies swollen glands.  CHEST: Denies SHARIF, wheezing, cough, or sputum production.  CARDIOVASCULAR: Denies chest pain, PND, orthopnea or reduced exercise tolerance. Denies palpitations.  ABDOMEN: Denies diarrhea, constipation, nausea, vomiting, abdominal pain, or blood in stool.  URINARY: Denies flank pain, dysuria or hematuria.  GENITOURINARY: Denies flank pain, dysuria, frequency or hematuria. Performs monthly breast self exams. Saw Dr. Miramontes, hematologist/oncologist, on June 24, 2019 for surveillance of right breast cancer with 3-month follow-up with DEXA scan advised. She states she completed chemotherapy in September 2017 and completed radiation therapy in February 2018; she continues Arimidex daily therapy.  ENDOCRINE: Denies diabetes, cholesterol problems.   HEME/LYMPH: Denies bleeding problems. Reports does not take iron therapy.  PERIPHERAL VASCULAR:Denies claudication or cyanosis.  MUSCULOSKELETAL: Denies edema. Reports "aging" joint pain, stiffness but not today.  NEUROLOGIC: Denies history " "of seizures, tremors, paralysis, alteration of gait or coordination.  PSYCHIATRIC: Denies mood swings, depression, anxiety, homicidal or suicidal thoughts. Denies sleep problems.    PE:   VS: /82   Pulse 97   Temp 98.5 °F (36.9 °C) (Oral)   Ht 5' 8" (1.727 m)   Wt 123.2 kg (271 lb 9.7 oz)   LMP 01/01/2016   SpO2 98%   BMI 41.30 kg/m²   APPEARANCE: Well nourished, well developed female, obese and pleasant, alert and oriented in no acute distress.  HEAD: Non tender. Full range of motion.  EYES: PERRL, conjunctiva pink, lids no edema.  EARS: External canal patent, no swelling or redness. TM's shiny and clear.  NOSE: Mucosa and turbinates not congested. No discharge. Non tender sinuses.  THROAT: No pharyngeal erythema or exudate. No stridor.  NECK: Supple, no mass, thyroid not enlarged.  NODES: No cervical lymph node enlargement.  CHEST: Normal respiratory effort. Lungs clear to auscultation.  CARDIOVASCULAR: Normal S1, S2. No rubs, murmurs or gallops. PMI not displaced. No carotid bruit. Pedal pulses palpable bilaterally. No edema.  ABDOMEN: Bowel sounds present. Not distended. Soft. No tenderness, masses or organomegaly.  BREAST EXAM: Not examined as already performed by oncologist.  PELVIC EXAM: Not examined as patient has had RAH/BSO due to non cancerous reasons.  RECTAL EXAM: Not examined per patient request.  MUSCULOSKELETAL: No joint deformities or stiffness. She is ambulatory without problems.  SKIN: No rashes or suspicious lesions, normal color and turgor.  NEUROLOGIC: Cranial Nerves: II-XII grossly intact. DTR's: Knees, Ankles 2+ and equal bilaterally. Gait & Posture: Normal gait and fine motion.  PSYCHIATRIC: Patient alert, oriented x 3. Mood/Affect normal without acute anxiety and depression noted. Judgment/insight good as she makes appropriate decisions during today's examination.    Lab Results   Component Value Date    TSH 2.904 07/24/2019     CMP  Sodium   Date Value Ref Range Status "   05/16/2019 143 136 - 145 mmol/L Final     Potassium   Date Value Ref Range Status   05/16/2019 3.9 3.5 - 5.1 mmol/L Final     Chloride   Date Value Ref Range Status   05/16/2019 110 95 - 110 mmol/L Final     CO2   Date Value Ref Range Status   05/16/2019 24 23 - 29 mmol/L Final     Glucose   Date Value Ref Range Status   05/16/2019 98 70 - 110 mg/dL Final     BUN, Bld   Date Value Ref Range Status   05/16/2019 12 6 - 20 mg/dL Final     Creatinine   Date Value Ref Range Status   05/16/2019 0.8 0.5 - 1.4 mg/dL Final     Calcium   Date Value Ref Range Status   05/16/2019 9.2 8.7 - 10.5 mg/dL Final     Total Protein   Date Value Ref Range Status   05/16/2019 7.0 6.0 - 8.4 g/dL Final     Albumin   Date Value Ref Range Status   05/16/2019 3.4 (L) 3.5 - 5.2 g/dL Final     Total Bilirubin   Date Value Ref Range Status   05/16/2019 0.2 0.1 - 1.0 mg/dL Final     Comment:     For infants and newborns, interpretation of results should be based  on gestational age, weight and in agreement with clinical  observations.  Premature Infant recommended reference ranges:  Up to 24 hours.............<8.0 mg/dL  Up to 48 hours............<12.0 mg/dL  3-5 days..................<15.0 mg/dL  6-29 days.................<15.0 mg/dL       Alkaline Phosphatase   Date Value Ref Range Status   05/16/2019 57 55 - 135 U/L Final     AST   Date Value Ref Range Status   05/16/2019 27 10 - 40 U/L Final     ALT   Date Value Ref Range Status   05/16/2019 32 10 - 44 U/L Final     Anion Gap   Date Value Ref Range Status   05/16/2019 9 8 - 16 mmol/L Final     eGFR if    Date Value Ref Range Status   05/16/2019 >60 >60 mL/min/1.73 m^2 Final     Lab Results   Component Value Date    WBC 7.91 05/16/2019    HGB 11.9 (L) 05/16/2019    HCT 37.3 05/16/2019    MCV 88 05/16/2019     05/16/2019     ASSESSMENT:    ICD-10-CM ICD-9-CM    1. Annual physical exam Z00.00 V70.0 Urinalysis      Lipid panel      Vitamin D      Insulin, random       Hemoglobin A1c   2. Essential hypertension I10 401.9    3. Hypothyroidism, unspecified type E03.9 244.9    4. Insulin resistance syndrome E88.81 277.7    5. Vitamin D deficiency E55.9 268.9    6. Gastroesophageal reflux disease, esophagitis presence not specified K21.9 530.81    7. Seasonal allergic rhinitis, unspecified trigger J30.2 477.9    8. Iron deficiency anemia, unspecified iron deficiency anemia type D50.9 280.9    9. Malignant neoplasm of upper-outer quadrant of right breast in female, estrogen receptor positive C50.411 174.4     Z17.0 V86.0    10. Status post laparoscopic hysterectomy Z90.710 V88.01    11. Morbid obesity with BMI of 40.0-44.9, adult E66.01 278.01     Z68.41 V85.41      PLAN:  1. Age-appropriate counseling-appropriate low-sodium, low-cholesterol, low carbohydrate diet and exercise daily, monthly breast self exam, annual wellness examination.   2. Patient advised to call for results.  3. Continue current medications.  4. Keep follow up with specialists.  5. Flu shot this fall.  6. Eye exam advised this year.              7. Follow up in about 6 months (around 2/24/2020) for hypothyroidism, IRS and hypertension follow up.

## 2019-09-16 RX ORDER — LEVOTHYROXINE SODIUM 175 UG/1
TABLET ORAL
Qty: 90 TABLET | Refills: 1 | Status: SHIPPED | OUTPATIENT
Start: 2019-09-16 | End: 2020-04-16

## 2019-10-01 ENCOUNTER — OFFICE VISIT (OUTPATIENT)
Dept: HEMATOLOGY/ONCOLOGY | Facility: CLINIC | Age: 54
End: 2019-10-01
Payer: COMMERCIAL

## 2019-10-01 ENCOUNTER — TELEPHONE (OUTPATIENT)
Dept: HEMATOLOGY/ONCOLOGY | Facility: CLINIC | Age: 54
End: 2019-10-01

## 2019-10-01 ENCOUNTER — LAB VISIT (OUTPATIENT)
Dept: LAB | Facility: HOSPITAL | Age: 54
End: 2019-10-01
Attending: NURSE PRACTITIONER
Payer: COMMERCIAL

## 2019-10-01 ENCOUNTER — APPOINTMENT (OUTPATIENT)
Dept: RADIOLOGY | Facility: HOSPITAL | Age: 54
End: 2019-10-01
Attending: INTERNAL MEDICINE
Payer: COMMERCIAL

## 2019-10-01 VITALS
RESPIRATION RATE: 18 BRPM | BODY MASS INDEX: 41.67 KG/M2 | HEART RATE: 91 BPM | HEIGHT: 68 IN | SYSTOLIC BLOOD PRESSURE: 154 MMHG | DIASTOLIC BLOOD PRESSURE: 94 MMHG | TEMPERATURE: 99 F | OXYGEN SATURATION: 98 % | WEIGHT: 274.94 LBS

## 2019-10-01 DIAGNOSIS — Z79.811 AROMATASE INHIBITOR USE: ICD-10-CM

## 2019-10-01 DIAGNOSIS — Z17.0 MALIGNANT NEOPLASM OF UPPER-OUTER QUADRANT OF RIGHT BREAST IN FEMALE, ESTROGEN RECEPTOR POSITIVE: ICD-10-CM

## 2019-10-01 DIAGNOSIS — C50.911 MALIGNANT NEOPLASM OF RIGHT FEMALE BREAST, UNSPECIFIED ESTROGEN RECEPTOR STATUS, UNSPECIFIED SITE OF BREAST: Primary | ICD-10-CM

## 2019-10-01 DIAGNOSIS — T38.6X5A OSTEOPOROSIS DUE TO AROMATASE INHIBITOR: ICD-10-CM

## 2019-10-01 DIAGNOSIS — R74.8 ELEVATED LIVER ENZYMES: Primary | ICD-10-CM

## 2019-10-01 DIAGNOSIS — C50.411 MALIGNANT NEOPLASM OF UPPER-OUTER QUADRANT OF RIGHT BREAST IN FEMALE, ESTROGEN RECEPTOR POSITIVE: ICD-10-CM

## 2019-10-01 DIAGNOSIS — R74.8 ELEVATED LIVER ENZYMES: ICD-10-CM

## 2019-10-01 DIAGNOSIS — M81.8 OSTEOPOROSIS DUE TO AROMATASE INHIBITOR: ICD-10-CM

## 2019-10-01 DIAGNOSIS — C50.911 MALIGNANT NEOPLASM OF RIGHT FEMALE BREAST, UNSPECIFIED ESTROGEN RECEPTOR STATUS, UNSPECIFIED SITE OF BREAST: ICD-10-CM

## 2019-10-01 DIAGNOSIS — E55.9 VITAMIN D DEFICIENCY: ICD-10-CM

## 2019-10-01 LAB
ALBUMIN SERPL BCP-MCNC: 3.6 G/DL (ref 3.5–5.2)
ALBUMIN SERPL BCP-MCNC: 3.6 G/DL (ref 3.5–5.2)
ALP SERPL-CCNC: 79 U/L (ref 55–135)
ALP SERPL-CCNC: 79 U/L (ref 55–135)
ALT SERPL W/O P-5'-P-CCNC: 63 U/L (ref 10–44)
ALT SERPL W/O P-5'-P-CCNC: 63 U/L (ref 10–44)
ANION GAP SERPL CALC-SCNC: 11 MMOL/L (ref 8–16)
ANION GAP SERPL CALC-SCNC: 11 MMOL/L (ref 8–16)
AST SERPL-CCNC: 43 U/L (ref 10–40)
AST SERPL-CCNC: 43 U/L (ref 10–40)
BASOPHILS # BLD AUTO: 0.03 K/UL (ref 0–0.2)
BASOPHILS # BLD AUTO: 0.03 K/UL (ref 0–0.2)
BASOPHILS NFR BLD: 0.5 % (ref 0–1.9)
BASOPHILS NFR BLD: 0.5 % (ref 0–1.9)
BILIRUB SERPL-MCNC: 0.3 MG/DL (ref 0.1–1)
BILIRUB SERPL-MCNC: 0.3 MG/DL (ref 0.1–1)
BUN SERPL-MCNC: 9 MG/DL (ref 6–20)
BUN SERPL-MCNC: 9 MG/DL (ref 6–20)
CALCIUM SERPL-MCNC: 9.9 MG/DL (ref 8.7–10.5)
CALCIUM SERPL-MCNC: 9.9 MG/DL (ref 8.7–10.5)
CHLORIDE SERPL-SCNC: 108 MMOL/L (ref 95–110)
CHLORIDE SERPL-SCNC: 108 MMOL/L (ref 95–110)
CO2 SERPL-SCNC: 24 MMOL/L (ref 23–29)
CO2 SERPL-SCNC: 24 MMOL/L (ref 23–29)
CREAT SERPL-MCNC: 0.7 MG/DL (ref 0.5–1.4)
CREAT SERPL-MCNC: 0.7 MG/DL (ref 0.5–1.4)
DIFFERENTIAL METHOD: NORMAL
DIFFERENTIAL METHOD: NORMAL
EOSINOPHIL # BLD AUTO: 0.2 K/UL (ref 0–0.5)
EOSINOPHIL # BLD AUTO: 0.2 K/UL (ref 0–0.5)
EOSINOPHIL NFR BLD: 3.3 % (ref 0–8)
EOSINOPHIL NFR BLD: 3.3 % (ref 0–8)
ERYTHROCYTE [DISTWIDTH] IN BLOOD BY AUTOMATED COUNT: 13.3 % (ref 11.5–14.5)
ERYTHROCYTE [DISTWIDTH] IN BLOOD BY AUTOMATED COUNT: 13.3 % (ref 11.5–14.5)
EST. GFR  (AFRICAN AMERICAN): >60 ML/MIN/1.73 M^2
EST. GFR  (AFRICAN AMERICAN): >60 ML/MIN/1.73 M^2
EST. GFR  (NON AFRICAN AMERICAN): >60 ML/MIN/1.73 M^2
EST. GFR  (NON AFRICAN AMERICAN): >60 ML/MIN/1.73 M^2
GLUCOSE SERPL-MCNC: 107 MG/DL (ref 70–110)
GLUCOSE SERPL-MCNC: 107 MG/DL (ref 70–110)
HCT VFR BLD AUTO: 41.2 % (ref 37–48.5)
HCT VFR BLD AUTO: 41.2 % (ref 37–48.5)
HGB BLD-MCNC: 13.7 G/DL (ref 12–16)
HGB BLD-MCNC: 13.7 G/DL (ref 12–16)
IMM GRANULOCYTES # BLD AUTO: 0.01 K/UL (ref 0–0.04)
IMM GRANULOCYTES # BLD AUTO: 0.01 K/UL (ref 0–0.04)
IMM GRANULOCYTES NFR BLD AUTO: 0.2 % (ref 0–0.5)
IMM GRANULOCYTES NFR BLD AUTO: 0.2 % (ref 0–0.5)
LYMPHOCYTES # BLD AUTO: 1.8 K/UL (ref 1–4.8)
LYMPHOCYTES # BLD AUTO: 1.8 K/UL (ref 1–4.8)
LYMPHOCYTES NFR BLD: 31.8 % (ref 18–48)
LYMPHOCYTES NFR BLD: 31.8 % (ref 18–48)
MCH RBC QN AUTO: 29 PG (ref 27–31)
MCH RBC QN AUTO: 29 PG (ref 27–31)
MCHC RBC AUTO-ENTMCNC: 33.3 G/DL (ref 32–36)
MCHC RBC AUTO-ENTMCNC: 33.3 G/DL (ref 32–36)
MCV RBC AUTO: 87 FL (ref 82–98)
MCV RBC AUTO: 87 FL (ref 82–98)
MONOCYTES # BLD AUTO: 0.5 K/UL (ref 0.3–1)
MONOCYTES # BLD AUTO: 0.5 K/UL (ref 0.3–1)
MONOCYTES NFR BLD: 8.9 % (ref 4–15)
MONOCYTES NFR BLD: 8.9 % (ref 4–15)
NEUTROPHILS # BLD AUTO: 3.2 K/UL (ref 1.8–7.7)
NEUTROPHILS # BLD AUTO: 3.2 K/UL (ref 1.8–7.7)
NEUTROPHILS NFR BLD: 55.5 % (ref 38–73)
NEUTROPHILS NFR BLD: 55.5 % (ref 38–73)
NRBC BLD-RTO: 0 /100 WBC
NRBC BLD-RTO: 0 /100 WBC
PLATELET # BLD AUTO: 253 K/UL (ref 150–350)
PLATELET # BLD AUTO: 253 K/UL (ref 150–350)
PMV BLD AUTO: 9.7 FL (ref 9.2–12.9)
PMV BLD AUTO: 9.7 FL (ref 9.2–12.9)
POTASSIUM SERPL-SCNC: 3.9 MMOL/L (ref 3.5–5.1)
POTASSIUM SERPL-SCNC: 3.9 MMOL/L (ref 3.5–5.1)
PROT SERPL-MCNC: 7.8 G/DL (ref 6–8.4)
PROT SERPL-MCNC: 7.8 G/DL (ref 6–8.4)
RBC # BLD AUTO: 4.73 M/UL (ref 4–5.4)
RBC # BLD AUTO: 4.73 M/UL (ref 4–5.4)
SODIUM SERPL-SCNC: 143 MMOL/L (ref 136–145)
SODIUM SERPL-SCNC: 143 MMOL/L (ref 136–145)
WBC # BLD AUTO: 5.75 K/UL (ref 3.9–12.7)
WBC # BLD AUTO: 5.75 K/UL (ref 3.9–12.7)

## 2019-10-01 PROCEDURE — 80053 COMPREHEN METABOLIC PANEL: CPT

## 2019-10-01 PROCEDURE — 77080 DXA BONE DENSITY AXIAL: CPT | Mod: 26,,, | Performed by: RADIOLOGY

## 2019-10-01 PROCEDURE — 99215 OFFICE O/P EST HI 40 MIN: CPT | Mod: S$GLB,,, | Performed by: NURSE PRACTITIONER

## 2019-10-01 PROCEDURE — 3077F SYST BP >= 140 MM HG: CPT | Mod: CPTII,S$GLB,, | Performed by: NURSE PRACTITIONER

## 2019-10-01 PROCEDURE — 3077F PR MOST RECENT SYSTOLIC BLOOD PRESSURE >= 140 MM HG: ICD-10-PCS | Mod: CPTII,S$GLB,, | Performed by: NURSE PRACTITIONER

## 2019-10-01 PROCEDURE — 3008F PR BODY MASS INDEX (BMI) DOCUMENTED: ICD-10-PCS | Mod: CPTII,S$GLB,, | Performed by: NURSE PRACTITIONER

## 2019-10-01 PROCEDURE — 3080F PR MOST RECENT DIASTOLIC BLOOD PRESSURE >= 90 MM HG: ICD-10-PCS | Mod: CPTII,S$GLB,, | Performed by: NURSE PRACTITIONER

## 2019-10-01 PROCEDURE — 3080F DIAST BP >= 90 MM HG: CPT | Mod: CPTII,S$GLB,, | Performed by: NURSE PRACTITIONER

## 2019-10-01 PROCEDURE — 77080 DEXA BONE DENSITY SPINE HIP: ICD-10-PCS | Mod: 26,,, | Performed by: RADIOLOGY

## 2019-10-01 PROCEDURE — 3008F BODY MASS INDEX DOCD: CPT | Mod: CPTII,S$GLB,, | Performed by: NURSE PRACTITIONER

## 2019-10-01 PROCEDURE — 99215 PR OFFICE/OUTPT VISIT, EST, LEVL V, 40-54 MIN: ICD-10-PCS | Mod: S$GLB,,, | Performed by: NURSE PRACTITIONER

## 2019-10-01 PROCEDURE — 85025 COMPLETE CBC W/AUTO DIFF WBC: CPT

## 2019-10-01 PROCEDURE — 99999 PR PBB SHADOW E&M-EST. PATIENT-LVL III: CPT | Mod: PBBFAC,,, | Performed by: NURSE PRACTITIONER

## 2019-10-01 PROCEDURE — 77080 DXA BONE DENSITY AXIAL: CPT | Mod: TC

## 2019-10-01 PROCEDURE — 36415 COLL VENOUS BLD VENIPUNCTURE: CPT

## 2019-10-01 PROCEDURE — 99999 PR PBB SHADOW E&M-EST. PATIENT-LVL III: ICD-10-PCS | Mod: PBBFAC,,, | Performed by: NURSE PRACTITIONER

## 2019-10-01 NOTE — PROGRESS NOTES
Subjective:      Patient ID: Kylie Urbano is a 54 y.o. female.    Chief Complaint: Continued surveillance; lab discussion    History of Present Illness:  54-year-old female with a previous diagnosis of right-sided infiltrating ductal carcinoma ER/MO positive, HER-2 negative with biopsy-proven right axillary lymph node involvement.  Patient was consented for BRCA testing for participation and B-55 study with negative BRCA testing.    She received cycle 1 of dense dose Adriamycin/Cytoxan on 6/12/2017 and tolerated treatment exceptionally well.  She completed cycle 4 of dose dense Taxol on 9/19/2017.  She underwent lumpectomy with sentinel lymph node biopsy on 10/31/2017.   Final pathology demonstrated [id number].  The primary measured 0.8 cm and 2 sentinel lymph nodes were positive with 3 mm macrometastasis within first sentinel lymph node and 1 mm micrometastasis within the second sentinel lymph node.  She underwent a full right axillary lymph node dissection on 11/8/2017 which demonstrated 0 positive nodes.  She completed adjuvant radiation on 2/27/2018.  The patient had a hysterectomy approximately 6/20/14.  FSH was in the intermediate range which likely means the patient is perimenopausal.  Started Tamoxifen 20 mg daily in 3/2018 and has since been transitioned to Arimidex - states tolerating well.    Surveillance mammograms remains MAGALIE in 5/2019-       ER/MO positive, HER-2 negative infiltrating ductal carcinoma the right breast with biopsy proven lymph node involvement clinically stage II/III  [id number].  Pathology following neoadjuvant therapy demonstrated 1 sentinel lymph node with micrometastasis (3 mm) and second lymph node with micrometastasis (1 mm)  --BRCA testing via B-55 Study is negative for mutation  --ddAC--> taxol   cycle 1--- 6/12/2017  --Final cycle Cycle 4 on 9/19/2017  --Radiation completed on 2/27/2018  --Tamoxifen 20 mg by mouth daily started in 3/2018  --surveillance bilateral mammogram in  05/2020  --Transitioned to Arimidex 1 mg PO daily - to be completed in 2027     I have reviewed and updated the HPI, ROS, PMHx, Social Hx, Family Hx and treatment history.     Today's visit:  The patient has previously been followed in the clinic by Dr. Martinez and Dr. Miramontes.  Today is the first time I am evaluating/assessing the patient.  She states cellulitis to right breast as well as yeast infection symptoms have resolved.  She had repeat DEXA scan today which shows no significant bone loss. She presents with elevated liver enzymes. States has been taking tylenol daily.  Has a diagnosis of borderline hepatomegaly and fatty liver found on previous abdominal US.  She denies ETOH use and statin use.  She denies abdominal pain.  She denies any bone pain.             Social History     Socioeconomic History    Marital status: Single     Spouse name: Not on file    Number of children: 3    Years of education: Not on file    Highest education level: Not on file   Occupational History    Occupation: [employer]     Employer: [employer]     Comment: [employer]   Social Needs    Financial resource strain: Somewhat hard    Food insecurity:     Worry: Sometimes true     Inability: Sometimes true    Transportation needs:     Medical: No     Non-medical: No   Tobacco Use    Smoking status: Never Smoker    Smokeless tobacco: Never Used   Substance and Sexual Activity    Alcohol use: Yes     Alcohol/week: 0.0 standard drinks     Frequency: 2-4 times a month     Drinks per session: 1 or 2     Binge frequency: Never     Comment: occasionally     Drug use: No    Sexual activity: Not Currently   Lifestyle    Physical activity:     Days per week: 4 days     Minutes per session: 40 min    Stress: Only a little   Relationships    Social connections:     Talks on phone: Twice a week     Gets together: Once a week     Attends Religion service: More than 4 times per year     Active member  of club or organization: No     Attends meetings of clubs or organizations: Never     Relationship status:    Other Topics Concern    Not on file   Social History Narrative    She wears seatbelt.       Family History   Problem Relation Age of Onset    Diabetes Mother     Hypertension Mother     Heart failure Mother     Cancer Father         Stomach cancer    Cancer Sister         Ovarian cancer    Ovarian cancer Sister     No Known Problems Daughter     No Known Problems Son     No Known Problems Daughter     Breast cancer Paternal Cousin     Stroke Neg Hx     Heart disease Neg Hx         MI/CAD       Past Surgical History:   Procedure Laterality Date    BREAST LUMPECTOMY Right 2017    BTL      COLONOSCOPY N/A 2/22/2019    Procedure: COLONOSCOPY;  Surgeon: Mariano Santamaria MD;  Location[facility] END[facility];  Service: Endoscopy;  Laterality: N/A;    DILATION AND CURETTAGE OF UTERUS      x 1    HYSTERECTOMY      Laproscopic robot assissted with BSO       Past Medical History:   Diagnosis Date    Allergic rhinitis, cause unspecified     Breast cancer 2017    Cancer     R Breast Cancer    Carpal tunnel syndrome of left wrist     Elevated liver function tests     in the past    Fatty liver 05/02/2017    on ultrasound    Fibroid uterus     10/2014    Hepatomegaly 05/02/2017    on ultrasound    History of sciatica     HSV infection     Type 1 and 2    HTN (hypertension)     Hypothyroidism     Insulin resistance     Iron deficiency anemia, unspecified     Obesity     PONV (postoperative nausea and vomiting)     Tension headache     Vitamin D deficiency        Review of Systems   Constitutional: Negative for activity change, appetite change, chills, diaphoresis, fatigue, fever and unexpected weight change.   HENT: Negative for congestion, ear pain, facial swelling, hearing loss, mouth sores, nosebleeds, rhinorrhea, sinus pressure, sinus pain and sneezing.    Eyes: Negative.     Respiratory: Negative for apnea, cough, shortness of breath, wheezing and stridor.    Cardiovascular: Negative for chest pain and leg swelling.   Gastrointestinal: Negative for abdominal distention, abdominal pain, anal bleeding, constipation, diarrhea, nausea and rectal pain.   Endocrine: Negative.    Genitourinary: Negative for difficulty urinating, dyspareunia, enuresis, flank pain, genital sores, hematuria, menstrual problem and pelvic pain.   Musculoskeletal: Negative for arthralgias, back pain, gait problem and neck stiffness.   Skin: Negative for color change and pallor.   Allergic/Immunologic: Negative.    Neurological: Negative for dizziness, tremors, seizures, facial asymmetry, speech difficulty, light-headedness and numbness.   Hematological: Negative for adenopathy. Does not bruise/bleed easily.   Psychiatric/Behavioral: Negative for agitation, behavioral problems, confusion and dysphoric mood. The patient is not nervous/anxious and is not hyperactive.           Medication List with Changes/Refills   Current Medications    ACYCLOVIR (ZOVIRAX) 400 MG TABLET    TAKE 1 TABLET BY MOUTH THREE TIMES DAILY WITH FOOD FOR 5 DAYS(PER EPISODE)    AMLODIPINE (NORVASC) 5 MG TABLET    TAKE 1 TABLET BY MOUTH ONCE DAILY    ANASTROZOLE (ARIMIDEX) 1 MG TAB    Take 1 tablet (1 mg total) by mouth once daily.    BENAZEPRIL (LOTENSIN) 20 MG TABLET    Take 1 tablet (20 mg total) by mouth once daily.    CHOLECALCIFEROL, VITAMIN D3, (VITAMIN D) 2,000 UNIT CAP    Take 1.5 capsules by mouth once daily. 1 Capsule Oral Every day    FLUTICASONE PROPIONATE (FLONASE) 50 MCG/ACTUATION NASAL SPRAY    Spray 2 sprays (100 mcg total) in each nostril daily as needed for Rhinitis.    IBUPROFEN (ADVIL,MOTRIN) 200 MG TABLET    Take 200-400 mg by mouth every 8 (eight) hours as needed for Pain.    METFORMIN (GLUCOPHAGE) 500 MG TABLET    TAKE FOUR TABLETS BY MOUTH IN THE EVENING AS DIRECTED    OMEPRAZOLE (PRILOSEC) 20 MG CAPSULE    Take 1  capsule (20 mg total) by mouth once daily.    SYNTHROID 175 MCG TABLET    TAKE 1 TABLET BY MOUTH ONCE DAILY        Objective:     Vitals:    10/01/19 0813   BP: (!) 154/94   Pulse: 91   Resp: 18   Temp: 99 °F (37.2 °C)       Physical Exam   Constitutional: She is oriented to person, place, and time. She appears well-developed and well-nourished. No distress.   HENT:   Head: Normocephalic and atraumatic. Not macrocephalic.   Right Ear: Tympanic membrane and ear canal normal.   Left Ear: Tympanic membrane and ear canal normal.   Nose: Nose normal. Right sinus exhibits no maxillary sinus tenderness and no frontal sinus tenderness. Left sinus exhibits no maxillary sinus tenderness and no frontal sinus tenderness.   Mouth/Throat: Uvula is midline, oropharynx is clear and moist and mucous membranes are normal. No oropharyngeal exudate.   Eyes: Pupils are equal, round, and reactive to light. Conjunctivae, EOM and lids are normal. Lids are everted and swept, no foreign bodies found.   Neck: Trachea normal and normal range of motion. Neck supple. No tracheal deviation present. No thyroid mass and no thyromegaly present.   Cardiovascular: Normal rate, regular rhythm, normal heart sounds, intact distal pulses and normal pulses. Exam reveals no gallop and no friction rub.   No murmur heard.  Pulmonary/Chest: Effort normal and breath sounds normal. No stridor. No respiratory distress. She has no decreased breath sounds. She has no wheezes. She has no rhonchi. She has no rales. She exhibits no tenderness.   No lymphadenopathy, breast tissue normal color   Abdominal: Soft. Normal appearance and bowel sounds are normal. She exhibits no distension. There is no hepatosplenomegaly. There is no tenderness. There is no guarding.   Musculoskeletal: Normal range of motion. She exhibits no edema or deformity.   Lymphadenopathy:        Head (right side): No submental and no submandibular adenopathy present.        Head (left side): No  submental and no submandibular adenopathy present.        Right cervical: No superficial cervical and no deep cervical adenopathy present.       Left cervical: No superficial cervical and no deep cervical adenopathy present.     She has no axillary adenopathy.        Right: No supraclavicular adenopathy present.        Left: No supraclavicular adenopathy present.   Neurological: She is alert and oriented to person, place, and time. No cranial nerve deficit or sensory deficit. She exhibits normal muscle tone.   Skin: Skin is warm, dry and intact. Capillary refill takes less than 2 seconds. No rash noted. She is not diaphoretic. No pallor.   Psychiatric: She has a normal mood and affect. Her speech is normal and behavior is normal. Judgment and thought content normal. She is not actively hallucinating. Cognition and memory are normal. She is attentive.       Assessment:     Problem List Items Addressed This Visit        Oncology    Malignant neoplasm of upper-outer quadrant of right female breast    Relevant Orders    CBC auto differential (Completed)    Comprehensive metabolic panel (Completed)    CBC auto differential (Completed)    Comprehensive metabolic panel (Completed)    CBC auto differential    Comprehensive metabolic panel    Breast cancer, right breast - Primary    Relevant Orders    CBC auto differential (Completed)    Comprehensive metabolic panel (Completed)    CBC auto differential (Completed)    Comprehensive metabolic panel (Completed)    CBC auto differential    Comprehensive metabolic panel    CBC auto differential (Completed)    Comprehensive metabolic panel (Completed)    CBC auto differential (Completed)    Comprehensive metabolic panel (Completed)    CBC auto differential    Comprehensive metabolic panel       Endocrine    Vitamin D deficiency      Other Visit Diagnoses     Elevated liver enzymes        Aromatase inhibitor use            Lab Results   Component Value Date    WBC 5.75 10/01/2019     WBC 5.75 10/01/2019    HGB 13.7 10/01/2019    HGB 13.7 10/01/2019    HCT 41.2 10/01/2019    HCT 41.2 10/01/2019    MCV 87 10/01/2019    MCV 87 10/01/2019     10/01/2019     10/01/2019     BMP  Lab Results   Component Value Date     10/01/2019     10/01/2019    K 3.9 10/01/2019    K 3.9 10/01/2019     10/01/2019     10/01/2019    CO2 24 10/01/2019    CO2 24 10/01/2019    BUN 9 10/01/2019    BUN 9 10/01/2019    CREATININE 0.7 10/01/2019    CREATININE 0.7 10/01/2019    CALCIUM 9.9 10/01/2019    CALCIUM 9.9 10/01/2019    ANIONGAP 11 10/01/2019    ANIONGAP 11 10/01/2019    ESTGFRAFRICA >60 10/01/2019    ESTGFRAFRICA >60 10/01/2019    EGFRNONAA >60 10/01/2019    EGFRNONAA >60 10/01/2019   \  Lab Results   Component Value Date    ALT 63 (H) 10/01/2019    ALT 63 (H) 10/01/2019    AST 43 (H) 10/01/2019    AST 43 (H) 10/01/2019    ALKPHOS 79 10/01/2019    ALKPHOS 79 10/01/2019    BILITOT 0.3 10/01/2019    BILITOT 0.3 10/01/2019     .  Plan:   Malignant neoplasm of right female breast, unspecified estrogen receptor status, unspecified site of breast  -     CBC auto differential; Future; Expected date: 10/01/2019  -     Comprehensive metabolic panel; Future; Expected date: 10/01/2019  -     CBC auto differential; Future; Expected date: 10/01/2019  -     Comprehensive metabolic panel; Future; Expected date: 10/01/2019  -     CBC auto differential; Future; Expected date: 10/01/2019  -     Comprehensive metabolic panel; Future; Expected date: 10/01/2019    Malignant neoplasm of upper-outer quadrant of right breast in female, estrogen receptor positive  -     CBC auto differential; Future; Expected date: 10/01/2019  -     Comprehensive metabolic panel; Future; Expected date: 10/01/2019  -     CBC auto differential; Future; Expected date: 10/01/2019  -     Comprehensive metabolic panel; Future; Expected date: 10/01/2019  -     CBC auto differential; Future; Expected date: 10/01/2019  -      Comprehensive metabolic panel; Future; Expected date: 10/01/2019    Vitamin D deficiency    Elevated liver enzymes    Aromatase inhibitor use    Vitamin D currently WNL - continue vitamin D supplementation    Will start calcium 600 mg PO daily.  She will continue vitamin D supplementation.  She will begin daily exercise with walking to reduce risk of bone loss.  Continue Arimidex 1 mg PO daily.  Follow up in 6 months with CBC and CMP.  She is to stop all tylenol.  She is to repeat LFT's in one week.        I will review assessment/plan with collaborating physician, Dr. All Barrow.    Thank You,  Emilia Mckeon, CATRACHO-C

## 2019-10-01 NOTE — TELEPHONE ENCOUNTER
----- Message from Mary Mckeon NP sent at 10/1/2019  4:11 PM CDT -----  Can you please schedule her for LFTs in one week at [facility].    Thank you

## 2019-10-10 ENCOUNTER — LAB VISIT (OUTPATIENT)
Dept: LAB | Facility: HOSPITAL | Age: 54
End: 2019-10-10
Attending: NURSE PRACTITIONER
Payer: COMMERCIAL

## 2019-10-10 DIAGNOSIS — R74.8 ELEVATED LIVER ENZYMES: ICD-10-CM

## 2019-10-10 LAB
ALBUMIN SERPL BCP-MCNC: 3.5 G/DL (ref 3.5–5.2)
ALP SERPL-CCNC: 78 U/L (ref 55–135)
ALT SERPL W/O P-5'-P-CCNC: 60 U/L (ref 10–44)
AST SERPL-CCNC: 43 U/L (ref 10–40)
BILIRUB DIRECT SERPL-MCNC: 0.1 MG/DL (ref 0.1–0.3)
BILIRUB SERPL-MCNC: 0.2 MG/DL (ref 0.1–1)
PROT SERPL-MCNC: 7.4 G/DL (ref 6–8.4)

## 2019-10-10 PROCEDURE — 80076 HEPATIC FUNCTION PANEL: CPT

## 2019-10-10 PROCEDURE — 36415 COLL VENOUS BLD VENIPUNCTURE: CPT

## 2019-12-18 ENCOUNTER — TELEPHONE (OUTPATIENT)
Dept: FAMILY MEDICINE | Facility: CLINIC | Age: 54
End: 2019-12-18

## 2019-12-18 DIAGNOSIS — J30.2 SEASONAL ALLERGIC RHINITIS, UNSPECIFIED TRIGGER: Primary | ICD-10-CM

## 2019-12-18 NOTE — TELEPHONE ENCOUNTER
----- Message from Vipul Cordon sent at 12/18/2019  2:10 PM CST -----  Contact: PT   Type:  Needs Medical Advice    Who Called: Pt   Symptoms (please be specific): sinus headache    How long has patient had these symptoms:  Last few days   Pharmacy name and phone #:    [facility] [address] [place], [place] - [address] [facility]  [address] [address] [facility] [address]  Phone: [phone] Fax: [phone]  Would the patient rather a call back or a response via [facility]? Call back   Best Call Back Number: [phone] (home)   Additional Information: The patient is requesting to have antibiotics called into her local pharmacy and she is allergic to a few items listed in her chart,please advise.

## 2019-12-19 RX ORDER — METHYLPREDNISOLONE 4 MG/1
TABLET ORAL
Qty: 1 PACKAGE | Refills: 0 | Status: SHIPPED | OUTPATIENT
Start: 2019-12-19 | End: 2020-01-06

## 2019-12-19 NOTE — TELEPHONE ENCOUNTER
----- Message from Brandy Bronson sent at 12/19/2019  3:12 PM CST -----  Contact: self  Type:  Patient Returning Call    Who Called:pt  Who Left Message for Patient:n/a  Does the patient know what this is regarding?:no  Would the patient rather a call back or a response via [facility]? Call back  Best Call Back Number:[phone]      Additional Information: none    Thanks,  Brandy Bronson

## 2019-12-19 NOTE — TELEPHONE ENCOUNTER
Pt son states his mother left her phone at home and would not be able to come in until later because she is at work. Pt son states he will have pt give our office a call back when she makes it back home.

## 2019-12-19 NOTE — TELEPHONE ENCOUNTER
Pt states can you call something in for her sinus problem    Pt states she is using a nasal spray (FLONASE) and it is not helping.    Pt states if you can't send something in, do you have any appointments for tomorrow.

## 2019-12-19 NOTE — TELEPHONE ENCOUNTER
Advise pt to add Medrol dose pack to Flonase. I sent to her local pharmacy.  Medrol dose pack may cause glucose elevation so monitor closely. Thanks.

## 2019-12-20 RX ORDER — BENAZEPRIL HYDROCHLORIDE 20 MG/1
TABLET ORAL
Qty: 90 TABLET | Refills: 1 | Status: SHIPPED | OUTPATIENT
Start: 2019-12-20 | End: 2020-06-10

## 2019-12-31 DIAGNOSIS — K21.9 GASTROESOPHAGEAL REFLUX DISEASE, ESOPHAGITIS PRESENCE NOT SPECIFIED: ICD-10-CM

## 2019-12-31 RX ORDER — OMEPRAZOLE 20 MG/1
20 CAPSULE, DELAYED RELEASE ORAL DAILY PRN
Qty: 30 CAPSULE | Refills: 0 | Status: SHIPPED | OUTPATIENT
Start: 2019-12-31 | End: 2020-03-26

## 2020-01-06 ENCOUNTER — OFFICE VISIT (OUTPATIENT)
Dept: FAMILY MEDICINE | Facility: CLINIC | Age: 55
End: 2020-01-06
Payer: COMMERCIAL

## 2020-01-06 VITALS
WEIGHT: 286.38 LBS | OXYGEN SATURATION: 98 % | BODY MASS INDEX: 43.4 KG/M2 | HEIGHT: 68 IN | DIASTOLIC BLOOD PRESSURE: 82 MMHG | HEART RATE: 92 BPM | TEMPERATURE: 99 F | SYSTOLIC BLOOD PRESSURE: 130 MMHG

## 2020-01-06 DIAGNOSIS — J30.2 SEASONAL ALLERGIC RHINITIS, UNSPECIFIED TRIGGER: ICD-10-CM

## 2020-01-06 DIAGNOSIS — J01.80 ACUTE NON-RECURRENT SINUSITIS OF OTHER SINUS: Primary | ICD-10-CM

## 2020-01-06 PROCEDURE — 99999 PR PBB SHADOW E&M-EST. PATIENT-LVL III: ICD-10-PCS | Mod: PBBFAC,,, | Performed by: FAMILY MEDICINE

## 2020-01-06 PROCEDURE — 99213 PR OFFICE/OUTPT VISIT, EST, LEVL III, 20-29 MIN: ICD-10-PCS | Mod: S$GLB,,, | Performed by: FAMILY MEDICINE

## 2020-01-06 PROCEDURE — 3008F PR BODY MASS INDEX (BMI) DOCUMENTED: ICD-10-PCS | Mod: CPTII,S$GLB,, | Performed by: FAMILY MEDICINE

## 2020-01-06 PROCEDURE — 3008F BODY MASS INDEX DOCD: CPT | Mod: CPTII,S$GLB,, | Performed by: FAMILY MEDICINE

## 2020-01-06 PROCEDURE — 3079F DIAST BP 80-89 MM HG: CPT | Mod: CPTII,S$GLB,, | Performed by: FAMILY MEDICINE

## 2020-01-06 PROCEDURE — 3075F PR MOST RECENT SYSTOLIC BLOOD PRESS GE 130-139MM HG: ICD-10-PCS | Mod: CPTII,S$GLB,, | Performed by: FAMILY MEDICINE

## 2020-01-06 PROCEDURE — 3079F PR MOST RECENT DIASTOLIC BLOOD PRESSURE 80-89 MM HG: ICD-10-PCS | Mod: CPTII,S$GLB,, | Performed by: FAMILY MEDICINE

## 2020-01-06 PROCEDURE — 99999 PR PBB SHADOW E&M-EST. PATIENT-LVL III: CPT | Mod: PBBFAC,,, | Performed by: FAMILY MEDICINE

## 2020-01-06 PROCEDURE — 3075F SYST BP GE 130 - 139MM HG: CPT | Mod: CPTII,S$GLB,, | Performed by: FAMILY MEDICINE

## 2020-01-06 PROCEDURE — 99213 OFFICE O/P EST LOW 20 MIN: CPT | Mod: S$GLB,,, | Performed by: FAMILY MEDICINE

## 2020-01-06 RX ORDER — FLUTICASONE PROPIONATE 50 MCG
2 SPRAY, SUSPENSION (ML) NASAL DAILY PRN
Qty: 16 G | Refills: 5 | Status: SHIPPED | OUTPATIENT
Start: 2020-01-06 | End: 2020-03-25 | Stop reason: SDUPTHER

## 2020-01-06 RX ORDER — DOXYCYCLINE HYCLATE 100 MG
100 TABLET ORAL EVERY 12 HOURS
Qty: 20 TABLET | Refills: 0 | Status: SHIPPED | OUTPATIENT
Start: 2020-01-06 | End: 2020-01-16

## 2020-01-06 NOTE — PROGRESS NOTES
Kylie Urbano    Chief Complaint   Patient presents with    Sinus Problem       History of Present Illness:   Ms. Urbano comes in today with complaint of having 1 month of left-sided headaches with nasal congestion and occasional nighttime postnasal drip.  She also reports having left watery, itchy, and painful eye recently.  She states she saw Dr. Leanne Luis, optometrist, proximally 1 week ago and was told to add PreserVision.  She states she was also prescribed Medrol dose pack but states she only took it for 2 days it caused her to feel nervous with rapid heart rate. She states she has been using nasal steroid spray without help.  She also states she has been taking ibuprofen without help for headaches.    Otherwise, she denies having associated fever, chills, fatigue, appetite changes; other sinus or ocular symptoms; cough, shortness of breath, wheezing; chest pain, palpitations, leg swelling; abdominal pain, nausea, vomiting, diarrhea, constipation; unusual urinary symptoms; back pain; anxiety, depression, homicidal or suicidal thoughts.    She does not smoke.  She states she did not have flu shot this fall.          Current Outpatient Medications   Medication Sig    acyclovir (ZOVIRAX) 400 MG tablet TAKE 1 TABLET BY MOUTH THREE TIMES DAILY WITH FOOD FOR 5 DAYS(PER EPISODE)    amLODIPine (NORVASC) 5 MG tablet TAKE 1 TABLET BY MOUTH ONCE DAILY    anastrozole (ARIMIDEX) 1 mg Tab Take 1 tablet (1 mg total) by mouth once daily.    benazepril (LOTENSIN) 20 MG tablet TAKE 1 TABLET BY MOUTH ONCE DAILY    cholecalciferol, vitamin D3, (VITAMIN D) 2,000 unit Cap Take 1.5 capsules by mouth once daily. 1 Capsule Oral Every day    ibuprofen (ADVIL,MOTRIN) 200 MG tablet Take 200-400 mg by mouth every 8 (eight) hours as needed for Pain.    metFORMIN (GLUCOPHAGE) 500 MG tablet TAKE FOUR TABLETS BY MOUTH IN THE EVENING AS DIRECTED    omeprazole (PRILOSEC) 20 MG capsule Take 1 capsule (20 mg total) by mouth daily  as needed.    SYNTHROID 175 mcg tablet TAKE 1 TABLET BY MOUTH ONCE DAILY    fluticasone propionate (FLONASE) 50 mcg/actuation nasal spray Spray 2 sprays (100 mcg total) in each nostril daily as needed for Rhinitis. (Patient not taking: Reported on 1/6/2020)       Review of Systems   Constitutional: Negative for appetite change, chills, diaphoresis and fever.   HENT: Positive for congestion, postnasal drip, sinus pressure and sinus pain. Negative for rhinorrhea, sneezing and sore throat.    Eyes: Positive for photophobia, pain, discharge and itching. Negative for redness and visual disturbance.   Respiratory: Negative for cough, shortness of breath and wheezing.    Cardiovascular: Negative for chest pain, palpitations and leg swelling.   Gastrointestinal: Negative for abdominal pain, constipation, diarrhea, nausea and vomiting.   Musculoskeletal: Negative for back pain and myalgias.   Neurological: Positive for headaches.   Psychiatric/Behavioral: Negative for dysphoric mood and suicidal ideas. The patient is not nervous/anxious.         Negative for homicidal ideas.       Objective:  Physical Exam   Constitutional: She is oriented to person, place, and time. She appears well-developed and well-nourished. No distress.   Pleasant.   HENT:   Head: Normocephalic and atraumatic.   Right Ear: External ear normal.   Left Ear: External ear normal.   Nose: Nose normal.   Mouth/Throat: Oropharynx is clear and moist. No oropharyngeal exudate.   Frontal and left maxillary sinus tenderness.  Nasal mucosa congested without nasal drainage noted. TM's shiny and clear.   Eyes: Pupils are equal, round, and reactive to light. Conjunctivae and EOM are normal. Right eye exhibits no discharge. Left eye exhibits no discharge. No scleral icterus.   Slight photophobia noted.   Neck: Normal range of motion. Neck supple. No thyromegaly present.   Cardiovascular: Normal rate, regular rhythm, normal heart sounds and intact distal pulses.   No  murmur heard.  Pulmonary/Chest: Effort normal and breath sounds normal. No respiratory distress. She has no wheezes.   Abdominal: Soft. Bowel sounds are normal. She exhibits no distension and no mass. There is no tenderness. There is no rebound and no guarding.   Musculoskeletal: Normal range of motion. She exhibits no edema or tenderness.   She is ambulatory without problems.   Lymphadenopathy:     She has no cervical adenopathy.   Neurological: She is alert and oriented to person, place, and time. She has normal reflexes.   Skin: She is not diaphoretic.   Psychiatric: She has a normal mood and affect. Her behavior is normal. Judgment and thought content normal.   Vitals reviewed.      ASSESSMENT:  1. Acute non-recurrent sinusitis of other sinus    2. Seasonal allergic rhinitis, unspecified trigger        PLAN:  Kylie was seen today for sinus problem.    Diagnoses and all orders for this visit:    Acute non-recurrent sinusitis of other sinus  -     doxycycline (VIBRA-TABS) 100 MG tablet; Take 1 tablet (100 mg total) by mouth every 12 (twelve) hours. for 10 days    Seasonal allergic rhinitis, unspecified trigger  -     fluticasone propionate (FLONASE) 50 mcg/actuation nasal spray; Spray 2 sprays (100 mcg total) in each nostril daily as needed for Rhinitis.       Add Doxycycline, Flonase and OTC Mucinex as directed. Medication precautions discussed with patient.  Add OTC Mucinex.  Continue current medications, follow low sodium, low cholesterol, low carb diet, daily walks.  Patient declines flu shot today.  Follow up if symptoms worsen or fail to improve.

## 2020-01-14 ENCOUNTER — TELEPHONE (OUTPATIENT)
Dept: FAMILY MEDICINE | Facility: CLINIC | Age: 55
End: 2020-01-14

## 2020-01-14 NOTE — TELEPHONE ENCOUNTER
----- Message from Ariela Hernandez sent at 1/14/2020  7:44 AM CST -----  Contact: pasquale   Type:  Same Day Appointment Request with Dr. Parks    Caller is requesting a same day appointment.  Caller declined first available appointment listed below.    Name of Caller:Kylie Urbano   When is the first available appointment?3/2020  Symptoms:sinus-not feeling well-pain on Left side of head   Best Call Back Number [phone]  Additional Information: please leave a voicemail if not answer, can be scheduled anytime after 1:00 pm.

## 2020-01-15 ENCOUNTER — OFFICE VISIT (OUTPATIENT)
Dept: INTERNAL MEDICINE | Facility: CLINIC | Age: 55
End: 2020-01-15
Payer: COMMERCIAL

## 2020-01-15 VITALS
WEIGHT: 279.56 LBS | TEMPERATURE: 99 F | SYSTOLIC BLOOD PRESSURE: 162 MMHG | DIASTOLIC BLOOD PRESSURE: 90 MMHG | RESPIRATION RATE: 18 BRPM | OXYGEN SATURATION: 95 % | BODY MASS INDEX: 42.5 KG/M2

## 2020-01-15 DIAGNOSIS — H57.9 EYE EXAM ABNORMAL: ICD-10-CM

## 2020-01-15 DIAGNOSIS — R51.9 ACUTE NONINTRACTABLE HEADACHE, UNSPECIFIED HEADACHE TYPE: Primary | ICD-10-CM

## 2020-01-15 DIAGNOSIS — R05.9 COUGH: ICD-10-CM

## 2020-01-15 PROCEDURE — 99999 PR PBB SHADOW E&M-EST. PATIENT-LVL V: ICD-10-PCS | Mod: PBBFAC,,, | Performed by: NURSE PRACTITIONER

## 2020-01-15 PROCEDURE — 3080F PR MOST RECENT DIASTOLIC BLOOD PRESSURE >= 90 MM HG: ICD-10-PCS | Mod: CPTII,S$GLB,, | Performed by: NURSE PRACTITIONER

## 2020-01-15 PROCEDURE — 3077F PR MOST RECENT SYSTOLIC BLOOD PRESSURE >= 140 MM HG: ICD-10-PCS | Mod: CPTII,S$GLB,, | Performed by: NURSE PRACTITIONER

## 2020-01-15 PROCEDURE — 99214 PR OFFICE/OUTPT VISIT, EST, LEVL IV, 30-39 MIN: ICD-10-PCS | Mod: S$GLB,,, | Performed by: NURSE PRACTITIONER

## 2020-01-15 PROCEDURE — 99999 PR PBB SHADOW E&M-EST. PATIENT-LVL V: CPT | Mod: PBBFAC,,, | Performed by: NURSE PRACTITIONER

## 2020-01-15 PROCEDURE — 99214 OFFICE O/P EST MOD 30 MIN: CPT | Mod: S$GLB,,, | Performed by: NURSE PRACTITIONER

## 2020-01-15 PROCEDURE — 3008F PR BODY MASS INDEX (BMI) DOCUMENTED: ICD-10-PCS | Mod: CPTII,S$GLB,, | Performed by: NURSE PRACTITIONER

## 2020-01-15 PROCEDURE — 3080F DIAST BP >= 90 MM HG: CPT | Mod: CPTII,S$GLB,, | Performed by: NURSE PRACTITIONER

## 2020-01-15 PROCEDURE — 3008F BODY MASS INDEX DOCD: CPT | Mod: CPTII,S$GLB,, | Performed by: NURSE PRACTITIONER

## 2020-01-15 PROCEDURE — 3077F SYST BP >= 140 MM HG: CPT | Mod: CPTII,S$GLB,, | Performed by: NURSE PRACTITIONER

## 2020-01-15 NOTE — PROGRESS NOTES
Subjective:       Patient ID: Kylie Urbano is a 54 y.o. female.    Chief Complaint: Headache    Patient presents with left side headache that started about one month ago.  Saw PCP last week and completed Doxy for sinus.  Still having headaches.  Reports some nausea, light and noise sensitivity.   Tried Excedrin.  No relief.   Had eye exam about 2 weeks ago.  Was told to follow up with someone due to abnormal findings to the macular.  Has form from the provider.     Headache    This is a new problem. The current episode started more than 1 month ago. The problem occurs intermittently. The problem has been unchanged. The pain is located in the left unilateral region. The pain does not radiate. The quality of the pain is described as throbbing. The pain is at a severity of 6/10. The pain is moderate. Associated symptoms include coughing, ear pain, eye pain, numbness, phonophobia and photophobia. Pertinent negatives include no blurred vision. She has tried Excedrin and NSAIDs for the symptoms. The treatment provided no relief. Her past medical history is significant for migraine headaches.     Review of Systems   Constitutional: Negative for chills and fatigue.   HENT: Positive for ear pain.    Eyes: Positive for photophobia and pain. Negative for blurred vision and visual disturbance.   Respiratory: Positive for cough and wheezing.    Cardiovascular: Negative for chest pain.   Musculoskeletal: Negative for arthralgias and gait problem.   Skin: Negative for color change and rash.   Neurological: Positive for numbness and headaches.   Psychiatric/Behavioral: Negative for agitation and confusion.       Objective:      Physical Exam   Constitutional: She is oriented to person, place, and time. Vital signs are normal. She appears well-developed and well-nourished.   HENT:   Head: Normocephalic and atraumatic.   Right Ear: Tympanic membrane normal.   Left Ear: Tympanic membrane normal.   Nose: Mucosal edema present.    Mouth/Throat: Uvula is midline and oropharynx is clear and moist.   Eyes: Pupils are equal, round, and reactive to light. EOM are normal.   Neck: Normal range of motion.   Cardiovascular: Regular rhythm.   Pulmonary/Chest: Effort normal and breath sounds normal.   Musculoskeletal: Normal range of motion.   Neurological: She is alert and oriented to person, place, and time. She has normal strength. No cranial nerve deficit.   Skin: Skin is warm.   Psychiatric: She has a normal mood and affect. Her behavior is normal.       Assessment:       1. Acute nonintractable headache, unspecified headache type    2. Cough    3. Eye exam abnormal        Plan:         Acute nonintractable headache, unspecified headache type  -     Ambulatory Referral to Ophthalmology  -     CT Head Without Contrast; Future; Expected date: 01/15/2020    Cough  -     albuterol sulfate (PROAIR RESPICLICK) 90 mcg/actuation AePB; Take 1-2 puffs every 4-6 hours as needed for cough and wheezing.  Dispense: 1 each; Refill: 0    Eye exam abnormal  -     Ambulatory Referral to Ophthalmology        Follow up with ophthalmologist.  Schedule CT after eye appointment.  Can cancel if ophthalmologist thinks pain is due to eye.  Schedule one week follow up with PCP.

## 2020-01-16 ENCOUNTER — HOSPITAL ENCOUNTER (OUTPATIENT)
Dept: RADIOLOGY | Facility: HOSPITAL | Age: 55
Discharge: HOME | End: 2020-01-16
Attending: NURSE PRACTITIONER
Payer: COMMERCIAL

## 2020-01-16 ENCOUNTER — OFFICE VISIT (OUTPATIENT)
Dept: OPHTHALMOLOGY | Facility: CLINIC | Age: 55
End: 2020-01-16
Payer: COMMERCIAL

## 2020-01-16 DIAGNOSIS — R51.9 ACUTE NONINTRACTABLE HEADACHE, UNSPECIFIED HEADACHE TYPE: Primary | ICD-10-CM

## 2020-01-16 DIAGNOSIS — H35.363 FAMILIAL DRUSEN OF BOTH EYES: ICD-10-CM

## 2020-01-16 DIAGNOSIS — R51.9 ACUTE NONINTRACTABLE HEADACHE, UNSPECIFIED HEADACHE TYPE: ICD-10-CM

## 2020-01-16 PROCEDURE — 70450 CT HEAD/BRAIN W/O DYE: CPT | Mod: TC

## 2020-01-16 PROCEDURE — 99999 PR PBB SHADOW E&M-EST. PATIENT-LVL I: CPT | Mod: PBBFAC,,, | Performed by: OPTOMETRIST

## 2020-01-16 PROCEDURE — 92004 PR EYE EXAM, NEW PATIENT,COMPREHESV: ICD-10-PCS | Mod: S$GLB,,, | Performed by: OPTOMETRIST

## 2020-01-16 PROCEDURE — 70450 CT HEAD/BRAIN W/O DYE: CPT | Mod: 26,,, | Performed by: RADIOLOGY

## 2020-01-16 PROCEDURE — 99999 PR PBB SHADOW E&M-EST. PATIENT-LVL I: ICD-10-PCS | Mod: PBBFAC,,, | Performed by: OPTOMETRIST

## 2020-01-16 PROCEDURE — 70450 CT HEAD WITHOUT CONTRAST: ICD-10-PCS | Mod: 26,,, | Performed by: RADIOLOGY

## 2020-01-16 PROCEDURE — 92004 COMPRE OPH EXAM NEW PT 1/>: CPT | Mod: S$GLB,,, | Performed by: OPTOMETRIST

## 2020-01-16 PROCEDURE — 92134 CPTRZ OPH DX IMG PST SGM RTA: CPT | Mod: S$GLB,,, | Performed by: OPTOMETRIST

## 2020-01-16 PROCEDURE — 92134 POSTERIOR SEGMENT OCT RETINA (OCULAR COHERENCE TOMOGRAPHY)-BOTH EYES: ICD-10-PCS | Mod: S$GLB,,, | Performed by: OPTOMETRIST

## 2020-01-16 RX ORDER — AZITHROMYCIN 250 MG/1
250 TABLET, FILM COATED ORAL DAILY
Qty: 6 TABLET | Refills: 0 | Status: SHIPPED | OUTPATIENT
Start: 2020-01-16 | End: 2020-01-21

## 2020-01-16 NOTE — LETTER
January 16, 2020      Ita Mojica NP  [address]  [place] [address]           [facility]  [address]  [place] [address]  Phone: [phone]  Fax: [phone]          Patient: Kylie Urbano   MR Number: [id number]   YOB: 1965   Date of Visit: 1/16/2020       Dear Iat Mojica:    Thank you for referring Kylie Urbano to me for evaluation. Attached you will find relevant portions of my assessment and plan of care.    If you have questions, please do not hesitate to call me. I look forward to following Kylie Urbano along with you.    Sincerely,    Gordon Scherer, OD    Enclosure  CC:  No Recipients    If you would like to receive this communication electronically, please contact [email or web address] or [phone] to request more information on [employer] Link access.    For providers and/or their staff who would like to refer a patient to [facility], please contact us through our one-stop-shop provider referral line, [facility], at [phone].    If you feel you have received this communication in error or would no longer like to receive these types of communications, please e-mail [email or web address]

## 2020-01-16 NOTE — PROGRESS NOTES
HPI     Blurred Vision     Comments: Yearly              Comments     New Patient  Last Eye Exam 12/31/2019  Mother had glaucoma   HPI     Any vision changes since last exam: No   Eye pain: Patient states pain runs from the left eye to the left side   temporal.  Other ocular symptoms: Itchy Eyes    Do you wear currently wear glasses or contacts? Glasses & OTC Readers    Interested in contacts today? No    Do you plan on getting new glasses today? If Needed              Last edited by Eden Jarquin on 1/16/2020  2:33 PM. (History)            Assessment /Plan     For exam results, see Encounter Report.    Acute nonintractable headache, unspecified headache type  Not likely ocular in origin  No iritis   Normal, well-perfused optic nerves bilaterally with no edema  Continue care with PCP and/or specialists     Familial drusen of both eyes  -     Posterior Segment OCT Retina-Both eyes  No SRF on OCT  Discussed with pt  Monitor 12 months    RTC 1 yr for dilated eye exam or PRN if any problems.   Discussed above and answered questions.

## 2020-02-06 DIAGNOSIS — C50.911 MALIGNANT NEOPLASM OF RIGHT FEMALE BREAST, UNSPECIFIED ESTROGEN RECEPTOR STATUS, UNSPECIFIED SITE OF BREAST: Primary | ICD-10-CM

## 2020-02-06 DIAGNOSIS — Z17.0 MALIGNANT NEOPLASM OF UPPER-OUTER QUADRANT OF RIGHT BREAST IN FEMALE, ESTROGEN RECEPTOR POSITIVE: ICD-10-CM

## 2020-02-06 DIAGNOSIS — C50.411 MALIGNANT NEOPLASM OF UPPER-OUTER QUADRANT OF RIGHT BREAST IN FEMALE, ESTROGEN RECEPTOR POSITIVE: ICD-10-CM

## 2020-03-10 RX ORDER — AMLODIPINE BESYLATE 5 MG/1
TABLET ORAL
Qty: 90 TABLET | Refills: 0 | Status: SHIPPED | OUTPATIENT
Start: 2020-03-10 | End: 2020-08-21

## 2020-03-25 ENCOUNTER — TELEPHONE (OUTPATIENT)
Dept: FAMILY MEDICINE | Facility: CLINIC | Age: 55
End: 2020-03-25

## 2020-03-25 DIAGNOSIS — J30.2 SEASONAL ALLERGIC RHINITIS, UNSPECIFIED TRIGGER: ICD-10-CM

## 2020-03-25 RX ORDER — FLUTICASONE PROPIONATE 50 MCG
2 SPRAY, SUSPENSION (ML) NASAL DAILY PRN
Qty: 16 G | Refills: 5 | Status: SHIPPED | OUTPATIENT
Start: 2020-03-25 | End: 2020-03-26

## 2020-03-25 NOTE — TELEPHONE ENCOUNTER
With her history - fever, cough + high risk (h/o breast cancer), I recommend she go to [facility] for flu and Covid-19 testing. Please notify [facility] of this referral.    However, advise pt Zyrtec alone only treats runny nose, drip in throat and can make sinus congestion worse.  If not using Flonase, she should use it; I've sent refill to her local pharmacy [facility]. Thanks.

## 2020-03-25 NOTE — TELEPHONE ENCOUNTER
Called pt gave orders from pcp and informed of refill   Called selina and informed of pts arrival today

## 2020-03-25 NOTE — TELEPHONE ENCOUNTER
----- Message from Keshia Wilkes sent at 3/25/2020  7:52 AM CDT -----  Contact: pt  Pt would like medication for sinus sent over for her. Pt can be reached at [phone]    [facility] [address] [place] [id number] [facility]  [address] [facility] [address]  Phone: [phone] Fax: [phone]    Thanks,  Keshia Wilkes

## 2020-03-25 NOTE — TELEPHONE ENCOUNTER
Pt states congestion from sinus   Taking zyrtec and it is not helping   Coughing , temp 100.4, no SOB     Would like something called in

## 2020-03-26 ENCOUNTER — HOSPITAL ENCOUNTER (INPATIENT)
Facility: HOSPITAL | Age: 55
LOS: 3 days | Discharge: HOME | DRG: 194 | End: 2020-03-29
Attending: EMERGENCY MEDICINE | Admitting: FAMILY MEDICINE
Payer: COMMERCIAL

## 2020-03-26 DIAGNOSIS — Z20.822 SUSPECTED COVID-19 VIRUS INFECTION: ICD-10-CM

## 2020-03-26 DIAGNOSIS — U07.1 COVID-19 VIRUS INFECTION: Primary | ICD-10-CM

## 2020-03-26 DIAGNOSIS — R06.02 SOB (SHORTNESS OF BREATH): ICD-10-CM

## 2020-03-26 DIAGNOSIS — I10 ESSENTIAL HYPERTENSION: Chronic | ICD-10-CM

## 2020-03-26 DIAGNOSIS — R07.9 CHEST PAIN: ICD-10-CM

## 2020-03-26 LAB
ALBUMIN SERPL BCP-MCNC: 3.3 G/DL (ref 3.5–5.2)
ALLENS TEST: ABNORMAL
ALP SERPL-CCNC: 65 U/L (ref 55–135)
ALT SERPL W/O P-5'-P-CCNC: 52 U/L (ref 10–44)
ANION GAP SERPL CALC-SCNC: 13 MMOL/L (ref 8–16)
AST SERPL-CCNC: 52 U/L (ref 10–40)
BASOPHILS # BLD AUTO: 0 K/UL (ref 0–0.2)
BASOPHILS NFR BLD: 0 % (ref 0–1.9)
BILIRUB SERPL-MCNC: 0.3 MG/DL (ref 0.1–1)
BNP SERPL-MCNC: 17 PG/ML (ref 0–99)
BUN SERPL-MCNC: 5 MG/DL (ref 6–20)
CALCIUM SERPL-MCNC: 9.1 MG/DL (ref 8.7–10.5)
CHLORIDE SERPL-SCNC: 103 MMOL/L (ref 95–110)
CO2 SERPL-SCNC: 23 MMOL/L (ref 23–29)
CREAT SERPL-MCNC: 0.7 MG/DL (ref 0.5–1.4)
CRP SERPL-MCNC: 101.5 MG/L (ref 0–8.2)
DELSYS: ABNORMAL
DIFFERENTIAL METHOD: NORMAL
EOSINOPHIL # BLD AUTO: 0 K/UL (ref 0–0.5)
EOSINOPHIL NFR BLD: 0 % (ref 0–8)
ERYTHROCYTE [DISTWIDTH] IN BLOOD BY AUTOMATED COUNT: 13 % (ref 11.5–14.5)
EST. GFR  (AFRICAN AMERICAN): >60 ML/MIN/1.73 M^2
EST. GFR  (NON AFRICAN AMERICAN): >60 ML/MIN/1.73 M^2
FLOW: 2
GLUCOSE SERPL-MCNC: 110 MG/DL (ref 70–110)
HCO3 UR-SCNC: 25.6 MMOL/L (ref 24–28)
HCT VFR BLD AUTO: 41.8 % (ref 37–48.5)
HGB BLD-MCNC: 13.8 G/DL (ref 12–16)
IMM GRANULOCYTES # BLD AUTO: 0.03 K/UL (ref 0–0.04)
IMM GRANULOCYTES NFR BLD AUTO: 0.5 % (ref 0–0.5)
LACTATE SERPL-SCNC: 1 MMOL/L (ref 0.5–2.2)
LDH SERPL L TO P-CCNC: 612 U/L (ref 110–260)
LIPASE SERPL-CCNC: 50 U/L (ref 4–60)
LYMPHOCYTES # BLD AUTO: 1.3 K/UL (ref 1–4.8)
LYMPHOCYTES NFR BLD: 22.8 % (ref 18–48)
MCH RBC QN AUTO: 28.2 PG (ref 27–31)
MCHC RBC AUTO-ENTMCNC: 33 G/DL (ref 32–36)
MCV RBC AUTO: 85 FL (ref 82–98)
MODE: ABNORMAL
MONOCYTES # BLD AUTO: 0.4 K/UL (ref 0.3–1)
MONOCYTES NFR BLD: 7 % (ref 4–15)
NEUTROPHILS # BLD AUTO: 4 K/UL (ref 1.8–7.7)
NEUTROPHILS NFR BLD: 69.7 % (ref 38–73)
NRBC BLD-RTO: 0 /100 WBC
PCO2 BLDA: 37.3 MMHG (ref 35–45)
PH SMN: 7.45 [PH] (ref 7.35–7.45)
PLATELET # BLD AUTO: 313 K/UL (ref 150–350)
PMV BLD AUTO: 9.8 FL (ref 9.2–12.9)
PO2 BLDA: 78 MMHG (ref 80–100)
POC BE: 2 MMOL/L
POC SATURATED O2: 96 % (ref 95–100)
POTASSIUM SERPL-SCNC: 3.8 MMOL/L (ref 3.5–5.1)
PROCALCITONIN SERPL IA-MCNC: 0.05 NG/ML
PROT SERPL-MCNC: 7.7 G/DL (ref 6–8.4)
RBC # BLD AUTO: 4.9 M/UL (ref 4–5.4)
SAMPLE: ABNORMAL
SITE: ABNORMAL
SODIUM SERPL-SCNC: 139 MMOL/L (ref 136–145)
TROPONIN I SERPL DL<=0.01 NG/ML-MCNC: 0.01 NG/ML (ref 0–0.03)
WBC # BLD AUTO: 5.75 K/UL (ref 3.9–12.7)

## 2020-03-26 PROCEDURE — 36600 WITHDRAWAL OF ARTERIAL BLOOD: CPT

## 2020-03-26 PROCEDURE — 25000003 PHARM REV CODE 250: Performed by: NURSE PRACTITIONER

## 2020-03-26 PROCEDURE — 25000003 PHARM REV CODE 250: Performed by: EMERGENCY MEDICINE

## 2020-03-26 PROCEDURE — 85025 COMPLETE CBC W/AUTO DIFF WBC: CPT

## 2020-03-26 PROCEDURE — 63600175 PHARM REV CODE 636 W HCPCS: Performed by: NURSE PRACTITIONER

## 2020-03-26 PROCEDURE — 82803 BLOOD GASES ANY COMBINATION: CPT

## 2020-03-26 PROCEDURE — 83880 ASSAY OF NATRIURETIC PEPTIDE: CPT

## 2020-03-26 PROCEDURE — 82550 ASSAY OF CK (CPK): CPT

## 2020-03-26 PROCEDURE — 84145 PROCALCITONIN (PCT): CPT

## 2020-03-26 PROCEDURE — 36415 COLL VENOUS BLD VENIPUNCTURE: CPT

## 2020-03-26 PROCEDURE — 86140 C-REACTIVE PROTEIN: CPT

## 2020-03-26 PROCEDURE — 83605 ASSAY OF LACTIC ACID: CPT

## 2020-03-26 PROCEDURE — 99900035 HC TECH TIME PER 15 MIN (STAT)

## 2020-03-26 PROCEDURE — 80053 COMPREHEN METABOLIC PANEL: CPT

## 2020-03-26 PROCEDURE — 83690 ASSAY OF LIPASE: CPT

## 2020-03-26 PROCEDURE — 96365 THER/PROPH/DIAG IV INF INIT: CPT

## 2020-03-26 PROCEDURE — 84484 ASSAY OF TROPONIN QUANT: CPT

## 2020-03-26 PROCEDURE — 94761 N-INVAS EAR/PLS OXIMETRY MLT: CPT

## 2020-03-26 PROCEDURE — 83615 LACTATE (LD) (LDH) ENZYME: CPT

## 2020-03-26 PROCEDURE — 63600175 PHARM REV CODE 636 W HCPCS: Performed by: EMERGENCY MEDICINE

## 2020-03-26 PROCEDURE — 99291 CRITICAL CARE FIRST HOUR: CPT | Mod: 25

## 2020-03-26 PROCEDURE — 87040 BLOOD CULTURE FOR BACTERIA: CPT

## 2020-03-26 PROCEDURE — 21400001 HC TELEMETRY ROOM

## 2020-03-26 RX ORDER — IBUPROFEN 200 MG
24 TABLET ORAL
Status: DISCONTINUED | OUTPATIENT
Start: 2020-03-26 | End: 2020-03-29 | Stop reason: HOSPADM

## 2020-03-26 RX ORDER — ACETAMINOPHEN 500 MG
1000 TABLET ORAL EVERY 8 HOURS PRN
Status: DISCONTINUED | OUTPATIENT
Start: 2020-03-26 | End: 2020-03-29 | Stop reason: HOSPADM

## 2020-03-26 RX ORDER — AMLODIPINE BESYLATE 5 MG/1
5 TABLET ORAL DAILY
Status: DISCONTINUED | OUTPATIENT
Start: 2020-03-26 | End: 2020-03-29 | Stop reason: HOSPADM

## 2020-03-26 RX ORDER — GLUCAGON 1 MG
1 KIT INJECTION
Status: DISCONTINUED | OUTPATIENT
Start: 2020-03-26 | End: 2020-03-29 | Stop reason: HOSPADM

## 2020-03-26 RX ORDER — SODIUM CHLORIDE 0.9 % (FLUSH) 0.9 %
10 SYRINGE (ML) INJECTION
Status: DISCONTINUED | OUTPATIENT
Start: 2020-03-26 | End: 2020-03-29 | Stop reason: HOSPADM

## 2020-03-26 RX ORDER — ACETAMINOPHEN 500 MG
1000 TABLET ORAL
Status: COMPLETED | OUTPATIENT
Start: 2020-03-26 | End: 2020-03-26

## 2020-03-26 RX ORDER — DEXTROSE MONOHYDRATE 100 MG/ML
12.5 INJECTION, SOLUTION INTRAVENOUS
Status: DISCONTINUED | OUTPATIENT
Start: 2020-03-26 | End: 2020-03-29 | Stop reason: HOSPADM

## 2020-03-26 RX ORDER — IBUPROFEN 200 MG
16 TABLET ORAL
Status: DISCONTINUED | OUTPATIENT
Start: 2020-03-26 | End: 2020-03-29 | Stop reason: HOSPADM

## 2020-03-26 RX ORDER — ENOXAPARIN SODIUM 100 MG/ML
40 INJECTION SUBCUTANEOUS EVERY 24 HOURS
Status: DISCONTINUED | OUTPATIENT
Start: 2020-03-26 | End: 2020-03-29 | Stop reason: HOSPADM

## 2020-03-26 RX ORDER — ALBUTEROL SULFATE 90 UG/1
2 AEROSOL, METERED RESPIRATORY (INHALATION) EVERY 4 HOURS PRN
Status: DISCONTINUED | OUTPATIENT
Start: 2020-03-26 | End: 2020-03-29 | Stop reason: HOSPADM

## 2020-03-26 RX ORDER — CEFEPIME HYDROCHLORIDE 1 G/50ML
1 INJECTION, SOLUTION INTRAVENOUS
Status: DISCONTINUED | OUTPATIENT
Start: 2020-03-26 | End: 2020-03-26

## 2020-03-26 RX ORDER — BENAZEPRIL HYDROCHLORIDE 20 MG/1
20 TABLET ORAL DAILY
Status: DISCONTINUED | OUTPATIENT
Start: 2020-03-26 | End: 2020-03-29 | Stop reason: HOSPADM

## 2020-03-26 RX ORDER — AZITHROMYCIN 250 MG/1
250 TABLET, FILM COATED ORAL DAILY
Status: DISCONTINUED | OUTPATIENT
Start: 2020-03-27 | End: 2020-03-28

## 2020-03-26 RX ADMIN — BENAZEPRIL HYDROCHLORIDE 20 MG: 20 TABLET, COATED ORAL at 03:03

## 2020-03-26 RX ADMIN — SODIUM CHLORIDE, SODIUM LACTATE, POTASSIUM CHLORIDE, AND CALCIUM CHLORIDE 1000 ML: .6; .31; .03; .02 INJECTION, SOLUTION INTRAVENOUS at 11:03

## 2020-03-26 RX ADMIN — AMLODIPINE BESYLATE 5 MG: 5 TABLET ORAL at 03:03

## 2020-03-26 RX ADMIN — ENOXAPARIN SODIUM 40 MG: 100 INJECTION SUBCUTANEOUS at 08:03

## 2020-03-26 RX ADMIN — ACETAMINOPHEN 1000 MG: 500 TABLET ORAL at 08:03

## 2020-03-26 RX ADMIN — AZITHROMYCIN MONOHYDRATE 500 MG: 500 INJECTION, POWDER, LYOPHILIZED, FOR SOLUTION INTRAVENOUS at 01:03

## 2020-03-26 RX ADMIN — CEFTRIAXONE 1 G: 1 INJECTION, SOLUTION INTRAVENOUS at 11:03

## 2020-03-26 NOTE — H&P
[facility] Hospital Medicine  History & Physical    Patient Name: Kylie Urbano  MRN: [id number]  Admission Date: 3/26/2020  Attending Physician: Chance Allen MD   Primary Care Provider: Beverly Parks MD         Patient information was obtained from patient and ER records.     Subjective:     Principal Problem:Covid-19 Virus Infection    Chief Complaint:   Chief Complaint   Patient presents with    Chest Pain     chest pain, cough, chest congestion.  pt states she was swabbed for covid but has not gotten the results yet.        HPI: The patient is a 54o female with hx Breast cancer s/p Chemo/radiation 2017-18-no on Arimidex, HTN, hypothyroidism who presented to ED with Cough, generalized weakness, fatigue, chest congestion, chest tightness, and SHARIF x 3 days that have progressively worsened. The patient was tested for COVID 19 yesterday and results pending.   In the ED, WBC normal, Temp 100.9F, .5, . CXR showed Airspace disease within the left mid and lower lung may relate to pneumonia.    Past Medical History:   Diagnosis Date    Allergic rhinitis, cause unspecified     Breast cancer 2017    Elevated liver function tests     in the past    Fatty liver 05/02/2017    on ultrasound    Hepatomegaly 05/02/2017    on ultrasound    History of sciatica     HSV infection     Type 1 and 2    HTN (hypertension)     Hypothyroidism     Insulin resistance     Iron deficiency anemia, unspecified     Obesity     PONV (postoperative nausea and vomiting)     Tension headache     Vitamin D deficiency        Past Surgical History:   Procedure Laterality Date    BREAST LUMPECTOMY Right 2017    BTL      COLONOSCOPY N/A 2/22/2019    Procedure: COLONOSCOPY;  Surgeon: Mariano Santamaria MD;  [facility];  Service: Endoscopy;  Laterality: N/A;    DILATION AND CURETTAGE OF UTERUS      x 1    HYSTERECTOMY      Laproscopic robot assissted with BSO       Review of patient's  allergies indicates:   Allergen Reactions    Methylprednisone Anxiety and Palpitations    Lortab [hydrocodone-acetaminophen] Nausea And Vomiting     Historical.    Amoxicillin Rash     Historical    Sulfa (sulfonamide antibiotics) Rash       No current facility-administered medications on file prior to encounter.      Current Outpatient Medications on File Prior to Encounter   Medication Sig    amLODIPine (NORVASC) 5 MG tablet Take 1 tablet by mouth once daily    benazepril (LOTENSIN) 20 MG tablet TAKE 1 TABLET BY MOUTH ONCE DAILY    SYNTHROID 175 mcg tablet TAKE 1 TABLET BY MOUTH ONCE DAILY    acyclovir (ZOVIRAX) 400 MG tablet TAKE 1 TABLET BY MOUTH THREE TIMES DAILY WITH FOOD FOR 5 DAYS(PER EPISODE)    [DISCONTINUED] albuterol sulfate (PROAIR RESPICLICK) 90 mcg/actuation AePB Take 1-2 puffs every 4-6 hours as needed for cough and wheezing.    [DISCONTINUED] anastrozole (ARIMIDEX) 1 mg Tab Take 1 tablet (1 mg total) by mouth once daily.    [DISCONTINUED] cholecalciferol, vitamin D3, (VITAMIN D) 2,000 unit Cap Take 1.5 capsules by mouth once daily. 1 Capsule Oral Every day    [DISCONTINUED] fluticasone propionate (FLONASE) 50 mcg/actuation nasal spray Spray 2 sprays (100 mcg total) in each nostril daily as needed for Rhinitis.    [DISCONTINUED] ibuprofen (ADVIL,MOTRIN) 200 MG tablet Take 200-400 mg by mouth every 8 (eight) hours as needed for Pain.    [DISCONTINUED] metFORMIN (GLUCOPHAGE) 500 MG tablet TAKE FOUR TABLETS BY MOUTH IN THE EVENING AS DIRECTED    [DISCONTINUED] omeprazole (PRILOSEC) 20 MG capsule Take 1 capsule (20 mg total) by mouth daily as needed.     Family History     Problem Relation (Age of Onset)    Breast cancer Paternal Cousin    Cancer Father, Sister    Diabetes Mother    Glaucoma Mother    Heart failure Mother    Hypertension Mother    No Known Problems Daughter, Son, Daughter    Ovarian cancer Sister        Tobacco Use    Smoking status: Never Smoker    Smokeless tobacco:  Never Used   Substance and Sexual Activity    Alcohol use: Yes     Frequency: 2-4 times a month     Drinks per session: 1 or 2     Binge frequency: Never     Comment: occasionally     Drug use: No    Sexual activity: Not Currently     Review of Systems   Constitutional: Positive for fatigue and fever. Negative for appetite change, chills, diaphoresis and unexpected weight change.   HENT: Negative for congestion, nosebleeds, sinus pressure and sore throat.    Eyes: Negative for pain, discharge and visual disturbance.   Respiratory: Positive for cough, chest tightness and shortness of breath. Negative for wheezing and stridor.    Cardiovascular: Negative for chest pain, palpitations and leg swelling.   Gastrointestinal: Negative for abdominal distention, abdominal pain, blood in stool, constipation, diarrhea, nausea and vomiting.   Endocrine: Negative for cold intolerance and heat intolerance.   Genitourinary: Negative for difficulty urinating, dysuria, flank pain, frequency and urgency.   Musculoskeletal: Negative for arthralgias, back pain, joint swelling, myalgias, neck pain and neck stiffness.   Skin: Negative for rash and wound.   Allergic/Immunologic: Negative for food allergies and immunocompromised state.   Neurological: Positive for weakness. Negative for dizziness, seizures, syncope, facial asymmetry, speech difficulty, light-headedness, numbness and headaches.   Hematological: Negative for adenopathy.   Psychiatric/Behavioral: Negative for agitation, confusion and hallucinations.     Objective:     Vital Signs (Most Recent):  Temp: 99.3 °F (37.4 °C) (03/26/20 1320)  Pulse: 90 (03/26/20 1320)  Resp: (!) 22 (03/26/20 1320)  BP: 132/79 (03/26/20 1320)  SpO2: 97 % (03/26/20 1320) Vital Signs (24h Range):  Temp:  [99.3 °F (37.4 °C)-100.9 °F (38.3 °C)] 99.3 °F (37.4 °C)  Pulse:  [] 90  Resp:  [20-35] 22  SpO2:  [93 %-100 %] 97 %  BP: (132-163)/(72-86) 132/79     Weight: 125.8 kg (277 lb 4.8 oz)  Body  mass index is 42.16 kg/m².    Physical Exam   Constitutional: She is oriented to person, place, and time. She appears well-developed and well-nourished. No distress.   HENT:   Head: Normocephalic and atraumatic.   Nose: Nose normal.   Mouth/Throat: Oropharynx is clear and moist.   Eyes: Conjunctivae and EOM are normal. No scleral icterus.   Neck: Normal range of motion. Neck supple. No tracheal deviation present.   Cardiovascular: Normal rate, regular rhythm, normal heart sounds and intact distal pulses. Exam reveals no gallop and no friction rub.   No murmur heard.  Pulmonary/Chest: Effort normal. No stridor. No respiratory distress. She has no wheezes. She has no rales. She exhibits no tenderness.   Crackles to bases    Abdominal: Soft. Bowel sounds are normal. She exhibits no distension and no mass. There is no tenderness. There is no rebound and no guarding.   Musculoskeletal: Normal range of motion. She exhibits no edema, tenderness or deformity.   Neurological: She is alert and oriented to person, place, and time. No cranial nerve deficit. She exhibits normal muscle tone. Coordination normal.   Skin: Skin is warm and dry. No rash noted. She is not diaphoretic. No erythema. No pallor.   Psychiatric: She has a normal mood and affect. Her behavior is normal. Thought content normal.   Nursing note and vitals reviewed.        CRANIAL NERVES     CN III, IV, VI   Extraocular motions are normal.        Significant Labs:   ABGs:   Recent Labs   Lab 03/26/20  0938   PH 7.446   PCO2 37.3   HCO3 25.6   POCSATURATED 96   BE 2     BMP:   Recent Labs   Lab 03/26/20  0900         K 3.8      CO2 23   BUN 5*   CREATININE 0.7   CALCIUM 9.1     CBC:   Recent Labs   Lab 03/26/20  1100   WBC 5.75   HGB 13.8   HCT 41.8        CMP:   Recent Labs   Lab 03/26/20  0900      K 3.8      CO2 23      BUN 5*   CREATININE 0.7   CALCIUM 9.1   PROT 7.7   ALBUMIN 3.3*   BILITOT 0.3   ALKPHOS 65   AST  52*   ALT 52*   ANIONGAP 13   EGFRNONAA >60       Significant Imaging:   Imaging Results          X-Ray Chest AP Portable (Final result)  Result time 03/26/20 09:29:01    Final result by Afshin Dunham Jr., MD (03/26/20 09:29:01)                 Impression:      Airspace disease within the left mid and lower lung may relate to pneumonia.      Electronically signed by: Afshin Dunham Jr., MD  Date:    03/26/2020  Time:    09:29             Narrative:    EXAMINATION:  XR CHEST AP PORTABLE    CLINICAL HISTORY:  Shortness of breath    COMPARISON:  Prior radiograph from January 3, 2018.    FINDINGS:  There is airspace opacity within the left mid and left lower lung.  The more superior left lung and the right lung appear clear.  No pleural fluid or pneumothorax.  Heart size within normal limits.  No significant bony findings.                              Assessment/Plan:     * Presumed Covid-19 Virus Infection  - COVID-19 testing Collection Date: 3/25/2020 Collection Time:  10:46 AM  - Infection Control notified 3/26/2020    - Isolation:   - Airborne, Contact and Droplet Precautions  - Cohort patients into COVID units  - N95 masks must be fit tested, wear eye protection  - 20 second hand hygiene              - Limit visitors per hospital policy              - Consolidating lab draws, nursing care, provider visits, and interventions    - Diagnostics: (leukopenia, hyponatremia, hyperferritinemia, elevated troponin, elevated d-dimer, age, and comorbidities are significant predictors of poor clinical outcome)  CBC, CMP, Procalcitonin, CRP, LDH, BNP, Troponin, Rapid Flu and Portable CXR    - Management:  Supplemental O2 to maintain SpO2 >92%  Telemetry  Continuous/intermittent Pulse Ox  Albuterol INHALER PRN (avoid nebulization of secretions)  Avoiding BiPAP to prevent aerosolization (including home BiPAP)  Avoiding NSAIDS for fever per WHO recommendations (3/16/2020)  Careful use of steroids in the absence of other indications  - unless septic shock due to increased viral replication Fluid sparing resuscitation and Empiric antibiotics per likely source & patient allergies if patient hypoxic with airspace disease as 1 time doses CAP: azithromycin & ceftriaxone      HTN (hypertension)  Stable   Cont Amlodipine and Benazepril     Breast cancer, right breast  S/p chemo/radiation 2017-18    Hypothyroidism  Cont levothyroxine     Morbid obesity with BMI of 40.0-44.9, adult  Counseled on healthy weight, diet, and exercise         VTE Risk Mitigation (From admission, onward)         Ordered     enoxaparin injection 40 mg  Daily      03/26/20 1345     IP VTE HIGH RISK PATIENT  Once      03/26/20 1345                   Renetta Mathews NP  Department of Hospital Medicine   [facility]

## 2020-03-26 NOTE — HPI
The patient is a 54o female with hx Breast cancer s/p Chemo/radiation 2017-18-no on Arimidex, HTN, hypothyroidism who presented to ED with Cough, generalized weakness, fatigue, chest congestion, chest tightness, and SHARIF x 3 days that have progressively worsened. The patient was tested for COVID 19 yesterday and results pending.   In the ED, WBC normal, Temp 100.9F, .5, . CXR showed Airspace disease within the left mid and lower lung may relate to pneumonia.

## 2020-03-26 NOTE — ASSESSMENT & PLAN NOTE
- COVID-19 testing Collection Date: 3/25/2020 Collection Time:  10:46 AM  - Infection Control notified 3/26/2020    - Isolation:   - Airborne, Contact and Droplet Precautions  - Cohort patients into COVID units  - N95 masks must be fit tested, wear eye protection  - 20 second hand hygiene              - Limit visitors per hospital policy              - Consolidating lab draws, nursing care, provider visits, and interventions    - Diagnostics: (leukopenia, hyponatremia, hyperferritinemia, elevated troponin, elevated d-dimer, age, and comorbidities are significant predictors of poor clinical outcome)  CBC, CMP, Procalcitonin, CRP, LDH, BNP, Troponin, Rapid Flu and Portable CXR    - Management:  Supplemental O2 to maintain SpO2 >92%  Telemetry  Continuous/intermittent Pulse Ox  Albuterol INHALER PRN (avoid nebulization of secretions)  Avoiding BiPAP to prevent aerosolization (including home BiPAP)  Avoiding NSAIDS for fever per WHO recommendations (3/16/2020)  Careful use of steroids in the absence of other indications - unless septic shock due to increased viral replication Fluid sparing resuscitation and Empiric antibiotics per likely source & patient allergies if patient hypoxic with airspace disease as 1 time doses CAP: azithromycin & ceftriaxone

## 2020-03-26 NOTE — SUBJECTIVE & OBJECTIVE
Past Medical History:   Diagnosis Date    Allergic rhinitis, cause unspecified     Breast cancer 2017    Elevated liver function tests     in the past    Fatty liver 05/02/2017    on ultrasound    Hepatomegaly 05/02/2017    on ultrasound    History of sciatica     HSV infection     Type 1 and 2    HTN (hypertension)     Hypothyroidism     Insulin resistance     Iron deficiency anemia, unspecified     Obesity     PONV (postoperative nausea and vomiting)     Tension headache     Vitamin D deficiency        Past Surgical History:   Procedure Laterality Date    BREAST LUMPECTOMY Right 2017    BTL      COLONOSCOPY N/A 2/22/2019    Procedure: COLONOSCOPY;  Surgeon: Mariano Santamaria MD;  Location: [facility];  Service: Endoscopy;  Laterality: N/A;    DILATION AND CURETTAGE OF UTERUS      x 1    HYSTERECTOMY      Laproscopic robot assissted with BSO       Review of patient's allergies indicates:   Allergen Reactions    Methylprednisone Anxiety and Palpitations    Lortab [hydrocodone-acetaminophen] Nausea And Vomiting     Historical.    Amoxicillin Rash     Historical    Sulfa (sulfonamide antibiotics) Rash       No current facility-administered medications on file prior to encounter.      Current Outpatient Medications on File Prior to Encounter   Medication Sig    amLODIPine (NORVASC) 5 MG tablet Take 1 tablet by mouth once daily    benazepril (LOTENSIN) 20 MG tablet TAKE 1 TABLET BY MOUTH ONCE DAILY    SYNTHROID 175 mcg tablet TAKE 1 TABLET BY MOUTH ONCE DAILY    acyclovir (ZOVIRAX) 400 MG tablet TAKE 1 TABLET BY MOUTH THREE TIMES DAILY WITH FOOD FOR 5 DAYS(PER EPISODE)    [DISCONTINUED] albuterol sulfate (PROAIR RESPICLICK) 90 mcg/actuation AePB Take 1-2 puffs every 4-6 hours as needed for cough and wheezing.    [DISCONTINUED] anastrozole (ARIMIDEX) 1 mg Tab Take 1 tablet (1 mg total) by mouth once daily.    [DISCONTINUED] cholecalciferol, vitamin D3, (VITAMIN D) 2,000 unit Cap Take 1.5  capsules by mouth once daily. 1 Capsule Oral Every day    [DISCONTINUED] fluticasone propionate (FLONASE) 50 mcg/actuation nasal spray Spray 2 sprays (100 mcg total) in each nostril daily as needed for Rhinitis.    [DISCONTINUED] ibuprofen (ADVIL,MOTRIN) 200 MG tablet Take 200-400 mg by mouth every 8 (eight) hours as needed for Pain.    [DISCONTINUED] metFORMIN (GLUCOPHAGE) 500 MG tablet TAKE FOUR TABLETS BY MOUTH IN THE EVENING AS DIRECTED    [DISCONTINUED] omeprazole (PRILOSEC) 20 MG capsule Take 1 capsule (20 mg total) by mouth daily as needed.     Family History     Problem Relation (Age of Onset)    Breast cancer Paternal Cousin    Cancer Father, Sister    Diabetes Mother    Glaucoma Mother    Heart failure Mother    Hypertension Mother    No Known Problems Daughter, Son, Daughter    Ovarian cancer Sister        Tobacco Use    Smoking status: Never Smoker    Smokeless tobacco: Never Used   Substance and Sexual Activity    Alcohol use: Yes     Frequency: 2-4 times a month     Drinks per session: 1 or 2     Binge frequency: Never     Comment: occasionally     Drug use: No    Sexual activity: Not Currently     Review of Systems   Constitutional: Positive for fatigue and fever. Negative for appetite change, chills, diaphoresis and unexpected weight change.   HENT: Negative for congestion, nosebleeds, sinus pressure and sore throat.    Eyes: Negative for pain, discharge and visual disturbance.   Respiratory: Positive for cough, chest tightness and shortness of breath. Negative for wheezing and stridor.    Cardiovascular: Negative for chest pain, palpitations and leg swelling.   Gastrointestinal: Negative for abdominal distention, abdominal pain, blood in stool, constipation, diarrhea, nausea and vomiting.   Endocrine: Negative for cold intolerance and heat intolerance.   Genitourinary: Negative for difficulty urinating, dysuria, flank pain, frequency and urgency.   Musculoskeletal: Negative for arthralgias,  back pain, joint swelling, myalgias, neck pain and neck stiffness.   Skin: Negative for rash and wound.   Allergic/Immunologic: Negative for food allergies and immunocompromised state.   Neurological: Positive for weakness. Negative for dizziness, seizures, syncope, facial asymmetry, speech difficulty, light-headedness, numbness and headaches.   Hematological: Negative for adenopathy.   Psychiatric/Behavioral: Negative for agitation, confusion and hallucinations.     Objective:     Vital Signs (Most Recent):  Temp: 99.3 °F (37.4 °C) (03/26/20 1320)  Pulse: 90 (03/26/20 1320)  Resp: (!) 22 (03/26/20 1320)  BP: 132/79 (03/26/20 1320)  SpO2: 97 % (03/26/20 1320) Vital Signs (24h Range):  Temp:  [99.3 °F (37.4 °C)-100.9 °F (38.3 °C)] 99.3 °F (37.4 °C)  Pulse:  [] 90  Resp:  [20-35] 22  SpO2:  [93 %-100 %] 97 %  BP: (132-163)/(72-86) 132/79     Weight: 125.8 kg (277 lb 4.8 oz)  Body mass index is 42.16 kg/m².    Physical Exam   Constitutional: She is oriented to person, place, and time. She appears well-developed and well-nourished. No distress.   HENT:   Head: Normocephalic and atraumatic.   Nose: Nose normal.   Mouth/Throat: Oropharynx is clear and moist.   Eyes: Conjunctivae and EOM are normal. No scleral icterus.   Neck: Normal range of motion. Neck supple. No tracheal deviation present.   Cardiovascular: Normal rate, regular rhythm, normal heart sounds and intact distal pulses. Exam reveals no gallop and no friction rub.   No murmur heard.  Pulmonary/Chest: Effort normal. No stridor. No respiratory distress. She has no wheezes. She has no rales. She exhibits no tenderness.   Crackles to bases    Abdominal: Soft. Bowel sounds are normal. She exhibits no distension and no mass. There is no tenderness. There is no rebound and no guarding.   Musculoskeletal: Normal range of motion. She exhibits no edema, tenderness or deformity.   Neurological: She is alert and oriented to person, place, and time. No cranial nerve  deficit. She exhibits normal muscle tone. Coordination normal.   Skin: Skin is warm and dry. No rash noted. She is not diaphoretic. No erythema. No pallor.   Psychiatric: She has a normal mood and affect. Her behavior is normal. Thought content normal.   Nursing note and vitals reviewed.        CRANIAL NERVES     CN III, IV, VI   Extraocular motions are normal.        Significant Labs:   ABGs:   Recent Labs   Lab 03/26/20  0938   PH 7.446   PCO2 37.3   HCO3 25.6   POCSATURATED 96   BE 2     BMP:   Recent Labs   Lab 03/26/20  0900         K 3.8      CO2 23   BUN 5*   CREATININE 0.7   CALCIUM 9.1     CBC:   Recent Labs   Lab 03/26/20  1100   WBC 5.75   HGB 13.8   HCT 41.8        CMP:   Recent Labs   Lab 03/26/20  0900      K 3.8      CO2 23      BUN 5*   CREATININE 0.7   CALCIUM 9.1   PROT 7.7   ALBUMIN 3.3*   BILITOT 0.3   ALKPHOS 65   AST 52*   ALT 52*   ANIONGAP 13   EGFRNONAA >60       Significant Imaging:   Imaging Results          X-Ray Chest AP Portable (Final result)  Result time 03/26/20 09:29:01    Final result by Afshin Dunham Jr., MD (03/26/20 09:29:01)                 Impression:      Airspace disease within the left mid and lower lung may relate to pneumonia.      Electronically signed by: Afshin Dunham Jr., MD  Date:    03/26/2020  Time:    09:29             Narrative:    EXAMINATION:  XR CHEST AP PORTABLE    CLINICAL HISTORY:  Shortness of breath    COMPARISON:  Prior radiograph from January 3, 2018.    FINDINGS:  There is airspace opacity within the left mid and left lower lung.  The more superior left lung and the right lung appear clear.  No pleural fluid or pneumothorax.  Heart size within normal limits.  No significant bony findings.

## 2020-03-26 NOTE — ED PROVIDER NOTES
SCRIBE #1 NOTE: I, Jean Kaufman, am scribing for, and in the presence of, Chance Allen MD. I have scribed the entire note.      History      Chief Complaint   Patient presents with    Chest Pain     chest pain, cough, chest congestion.  pt states she was swabbed for covid but has not gotten the results yet.     Review of patient's allergies indicates:   Allergen Reactions    Methylprednisone Anxiety and Palpitations    Lortab [hydrocodone-acetaminophen] Nausea And Vomiting     Historical.    Amoxicillin Rash     Historical    Sulfa (sulfonamide antibiotics) Rash        HPI   HPI    3/26/2020, 8:10 AM   History obtained from the patient      History of Present Illness: Kylie Urbano is a 54 y.o. female patient with a PMHx of breast cancer, HTN who presents to the Emergency Department for chest pain, onset several days PTA. Symptoms are constant and moderate in severity. No mitigating or exacerbating factors reported. Associated sxs include cough, fever (Tnow 100.9), SOB, and chest congestion. Patient denies any chills, n/v/d, weakness, numbness, dizziness, headache, and all other sxs at this time. Pt has been recently tested for COVID, results pending. No further complaints or concerns at this time.     Arrival mode: Personal vehicle    PCP: Beverly Parks MD       Past Medical History:  Past Medical History:   Diagnosis Date    Allergic rhinitis, cause unspecified     Breast cancer 2017    Elevated liver function tests     in the past    Fatty liver 05/02/2017    on ultrasound    Hepatomegaly 05/02/2017    on ultrasound    History of sciatica     HSV infection     Type 1 and 2    HTN (hypertension)     Hypothyroidism     Insulin resistance     Iron deficiency anemia, unspecified     Obesity     PONV (postoperative nausea and vomiting)     Tension headache     Vitamin D deficiency        Past Surgical History:  Past Surgical History:   Procedure Laterality Date    BREAST LUMPECTOMY Right  2017    BTL      COLONOSCOPY N/A 2/22/2019    Procedure: COLONOSCOPY;  Surgeon: Mariano Santamaria MD;  Location: [facility];  Service: Endoscopy;  Laterality: N/A;    DILATION AND CURETTAGE OF UTERUS      x 1    HYSTERECTOMY      Laproscopic robot assissted with BSO         Family History:  Family History   Problem Relation Age of Onset    Diabetes Mother     Hypertension Mother     Heart failure Mother     Glaucoma Mother     Cancer Father         Stomach cancer    Cancer Sister         Ovarian cancer    Ovarian cancer Sister     No Known Problems Daughter     No Known Problems Son     No Known Problems Daughter     Breast cancer Paternal Cousin     Stroke Neg Hx     Heart disease Neg Hx         MI/CAD       Social History:  Social History     Tobacco Use    Smoking status: Never Smoker    Smokeless tobacco: Never Used   Substance and Sexual Activity    Alcohol use: Yes     Frequency: 2-4 times a month     Drinks per session: 1 or 2     Binge frequency: Never     Comment: occasionally     Drug use: No    Sexual activity: Not Currently       ROS   Review of Systems   Constitutional: Positive for fever (Tnow 100.9). Negative for chills, diaphoresis and fatigue.   HENT: Positive for congestion (chest). Negative for sore throat.    Respiratory: Positive for cough and shortness of breath.    Cardiovascular: Positive for chest pain.   Gastrointestinal: Negative for diarrhea, nausea and vomiting.   Genitourinary: Negative for dysuria.   Musculoskeletal: Negative for back pain.   Skin: Negative for rash.   Neurological: Negative for dizziness, weakness, light-headedness, numbness and headaches.   Hematological: Does not bruise/bleed easily.   All other systems reviewed and are negative.    Physical Exam      Initial Vitals [03/26/20 0733]   BP Pulse Resp Temp SpO2   (!) 155/72 110 (!) 22 (!) 100.9 °F (38.3 °C) (!) 93 %      MAP       --          Physical Exam  Nursing Notes and Vital Signs  Reviewed.  Constitutional: Patient is in no acute distress. Well-developed and well-nourished.  Head: Atraumatic. Normocephalic.  Eyes: PERRL. EOM intact. Conjunctivae are not pale. No scleral icterus.  ENT: Mucous membranes are moist. Oropharynx is clear and symmetric.    Neck: Supple. Full ROM. No lymphadenopathy.  Cardiovascular: Tachycardic. Regular rhythm. No murmurs, rubs, or gallops. Distal pulses are 2+ and symmetric.  Pulmonary/Chest: Tachypneic. Intermittent dry cough during exam. Clear to auscultation bilaterally. No wheezing or rales.  Abdominal: Soft and non-distended.  There is no tenderness.  No rebound, guarding, or rigidity.   Musculoskeletal: Moves all extremities. No obvious deformities. No edema.  Skin: Warm and dry.  Neurological:  Alert, awake, and appropriate.  Normal speech.  No acute focal neurological deficits are appreciated.  Psychiatric: Normal affect. Good eye contact. Appropriate in content.    ED Course    Critical Care  Date/Time: 3/26/2020 11:38 AM  Performed by: Chance Allen MD  Authorized by: Chance Allen MD   Direct patient critical care time: 20 minutes  Additional history critical care time: 5 minutes  Ordering / reviewing critical care time: 5 minutes  Documentation critical care time: 5 minutes  Consulting other physicians critical care time: 5 minutes  Total critical care time (exclusive of procedural time) : 40 minutes  Critical care time was exclusive of separately billable procedures and treating other patients and teaching time.  Critical care was necessary to treat or prevent imminent or life-threatening deterioration of the following conditions: Sepsis, pneumonia, possible COVID.  Critical care was time spent personally by me on the following activities: blood draw for specimens, development of treatment plan with patient or surrogate, discussions with consultants, interpretation of cardiac output measurements, evaluation of patient's response to  treatment, examination of patient, obtaining history from patient or surrogate, ordering and performing treatments and interventions, ordering and review of laboratory studies, ordering and review of radiographic studies, pulse oximetry and re-evaluation of patient's condition.        ED Vital Signs:  Vitals:    03/26/20 1944 03/26/20 2100 03/26/20 2300 03/27/20 0019   BP: 136/71   (!) 125/58   Pulse: 99 92 91 105   Resp: 20   19   Temp: 100.1 °F (37.8 °C)   (!) 101.1 °F (38.4 °C)   TempSrc: Oral   Oral   SpO2: (!) 94%   (!) 91%   Weight:       Height:        03/27/20 0046 03/27/20 0100 03/27/20 0200 03/27/20 0300   BP:       Pulse:  87  83   Resp:       Temp: (!) 101.1 °F (38.4 °C)  99.1 °F (37.3 °C)    TempSrc:   Oral    SpO2:       Weight:       Height:        03/27/20 0500 03/27/20 0531 03/27/20 0924 03/27/20 1118   BP:  127/71 130/64 122/65   Pulse: 88 90 94 96   Resp:  20 18 16   Temp:  99.6 °F (37.6 °C) 99 °F (37.2 °C) 99.6 °F (37.6 °C)   TempSrc:  Oral Oral Oral   SpO2:  (!) 92% (!) 94% (!) 93%   Weight:       Height:        03/27/20 1646 03/27/20 2015 03/27/20 2038   BP: 120/69 114/64    Pulse: 100 102    Resp: 16 20    Temp: (!) 100.4 °F (38 °C) (!) 101 °F (38.3 °C) (!) 101 °F (38.3 °C)   TempSrc: Oral Oral    SpO2: (!) 93% (!) 94%    Weight:      Height:          Abnormal Lab Results:  Labs Reviewed   COMPREHENSIVE METABOLIC PANEL - Abnormal; Notable for the following components:       Result Value    BUN, Bld 5 (*)     Albumin 3.3 (*)     AST 52 (*)     ALT 52 (*)     All other components within normal limits   LACTATE DEHYDROGENASE - Abnormal; Notable for the following components:     (*)     All other components within normal limits   C-REACTIVE PROTEIN - Abnormal; Notable for the following components:    .5 (*)     All other components within normal limits   ISTAT PROCEDURE - Abnormal; Notable for the following components:    POC PO2 78 (*)     All other components within normal limits    TROPONIN I   PROCALCITONIN   LIPASE   CBC W/ AUTO DIFFERENTIAL   LACTIC ACID, PLASMA        All Lab Results:  Results for orders placed or performed during the hospital encounter of 03/26/20   Blood culture #1 **CANNOT BE ORDERED STAT**   Result Value Ref Range    Blood Culture, Routine No Growth to date    Blood culture #2 **CANNOT BE ORDERED STAT**   Result Value Ref Range    Blood Culture, Routine No Growth to date    Comprehensive metabolic panel   Result Value Ref Range    Sodium 139 136 - 145 mmol/L    Potassium 3.8 3.5 - 5.1 mmol/L    Chloride 103 95 - 110 mmol/L    CO2 23 23 - 29 mmol/L    Glucose 110 70 - 110 mg/dL    BUN, Bld 5 (L) 6 - 20 mg/dL    Creatinine 0.7 0.5 - 1.4 mg/dL    Calcium 9.1 8.7 - 10.5 mg/dL    Total Protein 7.7 6.0 - 8.4 g/dL    Albumin 3.3 (L) 3.5 - 5.2 g/dL    Total Bilirubin 0.3 0.1 - 1.0 mg/dL    Alkaline Phosphatase 65 55 - 135 U/L    AST 52 (H) 10 - 40 U/L    ALT 52 (H) 10 - 44 U/L    Anion Gap 13 8 - 16 mmol/L    eGFR if African American >60 >60 mL/min/1.73 m^2    eGFR if non African American >60 >60 mL/min/1.73 m^2   Troponin I   Result Value Ref Range    Troponin I 0.010 0.000 - 0.026 ng/mL   Lactate dehydrogenase   Result Value Ref Range     (H) 110 - 260 U/L   C-reactive protein   Result Value Ref Range    .5 (H) 0.0 - 8.2 mg/L   Procalcitonin   Result Value Ref Range    Procalcitonin 0.05 <0.25 ng/mL   Lipase   Result Value Ref Range    Lipase 50 4 - 60 U/L   CBC auto differential   Result Value Ref Range    WBC 5.75 3.90 - 12.70 K/uL    RBC 4.90 4.00 - 5.40 M/uL    Hemoglobin 13.8 12.0 - 16.0 g/dL    Hematocrit 41.8 37.0 - 48.5 %    Mean Corpuscular Volume 85 82 - 98 fL    Mean Corpuscular Hemoglobin 28.2 27.0 - 31.0 pg    Mean Corpuscular Hemoglobin Conc 33.0 32.0 - 36.0 g/dL    RDW 13.0 11.5 - 14.5 %    Platelets 313 150 - 350 K/uL    MPV 9.8 9.2 - 12.9 fL    Immature Granulocytes 0.5 0.0 - 0.5 %    Gran # (ANC) 4.0 1.8 - 7.7 K/uL    Immature Grans (Abs)  0.03 0.00 - 0.04 K/uL    Lymph # 1.3 1.0 - 4.8 K/uL    Mono # 0.4 0.3 - 1.0 K/uL    Eos # 0.0 0.0 - 0.5 K/uL    Baso # 0.00 0.00 - 0.20 K/uL    nRBC 0 0 /100 WBC    Gran% 69.7 38.0 - 73.0 %    Lymph% 22.8 18.0 - 48.0 %    Mono% 7.0 4.0 - 15.0 %    Eosinophil% 0.0 0.0 - 8.0 %    Basophil% 0.0 0.0 - 1.9 %    Differential Method Automated    Lactic acid, plasma   Result Value Ref Range    Lactate (Lactic Acid) 1.0 0.5 - 2.2 mmol/L   Brain natriuretic peptide   Result Value Ref Range    BNP 17 0 - 99 pg/mL   CK   Result Value Ref Range     (H) 20 - 180 U/L   ISTAT PROCEDURE   Result Value Ref Range    POC PH 7.446 7.35 - 7.45    POC PCO2 37.3 35 - 45 mmHg    POC PO2 78 (L) 80 - 100 mmHg    POC HCO3 25.6 24 - 28 mmol/L    POC BE 2 -2 to 2 mmol/L    POC SATURATED O2 96 95 - 100 %    Sample ARTERIAL     Site LR     Allens Test Pass     DelSys Nasal Can     Mode SPONT     Flow 2      Imaging Results:  Imaging Results          X-Ray Chest AP Portable (Final result)  Result time 03/26/20 09:29:01    Final result by Afshin Dunham Jr., MD (03/26/20 09:29:01)                 Impression:      Airspace disease within the left mid and lower lung may relate to pneumonia.      Electronically signed by: Afshin Dunham Jr., MD  Date:    03/26/2020  Time:    09:29             Narrative:    EXAMINATION:  XR CHEST AP PORTABLE    CLINICAL HISTORY:  Shortness of breath    COMPARISON:  Prior radiograph from January 3, 2018.    FINDINGS:  There is airspace opacity within the left mid and left lower lung.  The more superior left lung and the right lung appear clear.  No pleural fluid or pneumothorax.  Heart size within normal limits.  No significant bony findings.                               The EKG was ordered, reviewed, and independently interpreted by the ED provider.  Interpretation time: 07:57  Rate: 120 BPM  Rhythm: sinus tachycardia with premature ventricular complexes.  Interpretation: Possible left atrial enlargement. Cannot  rule out anterior infarct, age undetermined. No STEMI.             The Emergency Provider reviewed the vital signs and test results, which are outlined above.    ED Discussion     10:00 AM: Pharmacy recommends giving the pt rocephin and azithromycin instead of vancomycin.    12:12 PM: Discussed case with Pam Parks NP ([facility]). Dr. Zacarias agrees with current care and management of pt and accepts admission.   Admitting Service: [facility]  Admitting Physician: Dr. Zacarias  Admit to: Inpatient Tele    12:13 PM: Re-evaluated pt. I have discussed test results, shared treatment plan, and the need for admission with patient and family at bedside. Pt and family express understanding at this time and agree with all information. All questions answered. Pt and family have no further questions or concerns at this time. Pt is ready for admit.         ED Medication(s):  Medications   amLODIPine tablet 5 mg (5 mg Oral Given 3/27/20 0926)   levothyroxine tablet 175 mcg (175 mcg Oral Given 3/27/20 0534)   benazepriL tablet 20 mg (20 mg Oral Given 3/27/20 0927)   sodium chloride 0.9% flush 10 mL (has no administration in time range)   glucose chewable tablet 16 g (has no administration in time range)   glucose chewable tablet 24 g (has no administration in time range)   dextrose 10 % infusion (has no administration in time range)   dextrose 50% injection 25 g (has no administration in time range)   glucagon (human recombinant) injection 1 mg (has no administration in time range)   enoxaparin injection 40 mg (40 mg Subcutaneous Given 3/27/20 2038)   acetaminophen tablet 1,000 mg (1,000 mg Oral Given 3/27/20 2038)   cefTRIAXone (ROCEPHIN) 1 g in dextrose 5 % 50 mL IVPB (1 g Intravenous New Bag 3/27/20 0926)   azithromycin tablet 250 mg (250 mg Oral Given 3/27/20 0926)   albuterol inhaler 2 puff (has no administration in time range)   acetaminophen tablet 1,000 mg (1,000 mg Oral Given 3/26/20 0857)   lactated ringers  bolus 1,000 mL (1,000 mLs Intravenous New Bag 3/26/20 1135)   cefTRIAXone (ROCEPHIN) 1 g in dextrose 5 % 50 mL IVPB (0 g Intravenous Stopped 3/26/20 1210)   azithromycin 500 mg in dextrose 5 % 250 mL IVPB (ready to mix system) (500 mg Intravenous New Bag 3/26/20 1315)     Current Discharge Medication List              Medical Decision Making    Medical Decision Making:   Clinical Tests:   Lab Tests: Ordered and Reviewed  Radiological Study: Ordered and Reviewed  Medical Tests: Ordered and Reviewed           Scribe Attestation:   Scribe #1: I performed the above scribed service and the documentation accurately describes the services I performed. I attest to the accuracy of the note.    Attending:   Physician Attestation Statement for Scribe #1: I, Chance Allen MD, personally performed the services described in this documentation, as scribed by Jean Kaufman, in my presence, and it is both accurate and complete.          Clinical Impression       ICD-10-CM ICD-9-CM   1. Suspected Covid-19 Virus Infection R68.89    2. SOB (shortness of breath) R06.02 786.05   3. Chest pain R07.9 786.50       Disposition:   Disposition: Admitted  Condition: Fair         Chance Allen MD  03/27/20 2100

## 2020-03-26 NOTE — ED NOTES
Pt c/o SOB, palpitations, and chest pain with inhalation beginning yesterday.    Patient placed on cardiac monitor, continuous pulse oximetry and automatic blood pressure cuff. Bed placed in low locked position, side rails up x 2, call light is within reach of patient, will continue to monitor.    Patient identifies self as Kylie Urbano      LOC: The patient is awake, alert and aware of environment with an appropriate affect, the patient is oriented x 3 and speaking appropriately.  APPEARANCE: Patient resting comfortably and in no acute distress, patient is clean and well groomed, patient's clothing is properly fastened.  SKIN: The skin is warm and dry, color consistent with ethnicity, patient has normal skin turgor and moist mucus membranes, skin intact, no breakdown or bruising noted.  MUSCULOSKELETAL: Patient moving all extremities well, no obvious swelling or deformities noted.  RESPIRATORY: Diminished breath sounds noted to bilateral lung fields, respirations are spontaneous, patient has a normal effort and rate, no accessory muscle use noted.  CARDIAC: Patient has a rhythm of sinus tachycardia at a rate of 115 BPM, no peripheral edema noted, capillary refill < 3 seconds.  ABDOMEN: Soft and non tender to palpation, no distention noted.  NEUROLOGIC: PERRL, 3 mm bilaterally, eyes open spontaneously, behavior appropriate to situation, follows commands, facial expression symmetrical, bilateral hand grasp equal and even, purposeful motor response noted, normal sensation in all extremities when touched with a finger.

## 2020-03-27 LAB — CK SERPL-CCNC: 354 U/L (ref 20–180)

## 2020-03-27 PROCEDURE — 63600175 PHARM REV CODE 636 W HCPCS: Performed by: NURSE PRACTITIONER

## 2020-03-27 PROCEDURE — 63600175 PHARM REV CODE 636 W HCPCS: Performed by: INTERNAL MEDICINE

## 2020-03-27 PROCEDURE — 25000003 PHARM REV CODE 250: Performed by: NURSE PRACTITIONER

## 2020-03-27 PROCEDURE — 25000003 PHARM REV CODE 250: Performed by: INTERNAL MEDICINE

## 2020-03-27 PROCEDURE — 21400001 HC TELEMETRY ROOM

## 2020-03-27 RX ADMIN — BENAZEPRIL HYDROCHLORIDE 20 MG: 20 TABLET, COATED ORAL at 09:03

## 2020-03-27 RX ADMIN — CEFTRIAXONE 1 G: 1 INJECTION, SOLUTION INTRAVENOUS at 09:03

## 2020-03-27 RX ADMIN — ENOXAPARIN SODIUM 40 MG: 100 INJECTION SUBCUTANEOUS at 08:03

## 2020-03-27 RX ADMIN — AMLODIPINE BESYLATE 5 MG: 5 TABLET ORAL at 09:03

## 2020-03-27 RX ADMIN — ACETAMINOPHEN 1000 MG: 500 TABLET ORAL at 08:03

## 2020-03-27 RX ADMIN — ACETAMINOPHEN 1000 MG: 500 TABLET ORAL at 12:03

## 2020-03-27 RX ADMIN — AZITHROMYCIN MONOHYDRATE 250 MG: 250 TABLET ORAL at 09:03

## 2020-03-27 RX ADMIN — LEVOTHYROXINE SODIUM 175 MCG: 150 TABLET ORAL at 05:03

## 2020-03-27 NOTE — PLAN OF CARE
CM contacted patient by phone to complete the discharge planning assessment.  Patient lives at home and her son resides with her.  Her son is able to assist her with any needs if necessary.  Patient manages her own health care and does not normally require any assistance.  She does not use any medical equipment or have home health services.  She does not anticipate any discharge needs at this time.      D/C Plan: Home, no needs  PCP: Dr Parks  Preferred Pharmacy: [facility]  Discharge transportation: Son  My [facility]: Active  Pharmacy Bedside Delivery: Yes     03/27/20 0824   Discharge Assessment   Assessment Type Discharge Planning Assessment   Confirmed/corrected address and phone number on facesheet? Yes   Assessment information obtained from? Patient;Medical Record   Expected Length of Stay (days)   (TBD)   Communicated expected length of stay with patient/caregiver yes   Prior to hospitilization cognitive status: Alert/Oriented   Prior to hospitalization functional status: Independent   Current cognitive status: Alert/Oriented   Current Functional Status: Independent   Facility Arrived From: home   Lives With child(yusef), adult   Able to Return to Prior Arrangements yes   Is patient able to care for self after discharge? Yes   Who are your caregiver(s) and their phone number(s)? Octavia Urbano, daughter [phone]   Patient's perception of discharge disposition home or selfcare   Readmission Within the Last 30 Days no previous admission in last 30 days   Patient currently being followed by outpatient case management? No   Patient currently receives any other outside agency services? No   Equipment Currently Used at Home none   Do you have any problems affording any of your prescribed medications? No   Is the patient taking medications as prescribed? yes   Does the patient have transportation home? Yes   Transportation Anticipated family or friend will provide   Dialysis Name and Scheduled days NA   Does the patient  receive services at the [facility]? No   Discharge Plan A Home with family   DME Needed Upon Discharge  none   Patient/Family in Agreement with Plan yes

## 2020-03-27 NOTE — HOSPITAL COURSE
3/27:  Patient continues to require 2 L nasal pain.  Wean O2 as tolerated.  Plan to perform home O2 protocol tomorrow.  Continue to treat symptomatically for presumed COVID-19 infection.  Possible discharge tomorrow if stable.  3/28- looks and feels better, cough, SOB improving. Moving around in the room, going to the [place]. Qualifies for Home O2. Eating drinking well. Still await COVID.   3/29- continues to improve, cough, SOB lot better, afebrile. Weakness better, eating drinking well. Covid still pending but all her Sx have significantly improved, her CRP and LDH are coming down. She has qualified for Home O2 which has been arranged. She is agreeable to discharge home today and was instructed to maintain her quarantine for next 14 days or until resulted. She understands and accepts. She was seen and examined and deemed stable for discharge home today.

## 2020-03-27 NOTE — ASSESSMENT & PLAN NOTE
- COVID-19 testing Collection Date: 3/25/2020 Collection Time:  10:46 AM  - Infection Control notified 3/26/2020    - Isolation:   - Airborne, Contact and Droplet Precautions  - Cohort patients into COVID units  - N95 masks must be fit tested, wear eye protection  - 20 second hand hygiene              - Limit visitors per hospital policy              - Consolidating lab draws, nursing care, provider visits, and interventions    - Diagnostics: (leukopenia, hyponatremia, hyperferritinemia, elevated troponin, elevated d-dimer, age, and comorbidities are significant predictors of poor clinical outcome)  CBC, CMP, Procalcitonin, CRP, LDH, BNP, Troponin, Rapid Flu and Portable CXR    - Management:  Supplemental O2 to maintain SpO2 >92%  Telemetry  Continuous/intermittent Pulse Ox  Albuterol INHALER PRN (avoid nebulization of secretions)  Avoiding BiPAP to prevent aerosolization (including home BiPAP)  Avoiding NSAIDS for fever per WHO recommendations (3/16/2020)  Careful use of steroids in the absence of other indications - unless septic shock due to increased viral replication Fluid sparing resuscitation and Empiric antibiotics per likely source & patient allergies if patient hypoxic with airspace disease as 1 time doses CAP: azithromycin & ceftriaxone    3/27:  Continue current management  Plan to perform home O2 protocol tomorrow.    Wean O2 as tolerated   plan to discharge home with stable

## 2020-03-27 NOTE — SUBJECTIVE & OBJECTIVE
Interval History:  No acute events reported overnight.  Patient states she does not use home O2.  Denies any other issues.  Still complains of some shortness of breath.    Review of Systems   Constitutional: Positive for fever. Negative for fatigue.   HENT: Negative for sinus pressure.    Eyes: Negative for visual disturbance.   Respiratory: Positive for cough and shortness of breath.    Cardiovascular: Negative for chest pain.   Gastrointestinal: Negative for nausea and vomiting.   Genitourinary: Negative for difficulty urinating.   Musculoskeletal: Negative for back pain.   Skin: Negative for rash.   Neurological: Negative for headaches.     Objective:     Vital Signs (Most Recent):  Temp: (!) 100.4 °F (38 °C) (03/27/20 1646)  Pulse: 100 (03/27/20 1646)  Resp: 16 (03/27/20 1646)  BP: 120/69 (03/27/20 1646)  SpO2: (!) 93 % (03/27/20 1646) Vital Signs (24h Range):  Temp:  [99 °F (37.2 °C)-101.1 °F (38.4 °C)] 100.4 °F (38 °C)  Pulse:  [] 100  Resp:  [16-20] 16  SpO2:  [91 %-95 %] 93 %  BP: (120-136)/(58-71) 120/69     Weight: 125.8 kg (277 lb 4.8 oz)  Body mass index is 42.16 kg/m².    Intake/Output Summary (Last 24 hours) at 3/27/2020 1702  Last data filed at 3/27/2020 0400  Gross per 24 hour   Intake 720 ml   Output --   Net 720 ml      Physical Exam   Constitutional: She is oriented to person, place, and time. She appears well-developed and well-nourished. No distress.   HENT:   Head: Normocephalic and atraumatic.   Eyes: Pupils are equal, round, and reactive to light.   Neck: Normal range of motion. Thyromegaly present.   Cardiovascular: Normal rate, regular rhythm and normal heart sounds. Exam reveals no gallop and no friction rub.   No murmur heard.  Pulmonary/Chest: Effort normal and breath sounds normal. No stridor. No respiratory distress. She has no wheezes. She has no rales.   Abdominal: Soft. Bowel sounds are normal. She exhibits no distension and no mass. There is no tenderness. There is no  guarding.   Musculoskeletal: Normal range of motion. She exhibits no edema.   Neurological: She is alert and oriented to person, place, and time.   Skin: Skin is warm. She is not diaphoretic. No erythema.   Psychiatric: She has a normal mood and affect. Her behavior is normal. Judgment and thought content normal.       Significant Labs:   Recent Lab Results       03/26/20  2359                 All pertinent labs within the past 24 hours have been reviewed.    Significant Imaging: I have reviewed all pertinent imaging results/findings within the past 24 hours.

## 2020-03-27 NOTE — PLAN OF CARE
Patient alert and orient. Febrile X 1 relieved by PRN APAP. Patient requesting crackers, provided. Continue on 2L. NSR on monitor. Safety maintained. Continue IV antibiotics.

## 2020-03-27 NOTE — PROGRESS NOTES
[facility] Hospital Medicine  Progress Note    Patient Name: Kylie Urbano  MRN: [id number]  Patient Class: IP- Inpatient   Admission Date: 3/26/2020  Length of Stay: 1 days  Attending Physician: Abiel Zacarias MD  Primary Care Provider: Beverly Parks MD        Subjective:     Principal Problem:COVID-19 virus infection        HPI:  The patient is a 54o female with hx Breast cancer s/p Chemo/radiation 2017-18-no on Arimidex, HTN, hypothyroidism who presented to ED with Cough, generalized weakness, fatigue, chest congestion, chest tightness, and SHARIF x 3 days that have progressively worsened. The patient was tested for COVID 19 yesterday and results pending.   In the ED, WBC normal, Temp 100.9F, .5, . CXR showed Airspace disease within the left mid and lower lung may relate to pneumonia.    Overview/Hospital Course:  3/27:  Patient continues to require 2 L nasal pain.  Wean O2 as tolerated.  Plan to perform home O2 protocol tomorrow.  Continue to treat symptomatically for presumed COVID-19 infection.  Possible discharge tomorrow if stable.      Interval History:  No acute events reported overnight.  Patient states she does not use home O2.  Denies any other issues.  Still complains of some shortness of breath.    Review of Systems   Constitutional: Positive for fever. Negative for fatigue.   HENT: Negative for sinus pressure.    Eyes: Negative for visual disturbance.   Respiratory: Positive for cough and shortness of breath.    Cardiovascular: Negative for chest pain.   Gastrointestinal: Negative for nausea and vomiting.   Genitourinary: Negative for difficulty urinating.   Musculoskeletal: Negative for back pain.   Skin: Negative for rash.   Neurological: Negative for headaches.     Objective:     Vital Signs (Most Recent):  Temp: (!) 100.4 °F (38 °C) (03/27/20 1646)  Pulse: 100 (03/27/20 1646)  Resp: 16 (03/27/20 1646)  BP: 120/69 (03/27/20 1646)  SpO2: (!) 93 % (03/27/20 1646) Vital Signs  (24h Range):  Temp:  [99 °F (37.2 °C)-101.1 °F (38.4 °C)] 100.4 °F (38 °C)  Pulse:  [] 100  Resp:  [16-20] 16  SpO2:  [91 %-95 %] 93 %  BP: (120-136)/(58-71) 120/69     Weight: 125.8 kg (277 lb 4.8 oz)  Body mass index is 42.16 kg/m².    Intake/Output Summary (Last 24 hours) at 3/27/2020 1702  Last data filed at 3/27/2020 0400  Gross per 24 hour   Intake 720 ml   Output --   Net 720 ml      Physical Exam   Constitutional: She is oriented to person, place, and time. She appears well-developed and well-nourished. No distress.   HENT:   Head: Normocephalic and atraumatic.   Eyes: Pupils are equal, round, and reactive to light.   Neck: Normal range of motion. Thyromegaly present.   Cardiovascular: Normal rate, regular rhythm and normal heart sounds. Exam reveals no gallop and no friction rub.   No murmur heard.  Pulmonary/Chest: Effort normal and breath sounds normal. No stridor. No respiratory distress. She has no wheezes. She has no rales.   Abdominal: Soft. Bowel sounds are normal. She exhibits no distension and no mass. There is no tenderness. There is no guarding.   Musculoskeletal: Normal range of motion. She exhibits no edema.   Neurological: She is alert and oriented to person, place, and time.   Skin: Skin is warm. She is not diaphoretic. No erythema.   Psychiatric: She has a normal mood and affect. Her behavior is normal. Judgment and thought content normal.       Significant Labs:   Recent Lab Results       03/26/20  2359                 All pertinent labs within the past 24 hours have been reviewed.    Significant Imaging: I have reviewed all pertinent imaging results/findings within the past 24 hours.      Assessment/Plan:      * presumed Covid-19 Virus Infection  - COVID-19 testing Collection Date: 3/25/2020 Collection Time:  10:46 AM  - Infection Control notified 3/26/2020    - Isolation:   - Airborne, Contact and Droplet Precautions  - Cohort patients into COVID units  - N95 masks must be  fit tested, wear eye protection  - 20 second hand hygiene              - Limit visitors per hospital policy              - Consolidating lab draws, nursing care, provider visits, and interventions    - Diagnostics: (leukopenia, hyponatremia, hyperferritinemia, elevated troponin, elevated d-dimer, age, and comorbidities are significant predictors of poor clinical outcome)  CBC, CMP, Procalcitonin, CRP, LDH, BNP, Troponin, Rapid Flu and Portable CXR    - Management:  Supplemental O2 to maintain SpO2 >92%  Telemetry  Continuous/intermittent Pulse Ox  Albuterol INHALER PRN (avoid nebulization of secretions)  Avoiding BiPAP to prevent aerosolization (including home BiPAP)  Avoiding NSAIDS for fever per WHO recommendations (3/16/2020)  Careful use of steroids in the absence of other indications - unless septic shock due to increased viral replication Fluid sparing resuscitation and Empiric antibiotics per likely source & patient allergies if patient hypoxic with airspace disease as 1 time doses CAP: azithromycin & ceftriaxone    3/27:  Continue current management  Plan to perform home O2 protocol tomorrow.    Wean O2 as tolerated   plan to discharge home with stable                    Morbid obesity with BMI of 40.0-44.9, adult  Counseled on healthy weight, diet, and exercise       Breast cancer, right breast  S/p chemo/radiation 2017-18        Hypothyroidism  Cont levothyroxine       HTN (hypertension)  Stable   Cont Amlodipine and Benazepril       VTE Risk Mitigation (From admission, onward)         Ordered     enoxaparin injection 40 mg  Daily      03/26/20 1345     IP VTE HIGH RISK PATIENT  Once      03/26/20 1345                      Abiel Zacarias MD  Department of Hospital Medicine   [facility]

## 2020-03-27 NOTE — CHAPLAIN
Initial visit with patient was done by phone.  Provided support through listening.  Took time to assess for any spiritual needs and she currently didn't have any.  I will follow up by phone as needed.    Chaplain Yunior Liz M.Div., [facility]

## 2020-03-28 LAB
ALBUMIN SERPL BCP-MCNC: 3.1 G/DL (ref 3.5–5.2)
ALP SERPL-CCNC: 62 U/L (ref 55–135)
ALT SERPL W/O P-5'-P-CCNC: 36 U/L (ref 10–44)
ANION GAP SERPL CALC-SCNC: 7 MMOL/L (ref 8–16)
AST SERPL-CCNC: 34 U/L (ref 10–40)
BASOPHILS # BLD AUTO: 0.01 K/UL (ref 0–0.2)
BASOPHILS NFR BLD: 0.2 % (ref 0–1.9)
BILIRUB SERPL-MCNC: 0.3 MG/DL (ref 0.1–1)
BUN SERPL-MCNC: 6 MG/DL (ref 6–20)
CALCIUM SERPL-MCNC: 9.5 MG/DL (ref 8.7–10.5)
CHLORIDE SERPL-SCNC: 103 MMOL/L (ref 95–110)
CO2 SERPL-SCNC: 28 MMOL/L (ref 23–29)
CREAT SERPL-MCNC: 0.7 MG/DL (ref 0.5–1.4)
CRP SERPL-MCNC: 86.1 MG/L (ref 0–8.2)
DIFFERENTIAL METHOD: ABNORMAL
EOSINOPHIL # BLD AUTO: 0 K/UL (ref 0–0.5)
EOSINOPHIL NFR BLD: 0.2 % (ref 0–8)
ERYTHROCYTE [DISTWIDTH] IN BLOOD BY AUTOMATED COUNT: 13.1 % (ref 11.5–14.5)
ERYTHROCYTE [SEDIMENTATION RATE] IN BLOOD BY WESTERGREN METHOD: 84 MM/HR (ref 0–20)
EST. GFR  (AFRICAN AMERICAN): >60 ML/MIN/1.73 M^2
EST. GFR  (NON AFRICAN AMERICAN): >60 ML/MIN/1.73 M^2
GLUCOSE SERPL-MCNC: 99 MG/DL (ref 70–110)
HCT VFR BLD AUTO: 38 % (ref 37–48.5)
HGB BLD-MCNC: 12.6 G/DL (ref 12–16)
IMM GRANULOCYTES # BLD AUTO: 0.03 K/UL (ref 0–0.04)
IMM GRANULOCYTES NFR BLD AUTO: 0.5 % (ref 0–0.5)
LDH SERPL L TO P-CCNC: 480 U/L (ref 110–260)
LYMPHOCYTES # BLD AUTO: 1.8 K/UL (ref 1–4.8)
LYMPHOCYTES NFR BLD: 26.8 % (ref 18–48)
MAGNESIUM SERPL-MCNC: 2.3 MG/DL (ref 1.6–2.6)
MCH RBC QN AUTO: 28.4 PG (ref 27–31)
MCHC RBC AUTO-ENTMCNC: 33.2 G/DL (ref 32–36)
MCV RBC AUTO: 86 FL (ref 82–98)
MONOCYTES # BLD AUTO: 0.7 K/UL (ref 0.3–1)
MONOCYTES NFR BLD: 10.7 % (ref 4–15)
NEUTROPHILS # BLD AUTO: 4.1 K/UL (ref 1.8–7.7)
NEUTROPHILS NFR BLD: 61.6 % (ref 38–73)
NRBC BLD-RTO: 0 /100 WBC
PHOSPHATE SERPL-MCNC: 3.8 MG/DL (ref 2.7–4.5)
PLATELET # BLD AUTO: 391 K/UL (ref 150–350)
PMV BLD AUTO: 9.1 FL (ref 9.2–12.9)
POTASSIUM SERPL-SCNC: 3.7 MMOL/L (ref 3.5–5.1)
PROT SERPL-MCNC: 7.7 G/DL (ref 6–8.4)
RBC # BLD AUTO: 4.43 M/UL (ref 4–5.4)
SODIUM SERPL-SCNC: 138 MMOL/L (ref 136–145)
WBC # BLD AUTO: 6.61 K/UL (ref 3.9–12.7)

## 2020-03-28 PROCEDURE — 80053 COMPREHEN METABOLIC PANEL: CPT

## 2020-03-28 PROCEDURE — 84100 ASSAY OF PHOSPHORUS: CPT

## 2020-03-28 PROCEDURE — 85025 COMPLETE CBC W/AUTO DIFF WBC: CPT

## 2020-03-28 PROCEDURE — 63600175 PHARM REV CODE 636 W HCPCS: Performed by: INTERNAL MEDICINE

## 2020-03-28 PROCEDURE — 21400001 HC TELEMETRY ROOM

## 2020-03-28 PROCEDURE — 25000003 PHARM REV CODE 250: Performed by: INTERNAL MEDICINE

## 2020-03-28 PROCEDURE — 94761 N-INVAS EAR/PLS OXIMETRY MLT: CPT

## 2020-03-28 PROCEDURE — 85651 RBC SED RATE NONAUTOMATED: CPT

## 2020-03-28 PROCEDURE — 25000003 PHARM REV CODE 250: Performed by: NURSE PRACTITIONER

## 2020-03-28 PROCEDURE — 63600175 PHARM REV CODE 636 W HCPCS: Performed by: NURSE PRACTITIONER

## 2020-03-28 PROCEDURE — 83615 LACTATE (LD) (LDH) ENZYME: CPT

## 2020-03-28 PROCEDURE — 86140 C-REACTIVE PROTEIN: CPT

## 2020-03-28 PROCEDURE — 27000221 HC OXYGEN, UP TO 24 HOURS

## 2020-03-28 PROCEDURE — 83735 ASSAY OF MAGNESIUM: CPT

## 2020-03-28 RX ORDER — HYDROXYCHLOROQUINE SULFATE 200 MG/1
400 TABLET, FILM COATED ORAL DAILY
Status: DISCONTINUED | OUTPATIENT
Start: 2020-03-29 | End: 2020-03-29 | Stop reason: HOSPADM

## 2020-03-28 RX ORDER — HYDROXYCHLOROQUINE SULFATE 200 MG/1
400 TABLET, FILM COATED ORAL 2 TIMES DAILY
Status: COMPLETED | OUTPATIENT
Start: 2020-03-28 | End: 2020-03-28

## 2020-03-28 RX ORDER — DOXYCYCLINE HYCLATE 100 MG
100 TABLET ORAL EVERY 12 HOURS
Status: DISCONTINUED | OUTPATIENT
Start: 2020-03-28 | End: 2020-03-29 | Stop reason: HOSPADM

## 2020-03-28 RX ADMIN — HYDROXYCHLOROQUINE SULFATE 400 MG: 200 TABLET, FILM COATED ORAL at 09:03

## 2020-03-28 RX ADMIN — AZITHROMYCIN MONOHYDRATE 250 MG: 250 TABLET ORAL at 08:03

## 2020-03-28 RX ADMIN — CEFTRIAXONE 1 G: 1 INJECTION, SOLUTION INTRAVENOUS at 08:03

## 2020-03-28 RX ADMIN — ENOXAPARIN SODIUM 40 MG: 100 INJECTION SUBCUTANEOUS at 09:03

## 2020-03-28 RX ADMIN — DOXYCYCLINE HYCLATE 100 MG: 100 TABLET, COATED ORAL at 09:03

## 2020-03-28 RX ADMIN — LEVOTHYROXINE SODIUM 175 MCG: 150 TABLET ORAL at 05:03

## 2020-03-28 RX ADMIN — AMLODIPINE BESYLATE 5 MG: 5 TABLET ORAL at 08:03

## 2020-03-28 RX ADMIN — HYDROXYCHLOROQUINE SULFATE 400 MG: 200 TABLET, FILM COATED ORAL at 12:03

## 2020-03-28 RX ADMIN — BENAZEPRIL HYDROCHLORIDE 20 MG: 20 TABLET, COATED ORAL at 08:03

## 2020-03-28 NOTE — PLAN OF CARE
Patient alert and orient. NSR on monitor. Continues on 2L NC. Febrile to 101 at 8pm relieved by PRN APAP. Safety maintained. Continue antibiotics.       Problem: Fall Injury Risk  Goal: Absence of Fall and Fall-Related Injury  Outcome: Ongoing, Progressing     Problem: Adult Inpatient Plan of Care  Goal: Plan of Care Review  Outcome: Ongoing, Progressing  Goal: Patient-Specific Goal (Individualization)  Outcome: Ongoing, Progressing  Goal: Absence of Hospital-Acquired Illness or Injury  Outcome: Ongoing, Progressing  Goal: Optimal Comfort and Wellbeing  Outcome: Ongoing, Progressing  Goal: Readiness for Transition of Care  Outcome: Ongoing, Progressing  Goal: Rounds/Family Conference  Outcome: Ongoing, Progressing     Problem: Infection (Pneumonia)  Goal: Resolution of Infection Signs/Symptoms  Outcome: Ongoing, Progressing     Problem: Respiratory Compromise (Pneumonia)  Goal: Effective Oxygenation and Ventilation  Outcome: Ongoing, Progressing

## 2020-03-28 NOTE — PLAN OF CARE
Dx: presumed COVID-19 infection    POC: IV abx, supplemental oxygen via NC, PO medications as ordered. Activity encouraged within room    Airborne and contact isolation in place.    Bed remains low and locked, side rails up x 2, call bell and belongings within reach.    Clutter removed from room, lighting adjusted when rounding.    Frequent repositioning encouraged to prevent pressure injury. - done by patient independently.    IV antibiotics administered as ordered. Saline locked when not infusing.    Pain frequently monitored, interventions implemented as ordered and appropriate. - Pt has denied pain today.    VS taken Q4 hours per unit routine. PRN tylenol to be given if pt is febrile.     Cardiac monitor in place, Normal Sinus Rhythm - Sinus Tachycardia.    Educated pt on plan of care. Pt agreeable and verbalized understanding. Will continue to monitor.

## 2020-03-28 NOTE — PROGRESS NOTES
Home Oxygen Evaluation    Date Performed: 3/28/2020    1) Patient's Home O2 Sat on room air, while at rest: 88        If O2 sats on room air at rest are 88% or below, patient qualifies. No additional testing needed. Document N/A in steps 2 and 3. If 89% or above, complete steps 2.      2) Patient's O2 Sat on room air while exercising: na        If O2 sats on room air while exercising remain 89% or above patient does not qualify, no further testing needed Document N/A in step 3. If O2 sats on room air while exercising are 88% or below, continue to step 3.      3) Patient's O2 Sat while exercising on O2: na at na LPM         (Must show improvement from #2 for patients to qualify)    If O2 sats improve on oxygen, patient qualifies for portable oxygen. If not, the patient does not qualify.

## 2020-03-29 VITALS
OXYGEN SATURATION: 94 % | TEMPERATURE: 100 F | HEIGHT: 68 IN | HEART RATE: 93 BPM | BODY MASS INDEX: 42.03 KG/M2 | WEIGHT: 277.31 LBS | SYSTOLIC BLOOD PRESSURE: 127 MMHG | RESPIRATION RATE: 16 BRPM | DIASTOLIC BLOOD PRESSURE: 68 MMHG

## 2020-03-29 PROBLEM — U07.1 COVID-19 VIRUS INFECTION: Status: RESOLVED | Noted: 2020-03-26 | Resolved: 2020-03-29

## 2020-03-29 PROCEDURE — 25000003 PHARM REV CODE 250: Performed by: NURSE PRACTITIONER

## 2020-03-29 PROCEDURE — 27000221 HC OXYGEN, UP TO 24 HOURS

## 2020-03-29 PROCEDURE — 25000003 PHARM REV CODE 250: Performed by: INTERNAL MEDICINE

## 2020-03-29 PROCEDURE — 63600175 PHARM REV CODE 636 W HCPCS: Performed by: INTERNAL MEDICINE

## 2020-03-29 PROCEDURE — 94761 N-INVAS EAR/PLS OXIMETRY MLT: CPT

## 2020-03-29 PROCEDURE — 99900035 HC TECH TIME PER 15 MIN (STAT)

## 2020-03-29 RX ORDER — HYDROXYCHLOROQUINE SULFATE 200 MG/1
400 TABLET, FILM COATED ORAL DAILY
Qty: 3 TABLET | Refills: 0 | Status: SHIPPED | OUTPATIENT
Start: 2020-03-30 | End: 2020-04-09 | Stop reason: ALTCHOICE

## 2020-03-29 RX ORDER — HYDROXYCHLOROQUINE SULFATE 200 MG/1
400 TABLET, FILM COATED ORAL DAILY
Qty: 3 TABLET | Refills: 0 | Status: SHIPPED | OUTPATIENT
Start: 2020-03-30 | End: 2020-03-29

## 2020-03-29 RX ORDER — ACETAMINOPHEN 500 MG
1000 TABLET ORAL EVERY 8 HOURS PRN
Qty: 30 TABLET | Refills: 0 | Status: SHIPPED | OUTPATIENT
Start: 2020-03-29 | End: 2021-05-20

## 2020-03-29 RX ORDER — DOXYCYCLINE HYCLATE 100 MG
100 TABLET ORAL EVERY 12 HOURS
Qty: 10 TABLET | Refills: 0 | Status: SHIPPED | OUTPATIENT
Start: 2020-03-29 | End: 2020-03-29

## 2020-03-29 RX ORDER — ALBUTEROL SULFATE 90 UG/1
2 AEROSOL, METERED RESPIRATORY (INHALATION) EVERY 4 HOURS PRN
Qty: 18 G | Refills: 0 | Status: SHIPPED | OUTPATIENT
Start: 2020-03-29 | End: 2020-03-29

## 2020-03-29 RX ORDER — ACETAMINOPHEN 500 MG
1000 TABLET ORAL EVERY 8 HOURS PRN
Qty: 30 TABLET | Refills: 0 | Status: SHIPPED | OUTPATIENT
Start: 2020-03-29 | End: 2020-03-29

## 2020-03-29 RX ORDER — DOXYCYCLINE HYCLATE 100 MG
100 TABLET ORAL EVERY 12 HOURS
Qty: 10 TABLET | Refills: 0 | Status: SHIPPED | OUTPATIENT
Start: 2020-03-29 | End: 2020-04-09 | Stop reason: ALTCHOICE

## 2020-03-29 RX ORDER — ALBUTEROL SULFATE 90 UG/1
2 AEROSOL, METERED RESPIRATORY (INHALATION) EVERY 4 HOURS PRN
Qty: 18 G | Refills: 1 | Status: SHIPPED | OUTPATIENT
Start: 2020-03-29 | End: 2021-05-20

## 2020-03-29 RX ADMIN — CEFTRIAXONE 1 G: 1 INJECTION, SOLUTION INTRAVENOUS at 09:03

## 2020-03-29 RX ADMIN — BENAZEPRIL HYDROCHLORIDE 20 MG: 20 TABLET, COATED ORAL at 09:03

## 2020-03-29 RX ADMIN — LEVOTHYROXINE SODIUM 175 MCG: 150 TABLET ORAL at 04:03

## 2020-03-29 RX ADMIN — HYDROXYCHLOROQUINE SULFATE 400 MG: 200 TABLET, FILM COATED ORAL at 09:03

## 2020-03-29 RX ADMIN — DOXYCYCLINE HYCLATE 100 MG: 100 TABLET, COATED ORAL at 09:03

## 2020-03-29 RX ADMIN — AMLODIPINE BESYLATE 5 MG: 5 TABLET ORAL at 09:03

## 2020-03-29 NOTE — PLAN OF CARE
Dx: presumed COVID-19 virus infection    POC: supplemental O2 via NC, cluster care to minimize staff exposure, monitor VS    Bed remains low and locked, side rails up x 2, call bell and belongings within reach.    Clutter removed from room, lighting adjusted when rounding.    Frequent repositioning encouraged to prevent pressure injury. - done by patient independently.    Contact & airborne isolation in place.    IV antibiotics administered as ordered.    Pain frequently monitored, interventions implemented as ordered and appropriate. - Patient denies any pain this shift.    VS taken Q4 hours per unit routine.     Cardiac monitor in place: Sinus Rhythm/ Sinus Tachycardia.    Educated pt on plan of care. Pt is agreeable and verbalized understanding. Will continue to monitor.

## 2020-03-29 NOTE — PROGRESS NOTES
[facility] Hospital Medicine  Progress Note    Patient Name: Kylie Urbano  MRN: [id number]  Patient Class: IP- Inpatient   Admission Date: 3/26/2020  Length of Stay: 2 days  Attending Physician: Leonid Villalta MD  Primary Care Provider: Beverly Parks MD        Subjective:     Principal Problem:COVID-19 virus infection        HPI:  The patient is a 54o female with hx Breast cancer s/p Chemo/radiation 2017-18-no on Arimidex, HTN, hypothyroidism who presented to ED with Cough, generalized weakness, fatigue, chest congestion, chest tightness, and SHARIF x 3 days that have progressively worsened. The patient was tested for COVID 19 yesterday and results pending.   In the ED, WBC normal, Temp 100.9F, .5, . CXR showed Airspace disease within the left mid and lower lung may relate to pneumonia.    Overview/Hospital Course:  3/27:  Patient continues to require 2 L nasal pain.  Wean O2 as tolerated.  Plan to perform home O2 protocol tomorrow.  Continue to treat symptomatically for presumed COVID-19 infection.  Possible discharge tomorrow if stable.  3/28- looks and feels better, cough, SOB improving. Moving around in the room, going to the BR. Qualifies for Home O2. Eating drinking well. Still await COVID.       Interval History: looks and feels better, cough, SOB improving. Moving around in the room, going to the BR. Qualifies for Home O2. Eating drinking well. Still await COVID. Afebrile today.       Review of Systems   Constitutional: Negative for fatigue and fever.   HENT: Negative for sinus pressure.    Eyes: Negative for visual disturbance.   Respiratory: Positive for cough and shortness of breath.    Cardiovascular: Negative for chest pain.   Gastrointestinal: Negative for nausea and vomiting.   Genitourinary: Negative for difficulty urinating.   Musculoskeletal: Negative for back pain.   Skin: Negative for rash.   Neurological: Negative for headaches.     Objective:     Vital Signs (Most  Recent):  Temp: 100 °F (37.8 °C) (03/28/20 1629)  Pulse: 101 (03/28/20 1724)  Resp: 20 (03/28/20 1629)  BP: (!) 143/79 (03/28/20 1629)  SpO2: (!) 91 % (03/28/20 1629) Vital Signs (24h Range):  Temp:  [98.6 °F (37 °C)-101 °F (38.3 °C)] 100 °F (37.8 °C)  Pulse:  [] 101  Resp:  [16-20] 20  SpO2:  [91 %-95 %] 91 %  BP: (114-144)/(62-83) 143/79     Weight: 125.8 kg (277 lb 4.8 oz)  Body mass index is 42.16 kg/m².    Intake/Output Summary (Last 24 hours) at 3/28/2020 1909  Last data filed at 3/28/2020 1700  Gross per 24 hour   Intake 1510 ml   Output --   Net 1510 ml      Physical Exam   Constitutional: She is oriented to person, place, and time. She appears well-developed and well-nourished. No distress.   HENT:   Head: Normocephalic and atraumatic.   Eyes: Pupils are equal, round, and reactive to light.   Neck: Normal range of motion. Thyromegaly present.   Cardiovascular: Normal rate, regular rhythm and normal heart sounds. Exam reveals no gallop and no friction rub.   No murmur heard.  Pulmonary/Chest: Effort normal and breath sounds normal. No stridor. No respiratory distress. She has no wheezes. She has no rales.   Abdominal: Soft. Bowel sounds are normal. She exhibits no distension and no mass. There is no tenderness. There is no guarding.   Musculoskeletal: Normal range of motion. She exhibits no edema.   Neurological: She is alert and oriented to person, place, and time.   Skin: Skin is warm. She is not diaphoretic. No erythema.   Psychiatric: She has a normal mood and affect. Her behavior is normal. Judgment and thought content normal.   Nursing note and vitals reviewed.      Significant Labs:   BMP:   Recent Labs   Lab 03/28/20  0030   GLU 99      K 3.7      CO2 28   BUN 6   CREATININE 0.7   CALCIUM 9.5   MG 2.3     CBC:   Recent Labs   Lab 03/28/20  0030   WBC 6.61   HGB 12.6   HCT 38.0   *     All pertinent labs within the past 24 hours have been reviewed.    Significant Imaging: I  have reviewed all pertinent imaging results/findings within the past 24 hours.      Assessment/Plan:      * presumed Covid-19 Virus Infection  - COVID-19 testing Collection Date: 3/25/2020 Collection Time:  10:46 AM  - Infection Control notified 3/26/2020    - Isolation:   - Airborne, Contact and Droplet Precautions  - Cohort patients into COVID units  - N95 masks must be fit tested, wear eye protection  - 20 second hand hygiene              - Limit visitors per hospital policy              - Consolidating lab draws, nursing care, provider visits, and interventions    - Diagnostics: (leukopenia, hyponatremia, hyperferritinemia, elevated troponin, elevated d-dimer, age, and comorbidities are significant predictors of poor clinical outcome)  CBC, CMP, Procalcitonin, CRP, LDH, BNP, Troponin, Rapid Flu and Portable CXR    - Management:  Supplemental O2 to maintain SpO2 >92%  Telemetry  Continuous/intermittent Pulse Ox  Albuterol INHALER PRN (avoid nebulization of secretions)  Avoiding BiPAP to prevent aerosolization (including home BiPAP)  Avoiding NSAIDS for fever per WHO recommendations (3/16/2020)  Careful use of steroids in the absence of other indications - unless septic shock due to increased viral replication Fluid sparing resuscitation and Empiric antibiotics per likely source & patient allergies if patient hypoxic with airspace disease as 1 time doses CAP: azithromycin & ceftriaxone    3/27:  Continue current management  Plan to perform home O2 protocol tomorrow.    Wean O2 as tolerated   plan to discharge home with stable    3/28- results awaited  Continue present care  D/c soon                    Morbid obesity with BMI of 40.0-44.9, adult  Counseled on healthy weight, diet, and exercise     Must diet and exercise      Breast cancer, right breast  S/p chemo/radiation 2017-18    Resume arimidex tomorrow        Hypothyroidism  Cont levothyroxine       HTN (hypertension)  Stable   Cont Amlodipine and Benazepril      Under control      VTE Risk Mitigation (From admission, onward)         Ordered     enoxaparin injection 40 mg  Daily      03/26/20 1345     IP VTE HIGH RISK PATIENT  Once      03/26/20 1345                      Leonid Villalta MD  Department of Hospital Medicine   [facility]

## 2020-03-29 NOTE — PROGRESS NOTES

## 2020-03-29 NOTE — NURSING
Home O2 delivered to patient. Education done by RT. Pt verbalized understanding and denied any questions. Pt notified of (+) COVID-19 result. Reminded pt to self isolate from her family for 14 days. Pt transported on O2 via NC, via w/c, by PCT to main entrance where she was met by her daughter in her personal vehicle. Pt left with all belongings and home oxygen portable equipment and transport tank. Pt stable on discharge from hospital. Pt wore surgical mask when leaving the unit/hospital.

## 2020-03-29 NOTE — NURSING
Reviewed discharge instructions and medications. Expressed the need for follow up appointments per physicians recommendations. Unable to schedule f/u appt, and pt aware to call to make this appt.  Patient was given the opportunity for questions and all were answered to satisfaction. Telemetry monitor removed and returned to monitor room. IV removed without incident. Compression dressing applied and patient instructed to leave on no longer than 30 minutes. Waiting on home O2 to be delivered to nurses station. Patient to call when ready to leave. Ride has already arrived and is waiting downstairs.     Pt informed me that [facility] pharmacy is closed today. Updated EMR with new pharmacy preference and notified Dr. Villalta to send prescriptions to updated pharmacy. Notified pt.

## 2020-03-29 NOTE — ASSESSMENT & PLAN NOTE
- COVID-19 testing Collection Date: 3/25/2020 Collection Time:  10:46 AM  - Infection Control notified 3/26/2020    - Isolation:   - Airborne, Contact and Droplet Precautions  - Cohort patients into COVID units  - N95 masks must be fit tested, wear eye protection  - 20 second hand hygiene              - Limit visitors per hospital policy              - Consolidating lab draws, nursing care, provider visits, and interventions    - Diagnostics: (leukopenia, hyponatremia, hyperferritinemia, elevated troponin, elevated d-dimer, age, and comorbidities are significant predictors of poor clinical outcome)  CBC, CMP, Procalcitonin, CRP, LDH, BNP, Troponin, Rapid Flu and Portable CXR    - Management:  Supplemental O2 to maintain SpO2 >92%  Telemetry  Continuous/intermittent Pulse Ox  Albuterol INHALER PRN (avoid nebulization of secretions)  Avoiding BiPAP to prevent aerosolization (including home BiPAP)  Avoiding NSAIDS for fever per WHO recommendations (3/16/2020)  Careful use of steroids in the absence of other indications - unless septic shock due to increased viral replication Fluid sparing resuscitation and Empiric antibiotics per likely source & patient allergies if patient hypoxic with airspace disease as 1 time doses CAP: azithromycin & ceftriaxone    3/27:  Continue current management  Plan to perform home O2 protocol tomorrow.    Wean O2 as tolerated   plan to discharge home with stable    3/28- results awaited  Continue present care  D/c soon

## 2020-03-29 NOTE — DISCHARGE SUMMARY
[facility] Hospital Medicine  Discharge Summary      Patient Name: Kylie Urbano  MRN: [id number]  Admission Date: 3/26/2020  Hospital Length of Stay: 3 days  Discharge Date and Time:  03/29/2020 2:10 PM  Attending Physician: Leonid Villalta MD   Discharging Provider: Leonid Villalta MD  Primary Care Provider: Beverly Parks MD      HPI:   The patient is a 54o female with hx Breast cancer s/p Chemo/radiation 2017-18-no on Arimidex, HTN, hypothyroidism who presented to ED with Cough, generalized weakness, fatigue, chest congestion, chest tightness, and SHARIF x 3 days that have progressively worsened. The patient was tested for COVID 19 yesterday and results pending.   In the ED, WBC normal, Temp 100.9F, .5, . CXR showed Airspace disease within the left mid and lower lung may relate to pneumonia.    * No surgery found *      Hospital Course:   3/27:  Patient continues to require 2 L nasal pain.  Wean O2 as tolerated.  Plan to perform home O2 protocol tomorrow.  Continue to treat symptomatically for presumed COVID-19 infection.  Possible discharge tomorrow if stable.  3/28- looks and feels better, cough, SOB improving. Moving around in the room, going to the . Qualifies for Home O2. Eating drinking well. Still await COVID.   3/29- continues to improve, cough, SOB lot better, afebrile. Weakness better, eating drinking well. Covid still pending but all her Sx have significantly improved, her CRP and LDH are coming down. She has qualified for Home O2 which has been arranged. She is agreeable to discharge home today and was instructed to maintain her quarantine for next 14 days or until resulted. She understands and accepts. She was seen and examined and deemed stable for discharge home today.     Just got the results that she is COVID 19 Positive. Pt informed. She is discharged home with FM and again reiterated about quarantine for 14 days. She understands and agrees.       Consults:   Consults  "(From admission, onward)        Status Ordering Provider     Inpatient consult to Infection Control (Nurse)  Once     Provider:  (Not yet assigned)    Acknowledged PRICILA HEREDIA          No new Assessment & Plan notes have been filed under this hospital service since the last note was generated.  Service: Hospital Medicine    Final Active Diagnoses:    Diagnosis Date Noted POA    Morbid obesity with BMI of 40.0-44.9, adult [E66.01, Z68.41] 08/29/2018 Not Applicable    Breast cancer, right breast [C50.911] 06/09/2017 Yes    HTN (hypertension) [I10] 08/20/2013 Yes     Chronic    Hypothyroidism [E03.9] 08/20/2013 Yes     Chronic      Problems Resolved During this Admission:    Diagnosis Date Noted Date Resolved POA    PRINCIPAL PROBLEM:  presumed Covid-19 Virus Infection [J22, B97.29] 03/26/2020 03/29/2020 Yes       Discharged Condition: stable    Disposition: Home or Self Care    Follow Up:  Follow-up Information     Beverly Parks MD. Schedule an appointment as soon as possible for a visit in 3 days.    Specialty:  Family Medicine  Why:  Hospital follow up  Contact information:  [address] [facility] [address]  [phone]                 Patient Instructions:      OXYGEN FOR HOME USE     Order Specific Question Answer Comments   Liter Flow 2    Duration Continuous    Qualifying SpO2: 87    Testing done at: Rest    Route nasal cannula    Portable mode: pulse dose acceptable    Device home concentrator with portable unit    Length of need (in months): 99 mos    Patient condition with qualifying saturation  COVID   Height: 5' 8" (1.727 m)    Weight: 125.8 kg (277 lb 4.8 oz)    Does patient have medical equipment at home? none    Alternative treatment measures have been tried or considered and deemed clinically ineffective. Yes      Diet Cardiac     Activity as tolerated       Significant Diagnostic Studies: Labs:   BMP:   Recent Labs   Lab 03/28/20  0030   GLU 99      K 3.7      CO2 28 "   BUN 6   CREATININE 0.7   CALCIUM 9.5   MG 2.3   , CMP   Recent Labs   Lab 03/28/20  0030      K 3.7      CO2 28   GLU 99   BUN 6   CREATININE 0.7   CALCIUM 9.5   PROT 7.7   ALBUMIN 3.1*   BILITOT 0.3   ALKPHOS 62   AST 34   ALT 36   ANIONGAP 7*   ESTGFRAFRICA >60   EGFRNONAA >60   , CBC   Recent Labs   Lab 03/28/20  0030   WBC 6.61   HGB 12.6   HCT 38.0   *   All labs within the past 24 hours have been reviewed  Microbiology:   Blood Culture   Lab Results   Component Value Date    LABBLOO No Growth to date 03/26/2020    LABBLOO No Growth to date 03/26/2020    LABBLOO No Growth to date 03/26/2020     Radiology:   Imaging Results          X-Ray Chest AP Portable (Final result)  Result time 03/26/20 09:29:01    Final result by Afshin Dunham Jr., MD (03/26/20 09:29:01)                 Impression:      Airspace disease within the left mid and lower lung may relate to pneumonia.      Electronically signed by: Afshin Dunham Jr., MD  Date:    03/26/2020  Time:    09:29             Narrative:    EXAMINATION:  XR CHEST AP PORTABLE    CLINICAL HISTORY:  Shortness of breath    COMPARISON:  Prior radiograph from January 3, 2018.    FINDINGS:  There is airspace opacity within the left mid and left lower lung.  The more superior left lung and the right lung appear clear.  No pleural fluid or pneumothorax.  Heart size within normal limits.  No significant bony findings.                                Pending Diagnostic Studies:     None         Medications:  Reconciled Home Medications:      Medication List      START taking these medications    acetaminophen 500 MG tablet  Commonly known as:  TYLENOL  Take 2 tablets (1,000 mg total) by mouth every 8 (eight) hours as needed.     albuterol 90 mcg/actuation inhaler  Commonly known as:  PROVENTIL/VENTOLIN HFA  Inhale 2 puffs into the lungs every 4 (four) hours as needed for Wheezing. Rescue     doxycycline 100 MG tablet  Commonly known as:  VIBRA-TABS  Take 1  tablet (100 mg total) by mouth every 12 (twelve) hours.     hydroxychloroquine 200 mg tablet  Commonly known as:  PLAQUENIL  Take 2 tablets (400 mg total) by mouth once daily.  Start taking on:  March 30, 2020        CONTINUE taking these medications    acyclovir 400 MG tablet  Commonly known as:  ZOVIRAX  TAKE 1 TABLET BY MOUTH THREE TIMES DAILY WITH FOOD FOR 5 DAYS(PER EPISODE)     amLODIPine 5 MG tablet  Commonly known as:  NORVASC  Take 1 tablet by mouth once daily     benazepriL 20 MG tablet  Commonly known as:  LOTENSIN  TAKE 1 TABLET BY MOUTH ONCE DAILY     SYNTHROID 175 MCG tablet  Generic drug:  levothyroxine  TAKE 1 TABLET BY MOUTH ONCE DAILY            Indwelling Lines/Drains at time of discharge:   Lines/Drains/Airways     None                 Time spent on the discharge of patient: 47 minutes  Patient was seen and examined on the date of discharge and determined to be suitable for discharge.         Leonid Villalta MD  Department of Hospital Medicine  [facility]

## 2020-03-29 NOTE — SUBJECTIVE & OBJECTIVE
Interval History: looks and feels better, cough, SOB improving. Moving around in the room, going to the [place]. Qualifies for Home O2. Eating drinking well. Still await COVID. Afebrile today.       Review of Systems   Constitutional: Negative for fatigue and fever.   HENT: Negative for sinus pressure.    Eyes: Negative for visual disturbance.   Respiratory: Positive for cough and shortness of breath.    Cardiovascular: Negative for chest pain.   Gastrointestinal: Negative for nausea and vomiting.   Genitourinary: Negative for difficulty urinating.   Musculoskeletal: Negative for back pain.   Skin: Negative for rash.   Neurological: Negative for headaches.     Objective:     Vital Signs (Most Recent):  Temp: 100 °F (37.8 °C) (03/28/20 1629)  Pulse: 101 (03/28/20 1724)  Resp: 20 (03/28/20 1629)  BP: (!) 143/79 (03/28/20 1629)  SpO2: (!) 91 % (03/28/20 1629) Vital Signs (24h Range):  Temp:  [98.6 °F (37 °C)-101 °F (38.3 °C)] 100 °F (37.8 °C)  Pulse:  [] 101  Resp:  [16-20] 20  SpO2:  [91 %-95 %] 91 %  BP: (114-144)/(62-83) 143/79     Weight: 125.8 kg (277 lb 4.8 oz)  Body mass index is 42.16 kg/m².    Intake/Output Summary (Last 24 hours) at 3/28/2020 1909  Last data filed at 3/28/2020 1700  Gross per 24 hour   Intake 1510 ml   Output --   Net 1510 ml      Physical Exam   Constitutional: She is oriented to person, place, and time. She appears well-developed and well-nourished. No distress.   HENT:   Head: Normocephalic and atraumatic.   Eyes: Pupils are equal, round, and reactive to light.   Neck: Normal range of motion. Thyromegaly present.   Cardiovascular: Normal rate, regular rhythm and normal heart sounds. Exam reveals no gallop and no friction rub.   No murmur heard.  Pulmonary/Chest: Effort normal and breath sounds normal. No stridor. No respiratory distress. She has no wheezes. She has no rales.   Abdominal: Soft. Bowel sounds are normal. She exhibits no distension and no mass. There is no tenderness. There  is no guarding.   Musculoskeletal: Normal range of motion. She exhibits no edema.   Neurological: She is alert and oriented to person, place, and time.   Skin: Skin is warm. She is not diaphoretic. No erythema.   Psychiatric: She has a normal mood and affect. Her behavior is normal. Judgment and thought content normal.   Nursing note and vitals reviewed.      Significant Labs:   BMP:   Recent Labs   Lab 03/28/20  0030   GLU 99      K 3.7      CO2 28   BUN 6   CREATININE 0.7   CALCIUM 9.5   MG 2.3     CBC:   Recent Labs   Lab 03/28/20  0030   WBC 6.61   HGB 12.6   HCT 38.0   *     All pertinent labs within the past 24 hours have been reviewed.    Significant Imaging: I have reviewed all pertinent imaging results/findings within the past 24 hours.

## 2020-03-29 NOTE — PLAN OF CARE
Pt qualified for Oxygen for home use. Sw contacted [facility]. On call staff reports they will deliver equipment to nurse's station. Staff coming from [place] and will arrive within an hour and a half. Pt can discharge once equipment arrives.

## 2020-03-29 NOTE — PLAN OF CARE
03/29/20 1352   Post-Acute Status   Post-Acute Authorization E   E Status Pending Delivery Hospital

## 2020-03-29 NOTE — PLAN OF CARE
Patient alert and orient. NSR/sinus tach on monitor. Continues on 2L on NC. No complaints overnight. Plan for possible discharge today with home oxygen. Airborne, contact, and droplet precautions maintained. Safety maintained.       Problem: Fall Injury Risk  Goal: Absence of Fall and Fall-Related Injury  Outcome: Ongoing, Progressing     Problem: Adult Inpatient Plan of Care  Goal: Plan of Care Review  Outcome: Ongoing, Progressing  Goal: Patient-Specific Goal (Individualization)  Outcome: Ongoing, Progressing  Goal: Absence of Hospital-Acquired Illness or Injury  Outcome: Ongoing, Progressing  Goal: Optimal Comfort and Wellbeing  Outcome: Ongoing, Progressing  Goal: Readiness for Transition of Care  Outcome: Ongoing, Progressing  Goal: Rounds/Family Conference  Outcome: Ongoing, Progressing     Problem: Infection (Pneumonia)  Goal: Resolution of Infection Signs/Symptoms  Outcome: Ongoing, Progressing     Problem: Respiratory Compromise (Pneumonia)  Goal: Effective Oxygenation and Ventilation  Outcome: Ongoing, Progressing     Problem: Gas Exchange Impaired  Goal: Optimal Gas Exchange  Outcome: Ongoing, Progressing

## 2020-03-30 ENCOUNTER — TELEPHONE (OUTPATIENT)
Dept: INTERNAL MEDICINE | Facility: CLINIC | Age: 55
End: 2020-03-30

## 2020-03-30 LAB
BACTERIA BLD CULT: NORMAL
BACTERIA BLD CULT: NORMAL

## 2020-03-30 NOTE — PLAN OF CARE
Oxygen through [facility] [place]     03/30/20 0724   Final Note   Assessment Type Final Discharge Note   Anticipated Discharge Disposition Home   Right Care Referral Info   Post Acute Recommendation No Care

## 2020-03-30 NOTE — TELEPHONE ENCOUNTER
Telephone the notified patient positive COVID-19.  Patient states admitted to hospital past couple of days and was told on discharge of positive results.  · Stay home except to get medical care.  · Separate yourself from other people and animals in your home  · Call ahead before visiting your doctor.  · Wear a facemask.  · Cover your coughs and sneezes.  · Clean your hands often.  · Avoid sharing personal household items.  · Clean all high-touch surfaces every day.  · Monitor your symptoms. Seek prompt medical attention if your illness is worsening (e.g., difficulty breathing). Before seeking care, call your healthcare provider.  · If you have a medical emergency and need to call 911, notify the dispatch personnel that you have, or are being evaluated for COVID-19. If possible, put on a facemask before emergency medical services arrive.  · Discontinuing home isolation. Call your provider about guidance to discontinue home isolation.     Recommended precautions for household members, intimate partners, and caregivers in a nonhealthcare setting of a patient with symptomatic laboratory-confirmed COVID-19 or a patient under investigation.  Household members, intimate partners, and caregivers in a nonhealthcare setting may have close contact with a person with symptomatic, laboratory-confirmed COVID-19 or a person under investigation. Close contacts should monitor their health; they should call their healthcare provider right away if they develop symptoms suggestive of COVID-19 (e.g., fever, cough, shortness of breath).

## 2020-03-31 NOTE — PHYSICIAN QUERY
PT Name: Kylie Urbano  MR #: [id number]     Physician Query Form - Documentation Clarification      CDS: Toyin RIVERA RN  Contact information: [email or web address]  Phone number: [phone]    This form is a permanent document in the medical record.     Query Date: March 31, 2020    By submitting this query, we are merely seeking further clarification of documentation. Please utilize your independent clinical judgment when addressing the question(s) below.    The Medical record reflects the following:    Supporting Clinical Findings Location in Medical Record   HPI: The patient is a 54o female with hx Breast cancer s/p Chemo/radiation 2017-18-no on Arimidex, HTN, hypothyroidism who presented to ED with Cough, generalized weakness, fatigue, chest congestion, chest tightness, and SHARIF x 3 days that have progressively worsened. The patient was tested for COVID 19 yesterday and results pending.   In the ED, WBC normal, Temp 100.9F, .5, . CXR showed Airspace disease within the left mid and lower lung may relate to pneumonia.    HENT: Positive for congestion (chest). Negative for sore throat.   Respiratory: Positive for cough and shortness of breath.   Cardiovascular: Positive for chest pain.     Airspace disease within the left mid and lower lung may relate to pneumonia.    Presumed Covid-19 Virus Infection  - COVID-19 testing Collection Date: 3/25/2020 Collection Time:  10:46 AM  - Infection Control notified 3/26/2020    3/27:  Patient continues to require 2 L nasal pain.  Wean O2 as tolerated.  Plan to perform home O2 protocol tomorrow.  Continue to treat symptomatically for presumed COVID-19 infection.  Possible discharge tomorrow if stable.  3/28- looks and feels better, cough, SOB improving. Moving around in the room, going to the BR. Qualifies for Home O2. Eating drinking well. Still await COVID.   3/29- continues to improve, cough, SOB lot better, afebrile. Weakness better, eating drinking  well. Covid still pending but all her Sx have significantly improved, her CRP and LDH are coming down. She has qualified for Home O2 which has been arranged. She is agreeable to discharge home today and was instructed to maintain her quarantine for next 14 days or until resulted. She understands and accepts. She was seen and examined and deemed stable for discharge home today.     SARS-CoV2 (COVID-19) Qualitative PCR   Value: DetectedAbnormal   H&P Hosp Med 3/26/20              ED Prov Note 3/26/20        Chest X-Ray 3/26/20    H&P Hosp Med 3/26/20      Disc Summary Hosp Med 3/29/20                          Labs 3/25/20 1046                                                                        Doctor, Please specify diagnosis or diagnoses associated with above clinical findings.    Provider Use Only      [ xx  ] Pneumonia Ruled In, please further clarify               [  xx ] Pneumonia due to Covid 19 virus infection                [   ] Pneumonia due to other ___________________      [   ] Pneumonia Ruled out        [   ] Other ___________________________                                                                                                           [  ] Clinically Undetermined

## 2020-04-01 ENCOUNTER — TELEPHONE (OUTPATIENT)
Dept: FAMILY MEDICINE | Facility: CLINIC | Age: 55
End: 2020-04-01

## 2020-04-01 NOTE — TELEPHONE ENCOUNTER
----- Message from Cammy Hoyt sent at 4/1/2020  7:48 AM CDT -----  Contact: [phone]  Would like to consult with the nurse, patient was in the Hospital for Covid- 19 and would like to speak with the nurse concerning this, patient would like to make sure she has the Right  medication to get well, please call back at [phone], Thanks sj

## 2020-04-01 NOTE — TELEPHONE ENCOUNTER
Patient notified  reviewed her discharge hospital summary; she should continue medications as prescribed. Patient states thanks

## 2020-04-01 NOTE — TELEPHONE ENCOUNTER
Please advise pt I reviewed her discharge hospital summary; she should continue medications as prescribed. Thanks.

## 2020-04-08 ENCOUNTER — TELEPHONE (OUTPATIENT)
Dept: FAMILY MEDICINE | Facility: CLINIC | Age: 55
End: 2020-04-08

## 2020-04-08 DIAGNOSIS — U07.1 COVID-19: Primary | ICD-10-CM

## 2020-04-08 NOTE — TELEPHONE ENCOUNTER
Patient said she's still feeling feeling funny in her chest (it gets tight sometimes) and wanted to make sure she has enough antibiotics in her system. She's still coughing as well. No fever reported. She was sent home with the oxygen machine but doesn't know if she should still be using it. No sob reported. She's asking for advice.   [facility]     ----- Message from Sherrill Kerr sent at 4/8/2020 12:48 PM CDT -----  Contact: self  Pt requesting a call back regarding COVID-19. She states that she is feeling tightness in her chest and would like to know if there is anything that can be done. Please call pt back at [phone]

## 2020-04-08 NOTE — TELEPHONE ENCOUNTER
She needs Virtual visit w/me for hospital discharge on Thursday 4/9/2020 AM.  I called patient to discuss issues w/her - no answer.  Left message for her to expect call in the morning from nurse for appt.  If problems over night, call on-call doctor.       Addendum: I see pt was not scheduled with Covid-19 + monitoring program.  So, I ordered this evening.

## 2020-04-09 ENCOUNTER — TELEPHONE (OUTPATIENT)
Dept: FAMILY MEDICINE | Facility: CLINIC | Age: 55
End: 2020-04-09

## 2020-04-09 ENCOUNTER — OFFICE VISIT (OUTPATIENT)
Dept: FAMILY MEDICINE | Facility: CLINIC | Age: 55
End: 2020-04-09
Payer: COMMERCIAL

## 2020-04-09 DIAGNOSIS — E03.9 HYPOTHYROIDISM, UNSPECIFIED TYPE: Chronic | ICD-10-CM

## 2020-04-09 DIAGNOSIS — B37.9 ANTIBIOTIC-INDUCED YEAST INFECTION: ICD-10-CM

## 2020-04-09 DIAGNOSIS — C50.411 MALIGNANT NEOPLASM OF UPPER-OUTER QUADRANT OF RIGHT BREAST IN FEMALE, ESTROGEN RECEPTOR POSITIVE: ICD-10-CM

## 2020-04-09 DIAGNOSIS — T36.95XA ANTIBIOTIC-INDUCED YEAST INFECTION: ICD-10-CM

## 2020-04-09 DIAGNOSIS — I10 ESSENTIAL HYPERTENSION: Chronic | ICD-10-CM

## 2020-04-09 DIAGNOSIS — E55.9 VITAMIN D DEFICIENCY: ICD-10-CM

## 2020-04-09 DIAGNOSIS — R05.9 COUGH: ICD-10-CM

## 2020-04-09 DIAGNOSIS — U07.1 COVID-19: ICD-10-CM

## 2020-04-09 DIAGNOSIS — E88.810 INSULIN RESISTANCE SYNDROME: ICD-10-CM

## 2020-04-09 DIAGNOSIS — Z17.0 MALIGNANT NEOPLASM OF UPPER-OUTER QUADRANT OF RIGHT BREAST IN FEMALE, ESTROGEN RECEPTOR POSITIVE: ICD-10-CM

## 2020-04-09 PROCEDURE — 99214 PR OFFICE/OUTPT VISIT, EST, LEVL IV, 30-39 MIN: ICD-10-PCS | Mod: 95,,, | Performed by: FAMILY MEDICINE

## 2020-04-09 PROCEDURE — 99214 OFFICE O/P EST MOD 30 MIN: CPT | Mod: 95,,, | Performed by: FAMILY MEDICINE

## 2020-04-09 RX ORDER — PROMETHAZINE HYDROCHLORIDE AND DEXTROMETHORPHAN HYDROBROMIDE 6.25; 15 MG/5ML; MG/5ML
5 SYRUP ORAL
Qty: 240 ML | Refills: 0 | Status: SHIPPED | OUTPATIENT
Start: 2020-04-09 | End: 2020-04-19

## 2020-04-09 RX ORDER — FLUCONAZOLE 150 MG/1
150 TABLET ORAL DAILY
Qty: 1 TABLET | Refills: 0 | Status: SHIPPED | OUTPATIENT
Start: 2020-04-09 | End: 2020-04-10

## 2020-04-09 NOTE — TELEPHONE ENCOUNTER
----- Message from Abigail Saida sent at 4/9/2020  1:45 PM CDT -----  Contact: self [phone]  .Type:  Patient Returning Call    Who Called:Kylie Urbano  Who Left Message for Patient:  Does the patient know what this is regarding?:appt  Would the patient rather a call back or a response via [facility]? Call back  Best Call Back Number:[phone]  Additional Information: Pt states she was contacted to do a video Visit with Dr. Parks today.  Please call back at [phone]

## 2020-04-09 NOTE — TELEPHONE ENCOUNTER
Spoke with pt, let her know she needs to schedule a follow up for today as a virtual visit. Pt stated she was having trouble with her mychart. Sent her a new activation code and told her to call back once she signs up and gets logged in to schedule appt. Pt verbalized understanding.

## 2020-04-09 NOTE — PROGRESS NOTES
Kylie Urbano    Chief Complaint   Patient presents with    Hospital Follow Up    Covid-19       History of Present Illness:   The patient location is: Home (due to Covid-19 pandemic)  The chief complaint leading to consultation is: Hospital follow up - Covid-19 infection  Visit type: Virtual visit with synchronous audio and video  Total time spent with patient: 25 minutes with 100% of time spent in discussion regarding above.  Each patient to whom he or she provides medical services by telemedicine is:  (1) informed of the relationship between the physician and patient and the respective role of any other health care provider with respect to management of the patient; and (2) notified that he or she may decline to receive medical services by telemedicine and may withdraw from such care at any time.    Notes:     Ms. Urbano comes in today for hospital follow-up.  Per hospital discharge summary, she was admitted to [facility] on March 26, 2020 through March 29, 2020 for COVID-19 infection. She presented to ER with cough, generalized weakness, fatigue, chest congestion, chest tightness, and SHARIF x 3 days that had progressively worsened. She had just been tested for COVID 19 on March 25, 2020 with result pending. In the ER, her objective findings included - WBC normal, Temp 100.9F, .5, . CXR showed Airspace disease within the left mid and lower lung may relate to pneumonia.  She required oxygen supplementation of 2 liters throughout her hospital stay and qualified for home oxygen therapy upon discharge. She continue to improve and was discharged once Covid-19 resulted with positive noted. She was instructed to maintain her quarantine for next 14 days at home. She was discharged home on hydroxychloroquine 200 mg 2 pills daily x 1 day and Doxycycline 100 mg twice daily x 5 days and told to follow up with PCP in 3 days.    She states she feels better today.  She denies having fever, chills, fatigue, appetite  change as she states she is eating; shortness of breath, wheezing; chest pain, palpitations, leg swelling; abdominal pain, nausea, vomiting, diarrhea, constipation; unusual urinary symptoms; back pain; headache; anxiety, depression, homicidal or suicidal thoughts.    She states she completed hydroxychloroquine and Doxycycline medications.    However, she states she continues to have a nagging cough but admits cough has improved some.  She is not taking medication for cough.  She states on yesterday and today she has started to wean herself from oxygen (noting she was discharged home from the hospital with oxygen supplementation to use at 2 L).  She states as of today she has not required oxygen supplementation.  She states she has not been performing home pulse ox checks as she does not have a means to check her pulse ox and did know she was to check it at home.  She states she has been using albuterol MDI which helps and states she has only used it once today.    She reports having irritation and itching in the vaginal area from taking antibiotic therapy.  Although she does not have vaginal discharge, she thinks she may have yeast irritation and requests treatment.        Current Outpatient Medications   Medication Sig    acyclovir (ZOVIRAX) 400 MG tablet TAKE 1 TABLET BY MOUTH THREE TIMES DAILY WITH FOOD FOR 5 DAYS(PER EPISODE)    albuterol (PROVENTIL/VENTOLIN HFA) 90 mcg/actuation inhaler Inhale 2 puffs into the lungs every 4 (four) hours as needed for Wheezing. Rescue    amLODIPine (NORVASC) 5 MG tablet Take 1 tablet by mouth once daily    benazepril (LOTENSIN) 20 MG tablet TAKE 1 TABLET BY MOUTH ONCE DAILY    SYNTHROID 175 mcg tablet TAKE 1 TABLET BY MOUTH ONCE DAILY    acetaminophen (TYLENOL) 500 MG tablet Take 2 tablets (1,000 mg total) by mouth every 8 (eight) hours as needed. (Patient not taking: Reported on 4/9/2020)       Review of Systems   Constitutional: Negative for appetite change, chills,  fatigue and fever.   Respiratory: Positive for cough. Negative for shortness of breath and wheezing.         See history of present illness.   Cardiovascular: Negative for chest pain, palpitations and leg swelling.   Gastrointestinal: Negative for abdominal pain, constipation, diarrhea, nausea and vomiting.   Genitourinary: Negative for difficulty urinating and vaginal discharge.        See history of present illness.   Musculoskeletal: Negative for back pain.   Neurological: Negative for headaches.   Psychiatric/Behavioral: Negative for dysphoric mood and suicidal ideas. The patient is not nervous/anxious.         Negative for homicidal ideas.       Objective:  Physical Exam   Constitutional: She is oriented to person, place, and time. She appears well-developed and well-nourished. No distress.   Pulmonary/Chest: Effort normal. No respiratory distress.   Musculoskeletal: Normal range of motion.   She moves around her home without problems during virtual visit.   Neurological: She is alert and oriented to person, place, and time.   Skin: She is not diaphoretic.   Psychiatric: She has a normal mood and affect. Her behavior is normal. Judgment and thought content normal.       ASSESSMENT:  1. COVID-19    2. Cough    3. Antibiotic-induced yeast infection    4. Essential hypertension    5. Hypothyroidism, unspecified type    6. Insulin resistance syndrome    7. Vitamin D deficiency    8. Malignant neoplasm of upper-outer quadrant of right breast in female, estrogen receptor positive        PLAN:  Kylie was seen today for hospital follow up and covid-19.    Diagnoses and all orders for this visit:    COVID-19    Cough  -     promethazine-dextromethorphan (PROMETHAZINE-DM) 6.25-15 mg/5 mL Syrp; Take 5 mLs by mouth every 4 to 6 hours as needed (cough).    Antibiotic-induced yeast infection  -     fluconazole (DIFLUCAN) 150 MG Tab; Take 1 tablet (150 mg total) by mouth once daily. for 1 day    Essential  hypertension    Hypothyroidism, unspecified type    Insulin resistance syndrome    Vitamin D deficiency    Malignant neoplasm of upper-outer quadrant of right breast in female, estrogen receptor positive       Continue current medications, follow low sodium, low cholesterol, low carb diet, daily walks.  Add Promethazine-DM for cough as directed; medication precautions discussed with patient.  Medication precautions discussed with patient.  Encouraged patient to download Pulse Ox Jazz to her phone to track/follow her pulse ox with levels discussed: desired = 95 or above; may need oxygen prn if 93 - 94 following cough; needs continuous oxygen and call to report if below 93.  Patient verbalized understanding.  Add Diflucan as noted above; medication precautions discussed with patient.  Keep follow up with specialists.  Follow up in about 5 months (around 8/24/2020) for physical. But, see me sooner if problems.

## 2020-04-16 RX ORDER — LEVOTHYROXINE SODIUM 175 UG/1
TABLET ORAL
Qty: 90 TABLET | Refills: 0 | Status: SHIPPED | OUTPATIENT
Start: 2020-04-16 | End: 2020-08-21

## 2020-04-21 NOTE — PROGRESS NOTES
Subjective:       Patient ID: Kylie Urbano is a 54 y.o. female.    Chief Complaint: No chief complaint on file.    The patient location is: [place], [place]  The chief complaint leading to consultation is: breast cancer follow-up  Visit type: audio only  Total time spent with patient:   Each patient to whom he or she provides medical services by telemedicine is:  (1) informed of the relationship between the physician and patient and the respective role of any other health care provider with respect to management of the patient; and (2) notified that he or she may decline to receive medical services by telemedicine and may withdraw from such care at any time.      HPI Patient presents for breast cancer surveillance and review of labs  54-year-old female with a previous diagnosis of right-sided infiltrating ductal carcinoma ER/KS positive, HER-2 negative with biopsy-proven right axillary lymph node involvement.  Patient was consented for BRCA testing for participation and B-55 study with negative BRCA testing.    She received cycle 1 of dense dose Adriamycin/Cytoxan on 6/12/2017 and tolerated treatment exceptionally well.  She completed cycle 4 of dose dense Taxol on 9/19/2017.  She underwent lumpectomy with sentinel lymph node biopsy on 10/31/2017.   Final pathology demonstrated [id number].  The primary measured 0.8 cm and 2 sentinel lymph nodes were positive with 3 mm macrometastasis within first sentinel lymph node and 1 mm micrometastasis within the second sentinel lymph node.  She underwent a full right axillary lymph node dissection on 11/8/2017 which demonstrated 0 positive nodes.  She completed adjuvant radiation on 2/27/2018.      The patient had a hysterectomy approximately 6/20/14.  FSH was in the intermediate range which likely means the patient is perimenopausal.  Started Tamoxifen 20 mg daily in 3/2018 and has since been transitioned to Arimidex - states tolerating well.  Due off Arimidex  3/2027  Surveillance mammograms remains MAGALIE in 5/2019-        ER/SC positive, HER-2 negative infiltrating ductal carcinoma the right breast with biopsy proven lymph node involvement clinically stage II/III  [id number].  Pathology following neoadjuvant therapy demonstrated 1 sentinel lymph node with micrometastasis (3 mm) and second lymph node with micrometastasis (1 mm)  --BRCA testing via B-55 Study is negative for mutation  --ddAC--> taxol   cycle 1--- 6/12/2017  --Final cycle Cycle 4 on 9/19/2017  --Radiation completed on 2/27/2018  --Tamoxifen 20 mg by mouth daily started in 3/2018  --surveillance bilateral mammogram in 05/2019 wnl  --Transitioned to Arimidex 1 mg PO daily - to be completed in 3/2027     I have reviewed and updated the HPI, ROS, PMHx, Social Hx, Family Hx and treatment history.     Today's visit:  The patient has previously been followed in the clinic by Dr. Martinez, Dr. Miramontes, and CATRACHO Haque.  Today is the first time I am evaluating/assessing the patient.      Pt was recently hospitalized for Covid infection 3/26/20. Was on oxygen- doing better. Did take tylenol for fever - not taking for a few weeks now    10/1/2020- DEXA scan normal.     Has previous history of elevated liver enzymes and elevated ldh.   Last labs 3/28/20 - liver enzymes were normal.     States has been taking tylenol daily.  Has a diagnosis of borderline hepatomegaly and fatty liver found on previous abdominal US.  She denies ETOH use and statin use.  She denies abdominal pain.  She denies any bone pain.      Review of Systems   Constitutional: Negative.    HENT: Negative.    Eyes: Negative.    Respiratory: Negative.    Cardiovascular: Negative.    Gastrointestinal: Negative.         No reflux   Endocrine: Negative.    Genitourinary: Negative.    Musculoskeletal: Negative.    Skin: Negative.    Allergic/Immunologic: Negative.    Neurological: Negative.    Hematological: Negative.  Negative for adenopathy.    Psychiatric/Behavioral: Negative.        Pt denies any   Objective:          Deferred PE since audio visit only    Last mammo 5/13/19- wnl    Assessment:       1. Malignant neoplasm of upper-outer quadrant of right female breast, unspecified estrogen receptor status    2. Malignant neoplasm of right female breast, unspecified estrogen receptor status, unspecified site of breast    3. Malignant neoplasm of upper-outer quadrant of right breast in female, estrogen receptor positive    4. Counseling and coordination of care    5. Counseling on health promotion and disease prevention    6. Health education/counseling    7. Encounter for monitoring adjuvant hormonal therapy        Plan:          1.      Has a diagnosis of borderline hepatomegaly and fatty liver found on previous abdominal US 2017. Elevated LDH for  2 years.  She denies ETOH use and statin use.  She denies abdominal pain.  She denies any bone pain. Labs today reveal- elevation in liver enzymes- states took tylenol for fever when had Covid infection. Will refer to gastro for evaluation of elevated liver enzymes and ldh - review of labs reveals for at least 2 years- also hx of fatty liver in past with no GI workup  2.      Right breast cancer- no changes- mammo due Mid May 2020 - due to pandemic imaging postponed to June 2020 - will do labs and clinical exam same day  3.      Monitoring adjuvant therapy- Taking Arimidex without difficulty. Continue until 3/2027- Dexa 10/1/2020- normal  4.      Encouraged walking to help with bone density and decreasing risk for breast cancer recurrence.

## 2020-04-22 ENCOUNTER — OFFICE VISIT (OUTPATIENT)
Dept: HEMATOLOGY/ONCOLOGY | Facility: CLINIC | Age: 55
End: 2020-04-22
Payer: COMMERCIAL

## 2020-04-22 ENCOUNTER — LAB VISIT (OUTPATIENT)
Dept: LAB | Facility: HOSPITAL | Age: 55
End: 2020-04-22
Attending: FAMILY MEDICINE
Payer: COMMERCIAL

## 2020-04-22 DIAGNOSIS — Z71.89 COUNSELING AND COORDINATION OF CARE: ICD-10-CM

## 2020-04-22 DIAGNOSIS — R79.89 ELEVATED LIVER FUNCTION TESTS: ICD-10-CM

## 2020-04-22 DIAGNOSIS — Z79.899 ENCOUNTER FOR MONITORING ADJUVANT HORMONAL THERAPY: ICD-10-CM

## 2020-04-22 DIAGNOSIS — C50.911 MALIGNANT NEOPLASM OF RIGHT FEMALE BREAST, UNSPECIFIED ESTROGEN RECEPTOR STATUS, UNSPECIFIED SITE OF BREAST: ICD-10-CM

## 2020-04-22 DIAGNOSIS — Z17.0 MALIGNANT NEOPLASM OF UPPER-OUTER QUADRANT OF RIGHT BREAST IN FEMALE, ESTROGEN RECEPTOR POSITIVE: ICD-10-CM

## 2020-04-22 DIAGNOSIS — Z51.81 ENCOUNTER FOR MONITORING ADJUVANT HORMONAL THERAPY: ICD-10-CM

## 2020-04-22 DIAGNOSIS — C50.411 MALIGNANT NEOPLASM OF UPPER-OUTER QUADRANT OF RIGHT BREAST IN FEMALE, ESTROGEN RECEPTOR POSITIVE: ICD-10-CM

## 2020-04-22 DIAGNOSIS — Z71.89 COUNSELING ON HEALTH PROMOTION AND DISEASE PREVENTION: ICD-10-CM

## 2020-04-22 DIAGNOSIS — Z71.9 HEALTH EDUCATION/COUNSELING: ICD-10-CM

## 2020-04-22 DIAGNOSIS — C50.411 MALIGNANT NEOPLASM OF UPPER-OUTER QUADRANT OF RIGHT FEMALE BREAST, UNSPECIFIED ESTROGEN RECEPTOR STATUS: Primary | ICD-10-CM

## 2020-04-22 LAB
ALBUMIN SERPL BCP-MCNC: 3.9 G/DL (ref 3.5–5.2)
ALP SERPL-CCNC: 85 U/L (ref 55–135)
ALT SERPL W/O P-5'-P-CCNC: 56 U/L (ref 10–44)
ANION GAP SERPL CALC-SCNC: 10 MMOL/L (ref 8–16)
AST SERPL-CCNC: 41 U/L (ref 10–40)
BASOPHILS # BLD AUTO: 0.02 K/UL (ref 0–0.2)
BASOPHILS NFR BLD: 0.3 % (ref 0–1.9)
BILIRUB SERPL-MCNC: 0.2 MG/DL (ref 0.1–1)
BUN SERPL-MCNC: 8 MG/DL (ref 6–20)
CALCIUM SERPL-MCNC: 10.5 MG/DL (ref 8.7–10.5)
CHLORIDE SERPL-SCNC: 106 MMOL/L (ref 95–110)
CO2 SERPL-SCNC: 25 MMOL/L (ref 23–29)
CREAT SERPL-MCNC: 0.7 MG/DL (ref 0.5–1.4)
DIFFERENTIAL METHOD: NORMAL
EOSINOPHIL # BLD AUTO: 0.1 K/UL (ref 0–0.5)
EOSINOPHIL NFR BLD: 2.1 % (ref 0–8)
ERYTHROCYTE [DISTWIDTH] IN BLOOD BY AUTOMATED COUNT: 14.4 % (ref 11.5–14.5)
EST. GFR  (AFRICAN AMERICAN): >60 ML/MIN/1.73 M^2
EST. GFR  (NON AFRICAN AMERICAN): >60 ML/MIN/1.73 M^2
GLUCOSE SERPL-MCNC: 100 MG/DL (ref 70–110)
HCT VFR BLD AUTO: 42.4 % (ref 37–48.5)
HGB BLD-MCNC: 14.4 G/DL (ref 12–16)
IMM GRANULOCYTES # BLD AUTO: 0.02 K/UL (ref 0–0.04)
IMM GRANULOCYTES NFR BLD AUTO: 0.3 % (ref 0–0.5)
LYMPHOCYTES # BLD AUTO: 2.4 K/UL (ref 1–4.8)
LYMPHOCYTES NFR BLD: 38.7 % (ref 18–48)
MCH RBC QN AUTO: 28.9 PG (ref 27–31)
MCHC RBC AUTO-ENTMCNC: 34 G/DL (ref 32–36)
MCV RBC AUTO: 85 FL (ref 82–98)
MONOCYTES # BLD AUTO: 0.7 K/UL (ref 0.3–1)
MONOCYTES NFR BLD: 10.9 % (ref 4–15)
NEUTROPHILS # BLD AUTO: 3 K/UL (ref 1.8–7.7)
NEUTROPHILS NFR BLD: 48 % (ref 38–73)
NRBC BLD-RTO: 0 /100 WBC
PLATELET # BLD AUTO: 218 K/UL (ref 150–350)
PMV BLD AUTO: 9.3 FL (ref 9.2–12.9)
POTASSIUM SERPL-SCNC: 3.8 MMOL/L (ref 3.5–5.1)
PROT SERPL-MCNC: 8.3 G/DL (ref 6–8.4)
RBC # BLD AUTO: 4.98 M/UL (ref 4–5.4)
SODIUM SERPL-SCNC: 141 MMOL/L (ref 136–145)
WBC # BLD AUTO: 6.17 K/UL (ref 3.9–12.7)

## 2020-04-22 PROCEDURE — 85025 COMPLETE CBC W/AUTO DIFF WBC: CPT

## 2020-04-22 PROCEDURE — 99213 OFFICE O/P EST LOW 20 MIN: CPT | Mod: 95,,, | Performed by: NURSE PRACTITIONER

## 2020-04-22 PROCEDURE — 99213 PR OFFICE/OUTPT VISIT, EST, LEVL III, 20-29 MIN: ICD-10-PCS | Mod: 95,,, | Performed by: NURSE PRACTITIONER

## 2020-04-22 PROCEDURE — 80053 COMPREHEN METABOLIC PANEL: CPT

## 2020-04-22 PROCEDURE — 36415 COLL VENOUS BLD VENIPUNCTURE: CPT

## 2020-04-27 ENCOUNTER — PATIENT MESSAGE (OUTPATIENT)
Dept: GASTROENTEROLOGY | Facility: CLINIC | Age: 55
End: 2020-04-27

## 2020-05-01 ENCOUNTER — PATIENT OUTREACH (OUTPATIENT)
Dept: ADMINISTRATIVE | Facility: OTHER | Age: 55
End: 2020-05-01

## 2020-05-04 ENCOUNTER — OFFICE VISIT (OUTPATIENT)
Dept: GASTROENTEROLOGY | Facility: CLINIC | Age: 55
End: 2020-05-04
Payer: COMMERCIAL

## 2020-05-04 DIAGNOSIS — K76.0 FATTY LIVER: Primary | ICD-10-CM

## 2020-05-04 DIAGNOSIS — R79.89 ELEVATED LIVER FUNCTION TESTS: ICD-10-CM

## 2020-05-04 PROCEDURE — 99213 OFFICE O/P EST LOW 20 MIN: CPT | Mod: 95,,, | Performed by: NURSE PRACTITIONER

## 2020-05-04 PROCEDURE — 99213 PR OFFICE/OUTPT VISIT, EST, LEVL III, 20-29 MIN: ICD-10-PCS | Mod: 95,,, | Performed by: NURSE PRACTITIONER

## 2020-05-04 NOTE — PROGRESS NOTES
Established Patient - Audio Only Telehealth Visit     The patient location is: [place]  The chief complaint leading to consultation is: elevated LFTs  Visit type: Virtual visit with audio only (telephone)  Total time spent with patient: 20 minutes       The reason for the audio only service rather than synchronous audio and video virtual visit was related to technical difficulties or patient preference/necessity.     Each patient to whom I provide medical services by telemedicine is:  (1) informed of the relationship between the physician and patient and the respective role of any other health care provider with respect to management of the patient; and (2) notified that they may decline to receive medical services by telemedicine and may withdraw from such care at any time. Patient verbally consented to receive this service via voice-only telephone call.       HPI:   Pt referred by Raquel Bergeron NP for further evaluation of fatty liver disease.   Pt was recently diagnosed with COVID-19 and reports good recovery  She denies any previously known liver disease.    Per chart review, US in 2017 showed fatty liver.   PMH includes HTN, obesity, breast CA.   Recent labs show slight elevation in AST and ALT.  She denies any new or change in medications.  No OTC supplements.  No ETOH.   Denies any abdominal pain.  No nausea or vomiting.  No change in bowel pattern.  No melena or hematochezia. No weight loss.  No upper GI bleeding.  No ascites or BLE.  No overt confusion.       Assessment and plan:  - I suspect the elevated LFTs are related to fatty liver disease  - Educated patient on spectrum of fatty liver disease and potential for cirrhosis if BOWIE present  - Advised weight loss (10%), strict glycemic control and lipid control (statins are ok if needed, prescribing doctor will need to monitor LFTs per routine)  - will get additional labs with updated US and Fibroscan for staging.   - If the labs are suspicious for  autoimmune disease, will need biopsy instead of Fibroscan.       Fatty liver  -     Comprehensive metabolic panel; Future; Expected date: 05/04/2020  -     CBC auto differential; Future; Expected date: 05/04/2020  -     Protime-INR; Future; Expected date: 05/04/2020  -     Iron and TIBC; Future; Expected date: 05/04/2020  -     Ferritin; Future; Expected date: 05/04/2020  -     Hepatitis B Surface Ab, Qualitative; Future; Expected date: 05/04/2020  -     Hepatitis B Surface Antigen; Future; Expected date: 05/04/2020  -     Hepatitis B Core Antibody, Total; Future; Expected date: 05/04/2020  -     Hepatitis C Antibody; Future; Expected date: 05/04/2020  -     TSH; Future; Expected date: 05/04/2020  -     Alpha-1-Antitrypsin; Future; Expected date: 05/04/2020  -     AYLIN Screen w/Reflex; Future; Expected date: 05/04/2020  -     Antimitochondrial Antibody; Future; Expected date: 05/04/2020  -     Anti-Smooth Muscle Antibody; Future; Expected date: 05/04/2020  -     Hepatitis A antibody, IgG; Future; Expected date: 05/04/2020  -     Ceruloplasmin; Future; Expected date: 05/04/2020  -     US Abdomen Complete; Future; Expected date: 05/04/2020  -     US Elastography Liver; Future; Expected date: 05/04/2020    Elevated liver function tests  -     Ambulatory referral/consult to Gastroenterology  -     Comprehensive metabolic panel; Future; Expected date: 05/04/2020  -     CBC auto differential; Future; Expected date: 05/04/2020  -     Protime-INR; Future; Expected date: 05/04/2020  -     Iron and TIBC; Future; Expected date: 05/04/2020  -     Ferritin; Future; Expected date: 05/04/2020  -     Hepatitis B Surface Ab, Qualitative; Future; Expected date: 05/04/2020  -     Hepatitis B Surface Antigen; Future; Expected date: 05/04/2020  -     Hepatitis B Core Antibody, Total; Future; Expected date: 05/04/2020  -     Hepatitis C Antibody; Future; Expected date: 05/04/2020  -     TSH; Future; Expected date: 05/04/2020  -      Alpha-1-Antitrypsin; Future; Expected date: 05/04/2020  -     AYLIN Screen w/Reflex; Future; Expected date: 05/04/2020  -     Antimitochondrial Antibody; Future; Expected date: 05/04/2020  -     Anti-Smooth Muscle Antibody; Future; Expected date: 05/04/2020  -     Hepatitis A antibody, IgG; Future; Expected date: 05/04/2020  -     Ceruloplasmin; Future; Expected date: 05/04/2020  -     US Abdomen Complete; Future; Expected date: 05/04/2020  -     US Elastography Liver; Future; Expected date: 05/04/2020                              This service was not originating from a related E/M service provided within the previous 7 days nor will  to an E/M service or procedure within the next 24 hours or my soonest available appointment.  Prevailing standard of care was able to be met in this audio-only visit.

## 2020-05-04 NOTE — LETTER
May 4, 2020      [place]  [address] [address] [facility] [address]           The [facility]  [address] [address] [facility] [address]  Phone: [phone]  Fax: [phone]          Patient: Kylie Urbano   MR Number: [id number]   YOB: 1965   Date of Visit: 5/4/2020       Dear Raquel Bergeron:    Thank you for referring Kylie Urbano to me for evaluation. Attached you will find relevant portions of my assessment and plan of care.    If you have questions, please do not hesitate to call me. I look forward to following Kylie Urbano along with you.    Sincerely,    Nat Melvin, [facility]    Enclosure  CC:  No Recipients    If you would like to receive this communication electronically, please contact [email or web address] or [phone] to request more information on [employer] Link access.    For providers and/or their staff who would like to refer a patient to [facility], please contact us through our one-stop-shop provider referral line, [facility] , at [phone].    If you feel you have received this communication in error or would no longer like to receive these types of communications, please e-mail [email or web address]

## 2020-05-11 ENCOUNTER — PATIENT MESSAGE (OUTPATIENT)
Dept: GASTROENTEROLOGY | Facility: CLINIC | Age: 55
End: 2020-05-11

## 2020-05-14 RX ORDER — ACYCLOVIR 400 MG/1
TABLET ORAL
Qty: 30 TABLET | Refills: 0 | Status: SHIPPED | OUTPATIENT
Start: 2020-05-14 | End: 2020-05-18

## 2020-05-18 RX ORDER — ACYCLOVIR 400 MG/1
TABLET ORAL
Qty: 30 TABLET | Refills: 0 | Status: SHIPPED | OUTPATIENT
Start: 2020-05-18 | End: 2020-11-17 | Stop reason: SDUPTHER

## 2020-06-03 ENCOUNTER — HOSPITAL ENCOUNTER (OUTPATIENT)
Dept: RADIOLOGY | Facility: HOSPITAL | Age: 55
Discharge: HOME | End: 2020-06-03
Attending: NURSE PRACTITIONER
Payer: COMMERCIAL

## 2020-06-03 ENCOUNTER — LAB VISIT (OUTPATIENT)
Dept: LAB | Facility: HOSPITAL | Age: 55
End: 2020-06-03
Attending: NURSE PRACTITIONER
Payer: COMMERCIAL

## 2020-06-03 VITALS — HEIGHT: 68 IN | WEIGHT: 277.31 LBS | BODY MASS INDEX: 42.03 KG/M2

## 2020-06-03 DIAGNOSIS — C50.411 MALIGNANT NEOPLASM OF UPPER-OUTER QUADRANT OF RIGHT FEMALE BREAST, UNSPECIFIED ESTROGEN RECEPTOR STATUS: ICD-10-CM

## 2020-06-03 DIAGNOSIS — Z17.0 MALIGNANT NEOPLASM OF UPPER-OUTER QUADRANT OF RIGHT BREAST IN FEMALE, ESTROGEN RECEPTOR POSITIVE: ICD-10-CM

## 2020-06-03 DIAGNOSIS — C50.911 MALIGNANT NEOPLASM OF RIGHT FEMALE BREAST, UNSPECIFIED ESTROGEN RECEPTOR STATUS, UNSPECIFIED SITE OF BREAST: ICD-10-CM

## 2020-06-03 DIAGNOSIS — C50.411 MALIGNANT NEOPLASM OF UPPER-OUTER QUADRANT OF RIGHT BREAST IN FEMALE, ESTROGEN RECEPTOR POSITIVE: ICD-10-CM

## 2020-06-03 DIAGNOSIS — R79.89 ELEVATED LIVER FUNCTION TESTS: ICD-10-CM

## 2020-06-03 LAB
ALBUMIN SERPL BCP-MCNC: 4 G/DL (ref 3.5–5.2)
ALP SERPL-CCNC: 89 U/L (ref 55–135)
ALT SERPL W/O P-5'-P-CCNC: 45 U/L (ref 10–44)
ANION GAP SERPL CALC-SCNC: 8 MMOL/L (ref 8–16)
AST SERPL-CCNC: 38 U/L (ref 10–40)
BASOPHILS # BLD AUTO: 0.04 K/UL (ref 0–0.2)
BASOPHILS NFR BLD: 0.7 % (ref 0–1.9)
BILIRUB SERPL-MCNC: 0.3 MG/DL (ref 0.1–1)
BUN SERPL-MCNC: 11 MG/DL (ref 6–20)
CALCIUM SERPL-MCNC: 9.9 MG/DL (ref 8.7–10.5)
CHLORIDE SERPL-SCNC: 110 MMOL/L (ref 95–110)
CO2 SERPL-SCNC: 25 MMOL/L (ref 23–29)
CREAT SERPL-MCNC: 0.8 MG/DL (ref 0.5–1.4)
DIFFERENTIAL METHOD: ABNORMAL
EOSINOPHIL # BLD AUTO: 0.2 K/UL (ref 0–0.5)
EOSINOPHIL NFR BLD: 3.2 % (ref 0–8)
ERYTHROCYTE [DISTWIDTH] IN BLOOD BY AUTOMATED COUNT: 14.9 % (ref 11.5–14.5)
EST. GFR  (AFRICAN AMERICAN): >60 ML/MIN/1.73 M^2
EST. GFR  (NON AFRICAN AMERICAN): >60 ML/MIN/1.73 M^2
GLUCOSE SERPL-MCNC: 99 MG/DL (ref 70–110)
HCT VFR BLD AUTO: 41.8 % (ref 37–48.5)
HGB BLD-MCNC: 13.7 G/DL (ref 12–16)
IMM GRANULOCYTES # BLD AUTO: 0.01 K/UL (ref 0–0.04)
IMM GRANULOCYTES NFR BLD AUTO: 0.2 % (ref 0–0.5)
LDH SERPL L TO P-CCNC: 300 U/L (ref 110–260)
LYMPHOCYTES # BLD AUTO: 1.9 K/UL (ref 1–4.8)
LYMPHOCYTES NFR BLD: 31.8 % (ref 18–48)
MCH RBC QN AUTO: 28.5 PG (ref 27–31)
MCHC RBC AUTO-ENTMCNC: 32.8 G/DL (ref 32–36)
MCV RBC AUTO: 87 FL (ref 82–98)
MONOCYTES # BLD AUTO: 0.5 K/UL (ref 0.3–1)
MONOCYTES NFR BLD: 9.1 % (ref 4–15)
NEUTROPHILS # BLD AUTO: 3.3 K/UL (ref 1.8–7.7)
NEUTROPHILS NFR BLD: 55.2 % (ref 38–73)
NRBC BLD-RTO: 0 /100 WBC
PLATELET # BLD AUTO: 249 K/UL (ref 150–350)
PMV BLD AUTO: 9.7 FL (ref 9.2–12.9)
POTASSIUM SERPL-SCNC: 3.8 MMOL/L (ref 3.5–5.1)
PROT SERPL-MCNC: 8 G/DL (ref 6–8.4)
RBC # BLD AUTO: 4.81 M/UL (ref 4–5.4)
SODIUM SERPL-SCNC: 143 MMOL/L (ref 136–145)
WBC # BLD AUTO: 5.92 K/UL (ref 3.9–12.7)

## 2020-06-03 PROCEDURE — 77066 MAMMO DIGITAL DIAGNOSTIC BILAT WITH TOMOSYNTHESIS_CAD: ICD-10-PCS | Mod: 26,,, | Performed by: RADIOLOGY

## 2020-06-03 PROCEDURE — 77062 BREAST TOMOSYNTHESIS BI: CPT | Mod: 26,,, | Performed by: RADIOLOGY

## 2020-06-03 PROCEDURE — 77066 DX MAMMO INCL CAD BI: CPT | Mod: 26,,, | Performed by: RADIOLOGY

## 2020-06-03 PROCEDURE — 83615 LACTATE (LD) (LDH) ENZYME: CPT

## 2020-06-03 PROCEDURE — 85025 COMPLETE CBC W/AUTO DIFF WBC: CPT

## 2020-06-03 PROCEDURE — 80053 COMPREHEN METABOLIC PANEL: CPT

## 2020-06-03 PROCEDURE — 77062 MAMMO DIGITAL DIAGNOSTIC BILAT WITH TOMOSYNTHESIS_CAD: ICD-10-PCS | Mod: 26,,, | Performed by: RADIOLOGY

## 2020-06-03 PROCEDURE — 77066 DX MAMMO INCL CAD BI: CPT | Mod: TC

## 2020-06-14 NOTE — PROGRESS NOTES
Subjective:       Patient ID: Kylie Urbano is a 54 y.o. female.    Chief Complaint: Breast Cancer (f/u)      HPI Patient presents for breast cancer surveillance and review of labs. Doing well and states feels better after having COVID in March    54-year-old female with a previous diagnosis of right-sided infiltrating ductal carcinoma ER/[place] positive, HER-2 negative with biopsy-proven right axillary lymph node involvement.  Patient was consented for BRCA testing for participation and B-55 study with negative BRCA testing.    She received cycle 1 of dense dose Adriamycin/Cytoxan on 6/12/2017 and tolerated treatment exceptionally well.  She completed cycle 4 of dose dense Taxol on 9/19/2017.  She underwent lumpectomy with sentinel lymph node biopsy on 10/31/2017.   Final pathology demonstrated [id number].  The primary measured 0.8 cm and 2 sentinel lymph nodes were positive with 3 mm macrometastasis within first sentinel lymph node and 1 mm micrometastasis within the second sentinel lymph node.  She underwent a full right axillary lymph node dissection on 11/8/2017 which demonstrated 0 positive nodes.  She completed adjuvant radiation on 2/27/2018.      The patient had a hysterectomy approximately 6/20/14.  FSH was in the intermediate range which likely means the patient is perimenopausal.  Started Tamoxifen 20 mg daily in 3/2018 and has since been transitioned to Arimidex - states tolerating well.  Due off Arimidex 3/2027  Surveillance mammograms remains MAGALIE in 5/2019-and again 6/3/20        ER/[place] positive, HER-2 negative infiltrating ductal carcinoma the right breast with biopsy proven lymph node involvement clinically stage II/III  [id number].  Pathology following neoadjuvant therapy demonstrated 1 sentinel lymph node with micrometastasis (3 mm) and second lymph node with micrometastasis (1 mm)  --BRCA testing via B-55 Study is negative for mutation  --ddAC--> taxol   cycle 1--- 6/12/2017  --Final cycle Cycle 4 on  9/19/2017  --Radiation completed on 2/27/2018  --Tamoxifen 20 mg by mouth daily started in 3/2018  --surveillance bilateral mammogram in 05/2019 wnl  --Transitioned to Arimidex 1 mg PO daily - to be completed in 3/2027     I have reviewed and updated the HPI, ROS, PMHx, Social Hx, Family Hx and treatment history.     Today's visit:  The patient has previously been followed in the clinic by Dr. Martinez, Dr. Miramontes, and CATRACHO Haque.  Today is the first time I am evaluating/assessing the patient.      Pt was recently hospitalized for Covid infection 3/26/20. Was on oxygen- doing better. No fever and back to baseline    10/1/2019- DEXA scan normal. Next one due 10/2021    Has previous history of elevated liver enzymes and elevated ldh. Had taken more tylenol with Covid infection. Off tylenol now. Referred pt to gastro for evaluation due to prior history of elevated liver enzymes and fatty liver.     Pt has since established care with gastro and recent labs revealed improvement in her liver enzymes.  Pt had not had gastro labs drawn so we will get those done today.    Review of Systems   Constitutional: Negative.    HENT: Negative.    Eyes: Negative.    Respiratory: Negative.    Cardiovascular: Negative.    Gastrointestinal: Negative.         No reflux   Endocrine: Negative.    Genitourinary: Negative.    Musculoskeletal: Negative.    Skin: Negative.    Allergic/Immunologic: Negative.    Neurological: Negative.    Hematological: Negative.  Negative for adenopathy.   Psychiatric/Behavioral: Negative.        Pt denies any breast pain, nipple discharge or palpable mass    Objective:          Physical Exam   Constitutional: She is oriented to person, place, and time. She appears well-developed and well-nourished. No distress.   HENT:   Head: Normocephalic and atraumatic. Not macrocephalic.   Right Ear: Tympanic membrane and ear canal normal.   Left Ear: Tympanic membrane and ear canal normal.   Nose: Nose normal. Right  sinus exhibits no maxillary sinus tenderness and no frontal sinus tenderness. Left sinus exhibits no maxillary sinus tenderness and no frontal sinus tenderness.   Mouth/Throat: Uvula is midline, oropharynx is clear and moist and mucous membranes are normal. No oropharyngeal exudate.   Eyes: Pupils are equal, round, and reactive to light. Conjunctivae, EOM and lids are normal. Lids are everted and swept, no foreign bodies found.   Neck: Trachea normal and normal range of motion. Neck supple. No tracheal deviation present. No thyroid mass and no thyromegaly present.   Cardiovascular: Normal rate, regular rhythm, normal heart sounds, intact distal pulses and normal pulses. Exam reveals no gallop and no friction rub.   No murmur heard.  Pulmonary/Chest: Effort normal and breath sounds normal. No stridor. No respiratory distress. She has no decreased breath sounds. She has no wheezes. She has no rhonchi. She has no rales. She exhibits no tenderness.   No lymphadenopathy, susan breast no visible redness, puckering or retraction. No palpable mass bilaterally. No nipple discharge.   Abdominal: Soft. Normal appearance and bowel sounds are normal. She exhibits no distension. There is no hepatosplenomegaly. There is no tenderness. There is no guarding.   Musculoskeletal: Normal range of motion. She exhibits no edema or deformity.   Lymphadenopathy:        Head (right side): No submental and no submandibular adenopathy present.        Head (left side): No submental and no submandibular adenopathy present.        Right cervical: No superficial cervical and no deep cervical adenopathy present.       Left cervical: No superficial cervical and no deep cervical adenopathy present.     She has no axillary adenopathy.        Right: No supraclavicular adenopathy present.        Left: No supraclavicular adenopathy present.   Neurological: She is alert and oriented to person, place, and time. No cranial nerve deficit or sensory deficit.  She exhibits normal muscle tone.   Skin: Skin is warm, dry and intact. Capillary refill takes less than 2 seconds. No rash noted. She is not diaphoretic. No pallor.   Psychiatric: She has a normal mood and affect. Her speech is normal and behavior is normal. Judgment and thought content normal. She is not actively hallucinating. Cognition and memory are normal. She is attentive.     Last mammo 6/3/20- wnl  Results for ARNEL NEWMAN (MRN [id number]) as of 6/8/2020 09:58   Ref. Range 4/22/2020 10:56 6/3/2020 10:00   WBC Latest Ref Range: 3.90 - 12.70 K/uL 6.17 5.92   RBC Latest Ref Range: 4.00 - 5.40 M/uL 4.98 4.81   Hemoglobin Latest Ref Range: 12.0 - 16.0 g/dL 14.4 13.7   Hematocrit Latest Ref Range: 37.0 - 48.5 % 42.4 41.8   MCV Latest Ref Range: 82 - 98 fL 85 87   MCH Latest Ref Range: 27.0 - 31.0 pg 28.9 28.5   MCHC Latest Ref Range: 32.0 - 36.0 g/dL 34.0 32.8   RDW Latest Ref Range: 11.5 - 14.5 % 14.4 14.9 (H)   Platelets Latest Ref Range: 150 - 350 K/uL 218 249   MPV Latest Ref Range: 9.2 - 12.9 fL 9.3 9.7   Gran% Latest Ref Range: 38.0 - 73.0 % 48.0 55.2   Gran # (ANC) Latest Ref Range: 1.8 - 7.7 K/uL 3.0 3.3   Lymph% Latest Ref Range: 18.0 - 48.0 % 38.7 31.8   Lymph # Latest Ref Range: 1.0 - 4.8 K/uL 2.4 1.9   Mono% Latest Ref Range: 4.0 - 15.0 % 10.9 9.1   Mono # Latest Ref Range: 0.3 - 1.0 K/uL 0.7 0.5   Eosinophil% Latest Ref Range: 0.0 - 8.0 % 2.1 3.2   Eos # Latest Ref Range: 0.0 - 0.5 K/uL 0.1 0.2   Basophil% Latest Ref Range: 0.0 - 1.9 % 0.3 0.7   Baso # Latest Ref Range: 0.00 - 0.20 K/uL 0.02 0.04   nRBC Latest Ref Range: 0 /100 WBC 0 0   Differential Method Unknown Automated Automated   Immature Grans (Abs) Latest Ref Range: 0.00 - 0.04 K/uL 0.02 0.01   Immature Granulocytes Latest Ref Range: 0.0 - 0.5 % 0.3 0.2   Results for ARNEL NEWMAN (MRN [id number]) as of 6/8/2020 09:58   Ref. Range 3/28/2020 00:30 4/22/2020 10:56 6/3/2020 10:00   Sodium Latest Ref Range: 136 - 145 mmol/L 138 141 143    Potassium Latest Ref Range: 3.5 - 5.1 mmol/L 3.7 3.8 3.8   Chloride Latest Ref Range: 95 - 110 mmol/L 103 106 110   CO2 Latest Ref Range: 23 - 29 mmol/L 28 25 25   Anion Gap Latest Ref Range: 8 - 16 mmol/L 7 (L) 10 8   BUN, Bld Latest Ref Range: 6 - 20 mg/dL 6 8 11   Creatinine Latest Ref Range: 0.5 - 1.4 mg/dL 0.7 0.7 0.8   eGFR if non African American Latest Ref Range: >60 mL/min/1.73 m^2 >60 >60 >60   eGFR if  Latest Ref Range: >60 mL/min/1.73 m^2 >60 >60 >60   Glucose Latest Ref Range: 70 - 110 mg/dL 99 100 99   Calcium Latest Ref Range: 8.7 - 10.5 mg/dL 9.5 10.5 9.9   Phosphorus Latest Ref Range: 2.7 - 4.5 mg/dL 3.8     Magnesium Latest Ref Range: 1.6 - 2.6 mg/dL 2.3     Alkaline Phosphatase Latest Ref Range: 55 - 135 U/L 62 85 89   PROTEIN TOTAL Latest Ref Range: 6.0 - 8.4 g/dL 7.7 8.3 8.0   Albumin Latest Ref Range: 3.5 - 5.2 g/dL 3.1 (L) 3.9 4.0   BILIRUBIN TOTAL Latest Ref Range: 0.1 - 1.0 mg/dL 0.3 0.2 0.3   AST Latest Ref Range: 10 - 40 U/L 34 41 (H) 38   ALT Latest Ref Range: 10 - 44 U/L 36 56 (H) 45 (H)   CRP Latest Ref Range: 0.0 - 8.2 mg/L 86.1 (H)     LD Latest Ref Range: 110 - 260 U/L 480 (H)  300 (H)     6/3/20- mammo wnl  Assessment:       1. Malignant neoplasm of upper-outer quadrant of right breast in female, estrogen receptor positive    2. Encounter for follow-up surveillance of breast cancer    3. Encounter for monitoring adjuvant hormonal therapy    4. Counseling and coordination of care    5. Counseling on health promotion and disease prevention    6. Health education/counseling    7. Fatty liver disease, nonalcoholic        Plan:          1.      Right breast cancer- no changes- mammo 6/3/20- wnl  2.      Monitoring adjuvant therapy- Taking Arimidex without difficulty. Continue until 3/2027- Dexa 10/1/2019- normal- due again 10/2021  3.      Encouraged walking to help with bone density and decreasing risk for breast cancer recurrence. Take calcium and vit d  4.        Fatty liver disease- followed by gastro now. Has a diagnosis of borderline hepatomegaly and fatty liver found on previous abdominal US 2017. Elevated LDH for  2 years.  She denies ETOH use and statin use.  She denies abdominal pain.  She denies any bone pain. Labs today reveal- elevation in liver enzymes- but declining. LDH declining but elevated- will get gastro labs drawn for today  5.      RTC in 4 months for f/u of breast cancer and adjuvant therapy- Oct 2020 - call for any changes otherwise

## 2020-06-17 ENCOUNTER — OFFICE VISIT (OUTPATIENT)
Dept: HEMATOLOGY/ONCOLOGY | Facility: CLINIC | Age: 55
End: 2020-06-17
Payer: COMMERCIAL

## 2020-06-17 ENCOUNTER — LAB VISIT (OUTPATIENT)
Dept: LAB | Facility: HOSPITAL | Age: 55
End: 2020-06-17
Attending: NURSE PRACTITIONER
Payer: COMMERCIAL

## 2020-06-17 VITALS
TEMPERATURE: 99 F | BODY MASS INDEX: 41.33 KG/M2 | DIASTOLIC BLOOD PRESSURE: 93 MMHG | WEIGHT: 271.81 LBS | SYSTOLIC BLOOD PRESSURE: 146 MMHG | HEART RATE: 97 BPM

## 2020-06-17 DIAGNOSIS — R79.89 ELEVATED LIVER FUNCTION TESTS: ICD-10-CM

## 2020-06-17 DIAGNOSIS — Z17.0 MALIGNANT NEOPLASM OF UPPER-OUTER QUADRANT OF RIGHT BREAST IN FEMALE, ESTROGEN RECEPTOR POSITIVE: Primary | ICD-10-CM

## 2020-06-17 DIAGNOSIS — Z71.9 HEALTH EDUCATION/COUNSELING: ICD-10-CM

## 2020-06-17 DIAGNOSIS — C50.411 MALIGNANT NEOPLASM OF UPPER-OUTER QUADRANT OF RIGHT BREAST IN FEMALE, ESTROGEN RECEPTOR POSITIVE: Primary | ICD-10-CM

## 2020-06-17 DIAGNOSIS — Z08 ENCOUNTER FOR FOLLOW-UP SURVEILLANCE OF BREAST CANCER: ICD-10-CM

## 2020-06-17 DIAGNOSIS — Z71.89 COUNSELING ON HEALTH PROMOTION AND DISEASE PREVENTION: ICD-10-CM

## 2020-06-17 DIAGNOSIS — Z85.3 ENCOUNTER FOR FOLLOW-UP SURVEILLANCE OF BREAST CANCER: ICD-10-CM

## 2020-06-17 DIAGNOSIS — Z51.81 ENCOUNTER FOR MONITORING ADJUVANT HORMONAL THERAPY: ICD-10-CM

## 2020-06-17 DIAGNOSIS — Z71.89 COUNSELING AND COORDINATION OF CARE: ICD-10-CM

## 2020-06-17 DIAGNOSIS — K76.0 FATTY LIVER: ICD-10-CM

## 2020-06-17 DIAGNOSIS — Z79.899 ENCOUNTER FOR MONITORING ADJUVANT HORMONAL THERAPY: ICD-10-CM

## 2020-06-17 DIAGNOSIS — K76.0 FATTY LIVER DISEASE, NONALCOHOLIC: ICD-10-CM

## 2020-06-17 LAB
A1AT SERPL-MCNC: 82 MG/DL (ref 100–190)
CERULOPLASMIN SERPL-MCNC: 26 MG/DL (ref 15–45)
INR PPP: 0.9 (ref 0.8–1.2)
PROTHROMBIN TIME: 10.1 SEC (ref 9–12.5)

## 2020-06-17 PROCEDURE — 86706 HEP B SURFACE ANTIBODY: CPT

## 2020-06-17 PROCEDURE — 3080F DIAST BP >= 90 MM HG: CPT | Mod: CPTII,S$GLB,, | Performed by: NURSE PRACTITIONER

## 2020-06-17 PROCEDURE — 86235 NUCLEAR ANTIGEN ANTIBODY: CPT | Mod: 91

## 2020-06-17 PROCEDURE — 36415 COLL VENOUS BLD VENIPUNCTURE: CPT

## 2020-06-17 PROCEDURE — 3008F BODY MASS INDEX DOCD: CPT | Mod: CPTII,S$GLB,, | Performed by: NURSE PRACTITIONER

## 2020-06-17 PROCEDURE — 3077F SYST BP >= 140 MM HG: CPT | Mod: CPTII,S$GLB,, | Performed by: NURSE PRACTITIONER

## 2020-06-17 PROCEDURE — 99999 PR PBB SHADOW E&M-EST. PATIENT-LVL III: CPT | Mod: PBBFAC,,, | Performed by: NURSE PRACTITIONER

## 2020-06-17 PROCEDURE — 87340 HEPATITIS B SURFACE AG IA: CPT

## 2020-06-17 PROCEDURE — 3008F PR BODY MASS INDEX (BMI) DOCUMENTED: ICD-10-PCS | Mod: CPTII,S$GLB,, | Performed by: NURSE PRACTITIONER

## 2020-06-17 PROCEDURE — 86704 HEP B CORE ANTIBODY TOTAL: CPT

## 2020-06-17 PROCEDURE — 86256 FLUORESCENT ANTIBODY TITER: CPT

## 2020-06-17 PROCEDURE — 82390 ASSAY OF CERULOPLASMIN: CPT

## 2020-06-17 PROCEDURE — 86803 HEPATITIS C AB TEST: CPT

## 2020-06-17 PROCEDURE — 82728 ASSAY OF FERRITIN: CPT

## 2020-06-17 PROCEDURE — 85610 PROTHROMBIN TIME: CPT

## 2020-06-17 PROCEDURE — 86790 VIRUS ANTIBODY NOS: CPT

## 2020-06-17 PROCEDURE — 99999 PR PBB SHADOW E&M-EST. PATIENT-LVL III: ICD-10-PCS | Mod: PBBFAC,,, | Performed by: NURSE PRACTITIONER

## 2020-06-17 PROCEDURE — 84439 ASSAY OF FREE THYROXINE: CPT

## 2020-06-17 PROCEDURE — 99214 OFFICE O/P EST MOD 30 MIN: CPT | Mod: S$GLB,,, | Performed by: NURSE PRACTITIONER

## 2020-06-17 PROCEDURE — 99214 PR OFFICE/OUTPT VISIT, EST, LEVL IV, 30-39 MIN: ICD-10-PCS | Mod: S$GLB,,, | Performed by: NURSE PRACTITIONER

## 2020-06-17 PROCEDURE — 86235 NUCLEAR ANTIGEN ANTIBODY: CPT | Mod: 59

## 2020-06-17 PROCEDURE — 84443 ASSAY THYROID STIM HORMONE: CPT

## 2020-06-17 PROCEDURE — 82103 ALPHA-1-ANTITRYPSIN TOTAL: CPT

## 2020-06-17 PROCEDURE — 83540 ASSAY OF IRON: CPT

## 2020-06-17 PROCEDURE — 3077F PR MOST RECENT SYSTOLIC BLOOD PRESSURE >= 140 MM HG: ICD-10-PCS | Mod: CPTII,S$GLB,, | Performed by: NURSE PRACTITIONER

## 2020-06-17 PROCEDURE — 3080F PR MOST RECENT DIASTOLIC BLOOD PRESSURE >= 90 MM HG: ICD-10-PCS | Mod: CPTII,S$GLB,, | Performed by: NURSE PRACTITIONER

## 2020-06-17 PROCEDURE — 86038 ANTINUCLEAR ANTIBODIES: CPT

## 2020-06-17 PROCEDURE — 86039 ANTINUCLEAR ANTIBODIES (ANA): CPT

## 2020-06-18 LAB
ANA PATTERN 1: NORMAL
ANA SER QL IF: POSITIVE
ANA TITR SER IF: NORMAL {TITER}
FERRITIN SERPL-MCNC: 44 NG/ML (ref 20–300)
HBV CORE AB SERPL QL IA: NEGATIVE
HBV SURFACE AB SER-ACNC: NEGATIVE M[IU]/ML
HBV SURFACE AG SERPL QL IA: NEGATIVE
HCV AB SERPL QL IA: NEGATIVE
HEPATITIS A ANTIBODY, IGG: NEGATIVE
IRON SERPL-MCNC: 77 UG/DL (ref 30–160)
MITOCHONDRIA AB TITR SER IF: NORMAL {TITER}
SATURATED IRON: 17 % (ref 20–50)
T4 FREE SERPL-MCNC: 1.05 NG/DL (ref 0.71–1.51)
TOTAL IRON BINDING CAPACITY: 445 UG/DL (ref 250–450)
TRANSFERRIN SERPL-MCNC: 301 MG/DL (ref 200–375)
TSH SERPL DL<=0.005 MIU/L-ACNC: 4.42 UIU/ML (ref 0.4–4)

## 2020-06-19 LAB — SMOOTH MUSCLE AB TITR SER IF: ABNORMAL {TITER}

## 2020-06-24 LAB
ANTI SM ANTIBODY: 0.47 RATIO (ref 0–0.99)
ANTI SM/RNP ANTIBODY: 0.2 RATIO (ref 0–0.99)
ANTI-SM INTERPRETATION: NEGATIVE
ANTI-SM/RNP INTERPRETATION: NEGATIVE
ANTI-SSA ANTIBODY: 0.14 RATIO (ref 0–0.99)
ANTI-SSA INTERPRETATION: NEGATIVE
ANTI-SSB ANTIBODY: 0.09 RATIO (ref 0–0.99)
ANTI-SSB INTERPRETATION: NEGATIVE
DSDNA AB SER-ACNC: NORMAL [IU]/ML

## 2020-06-29 DIAGNOSIS — E88.01 ALPHA-1-ANTITRYPSIN DEFICIENCY: ICD-10-CM

## 2020-06-29 DIAGNOSIS — R79.89 ELEVATED LIVER FUNCTION TESTS: Primary | ICD-10-CM

## 2020-07-08 ENCOUNTER — TELEPHONE (OUTPATIENT)
Dept: FAMILY MEDICINE | Facility: CLINIC | Age: 55
End: 2020-07-08

## 2020-07-08 DIAGNOSIS — M79.673 PAIN OF FOOT, UNSPECIFIED LATERALITY: Primary | ICD-10-CM

## 2020-07-08 NOTE — TELEPHONE ENCOUNTER
Patient states  She need a work excuse to return back to work. She works for the school system.  Patient states she have bone spurs. Can you recommend her to podiatry.

## 2020-07-08 NOTE — TELEPHONE ENCOUNTER
Ok for referral to Podiatry Dr. Cazares.   Clarification: Work return needed why? How long has she been off?  Thanks.

## 2020-07-08 NOTE — TELEPHONE ENCOUNTER
----- Message from Krystin Lam sent at 7/8/2020  9:35 AM CDT -----  Contact: Kylie Espinal would like call back at [phone], Regards to getting a clearance to returned back to work.    Thanks  Td

## 2020-07-08 NOTE — TELEPHONE ENCOUNTER
Patient states they were off due to Covid outbreak. Now it is time to return so their job is requiring a doctors excuse stating it is okay to return to work.

## 2020-07-09 ENCOUNTER — PATIENT OUTREACH (OUTPATIENT)
Dept: ADMINISTRATIVE | Facility: OTHER | Age: 55
End: 2020-07-09

## 2020-07-10 ENCOUNTER — OFFICE VISIT (OUTPATIENT)
Dept: PODIATRY | Facility: CLINIC | Age: 55
End: 2020-07-10
Payer: COMMERCIAL

## 2020-07-10 VITALS
HEIGHT: 68 IN | BODY MASS INDEX: 41.07 KG/M2 | DIASTOLIC BLOOD PRESSURE: 90 MMHG | SYSTOLIC BLOOD PRESSURE: 144 MMHG | WEIGHT: 271 LBS | HEART RATE: 102 BPM

## 2020-07-10 DIAGNOSIS — M72.2 PLANTAR FASCIITIS: Primary | ICD-10-CM

## 2020-07-10 DIAGNOSIS — M79.673 PAIN OF FOOT, UNSPECIFIED LATERALITY: ICD-10-CM

## 2020-07-10 DIAGNOSIS — M79.671 INFLAMMATORY HEEL PAIN, RIGHT: ICD-10-CM

## 2020-07-10 PROCEDURE — 20550 PR INJECT TENDON SHEATH/LIGAMENT: ICD-10-PCS | Mod: RT,S$GLB,, | Performed by: PODIATRIST

## 2020-07-10 PROCEDURE — 20550 NJX 1 TENDON SHEATH/LIGAMENT: CPT | Mod: RT,S$GLB,, | Performed by: PODIATRIST

## 2020-07-10 PROCEDURE — 99999 PR PBB SHADOW E&M-EST. PATIENT-LVL IV: ICD-10-PCS | Mod: PBBFAC,,, | Performed by: PODIATRIST

## 2020-07-10 PROCEDURE — 3080F DIAST BP >= 90 MM HG: CPT | Mod: CPTII,S$GLB,, | Performed by: PODIATRIST

## 2020-07-10 PROCEDURE — 3080F PR MOST RECENT DIASTOLIC BLOOD PRESSURE >= 90 MM HG: ICD-10-PCS | Mod: CPTII,S$GLB,, | Performed by: PODIATRIST

## 2020-07-10 PROCEDURE — 3008F BODY MASS INDEX DOCD: CPT | Mod: CPTII,S$GLB,, | Performed by: PODIATRIST

## 2020-07-10 PROCEDURE — 3077F SYST BP >= 140 MM HG: CPT | Mod: CPTII,S$GLB,, | Performed by: PODIATRIST

## 2020-07-10 PROCEDURE — 3077F PR MOST RECENT SYSTOLIC BLOOD PRESSURE >= 140 MM HG: ICD-10-PCS | Mod: CPTII,S$GLB,, | Performed by: PODIATRIST

## 2020-07-10 PROCEDURE — 99204 PR OFFICE/OUTPT VISIT, NEW, LEVL IV, 45-59 MIN: ICD-10-PCS | Mod: 25,S$GLB,, | Performed by: PODIATRIST

## 2020-07-10 PROCEDURE — 3008F PR BODY MASS INDEX (BMI) DOCUMENTED: ICD-10-PCS | Mod: CPTII,S$GLB,, | Performed by: PODIATRIST

## 2020-07-10 PROCEDURE — 99999 PR PBB SHADOW E&M-EST. PATIENT-LVL IV: CPT | Mod: PBBFAC,,, | Performed by: PODIATRIST

## 2020-07-10 PROCEDURE — 99204 OFFICE O/P NEW MOD 45 MIN: CPT | Mod: 25,S$GLB,, | Performed by: PODIATRIST

## 2020-07-10 RX ORDER — TRIAMCINOLONE ACETONIDE 40 MG/ML
40 INJECTION, SUSPENSION INTRA-ARTICULAR; INTRAMUSCULAR
Status: COMPLETED | OUTPATIENT
Start: 2020-07-10 | End: 2020-07-10

## 2020-07-10 RX ORDER — MELOXICAM 15 MG/1
15 TABLET ORAL DAILY
Qty: 30 TABLET | Refills: 0 | Status: SHIPPED | OUTPATIENT
Start: 2020-07-10 | End: 2021-03-29

## 2020-07-10 RX ADMIN — TRIAMCINOLONE ACETONIDE 40 MG: 40 INJECTION, SUSPENSION INTRA-ARTICULAR; INTRAMUSCULAR at 08:07

## 2020-07-10 NOTE — PROGRESS NOTES
Subjective:     Patient ID: Kylie Urbano is a 54 y.o. female.    Chief Complaint: Foot Pain (c/o pain to bottom of right heel. rates pain 8/10. wears tennis shoes with socks. non-diabetic pt. last seen on 04/09/20 with PCP Dr. Parks.)    Kylie is a 54 y.o. female who presents to the clinic complaining of heel pain in the right foot, especially with the first step in the morning. The pain is described as Aching, Throbbing and Tight. The onset of the pain was gradual and has worsened over the past several days. Kylie rates the pain as 8/10. She denies a history of trauma. Prior treatments include Ibuprofen, and years ago injections and inserts.     Patient Active Problem List   Diagnosis    HTN (hypertension)    Iron deficiency anemia    Hypothyroidism    Insulin resistance syndrome    Allergic rhinitis    Vitamin D deficiency    HSV infection    Uterine leiomyoma    Status post laparoscopic hysterectomy    Malignant neoplasm of upper-outer quadrant of right female breast    Breast cancer, right breast    Malignant neoplasm of upper-outer quadrant of right breast in female, estrogen receptor positive    Gastroesophageal reflux disease    Constipation    Anxiety    Morbid obesity with BMI of 40.0-44.9, adult    Screening for colon cancer    SOB (shortness of breath)       Medication List with Changes/Refills   New Medications    MELOXICAM (MOBIC) 15 MG TABLET    Take 1 tablet (15 mg total) by mouth once daily.   Current Medications    ACETAMINOPHEN (TYLENOL) 500 MG TABLET    Take 2 tablets (1,000 mg total) by mouth every 8 (eight) hours as needed.    ACYCLOVIR (ZOVIRAX) 400 MG TABLET    TAKE 1 TABLET BY MOUTH THREE TIMES DAILY WITH FOOD FOR 5 DAYS (PER  EPISODE)    ALBUTEROL (PROVENTIL/VENTOLIN HFA) 90 MCG/ACTUATION INHALER    Inhale 2 puffs into the lungs every 4 (four) hours as needed for Wheezing. Rescue    AMLODIPINE (NORVASC) 5 MG TABLET    Take 1 tablet by mouth once daily    ANASTROZOLE  (ARIMIDEX) 1 MG TAB    Take 1 tablet by mouth once daily.    BENAZEPRIL (LOTENSIN) 20 MG TABLET    Take 1 tablet by mouth once daily    FLUTICASONE PROPIONATE (FLONASE) 50 MCG/ACTUATION NASAL SPRAY    2 sprays by Each Nostril route daily as needed for Rhinitis.    SYNTHROID 175 MCG TABLET    Take 1 tablet by mouth once daily       Review of patient's allergies indicates:   Allergen Reactions    Methylprednisone Anxiety and Palpitations    Lortab [hydrocodone-acetaminophen] Nausea And Vomiting     Historical.    Amoxicillin Rash     Historical    Sulfa (sulfonamide antibiotics) Rash       Past Surgical History:   Procedure Laterality Date    BREAST LUMPECTOMY Right 2017    BTL      COLONOSCOPY N/A 2/22/2019    Procedure: COLONOSCOPY;  Surgeon: Mariano Santamaria MD;  Location: [facility];  Service: Endoscopy;  Laterality: N/A;    DILATION AND CURETTAGE OF UTERUS      x 1    HYSTERECTOMY      Laproscopic robot assissted with BSO       Family History   Problem Relation Age of Onset    Diabetes Mother     Hypertension Mother     Heart failure Mother     Glaucoma Mother     Cancer Father         Stomach cancer    Cancer Sister         Ovarian cancer    Ovarian cancer Sister     No Known Problems Daughter     No Known Problems Son     No Known Problems Daughter     Breast cancer Paternal Cousin     Stroke Neg Hx     Heart disease Neg Hx         MI/CAD       Social History     Socioeconomic History    Marital status: Single     Spouse name: Not on file    Number of children: 3    Years of education: Not on file    Highest education level: Not on file   Occupational History    Occupation: [employer]     Employer: [employer]     Comment: [employer]   Social Needs    Financial resource strain: Somewhat hard    Food insecurity     Worry: Sometimes true     Inability: Sometimes true    Transportation needs     Medical: No     Non-medical: No   Tobacco Use    Smoking  "status: Never Smoker    Smokeless tobacco: Never Used   Substance and Sexual Activity    Alcohol use: Yes     Frequency: 2-4 times a month     Drinks per session: 1 or 2     Binge frequency: Never     Comment: occasionally     Drug use: No    Sexual activity: Not Currently   Lifestyle    Physical activity     Days per week: 4 days     Minutes per session: 40 min    Stress: Only a little   Relationships    Social connections     Talks on phone: Twice a week     Gets together: Once a week     Attends Evangelical service: More than 4 times per year     Active member of club or organization: No     Attends meetings of clubs or organizations: Never     Relationship status:    Other Topics Concern    Not on file   Social History Narrative    She wears seatbelt.       Vitals:    07/10/20 0804   BP: (!) 144/90   Pulse: 102   Weight: 122.9 kg (271 lb)   Height: 5' 8" (1.727 m)   PainSc:   8   PainLoc: Foot       Review of Systems   Constitutional: Negative for chills and fever.   Respiratory: Negative for shortness of breath.    Cardiovascular: Negative for chest pain, palpitations, orthopnea, claudication and leg swelling.   Gastrointestinal: Negative for diarrhea, nausea and vomiting.   Musculoskeletal: Negative for joint pain.   Skin: Negative for rash.   Neurological: Negative for dizziness, tingling, sensory change, focal weakness and weakness.   Psychiatric/Behavioral: Negative.              Objective:   PHYSICAL EXAM: Apperance: Alert and orient in no distress,well developed, and with good attention to grooming and body habits  Patient presents ambulating in tennis shoes.   Lower Extremity Exam  VASCULAR: Dorsalis pedis pulses 2/4 bilateral and Posterior Tibial pulses 2/4 bilateral. Capillary fill time <4 seconds bilateral. No edema observed bilateral. Varicosities absent bilateral. Skin temperature of the lower extremities is warm to warm, proximal to distal. Hair growth WNL bilateral.  DERMATOLOGICAL: " No skin rashes, subcutaneous nodules, lesions, or ulcers observed bilateral.   NEUROLOGICAL: Light touch, sharp-dull, proprioception all present and equal bilaterally.    MUSCULOSKELETAL: Muscle strength 5/5 for all foot inverters, everters, plantarflexors, and dorsiflexors bilateral. Ankle joints bilateral shows decreased ROM. bilateral ankle ROM shows greater decrease in dorsiflexion with knee extended. Ankle joint ROM is pain free and without crepitus bilateral. Pain to palpation right plantar medial tubercle. Plantar medial aspect of right heels shows tenderness to palpation. No pain on medial-lateral compression of the calcaneus.         Assessment:   The following prescriptions/orders were written today per listed diagnosis  Plantar fasciitis - Right Foot  -     meloxicam (MOBIC) 15 MG tablet; Take 1 tablet (15 mg total) by mouth once daily.  Dispense: 30 tablet; Refill: 0  -     triamcinolone acetonide injection 40 mg    Inflammatory heel pain, right - Right Foot  -     meloxicam (MOBIC) 15 MG tablet; Take 1 tablet (15 mg total) by mouth once daily.  Dispense: 30 tablet; Refill: 0  -     triamcinolone acetonide injection 40 mg    Pain of foot, unspecified laterality  -     Ambulatory referral/consult to Podiatry          Plan:   Plantar fasciitis - Right Foot  -     meloxicam (MOBIC) 15 MG tablet; Take 1 tablet (15 mg total) by mouth once daily.  Dispense: 30 tablet; Refill: 0  -     triamcinolone acetonide injection 40 mg    Inflammatory heel pain, right - Right Foot  -     meloxicam (MOBIC) 15 MG tablet; Take 1 tablet (15 mg total) by mouth once daily.  Dispense: 30 tablet; Refill: 0  -     triamcinolone acetonide injection 40 mg    Pain of foot, unspecified laterality  -     Ambulatory referral/consult to Podiatry      I counseled the patient on her conditions, regarding findings of my examination, my impressions, and usual treatment plan.   I explained to the patient that etiology and treatment options  for heel pain including rest,  ice messages, stretching exercises, strappings/tappings, NSAID's, injections, new shoegear with orthotic inserts, and/or surgical treatment.   Patient agreed to oral and injections therapy today.   I gave written and verbal instructions on heel cord stretching and this was demonstrated for the patient. Patient expressed understanding.  Prescribed Meloxicam 15mg to be taken once daily with food for the next 7 days and then as needed for inflammation. Patient advised on the possible elevation of blood pressure or heart effects and caution to take pills as needed and to discontinue use if symptoms arise, patient agreed.  Patient agreed to injection therapy today. After sterilizing the area with an alcohol prep, the affected area was injected with a solution containing 1 cc of 1% lidocaine plain, 1cc of 0.5% Marcaine plain and 1 cc of Kenalog 40%.  Injection area was then covered with band-aid. The patient tolerated the injection well and reported comfort to the area.  Patient instructed on adequate icing techniques. Patient should ice the affected area at least once per day x 10 minutes for 10 days . I advised the  patient that extra icing would also be beneficial to ensure adequate anti inflammatory effect.   The patient and I reviewed the types of shoes she should be wearing, my recommendation includes generally the best time of the day for a shoe fitting is the afternoon, shoes with a wide toe box, very good cushion, and tennis shoes with removable inner soles. The patient and I reviewed my recommendations for over-the-counter orthotic inserts.   Patient to return in 2 months.                   Kristina Cazares DPM  [facility]

## 2020-07-10 NOTE — LETTER
July 10, 2020      Beverly Parks MD  [address] [place] [address]           [facility]  [address] [address] [facility]  [place] [address]  Phone: [phone]  Fax: [phone]          Patient: Kylie Urbano   MR Number: [id number]   YOB: 1965   Date of Visit: 7/10/2020       Dear Dr. Beverly Parks:    Thank you for referring Kylie Urbano to me for evaluation. Attached you will find relevant portions of my assessment and plan of care.    If you have questions, please do not hesitate to call me. I look forward to following Kylie Urbano along with you.    Sincerely,    Kristina Cazares DPM    Enclosure  CC:  No Recipients    If you would like to receive this communication electronically, please contact [email or web address] or [phone] to request more information on [employer] Link access.    For providers and/or their staff who would like to refer a patient to [facility], please contact us through our one-stop-shop provider referral line, [facility] , at [phone].    If you feel you have received this communication in error or would no longer like to receive these types of communications, please e-mail [email or web address]

## 2020-07-28 ENCOUNTER — LAB VISIT (OUTPATIENT)
Dept: LAB | Facility: HOSPITAL | Age: 55
End: 2020-07-28
Attending: FAMILY MEDICINE
Payer: COMMERCIAL

## 2020-07-28 DIAGNOSIS — E88.01 ALPHA-1-ANTITRYPSIN DEFICIENCY: ICD-10-CM

## 2020-07-28 DIAGNOSIS — R79.89 ELEVATED LIVER FUNCTION TESTS: ICD-10-CM

## 2020-07-28 PROCEDURE — 36415 COLL VENOUS BLD VENIPUNCTURE: CPT

## 2020-07-28 PROCEDURE — 82103 ALPHA-1-ANTITRYPSIN TOTAL: CPT

## 2020-08-03 LAB
A1AT PHENOTYP SERPL-IMP: NORMAL BANDS
A1AT SERPL NEPH-MCNC: 110 MG/DL (ref 100–190)

## 2020-08-20 ENCOUNTER — TELEPHONE (OUTPATIENT)
Dept: RADIOLOGY | Facility: HOSPITAL | Age: 55
End: 2020-08-20

## 2020-08-21 ENCOUNTER — PROCEDURE VISIT (OUTPATIENT)
Dept: GASTROENTEROLOGY | Facility: CLINIC | Age: 55
End: 2020-08-21
Attending: NURSE PRACTITIONER
Payer: COMMERCIAL

## 2020-08-21 ENCOUNTER — HOSPITAL ENCOUNTER (OUTPATIENT)
Dept: RADIOLOGY | Facility: HOSPITAL | Age: 55
Discharge: HOME | End: 2020-08-21
Attending: NURSE PRACTITIONER
Payer: COMMERCIAL

## 2020-08-21 VITALS
HEIGHT: 68 IN | DIASTOLIC BLOOD PRESSURE: 92 MMHG | SYSTOLIC BLOOD PRESSURE: 146 MMHG | HEART RATE: 92 BPM | BODY MASS INDEX: 40.89 KG/M2 | WEIGHT: 269.81 LBS

## 2020-08-21 DIAGNOSIS — K76.0 FATTY LIVER: ICD-10-CM

## 2020-08-21 DIAGNOSIS — R79.89 ELEVATED LIVER FUNCTION TESTS: ICD-10-CM

## 2020-08-21 PROCEDURE — 91200 LIVER ELASTOGRAPHY: CPT | Mod: S$GLB,,, | Performed by: NURSE PRACTITIONER

## 2020-08-21 PROCEDURE — 76700 US ABDOMEN COMPLETE: ICD-10-PCS | Mod: 26,,, | Performed by: RADIOLOGY

## 2020-08-21 PROCEDURE — 76700 US EXAM ABDOM COMPLETE: CPT | Mod: 26,,, | Performed by: RADIOLOGY

## 2020-08-21 PROCEDURE — 76700 US EXAM ABDOM COMPLETE: CPT | Mod: TC

## 2020-08-21 PROCEDURE — 91200 PR LIVER ELASTOGRAPHY W/OUT IMAG W/INTERP & REPORT: ICD-10-PCS | Mod: S$GLB,,, | Performed by: NURSE PRACTITIONER

## 2020-08-21 NOTE — PROGRESS NOTES
Patient presented in clinic for fibroscan procedure. Patient denies taking any medication this morning. Patient denies any implantable metal devices; such as a pacemaker, defibrillator, or pump.

## 2020-08-24 NOTE — PROCEDURES
Fibroscan Procedure     Name: Kylie Urbano  Date of Procedure : 2020   :: CATRACHO Still  Diagnosis: NAFLD    Probe: XL    Fibroscan readin.2 KPa    Fibrosis:F 0-1     CAP readin dB/m    Steatosis: :S3       Interpretation:   Severe steatosis with no significant fibrosis.

## 2020-08-31 ENCOUNTER — TELEPHONE (OUTPATIENT)
Dept: GASTROENTEROLOGY | Facility: CLINIC | Age: 55
End: 2020-08-31

## 2020-08-31 NOTE — TELEPHONE ENCOUNTER
----- Message from CATRACHO Lanier sent at 8/24/2020  7:13 AM CDT -----  Severe steatosis with no significant fibrosis.

## 2020-09-21 ENCOUNTER — PATIENT OUTREACH (OUTPATIENT)
Dept: ADMINISTRATIVE | Facility: HOSPITAL | Age: 55
End: 2020-09-21

## 2020-09-21 NOTE — PROGRESS NOTES
HTN Report. Patient states she will call back to schedule a nurse BP check due to being at work and can't schedule  at this time. Patient does have a scheduled appointment with her PCP on 11/17/2020.

## 2020-09-28 NOTE — PROGRESS NOTES
Subjective:       Patient ID: Kylie Urbano is a 55 y.o. female.    Chief Complaint: Breast Cancer (f/u)      HPI Patient presents for breast cancer surveillance and review of labs. History of having COVID in March 2020    55-year-old female with a previous diagnosis of right-sided infiltrating ductal carcinoma ER/KY positive, HER-2 negative with biopsy-proven right axillary lymph node involvement.  Patient was consented for BRCA testing for participation and B-55 study with negative BRCA testing.    She received cycle 1 of dense dose Adriamycin/Cytoxan on 6/12/2017 and tolerated treatment exceptionally well.  She completed cycle 4 of dose dense Taxol on 9/19/2017.  She underwent lumpectomy with sentinel lymph node biopsy on 10/31/2017.   Final pathology demonstrated [id number].  The primary measured 0.8 cm and 2 sentinel lymph nodes were positive with 3 mm macrometastasis within first sentinel lymph node and 1 mm micrometastasis within the second sentinel lymph node.  She underwent a full right axillary lymph node dissection on 11/8/2017 which demonstrated 0 positive nodes.  She completed adjuvant radiation on 2/27/2018.      The patient had a hysterectomy approximately 6/20/14.  FSH was in the intermediate range which likely means the patient is perimenopausal.  Started Tamoxifen 20 mg daily in 3/2018 and has since been transitioned to Arimidex - states tolerating well.  Due off Arimidex 3/2027  Surveillance mammograms remains [facility] - 6/3/20        ER/KY positive, HER-2 negative infiltrating ductal carcinoma the right breast with biopsy proven lymph node involvement clinically stage II/III  [id number].  Pathology following neoadjuvant therapy demonstrated 1 sentinel lymph node with micrometastasis (3 mm) and second lymph node with micrometastasis (1 mm)  --BRCA testing via B-55 Study is negative for mutation  --ddAC--> taxol   cycle 1--- 6/12/2017  --Final cycle Cycle 4 on 9/19/2017  --Radiation completed on  2/27/2018  --Tamoxifen 20 mg by mouth daily started in 3/2018  --surveillance bilateral mammogram in 6/3/2020 wnl  --Transitioned to Arimidex 1 mg PO daily - to be completed in 3/2027     I have reviewed and updated the HPI, ROS, PMHx, Social Hx, Family Hx and treatment history.     Today's visit:  The patient has previously been followed in the clinic by Dr. Martinez, Dr. Miramontes, and CATRACHO Haque.  Last visit with me - 6/3/2020.      10/1/2019- DEXA scan normal. Next one due 10/2021    Has previous history of elevated liver enzymes and elevated ldh. Had taken more tylenol with Covid infection. Off tylenol now. Referred pt to gastro for evaluation due to prior history of elevated liver enzymes and fatty liver.     Pt followed by gastro for elevated liver enzymes and fatty liver disease.      Review of Systems   Constitutional: Negative.    HENT: Negative.    Eyes: Negative.    Respiratory: Negative.    Cardiovascular: Negative.    Gastrointestinal: Negative.         No reflux   Endocrine: Negative.    Genitourinary: Negative.    Musculoskeletal: Negative.    Skin: Negative.    Allergic/Immunologic: Negative.    Neurological: Negative.    Hematological: Negative.  Negative for adenopathy.   Psychiatric/Behavioral: Negative.        Pt denies any breast pain, nipple discharge or palpable mass    Objective:          Physical Exam   Constitutional: She is oriented to person, place, and time. She appears well-developed and well-nourished. No distress.   HENT:   Head: Normocephalic and atraumatic. Not macrocephalic.   Right Ear: Tympanic membrane and ear canal normal.   Left Ear: Tympanic membrane and ear canal normal.   Nose: Nose normal. Right sinus exhibits no maxillary sinus tenderness and no frontal sinus tenderness. Left sinus exhibits no maxillary sinus tenderness and no frontal sinus tenderness.   Mouth/Throat: Uvula is midline, oropharynx is clear and moist and mucous membranes are normal. No oropharyngeal  exudate.   Eyes: Pupils are equal, round, and reactive to light. Conjunctivae, EOM and lids are normal. Lids are everted and swept, no foreign bodies found.   Neck: Trachea normal and normal range of motion. Neck supple. No tracheal deviation present. No thyroid mass and no thyromegaly present.   Cardiovascular: Normal rate, regular rhythm, normal heart sounds, intact distal pulses and normal pulses. Exam reveals no gallop and no friction rub.   No murmur heard.  Pulmonary/Chest: Effort normal and breath sounds normal. No stridor. No respiratory distress. She has no decreased breath sounds. She has no wheezes. She has no rhonchi. She has no rales. She exhibits no tenderness.   No lymphadenopathy, susan breast no visible redness, puckering or retraction. No palpable mass bilaterally. No nipple discharge.   Abdominal: Soft. Normal appearance and bowel sounds are normal. She exhibits no distension. There is no hepatosplenomegaly. There is no tenderness. There is no guarding.   Musculoskeletal: Normal range of motion. She exhibits no edema or deformity.   Lymphadenopathy:        Head (right side): No submental and no submandibular adenopathy present.        Head (left side): No submental and no submandibular adenopathy present.        Right cervical: No superficial cervical and no deep cervical adenopathy present.       Left cervical: No superficial cervical and no deep cervical adenopathy present.     She has no axillary adenopathy.        Right: No supraclavicular adenopathy present.        Left: No supraclavicular adenopathy present.   Neurological: She is alert and oriented to person, place, and time. No cranial nerve deficit or sensory deficit. She exhibits normal muscle tone.   Skin: Skin is warm, dry and intact. Capillary refill takes less than 2 seconds. No rash noted. She is not diaphoretic. No pallor.   Psychiatric: She has a normal mood and affect. Her speech is normal and behavior is normal. Judgment and  thought content normal. She is not actively hallucinating. Cognition and memory are normal. She is attentive.     Last mammo 6/3/20- wnl    Assessment:       1. Malignant neoplasm of upper-outer quadrant of right breast in female, estrogen receptor positive    2. Malignant neoplasm of right breast in female, estrogen receptor positive, unspecified site of breast    3. Encounter for monitoring adjuvant hormonal therapy    4. Encounter for follow-up surveillance of breast cancer    5. Fatty liver disease, nonalcoholic    6. Elevated LDH        Plan:          1.      Right breast cancer- no changes- mammo 6/3/20- wnl  2.      Monitoring adjuvant therapy- Taking Arimidex without difficulty. Continue until 3/2027- Dexa 10/1/2019- normal- due again 10/2021-   3.      Encouraged walking to help with bone density and decreasing risk for breast cancer recurrence. Taking calcium and vit d- not walking- plans to restart. Discussed reducing risk for breast cancer with wt loss and exercise. Pt will work up to 3 days a week for 30 minutes of walking and wt loss of 10# by feb 2021 visit. Dietary intake review revealed high carb diet- pt counseled on alternative choices for foods.   4.      Fatty liver disease- followed by gastro now. Has a diagnosis of borderline hepatomegaly and fatty liver - labs today- cbc, cmp and ldh- will send results via [employer]  5.      RTC in 4 months for f/u of breast cancer and adjuvant therapy- Feb 2021 - call for any changes otherwise

## 2020-10-06 ENCOUNTER — OFFICE VISIT (OUTPATIENT)
Dept: HEMATOLOGY/ONCOLOGY | Facility: CLINIC | Age: 55
End: 2020-10-06
Payer: COMMERCIAL

## 2020-10-06 ENCOUNTER — LAB VISIT (OUTPATIENT)
Dept: LAB | Facility: HOSPITAL | Age: 55
End: 2020-10-06
Attending: NURSE PRACTITIONER
Payer: COMMERCIAL

## 2020-10-06 VITALS
DIASTOLIC BLOOD PRESSURE: 96 MMHG | WEIGHT: 279.13 LBS | BODY MASS INDEX: 42.3 KG/M2 | HEIGHT: 68 IN | SYSTOLIC BLOOD PRESSURE: 145 MMHG | HEART RATE: 96 BPM | RESPIRATION RATE: 14 BRPM | TEMPERATURE: 99 F | OXYGEN SATURATION: 97 %

## 2020-10-06 DIAGNOSIS — Z17.0 MALIGNANT NEOPLASM OF RIGHT BREAST IN FEMALE, ESTROGEN RECEPTOR POSITIVE, UNSPECIFIED SITE OF BREAST: ICD-10-CM

## 2020-10-06 DIAGNOSIS — K76.0 FATTY LIVER DISEASE, NONALCOHOLIC: ICD-10-CM

## 2020-10-06 DIAGNOSIS — Z85.3 ENCOUNTER FOR FOLLOW-UP SURVEILLANCE OF BREAST CANCER: ICD-10-CM

## 2020-10-06 DIAGNOSIS — Z79.899 ENCOUNTER FOR MONITORING ADJUVANT HORMONAL THERAPY: ICD-10-CM

## 2020-10-06 DIAGNOSIS — C50.411 MALIGNANT NEOPLASM OF UPPER-OUTER QUADRANT OF RIGHT BREAST IN FEMALE, ESTROGEN RECEPTOR POSITIVE: Primary | ICD-10-CM

## 2020-10-06 DIAGNOSIS — R74.02 ELEVATED LDH: ICD-10-CM

## 2020-10-06 DIAGNOSIS — C50.911 MALIGNANT NEOPLASM OF RIGHT BREAST IN FEMALE, ESTROGEN RECEPTOR POSITIVE, UNSPECIFIED SITE OF BREAST: ICD-10-CM

## 2020-10-06 DIAGNOSIS — Z51.81 ENCOUNTER FOR MONITORING ADJUVANT HORMONAL THERAPY: ICD-10-CM

## 2020-10-06 DIAGNOSIS — Z08 ENCOUNTER FOR FOLLOW-UP SURVEILLANCE OF BREAST CANCER: ICD-10-CM

## 2020-10-06 DIAGNOSIS — Z17.0 MALIGNANT NEOPLASM OF UPPER-OUTER QUADRANT OF RIGHT BREAST IN FEMALE, ESTROGEN RECEPTOR POSITIVE: Primary | ICD-10-CM

## 2020-10-06 LAB
ALBUMIN SERPL BCP-MCNC: 3.6 G/DL (ref 3.5–5.2)
ALP SERPL-CCNC: 94 U/L (ref 55–135)
ALT SERPL W/O P-5'-P-CCNC: 37 U/L (ref 10–44)
ANION GAP SERPL CALC-SCNC: 9 MMOL/L (ref 8–16)
AST SERPL-CCNC: 29 U/L (ref 10–40)
BASOPHILS # BLD AUTO: 0.04 K/UL (ref 0–0.2)
BASOPHILS NFR BLD: 0.6 % (ref 0–1.9)
BILIRUB SERPL-MCNC: 0.3 MG/DL (ref 0.1–1)
BUN SERPL-MCNC: 13 MG/DL (ref 6–20)
CALCIUM SERPL-MCNC: 9.5 MG/DL (ref 8.7–10.5)
CHLORIDE SERPL-SCNC: 106 MMOL/L (ref 95–110)
CO2 SERPL-SCNC: 25 MMOL/L (ref 23–29)
CREAT SERPL-MCNC: 0.7 MG/DL (ref 0.5–1.4)
DIFFERENTIAL METHOD: NORMAL
EOSINOPHIL # BLD AUTO: 0.1 K/UL (ref 0–0.5)
EOSINOPHIL NFR BLD: 1.8 % (ref 0–8)
ERYTHROCYTE [DISTWIDTH] IN BLOOD BY AUTOMATED COUNT: 14.5 % (ref 11.5–14.5)
EST. GFR  (AFRICAN AMERICAN): >60 ML/MIN/1.73 M^2
EST. GFR  (NON AFRICAN AMERICAN): >60 ML/MIN/1.73 M^2
GLUCOSE SERPL-MCNC: 78 MG/DL (ref 70–110)
HCT VFR BLD AUTO: 40.3 % (ref 37–48.5)
HGB BLD-MCNC: 13.2 G/DL (ref 12–16)
IMM GRANULOCYTES # BLD AUTO: 0.01 K/UL (ref 0–0.04)
IMM GRANULOCYTES NFR BLD AUTO: 0.1 % (ref 0–0.5)
LDH SERPL L TO P-CCNC: 264 U/L (ref 110–260)
LYMPHOCYTES # BLD AUTO: 2.5 K/UL (ref 1–4.8)
LYMPHOCYTES NFR BLD: 35 % (ref 18–48)
MCH RBC QN AUTO: 28.4 PG (ref 27–31)
MCHC RBC AUTO-ENTMCNC: 32.8 G/DL (ref 32–36)
MCV RBC AUTO: 87 FL (ref 82–98)
MONOCYTES # BLD AUTO: 0.7 K/UL (ref 0.3–1)
MONOCYTES NFR BLD: 10.1 % (ref 4–15)
NEUTROPHILS # BLD AUTO: 3.8 K/UL (ref 1.8–7.7)
NEUTROPHILS NFR BLD: 52.5 % (ref 38–73)
NRBC BLD-RTO: 0 /100 WBC
PLATELET # BLD AUTO: 261 K/UL (ref 150–350)
PMV BLD AUTO: 10.1 FL (ref 9.2–12.9)
POTASSIUM SERPL-SCNC: 3.9 MMOL/L (ref 3.5–5.1)
PROT SERPL-MCNC: 7.6 G/DL (ref 6–8.4)
RBC # BLD AUTO: 4.64 M/UL (ref 4–5.4)
SODIUM SERPL-SCNC: 140 MMOL/L (ref 136–145)
WBC # BLD AUTO: 7.22 K/UL (ref 3.9–12.7)

## 2020-10-06 PROCEDURE — 99999 PR PBB SHADOW E&M-EST. PATIENT-LVL IV: ICD-10-PCS | Mod: PBBFAC,,, | Performed by: NURSE PRACTITIONER

## 2020-10-06 PROCEDURE — 80053 COMPREHEN METABOLIC PANEL: CPT

## 2020-10-06 PROCEDURE — 99213 OFFICE O/P EST LOW 20 MIN: CPT | Mod: S$GLB,,, | Performed by: NURSE PRACTITIONER

## 2020-10-06 PROCEDURE — 83615 LACTATE (LD) (LDH) ENZYME: CPT

## 2020-10-06 PROCEDURE — 3080F PR MOST RECENT DIASTOLIC BLOOD PRESSURE >= 90 MM HG: ICD-10-PCS | Mod: CPTII,S$GLB,, | Performed by: NURSE PRACTITIONER

## 2020-10-06 PROCEDURE — 3077F PR MOST RECENT SYSTOLIC BLOOD PRESSURE >= 140 MM HG: ICD-10-PCS | Mod: CPTII,S$GLB,, | Performed by: NURSE PRACTITIONER

## 2020-10-06 PROCEDURE — 3008F PR BODY MASS INDEX (BMI) DOCUMENTED: ICD-10-PCS | Mod: CPTII,S$GLB,, | Performed by: NURSE PRACTITIONER

## 2020-10-06 PROCEDURE — 3008F BODY MASS INDEX DOCD: CPT | Mod: CPTII,S$GLB,, | Performed by: NURSE PRACTITIONER

## 2020-10-06 PROCEDURE — 99999 PR PBB SHADOW E&M-EST. PATIENT-LVL IV: CPT | Mod: PBBFAC,,, | Performed by: NURSE PRACTITIONER

## 2020-10-06 PROCEDURE — 99213 PR OFFICE/OUTPT VISIT, EST, LEVL III, 20-29 MIN: ICD-10-PCS | Mod: S$GLB,,, | Performed by: NURSE PRACTITIONER

## 2020-10-06 PROCEDURE — 3077F SYST BP >= 140 MM HG: CPT | Mod: CPTII,S$GLB,, | Performed by: NURSE PRACTITIONER

## 2020-10-06 PROCEDURE — 85025 COMPLETE CBC W/AUTO DIFF WBC: CPT

## 2020-10-06 PROCEDURE — 3080F DIAST BP >= 90 MM HG: CPT | Mod: CPTII,S$GLB,, | Performed by: NURSE PRACTITIONER

## 2020-10-06 PROCEDURE — 36415 COLL VENOUS BLD VENIPUNCTURE: CPT

## 2020-11-17 ENCOUNTER — LAB VISIT (OUTPATIENT)
Dept: LAB | Facility: HOSPITAL | Age: 55
End: 2020-11-17
Attending: FAMILY MEDICINE
Payer: COMMERCIAL

## 2020-11-17 ENCOUNTER — OFFICE VISIT (OUTPATIENT)
Dept: FAMILY MEDICINE | Facility: CLINIC | Age: 55
End: 2020-11-17
Payer: COMMERCIAL

## 2020-11-17 VITALS
SYSTOLIC BLOOD PRESSURE: 122 MMHG | OXYGEN SATURATION: 98 % | DIASTOLIC BLOOD PRESSURE: 78 MMHG | HEIGHT: 68 IN | TEMPERATURE: 97 F | BODY MASS INDEX: 42.9 KG/M2 | WEIGHT: 283.06 LBS | HEART RATE: 99 BPM

## 2020-11-17 DIAGNOSIS — E55.9 VITAMIN D DEFICIENCY: ICD-10-CM

## 2020-11-17 DIAGNOSIS — R73.03 PREDIABETES: ICD-10-CM

## 2020-11-17 DIAGNOSIS — E88.810 INSULIN RESISTANCE SYNDROME: ICD-10-CM

## 2020-11-17 DIAGNOSIS — I10 ESSENTIAL HYPERTENSION: Chronic | ICD-10-CM

## 2020-11-17 DIAGNOSIS — B00.9 HSV INFECTION: ICD-10-CM

## 2020-11-17 DIAGNOSIS — Z90.710 STATUS POST LAPAROSCOPIC HYSTERECTOMY: ICD-10-CM

## 2020-11-17 DIAGNOSIS — Z23 NEED FOR PROPHYLACTIC VACCINATION AGAINST STREPTOCOCCUS PNEUMONIAE (PNEUMOCOCCUS): ICD-10-CM

## 2020-11-17 DIAGNOSIS — Z00.00 ANNUAL PHYSICAL EXAM: ICD-10-CM

## 2020-11-17 DIAGNOSIS — E03.9 HYPOTHYROIDISM, UNSPECIFIED TYPE: Chronic | ICD-10-CM

## 2020-11-17 DIAGNOSIS — Z23 NEED FOR TD VACCINE: ICD-10-CM

## 2020-11-17 DIAGNOSIS — J30.2 SEASONAL ALLERGIC RHINITIS, UNSPECIFIED TRIGGER: ICD-10-CM

## 2020-11-17 DIAGNOSIS — D50.9 IRON DEFICIENCY ANEMIA, UNSPECIFIED IRON DEFICIENCY ANEMIA TYPE: Chronic | ICD-10-CM

## 2020-11-17 DIAGNOSIS — K63.5 BENIGN COLON POLYP: ICD-10-CM

## 2020-11-17 DIAGNOSIS — Z17.0 MALIGNANT NEOPLASM OF UPPER-OUTER QUADRANT OF RIGHT BREAST IN FEMALE, ESTROGEN RECEPTOR POSITIVE: ICD-10-CM

## 2020-11-17 DIAGNOSIS — C50.411 MALIGNANT NEOPLASM OF UPPER-OUTER QUADRANT OF RIGHT BREAST IN FEMALE, ESTROGEN RECEPTOR POSITIVE: ICD-10-CM

## 2020-11-17 DIAGNOSIS — E66.01 MORBID OBESITY WITH BMI OF 40.0-44.9, ADULT: ICD-10-CM

## 2020-11-17 DIAGNOSIS — Z23 NEED FOR SHINGLES VACCINE: ICD-10-CM

## 2020-11-17 DIAGNOSIS — Z00.00 ANNUAL PHYSICAL EXAM: Primary | ICD-10-CM

## 2020-11-17 LAB
BILIRUB UR QL STRIP: NEGATIVE
CLARITY UR REFRACT.AUTO: CLEAR
COLOR UR AUTO: YELLOW
GLUCOSE UR QL STRIP: NEGATIVE
HGB UR QL STRIP: NEGATIVE
KETONES UR QL STRIP: NEGATIVE
LEUKOCYTE ESTERASE UR QL STRIP: NEGATIVE
NITRITE UR QL STRIP: NEGATIVE
PH UR STRIP: 7 [PH] (ref 5–8)
PROT UR QL STRIP: NEGATIVE
SP GR UR STRIP: 1.02 (ref 1–1.03)
URN SPEC COLLECT METH UR: NORMAL

## 2020-11-17 PROCEDURE — 83036 HEMOGLOBIN GLYCOSYLATED A1C: CPT

## 2020-11-17 PROCEDURE — 3008F PR BODY MASS INDEX (BMI) DOCUMENTED: ICD-10-PCS | Mod: CPTII,S$GLB,, | Performed by: FAMILY MEDICINE

## 2020-11-17 PROCEDURE — 83525 ASSAY OF INSULIN: CPT

## 2020-11-17 PROCEDURE — 90472 PNEUMOCOCCAL CONJUGATE VACCINE 13-VALENT LESS THAN 5YO & GREATER THAN: ICD-10-PCS | Mod: S$GLB,,, | Performed by: FAMILY MEDICINE

## 2020-11-17 PROCEDURE — 99396 PREV VISIT EST AGE 40-64: CPT | Mod: 25,S$GLB,, | Performed by: FAMILY MEDICINE

## 2020-11-17 PROCEDURE — 3078F DIAST BP <80 MM HG: CPT | Mod: CPTII,S$GLB,, | Performed by: FAMILY MEDICINE

## 2020-11-17 PROCEDURE — 3008F BODY MASS INDEX DOCD: CPT | Mod: CPTII,S$GLB,, | Performed by: FAMILY MEDICINE

## 2020-11-17 PROCEDURE — 81003 URINALYSIS AUTO W/O SCOPE: CPT

## 2020-11-17 PROCEDURE — 90714 TD VACCINE GREATER THAN OR EQUAL TO 7YO PRESERVATIVE FREE IM: ICD-10-PCS | Mod: S$GLB,,, | Performed by: FAMILY MEDICINE

## 2020-11-17 PROCEDURE — 90471 ZOSTER RECOMBINANT VACCINE: ICD-10-PCS | Mod: S$GLB,,, | Performed by: FAMILY MEDICINE

## 2020-11-17 PROCEDURE — 80061 LIPID PANEL: CPT

## 2020-11-17 PROCEDURE — 3078F PR MOST RECENT DIASTOLIC BLOOD PRESSURE < 80 MM HG: ICD-10-PCS | Mod: CPTII,S$GLB,, | Performed by: FAMILY MEDICINE

## 2020-11-17 PROCEDURE — 90750 ZOSTER RECOMBINANT VACCINE: ICD-10-PCS | Mod: S$GLB,,, | Performed by: FAMILY MEDICINE

## 2020-11-17 PROCEDURE — 82306 VITAMIN D 25 HYDROXY: CPT

## 2020-11-17 PROCEDURE — 90714 TD VACC NO PRESV 7 YRS+ IM: CPT | Mod: S$GLB,,, | Performed by: FAMILY MEDICINE

## 2020-11-17 PROCEDURE — 1126F AMNT PAIN NOTED NONE PRSNT: CPT | Mod: S$GLB,,, | Performed by: FAMILY MEDICINE

## 2020-11-17 PROCEDURE — 99396 PR PREVENTIVE VISIT,EST,40-64: ICD-10-PCS | Mod: 25,S$GLB,, | Performed by: FAMILY MEDICINE

## 2020-11-17 PROCEDURE — 3074F PR MOST RECENT SYSTOLIC BLOOD PRESSURE < 130 MM HG: ICD-10-PCS | Mod: CPTII,S$GLB,, | Performed by: FAMILY MEDICINE

## 2020-11-17 PROCEDURE — 3074F SYST BP LT 130 MM HG: CPT | Mod: CPTII,S$GLB,, | Performed by: FAMILY MEDICINE

## 2020-11-17 PROCEDURE — 99999 PR PBB SHADOW E&M-EST. PATIENT-LVL IV: CPT | Mod: PBBFAC,,, | Performed by: FAMILY MEDICINE

## 2020-11-17 PROCEDURE — 90471 IMMUNIZATION ADMIN: CPT | Mod: S$GLB,,, | Performed by: FAMILY MEDICINE

## 2020-11-17 PROCEDURE — 99999 PR PBB SHADOW E&M-EST. PATIENT-LVL IV: ICD-10-PCS | Mod: PBBFAC,,, | Performed by: FAMILY MEDICINE

## 2020-11-17 PROCEDURE — 90750 HZV VACC RECOMBINANT IM: CPT | Mod: S$GLB,,, | Performed by: FAMILY MEDICINE

## 2020-11-17 PROCEDURE — 90670 PNEUMOCOCCAL CONJUGATE VACCINE 13-VALENT LESS THAN 5YO & GREATER THAN: ICD-10-PCS | Mod: S$GLB,,, | Performed by: FAMILY MEDICINE

## 2020-11-17 PROCEDURE — 90670 PCV13 VACCINE IM: CPT | Mod: S$GLB,,, | Performed by: FAMILY MEDICINE

## 2020-11-17 PROCEDURE — 1126F PR PAIN SEVERITY QUANTIFIED, NO PAIN PRESENT: ICD-10-PCS | Mod: S$GLB,,, | Performed by: FAMILY MEDICINE

## 2020-11-17 PROCEDURE — 84443 ASSAY THYROID STIM HORMONE: CPT

## 2020-11-17 PROCEDURE — 90472 IMMUNIZATION ADMIN EACH ADD: CPT | Mod: S$GLB,,, | Performed by: FAMILY MEDICINE

## 2020-11-17 RX ORDER — BENAZEPRIL HYDROCHLORIDE 20 MG/1
20 TABLET ORAL DAILY
Qty: 90 TABLET | Refills: 1 | Status: SHIPPED | OUTPATIENT
Start: 2020-11-17 | End: 2021-08-04

## 2020-11-17 RX ORDER — AMLODIPINE BESYLATE 5 MG/1
5 TABLET ORAL DAILY
Qty: 90 TABLET | Refills: 1 | Status: SHIPPED | OUTPATIENT
Start: 2020-11-17 | End: 2021-03-29 | Stop reason: SDUPTHER

## 2020-11-17 RX ORDER — FLUTICASONE PROPIONATE 50 MCG
2 SPRAY, SUSPENSION (ML) NASAL DAILY PRN
Qty: 16 G | Refills: 5 | Status: SHIPPED | OUTPATIENT
Start: 2020-11-17 | End: 2021-06-16

## 2020-11-17 RX ORDER — INFLUENZA A VIRUS A/NEBRASKA/14/2019 (H1N1) ANTIGEN (MDCK CELL DERIVED, PROPIOLACTONE INACTIVATED), INFLUENZA A VIRUS A/DELAWARE/39/2019 (H3N2) ANTIGEN (MDCK CELL DERIVED, PROPIOLACTONE INACTIVATED), INFLUENZA B VIRUS B/SINGAPORE/INFTT-16-0610/2016 ANTIGEN (MDCK CELL DERIVED, PROPIOLACTONE INACTIVATED), INFLUENZA B VIRUS B/DARWIN/7/2019 ANTIGEN (MDCK CELL DERIVED, PROPIOLACTONE INACTIVATED) 15; 15; 15; 15 UG/.5ML; UG/.5ML; UG/.5ML; UG/.5ML
INJECTION, SUSPENSION INTRAMUSCULAR
COMMUNITY
Start: 2020-10-14 | End: 2021-05-20

## 2020-11-17 RX ORDER — ACYCLOVIR 400 MG/1
TABLET ORAL
Qty: 30 TABLET | Refills: 0 | Status: SHIPPED | OUTPATIENT
Start: 2020-11-17 | End: 2021-05-25 | Stop reason: SDUPTHER

## 2020-11-17 NOTE — PROGRESS NOTES
CHIEF COMPLAINT: Annual wellness examination.     HISTORY OF PRESENT ILLNESS: Ms. Urbano comes in today not fasting and with taking blood pressure medication for annual wellness examination. She reports no acute problems today.    END OF LIFE DECISION: She has no living will and is not sure about life support (depends on the situation).    Current Outpatient Medications   Medication Sig    acetaminophen (TYLENOL) 500 MG tablet Take 2 tablets (1,000 mg total) by mouth every 8 (eight) hours as needed.    acyclovir (ZOVIRAX) 400 MG tablet TAKE 1 TABLET BY MOUTH THREE TIMES DAILY WITH FOOD FOR 5 DAYS (PER  EPISODE)    albuterol (PROVENTIL/VENTOLIN HFA) 90 mcg/actuation inhaler Inhale 2 puffs into the lungs every 4 (four) hours as needed for Wheezing. Rescue    amLODIPine (NORVASC) 5 MG tablet Take 1 tablet by mouth once daily    anastrozole (ARIMIDEX) 1 mg Tab Take 1 tablet by mouth once daily    benazepriL (LOTENSIN) 20 MG tablet Take 1 tablet by mouth once daily    FLUCELVAX QUAD [id number], PF, 60 mcg (15 mcg x 4)/0.5 mL Syrg ADM 0.5ML IM UTD    fluticasone propionate (FLONASE) 50 mcg/actuation nasal spray 2 sprays by Each Nostril route daily as needed for Rhinitis.    meloxicam (MOBIC) 15 MG tablet Take 1 tablet (15 mg total) by mouth once daily.    SYNTHROID 175 mcg tablet Take 1 tablet by mouth once daily     SCREENINGS:  Cholesterol: August 22, 2019.  FFS/Colonoscopy: February 22, 2019 - benign colon polyp, diverticulosis, hemorrhoids - repeat in 5 years.  Mammogram: Marry 3, 2020 - benign.   GYN Exam (breast only): October 6, 2020 with oncology. Pap smear no longer needed.   Dexa Scan: October 1, 2019 - okay.   Eye Exam: January 16, 2020 with Dr. Scherer.  PPD: Negative in the past.  Immunizations: Tdap - April 22, 2010. She desires.  Gardasil - N./A.  Zostavax - N./A.  Shingrix - Never. Reports history of varicella not zoster. She desires.  Pneumovax - Never. She desires. Due on/after November 17, 2021.  "  Prevnar-13 shot - Never. She desires.  Seasonal Flu - October 14, 2020 at local pharmacy.      ROS:  GENERAL: Denies fever, chills, fatigue or unusual weight change. Appetite normal. Weight 129.9 kg (286 lb 6 oz) at January 6, 2020 visit. Monitors diet little but does exercise 3 to 4 times per week, 30 to 60 minutes each time.   SKIN: Denies rashes, itching, changes in mole, color or texture of skin or easy bruising.  HEAD: Denies recent head trauma or headaches.  EYES: Denies change in vision, diplopia, redness or discharge. Wears readers.  EARS: Denies ear pain, discharge, vertigo or decreased hearing.  NOSE: Reports occasional nasal congestion, post-nasal drip; uses OTC nasal spray with help.  MOUTH & THROAT: Denies hoarseness or change in voice. Denies excessive gum bleeding or mouth sores. Denies sore throat.  NODES: Denies swollen glands.  CHEST: Denies SHARIF, wheezing, cough, or sputum production.  CARDIOVASCULAR: Denies chest pain, PND, orthopnea or reduced exercise tolerance. Denies palpitations.  ABDOMEN: Denies diarrhea, constipation, nausea, vomiting, abdominal pain, or blood in stool.  URINARY: Denies flank pain, dysuria or hematuria.  GENITOURINARY: Denies flank pain, dysuria, frequency or hematuria. Performs monthly breast self exams. Saw Dr. Miramontes, hematologist/oncologist, on June 24, 2019 for surveillance of right breast cancer with 3-month follow-up with DEXA scan advised. She states she completed chemotherapy in September 2017 and completed radiation therapy in February 2018; she continues Arimidex daily therapy.  ENDOCRINE: Denies diabetes, cholesterol problems.   HEME/LYMPH: Denies bleeding problems. Reports does not take iron therapy.  PERIPHERAL VASCULAR:Denies claudication or cyanosis.  MUSCULOSKELETAL: Denies edema. Reports "aging" joint pain, stiffness but not today.  NEUROLOGIC: Denies history of seizures, tremors, paralysis, alteration of gait or coordination.  PSYCHIATRIC: Denies mood " "swings, depression, anxiety, homicidal or suicidal thoughts. Denies sleep problems.    PE:   VS: /78   Pulse 99   Temp 97.2 °F (36.2 °C) (Tympanic)   Ht 5' 8" (1.727 m)   Wt 128.4 kg (283 lb 1.1 oz)   LMP 01/01/2016 (LMP Unknown)   SpO2 98%   BMI 43.04 kg/m²   APPEARANCE: Well nourished, well developed female, obese and pleasant, alert and oriented in no acute distress.  HEAD: Non tender. Full range of motion.  EYES: PERRL, conjunctiva pink, lids no edema.  EARS: External canal patent, no swelling or redness. TM's shiny and clear.  NOSE: Mucosa and turbinates not congested. No discharge. Non tender sinuses.  THROAT: No pharyngeal erythema or exudate. No stridor.  NECK: Supple, no mass, thyroid not enlarged.  NODES: No cervical lymph node enlargement.  CHEST: Normal respiratory effort. Lungs clear to auscultation.  CARDIOVASCULAR: Normal S1, S2. No rubs, murmurs or gallops. PMI not displaced. No carotid bruit. Pedal pulses palpable bilaterally. No edema.  ABDOMEN: Bowel sounds present. Not distended. Soft. No tenderness, masses or organomegaly.  BREAST EXAM: Not examined as already performed by oncologist.  PELVIC EXAM: Not examined as patient has had RAH/BSO due to non cancerous reasons.  RECTAL EXAM: Not examined per patient request.  MUSCULOSKELETAL: No joint deformities or stiffness. She is ambulatory without problems.  SKIN: No rashes or suspicious lesions, normal color and turgor.  NEUROLOGIC: Cranial Nerves: II-XII grossly intact. DTR's: Knees, Ankles 2+ and equal bilaterally. Gait & Posture: Normal gait and fine motion.  PSYCHIATRIC: Patient alert, oriented x 3. Mood/Affect normal without acute anxiety and depression noted. Judgment/insight good as she makes appropriate decisions during today's examination.    CMP  Sodium   Date Value Ref Range Status   10/06/2020 140 136 - 145 mmol/L Final     Potassium   Date Value Ref Range Status   10/06/2020 3.9 3.5 - 5.1 mmol/L Final     Chloride   Date " Value Ref Range Status   10/06/2020 106 95 - 110 mmol/L Final     CO2   Date Value Ref Range Status   10/06/2020 25 23 - 29 mmol/L Final     Glucose   Date Value Ref Range Status   10/06/2020 78 70 - 110 mg/dL Final     BUN   Date Value Ref Range Status   10/06/2020 13 6 - 20 mg/dL Final     Creatinine   Date Value Ref Range Status   10/06/2020 0.7 0.5 - 1.4 mg/dL Final     Calcium   Date Value Ref Range Status   10/06/2020 9.5 8.7 - 10.5 mg/dL Final     Total Protein   Date Value Ref Range Status   10/06/2020 7.6 6.0 - 8.4 g/dL Final     Albumin   Date Value Ref Range Status   10/06/2020 3.6 3.5 - 5.2 g/dL Final     Total Bilirubin   Date Value Ref Range Status   10/06/2020 0.3 0.1 - 1.0 mg/dL Final     Comment:     For infants and newborns, interpretation of results should be based  on gestational age, weight and in agreement with clinical  observations.  Premature Infant recommended reference ranges:  Up to 24 hours.............<8.0 mg/dL  Up to 48 hours............<12.0 mg/dL  3-5 days..................<15.0 mg/dL  6-29 days.................<15.0 mg/dL       Alkaline Phosphatase   Date Value Ref Range Status   10/06/2020 94 55 - 135 U/L Final     AST   Date Value Ref Range Status   10/06/2020 29 10 - 40 U/L Final     ALT   Date Value Ref Range Status   10/06/2020 37 10 - 44 U/L Final     Anion Gap   Date Value Ref Range Status   10/06/2020 9 8 - 16 mmol/L Final     eGFR if    Date Value Ref Range Status   10/06/2020 >60 >60 mL/min/1.73 m^2 Final     Lab Results   Component Value Date    WBC 7.22 10/06/2020    HGB 13.2 10/06/2020    HCT 40.3 10/06/2020    MCV 87 10/06/2020     10/06/2020        ASSESSMENT:    ICD-10-CM ICD-9-CM    1. Annual physical exam  Z00.00 V70.0 TSH      Urinalysis      Insulin, Random      Hemoglobin A1C      Lipid Panel      Vitamin D   2. Essential hypertension  I10 401.9 benazepriL (LOTENSIN) 20 MG tablet      amLODIPine (NORVASC) 5 MG tablet   3. Hypothyroidism,  unspecified type  E03.9 244.9    4. Insulin resistance syndrome  E88.81 277.7    5. Prediabetes  R73.03 790.29    6. Vitamin D deficiency  E55.9 268.9    7. Iron deficiency anemia, unspecified iron deficiency anemia type  D50.9 280.9    8. HSV infection  B00.9 054.9 acyclovir (ZOVIRAX) 400 MG tablet   9. Seasonal allergic rhinitis, unspecified trigger  J30.2 477.9 fluticasone propionate (FLONASE) 50 mcg/actuation nasal spray   10. Benign colon polyp  K63.5 211.3    11. Malignant neoplasm of upper-outer quadrant of right breast in female, estrogen receptor positive  C50.411 174.4     Z17.0 V86.0    12. Status post laparoscopic hysterectomy  Z90.710 V88.01    13. Morbid obesity with BMI of 40.0-44.9, adult  E66.01 278.01     Z68.41 V85.41    14. Need for Td vaccine  Z23 V06.5 (In Office Administered) Td Vaccine - Preservative Free   15. Need for shingles vaccine  Z23 V04.89 Zoster Recombinant Vaccine   16. Need for prophylactic vaccination against Streptococcus pneumoniae (pneumococcus)  Z23 V03.82 Pneumococcal Conjugate Vaccine (13 Valent) (IM)       PLAN:  1. Age-appropriate counseling-appropriate low-sodium, low-cholesterol, low carbohydrate diet and exercise daily, monthly breast self exam, annual wellness examination.   2. Patient advised to call for results.  3. Continue current medications.  4. Prescription refills as noted above.  5. Keep follow up with specialists.  6. Work excuse for today with return tomorrow.   7. Follow up in about 6 months (around 5/17/2021) for hypothyroidism, IRS and hypertension follow up with Shingrix #2.

## 2020-11-18 LAB
25(OH)D3+25(OH)D2 SERPL-MCNC: 40 NG/ML (ref 30–96)
CHOLEST SERPL-MCNC: 183 MG/DL (ref 120–199)
CHOLEST/HDLC SERPL: 3.2 {RATIO} (ref 2–5)
ESTIMATED AVG GLUCOSE: 120 MG/DL (ref 68–131)
HBA1C MFR BLD HPLC: 5.8 % (ref 4–5.6)
HDLC SERPL-MCNC: 57 MG/DL (ref 40–75)
HDLC SERPL: 31.1 % (ref 20–50)
INSULIN COLLECTION INTERVAL: NORMAL
INSULIN SERPL-ACNC: 11.6 UU/ML
LDLC SERPL CALC-MCNC: 107.6 MG/DL (ref 63–159)
NONHDLC SERPL-MCNC: 126 MG/DL
TRIGL SERPL-MCNC: 92 MG/DL (ref 30–150)
TSH SERPL DL<=0.005 MIU/L-ACNC: 2.31 UIU/ML (ref 0.4–4)

## 2020-12-17 RX ORDER — LEVOTHYROXINE SODIUM 175 UG/1
TABLET ORAL
Qty: 90 TABLET | Refills: 1 | Status: SHIPPED | OUTPATIENT
Start: 2020-12-17 | End: 2021-03-24 | Stop reason: SDUPTHER

## 2021-01-14 DIAGNOSIS — E88.810 INSULIN RESISTANCE SYNDROME: ICD-10-CM

## 2021-01-15 RX ORDER — METFORMIN HYDROCHLORIDE 500 MG/1
TABLET ORAL
Qty: 120 TABLET | Refills: 5 | Status: SHIPPED | OUTPATIENT
Start: 2021-01-15 | End: 2022-08-04 | Stop reason: SDUPTHER

## 2021-02-09 ENCOUNTER — OFFICE VISIT (OUTPATIENT)
Dept: OPHTHALMOLOGY | Facility: CLINIC | Age: 56
End: 2021-02-09
Payer: COMMERCIAL

## 2021-02-09 DIAGNOSIS — H52.4 HYPEROPIA WITH PRESBYOPIA, BILATERAL: ICD-10-CM

## 2021-02-09 DIAGNOSIS — H35.363 FAMILIAL DRUSEN OF BOTH EYES: Primary | ICD-10-CM

## 2021-02-09 DIAGNOSIS — H52.03 HYPEROPIA WITH PRESBYOPIA, BILATERAL: ICD-10-CM

## 2021-02-09 PROCEDURE — 92014 COMPRE OPH EXAM EST PT 1/>: CPT | Mod: S$GLB,,, | Performed by: OPTOMETRIST

## 2021-02-09 PROCEDURE — 92015 DETERMINE REFRACTIVE STATE: CPT | Mod: S$GLB,,, | Performed by: OPTOMETRIST

## 2021-02-09 PROCEDURE — 92014 PR EYE EXAM, EST PATIENT,COMPREHESV: ICD-10-PCS | Mod: S$GLB,,, | Performed by: OPTOMETRIST

## 2021-02-09 PROCEDURE — 99999 PR PBB SHADOW E&M-EST. PATIENT-LVL III: CPT | Mod: PBBFAC,,, | Performed by: OPTOMETRIST

## 2021-02-09 PROCEDURE — 99999 PR PBB SHADOW E&M-EST. PATIENT-LVL III: ICD-10-PCS | Mod: PBBFAC,,, | Performed by: OPTOMETRIST

## 2021-02-09 PROCEDURE — 92015 PR REFRACTION: ICD-10-PCS | Mod: S$GLB,,, | Performed by: OPTOMETRIST

## 2021-02-23 DIAGNOSIS — K21.9 GASTROESOPHAGEAL REFLUX DISEASE: ICD-10-CM

## 2021-02-23 RX ORDER — OMEPRAZOLE 20 MG/1
CAPSULE, DELAYED RELEASE ORAL
Qty: 30 CAPSULE | Refills: 5 | Status: SHIPPED | OUTPATIENT
Start: 2021-02-23 | End: 2021-05-20 | Stop reason: SDUPTHER

## 2021-03-03 ENCOUNTER — IMMUNIZATION (OUTPATIENT)
Dept: INTERNAL MEDICINE | Facility: CLINIC | Age: 56
End: 2021-03-03
Payer: COMMERCIAL

## 2021-03-03 DIAGNOSIS — Z23 NEED FOR VACCINATION: Primary | ICD-10-CM

## 2021-03-03 PROCEDURE — 91300 COVID-19, MRNA, LNP-S, PF, 30 MCG/0.3 ML DOSE VACCINE: CPT | Mod: PBBFAC | Performed by: FAMILY MEDICINE

## 2021-03-24 ENCOUNTER — HOSPITAL ENCOUNTER (EMERGENCY)
Facility: HOSPITAL | Age: 56
Discharge: HOME | End: 2021-03-24
Attending: EMERGENCY MEDICINE
Payer: COMMERCIAL

## 2021-03-24 ENCOUNTER — OFFICE VISIT (OUTPATIENT)
Dept: HEMATOLOGY/ONCOLOGY | Facility: CLINIC | Age: 56
End: 2021-03-24
Payer: COMMERCIAL

## 2021-03-24 ENCOUNTER — IMMUNIZATION (OUTPATIENT)
Dept: INTERNAL MEDICINE | Facility: CLINIC | Age: 56
End: 2021-03-24
Payer: COMMERCIAL

## 2021-03-24 VITALS
DIASTOLIC BLOOD PRESSURE: 111 MMHG | HEART RATE: 92 BPM | WEIGHT: 289.25 LBS | TEMPERATURE: 98 F | BODY MASS INDEX: 43.84 KG/M2 | RESPIRATION RATE: 16 BRPM | OXYGEN SATURATION: 99 % | HEIGHT: 68 IN | SYSTOLIC BLOOD PRESSURE: 177 MMHG

## 2021-03-24 VITALS
BODY MASS INDEX: 43.63 KG/M2 | DIASTOLIC BLOOD PRESSURE: 71 MMHG | RESPIRATION RATE: 18 BRPM | WEIGHT: 286.94 LBS | TEMPERATURE: 99 F | HEART RATE: 88 BPM | SYSTOLIC BLOOD PRESSURE: 132 MMHG | OXYGEN SATURATION: 98 %

## 2021-03-24 DIAGNOSIS — Z71.89 COUNSELING AND COORDINATION OF CARE: ICD-10-CM

## 2021-03-24 DIAGNOSIS — Z85.3 ENCOUNTER FOR FOLLOW-UP SURVEILLANCE OF BREAST CANCER: ICD-10-CM

## 2021-03-24 DIAGNOSIS — Z08 ENCOUNTER FOR FOLLOW-UP SURVEILLANCE OF BREAST CANCER: ICD-10-CM

## 2021-03-24 DIAGNOSIS — Z23 NEED FOR VACCINATION: Primary | ICD-10-CM

## 2021-03-24 DIAGNOSIS — Z71.9 HEALTH EDUCATION/COUNSELING: ICD-10-CM

## 2021-03-24 DIAGNOSIS — I10 ELEVATED BLOOD PRESSURE READING WITH DIAGNOSIS OF HYPERTENSION: ICD-10-CM

## 2021-03-24 DIAGNOSIS — Z12.31 ENCOUNTER FOR SCREENING MAMMOGRAM FOR BREAST CANCER: ICD-10-CM

## 2021-03-24 DIAGNOSIS — D50.9 IRON DEFICIENCY ANEMIA, UNSPECIFIED IRON DEFICIENCY ANEMIA TYPE: Chronic | ICD-10-CM

## 2021-03-24 DIAGNOSIS — Z79.899 ENCOUNTER FOR MONITORING ADJUVANT HORMONAL THERAPY: ICD-10-CM

## 2021-03-24 DIAGNOSIS — C50.911 MALIGNANT NEOPLASM OF RIGHT BREAST IN FEMALE, ESTROGEN RECEPTOR POSITIVE, UNSPECIFIED SITE OF BREAST: ICD-10-CM

## 2021-03-24 DIAGNOSIS — Z17.0 MALIGNANT NEOPLASM OF UPPER-OUTER QUADRANT OF RIGHT BREAST IN FEMALE, ESTROGEN RECEPTOR POSITIVE: Primary | ICD-10-CM

## 2021-03-24 DIAGNOSIS — R00.2 PALPITATIONS: ICD-10-CM

## 2021-03-24 DIAGNOSIS — Z51.81 ENCOUNTER FOR MONITORING ADJUVANT HORMONAL THERAPY: ICD-10-CM

## 2021-03-24 DIAGNOSIS — Z17.0 MALIGNANT NEOPLASM OF RIGHT BREAST IN FEMALE, ESTROGEN RECEPTOR POSITIVE, UNSPECIFIED SITE OF BREAST: ICD-10-CM

## 2021-03-24 DIAGNOSIS — Z71.89 COUNSELING ON HEALTH PROMOTION AND DISEASE PREVENTION: ICD-10-CM

## 2021-03-24 DIAGNOSIS — I15.2 HYPERTENSION DUE TO ENDOCRINE DISORDER: Primary | ICD-10-CM

## 2021-03-24 DIAGNOSIS — C50.411 MALIGNANT NEOPLASM OF UPPER-OUTER QUADRANT OF RIGHT BREAST IN FEMALE, ESTROGEN RECEPTOR POSITIVE: Primary | ICD-10-CM

## 2021-03-24 DIAGNOSIS — I49.9 IRREGULAR HEART BEAT: ICD-10-CM

## 2021-03-24 LAB
ALBUMIN SERPL BCP-MCNC: 4.3 G/DL (ref 3.5–5.2)
ALP SERPL-CCNC: 126 U/L (ref 55–135)
ALT SERPL W/O P-5'-P-CCNC: 91 U/L (ref 10–44)
ANION GAP SERPL CALC-SCNC: 14 MMOL/L (ref 8–16)
AST SERPL-CCNC: 60 U/L (ref 10–40)
BASOPHILS # BLD AUTO: 0.05 K/UL (ref 0–0.2)
BASOPHILS NFR BLD: 0.5 % (ref 0–1.9)
BILIRUB SERPL-MCNC: 0.4 MG/DL (ref 0.1–1)
BUN SERPL-MCNC: 15 MG/DL (ref 6–20)
CALCIUM SERPL-MCNC: 10.1 MG/DL (ref 8.7–10.5)
CHLORIDE SERPL-SCNC: 104 MMOL/L (ref 95–110)
CO2 SERPL-SCNC: 23 MMOL/L (ref 23–29)
CREAT SERPL-MCNC: 0.7 MG/DL (ref 0.5–1.4)
DIFFERENTIAL METHOD: NORMAL
EOSINOPHIL # BLD AUTO: 0.2 K/UL (ref 0–0.5)
EOSINOPHIL NFR BLD: 1.7 % (ref 0–8)
ERYTHROCYTE [DISTWIDTH] IN BLOOD BY AUTOMATED COUNT: 14 % (ref 11.5–14.5)
EST. GFR  (AFRICAN AMERICAN): >60 ML/MIN/1.73 M^2
EST. GFR  (NON AFRICAN AMERICAN): >60 ML/MIN/1.73 M^2
GLUCOSE SERPL-MCNC: 84 MG/DL (ref 70–110)
HCT VFR BLD AUTO: 44.3 % (ref 37–48.5)
HCV AB SERPL QL IA: NEGATIVE
HGB BLD-MCNC: 14.7 G/DL (ref 12–16)
HIV 1+2 AB+HIV1 P24 AG SERPL QL IA: NEGATIVE
IMM GRANULOCYTES # BLD AUTO: 0.03 K/UL (ref 0–0.04)
IMM GRANULOCYTES NFR BLD AUTO: 0.3 % (ref 0–0.5)
LYMPHOCYTES # BLD AUTO: 2.4 K/UL (ref 1–4.8)
LYMPHOCYTES NFR BLD: 25.2 % (ref 18–48)
MCH RBC QN AUTO: 28.4 PG (ref 27–31)
MCHC RBC AUTO-ENTMCNC: 33.2 G/DL (ref 32–36)
MCV RBC AUTO: 86 FL (ref 82–98)
MONOCYTES # BLD AUTO: 0.7 K/UL (ref 0.3–1)
MONOCYTES NFR BLD: 7.1 % (ref 4–15)
NEUTROPHILS # BLD AUTO: 6.3 K/UL (ref 1.8–7.7)
NEUTROPHILS NFR BLD: 65.2 % (ref 38–73)
NRBC BLD-RTO: 0 /100 WBC
PLATELET # BLD AUTO: 266 K/UL (ref 150–350)
PMV BLD AUTO: 10.3 FL (ref 9.2–12.9)
POTASSIUM SERPL-SCNC: 3.9 MMOL/L (ref 3.5–5.1)
PROT SERPL-MCNC: 9 G/DL (ref 6–8.4)
RBC # BLD AUTO: 5.17 M/UL (ref 4–5.4)
SODIUM SERPL-SCNC: 141 MMOL/L (ref 136–145)
TROPONIN I SERPL DL<=0.01 NG/ML-MCNC: 0.01 NG/ML (ref 0–0.03)
TSH SERPL DL<=0.005 MIU/L-ACNC: 1.08 UIU/ML (ref 0.4–4)
WBC # BLD AUTO: 9.62 K/UL (ref 3.9–12.7)

## 2021-03-24 PROCEDURE — 3080F DIAST BP >= 90 MM HG: CPT | Mod: CPTII,S$GLB,, | Performed by: NURSE PRACTITIONER

## 2021-03-24 PROCEDURE — 86703 HIV-1/HIV-2 1 RESULT ANTBDY: CPT | Performed by: EMERGENCY MEDICINE

## 2021-03-24 PROCEDURE — 3077F PR MOST RECENT SYSTOLIC BLOOD PRESSURE >= 140 MM HG: ICD-10-PCS | Mod: CPTII,S$GLB,, | Performed by: NURSE PRACTITIONER

## 2021-03-24 PROCEDURE — 84484 ASSAY OF TROPONIN QUANT: CPT | Performed by: NURSE PRACTITIONER

## 2021-03-24 PROCEDURE — 99999 PR PBB SHADOW E&M-EST. PATIENT-LVL IV: ICD-10-PCS | Mod: PBBFAC,,, | Performed by: NURSE PRACTITIONER

## 2021-03-24 PROCEDURE — 93005 ELECTROCARDIOGRAM TRACING: CPT

## 2021-03-24 PROCEDURE — 85025 COMPLETE CBC W/AUTO DIFF WBC: CPT | Performed by: NURSE PRACTITIONER

## 2021-03-24 PROCEDURE — 0002A COVID-19, MRNA, LNP-S, PF, 30 MCG/0.3 ML DOSE VACCINE: CPT | Mod: PBBFAC | Performed by: FAMILY MEDICINE

## 2021-03-24 PROCEDURE — 93010 ELECTROCARDIOGRAM REPORT: CPT | Mod: ,,, | Performed by: INTERNAL MEDICINE

## 2021-03-24 PROCEDURE — 3008F PR BODY MASS INDEX (BMI) DOCUMENTED: ICD-10-PCS | Mod: CPTII,S$GLB,, | Performed by: NURSE PRACTITIONER

## 2021-03-24 PROCEDURE — 3077F SYST BP >= 140 MM HG: CPT | Mod: CPTII,S$GLB,, | Performed by: NURSE PRACTITIONER

## 2021-03-24 PROCEDURE — 86803 HEPATITIS C AB TEST: CPT | Performed by: EMERGENCY MEDICINE

## 2021-03-24 PROCEDURE — 1126F AMNT PAIN NOTED NONE PRSNT: CPT | Mod: S$GLB,,, | Performed by: NURSE PRACTITIONER

## 2021-03-24 PROCEDURE — 3080F PR MOST RECENT DIASTOLIC BLOOD PRESSURE >= 90 MM HG: ICD-10-PCS | Mod: CPTII,S$GLB,, | Performed by: NURSE PRACTITIONER

## 2021-03-24 PROCEDURE — 93010 EKG 12-LEAD: ICD-10-PCS | Mod: ,,, | Performed by: INTERNAL MEDICINE

## 2021-03-24 PROCEDURE — 36000 PLACE NEEDLE IN VEIN: CPT

## 2021-03-24 PROCEDURE — 84443 ASSAY THYROID STIM HORMONE: CPT | Performed by: EMERGENCY MEDICINE

## 2021-03-24 PROCEDURE — 99285 EMERGENCY DEPT VISIT HI MDM: CPT | Mod: 25

## 2021-03-24 PROCEDURE — 99999 PR PBB SHADOW E&M-EST. PATIENT-LVL IV: CPT | Mod: PBBFAC,,, | Performed by: NURSE PRACTITIONER

## 2021-03-24 PROCEDURE — 1126F PR PAIN SEVERITY QUANTIFIED, NO PAIN PRESENT: ICD-10-PCS | Mod: S$GLB,,, | Performed by: NURSE PRACTITIONER

## 2021-03-24 PROCEDURE — 91300 COVID-19, MRNA, LNP-S, PF, 30 MCG/0.3 ML DOSE VACCINE: CPT | Mod: PBBFAC | Performed by: FAMILY MEDICINE

## 2021-03-24 PROCEDURE — 99214 OFFICE O/P EST MOD 30 MIN: CPT | Mod: S$GLB,,, | Performed by: NURSE PRACTITIONER

## 2021-03-24 PROCEDURE — 80053 COMPREHEN METABOLIC PANEL: CPT | Performed by: NURSE PRACTITIONER

## 2021-03-24 PROCEDURE — 3008F BODY MASS INDEX DOCD: CPT | Mod: CPTII,S$GLB,, | Performed by: NURSE PRACTITIONER

## 2021-03-24 PROCEDURE — 99214 PR OFFICE/OUTPT VISIT, EST, LEVL IV, 30-39 MIN: ICD-10-PCS | Mod: S$GLB,,, | Performed by: NURSE PRACTITIONER

## 2021-03-24 RX ORDER — HYDROCODONE BITARTRATE AND ACETAMINOPHEN 5; 325 MG/1; MG/1
1 TABLET ORAL
COMMUNITY
Start: 2021-02-13 | End: 2021-03-29

## 2021-03-24 RX ORDER — LEVOTHYROXINE SODIUM 150 UG/1
150 TABLET ORAL
Qty: 30 TABLET | Refills: 0 | Status: SHIPPED | OUTPATIENT
Start: 2021-03-24 | End: 2021-03-29 | Stop reason: SDUPTHER

## 2021-03-24 RX ORDER — IBUPROFEN 800 MG/1
TABLET ORAL
COMMUNITY
Start: 2021-02-13 | End: 2022-04-05 | Stop reason: SDUPTHER

## 2021-03-25 DIAGNOSIS — Z08 ENCOUNTER FOR FOLLOW-UP SURVEILLANCE OF BREAST CANCER: ICD-10-CM

## 2021-03-25 DIAGNOSIS — Z17.0 MALIGNANT NEOPLASM OF UPPER-OUTER QUADRANT OF RIGHT BREAST IN FEMALE, ESTROGEN RECEPTOR POSITIVE: Primary | ICD-10-CM

## 2021-03-25 DIAGNOSIS — C50.411 MALIGNANT NEOPLASM OF UPPER-OUTER QUADRANT OF RIGHT BREAST IN FEMALE, ESTROGEN RECEPTOR POSITIVE: Primary | ICD-10-CM

## 2021-03-25 DIAGNOSIS — Z85.3 ENCOUNTER FOR FOLLOW-UP SURVEILLANCE OF BREAST CANCER: ICD-10-CM

## 2021-03-29 ENCOUNTER — OFFICE VISIT (OUTPATIENT)
Dept: FAMILY MEDICINE | Facility: CLINIC | Age: 56
End: 2021-03-29
Payer: COMMERCIAL

## 2021-03-29 VITALS
BODY MASS INDEX: 43.57 KG/M2 | HEART RATE: 107 BPM | TEMPERATURE: 99 F | WEIGHT: 287.5 LBS | SYSTOLIC BLOOD PRESSURE: 146 MMHG | OXYGEN SATURATION: 98 % | HEIGHT: 68 IN | RESPIRATION RATE: 16 BRPM | DIASTOLIC BLOOD PRESSURE: 90 MMHG

## 2021-03-29 DIAGNOSIS — E03.9 HYPOTHYROIDISM, UNSPECIFIED TYPE: ICD-10-CM

## 2021-03-29 DIAGNOSIS — E66.01 MORBID OBESITY WITH BMI OF 40.0-44.9, ADULT: ICD-10-CM

## 2021-03-29 DIAGNOSIS — I10 ESSENTIAL HYPERTENSION: Chronic | ICD-10-CM

## 2021-03-29 PROCEDURE — 3077F SYST BP >= 140 MM HG: CPT | Mod: CPTII,S$GLB,, | Performed by: FAMILY MEDICINE

## 2021-03-29 PROCEDURE — 99214 OFFICE O/P EST MOD 30 MIN: CPT | Mod: S$GLB,,, | Performed by: FAMILY MEDICINE

## 2021-03-29 PROCEDURE — 99999 PR PBB SHADOW E&M-EST. PATIENT-LVL IV: CPT | Mod: PBBFAC,,, | Performed by: FAMILY MEDICINE

## 2021-03-29 PROCEDURE — 1126F AMNT PAIN NOTED NONE PRSNT: CPT | Mod: S$GLB,,, | Performed by: FAMILY MEDICINE

## 2021-03-29 PROCEDURE — 99214 PR OFFICE/OUTPT VISIT, EST, LEVL IV, 30-39 MIN: ICD-10-PCS | Mod: S$GLB,,, | Performed by: FAMILY MEDICINE

## 2021-03-29 PROCEDURE — 3080F PR MOST RECENT DIASTOLIC BLOOD PRESSURE >= 90 MM HG: ICD-10-PCS | Mod: CPTII,S$GLB,, | Performed by: FAMILY MEDICINE

## 2021-03-29 PROCEDURE — 3008F PR BODY MASS INDEX (BMI) DOCUMENTED: ICD-10-PCS | Mod: CPTII,S$GLB,, | Performed by: FAMILY MEDICINE

## 2021-03-29 PROCEDURE — 3008F BODY MASS INDEX DOCD: CPT | Mod: CPTII,S$GLB,, | Performed by: FAMILY MEDICINE

## 2021-03-29 PROCEDURE — 1126F PR PAIN SEVERITY QUANTIFIED, NO PAIN PRESENT: ICD-10-PCS | Mod: S$GLB,,, | Performed by: FAMILY MEDICINE

## 2021-03-29 PROCEDURE — 3077F PR MOST RECENT SYSTOLIC BLOOD PRESSURE >= 140 MM HG: ICD-10-PCS | Mod: CPTII,S$GLB,, | Performed by: FAMILY MEDICINE

## 2021-03-29 PROCEDURE — 3080F DIAST BP >= 90 MM HG: CPT | Mod: CPTII,S$GLB,, | Performed by: FAMILY MEDICINE

## 2021-03-29 PROCEDURE — 99999 PR PBB SHADOW E&M-EST. PATIENT-LVL IV: ICD-10-PCS | Mod: PBBFAC,,, | Performed by: FAMILY MEDICINE

## 2021-03-29 RX ORDER — LEVOTHYROXINE SODIUM 150 UG/1
150 TABLET ORAL
Qty: 30 TABLET | Refills: 5 | Status: SHIPPED | OUTPATIENT
Start: 2021-03-29 | End: 2021-05-21 | Stop reason: DRUGHIGH

## 2021-03-29 RX ORDER — AMLODIPINE BESYLATE 10 MG/1
10 TABLET ORAL DAILY
Qty: 30 TABLET | Refills: 5 | Status: SHIPPED | OUTPATIENT
Start: 2021-03-29 | End: 2021-10-07

## 2021-04-12 DIAGNOSIS — Z08 ENCOUNTER FOR FOLLOW-UP SURVEILLANCE OF BREAST CANCER: ICD-10-CM

## 2021-04-12 DIAGNOSIS — Z12.31 ENCOUNTER FOR SCREENING MAMMOGRAM FOR BREAST CANCER: ICD-10-CM

## 2021-04-12 DIAGNOSIS — Z17.0 MALIGNANT NEOPLASM OF UPPER-OUTER QUADRANT OF RIGHT BREAST IN FEMALE, ESTROGEN RECEPTOR POSITIVE: Primary | ICD-10-CM

## 2021-04-12 DIAGNOSIS — Z85.3 ENCOUNTER FOR FOLLOW-UP SURVEILLANCE OF BREAST CANCER: ICD-10-CM

## 2021-04-12 DIAGNOSIS — C50.411 MALIGNANT NEOPLASM OF UPPER-OUTER QUADRANT OF RIGHT BREAST IN FEMALE, ESTROGEN RECEPTOR POSITIVE: Primary | ICD-10-CM

## 2021-04-20 ENCOUNTER — PATIENT OUTREACH (OUTPATIENT)
Dept: ADMINISTRATIVE | Facility: HOSPITAL | Age: 56
End: 2021-04-20

## 2021-05-20 ENCOUNTER — LAB VISIT (OUTPATIENT)
Dept: LAB | Facility: HOSPITAL | Age: 56
End: 2021-05-20
Attending: FAMILY MEDICINE
Payer: COMMERCIAL

## 2021-05-20 ENCOUNTER — OFFICE VISIT (OUTPATIENT)
Dept: INTERNAL MEDICINE | Facility: CLINIC | Age: 56
End: 2021-05-20
Payer: COMMERCIAL

## 2021-05-20 VITALS
OXYGEN SATURATION: 98 % | SYSTOLIC BLOOD PRESSURE: 138 MMHG | HEART RATE: 102 BPM | RESPIRATION RATE: 18 BRPM | HEIGHT: 68 IN | DIASTOLIC BLOOD PRESSURE: 86 MMHG | BODY MASS INDEX: 44.01 KG/M2 | TEMPERATURE: 99 F | WEIGHT: 290.38 LBS

## 2021-05-20 DIAGNOSIS — I10 HYPERTENSION, UNSPECIFIED TYPE: Primary | ICD-10-CM

## 2021-05-20 DIAGNOSIS — K21.9 GASTROESOPHAGEAL REFLUX DISEASE: ICD-10-CM

## 2021-05-20 DIAGNOSIS — R73.03 PREDIABETES: ICD-10-CM

## 2021-05-20 DIAGNOSIS — E88.810 INSULIN RESISTANCE SYNDROME: ICD-10-CM

## 2021-05-20 DIAGNOSIS — E03.9 HYPOTHYROIDISM, UNSPECIFIED TYPE: ICD-10-CM

## 2021-05-20 DIAGNOSIS — E66.01 MORBID OBESITY WITH BMI OF 40.0-44.9, ADULT: ICD-10-CM

## 2021-05-20 DIAGNOSIS — K59.00 CONSTIPATION, UNSPECIFIED CONSTIPATION TYPE: ICD-10-CM

## 2021-05-20 DIAGNOSIS — I10 HYPERTENSION, UNSPECIFIED TYPE: ICD-10-CM

## 2021-05-20 DIAGNOSIS — E55.9 VITAMIN D DEFICIENCY: ICD-10-CM

## 2021-05-20 DIAGNOSIS — Z23 NEED FOR SHINGLES VACCINE: ICD-10-CM

## 2021-05-20 LAB
ALBUMIN SERPL BCP-MCNC: 3.8 G/DL (ref 3.5–5.2)
ALP SERPL-CCNC: 113 U/L (ref 55–135)
ALT SERPL W/O P-5'-P-CCNC: 56 U/L (ref 10–44)
ANION GAP SERPL CALC-SCNC: 11 MMOL/L (ref 8–16)
AST SERPL-CCNC: 42 U/L (ref 10–40)
BILIRUB SERPL-MCNC: 0.3 MG/DL (ref 0.1–1)
BUN SERPL-MCNC: 11 MG/DL (ref 6–20)
CALCIUM SERPL-MCNC: 10.4 MG/DL (ref 8.7–10.5)
CHLORIDE SERPL-SCNC: 104 MMOL/L (ref 95–110)
CO2 SERPL-SCNC: 26 MMOL/L (ref 23–29)
CREAT SERPL-MCNC: 0.8 MG/DL (ref 0.5–1.4)
EST. GFR  (AFRICAN AMERICAN): >60 ML/MIN/1.73 M^2
EST. GFR  (NON AFRICAN AMERICAN): >60 ML/MIN/1.73 M^2
ESTIMATED AVG GLUCOSE: 128 MG/DL (ref 68–131)
GLUCOSE SERPL-MCNC: 96 MG/DL (ref 70–110)
HBA1C MFR BLD: 6.1 % (ref 4–5.6)
INSULIN COLLECTION INTERVAL: NORMAL
INSULIN SERPL-ACNC: 14.6 UU/ML
POTASSIUM SERPL-SCNC: 4.1 MMOL/L (ref 3.5–5.1)
PROT SERPL-MCNC: 8.1 G/DL (ref 6–8.4)
SODIUM SERPL-SCNC: 141 MMOL/L (ref 136–145)
T4 FREE SERPL-MCNC: 1.14 NG/DL (ref 0.71–1.51)
TSH SERPL DL<=0.005 MIU/L-ACNC: 7.26 UIU/ML (ref 0.4–4)

## 2021-05-20 PROCEDURE — 90471 ZOSTER RECOMBINANT VACCINE: ICD-10-PCS | Mod: S$GLB,,, | Performed by: FAMILY MEDICINE

## 2021-05-20 PROCEDURE — 84443 ASSAY THYROID STIM HORMONE: CPT | Performed by: FAMILY MEDICINE

## 2021-05-20 PROCEDURE — 80053 COMPREHEN METABOLIC PANEL: CPT | Performed by: FAMILY MEDICINE

## 2021-05-20 PROCEDURE — 3079F PR MOST RECENT DIASTOLIC BLOOD PRESSURE 80-89 MM HG: ICD-10-PCS | Mod: CPTII,S$GLB,, | Performed by: FAMILY MEDICINE

## 2021-05-20 PROCEDURE — 99214 OFFICE O/P EST MOD 30 MIN: CPT | Mod: 25,S$GLB,, | Performed by: FAMILY MEDICINE

## 2021-05-20 PROCEDURE — 99999 PR PBB SHADOW E&M-EST. PATIENT-LVL IV: CPT | Mod: PBBFAC,,, | Performed by: FAMILY MEDICINE

## 2021-05-20 PROCEDURE — 90750 ZOSTER RECOMBINANT VACCINE: ICD-10-PCS | Mod: S$GLB,,, | Performed by: FAMILY MEDICINE

## 2021-05-20 PROCEDURE — 1126F PR PAIN SEVERITY QUANTIFIED, NO PAIN PRESENT: ICD-10-PCS | Mod: S$GLB,,, | Performed by: FAMILY MEDICINE

## 2021-05-20 PROCEDURE — 3075F PR MOST RECENT SYSTOLIC BLOOD PRESS GE 130-139MM HG: ICD-10-PCS | Mod: CPTII,S$GLB,, | Performed by: FAMILY MEDICINE

## 2021-05-20 PROCEDURE — 3079F DIAST BP 80-89 MM HG: CPT | Mod: CPTII,S$GLB,, | Performed by: FAMILY MEDICINE

## 2021-05-20 PROCEDURE — 3075F SYST BP GE 130 - 139MM HG: CPT | Mod: CPTII,S$GLB,, | Performed by: FAMILY MEDICINE

## 2021-05-20 PROCEDURE — 83036 HEMOGLOBIN GLYCOSYLATED A1C: CPT | Performed by: FAMILY MEDICINE

## 2021-05-20 PROCEDURE — 3008F BODY MASS INDEX DOCD: CPT | Mod: CPTII,S$GLB,, | Performed by: FAMILY MEDICINE

## 2021-05-20 PROCEDURE — 99999 PR PBB SHADOW E&M-EST. PATIENT-LVL IV: ICD-10-PCS | Mod: PBBFAC,,, | Performed by: FAMILY MEDICINE

## 2021-05-20 PROCEDURE — 90750 HZV VACC RECOMBINANT IM: CPT | Mod: S$GLB,,, | Performed by: FAMILY MEDICINE

## 2021-05-20 PROCEDURE — 83525 ASSAY OF INSULIN: CPT | Performed by: FAMILY MEDICINE

## 2021-05-20 PROCEDURE — 1126F AMNT PAIN NOTED NONE PRSNT: CPT | Mod: S$GLB,,, | Performed by: FAMILY MEDICINE

## 2021-05-20 PROCEDURE — 3008F PR BODY MASS INDEX (BMI) DOCUMENTED: ICD-10-PCS | Mod: CPTII,S$GLB,, | Performed by: FAMILY MEDICINE

## 2021-05-20 PROCEDURE — 99214 PR OFFICE/OUTPT VISIT, EST, LEVL IV, 30-39 MIN: ICD-10-PCS | Mod: 25,S$GLB,, | Performed by: FAMILY MEDICINE

## 2021-05-20 PROCEDURE — 90471 IMMUNIZATION ADMIN: CPT | Mod: S$GLB,,, | Performed by: FAMILY MEDICINE

## 2021-05-20 PROCEDURE — 84439 ASSAY OF FREE THYROXINE: CPT | Performed by: FAMILY MEDICINE

## 2021-05-20 RX ORDER — OMEPRAZOLE 20 MG/1
CAPSULE, DELAYED RELEASE ORAL
Qty: 30 CAPSULE | Refills: 5 | Status: SHIPPED | OUTPATIENT
Start: 2021-05-20 | End: 2022-10-16 | Stop reason: SDUPTHER

## 2021-05-21 ENCOUNTER — TELEPHONE (OUTPATIENT)
Dept: INTERNAL MEDICINE | Facility: CLINIC | Age: 56
End: 2021-05-21

## 2021-05-21 DIAGNOSIS — E03.9 HYPOTHYROIDISM, UNSPECIFIED TYPE: Primary | ICD-10-CM

## 2021-05-21 RX ORDER — LEVOTHYROXINE SODIUM 150 UG/1
150 TABLET ORAL
Qty: 30 TABLET | Refills: 5
Start: 2021-05-21 | End: 2021-06-16 | Stop reason: DRUGHIGH

## 2021-05-21 RX ORDER — LEVOTHYROXINE SODIUM 25 UG/1
25 TABLET ORAL
Qty: 30 TABLET | Refills: 5 | Status: SHIPPED | OUTPATIENT
Start: 2021-05-21 | End: 2021-06-16 | Stop reason: DRUGHIGH

## 2021-05-21 RX ORDER — LEVOTHYROXINE SODIUM 175 UG/1
175 TABLET ORAL
Qty: 30 TABLET | Refills: 5 | Status: SHIPPED | OUTPATIENT
Start: 2021-05-21 | End: 2021-05-21 | Stop reason: ALTCHOICE

## 2021-06-04 ENCOUNTER — HOSPITAL ENCOUNTER (OUTPATIENT)
Dept: RADIOLOGY | Facility: HOSPITAL | Age: 56
Discharge: HOME | End: 2021-06-04
Attending: NURSE PRACTITIONER
Payer: COMMERCIAL

## 2021-06-04 VITALS — HEIGHT: 68 IN | WEIGHT: 290.38 LBS | BODY MASS INDEX: 44.01 KG/M2

## 2021-06-04 DIAGNOSIS — Z17.0 MALIGNANT NEOPLASM OF UPPER-OUTER QUADRANT OF RIGHT BREAST IN FEMALE, ESTROGEN RECEPTOR POSITIVE: ICD-10-CM

## 2021-06-04 DIAGNOSIS — Z85.3 ENCOUNTER FOR FOLLOW-UP SURVEILLANCE OF BREAST CANCER: ICD-10-CM

## 2021-06-04 DIAGNOSIS — C50.411 MALIGNANT NEOPLASM OF UPPER-OUTER QUADRANT OF RIGHT BREAST IN FEMALE, ESTROGEN RECEPTOR POSITIVE: ICD-10-CM

## 2021-06-04 DIAGNOSIS — Z08 ENCOUNTER FOR FOLLOW-UP SURVEILLANCE OF BREAST CANCER: ICD-10-CM

## 2021-06-04 PROCEDURE — 77066 DX MAMMO INCL CAD BI: CPT | Mod: TC

## 2021-06-04 PROCEDURE — 77066 MAMMO DIGITAL DIAGNOSTIC BILAT WITH TOMO: ICD-10-PCS | Mod: 26,,, | Performed by: RADIOLOGY

## 2021-06-04 PROCEDURE — 77062 BREAST TOMOSYNTHESIS BI: CPT | Mod: 26,,, | Performed by: RADIOLOGY

## 2021-06-04 PROCEDURE — 77066 DX MAMMO INCL CAD BI: CPT | Mod: 26,,, | Performed by: RADIOLOGY

## 2021-06-04 PROCEDURE — 77062 MAMMO DIGITAL DIAGNOSTIC BILAT WITH TOMO: ICD-10-PCS | Mod: 26,,, | Performed by: RADIOLOGY

## 2021-06-16 ENCOUNTER — OFFICE VISIT (OUTPATIENT)
Dept: HEMATOLOGY/ONCOLOGY | Facility: CLINIC | Age: 56
End: 2021-06-16
Payer: COMMERCIAL

## 2021-06-16 VITALS
HEIGHT: 68 IN | RESPIRATION RATE: 14 BRPM | DIASTOLIC BLOOD PRESSURE: 102 MMHG | OXYGEN SATURATION: 99 % | SYSTOLIC BLOOD PRESSURE: 161 MMHG | WEIGHT: 290.13 LBS | BODY MASS INDEX: 43.97 KG/M2 | HEART RATE: 106 BPM | TEMPERATURE: 99 F

## 2021-06-16 DIAGNOSIS — D50.9 IRON DEFICIENCY ANEMIA, UNSPECIFIED IRON DEFICIENCY ANEMIA TYPE: ICD-10-CM

## 2021-06-16 DIAGNOSIS — T45.1X5A HOT FLASHES RELATED TO AROMATASE INHIBITOR THERAPY: ICD-10-CM

## 2021-06-16 DIAGNOSIS — Z17.0 MALIGNANT NEOPLASM OF UPPER-OUTER QUADRANT OF RIGHT BREAST IN FEMALE, ESTROGEN RECEPTOR POSITIVE: Primary | ICD-10-CM

## 2021-06-16 DIAGNOSIS — C50.911 MALIGNANT NEOPLASM OF RIGHT BREAST IN FEMALE, ESTROGEN RECEPTOR POSITIVE, UNSPECIFIED SITE OF BREAST: ICD-10-CM

## 2021-06-16 DIAGNOSIS — Z17.0 MALIGNANT NEOPLASM OF RIGHT BREAST IN FEMALE, ESTROGEN RECEPTOR POSITIVE, UNSPECIFIED SITE OF BREAST: ICD-10-CM

## 2021-06-16 DIAGNOSIS — F41.9 ANXIETY: ICD-10-CM

## 2021-06-16 DIAGNOSIS — R23.2 HOT FLASHES RELATED TO AROMATASE INHIBITOR THERAPY: ICD-10-CM

## 2021-06-16 DIAGNOSIS — Z79.811 AROMATASE INHIBITOR USE: ICD-10-CM

## 2021-06-16 DIAGNOSIS — C50.411 MALIGNANT NEOPLASM OF UPPER-OUTER QUADRANT OF RIGHT BREAST IN FEMALE, ESTROGEN RECEPTOR POSITIVE: Primary | ICD-10-CM

## 2021-06-16 DIAGNOSIS — J30.2 SEASONAL ALLERGIC RHINITIS, UNSPECIFIED TRIGGER: ICD-10-CM

## 2021-06-16 PROCEDURE — 99214 OFFICE O/P EST MOD 30 MIN: CPT | Mod: S$GLB,,, | Performed by: NURSE PRACTITIONER

## 2021-06-16 PROCEDURE — 99214 PR OFFICE/OUTPT VISIT, EST, LEVL IV, 30-39 MIN: ICD-10-PCS | Mod: S$GLB,,, | Performed by: NURSE PRACTITIONER

## 2021-06-16 PROCEDURE — 1126F PR PAIN SEVERITY QUANTIFIED, NO PAIN PRESENT: ICD-10-PCS | Mod: S$GLB,,, | Performed by: NURSE PRACTITIONER

## 2021-06-16 PROCEDURE — 3008F PR BODY MASS INDEX (BMI) DOCUMENTED: ICD-10-PCS | Mod: CPTII,S$GLB,, | Performed by: NURSE PRACTITIONER

## 2021-06-16 PROCEDURE — 99999 PR PBB SHADOW E&M-EST. PATIENT-LVL IV: CPT | Mod: PBBFAC,,, | Performed by: NURSE PRACTITIONER

## 2021-06-16 PROCEDURE — 1126F AMNT PAIN NOTED NONE PRSNT: CPT | Mod: S$GLB,,, | Performed by: NURSE PRACTITIONER

## 2021-06-16 PROCEDURE — 99999 PR PBB SHADOW E&M-EST. PATIENT-LVL IV: ICD-10-PCS | Mod: PBBFAC,,, | Performed by: NURSE PRACTITIONER

## 2021-06-16 PROCEDURE — 3008F BODY MASS INDEX DOCD: CPT | Mod: CPTII,S$GLB,, | Performed by: NURSE PRACTITIONER

## 2021-06-16 RX ORDER — LEVOTHYROXINE SODIUM 175 UG/1
175 TABLET ORAL DAILY
COMMUNITY
Start: 2021-05-21 | End: 2021-07-15

## 2021-06-16 RX ORDER — FLUTICASONE PROPIONATE 50 MCG
SPRAY, SUSPENSION (ML) NASAL
Qty: 16 G | Refills: 5 | Status: SHIPPED | OUTPATIENT
Start: 2021-06-16 | End: 2024-01-09 | Stop reason: SDUPTHER

## 2021-06-16 RX ORDER — VENLAFAXINE HYDROCHLORIDE 37.5 MG/1
37.5 CAPSULE, EXTENDED RELEASE ORAL DAILY
Qty: 30 CAPSULE | Refills: 2 | Status: SHIPPED | OUTPATIENT
Start: 2021-06-16 | End: 2022-11-17

## 2021-07-07 ENCOUNTER — OFFICE VISIT (OUTPATIENT)
Dept: FAMILY MEDICINE | Facility: CLINIC | Age: 56
End: 2021-07-07
Payer: COMMERCIAL

## 2021-07-07 VITALS
BODY MASS INDEX: 44.27 KG/M2 | OXYGEN SATURATION: 99 % | HEART RATE: 100 BPM | TEMPERATURE: 98 F | WEIGHT: 292.13 LBS | HEIGHT: 68 IN | DIASTOLIC BLOOD PRESSURE: 86 MMHG | SYSTOLIC BLOOD PRESSURE: 136 MMHG

## 2021-07-07 DIAGNOSIS — R00.2 PALPITATIONS: Primary | ICD-10-CM

## 2021-07-07 PROCEDURE — 3079F PR MOST RECENT DIASTOLIC BLOOD PRESSURE 80-89 MM HG: ICD-10-PCS | Mod: CPTII,S$GLB,, | Performed by: FAMILY MEDICINE

## 2021-07-07 PROCEDURE — 3075F SYST BP GE 130 - 139MM HG: CPT | Mod: CPTII,S$GLB,, | Performed by: FAMILY MEDICINE

## 2021-07-07 PROCEDURE — 99999 PR PBB SHADOW E&M-EST. PATIENT-LVL IV: ICD-10-PCS | Mod: PBBFAC,,, | Performed by: FAMILY MEDICINE

## 2021-07-07 PROCEDURE — 99213 OFFICE O/P EST LOW 20 MIN: CPT | Mod: S$GLB,,, | Performed by: FAMILY MEDICINE

## 2021-07-07 PROCEDURE — 3075F PR MOST RECENT SYSTOLIC BLOOD PRESS GE 130-139MM HG: ICD-10-PCS | Mod: CPTII,S$GLB,, | Performed by: FAMILY MEDICINE

## 2021-07-07 PROCEDURE — 3079F DIAST BP 80-89 MM HG: CPT | Mod: CPTII,S$GLB,, | Performed by: FAMILY MEDICINE

## 2021-07-07 PROCEDURE — 99999 PR PBB SHADOW E&M-EST. PATIENT-LVL IV: CPT | Mod: PBBFAC,,, | Performed by: FAMILY MEDICINE

## 2021-07-07 PROCEDURE — 3044F HG A1C LEVEL LT 7.0%: CPT | Mod: CPTII,S$GLB,, | Performed by: FAMILY MEDICINE

## 2021-07-07 PROCEDURE — 3008F PR BODY MASS INDEX (BMI) DOCUMENTED: ICD-10-PCS | Mod: CPTII,S$GLB,, | Performed by: FAMILY MEDICINE

## 2021-07-07 PROCEDURE — 99213 PR OFFICE/OUTPT VISIT, EST, LEVL III, 20-29 MIN: ICD-10-PCS | Mod: S$GLB,,, | Performed by: FAMILY MEDICINE

## 2021-07-07 PROCEDURE — 3044F PR MOST RECENT HEMOGLOBIN A1C LEVEL <7.0%: ICD-10-PCS | Mod: CPTII,S$GLB,, | Performed by: FAMILY MEDICINE

## 2021-07-07 PROCEDURE — 3008F BODY MASS INDEX DOCD: CPT | Mod: CPTII,S$GLB,, | Performed by: FAMILY MEDICINE

## 2021-07-07 RX ORDER — METOPROLOL SUCCINATE 25 MG/1
1 TABLET, EXTENDED RELEASE ORAL DAILY
COMMUNITY
Start: 2021-06-26 | End: 2021-09-13 | Stop reason: SDUPTHER

## 2021-07-15 RX ORDER — LEVOTHYROXINE SODIUM 175 UG/1
TABLET ORAL
Qty: 90 TABLET | Refills: 1 | Status: SHIPPED | OUTPATIENT
Start: 2021-07-15 | End: 2021-11-08 | Stop reason: SDUPTHER

## 2021-07-28 ENCOUNTER — DOCUMENTATION ONLY (OUTPATIENT)
Dept: HEMATOLOGY/ONCOLOGY | Facility: CLINIC | Age: 56
End: 2021-07-28

## 2021-07-28 DIAGNOSIS — Z17.0 MALIGNANT NEOPLASM OF UPPER-OUTER QUADRANT OF RIGHT BREAST IN FEMALE, ESTROGEN RECEPTOR POSITIVE: Primary | ICD-10-CM

## 2021-07-28 DIAGNOSIS — C50.411 MALIGNANT NEOPLASM OF UPPER-OUTER QUADRANT OF RIGHT BREAST IN FEMALE, ESTROGEN RECEPTOR POSITIVE: Primary | ICD-10-CM

## 2021-07-28 DIAGNOSIS — Z79.811 AROMATASE INHIBITOR USE: ICD-10-CM

## 2021-08-02 DIAGNOSIS — I10 ESSENTIAL HYPERTENSION: Chronic | ICD-10-CM

## 2021-08-04 ENCOUNTER — LAB VISIT (OUTPATIENT)
Dept: LAB | Facility: HOSPITAL | Age: 56
End: 2021-08-04
Attending: FAMILY MEDICINE
Payer: COMMERCIAL

## 2021-08-04 ENCOUNTER — OFFICE VISIT (OUTPATIENT)
Dept: FAMILY MEDICINE | Facility: CLINIC | Age: 56
End: 2021-08-04
Payer: COMMERCIAL

## 2021-08-04 VITALS
BODY MASS INDEX: 44.21 KG/M2 | TEMPERATURE: 98 F | WEIGHT: 291.69 LBS | HEIGHT: 68 IN | OXYGEN SATURATION: 97 % | HEART RATE: 90 BPM | SYSTOLIC BLOOD PRESSURE: 144 MMHG | DIASTOLIC BLOOD PRESSURE: 76 MMHG

## 2021-08-04 DIAGNOSIS — E03.9 HYPOTHYROIDISM, UNSPECIFIED TYPE: ICD-10-CM

## 2021-08-04 DIAGNOSIS — E03.9 HYPOTHYROIDISM, UNSPECIFIED TYPE: Primary | ICD-10-CM

## 2021-08-04 LAB — TSH SERPL DL<=0.005 MIU/L-ACNC: 2.52 UIU/ML (ref 0.4–4)

## 2021-08-04 PROCEDURE — 3008F BODY MASS INDEX DOCD: CPT | Mod: CPTII,S$GLB,, | Performed by: FAMILY MEDICINE

## 2021-08-04 PROCEDURE — 3008F PR BODY MASS INDEX (BMI) DOCUMENTED: ICD-10-PCS | Mod: CPTII,S$GLB,, | Performed by: FAMILY MEDICINE

## 2021-08-04 PROCEDURE — 4010F ACE/ARB THERAPY RXD/TAKEN: CPT | Mod: CPTII,S$GLB,, | Performed by: FAMILY MEDICINE

## 2021-08-04 PROCEDURE — 3044F PR MOST RECENT HEMOGLOBIN A1C LEVEL <7.0%: ICD-10-PCS | Mod: CPTII,S$GLB,, | Performed by: FAMILY MEDICINE

## 2021-08-04 PROCEDURE — 4010F PR ACE/ARB THEARPY RXD/TAKEN: ICD-10-PCS | Mod: CPTII,S$GLB,, | Performed by: FAMILY MEDICINE

## 2021-08-04 PROCEDURE — 3078F DIAST BP <80 MM HG: CPT | Mod: CPTII,S$GLB,, | Performed by: FAMILY MEDICINE

## 2021-08-04 PROCEDURE — 3078F PR MOST RECENT DIASTOLIC BLOOD PRESSURE < 80 MM HG: ICD-10-PCS | Mod: CPTII,S$GLB,, | Performed by: FAMILY MEDICINE

## 2021-08-04 PROCEDURE — 1160F RVW MEDS BY RX/DR IN RCRD: CPT | Mod: CPTII,S$GLB,, | Performed by: FAMILY MEDICINE

## 2021-08-04 PROCEDURE — 1159F MED LIST DOCD IN RCRD: CPT | Mod: CPTII,S$GLB,, | Performed by: FAMILY MEDICINE

## 2021-08-04 PROCEDURE — 3077F SYST BP >= 140 MM HG: CPT | Mod: CPTII,S$GLB,, | Performed by: FAMILY MEDICINE

## 2021-08-04 PROCEDURE — 99213 PR OFFICE/OUTPT VISIT, EST, LEVL III, 20-29 MIN: ICD-10-PCS | Mod: S$GLB,,, | Performed by: FAMILY MEDICINE

## 2021-08-04 PROCEDURE — 1159F PR MEDICATION LIST DOCUMENTED IN MEDICAL RECORD: ICD-10-PCS | Mod: CPTII,S$GLB,, | Performed by: FAMILY MEDICINE

## 2021-08-04 PROCEDURE — 1160F PR REVIEW ALL MEDS BY PRESCRIBER/CLIN PHARMACIST DOCUMENTED: ICD-10-PCS | Mod: CPTII,S$GLB,, | Performed by: FAMILY MEDICINE

## 2021-08-04 PROCEDURE — 99213 OFFICE O/P EST LOW 20 MIN: CPT | Mod: S$GLB,,, | Performed by: FAMILY MEDICINE

## 2021-08-04 PROCEDURE — 36415 COLL VENOUS BLD VENIPUNCTURE: CPT | Mod: PO | Performed by: FAMILY MEDICINE

## 2021-08-04 PROCEDURE — 99999 PR PBB SHADOW E&M-EST. PATIENT-LVL IV: CPT | Mod: PBBFAC,,, | Performed by: FAMILY MEDICINE

## 2021-08-04 PROCEDURE — 3077F PR MOST RECENT SYSTOLIC BLOOD PRESSURE >= 140 MM HG: ICD-10-PCS | Mod: CPTII,S$GLB,, | Performed by: FAMILY MEDICINE

## 2021-08-04 PROCEDURE — 3044F HG A1C LEVEL LT 7.0%: CPT | Mod: CPTII,S$GLB,, | Performed by: FAMILY MEDICINE

## 2021-08-04 PROCEDURE — 84443 ASSAY THYROID STIM HORMONE: CPT | Performed by: FAMILY MEDICINE

## 2021-08-04 PROCEDURE — 99999 PR PBB SHADOW E&M-EST. PATIENT-LVL IV: ICD-10-PCS | Mod: PBBFAC,,, | Performed by: FAMILY MEDICINE

## 2021-08-04 RX ORDER — BENAZEPRIL HYDROCHLORIDE 20 MG/1
TABLET ORAL
Qty: 90 TABLET | Refills: 1 | Status: SHIPPED | OUTPATIENT
Start: 2021-08-04 | End: 2022-02-08

## 2021-09-13 ENCOUNTER — PATIENT MESSAGE (OUTPATIENT)
Dept: FAMILY MEDICINE | Facility: CLINIC | Age: 56
End: 2021-09-13

## 2021-09-13 RX ORDER — METOPROLOL SUCCINATE 25 MG/1
25 TABLET, EXTENDED RELEASE ORAL DAILY
Qty: 30 TABLET | Refills: 5 | Status: SHIPPED | OUTPATIENT
Start: 2021-09-13 | End: 2022-02-14 | Stop reason: SDUPTHER

## 2021-10-07 DIAGNOSIS — I10 ESSENTIAL HYPERTENSION: Chronic | ICD-10-CM

## 2021-10-07 RX ORDER — AMLODIPINE BESYLATE 10 MG/1
TABLET ORAL
Qty: 30 TABLET | Refills: 5 | Status: SHIPPED | OUTPATIENT
Start: 2021-10-07 | End: 2022-04-08

## 2021-10-25 ENCOUNTER — HOSPITAL ENCOUNTER (OUTPATIENT)
Dept: RADIOLOGY | Facility: HOSPITAL | Age: 56
Discharge: HOME | End: 2021-10-25
Attending: PODIATRIST
Payer: COMMERCIAL

## 2021-10-25 ENCOUNTER — OFFICE VISIT (OUTPATIENT)
Dept: PODIATRY | Facility: CLINIC | Age: 56
End: 2021-10-25
Payer: COMMERCIAL

## 2021-10-25 VITALS
HEIGHT: 68 IN | HEART RATE: 95 BPM | BODY MASS INDEX: 44.1 KG/M2 | SYSTOLIC BLOOD PRESSURE: 147 MMHG | DIASTOLIC BLOOD PRESSURE: 93 MMHG | WEIGHT: 291 LBS

## 2021-10-25 DIAGNOSIS — M79.671 INFLAMMATORY HEEL PAIN, RIGHT: ICD-10-CM

## 2021-10-25 DIAGNOSIS — M76.61 ACHILLES TENDINITIS OF RIGHT LOWER EXTREMITY: ICD-10-CM

## 2021-10-25 DIAGNOSIS — M72.2 PLANTAR FASCIITIS: ICD-10-CM

## 2021-10-25 DIAGNOSIS — M76.61 ACHILLES TENDINITIS OF RIGHT LOWER EXTREMITY: Primary | ICD-10-CM

## 2021-10-25 DIAGNOSIS — M77.51 BURSITIS OF POSTERIOR HEEL, RIGHT: ICD-10-CM

## 2021-10-25 PROCEDURE — 3008F BODY MASS INDEX DOCD: CPT | Mod: CPTII,S$GLB,, | Performed by: PODIATRIST

## 2021-10-25 PROCEDURE — 4010F PR ACE/ARB THEARPY RXD/TAKEN: ICD-10-PCS | Mod: CPTII,S$GLB,, | Performed by: PODIATRIST

## 2021-10-25 PROCEDURE — 3077F SYST BP >= 140 MM HG: CPT | Mod: CPTII,S$GLB,, | Performed by: PODIATRIST

## 2021-10-25 PROCEDURE — 3044F PR MOST RECENT HEMOGLOBIN A1C LEVEL <7.0%: ICD-10-PCS | Mod: CPTII,S$GLB,, | Performed by: PODIATRIST

## 2021-10-25 PROCEDURE — 73630 X-RAY EXAM OF FOOT: CPT | Mod: 26,RT,, | Performed by: RADIOLOGY

## 2021-10-25 PROCEDURE — 99214 PR OFFICE/OUTPT VISIT, EST, LEVL IV, 30-39 MIN: ICD-10-PCS | Mod: S$GLB,,, | Performed by: PODIATRIST

## 2021-10-25 PROCEDURE — 3077F PR MOST RECENT SYSTOLIC BLOOD PRESSURE >= 140 MM HG: ICD-10-PCS | Mod: CPTII,S$GLB,, | Performed by: PODIATRIST

## 2021-10-25 PROCEDURE — 73630 X-RAY EXAM OF FOOT: CPT | Mod: TC,RT

## 2021-10-25 PROCEDURE — 3044F HG A1C LEVEL LT 7.0%: CPT | Mod: CPTII,S$GLB,, | Performed by: PODIATRIST

## 2021-10-25 PROCEDURE — 1160F PR REVIEW ALL MEDS BY PRESCRIBER/CLIN PHARMACIST DOCUMENTED: ICD-10-PCS | Mod: CPTII,S$GLB,, | Performed by: PODIATRIST

## 2021-10-25 PROCEDURE — 4010F ACE/ARB THERAPY RXD/TAKEN: CPT | Mod: CPTII,S$GLB,, | Performed by: PODIATRIST

## 2021-10-25 PROCEDURE — 99214 OFFICE O/P EST MOD 30 MIN: CPT | Mod: S$GLB,,, | Performed by: PODIATRIST

## 2021-10-25 PROCEDURE — 99999 PR PBB SHADOW E&M-EST. PATIENT-LVL IV: ICD-10-PCS | Mod: PBBFAC,,, | Performed by: PODIATRIST

## 2021-10-25 PROCEDURE — 73630 XR FOOT COMPLETE 3 VIEW RIGHT: ICD-10-PCS | Mod: 26,RT,, | Performed by: RADIOLOGY

## 2021-10-25 PROCEDURE — 1160F RVW MEDS BY RX/DR IN RCRD: CPT | Mod: CPTII,S$GLB,, | Performed by: PODIATRIST

## 2021-10-25 PROCEDURE — 3080F DIAST BP >= 90 MM HG: CPT | Mod: CPTII,S$GLB,, | Performed by: PODIATRIST

## 2021-10-25 PROCEDURE — 99999 PR PBB SHADOW E&M-EST. PATIENT-LVL IV: CPT | Mod: PBBFAC,,, | Performed by: PODIATRIST

## 2021-10-25 PROCEDURE — 1159F PR MEDICATION LIST DOCUMENTED IN MEDICAL RECORD: ICD-10-PCS | Mod: CPTII,S$GLB,, | Performed by: PODIATRIST

## 2021-10-25 PROCEDURE — 3008F PR BODY MASS INDEX (BMI) DOCUMENTED: ICD-10-PCS | Mod: CPTII,S$GLB,, | Performed by: PODIATRIST

## 2021-10-25 PROCEDURE — 1159F MED LIST DOCD IN RCRD: CPT | Mod: CPTII,S$GLB,, | Performed by: PODIATRIST

## 2021-10-25 PROCEDURE — 3080F PR MOST RECENT DIASTOLIC BLOOD PRESSURE >= 90 MM HG: ICD-10-PCS | Mod: CPTII,S$GLB,, | Performed by: PODIATRIST

## 2021-10-25 RX ORDER — CYCLOBENZAPRINE HCL 5 MG
10 TABLET ORAL NIGHTLY
Qty: 10 TABLET | Refills: 0 | Status: SHIPPED | OUTPATIENT
Start: 2021-10-25 | End: 2021-11-23

## 2021-10-27 ENCOUNTER — PATIENT MESSAGE (OUTPATIENT)
Dept: PODIATRY | Facility: CLINIC | Age: 56
End: 2021-10-27
Payer: COMMERCIAL

## 2021-10-29 RX ORDER — ACETAMINOPHEN AND CODEINE PHOSPHATE 300; 30 MG/1; MG/1
1 TABLET ORAL EVERY 4 HOURS PRN
Qty: 15 TABLET | Refills: 0 | Status: SHIPPED | OUTPATIENT
Start: 2021-10-29 | End: 2021-11-08

## 2021-11-08 RX ORDER — LEVOTHYROXINE SODIUM 175 UG/1
175 TABLET ORAL DAILY
Qty: 90 TABLET | Refills: 1 | Status: SHIPPED | OUTPATIENT
Start: 2021-11-08 | End: 2022-10-16 | Stop reason: SDUPTHER

## 2021-11-23 ENCOUNTER — LAB VISIT (OUTPATIENT)
Dept: LAB | Facility: HOSPITAL | Age: 56
End: 2021-11-23
Attending: FAMILY MEDICINE
Payer: COMMERCIAL

## 2021-11-23 ENCOUNTER — OFFICE VISIT (OUTPATIENT)
Dept: FAMILY MEDICINE | Facility: CLINIC | Age: 56
End: 2021-11-23
Payer: COMMERCIAL

## 2021-11-23 VITALS
DIASTOLIC BLOOD PRESSURE: 82 MMHG | HEIGHT: 68 IN | BODY MASS INDEX: 43.93 KG/M2 | SYSTOLIC BLOOD PRESSURE: 134 MMHG | OXYGEN SATURATION: 97 % | TEMPERATURE: 97 F | HEART RATE: 107 BPM | WEIGHT: 289.88 LBS

## 2021-11-23 DIAGNOSIS — Z90.710 STATUS POST LAPAROSCOPIC HYSTERECTOMY: ICD-10-CM

## 2021-11-23 DIAGNOSIS — K63.5 BENIGN COLON POLYP: ICD-10-CM

## 2021-11-23 DIAGNOSIS — E55.9 VITAMIN D DEFICIENCY: ICD-10-CM

## 2021-11-23 DIAGNOSIS — Z17.0 MALIGNANT NEOPLASM OF UPPER-OUTER QUADRANT OF RIGHT BREAST IN FEMALE, ESTROGEN RECEPTOR POSITIVE: ICD-10-CM

## 2021-11-23 DIAGNOSIS — C50.411 MALIGNANT NEOPLASM OF UPPER-OUTER QUADRANT OF RIGHT BREAST IN FEMALE, ESTROGEN RECEPTOR POSITIVE: ICD-10-CM

## 2021-11-23 DIAGNOSIS — R73.03 PREDIABETES: ICD-10-CM

## 2021-11-23 DIAGNOSIS — D50.9 IRON DEFICIENCY ANEMIA, UNSPECIFIED IRON DEFICIENCY ANEMIA TYPE: ICD-10-CM

## 2021-11-23 DIAGNOSIS — J30.2 SEASONAL ALLERGIC RHINITIS, UNSPECIFIED TRIGGER: ICD-10-CM

## 2021-11-23 DIAGNOSIS — E88.810 INSULIN RESISTANCE SYNDROME: ICD-10-CM

## 2021-11-23 DIAGNOSIS — Z00.00 ANNUAL PHYSICAL EXAM: Primary | ICD-10-CM

## 2021-11-23 DIAGNOSIS — Z23 NEED FOR PROPHYLACTIC VACCINATION AGAINST STREPTOCOCCUS PNEUMONIAE (PNEUMOCOCCUS): ICD-10-CM

## 2021-11-23 DIAGNOSIS — I10 ESSENTIAL HYPERTENSION: ICD-10-CM

## 2021-11-23 DIAGNOSIS — E03.9 HYPOTHYROIDISM, UNSPECIFIED TYPE: ICD-10-CM

## 2021-11-23 DIAGNOSIS — Z00.00 ANNUAL PHYSICAL EXAM: ICD-10-CM

## 2021-11-23 DIAGNOSIS — E66.01 MORBID OBESITY WITH BMI OF 40.0-44.9, ADULT: ICD-10-CM

## 2021-11-23 LAB
25(OH)D3+25(OH)D2 SERPL-MCNC: 42 NG/ML (ref 30–96)
BILIRUB UR QL STRIP: NEGATIVE
CHOLEST SERPL-MCNC: 178 MG/DL (ref 120–199)
CHOLEST/HDLC SERPL: 3.7 {RATIO} (ref 2–5)
CLARITY UR REFRACT.AUTO: ABNORMAL
COLOR UR AUTO: ABNORMAL
ESTIMATED AVG GLUCOSE: 137 MG/DL (ref 68–131)
GLUCOSE UR QL STRIP: NEGATIVE
HBA1C MFR BLD: 6.4 % (ref 4–5.6)
HDLC SERPL-MCNC: 48 MG/DL (ref 40–75)
HDLC SERPL: 27 % (ref 20–50)
HGB UR QL STRIP: ABNORMAL
INSULIN COLLECTION INTERVAL: NORMAL
INSULIN SERPL-ACNC: 20.1 UU/ML
KETONES UR QL STRIP: NEGATIVE
LDLC SERPL CALC-MCNC: 117.6 MG/DL (ref 63–159)
LEUKOCYTE ESTERASE UR QL STRIP: ABNORMAL
MICROSCOPIC COMMENT: ABNORMAL
NITRITE UR QL STRIP: NEGATIVE
NONHDLC SERPL-MCNC: 130 MG/DL
PH UR STRIP: 5 [PH] (ref 5–8)
PROT UR QL STRIP: NEGATIVE
RBC #/AREA URNS AUTO: 10 /HPF (ref 0–4)
SP GR UR STRIP: 1.02 (ref 1–1.03)
SQUAMOUS #/AREA URNS AUTO: 2 /HPF
TRIGL SERPL-MCNC: 62 MG/DL (ref 30–150)
TSH SERPL DL<=0.005 MIU/L-ACNC: 0.46 UIU/ML (ref 0.4–4)
URN SPEC COLLECT METH UR: ABNORMAL
WBC #/AREA URNS AUTO: 1 /HPF (ref 0–5)

## 2021-11-23 PROCEDURE — 99999 PR PBB SHADOW E&M-EST. PATIENT-LVL IV: CPT | Mod: PBBFAC,,, | Performed by: FAMILY MEDICINE

## 2021-11-23 PROCEDURE — 99396 PR PREVENTIVE VISIT,EST,40-64: ICD-10-PCS | Mod: 25,S$GLB,, | Performed by: FAMILY MEDICINE

## 2021-11-23 PROCEDURE — 82306 VITAMIN D 25 HYDROXY: CPT | Performed by: FAMILY MEDICINE

## 2021-11-23 PROCEDURE — 84443 ASSAY THYROID STIM HORMONE: CPT | Performed by: FAMILY MEDICINE

## 2021-11-23 PROCEDURE — 99396 PREV VISIT EST AGE 40-64: CPT | Mod: 25,S$GLB,, | Performed by: FAMILY MEDICINE

## 2021-11-23 PROCEDURE — 90471 PNEUMOCOCCAL POLYSACCHARIDE VACCINE 23-VALENT =>2YO SQ IM: ICD-10-PCS | Mod: S$GLB,,, | Performed by: FAMILY MEDICINE

## 2021-11-23 PROCEDURE — 80061 LIPID PANEL: CPT | Performed by: FAMILY MEDICINE

## 2021-11-23 PROCEDURE — 83036 HEMOGLOBIN GLYCOSYLATED A1C: CPT | Performed by: FAMILY MEDICINE

## 2021-11-23 PROCEDURE — 90732 PPSV23 VACC 2 YRS+ SUBQ/IM: CPT | Mod: S$GLB,,, | Performed by: FAMILY MEDICINE

## 2021-11-23 PROCEDURE — 4010F ACE/ARB THERAPY RXD/TAKEN: CPT | Mod: CPTII,S$GLB,, | Performed by: FAMILY MEDICINE

## 2021-11-23 PROCEDURE — 99999 PR PBB SHADOW E&M-EST. PATIENT-LVL IV: ICD-10-PCS | Mod: PBBFAC,,, | Performed by: FAMILY MEDICINE

## 2021-11-23 PROCEDURE — 90732 PNEUMOCOCCAL POLYSACCHARIDE VACCINE 23-VALENT =>2YO SQ IM: ICD-10-PCS | Mod: S$GLB,,, | Performed by: FAMILY MEDICINE

## 2021-11-23 PROCEDURE — 4010F PR ACE/ARB THEARPY RXD/TAKEN: ICD-10-PCS | Mod: CPTII,S$GLB,, | Performed by: FAMILY MEDICINE

## 2021-11-23 PROCEDURE — 90471 IMMUNIZATION ADMIN: CPT | Mod: S$GLB,,, | Performed by: FAMILY MEDICINE

## 2021-11-23 PROCEDURE — 81001 URINALYSIS AUTO W/SCOPE: CPT | Performed by: FAMILY MEDICINE

## 2021-11-23 PROCEDURE — 83525 ASSAY OF INSULIN: CPT | Performed by: FAMILY MEDICINE

## 2021-11-29 ENCOUNTER — OFFICE VISIT (OUTPATIENT)
Dept: PODIATRY | Facility: CLINIC | Age: 56
End: 2021-11-29
Payer: COMMERCIAL

## 2021-11-29 VITALS — WEIGHT: 289 LBS | HEIGHT: 68 IN | BODY MASS INDEX: 43.8 KG/M2

## 2021-11-29 DIAGNOSIS — M76.61 ACHILLES TENDINITIS OF RIGHT LOWER EXTREMITY: Primary | ICD-10-CM

## 2021-11-29 PROCEDURE — 99213 OFFICE O/P EST LOW 20 MIN: CPT | Mod: S$GLB,,, | Performed by: PODIATRIST

## 2021-11-29 PROCEDURE — 99999 PR PBB SHADOW E&M-EST. PATIENT-LVL III: ICD-10-PCS | Mod: PBBFAC,,, | Performed by: PODIATRIST

## 2021-11-29 PROCEDURE — 4010F ACE/ARB THERAPY RXD/TAKEN: CPT | Mod: CPTII,S$GLB,, | Performed by: PODIATRIST

## 2021-11-29 PROCEDURE — 4010F PR ACE/ARB THEARPY RXD/TAKEN: ICD-10-PCS | Mod: CPTII,S$GLB,, | Performed by: PODIATRIST

## 2021-11-29 PROCEDURE — 99999 PR PBB SHADOW E&M-EST. PATIENT-LVL III: CPT | Mod: PBBFAC,,, | Performed by: PODIATRIST

## 2021-11-29 PROCEDURE — 99213 PR OFFICE/OUTPT VISIT, EST, LEVL III, 20-29 MIN: ICD-10-PCS | Mod: S$GLB,,, | Performed by: PODIATRIST

## 2021-12-10 DIAGNOSIS — N39.0 URINARY TRACT INFECTION WITHOUT HEMATURIA, SITE UNSPECIFIED: Primary | ICD-10-CM

## 2021-12-10 RX ORDER — NITROFURANTOIN (MACROCRYSTALS) 100 MG/1
100 CAPSULE ORAL EVERY 12 HOURS
Qty: 14 CAPSULE | Refills: 0 | Status: SHIPPED | OUTPATIENT
Start: 2021-12-10 | End: 2021-12-17

## 2021-12-20 ENCOUNTER — LAB VISIT (OUTPATIENT)
Dept: LAB | Facility: HOSPITAL | Age: 56
End: 2021-12-20
Attending: NURSE PRACTITIONER
Payer: COMMERCIAL

## 2021-12-20 ENCOUNTER — APPOINTMENT (OUTPATIENT)
Dept: RADIOLOGY | Facility: HOSPITAL | Age: 56
End: 2021-12-20
Attending: NURSE PRACTITIONER
Payer: COMMERCIAL

## 2021-12-20 DIAGNOSIS — Z79.811 AROMATASE INHIBITOR USE: ICD-10-CM

## 2021-12-20 DIAGNOSIS — C50.411 MALIGNANT NEOPLASM OF UPPER-OUTER QUADRANT OF RIGHT BREAST IN FEMALE, ESTROGEN RECEPTOR POSITIVE: ICD-10-CM

## 2021-12-20 DIAGNOSIS — Z17.0 MALIGNANT NEOPLASM OF UPPER-OUTER QUADRANT OF RIGHT BREAST IN FEMALE, ESTROGEN RECEPTOR POSITIVE: ICD-10-CM

## 2021-12-20 DIAGNOSIS — D50.9 IRON DEFICIENCY ANEMIA, UNSPECIFIED IRON DEFICIENCY ANEMIA TYPE: ICD-10-CM

## 2021-12-20 LAB
ALBUMIN SERPL BCP-MCNC: 3.7 G/DL (ref 3.5–5.2)
ALP SERPL-CCNC: 107 U/L (ref 55–135)
ALT SERPL W/O P-5'-P-CCNC: 50 U/L (ref 10–44)
ANION GAP SERPL CALC-SCNC: 11 MMOL/L (ref 8–16)
AST SERPL-CCNC: 32 U/L (ref 10–40)
BASOPHILS # BLD AUTO: 0.03 K/UL (ref 0–0.2)
BASOPHILS NFR BLD: 0.5 % (ref 0–1.9)
BILIRUB SERPL-MCNC: 0.3 MG/DL (ref 0.1–1)
BUN SERPL-MCNC: 14 MG/DL (ref 6–20)
CALCIUM SERPL-MCNC: 9.7 MG/DL (ref 8.7–10.5)
CHLORIDE SERPL-SCNC: 107 MMOL/L (ref 95–110)
CO2 SERPL-SCNC: 24 MMOL/L (ref 23–29)
CREAT SERPL-MCNC: 0.8 MG/DL (ref 0.5–1.4)
DIFFERENTIAL METHOD: NORMAL
EOSINOPHIL # BLD AUTO: 0.2 K/UL (ref 0–0.5)
EOSINOPHIL NFR BLD: 2.3 % (ref 0–8)
ERYTHROCYTE [DISTWIDTH] IN BLOOD BY AUTOMATED COUNT: 14.1 % (ref 11.5–14.5)
EST. GFR  (AFRICAN AMERICAN): >60 ML/MIN/1.73 M^2
EST. GFR  (NON AFRICAN AMERICAN): >60 ML/MIN/1.73 M^2
GLUCOSE SERPL-MCNC: 128 MG/DL (ref 70–110)
HCT VFR BLD AUTO: 41 % (ref 37–48.5)
HGB BLD-MCNC: 13.9 G/DL (ref 12–16)
IMM GRANULOCYTES # BLD AUTO: 0.01 K/UL (ref 0–0.04)
IMM GRANULOCYTES NFR BLD AUTO: 0.2 % (ref 0–0.5)
LYMPHOCYTES # BLD AUTO: 2.3 K/UL (ref 1–4.8)
LYMPHOCYTES NFR BLD: 34.7 % (ref 18–48)
MCH RBC QN AUTO: 28.9 PG (ref 27–31)
MCHC RBC AUTO-ENTMCNC: 33.9 G/DL (ref 32–36)
MCV RBC AUTO: 85 FL (ref 82–98)
MONOCYTES # BLD AUTO: 0.5 K/UL (ref 0.3–1)
MONOCYTES NFR BLD: 7.9 % (ref 4–15)
NEUTROPHILS # BLD AUTO: 3.6 K/UL (ref 1.8–7.7)
NEUTROPHILS NFR BLD: 54.4 % (ref 38–73)
NRBC BLD-RTO: 0 /100 WBC
PLATELET # BLD AUTO: 264 K/UL (ref 150–450)
PMV BLD AUTO: 9.9 FL (ref 9.2–12.9)
POTASSIUM SERPL-SCNC: 3.8 MMOL/L (ref 3.5–5.1)
PROT SERPL-MCNC: 7.8 G/DL (ref 6–8.4)
RBC # BLD AUTO: 4.81 M/UL (ref 4–5.4)
SODIUM SERPL-SCNC: 142 MMOL/L (ref 136–145)
WBC # BLD AUTO: 6.58 K/UL (ref 3.9–12.7)

## 2021-12-20 PROCEDURE — 85025 COMPLETE CBC W/AUTO DIFF WBC: CPT | Performed by: NURSE PRACTITIONER

## 2021-12-20 PROCEDURE — 77080 DEXA BONE DENSITY SPINE HIP: ICD-10-PCS | Mod: 26,,, | Performed by: RADIOLOGY

## 2021-12-20 PROCEDURE — 36415 COLL VENOUS BLD VENIPUNCTURE: CPT | Performed by: NURSE PRACTITIONER

## 2021-12-20 PROCEDURE — 77080 DXA BONE DENSITY AXIAL: CPT | Mod: TC

## 2021-12-20 PROCEDURE — 77080 DXA BONE DENSITY AXIAL: CPT | Mod: 26,,, | Performed by: RADIOLOGY

## 2021-12-20 PROCEDURE — 80053 COMPREHEN METABOLIC PANEL: CPT | Performed by: NURSE PRACTITIONER

## 2021-12-22 ENCOUNTER — OFFICE VISIT (OUTPATIENT)
Dept: HEMATOLOGY/ONCOLOGY | Facility: CLINIC | Age: 56
End: 2021-12-22
Payer: COMMERCIAL

## 2021-12-22 VITALS
HEIGHT: 68 IN | BODY MASS INDEX: 44.41 KG/M2 | HEART RATE: 89 BPM | TEMPERATURE: 97 F | WEIGHT: 293 LBS | DIASTOLIC BLOOD PRESSURE: 86 MMHG | OXYGEN SATURATION: 98 % | SYSTOLIC BLOOD PRESSURE: 141 MMHG

## 2021-12-22 DIAGNOSIS — G62.0 CHEMOTHERAPY-INDUCED NEUROPATHY: ICD-10-CM

## 2021-12-22 DIAGNOSIS — Z71.89 COUNSELING ON HEALTH PROMOTION AND DISEASE PREVENTION: ICD-10-CM

## 2021-12-22 DIAGNOSIS — Z17.0 MALIGNANT NEOPLASM OF UPPER-OUTER QUADRANT OF RIGHT BREAST IN FEMALE, ESTROGEN RECEPTOR POSITIVE: Primary | ICD-10-CM

## 2021-12-22 DIAGNOSIS — Z12.39 ENCOUNTER FOR BREAST CANCER SCREENING USING NON-MAMMOGRAM MODALITY: ICD-10-CM

## 2021-12-22 DIAGNOSIS — Z79.899 ENCOUNTER FOR MONITORING ADJUVANT HORMONAL THERAPY: ICD-10-CM

## 2021-12-22 DIAGNOSIS — Z51.81 ENCOUNTER FOR MONITORING ADJUVANT HORMONAL THERAPY: ICD-10-CM

## 2021-12-22 DIAGNOSIS — Z71.89 ENCOUNTER FOR MEDICATION COUNSELING: ICD-10-CM

## 2021-12-22 DIAGNOSIS — G62.0 CHEMOTHERAPY-INDUCED PERIPHERAL NEUROPATHY: ICD-10-CM

## 2021-12-22 DIAGNOSIS — Z71.89 COUNSELING AND COORDINATION OF CARE: ICD-10-CM

## 2021-12-22 DIAGNOSIS — Z17.0 MALIGNANT NEOPLASM OF RIGHT BREAST IN FEMALE, ESTROGEN RECEPTOR POSITIVE, UNSPECIFIED SITE OF BREAST: ICD-10-CM

## 2021-12-22 DIAGNOSIS — T45.1X5A CHEMOTHERAPY-INDUCED PERIPHERAL NEUROPATHY: ICD-10-CM

## 2021-12-22 DIAGNOSIS — T45.1X5A CHEMOTHERAPY-INDUCED NEUROPATHY: ICD-10-CM

## 2021-12-22 DIAGNOSIS — Z71.9 HEALTH EDUCATION/COUNSELING: ICD-10-CM

## 2021-12-22 DIAGNOSIS — C50.411 MALIGNANT NEOPLASM OF UPPER-OUTER QUADRANT OF RIGHT BREAST IN FEMALE, ESTROGEN RECEPTOR POSITIVE: Primary | ICD-10-CM

## 2021-12-22 DIAGNOSIS — C50.911 MALIGNANT NEOPLASM OF RIGHT BREAST IN FEMALE, ESTROGEN RECEPTOR POSITIVE, UNSPECIFIED SITE OF BREAST: ICD-10-CM

## 2021-12-22 PROCEDURE — 1159F MED LIST DOCD IN RCRD: CPT | Mod: CPTII,S$GLB,, | Performed by: NURSE PRACTITIONER

## 2021-12-22 PROCEDURE — 3079F PR MOST RECENT DIASTOLIC BLOOD PRESSURE 80-89 MM HG: ICD-10-PCS | Mod: CPTII,S$GLB,, | Performed by: NURSE PRACTITIONER

## 2021-12-22 PROCEDURE — 4010F PR ACE/ARB THEARPY RXD/TAKEN: ICD-10-PCS | Mod: CPTII,S$GLB,, | Performed by: NURSE PRACTITIONER

## 2021-12-22 PROCEDURE — 3077F PR MOST RECENT SYSTOLIC BLOOD PRESSURE >= 140 MM HG: ICD-10-PCS | Mod: CPTII,S$GLB,, | Performed by: NURSE PRACTITIONER

## 2021-12-22 PROCEDURE — 99214 PR OFFICE/OUTPT VISIT, EST, LEVL IV, 30-39 MIN: ICD-10-PCS | Mod: S$GLB,,, | Performed by: NURSE PRACTITIONER

## 2021-12-22 PROCEDURE — 1160F RVW MEDS BY RX/DR IN RCRD: CPT | Mod: CPTII,S$GLB,, | Performed by: NURSE PRACTITIONER

## 2021-12-22 PROCEDURE — 3044F HG A1C LEVEL LT 7.0%: CPT | Mod: CPTII,S$GLB,, | Performed by: NURSE PRACTITIONER

## 2021-12-22 PROCEDURE — 1160F PR REVIEW ALL MEDS BY PRESCRIBER/CLIN PHARMACIST DOCUMENTED: ICD-10-PCS | Mod: CPTII,S$GLB,, | Performed by: NURSE PRACTITIONER

## 2021-12-22 PROCEDURE — 3077F SYST BP >= 140 MM HG: CPT | Mod: CPTII,S$GLB,, | Performed by: NURSE PRACTITIONER

## 2021-12-22 PROCEDURE — 4010F ACE/ARB THERAPY RXD/TAKEN: CPT | Mod: CPTII,S$GLB,, | Performed by: NURSE PRACTITIONER

## 2021-12-22 PROCEDURE — 3044F PR MOST RECENT HEMOGLOBIN A1C LEVEL <7.0%: ICD-10-PCS | Mod: CPTII,S$GLB,, | Performed by: NURSE PRACTITIONER

## 2021-12-22 PROCEDURE — 3079F DIAST BP 80-89 MM HG: CPT | Mod: CPTII,S$GLB,, | Performed by: NURSE PRACTITIONER

## 2021-12-22 PROCEDURE — 3008F BODY MASS INDEX DOCD: CPT | Mod: CPTII,S$GLB,, | Performed by: NURSE PRACTITIONER

## 2021-12-22 PROCEDURE — 99214 OFFICE O/P EST MOD 30 MIN: CPT | Mod: S$GLB,,, | Performed by: NURSE PRACTITIONER

## 2021-12-22 PROCEDURE — 99999 PR PBB SHADOW E&M-EST. PATIENT-LVL IV: CPT | Mod: PBBFAC,,, | Performed by: NURSE PRACTITIONER

## 2021-12-22 PROCEDURE — 1159F PR MEDICATION LIST DOCUMENTED IN MEDICAL RECORD: ICD-10-PCS | Mod: CPTII,S$GLB,, | Performed by: NURSE PRACTITIONER

## 2021-12-22 PROCEDURE — 99999 PR PBB SHADOW E&M-EST. PATIENT-LVL IV: ICD-10-PCS | Mod: PBBFAC,,, | Performed by: NURSE PRACTITIONER

## 2021-12-22 PROCEDURE — 3008F PR BODY MASS INDEX (BMI) DOCUMENTED: ICD-10-PCS | Mod: CPTII,S$GLB,, | Performed by: NURSE PRACTITIONER

## 2021-12-22 RX ORDER — GABAPENTIN 100 MG/1
100 CAPSULE ORAL 3 TIMES DAILY
Qty: 90 CAPSULE | Refills: 2 | Status: SHIPPED | OUTPATIENT
Start: 2021-12-22 | End: 2022-11-17

## 2021-12-22 RX ORDER — ANASTROZOLE 1 MG/1
1 TABLET ORAL DAILY
Qty: 90 TABLET | Refills: 3 | Status: SHIPPED | OUTPATIENT
Start: 2021-12-22 | End: 2022-07-10

## 2022-01-24 DIAGNOSIS — B00.9 HSV INFECTION: ICD-10-CM

## 2022-01-24 NOTE — TELEPHONE ENCOUNTER
No new care gaps identified.  Powered by [employer]. Reference number: [id number].   1/24/2022 5:30:59 AM CST

## 2022-01-25 DIAGNOSIS — B00.9 HSV INFECTION: ICD-10-CM

## 2022-01-26 RX ORDER — ACYCLOVIR 400 MG/1
TABLET ORAL
Qty: 30 TABLET | Refills: 0 | Status: SHIPPED | OUTPATIENT
Start: 2022-01-26 | End: 2022-04-05

## 2022-01-26 NOTE — TELEPHONE ENCOUNTER
No new care gaps identified.  Powered by [employer]. Reference number: [id number].   1/25/2022 6:37:06 PM CST

## 2022-01-26 NOTE — TELEPHONE ENCOUNTER
[facility]-11/23/21  [facility]-8/4/21  Upcoming appt-5/24/22    Pt requesting- acyclovir (ZOVIRAX) 400 MG tablet    Last fill-8/18/21

## 2022-02-02 RX ORDER — ACYCLOVIR 400 MG/1
TABLET ORAL
Qty: 30 TABLET | Refills: 0 | OUTPATIENT
Start: 2022-02-02
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
## 2022-02-08 DIAGNOSIS — I10 ESSENTIAL HYPERTENSION: Chronic | ICD-10-CM

## 2022-02-08 RX ORDER — BENAZEPRIL HYDROCHLORIDE 20 MG/1
TABLET ORAL
Qty: 90 TABLET | Refills: 1 | Status: SHIPPED | OUTPATIENT
Start: 2022-02-08 | End: 2022-08-05

## 2022-02-08 NOTE — TELEPHONE ENCOUNTER
No new care gaps identified.  Powered by [employer]. Reference number: [id number].   2/08/2022 3:27:21 PM CST

## 2022-02-14 ENCOUNTER — TELEPHONE (OUTPATIENT)
Dept: FAMILY MEDICINE | Facility: CLINIC | Age: 57
End: 2022-02-14

## 2022-02-14 ENCOUNTER — OFFICE VISIT (OUTPATIENT)
Dept: FAMILY MEDICINE | Facility: CLINIC | Age: 57
End: 2022-02-14
Payer: COMMERCIAL

## 2022-02-14 VITALS
OXYGEN SATURATION: 98 % | TEMPERATURE: 98 F | BODY MASS INDEX: 43.1 KG/M2 | WEIGHT: 284.38 LBS | HEIGHT: 68 IN | DIASTOLIC BLOOD PRESSURE: 80 MMHG | RESPIRATION RATE: 18 BRPM | SYSTOLIC BLOOD PRESSURE: 150 MMHG | HEART RATE: 106 BPM

## 2022-02-14 DIAGNOSIS — E66.01 MORBID OBESITY WITH BMI OF 40.0-44.9, ADULT: ICD-10-CM

## 2022-02-14 DIAGNOSIS — F41.9 ANXIETY: ICD-10-CM

## 2022-02-14 DIAGNOSIS — R00.0 TACHYCARDIA: ICD-10-CM

## 2022-02-14 DIAGNOSIS — I10 ESSENTIAL HYPERTENSION: Primary | ICD-10-CM

## 2022-02-14 PROBLEM — Z12.11 SCREENING FOR COLON CANCER: Status: RESOLVED | Noted: 2019-02-22 | Resolved: 2022-02-14

## 2022-02-14 PROCEDURE — 1159F PR MEDICATION LIST DOCUMENTED IN MEDICAL RECORD: ICD-10-PCS | Mod: CPTII,S$GLB,, | Performed by: FAMILY MEDICINE

## 2022-02-14 PROCEDURE — 3079F PR MOST RECENT DIASTOLIC BLOOD PRESSURE 80-89 MM HG: ICD-10-PCS | Mod: CPTII,S$GLB,, | Performed by: FAMILY MEDICINE

## 2022-02-14 PROCEDURE — 99999 PR PBB SHADOW E&M-EST. PATIENT-LVL IV: CPT | Mod: PBBFAC,,, | Performed by: FAMILY MEDICINE

## 2022-02-14 PROCEDURE — 1160F RVW MEDS BY RX/DR IN RCRD: CPT | Mod: CPTII,S$GLB,, | Performed by: FAMILY MEDICINE

## 2022-02-14 PROCEDURE — 3008F PR BODY MASS INDEX (BMI) DOCUMENTED: ICD-10-PCS | Mod: CPTII,S$GLB,, | Performed by: FAMILY MEDICINE

## 2022-02-14 PROCEDURE — 3008F BODY MASS INDEX DOCD: CPT | Mod: CPTII,S$GLB,, | Performed by: FAMILY MEDICINE

## 2022-02-14 PROCEDURE — 99999 PR PBB SHADOW E&M-EST. PATIENT-LVL IV: ICD-10-PCS | Mod: PBBFAC,,, | Performed by: FAMILY MEDICINE

## 2022-02-14 PROCEDURE — 99214 PR OFFICE/OUTPT VISIT, EST, LEVL IV, 30-39 MIN: ICD-10-PCS | Mod: S$GLB,,, | Performed by: FAMILY MEDICINE

## 2022-02-14 PROCEDURE — 1160F PR REVIEW ALL MEDS BY PRESCRIBER/CLIN PHARMACIST DOCUMENTED: ICD-10-PCS | Mod: CPTII,S$GLB,, | Performed by: FAMILY MEDICINE

## 2022-02-14 PROCEDURE — 1159F MED LIST DOCD IN RCRD: CPT | Mod: CPTII,S$GLB,, | Performed by: FAMILY MEDICINE

## 2022-02-14 PROCEDURE — 3079F DIAST BP 80-89 MM HG: CPT | Mod: CPTII,S$GLB,, | Performed by: FAMILY MEDICINE

## 2022-02-14 PROCEDURE — 3077F PR MOST RECENT SYSTOLIC BLOOD PRESSURE >= 140 MM HG: ICD-10-PCS | Mod: CPTII,S$GLB,, | Performed by: FAMILY MEDICINE

## 2022-02-14 PROCEDURE — 99214 OFFICE O/P EST MOD 30 MIN: CPT | Mod: S$GLB,,, | Performed by: FAMILY MEDICINE

## 2022-02-14 PROCEDURE — 4010F PR ACE/ARB THEARPY RXD/TAKEN: ICD-10-PCS | Mod: CPTII,S$GLB,, | Performed by: FAMILY MEDICINE

## 2022-02-14 PROCEDURE — 4010F ACE/ARB THERAPY RXD/TAKEN: CPT | Mod: CPTII,S$GLB,, | Performed by: FAMILY MEDICINE

## 2022-02-14 PROCEDURE — 3077F SYST BP >= 140 MM HG: CPT | Mod: CPTII,S$GLB,, | Performed by: FAMILY MEDICINE

## 2022-02-14 RX ORDER — ALPRAZOLAM 0.25 MG/1
.25-.5 TABLET ORAL 2 TIMES DAILY PRN
Qty: 30 TABLET | Refills: 0 | Status: SHIPPED | OUTPATIENT
Start: 2022-02-14 | End: 2022-06-09

## 2022-02-14 RX ORDER — METOPROLOL SUCCINATE 50 MG/1
50 TABLET, EXTENDED RELEASE ORAL DAILY
Qty: 30 TABLET | Refills: 3 | Status: SHIPPED | OUTPATIENT
Start: 2022-02-14 | End: 2022-06-09

## 2022-02-14 NOTE — TELEPHONE ENCOUNTER
Spoke to patient, patient states provider advised her she was sending in medication for anxiety, I explained to patient that provider typically sends controlled drugs at the end of the day after clinic. /josé luis/.        ----- Message from Naomy Piper sent at 2/14/2022  4:56 PM CST -----  Contact: Kylie  Patient would like a call back at [phone] in regards to some medication. She states that they were not received at the pharmacy.      [address] [place] - [address] [facility]  [address] [facility] [address]  Phone: [phone] Fax: [phone]        Thanks

## 2022-02-14 NOTE — PROGRESS NOTES
CHIEF COMPLAINT:  This is a 56-year-old female here for follow-up ED visit.    SUBJECTIVE:  The patient complains of not feeling well for the last 72 hours.  She initially felt hot all over associated with elevated blood pressure, heart rate and intermittent mid chest pain.  She presented to the ED yesterday with these symptoms and blood pressure of 167/100.  Repeated blood pressures were 154/79 and 131/72.  Her pulse was initially 105-115.  Lab work was unremarkable except for elevated SGPT of 68.  BNP, troponin levels and TSH were within normal range.  EKG showed sinus tachycardia at 111, LVH and possible LAE.  Patient admitted to not taking antihypertensive medication regularly until the last few days.  She takes metoprolol XL 25 mg daily and was given an extra dose in the ED.  Patient was discharged when pulse rate stabilized at 80 and blood pressure was within normal range.  Patient is also supposed to be taking amlodipine 10 mg daily and benazepril 20 mg daily for hypertension.     Patient complains of fatigue and weakness associated with elevated heart rate.  Blood pressure readings taken today at home by patient were 148/84 and 146/84.  Her blood pressure here is 150/80 with a pulse of 106. Patient complains of feeling jittery in the morning.  She took venlafaxine 37.5 mg to help her calm down.  She takes venlafaxine inconsistently.  Patient has a history of anxiety and admits to increased anxiety over her breast cancer diagnosis.  Patient was diagnosed with breast cancer in 2017 and takes Arimidex 1 mg daily. She has prediabetes for which she is supposed to be taking metformin.  Patient is morbidly obese with a BMI of 43.24.      ROS:  GENERAL: Patient denies fever, chills, night sweats.  Patient denies weight gain or loss. Patient denies anorexia, or swollen glands.  Positive for fatigue and weakness.  SKIN: Patient denies rash.  HEENT: Patient denies sore throat, ear pain, hearing loss, nasal congestion,  or runny nose. Patient denies visual disturbance, eye irritation or discharge.  LUNGS: Patient denies cough, wheeze or hemoptysis.  CARDIOVASCULAR: Patient denies chest pain, shortness of breath, syncope or lower extremity edema.  Positive for palpitations.  GI: Patient denies abdominal pain, nausea, vomiting, diarrhea, constipation, blood in stool or melena.  GENITOURINARY:  Patient denies dysuria, frequency, hematuria, nocturia, urgency or incontinence.  MUSCULOSKELETAL: Patient denies joint pain, swelling, redness or warmth.  NEUROLOGIC: Patient denies headache, vertigo, paresthesias, weakness in limb, dysarthria, dysphagia or abnormality of gait.  PSYCHIATRIC: Patient denies depression or memory loss.  Positive for anxiety.      OBJECTIVE:   GENERAL: Well-developed well-nourished morbidly obese black female alert and oriented x3 in no acute distress.  Memory, judgment and cognition without deficit.  Anxious affect.  No hallucinations, delusions or flight of ideas.  SKIN: Clear without rash.  Normal color and tone.  HEENT: Eyes: Clear conjunctivae.  No scleral icterus.    NECK: Supple, normal range of motion.  No lymphadenopathy.  No masses or enlarged thyroid.  No JVD.  Carotids 2+ and equal.  No bruits.  LUNGS: Clear to auscultation.  Normal respiratory effort.  CARDIOVASCULAR:  Rapid regular  rhythm, normal S1, S2 without murmur, gallop or rub.  BACK: No CVA or spinal tenderness.  ABDOMEN:  Soft, nontender without mass or organomegaly.  No rebound or guarding.  EXTREMITIES: Without cyanosis, clubbing or edema.  Distal pulses 2+ and equal.  Normal range of motion in all extremities.  No joint effusion, erythema or warmth.  NEUROLOGIC: Motor strength equal bilaterally.  Sensation normal to touch.  Gait without abnormality.  No tremor.    ASSESSMENT:  1. Essential hypertension     2. Tachycardia     3. Anxiety     4. Morbid obesity with BMI of 40.0-44.9, adult       PLAN:   1. Increase metoprolol XL to 50 mg  daily.  2. Encouraged patient to take prescribed medication as directed, including amlodipine and benazepril.  3. Explained pharmacology of venlafaxine.  Medication does not work as an as needed medication.  4. Monitor blood pressure.  Report readings in 2-3 weeks.  5. Alprazolam 0.25 mg take 1-2 tablets as needed for anxiety.  Discussed pharmacology of benzodiazepines including addictive potential if used regularly.  6. Follow-up if no improvement or worsening symptoms.    30 minutes of total time spent on the encounter, which includes face to face time and non-face to face time preparing to see the patient (eg, review of tests), Obtaining and/or reviewing separately obtained history, Documenting clinical information in the electronic or other health record, Independently interpreting results (not separately reported) and communicating results to the patient/family/caregiver, or Care coordination (not separately reported).     This note is generated with speech recognition software and is subject to transcription error and sound alike phrases that may be missed by proofreading.

## 2022-03-06 ENCOUNTER — PATIENT MESSAGE (OUTPATIENT)
Dept: FAMILY MEDICINE | Facility: CLINIC | Age: 57
End: 2022-03-06
Payer: COMMERCIAL

## 2022-03-06 DIAGNOSIS — J01.90 ACUTE NON-RECURRENT SINUSITIS, UNSPECIFIED LOCATION: Primary | ICD-10-CM

## 2022-03-07 ENCOUNTER — TELEPHONE (OUTPATIENT)
Dept: FAMILY MEDICINE | Facility: CLINIC | Age: 57
End: 2022-03-07
Payer: COMMERCIAL

## 2022-03-07 NOTE — TELEPHONE ENCOUNTER
----- Message from Anthony Barbosa sent at 3/7/2022  1:20 PM CST -----  Contact: [phone]  Patient is calling requesting something to be sent to the pharmacy for sore throat, cough , and mucus . Please call back at [phone] . Jessica/dj       [facility] [address] [place], [place] [address] - [address] [facility]  [address] [facility] [address]  Phone: [phone] Fax: [phone]

## 2022-03-08 ENCOUNTER — PATIENT MESSAGE (OUTPATIENT)
Dept: FAMILY MEDICINE | Facility: CLINIC | Age: 57
End: 2022-03-08
Payer: COMMERCIAL

## 2022-03-08 RX ORDER — DOXYCYCLINE HYCLATE 100 MG
100 TABLET ORAL EVERY 12 HOURS
Qty: 20 TABLET | Refills: 0 | Status: SHIPPED | OUTPATIENT
Start: 2022-03-08 | End: 2022-03-18

## 2022-03-08 NOTE — TELEPHONE ENCOUNTER
----- Message from Esau Morgan sent at 3/8/2022  1:19 PM CST -----  Contact: self  Pt would like to speak with a nurse regarding medication request.  Pt states she has had a reaction to steroids any other medication will be fine.    Please contact Kylie Urbano @ [phone].  Thanks/As

## 2022-03-08 NOTE — TELEPHONE ENCOUNTER
----- Message from Reta Crandall sent at 3/7/2022  4:41 PM CST -----  Contact: self [phone]  Pt stated left a message earlier still awaiting a call.    Please call and advise

## 2022-03-08 NOTE — TELEPHONE ENCOUNTER
I have put the following orders and/or medications to this note.  Please advise pt and assist. Continue Flonase nasal spray and also take OTC Mucinex or Robitussin for cough.    No orders of the defined types were placed in this encounter.      Medications Ordered This Encounter   Medications    doxycycline (VIBRA-TABS) 100 MG tablet     Sig: Take 1 tablet (100 mg total) by mouth every 12 (twelve) hours. for 10 days     Dispense:  20 tablet     Refill:  0

## 2022-04-03 DIAGNOSIS — B00.9 HSV INFECTION: ICD-10-CM

## 2022-04-03 NOTE — TELEPHONE ENCOUNTER
Care Due:                  Date            Visit Type   Department     Provider  --------------------------------------------------------------------------------                                SAME DAY -                              ESTABLISHED   [facility]  Last Visit: 02-      PATIENT      MEDICINE       Hoda Lane                              EP -                              PRIMARY      [facility]  Next Visit: 05-      CARE ([facility])   MEDICINE       Beverly Parks                                                            Last  Test          Frequency    Reason                     Performed    Due Date  --------------------------------------------------------------------------------    HBA1C.......  6 months...  metFORMIN................  11- 05-    Powered by [employer]. Reference number: [id number].   4/03/2022 6:45:22 PM CDT

## 2022-04-04 ENCOUNTER — PATIENT MESSAGE (OUTPATIENT)
Dept: FAMILY MEDICINE | Facility: CLINIC | Age: 57
End: 2022-04-04
Payer: COMMERCIAL

## 2022-04-05 ENCOUNTER — OFFICE VISIT (OUTPATIENT)
Dept: FAMILY MEDICINE | Facility: CLINIC | Age: 57
End: 2022-04-05
Payer: COMMERCIAL

## 2022-04-05 VITALS
HEART RATE: 98 BPM | SYSTOLIC BLOOD PRESSURE: 134 MMHG | DIASTOLIC BLOOD PRESSURE: 76 MMHG | TEMPERATURE: 97 F | OXYGEN SATURATION: 98 % | HEIGHT: 68 IN | WEIGHT: 289.88 LBS | BODY MASS INDEX: 43.93 KG/M2

## 2022-04-05 DIAGNOSIS — M54.50 LUMBAR PAIN WITH RADIATION DOWN RIGHT LEG: Primary | ICD-10-CM

## 2022-04-05 DIAGNOSIS — M79.604 LUMBAR PAIN WITH RADIATION DOWN RIGHT LEG: Primary | ICD-10-CM

## 2022-04-05 PROBLEM — R06.02 SOB (SHORTNESS OF BREATH): Status: RESOLVED | Noted: 2020-03-26 | Resolved: 2022-04-05

## 2022-04-05 PROCEDURE — 99213 OFFICE O/P EST LOW 20 MIN: CPT | Mod: 25,S$GLB,, | Performed by: REGISTERED NURSE

## 2022-04-05 PROCEDURE — 4010F ACE/ARB THERAPY RXD/TAKEN: CPT | Mod: CPTII,S$GLB,, | Performed by: REGISTERED NURSE

## 2022-04-05 PROCEDURE — 3008F BODY MASS INDEX DOCD: CPT | Mod: CPTII,S$GLB,, | Performed by: REGISTERED NURSE

## 2022-04-05 PROCEDURE — 3075F PR MOST RECENT SYSTOLIC BLOOD PRESS GE 130-139MM HG: ICD-10-PCS | Mod: CPTII,S$GLB,, | Performed by: REGISTERED NURSE

## 2022-04-05 PROCEDURE — 3075F SYST BP GE 130 - 139MM HG: CPT | Mod: CPTII,S$GLB,, | Performed by: REGISTERED NURSE

## 2022-04-05 PROCEDURE — 99999 PR PBB SHADOW E&M-EST. PATIENT-LVL IV: ICD-10-PCS | Mod: PBBFAC,,, | Performed by: REGISTERED NURSE

## 2022-04-05 PROCEDURE — 96372 THER/PROPH/DIAG INJ SC/IM: CPT | Mod: S$GLB,,, | Performed by: REGISTERED NURSE

## 2022-04-05 PROCEDURE — 3078F DIAST BP <80 MM HG: CPT | Mod: CPTII,S$GLB,, | Performed by: REGISTERED NURSE

## 2022-04-05 PROCEDURE — 3008F PR BODY MASS INDEX (BMI) DOCUMENTED: ICD-10-PCS | Mod: CPTII,S$GLB,, | Performed by: REGISTERED NURSE

## 2022-04-05 PROCEDURE — 1159F PR MEDICATION LIST DOCUMENTED IN MEDICAL RECORD: ICD-10-PCS | Mod: CPTII,S$GLB,, | Performed by: REGISTERED NURSE

## 2022-04-05 PROCEDURE — 99213 PR OFFICE/OUTPT VISIT, EST, LEVL III, 20-29 MIN: ICD-10-PCS | Mod: 25,S$GLB,, | Performed by: REGISTERED NURSE

## 2022-04-05 PROCEDURE — 99999 PR PBB SHADOW E&M-EST. PATIENT-LVL IV: CPT | Mod: PBBFAC,,, | Performed by: REGISTERED NURSE

## 2022-04-05 PROCEDURE — 1159F MED LIST DOCD IN RCRD: CPT | Mod: CPTII,S$GLB,, | Performed by: REGISTERED NURSE

## 2022-04-05 PROCEDURE — 3078F PR MOST RECENT DIASTOLIC BLOOD PRESSURE < 80 MM HG: ICD-10-PCS | Mod: CPTII,S$GLB,, | Performed by: REGISTERED NURSE

## 2022-04-05 PROCEDURE — 4010F PR ACE/ARB THEARPY RXD/TAKEN: ICD-10-PCS | Mod: CPTII,S$GLB,, | Performed by: REGISTERED NURSE

## 2022-04-05 PROCEDURE — 96372 PR INJECTION,THERAP/PROPH/DIAG2ST, IM OR SUBCUT: ICD-10-PCS | Mod: S$GLB,,, | Performed by: REGISTERED NURSE

## 2022-04-05 RX ORDER — DEXAMETHASONE SODIUM PHOSPHATE 4 MG/ML
8 INJECTION, SOLUTION INTRA-ARTICULAR; INTRALESIONAL; INTRAMUSCULAR; INTRAVENOUS; SOFT TISSUE
Status: COMPLETED | OUTPATIENT
Start: 2022-04-05 | End: 2022-04-05

## 2022-04-05 RX ORDER — IBUPROFEN 800 MG/1
TABLET ORAL
Qty: 90 TABLET | Refills: 1 | Status: SHIPPED | OUTPATIENT
Start: 2022-04-05 | End: 2022-12-14 | Stop reason: SDUPTHER

## 2022-04-05 RX ORDER — CHLORZOXAZONE 500 MG/1
500 TABLET ORAL 4 TIMES DAILY PRN
Qty: 60 TABLET | Refills: 0 | Status: SHIPPED | OUTPATIENT
Start: 2022-04-05 | End: 2022-11-17

## 2022-04-05 RX ORDER — ACYCLOVIR 400 MG/1
TABLET ORAL
Qty: 30 TABLET | Refills: 0 | Status: SHIPPED | OUTPATIENT
Start: 2022-04-05 | End: 2022-05-17

## 2022-04-05 RX ORDER — METOPROLOL SUCCINATE 25 MG/1
25 TABLET, EXTENDED RELEASE ORAL
COMMUNITY
Start: 2021-09-13 | End: 2022-07-08

## 2022-04-05 RX ADMIN — DEXAMETHASONE SODIUM PHOSPHATE 8 MG: 4 INJECTION, SOLUTION INTRA-ARTICULAR; INTRALESIONAL; INTRAMUSCULAR; INTRAVENOUS; SOFT TISSUE at 10:04

## 2022-04-05 NOTE — PROGRESS NOTES
Instruct pt to wait 15 mins in lobby after receiving  Shot  tolerated well   verbalized understanding

## 2022-04-05 NOTE — TELEPHONE ENCOUNTER
Refill Authorization Note   Kylie Urbano  is requesting a refill authorization.  Brief Assessment and Rationale for Refill:  Approve    -Medication-Related Problems Identified: Requires labs  Medication Therapy Plan:       Medication Reconciliation Completed: No   Comments:     Provider Staff:     Action is required for this patient.   Please see care gap opportunities below in Care Due Message.     Thanks!  [facility]     Appointments      Date Provider   Last Visit   11/23/2021 Beverly Parks MD   Next Visit   5/24/2022 Beverly Parks MD     Note composed:12:44 PM 04/05/2022           Note composed:12:44 PM 04/05/2022

## 2022-04-05 NOTE — PROGRESS NOTES
Subjective:       Patient ID: Kylie Urbano is a 56 y.o. female.      Chief Complaint   Patient presents with    Leg Pain       HPI    Leg Pain   There was no injury mechanism (end of last week). The pain is present in the right leg. The quality of the pain is described as shooting and burning. The pain is at a severity of 5/10 (goes up to 10 when walking & standing). Associated symptoms include numbness (right lateral thigh). She has tried heat (tried muscle relaxer Flexeril) for the symptoms.          Review of Systems   Respiratory: Negative.    Cardiovascular: Negative.    Musculoskeletal: Positive for back pain (right lower), gait problem (due to pain) and myalgias (right anterior thigh). Negative for joint swelling and neck pain.   Neurological: Positive for numbness (right lateral thigh).   Psychiatric/Behavioral: Negative for sleep disturbance.         Review of patient's allergies indicates:   Allergen Reactions    Methylprednisone Anxiety and Palpitations    Amoxicillin Rash     Historical    Hydrocodone-acetaminophen Nausea And Vomiting     Historical.  Historical.    Sulfa (sulfonamide antibiotics) Rash         Patient Active Problem List   Diagnosis    HTN (hypertension)    Iron deficiency anemia    Hypothyroidism    Insulin resistance syndrome    Allergic rhinitis    Vitamin D deficiency    HSV infection    Uterine leiomyoma    Status post laparoscopic hysterectomy    Malignant neoplasm of upper-outer quadrant of right female breast    Breast cancer, right breast    Malignant neoplasm of upper-outer quadrant of right breast in female, estrogen receptor positive    Gastroesophageal reflux disease    Constipation    Anxiety    Morbid obesity with BMI of 40.0-44.9, adult    Prediabetes    Benign colon polyp           Current Outpatient Medications:     acyclovir (ZOVIRAX) 400 MG tablet, TAKE 1 TABLET BY MOUTH THREE TIMES DAILY WITH FOOD WITH MEALS, Disp: 30 tablet, Rfl: 0     "amLODIPine (NORVASC) 10 MG tablet, Take 1 tablet by mouth once daily, Disp: 30 tablet, Rfl: 5    anastrozole (ARIMIDEX) 1 mg Tab, Take 1 tablet (1 mg total) by mouth once daily., Disp: 90 tablet, Rfl: 3    benazepriL (LOTENSIN) 20 MG tablet, Take 1 tablet by mouth once daily, Disp: 90 tablet, Rfl: 1    fluticasone propionate (FLONASE) 50 mcg/actuation nasal spray, USE 2 SPRAY(S) IN EACH NOSTRIL ONCE DAILY AS NEEDED FOR  RHINITIS, Disp: 16 g, Rfl: 5    ibuprofen (ADVIL,MOTRIN) 800 MG tablet, TAKE 1 TABLET BY MOUTH EVERY 6 TO 8 HOURS AS NEEDED FOR PAIN, Disp: , Rfl:     metFORMIN (GLUCOPHAGE) 500 MG tablet, TAKE 4 TABLETS BY MOUTH IN THE EVENING AS DIRECTED, Disp: 120 tablet, Rfl: 5    metoprolol succinate (TOPROL-XL) 25 MG 24 hr tablet, Take 25 mg by mouth., Disp: , Rfl:     metoprolol succinate (TOPROL-XL) 50 MG 24 hr tablet, Take 1 tablet (50 mg total) by mouth once daily., Disp: 30 tablet, Rfl: 3    omeprazole (PRILOSEC) 20 MG capsule, TAKE 1 CAPSULE BY MOUTH ONCE DAILY AS NEEDED, Disp: 30 capsule, Rfl: 5    SYNTHROID 175 mcg tablet, Take 1 tablet (175 mcg total) by mouth once daily., Disp: 90 tablet, Rfl: 1    venlafaxine (EFFEXOR XR) 37.5 MG 24 hr capsule, Take 1 capsule (37.5 mg total) by mouth once daily., Disp: 30 capsule, Rfl: 2    ALPRAZolam (XANAX) 0.25 MG tablet, Take 1-2 tablets (0.25-0.5 mg total) by mouth 2 (two) times daily as needed for Anxiety., Disp: 30 tablet, Rfl: 0    gabapentin (NEURONTIN) 100 MG capsule, Take 1 capsule (100 mg total) by mouth 3 (three) times daily. Increase to 200 mg tid after 2 weeks and increase to 300 mg tid 2 weeks after that if symptoms have not improved, Disp: 90 capsule, Rfl: 2        Past medical, surgical, family and social histories have been reviewed today.      Objective:     Vitals:    04/05/22 0958   BP: 134/76   Pulse: 98   Temp: 97.3 °F (36.3 °C)   SpO2: 98%   Weight: 131.5 kg (289 lb 14.5 oz)   Height: 5' 8" (1.727 m)   PainSc:   9   PainLoc: Leg "         Physical Exam  Vitals reviewed.   HENT:      Head: Normocephalic and atraumatic.   Musculoskeletal:      Lumbar back: Spasms and tenderness present. No swelling, edema or deformity. Decreased range of motion.        Back:    Skin:     Capillary Refill: Capillary refill takes less than 2 seconds.   Neurological:      Mental Status: She is alert and oriented to person, place, and time.      Motor: No weakness.      Gait: Gait abnormal.   Psychiatric:         Mood and Affect: Mood normal.         Behavior: Behavior normal.         Thought Content: Thought content normal.         Judgment: Judgment normal.         Assessment     1. Lumbar pain with radiation down right leg --- new problem identified.  Injection today.  Home care and pain relief discussed.  May need albert but will see how she responds with txmt plan today.  - dexamethasone injection 8 mg  - ibuprofen (ADVIL,MOTRIN) 800 MG tablet; Take 1 tab by mouth TID prn pain  Dispense: 90 tablet; Refill: 1  - chlorzoxazone (PARAFON FORTE) 500 mg Tab; Take 1 tablet (500 mg total) by mouth 4 (four) times daily as needed (muscle pain).  Dispense: 60 tablet; Refill: 0       Follow-up     Work note provided to go back on Thursday 4/7/2022.  Contact office in 2 to 3 days if worse or no improvement noted.  Return to clinic as needed.      FELI Sun  [facility]       20 minutes of total time spent on the encounter, which includes face to face time and non-face to face time preparing to see the patient (eg, review of tests), obtaining and/or reviewing separately obtained history, and documenting clinical information in the electronic or other health record.  Also includes independent interpretation of results (not separately reported) and communicating results to the patient/family/caregiver, with care coordination (not separately reported).

## 2022-04-07 DIAGNOSIS — I10 ESSENTIAL HYPERTENSION: Chronic | ICD-10-CM

## 2022-04-07 NOTE — TELEPHONE ENCOUNTER
No new care gaps identified.  Powered by [employer]. Reference number: [id number].   4/07/2022 2:32:45 PM CDT

## 2022-04-08 RX ORDER — AMLODIPINE BESYLATE 10 MG/1
TABLET ORAL
Qty: 90 TABLET | Refills: 2 | Status: SHIPPED | OUTPATIENT
Start: 2022-04-08 | End: 2023-01-18

## 2022-04-08 NOTE — TELEPHONE ENCOUNTER
Refill Authorization Note   Kylie Urbano  is requesting a refill authorization.  Brief Assessment and Rationale for Refill:  Approve     Medication Therapy Plan:       Medication Reconciliation Completed: No   Comments:     No Care Gaps recommended.     Note composed:4:00 PM 04/08/2022

## 2022-04-12 ENCOUNTER — PATIENT MESSAGE (OUTPATIENT)
Dept: FAMILY MEDICINE | Facility: CLINIC | Age: 57
End: 2022-04-12
Payer: COMMERCIAL

## 2022-05-16 DIAGNOSIS — B00.9 HSV INFECTION: ICD-10-CM

## 2022-05-16 NOTE — TELEPHONE ENCOUNTER
No new care gaps identified.  [facility] Embedded Care Gaps. Reference number: [id number]. 5/16/2022   5:59:51 AM MAJOT

## 2022-05-17 RX ORDER — ACYCLOVIR 400 MG/1
TABLET ORAL
Qty: 30 TABLET | Refills: 0 | Status: SHIPPED | OUTPATIENT
Start: 2022-05-17 | End: 2022-09-29

## 2022-05-17 NOTE — TELEPHONE ENCOUNTER
Refill Authorization Note   Kylie Urbano  is requesting a refill authorization.  Brief Assessment and Rationale for Refill:  Approve     Medication Therapy Plan:       Medication Reconciliation Completed: No   Comments:     No Care Gaps recommended.     Note composed:9:55 AM 05/17/2022

## 2022-05-24 ENCOUNTER — TELEPHONE (OUTPATIENT)
Dept: FAMILY MEDICINE | Facility: CLINIC | Age: 57
End: 2022-05-24
Payer: COMMERCIAL

## 2022-05-24 NOTE — TELEPHONE ENCOUNTER
----- Message from Shayy Roger sent at 5/24/2022  8:47 AM CDT -----  Contact: self  Type:  Patient Returning Call    Who Called:Kylie Urbano   Who Left Message for Patient: nurse   Does the patient know what this is regarding?: appointment   Would the patient rather a call back or a response via [facility]?  Call back   Best Call Back Number: [phone]  Additional Information:  pt states that she missed her appointment due to car issues.         Home, no PT needs/yes

## 2022-06-02 ENCOUNTER — PATIENT MESSAGE (OUTPATIENT)
Dept: RESEARCH | Facility: HOSPITAL | Age: 57
End: 2022-06-02
Payer: COMMERCIAL

## 2022-06-16 ENCOUNTER — HOSPITAL ENCOUNTER (EMERGENCY)
Facility: HOSPITAL | Age: 57
Discharge: HOME | End: 2022-06-16
Attending: EMERGENCY MEDICINE
Payer: COMMERCIAL

## 2022-06-16 VITALS
HEART RATE: 90 BPM | OXYGEN SATURATION: 100 % | BODY MASS INDEX: 43.8 KG/M2 | WEIGHT: 289 LBS | DIASTOLIC BLOOD PRESSURE: 82 MMHG | SYSTOLIC BLOOD PRESSURE: 139 MMHG | TEMPERATURE: 98 F | HEIGHT: 68 IN | RESPIRATION RATE: 18 BRPM

## 2022-06-16 DIAGNOSIS — R00.2 PALPITATIONS: Primary | ICD-10-CM

## 2022-06-16 LAB
ALBUMIN SERPL BCP-MCNC: 3.6 G/DL (ref 3.5–5.2)
ALP SERPL-CCNC: 102 U/L (ref 55–135)
ALT SERPL W/O P-5'-P-CCNC: 55 U/L (ref 10–44)
ANION GAP SERPL CALC-SCNC: 10 MMOL/L (ref 8–16)
AST SERPL-CCNC: 39 U/L (ref 10–40)
BASOPHILS # BLD AUTO: 0.03 K/UL (ref 0–0.2)
BASOPHILS NFR BLD: 0.3 % (ref 0–1.9)
BILIRUB SERPL-MCNC: 0.2 MG/DL (ref 0.1–1)
BILIRUB UR QL STRIP: NEGATIVE
BNP SERPL-MCNC: 10 PG/ML (ref 0–99)
BUN SERPL-MCNC: 15 MG/DL (ref 6–20)
CALCIUM SERPL-MCNC: 9.7 MG/DL (ref 8.7–10.5)
CHLORIDE SERPL-SCNC: 107 MMOL/L (ref 95–110)
CLARITY UR: CLEAR
CO2 SERPL-SCNC: 25 MMOL/L (ref 23–29)
COLOR UR: YELLOW
CREAT SERPL-MCNC: 0.9 MG/DL (ref 0.5–1.4)
DIFFERENTIAL METHOD: NORMAL
EOSINOPHIL # BLD AUTO: 0.1 K/UL (ref 0–0.5)
EOSINOPHIL NFR BLD: 1 % (ref 0–8)
ERYTHROCYTE [DISTWIDTH] IN BLOOD BY AUTOMATED COUNT: 13.5 % (ref 11.5–14.5)
EST. GFR  (AFRICAN AMERICAN): >60 ML/MIN/1.73 M^2
EST. GFR  (NON AFRICAN AMERICAN): >60 ML/MIN/1.73 M^2
GLUCOSE SERPL-MCNC: 113 MG/DL (ref 70–110)
GLUCOSE UR QL STRIP: NEGATIVE
HCT VFR BLD AUTO: 39.7 % (ref 37–48.5)
HGB BLD-MCNC: 13.3 G/DL (ref 12–16)
HGB UR QL STRIP: NEGATIVE
IMM GRANULOCYTES # BLD AUTO: 0.03 K/UL (ref 0–0.04)
IMM GRANULOCYTES NFR BLD AUTO: 0.3 % (ref 0–0.5)
KETONES UR QL STRIP: NEGATIVE
LEUKOCYTE ESTERASE UR QL STRIP: ABNORMAL
LYMPHOCYTES # BLD AUTO: 1.7 K/UL (ref 1–4.8)
LYMPHOCYTES NFR BLD: 19.4 % (ref 18–48)
MCH RBC QN AUTO: 28.8 PG (ref 27–31)
MCHC RBC AUTO-ENTMCNC: 33.5 G/DL (ref 32–36)
MCV RBC AUTO: 86 FL (ref 82–98)
MICROSCOPIC COMMENT: NORMAL
MONOCYTES # BLD AUTO: 0.7 K/UL (ref 0.3–1)
MONOCYTES NFR BLD: 8.3 % (ref 4–15)
NEUTROPHILS # BLD AUTO: 6.2 K/UL (ref 1.8–7.7)
NEUTROPHILS NFR BLD: 70.7 % (ref 38–73)
NITRITE UR QL STRIP: NEGATIVE
NRBC BLD-RTO: 0 /100 WBC
PH UR STRIP: 6 [PH] (ref 5–8)
PLATELET # BLD AUTO: 257 K/UL (ref 150–450)
PMV BLD AUTO: 9.8 FL (ref 9.2–12.9)
POTASSIUM SERPL-SCNC: 3.8 MMOL/L (ref 3.5–5.1)
PROT SERPL-MCNC: 7.8 G/DL (ref 6–8.4)
PROT UR QL STRIP: NEGATIVE
RBC # BLD AUTO: 4.62 M/UL (ref 4–5.4)
SODIUM SERPL-SCNC: 142 MMOL/L (ref 136–145)
SP GR UR STRIP: <=1.005 (ref 1–1.03)
TROPONIN I SERPL DL<=0.01 NG/ML-MCNC: <0.006 NG/ML (ref 0–0.03)
URN SPEC COLLECT METH UR: ABNORMAL
UROBILINOGEN UR STRIP-ACNC: NEGATIVE EU/DL
WBC # BLD AUTO: 8.8 K/UL (ref 3.9–12.7)
WBC #/AREA URNS HPF: 0 /HPF (ref 0–5)

## 2022-06-16 PROCEDURE — 25000003 PHARM REV CODE 250: Performed by: EMERGENCY MEDICINE

## 2022-06-16 PROCEDURE — 93005 ELECTROCARDIOGRAM TRACING: CPT

## 2022-06-16 PROCEDURE — 80053 COMPREHEN METABOLIC PANEL: CPT | Performed by: EMERGENCY MEDICINE

## 2022-06-16 PROCEDURE — 93010 ELECTROCARDIOGRAM REPORT: CPT | Mod: ,,, | Performed by: INTERNAL MEDICINE

## 2022-06-16 PROCEDURE — 93010 EKG 12-LEAD: ICD-10-PCS | Mod: ,,, | Performed by: INTERNAL MEDICINE

## 2022-06-16 PROCEDURE — 85025 COMPLETE CBC W/AUTO DIFF WBC: CPT | Performed by: EMERGENCY MEDICINE

## 2022-06-16 PROCEDURE — 81000 URINALYSIS NONAUTO W/SCOPE: CPT | Performed by: EMERGENCY MEDICINE

## 2022-06-16 PROCEDURE — 96360 HYDRATION IV INFUSION INIT: CPT

## 2022-06-16 PROCEDURE — 83880 ASSAY OF NATRIURETIC PEPTIDE: CPT | Performed by: EMERGENCY MEDICINE

## 2022-06-16 PROCEDURE — 99285 EMERGENCY DEPT VISIT HI MDM: CPT | Mod: 25

## 2022-06-16 PROCEDURE — 84484 ASSAY OF TROPONIN QUANT: CPT | Performed by: EMERGENCY MEDICINE

## 2022-06-16 RX ADMIN — SODIUM CHLORIDE 500 ML: 0.9 INJECTION, SOLUTION INTRAVENOUS at 07:06

## 2022-06-16 NOTE — ED PROVIDER NOTES
SCRIBE #1 NOTE: I, Raymundo Pimentel, am scribing for, and in the presence of, Hernandez Lay MD. I have scribed the entire note.       History     Chief Complaint   Patient presents with    Tachycardia     Ems was called to home for pt that was tachycardic and tachypnea hr 140's RR 40's, recent death in family, chest wall pain      Review of patient's allergies indicates:   Allergen Reactions    Methylprednisone Anxiety and Palpitations    Amoxicillin Rash     Historical    Hydrocodone-acetaminophen Nausea And Vomiting     Historical.  Historical.    Sulfa (sulfonamide antibiotics) Rash         History of Present Illness     HPI    6/16/2022, 6:47 PM  History obtained from the patient      History of Present Illness: Kylie Urbano is a 56 y.o. female patient with a PMHx of HTN and thyroid disease who presents to the Emergency Department for evaluation of dizziness which onset today PTA. Symptoms are constant and mild to moderate in severity. No mitigating or exacerbating factors reported. Associated sxs include weakness, chest tightness, tachycardia, and palpitations. Patient denies any chills, fever, nausea, diarrhea, vomiting, and all other sxs at this time. No prior Tx reported. No further complaints or concerns at this time.       Arrival mode: EMS    PCP: Beverly Parks MD        Past Medical History:  Past Medical History:   Diagnosis Date    Allergic rhinitis, cause unspecified     Breast cancer 2017    Elevated liver function tests     in the past    Fatty liver 05/02/2017    on ultrasound    Hepatomegaly 05/02/2017    on ultrasound    History of sciatica     HSV infection     Type 1 and 2    HTN (hypertension)     Hypothyroidism     Insulin resistance     Iron deficiency anemia, unspecified     Obesity     PONV (postoperative nausea and vomiting)     Tension headache     Vitamin D deficiency        Past Surgical History:  Past Surgical History:   Procedure Laterality Date     BREAST LUMPECTOMY Right 2017    BTL      COLONOSCOPY N/A 2/22/2019    Procedure: COLONOSCOPY;  Surgeon: Mariano Santamaria MD;  Location: [facility];  Service: Endoscopy;  Laterality: N/A;    DILATION AND CURETTAGE OF UTERUS      x 1    HYSTERECTOMY      Laproscopic robot assissted with BSO         Family History:  Family History   Problem Relation Age of Onset    Diabetes Mother     Hypertension Mother     Heart failure Mother     Glaucoma Mother     Cancer Father         Stomach cancer    Cancer Sister         Ovarian cancer    Ovarian cancer Sister     No Known Problems Daughter     No Known Problems Son     No Known Problems Daughter     Breast cancer Paternal Cousin     Stroke Neg Hx     Heart disease Neg Hx         MI/CAD       Social History:  Social History     Tobacco Use    Smoking status: Never Smoker    Smokeless tobacco: Never Used   Substance and Sexual Activity    Alcohol use: Yes     Comment: occasionally     Drug use: No    Sexual activity: Not Currently        Review of Systems     Review of Systems   Constitutional: Negative for chills and fever.   HENT: Negative for sore throat.    Respiratory: Positive for chest tightness. Negative for shortness of breath.    Cardiovascular: Positive for palpitations. Negative for chest pain.        (+) Tachycardia   Gastrointestinal: Negative for diarrhea, nausea and vomiting.   Genitourinary: Negative for dysuria.   Musculoskeletal: Negative for back pain.   Skin: Negative for rash.   Neurological: Positive for dizziness and weakness.   Hematological: Does not bruise/bleed easily.   All other systems reviewed and are negative.     Physical Exam     Initial Vitals [06/16/22 1835]   BP Pulse Resp Temp SpO2   134/75 110 18 98.4 °F (36.9 °C) 98 %      MAP       --          Physical Exam  Nursing Notes and Vital Signs Reviewed.  Constitutional: Patient is in no acute distress. Well-developed and well-nourished.  Head: Atraumatic.  "Normocephalic.  Eyes: PERRL. EOM intact. Conjunctivae are not pale. No scleral icterus.  ENT: Mucous membranes are moist. Oropharynx is clear and symmetric.    Neck: Supple. Full ROM. No lymphadenopathy.  Cardiovascular: Regular rate. Regular rhythm. No murmurs, rubs, or gallops. Distal pulses are 2+ and symmetric.  Pulmonary/Chest: No respiratory distress. Clear to auscultation bilaterally. No wheezing or rales. Well healed incision on left chest wall from previous Mediport placement.   Abdominal: Soft and non-distended.  There is no tenderness.  No rebound, guarding, or rigidity. Good bowel sounds.  Genitourinary: No CVA tenderness  Musculoskeletal: Moves all extremities. No obvious deformities. No edema. No calf tenderness.  Skin: Warm and dry.  Neurological:  Alert, awake, and appropriate.  Normal speech.  No acute focal neurological deficits are appreciated.  Psychiatric: Normal affect. Good eye contact. Appropriate in content.     ED Course   Procedures  ED Vital Signs:  Vitals:    06/16/22 1835 06/16/22 2033 06/16/22 2035 06/16/22 2037   BP: 134/75 (!) 148/82 137/86 (!) 140/85   Pulse: 110 93 100 103   Resp: 18      Temp: 98.4 °F (36.9 °C)      TempSrc: Oral      SpO2: 98% 99% 98% 98%   Weight: 131.1 kg (289 lb)      Height: 5' 8" (1.727 m)          Abnormal Lab Results:  Labs Reviewed   COMPREHENSIVE METABOLIC PANEL - Abnormal; Notable for the following components:       Result Value    Glucose 113 (*)     ALT 55 (*)     All other components within normal limits   URINALYSIS, REFLEX TO URINE CULTURE - Abnormal; Notable for the following components:    Specific Gravity, UA <=1.005 (*)     Leukocytes, UA 1+ (*)     All other components within normal limits    Narrative:     Specimen Source->Urine   CBC W/ AUTO DIFFERENTIAL   B-TYPE NATRIURETIC PEPTIDE   TROPONIN I   URINALYSIS MICROSCOPIC    Narrative:     Specimen Source->Urine   SARS-COV-2 RNA AMPLIFICATION, QUAL        All Lab Results:  Results for orders " placed or performed during the hospital encounter of 06/16/22   CBC Auto Differential   Result Value Ref Range    WBC 8.80 3.90 - 12.70 K/uL    RBC 4.62 4.00 - 5.40 M/uL    Hemoglobin 13.3 12.0 - 16.0 g/dL    Hematocrit 39.7 37.0 - 48.5 %    MCV 86 82 - 98 fL    MCH 28.8 27.0 - 31.0 pg    MCHC 33.5 32.0 - 36.0 g/dL    RDW 13.5 11.5 - 14.5 %    Platelets 257 150 - 450 K/uL    MPV 9.8 9.2 - 12.9 fL    Immature Granulocytes 0.3 0.0 - 0.5 %    Gran # (ANC) 6.2 1.8 - 7.7 K/uL    Immature Grans (Abs) 0.03 0.00 - 0.04 K/uL    Lymph # 1.7 1.0 - 4.8 K/uL    Mono # 0.7 0.3 - 1.0 K/uL    Eos # 0.1 0.0 - 0.5 K/uL    Baso # 0.03 0.00 - 0.20 K/uL    nRBC 0 0 /100 WBC    Gran % 70.7 38.0 - 73.0 %    Lymph % 19.4 18.0 - 48.0 %    Mono % 8.3 4.0 - 15.0 %    Eosinophil % 1.0 0.0 - 8.0 %    Basophil % 0.3 0.0 - 1.9 %    Differential Method Automated    Comprehensive Metabolic Panel   Result Value Ref Range    Sodium 142 136 - 145 mmol/L    Potassium 3.8 3.5 - 5.1 mmol/L    Chloride 107 95 - 110 mmol/L    CO2 25 23 - 29 mmol/L    Glucose 113 (H) 70 - 110 mg/dL    BUN 15 6 - 20 mg/dL    Creatinine 0.9 0.5 - 1.4 mg/dL    Calcium 9.7 8.7 - 10.5 mg/dL    Total Protein 7.8 6.0 - 8.4 g/dL    Albumin 3.6 3.5 - 5.2 g/dL    Total Bilirubin 0.2 0.1 - 1.0 mg/dL    Alkaline Phosphatase 102 55 - 135 U/L    AST 39 10 - 40 U/L    ALT 55 (H) 10 - 44 U/L    Anion Gap 10 8 - 16 mmol/L    eGFR if African American >60 >60 mL/min/1.73 m^2    eGFR if non African American >60 >60 mL/min/1.73 m^2   BNP   Result Value Ref Range    BNP 10 0 - 99 pg/mL   Troponin I   Result Value Ref Range    Troponin I <0.006 0.000 - 0.026 ng/mL   Urinalysis, Reflex to Urine Culture Urine, Clean Catch    Specimen: Urine   Result Value Ref Range    Specimen UA Urine, Clean Catch     Color, UA Yellow Yellow, Straw, Angie    Appearance, UA Clear Clear    pH, UA 6.0 5.0 - 8.0    Specific Gravity, UA <=1.005 (A) 1.005 - 1.030    Protein, UA Negative Negative    Glucose, UA Negative  Negative    Ketones, UA Negative Negative    Bilirubin (UA) Negative Negative    Occult Blood UA Negative Negative    Nitrite, UA Negative Negative    Urobilinogen, UA Negative <2.0 EU/dL    Leukocytes, UA 1+ (A) Negative   Urinalysis Microscopic   Result Value Ref Range    WBC, UA 0 0 - 5 /hpf    Microscopic Comment SEE COMMENT          Imaging Results:  Imaging Results          X-Ray Chest 1 View (Final result)  Result time 06/16/22 20:30:55    Final result by Delfino Copeland MD (06/16/22 20:30:55)                 Impression:      No acute abnormality.      Electronically signed by: Delfino Copeland  Date:    06/16/2022  Time:    20:30             Narrative:    EXAMINATION:  XR CHEST 1 VIEW    CLINICAL HISTORY:  Palpitations    TECHNIQUE:  Single frontal view of the chest was performed.    COMPARISON:  Multiple priors.    FINDINGS:  The lungs are clear, with normal appearance of pulmonary vasculature and no pleural effusion or pneumothorax.    The cardiac silhouette is normal in size. The hilar and mediastinal contours are unremarkable.    Bones are intact.                                 The EKG was ordered, reviewed, and independently interpreted by the ED provider.  Interpretation time: 18:54  Rate: 104 BPM  Rhythm: sinus tachycardia  Interpretation: Cannot rule out anterior Infarct, age undetermined. No STEMI.           The Emergency Provider reviewed the vital signs and test results, which are outlined above.     ED Discussion       9:21 PM: Reassessed pt at this time. Discussed with pt all pertinent ED information and results. Discussed pt dx and plan of tx. Gave pt all f/u and return to the ED instructions. All questions and concerns were addressed at this time. Pt expresses understanding of information and instructions, and is comfortable with plan to discharge. Pt is stable for discharge.    I discussed with patient and/or family/caretaker that evaluation in the ED does not suggest any emergent or life  threatening medical conditions requiring immediate intervention beyond what was provided in the ED, and I believe patient is safe for discharge.  Regardless, an unremarkable evaluation in the ED does not preclude the development or presence of a serious of life threatening condition. As such, patient was instructed to return immediately for any worsening or change in current symptoms.         Medical Decision Making:   Clinical Tests:   Lab Tests: Ordered and Reviewed  Radiological Study: Ordered and Reviewed  Medical Tests: Ordered and Reviewed           ED Medication(s):  Medications   sodium chloride 0.9% bolus 500 mL (0 mLs Intravenous Stopped 6/16/22 2051)       New Prescriptions    No medications on file        Follow-up Information     Beverly Parks MD. Call in 2 days.    Specialty: Family Medicine  Contact information:  [address] [facility] [address] [phone]             [facility] - Emergency Dept..    Specialty: Emergency Medicine  Why: If symptoms worsen  Contact information:  [address] [facility] [address] [phone]           Cardiology. Call in 2 days.                           Scribe Attestation:   Scribe #1: I performed the above scribed service and the documentation accurately describes the services I performed. I attest to the accuracy of the note.     Attending:   Physician Attestation Statement for Scribe #1: I, eHrnandez Lay MD, personally performed the services described in this documentation, as scribed by Raymundo Pimentel, in my presence, and it is both accurate and complete.           Clinical Impression       ICD-10-CM ICD-9-CM   1. Palpitations  R00.2 785.1       Disposition:   Disposition: Discharged  Condition: Stable         Hernandez Lay MD  06/16/22 2752

## 2022-06-21 ENCOUNTER — TELEPHONE (OUTPATIENT)
Dept: HEMATOLOGY/ONCOLOGY | Facility: CLINIC | Age: 57
End: 2022-06-21
Payer: COMMERCIAL

## 2022-06-21 DIAGNOSIS — C50.411 MALIGNANT NEOPLASM OF UPPER-OUTER QUADRANT OF RIGHT BREAST IN FEMALE, ESTROGEN RECEPTOR POSITIVE: Primary | ICD-10-CM

## 2022-06-21 DIAGNOSIS — Z12.39 ENCOUNTER FOR BREAST CANCER SCREENING USING NON-MAMMOGRAM MODALITY: ICD-10-CM

## 2022-06-21 DIAGNOSIS — Z17.0 MALIGNANT NEOPLASM OF UPPER-OUTER QUADRANT OF RIGHT BREAST IN FEMALE, ESTROGEN RECEPTOR POSITIVE: Primary | ICD-10-CM

## 2022-06-21 NOTE — TELEPHONE ENCOUNTER
----- Message from Fabiola Hernández sent at 6/21/2022  7:15 AM CDT -----  Contact: pt  Pt is calling to have orders placed in system for mammo / pt can be reached at [phone]//thanks/dbw

## 2022-06-21 NOTE — TELEPHONE ENCOUNTER
Returned pt's call hitesh. Informed her that I will request mammo order from NP. Once orders are placed, I will schedule. Pt states she will check portal.

## 2022-07-01 ENCOUNTER — HOSPITAL ENCOUNTER (OUTPATIENT)
Dept: RADIOLOGY | Facility: HOSPITAL | Age: 57
Discharge: HOME | End: 2022-07-01
Attending: NURSE PRACTITIONER
Payer: COMMERCIAL

## 2022-07-01 DIAGNOSIS — Z12.39 ENCOUNTER FOR BREAST CANCER SCREENING USING NON-MAMMOGRAM MODALITY: ICD-10-CM

## 2022-07-01 DIAGNOSIS — C50.411 MALIGNANT NEOPLASM OF UPPER-OUTER QUADRANT OF RIGHT BREAST IN FEMALE, ESTROGEN RECEPTOR POSITIVE: ICD-10-CM

## 2022-07-01 DIAGNOSIS — Z17.0 MALIGNANT NEOPLASM OF UPPER-OUTER QUADRANT OF RIGHT BREAST IN FEMALE, ESTROGEN RECEPTOR POSITIVE: ICD-10-CM

## 2022-07-01 PROCEDURE — 77066 MAMMO DIGITAL DIAGNOSTIC BILAT WITH TOMO: ICD-10-PCS | Mod: 26,,, | Performed by: RADIOLOGY

## 2022-07-01 PROCEDURE — 77066 DX MAMMO INCL CAD BI: CPT | Mod: 26,,, | Performed by: RADIOLOGY

## 2022-07-01 PROCEDURE — 77062 BREAST TOMOSYNTHESIS BI: CPT | Mod: TC

## 2022-07-01 PROCEDURE — 77062 BREAST TOMOSYNTHESIS BI: CPT | Mod: 26,,, | Performed by: RADIOLOGY

## 2022-07-01 PROCEDURE — 77062 MAMMO DIGITAL DIAGNOSTIC BILAT WITH TOMO: ICD-10-PCS | Mod: 26,,, | Performed by: RADIOLOGY

## 2022-07-08 ENCOUNTER — OFFICE VISIT (OUTPATIENT)
Dept: CARDIOLOGY | Facility: CLINIC | Age: 57
End: 2022-07-08
Payer: COMMERCIAL

## 2022-07-08 ENCOUNTER — PATIENT MESSAGE (OUTPATIENT)
Dept: PULMONOLOGY | Facility: CLINIC | Age: 57
End: 2022-07-08
Payer: COMMERCIAL

## 2022-07-08 ENCOUNTER — LAB VISIT (OUTPATIENT)
Dept: LAB | Facility: HOSPITAL | Age: 57
End: 2022-07-08
Attending: INTERNAL MEDICINE
Payer: COMMERCIAL

## 2022-07-08 VITALS
SYSTOLIC BLOOD PRESSURE: 136 MMHG | DIASTOLIC BLOOD PRESSURE: 79 MMHG | RESPIRATION RATE: 18 BRPM | HEIGHT: 68 IN | WEIGHT: 293 LBS | BODY MASS INDEX: 44.41 KG/M2 | HEART RATE: 85 BPM

## 2022-07-08 DIAGNOSIS — R00.2 PALPITATIONS: ICD-10-CM

## 2022-07-08 DIAGNOSIS — C50.411 MALIGNANT NEOPLASM OF UPPER-OUTER QUADRANT OF RIGHT BREAST IN FEMALE, ESTROGEN RECEPTOR POSITIVE: ICD-10-CM

## 2022-07-08 DIAGNOSIS — R73.03 PREDIABETES: ICD-10-CM

## 2022-07-08 DIAGNOSIS — Z17.0 MALIGNANT NEOPLASM OF UPPER-OUTER QUADRANT OF RIGHT BREAST IN FEMALE, ESTROGEN RECEPTOR POSITIVE: ICD-10-CM

## 2022-07-08 DIAGNOSIS — E03.9 HYPOTHYROIDISM, UNSPECIFIED TYPE: Chronic | ICD-10-CM

## 2022-07-08 DIAGNOSIS — E88.810 INSULIN RESISTANCE SYNDROME: ICD-10-CM

## 2022-07-08 DIAGNOSIS — K21.9 GASTROESOPHAGEAL REFLUX DISEASE, UNSPECIFIED WHETHER ESOPHAGITIS PRESENT: ICD-10-CM

## 2022-07-08 DIAGNOSIS — E66.01 MORBID OBESITY WITH BMI OF 40.0-44.9, ADULT: ICD-10-CM

## 2022-07-08 DIAGNOSIS — I10 PRIMARY HYPERTENSION: Primary | Chronic | ICD-10-CM

## 2022-07-08 DIAGNOSIS — R06.00 DYSPNEA, UNSPECIFIED TYPE: ICD-10-CM

## 2022-07-08 DIAGNOSIS — D50.9 IRON DEFICIENCY ANEMIA, UNSPECIFIED IRON DEFICIENCY ANEMIA TYPE: Chronic | ICD-10-CM

## 2022-07-08 DIAGNOSIS — R07.9 CHEST PAIN, UNSPECIFIED TYPE: ICD-10-CM

## 2022-07-08 DIAGNOSIS — G47.30 SLEEP APNEA, UNSPECIFIED TYPE: ICD-10-CM

## 2022-07-08 LAB
FERRITIN SERPL-MCNC: 63 NG/ML (ref 20–300)
IRON SERPL-MCNC: 73 UG/DL (ref 30–160)
SATURATED IRON: 17 % (ref 20–50)
TOTAL IRON BINDING CAPACITY: 426 UG/DL (ref 250–450)
TRANSFERRIN SERPL-MCNC: 288 MG/DL (ref 200–375)

## 2022-07-08 PROCEDURE — 4010F ACE/ARB THERAPY RXD/TAKEN: CPT | Mod: CPTII,S$GLB,, | Performed by: INTERNAL MEDICINE

## 2022-07-08 PROCEDURE — 3008F BODY MASS INDEX DOCD: CPT | Mod: CPTII,S$GLB,, | Performed by: INTERNAL MEDICINE

## 2022-07-08 PROCEDURE — 3078F PR MOST RECENT DIASTOLIC BLOOD PRESSURE < 80 MM HG: ICD-10-PCS | Mod: CPTII,S$GLB,, | Performed by: INTERNAL MEDICINE

## 2022-07-08 PROCEDURE — 3078F DIAST BP <80 MM HG: CPT | Mod: CPTII,S$GLB,, | Performed by: INTERNAL MEDICINE

## 2022-07-08 PROCEDURE — 99999 PR PBB SHADOW E&M-EST. PATIENT-LVL V: CPT | Mod: PBBFAC,,, | Performed by: INTERNAL MEDICINE

## 2022-07-08 PROCEDURE — 1160F PR REVIEW ALL MEDS BY PRESCRIBER/CLIN PHARMACIST DOCUMENTED: ICD-10-PCS | Mod: CPTII,S$GLB,, | Performed by: INTERNAL MEDICINE

## 2022-07-08 PROCEDURE — 36415 COLL VENOUS BLD VENIPUNCTURE: CPT | Performed by: INTERNAL MEDICINE

## 2022-07-08 PROCEDURE — 3075F SYST BP GE 130 - 139MM HG: CPT | Mod: CPTII,S$GLB,, | Performed by: INTERNAL MEDICINE

## 2022-07-08 PROCEDURE — 1159F MED LIST DOCD IN RCRD: CPT | Mod: CPTII,S$GLB,, | Performed by: INTERNAL MEDICINE

## 2022-07-08 PROCEDURE — 3008F PR BODY MASS INDEX (BMI) DOCUMENTED: ICD-10-PCS | Mod: CPTII,S$GLB,, | Performed by: INTERNAL MEDICINE

## 2022-07-08 PROCEDURE — 1160F RVW MEDS BY RX/DR IN RCRD: CPT | Mod: CPTII,S$GLB,, | Performed by: INTERNAL MEDICINE

## 2022-07-08 PROCEDURE — 84466 ASSAY OF TRANSFERRIN: CPT | Performed by: INTERNAL MEDICINE

## 2022-07-08 PROCEDURE — 99204 PR OFFICE/OUTPT VISIT, NEW, LEVL IV, 45-59 MIN: ICD-10-PCS | Mod: S$GLB,,, | Performed by: INTERNAL MEDICINE

## 2022-07-08 PROCEDURE — 82728 ASSAY OF FERRITIN: CPT | Performed by: INTERNAL MEDICINE

## 2022-07-08 PROCEDURE — 4010F PR ACE/ARB THEARPY RXD/TAKEN: ICD-10-PCS | Mod: CPTII,S$GLB,, | Performed by: INTERNAL MEDICINE

## 2022-07-08 PROCEDURE — 1159F PR MEDICATION LIST DOCUMENTED IN MEDICAL RECORD: ICD-10-PCS | Mod: CPTII,S$GLB,, | Performed by: INTERNAL MEDICINE

## 2022-07-08 PROCEDURE — 99999 PR PBB SHADOW E&M-EST. PATIENT-LVL V: ICD-10-PCS | Mod: PBBFAC,,, | Performed by: INTERNAL MEDICINE

## 2022-07-08 PROCEDURE — 3075F PR MOST RECENT SYSTOLIC BLOOD PRESS GE 130-139MM HG: ICD-10-PCS | Mod: CPTII,S$GLB,, | Performed by: INTERNAL MEDICINE

## 2022-07-08 PROCEDURE — 99204 OFFICE O/P NEW MOD 45 MIN: CPT | Mod: S$GLB,,, | Performed by: INTERNAL MEDICINE

## 2022-07-08 RX ORDER — METOPROLOL SUCCINATE 25 MG/1
75 TABLET, EXTENDED RELEASE ORAL DAILY
Qty: 270 TABLET | Refills: 1 | Status: SHIPPED | OUTPATIENT
Start: 2022-07-08 | End: 2023-01-13

## 2022-07-08 NOTE — PROGRESS NOTES
Subjective:   Patient ID:  Kylie Urbano is a 56 y.o. female who presents for cardiac consult of Chest Pain (zandra) and elevated heart      HPI  The patient came in today for cardiac consult of Chest Pain (zandra) and elevated heart    22  Kylie Urbano is a 56 y.o. female pt with HTN, PreDM, obesity, GERD, anxiety, h/o breast CA, Vit D def, insulin resistance, hypothyroidism, Fe def anemia, anxiety presents for CV eval of tachycardia/dizziness.     Chief Complaint   Patient presents with    Tachycardia       Ems was called to home for pt that was tachycardic and tachypnea hr 140's RR 40's, recent death in family, chest wall pain       2022, 6:47 PM  History obtained from the patient                  History of Present Illness: Kylie Urbano is a 56 y.o. female patient with a PMHx of HTN and thyroid disease who presents to the Emergency Department for evaluation of dizziness which onset today PTA. Symptoms are constant and mild to moderate in severity. No mitigating or exacerbating factors reported. Associated sxs include weakness, chest tightness, tachycardia, and palpitations. Patient denies any chills, fever, nausea, diarrhea, vomiting, and all other sxs at this time. No prior Tx reported. No further complaints or concerns at this time.     Pt had recent ER eval for tachycardia, dizziness. She has been having intermittent episodes of palpitations, started last year March. She has occ chest pain along with SOB.     Patient feels no leg swelling, no PND, no dizziness, no syncope, no CNS symptoms.    Patient has fairly good exercise tolerance. Works for [employer], is a cook.     Patient is compliant with medications.    FH - mother - CHF,  in 70s    Sinus tachycardia   Cannot rule out Anterior infarct (cited on or before 2018)   Abnormal ECG   When compared with ECG of 24-MAR-2021 16:49,   Questionable change in initial forces of Lateral leads   Confirmed by ALEXANDRA SANCHEZ, LORRAINE ([id number]) on 2022  8:00:26 PM     Past Medical History:   Diagnosis Date    Allergic rhinitis, cause unspecified     Breast cancer 2017    Elevated liver function tests     in the past    Fatty liver 05/02/2017    on ultrasound    Hepatomegaly 05/02/2017    on ultrasound    History of sciatica     HSV infection     Type 1 and 2    HTN (hypertension)     Hypothyroidism     Insulin resistance     Iron deficiency anemia, unspecified     Obesity     PONV (postoperative nausea and vomiting)     Tension headache     Vitamin D deficiency        Past Surgical History:   Procedure Laterality Date    BREAST LUMPECTOMY Right 2017    BTL      COLONOSCOPY N/A 2/22/2019    Procedure: COLONOSCOPY;  Surgeon: Mariano Santamaria MD;  Location: [facility];  Service: Endoscopy;  Laterality: N/A;    DILATION AND CURETTAGE OF UTERUS      x 1    HYSTERECTOMY      Laproscopic robot assissted with BSO       Social History     Tobacco Use    Smoking status: Never Smoker    Smokeless tobacco: Never Used   Substance Use Topics    Alcohol use: Yes     Comment: occasionally     Drug use: No       Family History   Problem Relation Age of Onset    Diabetes Mother     Hypertension Mother     Heart failure Mother     Glaucoma Mother     Cancer Father         Stomach cancer    Cancer Sister         Ovarian cancer    Ovarian cancer Sister     No Known Problems Daughter     No Known Problems Son     No Known Problems Daughter     Breast cancer Paternal Cousin     Stroke Neg Hx     Heart disease Neg Hx         MI/CAD       Patient's Medications   New Prescriptions    METOPROLOL SUCCINATE (TOPROL-XL) 25 MG 24 HR TABLET    Take 3 tablets (75 mg total) by mouth once daily.   Previous Medications    ACYCLOVIR (ZOVIRAX) 400 MG TABLET    TAKE 1 TABLET BY MOUTH THREE TIMES DAILY WITH MEALS    ALPRAZOLAM (XANAX) 0.25 MG TABLET    TAKE 1 TO 2 TABLETS BY MOUTH TWICE DAILY AS NEEDED FOR ANXIETY    AMLODIPINE (NORVASC) 10 MG TABLET    Take 1 tablet  by mouth once daily    ANASTROZOLE (ARIMIDEX) 1 MG TAB    Take 1 tablet (1 mg total) by mouth once daily.    BENAZEPRIL (LOTENSIN) 20 MG TABLET    Take 1 tablet by mouth once daily    CHLORZOXAZONE (PARAFON FORTE) 500 MG TAB    Take 1 tablet (500 mg total) by mouth 4 (four) times daily as needed (muscle pain).    FLUTICASONE PROPIONATE (FLONASE) 50 MCG/ACTUATION NASAL SPRAY    USE 2 SPRAY(S) IN EACH NOSTRIL ONCE DAILY AS NEEDED FOR  RHINITIS    GABAPENTIN (NEURONTIN) 100 MG CAPSULE    Take 1 capsule (100 mg total) by mouth 3 (three) times daily. Increase to 200 mg tid after 2 weeks and increase to 300 mg tid 2 weeks after that if symptoms have not improved    IBUPROFEN (ADVIL,MOTRIN) 800 MG TABLET    Take 1 tab by mouth TID prn pain    METFORMIN (GLUCOPHAGE) 500 MG TABLET    TAKE 4 TABLETS BY MOUTH IN THE EVENING AS DIRECTED    OMEPRAZOLE (PRILOSEC) 20 MG CAPSULE    TAKE 1 CAPSULE BY MOUTH ONCE DAILY AS NEEDED    SYNTHROID 175 MCG TABLET    Take 1 tablet (175 mcg total) by mouth once daily.    VENLAFAXINE (EFFEXOR XR) 37.5 MG 24 HR CAPSULE    Take 1 capsule (37.5 mg total) by mouth once daily.   Modified Medications    No medications on file   Discontinued Medications    METOPROLOL SUCCINATE (TOPROL-XL) 25 MG 24 HR TABLET    Take 25 mg by mouth.    METOPROLOL SUCCINATE (TOPROL-XL) 50 MG 24 HR TABLET    Take 1 tablet by mouth once daily       Review of Systems   Constitutional: Negative.    HENT: Negative.    Eyes: Negative.    Respiratory: Negative.    Cardiovascular: Positive for palpitations.   Gastrointestinal: Negative.    Genitourinary: Negative.    Musculoskeletal: Negative.    Skin: Negative.    Neurological: Positive for dizziness.   Endo/Heme/Allergies: Negative.    Psychiatric/Behavioral: Negative.    All 12 systems otherwise negative.      Wt Readings from Last 3 Encounters:   07/08/22 133.3 kg (293 lb 14 oz)   06/16/22 131.1 kg (289 lb)   04/05/22 131.5 kg (289 lb 14.5 oz)     Temp Readings from Last 3  "Encounters:   06/16/22 98.2 °F (36.8 °C) (Oral)   04/05/22 97.3 °F (36.3 °C)   02/14/22 98.1 °F (36.7 °C) (Temporal)     BP Readings from Last 3 Encounters:   07/08/22 136/79   06/16/22 139/82   04/05/22 134/76     Pulse Readings from Last 3 Encounters:   07/08/22 85   06/16/22 90   04/05/22 98       /79   Pulse 85   Resp 18   Ht 5' 8" (1.727 m)   Wt 133.3 kg (293 lb 14 oz)   LMP 01/01/2016 (LMP Unknown)   BMI 44.68 kg/m²     Objective:   Physical Exam  Vitals and nursing note reviewed.   Constitutional:       General: She is not in acute distress.     Appearance: She is well-developed. She is obese. She is not diaphoretic.   HENT:      Head: Normocephalic and atraumatic.      Nose: Nose normal.   Eyes:      General: No scleral icterus.     Conjunctiva/sclera: Conjunctivae normal.   Neck:      Thyroid: No thyromegaly.      Vascular: No JVD.   Cardiovascular:      Rate and Rhythm: Normal rate and regular rhythm.      Heart sounds: S1 normal and S2 normal. No murmur heard.    No friction rub. No gallop. No S3 or S4 sounds.   Pulmonary:      Effort: Pulmonary effort is normal. No respiratory distress.      Breath sounds: Normal breath sounds. No stridor. No wheezing or rales.   Chest:      Chest wall: No tenderness.   Abdominal:      General: Bowel sounds are normal. There is no distension.      Palpations: Abdomen is soft. There is no mass.      Tenderness: There is no abdominal tenderness. There is no rebound.   Genitourinary:     Comments: Deferred  Musculoskeletal:         General: No tenderness or deformity. Normal range of motion.      Cervical back: Normal range of motion and neck supple.   Lymphadenopathy:      Cervical: No cervical adenopathy.   Skin:     General: Skin is warm and dry.      Coloration: Skin is not pale.      Findings: No erythema or rash.   Neurological:      Mental Status: She is alert and oriented to person, place, and time.      Motor: No abnormal muscle tone.      Coordination: " Coordination normal.   Psychiatric:         Behavior: Behavior normal.         Thought Content: Thought content normal.         Judgment: Judgment normal.         Lab Results   Component Value Date     06/16/2022    K 3.8 06/16/2022     06/16/2022    CO2 25 06/16/2022    BUN 15 06/16/2022    CREATININE 0.9 06/16/2022     (H) 06/16/2022    HGBA1C 6.4 (H) 11/23/2021    MG 2.3 03/28/2020    AST 39 06/16/2022    ALT 55 (H) 06/16/2022    ALBUMIN 3.6 06/16/2022    PROT 7.8 06/16/2022    BILITOT 0.2 06/16/2022    WBC 8.80 06/16/2022    HGB 13.3 06/16/2022    HCT 39.7 06/16/2022    MCV 86 06/16/2022     06/16/2022    INR 0.9 06/17/2020    TSH 0.456 11/23/2021    CHOL 178 11/23/2021    HDL 48 11/23/2021    LDLCALC 117.6 11/23/2021    TRIG 62 11/23/2021    BNP 10 06/16/2022     Assessment:      1. Primary hypertension    2. Malignant neoplasm of upper-outer quadrant of right breast in female, estrogen receptor positive    3. Iron deficiency anemia, unspecified iron deficiency anemia type    4. Hypothyroidism, unspecified type    5. Morbid obesity with BMI of 40.0-44.9, adult    6. Insulin resistance syndrome    7. Prediabetes    8. Gastroesophageal reflux disease, unspecified whether esophagitis present    9. Chest pain, unspecified type    10. Dyspnea, unspecified type    11. Palpitations    12. Sleep apnea, unspecified type        Plan:     1. HTN, recent tachycardia with dizziness, CP/SOB  - titrate meds  - cont BB - increase to 75mg  - r/o TONY   - order ECHO and ECG stress test  - order Bardy 14 day    2. H/O Breast CA, s/p lumpectomy, radiation/chemo  - cont tx/fu with heme/onc  - cont Arimidex    3. Hypothyroidism  - cont Synthroid     4. GERD  - cont PPI    5. Obesity/PreDM  - cont tx   - needs weight loss with diet/exercise   - may benefit from bariatric eval     6. FE def anemia  - cont tx and monitor  - order iron labs      Thank you for allowing me to participate in this patient's care.  Please do not hesitate to contact me with any questions or concerns. Consult note has been forwarded to the referral physician.

## 2022-07-11 ENCOUNTER — TELEPHONE (OUTPATIENT)
Dept: CARDIOLOGY | Facility: CLINIC | Age: 57
End: 2022-07-11
Payer: COMMERCIAL

## 2022-07-11 NOTE — PROGRESS NOTES
Subjective:       Patient ID: Kylie Urbano is a 56 y.o. female.    Chief Complaint: breast cancer f/u    HPI Patient presents for breast cancer surveillance and review of mammogram. History of having COVID in March 2020    56-year-old female with a previous diagnosis of right-sided infiltrating ductal carcinoma ER/MI positive, HER-2 negative with biopsy-proven right axillary lymph node involvement.  Patient was consented for BRCA testing for participation and B-55 study with negative BRCA testing.    She received cycle 1 of dense dose Adriamycin/Cytoxan on 6/12/2017 and tolerated treatment exceptionally well.  She completed cycle 4 of dose dense Taxol on 9/19/2017.  She underwent lumpectomy with sentinel lymph node biopsy on 10/31/2017.   Final pathology demonstrated [id number].  The primary measured 0.8 cm and 2 sentinel lymph nodes were positive with 3 mm macrometastasis within first sentinel lymph node and 1 mm micrometastasis within the second sentinel lymph node.  She underwent a full right axillary lymph node dissection on 11/8/2017 which demonstrated 0 positive nodes.  She completed adjuvant radiation on 2/27/2018.      The patient had a hysterectomy approximately 6/20/14.  FSH was in the intermediate range which likely means the patient is perimenopausal.  Started Tamoxifen 20 mg daily in 3/2018 and has since been transitioned to Arimidex - states tolerating well.  Due off Arimidex 3/2027  Surveillance mammograms remains [facility] - 6/3/20    Primary Oncologist: Herbie Miramontes MD  Previous- Maosn Martinez MD        ER/MI positive, HER-2 negative infiltrating ductal carcinoma the right breast with biopsy proven lymph node involvement clinically stage II/III  [id number].  Pathology following neoadjuvant therapy demonstrated 1 sentinel lymph node with micrometastasis (3 mm) and second lymph node with micrometastasis (1 mm)  --BRCA testing via B-55 Study is negative for mutation  --ddAC--> taxol   cycle 1---  6/12/2017  --Final cycle Cycle 4 on 9/19/2017  --Radiation completed on 2/27/2018  --Tamoxifen 20 mg by mouth daily started in 3/2018  --surveillance bilateral mammogram in 6/4/2021 wnl  --Transitioned to Arimidex 1 mg PO daily - to be completed in 3/2027     I have reviewed and updated the HPI, ROS, PMHx, Social Hx, Family Hx and treatment history.     Today's visit:  The patient has previously been followed in the clinic by Dr. Martinez, Dr. Miramontes, and CATRACHO Haque.  Last visit with me - 12/22/2022.      10/1/2019- DEXA scan normal.     12/20/2021- dexa normal- repeat 12/2023    Has previous history of elevated liver enzymes and elevated ldh. Had taken more tylenol with Covid infection. Off tylenol now. Referred pt to gastro for evaluation due to prior history of elevated liver enzymes and fatty liver.     Pt followed by gastro for elevated liver enzymes and fatty liver disease. Last visit 8/2020 for fibroscan and ultrasound      Review of Systems   Constitutional: Negative.    HENT: Negative.    Eyes: Negative.    Respiratory: Negative.    Cardiovascular: Negative.  Negative for chest pain, palpitations and leg swelling.   Gastrointestinal: Negative.         No reflux   Endocrine: Negative.         Hot flashes and sleep disturbance   Genitourinary: Negative.    Musculoskeletal: Negative.    Skin: Negative.    Allergic/Immunologic: Negative.    Neurological: Positive for numbness.        Tingling in hands and feet since chemo   Hematological: Negative.  Negative for adenopathy.   Psychiatric/Behavioral: Negative.         Relates mild anxiety       Pt denies any breast pain, nipple discharge or palpable mass    Feels anxious intermittently- walking intermittently    Objective:          Physical Exam   Constitutional: She is oriented to person, place, and time. She appears well-developed and well-nourished. No distress.   HENT:   Head: Normocephalic and atraumatic. Not macrocephalic.   Right Ear: Tympanic  membrane and ear canal normal.   Left Ear: Tympanic membrane and ear canal normal.   Nose: Nose normal. Right sinus exhibits no maxillary sinus tenderness and no frontal sinus tenderness. Left sinus exhibits no maxillary sinus tenderness and no frontal sinus tenderness.   Mouth/Throat: Uvula is midline, oropharynx is clear and moist and mucous membranes are normal. No oropharyngeal exudate.   Eyes: Pupils are equal, round, and reactive to light. Conjunctivae, EOM and lids are normal. Lids are everted and swept, no foreign bodies found.   Neck: Trachea normal and normal range of motion. Neck supple. No tracheal deviation present. No thyroid mass and no thyromegaly present.   Cardiovascular: regular rhythm, normal heart sounds, intact distal pulses and normal pulses. Exam reveals no gallop and no friction rub.   No murmur heard.  Pulmonary/Chest: Effort normal and breath sounds normal. No stridor. No respiratory distress. She has no decreased breath sounds. She has no wheezes. She has no rhonchi. She has no rales. She exhibits no tenderness.   No lymphadenopathy, susan breast no visible redness, puckering or retraction. No palpable mass bilaterally. No nipple discharge.   Abdominal: Soft. Normal appearance and bowel sounds are normal. She exhibits no distension. There is no hepatosplenomegaly. There is no tenderness. There is no guarding.   Musculoskeletal: Normal range of motion. She exhibits no edema or deformity.   Lymphadenopathy:        Head (right side): No submental and no submandibular adenopathy present.        Head (left side): No submental and no submandibular adenopathy present.        Right cervical: No superficial cervical and no deep cervical adenopathy present.       Left cervical: No superficial cervical and no deep cervical adenopathy present.     She has no axillary adenopathy.        Right: No supraclavicular adenopathy present.        Left: No supraclavicular adenopathy present.   Neurological: She  is alert and oriented to person, place, and time. No cranial nerve deficit or sensory deficit. She exhibits normal muscle tone.   Skin: Skin is warm, dry and intact. Capillary refill takes less than 2 seconds. No rash noted. She is not diaphoretic. No pallor.   Psychiatric: She has a normal mood and affect. Her speech is normal and behavior is normal. Judgment and thought content normal. She is not actively hallucinating. Cognition and memory are normal. She is attentive.     Last mammo 7/1/2022- wnl    Labs - 6/2022- cbc - normal, cmp- mildly elevated ALT- 55- stable, iron sat low    Last colonoscopy 2/22/2019- polyp noted- 5 year f/u recommended    Assessment:       1. Malignant neoplasm of upper-outer quadrant of right breast in female, estrogen receptor positive    2. Malignant neoplasm of right breast in female, estrogen receptor positive, unspecified site of breast    3. Elevated liver enzymes    4. Encounter for follow-up surveillance of breast cancer    5. Encounter for monitoring adjuvant hormonal therapy    6. Counseling and coordination of care    7. Counseling on health promotion and disease prevention    8. Health education/counseling    9. Encounter for breast cancer screening using non-mammogram modality    10. Iron deficiency anemia, unspecified iron deficiency anemia type        Plan:       1.      Negative mammogram and exam  2.      History of Right breast cancer- continue to take Arimidex daily  3.      Monitoring adjuvant therapy- Taking Arimidex without difficulty. Continue until 3/2027- Dexa 12/20/21- normal - due again 12/2023  4.      Encouraged walking to help with bone density and decreasing risk for breast cancer recurrence. Taking calcium and vit d- walking 5 days a week 45 min each time- excellent. Discussed reducing risk for breast cancer with wt loss and exercise.    5.      Fatty liver disease- followed by gastro now. Has a diagnosis of borderline hepatomegaly and fatty liver - due  for f/u in August 2022  6.      RTC in 6 months for f/u of breast cancer and adjuvant therapy- Jan 2023 - call for any changes otherwise-   7.      Chemo induced neuropathy- tried neurotin 100 mg po tid for 2 weeks and had no real change. Extensive discussion regarding potential side effects from med and admin instructions. Warned of drowsiness and dizziness-   8.      Lipids due 11/2022  9.      Normal hgb on cbc, low iron sat, normal ferritin- recommend initiate oral iron daily at night with water- will recheck cbc, cmp, iron , ferritin in 2 months with virtual visit

## 2022-07-11 NOTE — TELEPHONE ENCOUNTER
Pt contacted about results, pt verbalized understanding.          ----- Message from Kalen Hicks MD sent at 7/11/2022 10:24 AM CDT -----  Please contact the patient and let them know that their results reveal low iron levels, follow up with heme/onc to discuss further treatment to improve iron levels. This may be cause of some of the symptoms discussed in our office visit.

## 2022-07-12 DIAGNOSIS — Z17.0 MALIGNANT NEOPLASM OF UPPER-OUTER QUADRANT OF RIGHT BREAST IN FEMALE, ESTROGEN RECEPTOR POSITIVE: ICD-10-CM

## 2022-07-12 DIAGNOSIS — C50.411 MALIGNANT NEOPLASM OF UPPER-OUTER QUADRANT OF RIGHT BREAST IN FEMALE, ESTROGEN RECEPTOR POSITIVE: ICD-10-CM

## 2022-07-12 RX ORDER — ANASTROZOLE 1 MG/1
1 TABLET ORAL DAILY
Qty: 90 TABLET | Refills: 3 | Status: SHIPPED | OUTPATIENT
Start: 2022-07-12 | End: 2022-07-25

## 2022-07-20 ENCOUNTER — OFFICE VISIT (OUTPATIENT)
Dept: HEMATOLOGY/ONCOLOGY | Facility: CLINIC | Age: 57
End: 2022-07-20
Payer: COMMERCIAL

## 2022-07-20 VITALS
OXYGEN SATURATION: 96 % | WEIGHT: 293 LBS | TEMPERATURE: 98 F | HEART RATE: 92 BPM | DIASTOLIC BLOOD PRESSURE: 97 MMHG | RESPIRATION RATE: 14 BRPM | SYSTOLIC BLOOD PRESSURE: 145 MMHG | BODY MASS INDEX: 44.41 KG/M2 | HEIGHT: 68 IN

## 2022-07-20 DIAGNOSIS — Z08 ENCOUNTER FOR FOLLOW-UP SURVEILLANCE OF BREAST CANCER: ICD-10-CM

## 2022-07-20 DIAGNOSIS — Z17.0 MALIGNANT NEOPLASM OF RIGHT BREAST IN FEMALE, ESTROGEN RECEPTOR POSITIVE, UNSPECIFIED SITE OF BREAST: ICD-10-CM

## 2022-07-20 DIAGNOSIS — Z79.899 ENCOUNTER FOR MONITORING ADJUVANT HORMONAL THERAPY: ICD-10-CM

## 2022-07-20 DIAGNOSIS — Z12.39 ENCOUNTER FOR BREAST CANCER SCREENING USING NON-MAMMOGRAM MODALITY: ICD-10-CM

## 2022-07-20 DIAGNOSIS — Z71.89 COUNSELING AND COORDINATION OF CARE: ICD-10-CM

## 2022-07-20 DIAGNOSIS — C50.411 MALIGNANT NEOPLASM OF UPPER-OUTER QUADRANT OF RIGHT BREAST IN FEMALE, ESTROGEN RECEPTOR POSITIVE: Primary | ICD-10-CM

## 2022-07-20 DIAGNOSIS — D50.9 IRON DEFICIENCY ANEMIA, UNSPECIFIED IRON DEFICIENCY ANEMIA TYPE: Chronic | ICD-10-CM

## 2022-07-20 DIAGNOSIS — Z71.9 HEALTH EDUCATION/COUNSELING: ICD-10-CM

## 2022-07-20 DIAGNOSIS — Z17.0 MALIGNANT NEOPLASM OF UPPER-OUTER QUADRANT OF RIGHT BREAST IN FEMALE, ESTROGEN RECEPTOR POSITIVE: Primary | ICD-10-CM

## 2022-07-20 DIAGNOSIS — R74.8 ELEVATED LIVER ENZYMES: ICD-10-CM

## 2022-07-20 DIAGNOSIS — C50.911 MALIGNANT NEOPLASM OF RIGHT BREAST IN FEMALE, ESTROGEN RECEPTOR POSITIVE, UNSPECIFIED SITE OF BREAST: ICD-10-CM

## 2022-07-20 DIAGNOSIS — Z51.81 ENCOUNTER FOR MONITORING ADJUVANT HORMONAL THERAPY: ICD-10-CM

## 2022-07-20 DIAGNOSIS — Z71.89 COUNSELING ON HEALTH PROMOTION AND DISEASE PREVENTION: ICD-10-CM

## 2022-07-20 DIAGNOSIS — Z85.3 ENCOUNTER FOR FOLLOW-UP SURVEILLANCE OF BREAST CANCER: ICD-10-CM

## 2022-07-20 PROCEDURE — 99214 OFFICE O/P EST MOD 30 MIN: CPT | Mod: S$GLB,,, | Performed by: NURSE PRACTITIONER

## 2022-07-20 PROCEDURE — 4010F ACE/ARB THERAPY RXD/TAKEN: CPT | Mod: CPTII,S$GLB,, | Performed by: NURSE PRACTITIONER

## 2022-07-20 PROCEDURE — 4010F PR ACE/ARB THEARPY RXD/TAKEN: ICD-10-PCS | Mod: CPTII,S$GLB,, | Performed by: NURSE PRACTITIONER

## 2022-07-20 PROCEDURE — 1159F MED LIST DOCD IN RCRD: CPT | Mod: CPTII,S$GLB,, | Performed by: NURSE PRACTITIONER

## 2022-07-20 PROCEDURE — 3008F BODY MASS INDEX DOCD: CPT | Mod: CPTII,S$GLB,, | Performed by: NURSE PRACTITIONER

## 2022-07-20 PROCEDURE — 99999 PR PBB SHADOW E&M-EST. PATIENT-LVL V: CPT | Mod: PBBFAC,,, | Performed by: NURSE PRACTITIONER

## 2022-07-20 PROCEDURE — 3080F DIAST BP >= 90 MM HG: CPT | Mod: CPTII,S$GLB,, | Performed by: NURSE PRACTITIONER

## 2022-07-20 PROCEDURE — 99214 PR OFFICE/OUTPT VISIT, EST, LEVL IV, 30-39 MIN: ICD-10-PCS | Mod: S$GLB,,, | Performed by: NURSE PRACTITIONER

## 2022-07-20 PROCEDURE — 3080F PR MOST RECENT DIASTOLIC BLOOD PRESSURE >= 90 MM HG: ICD-10-PCS | Mod: CPTII,S$GLB,, | Performed by: NURSE PRACTITIONER

## 2022-07-20 PROCEDURE — 3077F SYST BP >= 140 MM HG: CPT | Mod: CPTII,S$GLB,, | Performed by: NURSE PRACTITIONER

## 2022-07-20 PROCEDURE — 3008F PR BODY MASS INDEX (BMI) DOCUMENTED: ICD-10-PCS | Mod: CPTII,S$GLB,, | Performed by: NURSE PRACTITIONER

## 2022-07-20 PROCEDURE — 3077F PR MOST RECENT SYSTOLIC BLOOD PRESSURE >= 140 MM HG: ICD-10-PCS | Mod: CPTII,S$GLB,, | Performed by: NURSE PRACTITIONER

## 2022-07-20 PROCEDURE — 99999 PR PBB SHADOW E&M-EST. PATIENT-LVL V: ICD-10-PCS | Mod: PBBFAC,,, | Performed by: NURSE PRACTITIONER

## 2022-07-20 PROCEDURE — 1159F PR MEDICATION LIST DOCUMENTED IN MEDICAL RECORD: ICD-10-PCS | Mod: CPTII,S$GLB,, | Performed by: NURSE PRACTITIONER

## 2022-07-28 ENCOUNTER — HOSPITAL ENCOUNTER (OUTPATIENT)
Dept: CARDIOLOGY | Facility: HOSPITAL | Age: 57
Discharge: HOME | End: 2022-07-28
Attending: INTERNAL MEDICINE
Payer: COMMERCIAL

## 2022-07-28 VITALS — HEIGHT: 68 IN | WEIGHT: 293 LBS | BODY MASS INDEX: 44.41 KG/M2

## 2022-07-28 DIAGNOSIS — R06.00 DYSPNEA, UNSPECIFIED TYPE: ICD-10-CM

## 2022-07-28 DIAGNOSIS — R07.9 CHEST PAIN, UNSPECIFIED TYPE: ICD-10-CM

## 2022-07-28 DIAGNOSIS — I10 PRIMARY HYPERTENSION: ICD-10-CM

## 2022-07-28 DIAGNOSIS — R00.2 PALPITATIONS: ICD-10-CM

## 2022-07-28 DIAGNOSIS — I10 PRIMARY HYPERTENSION: Chronic | ICD-10-CM

## 2022-07-28 LAB
AV INDEX (PROSTH): 0.69
AV MEAN GRADIENT: 3 MMHG
AV PEAK GRADIENT: 5 MMHG
AV VALVE AREA: 2.17 CM2
AV VELOCITY RATIO: 0.69
BSA FOR ECHO PROCEDURE: 2.53 M2
CV ECHO LV RWT: 0.42 CM
CV STRESS BASE HR: 101 BPM
DIASTOLIC BLOOD PRESSURE: 88 MMHG
DOP CALC AO PEAK VEL: 1.17 M/S
DOP CALC AO VTI: 23.6 CM
DOP CALC LVOT AREA: 3.1 CM2
DOP CALC LVOT DIAMETER: 2 CM
DOP CALC LVOT PEAK VEL: 0.81 M/S
DOP CALC LVOT STROKE VOLUME: 51.18 CM3
DOP CALC RVOT PEAK VEL: 0.6 M/S
DOP CALC RVOT VTI: 15.5 CM
DOP CALCLVOT PEAK VEL VTI: 16.3 CM
E WAVE DECELERATION TIME: 173.05 MSEC
E/A RATIO: 0.82
E/E' RATIO: 9.2 M/S
ECHO LV POSTERIOR WALL: 1.06 CM (ref 0.6–1.1)
EJECTION FRACTION: 60 %
FRACTIONAL SHORTENING: 33 % (ref 28–44)
INTERVENTRICULAR SEPTUM: 1.2 CM (ref 0.6–1.1)
IVRT: 77.07 MSEC
LA MAJOR: 4.5 CM
LA MINOR: 5.16 CM
LA WIDTH: 3.7 CM
LEFT ATRIUM SIZE: 4.46 CM
LEFT ATRIUM VOLUME INDEX MOD: 14.8 ML/M2
LEFT ATRIUM VOLUME INDEX: 28 ML/M2
LEFT ATRIUM VOLUME MOD: 35.63 CM3
LEFT ATRIUM VOLUME: 67.43 CM3
LEFT INTERNAL DIMENSION IN SYSTOLE: 3.39 CM (ref 2.1–4)
LEFT VENTRICLE DIASTOLIC VOLUME INDEX: 51.2 ML/M2
LEFT VENTRICLE DIASTOLIC VOLUME: 123.38 ML
LEFT VENTRICLE MASS INDEX: 92 G/M2
LEFT VENTRICLE SYSTOLIC VOLUME INDEX: 19.5 ML/M2
LEFT VENTRICLE SYSTOLIC VOLUME: 47.09 ML
LEFT VENTRICULAR INTERNAL DIMENSION IN DIASTOLE: 5.09 CM (ref 3.5–6)
LEFT VENTRICULAR MASS: 221.24 G
LV LATERAL E/E' RATIO: 8.63 M/S
LV SEPTAL E/E' RATIO: 9.86 M/S
LVOT MG: 1.43 MMHG
LVOT MV: 0.55 CM/S
MV PEAK A VEL: 0.84 M/S
MV PEAK E VEL: 0.69 M/S
MV STENOSIS PRESSURE HALF TIME: 50.18 MS
MV VALVE AREA P 1/2 METHOD: 4.38 CM2
OHS CV CPX 1 MINUTE RECOVERY HEART RATE: 136 BPM
OHS CV CPX 85 PERCENT MAX PREDICTED HEART RATE MALE: 134
OHS CV CPX ESTIMATED METS: 5
OHS CV CPX MAX PREDICTED HEART RATE: 158
OHS CV CPX PATIENT IS FEMALE: 1
OHS CV CPX PATIENT IS MALE: 0
OHS CV CPX PEAK DIASTOLIC BLOOD PRESSURE: 114 MMHG
OHS CV CPX PEAK HEAR RATE: 148 BPM
OHS CV CPX PEAK RATE PRESSURE PRODUCT: NORMAL
OHS CV CPX PEAK SYSTOLIC BLOOD PRESSURE: 235 MMHG
OHS CV CPX PERCENT MAX PREDICTED HEART RATE ACHIEVED: 94
OHS CV CPX RATE PRESSURE PRODUCT PRESENTING: NORMAL
PISA TR MAX VEL: 2.31 M/S
PV MEAN GRADIENT: 0.83 MMHG
PV PEAK VELOCITY: 0.98 CM/S
RA MAJOR: 4.33 CM
RA PRESSURE: 3 MMHG
RA WIDTH: 3.99 CM
RIGHT VENTRICULAR END-DIASTOLIC DIMENSION: 2.83 CM
SINUS: 2.49 CM
STJ: 2.43 CM
STRESS ECHO POST EXERCISE DUR MIN: 3 MINUTES
STRESS ECHO POST EXERCISE DUR SEC: 0 SECONDS
SYSTOLIC BLOOD PRESSURE: 153 MMHG
TDI LATERAL: 0.08 M/S
TDI SEPTAL: 0.07 M/S
TDI: 0.08 M/S
TR MAX PG: 21 MMHG
TV REST PULMONARY ARTERY PRESSURE: 24 MMHG

## 2022-07-28 PROCEDURE — 93306 TTE W/DOPPLER COMPLETE: CPT | Mod: 26,,, | Performed by: INTERNAL MEDICINE

## 2022-07-28 PROCEDURE — 93016 CV STRESS TEST SUPVJ ONLY: CPT | Mod: ,,, | Performed by: INTERNAL MEDICINE

## 2022-07-28 PROCEDURE — 93248 EXT ECG>7D<15D REV&INTERPJ: CPT | Mod: ,,, | Performed by: INTERNAL MEDICINE

## 2022-07-28 PROCEDURE — 93018 EXERCISE STRESS - EKG (CUPID ONLY): ICD-10-PCS | Mod: ,,, | Performed by: INTERNAL MEDICINE

## 2022-07-28 PROCEDURE — 93246 EXT ECG>7D<15D RECORDING: CPT

## 2022-07-28 PROCEDURE — 93248 CV CARDIAC MONITOR - 3-15 DAY ADULT (CUPID ONLY): ICD-10-PCS | Mod: ,,, | Performed by: INTERNAL MEDICINE

## 2022-07-28 PROCEDURE — 93306 ECHO (CUPID ONLY): ICD-10-PCS | Mod: 26,,, | Performed by: INTERNAL MEDICINE

## 2022-07-28 PROCEDURE — 93018 CV STRESS TEST I&R ONLY: CPT | Mod: ,,, | Performed by: INTERNAL MEDICINE

## 2022-07-28 PROCEDURE — 93306 TTE W/DOPPLER COMPLETE: CPT

## 2022-07-28 PROCEDURE — 93016 EXERCISE STRESS - EKG (CUPID ONLY): ICD-10-PCS | Mod: ,,, | Performed by: INTERNAL MEDICINE

## 2022-07-28 PROCEDURE — 93017 CV STRESS TEST TRACING ONLY: CPT

## 2022-08-08 ENCOUNTER — TELEPHONE (OUTPATIENT)
Dept: CARDIOLOGY | Facility: CLINIC | Age: 57
End: 2022-08-08
Payer: COMMERCIAL

## 2022-08-08 NOTE — TELEPHONE ENCOUNTER
I left message the second patch for Vital connect needs to be pit on. If help is needed, please call me back.

## 2022-08-17 DIAGNOSIS — I10 ESSENTIAL HYPERTENSION: Chronic | ICD-10-CM

## 2022-08-17 LAB
OHS CV HOLTER SINUS AVERAGE HR: 83
OHS CV HOLTER SINUS MAX HR: 115
OHS CV HOLTER SINUS MIN HR: 66

## 2022-08-17 RX ORDER — BENAZEPRIL HYDROCHLORIDE 20 MG/1
20 TABLET ORAL DAILY
Qty: 90 TABLET | Refills: 0 | Status: CANCELLED | OUTPATIENT
Start: 2022-08-17

## 2022-08-17 RX ORDER — ALPRAZOLAM 0.25 MG/1
TABLET ORAL
Qty: 30 TABLET | Refills: 0 | Status: CANCELLED | OUTPATIENT
Start: 2022-08-17

## 2022-08-18 NOTE — TELEPHONE ENCOUNTER
LV:11/23/21  LAV:11/23/21  Upcoming Appt:11/17/22    Pt requesting  benazepriL (LOTENSIN) 20 MG tablet     Last fill  8/5/22

## 2022-08-18 NOTE — TELEPHONE ENCOUNTER
Both Rx(s) were sent to pharmacy on 8/5/2022. Please call pharmacy to see if Rx(s) were received on their end. Thanks.

## 2022-08-18 NOTE — TELEPHONE ENCOUNTER
Care Due:                  Date            Visit Type   Department     Provider  --------------------------------------------------------------------------------                                SAME DAY -                              ESTABLISHED   [facility]  Last Visit: 02-      PATIENT      MEDICINE       Hoda Lane                              EP -                              PRIMARY      [facility]  Next Visit: 11-      CARE ([facility])   MEDICINE       Beverly Parks                                                            Last  Test          Frequency    Reason                     Performed    Due Date  --------------------------------------------------------------------------------    HBA1C.......  6 months...  metFORMIN................  11- 05-    [facility] Embedded Care Gaps. Reference number: [id number]. 8/17/2022   7:28:36 PM CDT

## 2022-08-19 ENCOUNTER — PATIENT MESSAGE (OUTPATIENT)
Dept: FAMILY MEDICINE | Facility: CLINIC | Age: 57
End: 2022-08-19
Payer: COMMERCIAL

## 2022-09-07 DIAGNOSIS — D50.9 IRON DEFICIENCY ANEMIA, UNSPECIFIED IRON DEFICIENCY ANEMIA TYPE: Primary | ICD-10-CM

## 2022-09-20 ENCOUNTER — LAB VISIT (OUTPATIENT)
Dept: LAB | Facility: HOSPITAL | Age: 57
End: 2022-09-20
Attending: FAMILY MEDICINE
Payer: COMMERCIAL

## 2022-09-20 DIAGNOSIS — Z17.0 MALIGNANT NEOPLASM OF UPPER-OUTER QUADRANT OF RIGHT BREAST IN FEMALE, ESTROGEN RECEPTOR POSITIVE: ICD-10-CM

## 2022-09-20 DIAGNOSIS — D50.9 IRON DEFICIENCY ANEMIA, UNSPECIFIED IRON DEFICIENCY ANEMIA TYPE: Chronic | ICD-10-CM

## 2022-09-20 DIAGNOSIS — C50.411 MALIGNANT NEOPLASM OF UPPER-OUTER QUADRANT OF RIGHT BREAST IN FEMALE, ESTROGEN RECEPTOR POSITIVE: ICD-10-CM

## 2022-09-20 LAB
ALBUMIN SERPL BCP-MCNC: 3.7 G/DL (ref 3.5–5.2)
ALP SERPL-CCNC: 115 U/L (ref 55–135)
ALT SERPL W/O P-5'-P-CCNC: 63 U/L (ref 10–44)
ANION GAP SERPL CALC-SCNC: 8 MMOL/L (ref 8–16)
AST SERPL-CCNC: 50 U/L (ref 10–40)
BASOPHILS # BLD AUTO: 0.03 K/UL (ref 0–0.2)
BASOPHILS NFR BLD: 0.6 % (ref 0–1.9)
BILIRUB SERPL-MCNC: 0.3 MG/DL (ref 0.1–1)
BUN SERPL-MCNC: 13 MG/DL (ref 6–20)
CALCIUM SERPL-MCNC: 9.6 MG/DL (ref 8.7–10.5)
CHLORIDE SERPL-SCNC: 106 MMOL/L (ref 95–110)
CO2 SERPL-SCNC: 25 MMOL/L (ref 23–29)
CREAT SERPL-MCNC: 0.8 MG/DL (ref 0.5–1.4)
DIFFERENTIAL METHOD: NORMAL
EOSINOPHIL # BLD AUTO: 0.1 K/UL (ref 0–0.5)
EOSINOPHIL NFR BLD: 2.1 % (ref 0–8)
ERYTHROCYTE [DISTWIDTH] IN BLOOD BY AUTOMATED COUNT: 13.9 % (ref 11.5–14.5)
EST. GFR  (NO RACE VARIABLE): >60 ML/MIN/1.73 M^2
GLUCOSE SERPL-MCNC: 128 MG/DL (ref 70–110)
HCT VFR BLD AUTO: 40.5 % (ref 37–48.5)
HGB BLD-MCNC: 13 G/DL (ref 12–16)
IMM GRANULOCYTES # BLD AUTO: 0.01 K/UL (ref 0–0.04)
IMM GRANULOCYTES NFR BLD AUTO: 0.2 % (ref 0–0.5)
IRON SERPL-MCNC: 112 UG/DL (ref 30–160)
LYMPHOCYTES # BLD AUTO: 2 K/UL (ref 1–4.8)
LYMPHOCYTES NFR BLD: 38 % (ref 18–48)
MCH RBC QN AUTO: 28.7 PG (ref 27–31)
MCHC RBC AUTO-ENTMCNC: 32.1 G/DL (ref 32–36)
MCV RBC AUTO: 89 FL (ref 82–98)
MONOCYTES # BLD AUTO: 0.4 K/UL (ref 0.3–1)
MONOCYTES NFR BLD: 8.3 % (ref 4–15)
NEUTROPHILS # BLD AUTO: 2.7 K/UL (ref 1.8–7.7)
NEUTROPHILS NFR BLD: 50.8 % (ref 38–73)
NRBC BLD-RTO: 0 /100 WBC
PLATELET # BLD AUTO: 249 K/UL (ref 150–450)
PMV BLD AUTO: 11.1 FL (ref 9.2–12.9)
POTASSIUM SERPL-SCNC: 4.1 MMOL/L (ref 3.5–5.1)
PROT SERPL-MCNC: 7.1 G/DL (ref 6–8.4)
RBC # BLD AUTO: 4.53 M/UL (ref 4–5.4)
SATURATED IRON: 29 % (ref 20–50)
SODIUM SERPL-SCNC: 139 MMOL/L (ref 136–145)
TOTAL IRON BINDING CAPACITY: 391 UG/DL (ref 250–450)
TRANSFERRIN SERPL-MCNC: 264 MG/DL (ref 200–375)
WBC # BLD AUTO: 5.31 K/UL (ref 3.9–12.7)

## 2022-09-20 PROCEDURE — 84466 ASSAY OF TRANSFERRIN: CPT | Performed by: NURSE PRACTITIONER

## 2022-09-20 PROCEDURE — 82728 ASSAY OF FERRITIN: CPT | Performed by: NURSE PRACTITIONER

## 2022-09-20 PROCEDURE — 85025 COMPLETE CBC W/AUTO DIFF WBC: CPT | Performed by: NURSE PRACTITIONER

## 2022-09-20 PROCEDURE — 80053 COMPREHEN METABOLIC PANEL: CPT | Performed by: NURSE PRACTITIONER

## 2022-09-20 PROCEDURE — 36415 COLL VENOUS BLD VENIPUNCTURE: CPT | Mod: PN | Performed by: NURSE PRACTITIONER

## 2022-09-21 LAB — FERRITIN SERPL-MCNC: 78 NG/ML (ref 20–300)

## 2022-11-03 DIAGNOSIS — D50.9 IRON DEFICIENCY ANEMIA, UNSPECIFIED IRON DEFICIENCY ANEMIA TYPE: Primary | Chronic | ICD-10-CM

## 2022-11-17 ENCOUNTER — OFFICE VISIT (OUTPATIENT)
Dept: FAMILY MEDICINE | Facility: CLINIC | Age: 57
End: 2022-11-17
Payer: COMMERCIAL

## 2022-11-17 VITALS
HEIGHT: 68 IN | DIASTOLIC BLOOD PRESSURE: 72 MMHG | BODY MASS INDEX: 42.83 KG/M2 | HEART RATE: 81 BPM | OXYGEN SATURATION: 98 % | RESPIRATION RATE: 18 BRPM | WEIGHT: 282.63 LBS | SYSTOLIC BLOOD PRESSURE: 134 MMHG | TEMPERATURE: 98 F

## 2022-11-17 DIAGNOSIS — E03.9 HYPOTHYROIDISM, UNSPECIFIED TYPE: Chronic | ICD-10-CM

## 2022-11-17 DIAGNOSIS — K63.5 BENIGN COLON POLYP: ICD-10-CM

## 2022-11-17 DIAGNOSIS — C50.411 MALIGNANT NEOPLASM OF UPPER-OUTER QUADRANT OF RIGHT BREAST IN FEMALE, ESTROGEN RECEPTOR POSITIVE: ICD-10-CM

## 2022-11-17 DIAGNOSIS — Z00.00 ANNUAL PHYSICAL EXAM: Primary | ICD-10-CM

## 2022-11-17 DIAGNOSIS — Z17.0 MALIGNANT NEOPLASM OF UPPER-OUTER QUADRANT OF RIGHT BREAST IN FEMALE, ESTROGEN RECEPTOR POSITIVE: ICD-10-CM

## 2022-11-17 DIAGNOSIS — E66.01 MORBID OBESITY WITH BMI OF 40.0-44.9, ADULT: ICD-10-CM

## 2022-11-17 DIAGNOSIS — G62.0 CHEMOTHERAPY-INDUCED NEUROPATHY: ICD-10-CM

## 2022-11-17 DIAGNOSIS — T45.1X5A CHEMOTHERAPY-INDUCED NEUROPATHY: ICD-10-CM

## 2022-11-17 DIAGNOSIS — R73.03 PREDIABETES: ICD-10-CM

## 2022-11-17 DIAGNOSIS — I10 ESSENTIAL HYPERTENSION: ICD-10-CM

## 2022-11-17 DIAGNOSIS — E88.810 INSULIN RESISTANCE SYNDROME: ICD-10-CM

## 2022-11-17 DIAGNOSIS — E55.9 VITAMIN D DEFICIENCY: ICD-10-CM

## 2022-11-17 DIAGNOSIS — D50.9 IRON DEFICIENCY ANEMIA, UNSPECIFIED IRON DEFICIENCY ANEMIA TYPE: Chronic | ICD-10-CM

## 2022-11-17 PROCEDURE — 3078F DIAST BP <80 MM HG: CPT | Mod: CPTII,S$GLB,, | Performed by: FAMILY MEDICINE

## 2022-11-17 PROCEDURE — 3008F BODY MASS INDEX DOCD: CPT | Mod: CPTII,S$GLB,, | Performed by: FAMILY MEDICINE

## 2022-11-17 PROCEDURE — 90471 FLU VACCINE (QUAD) GREATER THAN OR EQUAL TO 3YO PRESERVATIVE FREE IM: ICD-10-PCS | Mod: S$GLB,,, | Performed by: FAMILY MEDICINE

## 2022-11-17 PROCEDURE — 3075F SYST BP GE 130 - 139MM HG: CPT | Mod: CPTII,S$GLB,, | Performed by: FAMILY MEDICINE

## 2022-11-17 PROCEDURE — 3078F PR MOST RECENT DIASTOLIC BLOOD PRESSURE < 80 MM HG: ICD-10-PCS | Mod: CPTII,S$GLB,, | Performed by: FAMILY MEDICINE

## 2022-11-17 PROCEDURE — 90686 IIV4 VACC NO PRSV 0.5 ML IM: CPT | Mod: S$GLB,,, | Performed by: FAMILY MEDICINE

## 2022-11-17 PROCEDURE — 99999 PR PBB SHADOW E&M-EST. PATIENT-LVL V: ICD-10-PCS | Mod: PBBFAC,,, | Performed by: FAMILY MEDICINE

## 2022-11-17 PROCEDURE — 3008F PR BODY MASS INDEX (BMI) DOCUMENTED: ICD-10-PCS | Mod: CPTII,S$GLB,, | Performed by: FAMILY MEDICINE

## 2022-11-17 PROCEDURE — 99999 PR PBB SHADOW E&M-EST. PATIENT-LVL V: CPT | Mod: PBBFAC,,, | Performed by: FAMILY MEDICINE

## 2022-11-17 PROCEDURE — 90686 FLU VACCINE (QUAD) GREATER THAN OR EQUAL TO 3YO PRESERVATIVE FREE IM: ICD-10-PCS | Mod: S$GLB,,, | Performed by: FAMILY MEDICINE

## 2022-11-17 PROCEDURE — 90471 IMMUNIZATION ADMIN: CPT | Mod: S$GLB,,, | Performed by: FAMILY MEDICINE

## 2022-11-17 PROCEDURE — 99396 PR PREVENTIVE VISIT,EST,40-64: ICD-10-PCS | Mod: 25,S$GLB,, | Performed by: FAMILY MEDICINE

## 2022-11-17 PROCEDURE — 3075F PR MOST RECENT SYSTOLIC BLOOD PRESS GE 130-139MM HG: ICD-10-PCS | Mod: CPTII,S$GLB,, | Performed by: FAMILY MEDICINE

## 2022-11-17 PROCEDURE — 81001 URINALYSIS AUTO W/SCOPE: CPT | Performed by: FAMILY MEDICINE

## 2022-11-17 PROCEDURE — 99396 PREV VISIT EST AGE 40-64: CPT | Mod: 25,S$GLB,, | Performed by: FAMILY MEDICINE

## 2022-11-17 NOTE — PROGRESS NOTES
CHIEF COMPLAINT: Annual wellness examination.     HISTORY OF PRESENT ILLNESS: Ms. Urbano comes in today not fasting and with taking blood pressure medication for annual wellness examination. She reports no acute problems today.    END OF LIFE DECISION: She has no living will and is not sure about life support (depends on the situation).    Current Outpatient Medications   Medication Sig    acyclovir (ZOVIRAX) 400 MG tablet TAKE 1 TABLET BY MOUTH THREE TIMES DAILY WITH MEALS    ALPRAZolam (XANAX) 0.5 MG tablet Take 1 tablet (0.5 mg total) by mouth 2 (two) times daily as needed for Anxiety.    amLODIPine (NORVASC) 10 MG tablet Take 1 tablet by mouth once daily    anastrozole (ARIMIDEX) 1 mg Tab Take 1 tablet by mouth once daily    benazepriL (LOTENSIN) 20 MG tablet Take 1 tablet (20 mg total) by mouth once daily.    fluticasone propionate (FLONASE) 50 mcg/actuation nasal spray USE 2 SPRAY(S) IN EACH NOSTRIL ONCE DAILY AS NEEDED FOR  RHINITIS    ibuprofen (ADVIL,MOTRIN) 800 MG tablet Take 1 tab by mouth TID prn pain    metFORMIN (GLUCOPHAGE) 500 MG tablet TAKE 4 TABLETS BY MOUTH IN THE EVENING AS DIRECTED    metoprolol succinate (TOPROL-XL) 25 MG 24 hr tablet Take 3 tablets (75 mg total) by mouth once daily.    omeprazole (PRILOSEC) 20 MG capsule TAKE 1 CAPSULE BY MOUTH ONCE DAILY AS NEEDED    SYNTHROID 175 mcg tablet Take 1 tablet (175 mcg total) by mouth once daily.     SCREENINGS:  Cholesterol: November 23, 2021.  FFS/Colonoscopy: February 22, 2019 - benign colon polyp, diverticulosis, hemorrhoids - repeat in 5 years.  Mammogram: July 1, 2022 - benign.   GYN Exam (breast only): July 20, 2022 with oncology. Pap smear no longer needed.   Dexa Scan: December 20, 2021 - okay.   Eye Exam: February 9, 2021 with Dr. Scherer.  PPD: Negative in the past.  Immunizations: Tdap - April 22, 2010. Td - November 17, 2020.  Gardasil - N./A.  Zostavax - N./A.  Shingrix - November 17, 2020, May 20, 2021.  Pneumovax - November 23,  2021.   Prevnar-13 shot - November 17, 2020.  Seasonal Flu - November 2, 2021. She desires.   Covid-19 (Pfizer) vaccine series - March 3, 2021, March 24, 2021, December 7, 2021.     ROS:  GENERAL: Denies fever, chills, fatigue or unusual weight change. Appetite normal. WWeight 131.5 kg (289 lb 14.5 oz) at November 23, 2021 visit. Monitors diet by intermittent fasting but does exercise by walking 3 times per week, 30 minutes each time.   SKIN: Denies rashes, itching, changes in mole, color or texture of skin or easy bruising.  HEAD: Denies recent head trauma or headaches.  EYES: Denies change in vision, diplopia, redness or discharge. Wears readers.  EARS: Denies ear pain, discharge, vertigo or decreased hearing.  NOSE: Reports occasional nasal congestion, post-nasal drip; uses OTC nasal spray with help.  MOUTH & THROAT: Denies hoarseness or change in voice. Denies excessive gum bleeding or mouth sores. Denies sore throat.  NODES: Denies swollen glands.  CHEST: Denies SHARIF, wheezing, cough, or sputum production.  CARDIOVASCULAR: Denies chest pain, PND, orthopnea or reduced exercise tolerance. Denies palpitations. Saw Dr. Hicks, cardiologist, on July 8, 2022 for hypertension follow up at which time Metoprolol succinate was increased to 75 mg daily with 3-month follow up advised.  ABDOMEN: Denies diarrhea, constipation, nausea, vomiting, abdominal pain, or blood in stool.  URINARY: Denies flank pain, dysuria or hematuria.  GENITOURINARY: Denies flank pain, dysuria, frequency or hematuria. Performs monthly breast self exams. Saw JACQUES Bergeron with  hematologist/oncologist on July 20, 2022 for surveillance of right breast cancer. She states she completed chemotherapy in September 2017 and completed radiation therapy in February 2018; she continues Arimidex daily therapy.  ENDOCRINE: Denies diabetes, cholesterol problems.   HEME/LYMPH: Denies bleeding problems. Reports does not take iron therapy.  PERIPHERAL  "VASCULAR:Denies claudication or cyanosis.  MUSCULOSKELETAL: Denies edema. Reports "aging" joint pain, stiffness but not today.  NEUROLOGIC: Denies history of seizures, tremors, paralysis, alteration of gait or coordination.  PSYCHIATRIC: Denies mood swings, depression, anxiety, homicidal or suicidal thoughts. Denies sleep problems.    PE:   VS: Blood Pressure 134/72   Pulse 81   Temperature 98 °F (36.7 °C)   Respiration 18   Height 5' 8" (1.727 m)   Weight 128.2 kg (282 lb 10.1 oz)   Last Menstrual Period 01/01/2016 (LMP Unknown)   Oxygen Saturation 98%   Body Mass Index 42.97 kg/m²   APPEARANCE: Well nourished, well developed female, obese and pleasant, alert and oriented in no acute distress.  HEAD: Non tender. Full range of motion.  EYES: PERRL, conjunctiva pink, lids no edema.  EARS: External canal patent, no swelling or redness. TM's shiny and clear.  NOSE: Mucosa and turbinates not congested. No discharge. Non tender sinuses.  THROAT: No pharyngeal erythema or exudate. No stridor.  NECK: Supple, no mass, thyroid not enlarged.  NODES: No cervical lymph node enlargement.  CHEST: Normal respiratory effort. Lungs clear to auscultation.  CARDIOVASCULAR: Normal S1, S2. No rubs, murmurs or gallops. PMI not displaced. No carotid bruit. Pedal pulses palpable bilaterally. No edema.  ABDOMEN: Bowel sounds present. Not distended. Soft. No tenderness, masses or organomegaly.  BREAST EXAM: Not examined as already performed by oncologist.  PELVIC EXAM: Not examined as patient has had RAH/BSO due to non cancerous reasons.  RECTAL EXAM: Not examined per patient request.  MUSCULOSKELETAL: No joint deformities or stiffness. She is ambulatory without problems.  SKIN: No rashes or suspicious lesions, normal color and turgor.  NEUROLOGIC: Cranial Nerves: II-XII grossly intact. DTR's: Knees, Ankles 2+ and equal bilaterally. Gait & Posture: Normal gait and fine motion.  PSYCHIATRIC: Patient alert, oriented x 3. Mood/Affect " normal without acute anxiety and depression noted. Judgment/insight good as she makes appropriate decisions during today's examination.    Protective Sensation (w/ 10 gram monofilament):  Right: Intact  Left: Intact    Visual Inspection:  Normal -  Bilateral    Pedal Pulses:   Right: Present  Left: Present    Posterior tibialis:   Right:Present  Left: Present    ASSESSMENT:    ICD-10-CM ICD-9-CM    1. Annual physical exam  Z00.00 V70.0 TSH      Urinalysis      Lipid Panel      Hemoglobin A1C      Vitamin D      Insulin, random      2. Essential hypertension  I10 401.9       3. Prediabetes  R73.03 790.29       4. Insulin resistance syndrome  E88.81 277.7       5. Hypothyroidism, unspecified type  E03.9 244.9       6. Vitamin D deficiency  E55.9 268.9       7. Iron deficiency anemia, unspecified iron deficiency anemia type  D50.9 280.9       8. Benign colon polyp  K63.5 211.3       9. Chemotherapy-induced neuropathy  G62.0 357.6     T45.1X5A E933.1       10. Malignant neoplasm of upper-outer quadrant of right breast in female, estrogen receptor positive  C50.411 174.4     Z17.0 V86.0       11. Morbid obesity with BMI of 40.0-44.9, adult  E66.01 278.01     Z68.41 V85.41           PLAN:  1. Age-appropriate counseling-appropriate low-sodium, low-cholesterol, low carbohydrate diet and exercise daily, monthly breast self exam, annual wellness examination.   2. Patient advised to call for results.  3. Continue current medications.  4. Keep follow up with specialists.  5. Work excuse for today with return tomorrow.  6. Flu shot will be given today.  7. Follow up in about 6 months (around 5/17/2023) for hypothyroidism, IRS, prediabetes and hypertension follow up.

## 2022-11-18 LAB
AMORPH CRY UR QL COMP ASSIST: ABNORMAL
BACTERIA #/AREA URNS AUTO: ABNORMAL /HPF
BILIRUB UR QL STRIP: NEGATIVE
CLARITY UR REFRACT.AUTO: ABNORMAL
COLOR UR AUTO: YELLOW
GLUCOSE UR QL STRIP: NEGATIVE
HGB UR QL STRIP: NEGATIVE
KETONES UR QL STRIP: NEGATIVE
LEUKOCYTE ESTERASE UR QL STRIP: ABNORMAL
MICROSCOPIC COMMENT: ABNORMAL
NITRITE UR QL STRIP: NEGATIVE
PH UR STRIP: 5 [PH] (ref 5–8)
PROT UR QL STRIP: NEGATIVE
SP GR UR STRIP: 1.02 (ref 1–1.03)
URN SPEC COLLECT METH UR: ABNORMAL
WBC #/AREA URNS AUTO: 0 /HPF (ref 0–5)

## 2022-12-05 ENCOUNTER — LAB VISIT (OUTPATIENT)
Dept: LAB | Facility: HOSPITAL | Age: 57
End: 2022-12-05
Attending: FAMILY MEDICINE
Payer: COMMERCIAL

## 2022-12-05 DIAGNOSIS — Z00.00 ANNUAL PHYSICAL EXAM: ICD-10-CM

## 2022-12-05 LAB
25(OH)D3+25(OH)D2 SERPL-MCNC: 32 NG/ML (ref 30–96)
CHOLEST SERPL-MCNC: 192 MG/DL (ref 120–199)
CHOLEST/HDLC SERPL: 3.8 {RATIO} (ref 2–5)
HDLC SERPL-MCNC: 51 MG/DL (ref 40–75)
HDLC SERPL: 26.6 % (ref 20–50)
INSULIN COLLECTION INTERVAL: NORMAL
INSULIN SERPL-ACNC: 9 UU/ML
LDLC SERPL CALC-MCNC: 126.4 MG/DL (ref 63–159)
NONHDLC SERPL-MCNC: 141 MG/DL
TRIGL SERPL-MCNC: 73 MG/DL (ref 30–150)
TSH SERPL DL<=0.005 MIU/L-ACNC: 0.75 UIU/ML (ref 0.4–4)

## 2022-12-05 PROCEDURE — 84443 ASSAY THYROID STIM HORMONE: CPT | Performed by: FAMILY MEDICINE

## 2022-12-05 PROCEDURE — 82306 VITAMIN D 25 HYDROXY: CPT | Performed by: FAMILY MEDICINE

## 2022-12-05 PROCEDURE — 80061 LIPID PANEL: CPT | Performed by: FAMILY MEDICINE

## 2022-12-05 PROCEDURE — 83525 ASSAY OF INSULIN: CPT | Performed by: FAMILY MEDICINE

## 2022-12-15 ENCOUNTER — PATIENT MESSAGE (OUTPATIENT)
Dept: PODIATRY | Facility: CLINIC | Age: 57
End: 2022-12-15
Payer: COMMERCIAL

## 2022-12-21 ENCOUNTER — OFFICE VISIT (OUTPATIENT)
Dept: PODIATRY | Facility: CLINIC | Age: 57
End: 2022-12-21
Payer: COMMERCIAL

## 2022-12-21 DIAGNOSIS — M77.51 BURSITIS OF POSTERIOR HEEL, RIGHT: ICD-10-CM

## 2022-12-21 DIAGNOSIS — M24.571 EQUINUS CONTRACTURE OF RIGHT ANKLE: ICD-10-CM

## 2022-12-21 DIAGNOSIS — M72.2 PLANTAR FASCIITIS: ICD-10-CM

## 2022-12-21 DIAGNOSIS — M76.61 ACHILLES TENDINITIS OF RIGHT LOWER EXTREMITY: Primary | ICD-10-CM

## 2022-12-21 PROCEDURE — 99999 PR PBB SHADOW E&M-EST. PATIENT-LVL III: CPT | Mod: PBBFAC,,, | Performed by: PODIATRIST

## 2022-12-21 PROCEDURE — 99214 OFFICE O/P EST MOD 30 MIN: CPT | Mod: S$GLB,,, | Performed by: PODIATRIST

## 2022-12-21 PROCEDURE — 1159F MED LIST DOCD IN RCRD: CPT | Mod: CPTII,S$GLB,, | Performed by: PODIATRIST

## 2022-12-21 PROCEDURE — 99999 PR PBB SHADOW E&M-EST. PATIENT-LVL III: ICD-10-PCS | Mod: PBBFAC,,, | Performed by: PODIATRIST

## 2022-12-21 PROCEDURE — 1160F RVW MEDS BY RX/DR IN RCRD: CPT | Mod: CPTII,S$GLB,, | Performed by: PODIATRIST

## 2022-12-21 PROCEDURE — 1159F PR MEDICATION LIST DOCUMENTED IN MEDICAL RECORD: ICD-10-PCS | Mod: CPTII,S$GLB,, | Performed by: PODIATRIST

## 2022-12-21 PROCEDURE — 1160F PR REVIEW ALL MEDS BY PRESCRIBER/CLIN PHARMACIST DOCUMENTED: ICD-10-PCS | Mod: CPTII,S$GLB,, | Performed by: PODIATRIST

## 2022-12-21 PROCEDURE — 99214 PR OFFICE/OUTPT VISIT, EST, LEVL IV, 30-39 MIN: ICD-10-PCS | Mod: S$GLB,,, | Performed by: PODIATRIST

## 2022-12-21 RX ORDER — CYCLOBENZAPRINE HCL 10 MG
10 TABLET ORAL DAILY PRN
Qty: 20 TABLET | Refills: 0 | Status: SHIPPED | OUTPATIENT
Start: 2022-12-21 | End: 2024-01-09

## 2022-12-21 NOTE — LETTER
December 21, 2022      [facility] 2nd Floor  [address] [facility] [address]  [place] [address]  Phone: [phone]  Fax: [phone]       Patient: Kylie Urbano   YOB: 1965  Date of Visit: 12/21/2022    To Whom It May Concern:    Nick Urbano  was at [facility] on 12/21/2022. The patient may return to work on 01/23/2023 with no restrictions. If you have any questions or concerns, or if I can be of further assistance, please do not hesitate to contact me.    Sincerely,      Dwain Solares MA

## 2022-12-27 ENCOUNTER — CLINICAL SUPPORT (OUTPATIENT)
Dept: REHABILITATION | Facility: HOSPITAL | Age: 57
End: 2022-12-27
Attending: PODIATRIST
Payer: COMMERCIAL

## 2022-12-27 DIAGNOSIS — M77.51 BURSITIS OF POSTERIOR HEEL, RIGHT: ICD-10-CM

## 2022-12-27 DIAGNOSIS — R53.1 DECREASED STRENGTH, ENDURANCE, AND MOBILITY: ICD-10-CM

## 2022-12-27 DIAGNOSIS — Z74.09 DECREASED STRENGTH, ENDURANCE, AND MOBILITY: ICD-10-CM

## 2022-12-27 DIAGNOSIS — M24.571 EQUINUS CONTRACTURE OF RIGHT ANKLE: ICD-10-CM

## 2022-12-27 DIAGNOSIS — R26.9 GAIT ABNORMALITY: ICD-10-CM

## 2022-12-27 DIAGNOSIS — M25.571 ACUTE RIGHT ANKLE PAIN: ICD-10-CM

## 2022-12-27 DIAGNOSIS — M76.61 ACHILLES TENDINITIS OF RIGHT LOWER EXTREMITY: ICD-10-CM

## 2022-12-27 DIAGNOSIS — R68.89 DECREASED STRENGTH, ENDURANCE, AND MOBILITY: ICD-10-CM

## 2022-12-27 PROCEDURE — 97162 PT EVAL MOD COMPLEX 30 MIN: CPT

## 2022-12-27 NOTE — PLAN OF CARE
[facility]   Physical Therapy Initial Evaluation     Date: 12/27/2022   Name: Kylie Urbano  Clinic Number: [id number]    Therapy Diagnosis:   Encounter Diagnoses   Name Primary?    Achilles tendinitis of right lower extremity - Right Foot     Bursitis of posterior heel, right     Equinus contracture of right ankle      Physician: Kristina Cazares DPM    Physician Orders: PT Eval and Treat   Medical Diagnosis from Referral:   Achilles tendinitis of right lower extremity - Right Foot   Bursitis of posterior heel, right   Equinus contracture of right ankle     Evaluation Date: 12/27/2022  Authorization Period Expiration: 12/21/2023  Plan of Care Expiration: 3/27/2023  Progress Note Due: 1/27/2023  Visit # / Visits authorized: 1/ 1   FOTO: 1/ 3     Precautions: Standard    Time In: 0930  Time Out: 1015  Total Appointment Time (timed & untimed codes): 45 minutes      SUBJECTIVE   Date of onset: 2022    History of current condition - Kylie reports: of pain at the posterior right Achilles and heel.  This is the third week of this flare-up.  Previous history of the same injury last year.  Previously in a boot for 6 weeks.  After the 6 weeks, the pain did resolve.  Reports that she stands prolonged periods while working which increases pain.   - she reports that when others don't show up for work, there is more pain.  After workday, pain is much.  Sometimes pain is better early in the morning.    Falls: None    Imaging, x-ray right foot:    FINDINGS:  Prominent dorsal and plantar calcaneal spurs.  In thesis noted within the distal thickened Achilles tendon at and proximal to its insertion.  Multi articular degenerative change in the midfoot.  Pes planus and mild prominence degenerative change 1st MTP joint.  Positioning limits evaluation of the phalanges.  No acute or healing fracture.     Area of irregularity along the articular margin base 5th metatarsal visualized on previous exam  from 2011.    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    Pain:  Current 8/10, worst 10/10, best 3/10   Location: right Achilles, calcaneus, and anterior talus regions - Distal Achilles and calcaneus are the most painful  Description: Aching, Dull, Throbbing, and Grabbing  Aggravating Factors: Standing and Morning  Easing Factors:  Avoiding prolonged standing when she is able to     Patients goals: Decreased pain with working and return to walking exercise regimen with mother     Medical History:   Past Medical History:   Diagnosis Date    Allergic rhinitis, cause unspecified     Breast cancer 2017    Elevated liver function tests     in the past    Fatty liver 05/02/2017    on ultrasound    Hepatomegaly 05/02/2017    on ultrasound    History of sciatica     HSV infection     Type 1 and 2    HTN (hypertension)     Hypothyroidism     Insulin resistance     Iron deficiency anemia, unspecified     Obesity     PONV (postoperative nausea and vomiting)     Tension headache     Vitamin D deficiency        Surgical History:   Kylie Urbano  has a past surgical history that includes BTL; Dilation and curettage of uterus; Hysterectomy; Breast lumpectomy (Right, 2017); and Colonoscopy (N/A, 2/22/2019).    Medications:   Kylie has a current medication list which includes the following prescription(s): acyclovir, alprazolam, amlodipine, anastrozole, benazepril, cyclobenzaprine, fluticasone propionate, ibuprofen, metformin, metoprolol succinate, omeprazole, and synthroid.    Allergies:   Review of patient's allergies indicates:   Allergen Reactions     Methylprednisone Anxiety and Palpitations    Amoxicillin Rash     Historical    Hydrocodone-acetaminophen Nausea And Vomiting     Historical.  Historical.    Sulfa (sulfonamide antibiotics) Rash          OBJECTIVE       CMS Impairment/Limitation/Restriction for FOTO Orthopedic Ankle Survey    Therapist reviewed FOTO scores for Kylie Urbano on 12/27/2022.   FOTO documents entered into [employer] - see Media section.              Gait/Balance: CAM walking boot at the right lower extremities during gait, externally rotated hip/toe out at the right lower extremities, decreased step length and walking velocity     SL balance R=unable to perform at the right lower extremities due to pain          Squat:   Double leg: Not tested due to significant pain in weight bearing without CAM walking boot              Sensation: Intact to light touch at the right foot    Strength:       L/E MMT Right Pain/Dysfunction with Movement   Ankle DF 4-/5    Ankle PF 3/5 No tolerance to weight bearing without boot   Ankle Inversion 3+/5 Pain Achilles region   Ankle Eversion 4-/5    Big Toe Extension 4-/5    Big Toe Flexion 4/5          Range of Motion:       Ankle/Foot Right Pain/Dysfunction with Movement   PROM     dorsiflexion  +2 deg Pain at the Achilles/posterior calcaneus   plantarflexion  46 deg  Pain posterior calcaneus/Achilles   inversion  18 deg    eversion  4 deg Pain at the Achilles region   Great toe extension  51        Palpation:  Tenderness on palpation of the Achilles and calcaneus region. No significant complaints at the soleus nor gastroc regions        TREATMENT     Total Treatment time (time-based codes) separate from Evaluation: 5 minutes      Kylie received the treatments listed below:        THERAPEUTIC EXERCISES to develop strength, endurance, ROM, flexibility, posture, and core stabilization for (5) minutes including:    Intervention Performed Today    Nustep - boot off if tolerant     Ankle alphabets     Towel  stretch in dorsiflexion     Therabands  Ankle PF  Ankle Inv  Ankle Ev   Sitting heel raises     STM  right achilles/calcaneus     Joint mobilization - talocrural and subtalar     Toe Yoga       Plan for Next Visit:         PATIENT EDUCATION AND HOME EXERCISES     Education provided:   - Educated on EvenUps while wearing boot, Towel stretch into DF, ankle alphabets    Written Home Exercises Provided: yes. Exercises were reviewed and Kylie was able to demonstrate them prior to the end of the session.  Kylie demonstrated good  understanding of the education provided. See EMR under Patient Instructions for exercises provided during therapy sessions.    ASSESSMENT     Kylie is a 57 y.o. female referred to outpatient Physical Therapy with a medical diagnosis of    Achilles tendinitis of right lower extremity - Right Foot    Bursitis of posterior heel, right    Equinus contracture of right ankle   The patient presents with signs and symptoms consistent with diagnosis along with pain at the right Calcaneus and Achilles region, decreased ROM at the right ankle, weakness right ankle and foot secondary to pain, significant pain with weight-bearing with and without boot at the right lower extremities, significant gait deficits with boot, pain with prolonged standing which is a work requirement, and pain inability to return to daily walking regimen with mother. Patient demonstrates symptoms that are suggestive of a right Achilles strain with compensatory techniques causing some talocrural pain.      Pt prognosis is Fair, if patient is consistent and compliant with PT and HEP.   Pt will benefit from skilled outpatient Physical Therapy to address the deficits stated above and in the chart below, provide pt/family education, and to maximize pt's level of independence.     Plan of care discussed with patient: Yes  Pt's spiritual, cultural and educational needs considered and patient is agreeable to the plan of care and goals as  stated below:     Anticipated Barriers for therapy: BMI over 30, Headaches, High Blood Pressure, previous history of breast cancer    Medical Necessity is demonstrated by the following  History  Co-morbidities and personal factors that may impact the plan of care Co-morbidities:   BMI over 30, Headaches, High Blood Pressure, previous history of breast cancer    Personal Factors:   no deficits     moderate   Examination  Body Structures and Functions, activity limitations and participation restrictions that may impact the plan of care Body Regions:   lower extremities    Body Systems:    ROM  strength  balance  gait  motor control  joint mobility, muscle tone, muscle length    Participation Restrictions:   See current level of function listed above     Activity limitations:   Learning and applying knowledge  no deficits    General Tasks and Commands  no deficits    Communication  no deficits    Mobility  lifting and carrying objects  walking    Self care  no deficits    Domestic Life  shopping  cooking  doing house work (cleaning house, washing dishes, laundry)    Interactions/Relationships  no deficits    Life Areas  employment    Community and Social Life  community life  recreation and leisure         moderate   Clinical Presentation evolving clinical presentation with changing clinical characteristics moderate   Decision Making/ Complexity Score: moderate     Goals: Reviewed:12/27/2022    Short Term Goals:  In 6 weeks  1.Pt to be educated on HEP.  2.Patient to demo increased PROM to DF +10 deg, Inv 30 deg, Ev 15 deg, PF 60 deg without pain, in order to assist with normalizing gait pattern and tolerate activities such as squatting for work related activities.  3.Patient to demo increased strength right ankle to 4/5, in order to improve gait when boot is removed  4.Patient to have decreased pain to 2/10, to improve QOL.  5.Patient to increase SL bal to 5 seconds, in order to improved gait tolerance of increased ROM  at the right lower extremities.  6.Patient to improve score on the FOTO, to improve QOL.    Long Term Goals: In 12 weeks  1. Patient to perform daily activities including walking and standing as  with varying postures without increased symptoms.  2. Patient to demonstrate increased ankle PROM to DF +10 deg, Inv 30 deg, Ev 15 deg, PF 60 deg without pain, in order to assist with normalizing gait pattern and tolerate activities such as squatting for work related activities.  3. Patient to demonstrate increased 4+/5 at the right ankle to improve gait mechanics and return to normal walking regimen  4. Patient to have decreased pain to 2/10, to improve QOL.  5. Patient to improve score on the FOTO to 36% or less, to improve QOL.  6.Patient to increase SL bal to 10 seconds, in order to tolerate variations of semi-static positions while standing as a .        PLAN     Plan of care Certification: 12/27/2022 to 3/27/2023.    Outpatient Physical Therapy 2 times weekly for 12 weeks to include the following interventions: Gait Training, Manual Therapy, Moist Heat/ Ice, Neuromuscular Re-ed, Patient Education, Self Care, Therapeutic Activities, and Therapeutic Exercise.     Ravinder Medellin, PT      I CERTIFY THE NEED FOR THESE SERVICES FURNISHED UNDER THIS PLAN OF TREATMENT AND WHILE UNDER MY CARE   Physician's comments:     Physician's Signature: ___________________________________________________

## 2022-12-28 PROBLEM — M25.571 ACUTE RIGHT ANKLE PAIN: Status: ACTIVE | Noted: 2022-12-28

## 2022-12-28 PROBLEM — R53.1 DECREASED STRENGTH, ENDURANCE, AND MOBILITY: Status: ACTIVE | Noted: 2022-12-28

## 2022-12-28 PROBLEM — Z74.09 DECREASED STRENGTH, ENDURANCE, AND MOBILITY: Status: ACTIVE | Noted: 2022-12-28

## 2022-12-28 PROBLEM — R26.9 GAIT ABNORMALITY: Status: ACTIVE | Noted: 2022-12-28

## 2022-12-28 PROBLEM — R68.89 DECREASED STRENGTH, ENDURANCE, AND MOBILITY: Status: ACTIVE | Noted: 2022-12-28

## 2023-01-02 ENCOUNTER — PATIENT MESSAGE (OUTPATIENT)
Dept: PODIATRY | Facility: CLINIC | Age: 58
End: 2023-01-02
Payer: COMMERCIAL

## 2023-01-03 ENCOUNTER — CLINICAL SUPPORT (OUTPATIENT)
Dept: REHABILITATION | Facility: HOSPITAL | Age: 58
End: 2023-01-03
Payer: COMMERCIAL

## 2023-01-03 DIAGNOSIS — M25.571 ACUTE RIGHT ANKLE PAIN: Primary | ICD-10-CM

## 2023-01-03 DIAGNOSIS — Z74.09 DECREASED STRENGTH, ENDURANCE, AND MOBILITY: ICD-10-CM

## 2023-01-03 DIAGNOSIS — R53.1 DECREASED STRENGTH, ENDURANCE, AND MOBILITY: ICD-10-CM

## 2023-01-03 DIAGNOSIS — R68.89 DECREASED STRENGTH, ENDURANCE, AND MOBILITY: ICD-10-CM

## 2023-01-03 DIAGNOSIS — R26.9 GAIT ABNORMALITY: ICD-10-CM

## 2023-01-03 PROBLEM — G62.0 CHEMOTHERAPY-INDUCED NEUROPATHY: Status: ACTIVE | Noted: 2023-01-03

## 2023-01-03 PROBLEM — T45.1X5A CHEMOTHERAPY-INDUCED NEUROPATHY: Status: ACTIVE | Noted: 2023-01-03

## 2023-01-03 PROCEDURE — 97112 NEUROMUSCULAR REEDUCATION: CPT

## 2023-01-03 PROCEDURE — 97110 THERAPEUTIC EXERCISES: CPT

## 2023-01-03 PROCEDURE — 97140 MANUAL THERAPY 1/> REGIONS: CPT

## 2023-01-03 NOTE — PROGRESS NOTES
[facility]   Physical Therapy Treatment Note     Name: Kylie Urbano  Clinic Number: [id number]    Therapy Diagnosis:   Encounter Diagnoses   Name Primary?    Acute right ankle pain Yes    Decreased strength, endurance, and mobility     Gait abnormality      Physician: Kristina Cazares DPM    Visit Date: 1/3/2023    Physician Orders: PT Eval and Treat   Medical Diagnosis from Referral:   Achilles tendinitis of right lower extremity - Right Foot   Bursitis of posterior heel, right   Equinus contracture of right ankle   Evaluation Date: 12/27/2022  Authorization Period Expiration: 12/31/2023  Plan of Care Expiration: 3/27/2023  Progress Note Due: 1/27/2023  Visit # / Visits authorized: 1/25 (+1 Evaluation)  FOTO: 1/ 3      Precautions: Standard    PTA Visit #: 0/5     Time In: 9:40 AM (late arrival)  Time Out: 10:22 AM  Total Billable Time: 42 minutes    SUBJECTIVE     Patient reports: she has been doing her HOME EXERCISE PROGRAM at home, but her right heel pain is still about the same today.  She was compliant with home exercise program.  Response to previous treatment: none yet  Functional change: walking in CAM boot on right LOWER EXTREMITY, pain with work duties, decreased ability to perform ACTIVITIES OF DAILY LIVING     Pain: 6/10  Location: right achilles tendon    OBJECTIVE     Objective Measures updated at progress report unless specified.       Treatment     Kylie received the treatments listed below:      THERAPEUTIC EXERCISES to develop strength, endurance, ROM, flexibility, posture, and core stabilization for (8) minutes including:     Intervention Performed Today     Nustep   x  5 minutes, Level one, boot doffed   Towel stretch in dorsiflexion  x  2 minutes supine with left ankle propped   Therabands (yellow)    held   Ankle PF  Ankle Inv  Ankle Ev                   Plan for Next Visit: progress as tolerated     NEUROMUSCULAR RE-EDUCATION activities to improve:  Coordination, Kinesthetic, Sense, and Proprioception for (23) minutes. The following activities were included: x = exercises performed     Neuromuscular Re-Education 1/3/2023    Toe Yoga X  x  isolating big toe extension x30  Isolating 4 digits extension x30   Ankle Alphabets x Capital A,B, and C x10 each   Seated heel raises x X20. Modified RANGE OF MOTION          BOLD= new this visit  Plan for Next Visit: progress as tolerated      MANUAL THERAPY TECHNIQUES: Joint mobilizations, Soft tissue Mobilization, and mobilization with movement were applied to the area listed below for (11) minutes, including: x = exercises performed     Manual Intervention 1/3/2023    Soft Tissue Mobilization x Right achilles tendon, calcaneal insertion point, gastrocnemius    Joint Mobilizations  Talocrural and subtalar   Mobilization with movement     Functional Dry Needling      BOLD = new this visit  Plan for Next Visit: as needed        Patient Education and Home Exercises     Home Exercises Provided and Patient Education Provided     Education provided:   - Educated on EvenUps while wearing boot, Towel stretch into DF, ankle alphabets    Written Home Exercises Provided: Patient instructed to cont prior HEP. Exercises were reviewed and Kylie was able to demonstrate them prior to the end of the session.  Kylie demonstrated good  understanding of the education provided. See EMR under Patient Instructions for exercises provided during therapy sessions    ASSESSMENT   Patient tolerated first treatment session fairly. Patient needed extra time to perform all tasks and extra rest breaks. Patient with discomfort with heel raises, so patient was encouraged to utilize decreased RANGE OF MOTION, and then patient was able to tolerate this exercise better. Because of this, theraband resistance was not given today and only ankle alphabets were performed. Patient with poor neuromuscular control in isolating right big toe and other toes. Patient  with some discomfort during manual therapy, but patient was able to tolerate SOFT TISSUE MOBILIZATION and patient left session with decreased pain and stiffness. Plan to continue progressing as tolerated.    Kylie Is progressing well towards her goals.   Patient prognosis is Good.     Patient will continue to benefit from skilled outpatient physical therapy to address the deficits listed in the problem list box on initial evaluation, provide patient/family education and to maximize patient's level of independence in the home and community environment.     Patient's spiritual, cultural and educational needs considered and patient agreeable to plan of care and goals.     Anticipated barriers to physical therapy: BMI over 30, Headaches, High Blood Pressure, previous history of breast cancer    Goals: Reviewed:01/03/2022    Short Term Goals:  In 6 weeks  1.Pt to be educated on HEP.  2.Patient to demo increased PROM to DF +10 deg, Inv 30 deg, Ev 15 deg, PF 60 deg without pain, in order to assist with normalizing gait pattern and tolerate activities such as squatting for work related activities.  3.Patient to demo increased strength right ankle to 4/5, in order to improve gait when boot is removed  4.Patient to have decreased pain to 2/10, to improve QOL.  5.Patient to increase SL bal to 5 seconds, in order to improved gait tolerance of increased ROM at the right lower extremities.  6.Patient to improve score on the FOTO, to improve QOL.     Long Term Goals: In 12 weeks  1. Patient to perform daily activities including walking and standing as  with varying postures without increased symptoms.  2. Patient to demonstrate increased ankle PROM to DF +10 deg, Inv 30 deg, Ev 15 deg, PF 60 deg without pain, in order to assist with normalizing gait pattern and tolerate activities such as squatting for work related activities.  3. Patient to demonstrate increased 4+/5 at the right ankle to improve gait mechanics and return to  normal walking regimen  4. Patient to have decreased pain to 2/10, to improve QOL.  5. Patient to improve score on the FOTO to 36% or less, to improve QOL.  6.Patient to increase SL bal to 10 seconds, in order to tolerate variations of semi-static positions while standing as a .       PLAN     Continue Plan of Care (POC) and progress per patient tolerance. See treatment section for details on planned progressions next session.  Plan of Care Due: 3/27/2023    Hannah Guzman, PT

## 2023-01-06 ENCOUNTER — CLINICAL SUPPORT (OUTPATIENT)
Dept: REHABILITATION | Facility: HOSPITAL | Age: 58
End: 2023-01-06
Payer: COMMERCIAL

## 2023-01-06 DIAGNOSIS — R68.89 DECREASED STRENGTH, ENDURANCE, AND MOBILITY: ICD-10-CM

## 2023-01-06 DIAGNOSIS — R26.9 GAIT ABNORMALITY: ICD-10-CM

## 2023-01-06 DIAGNOSIS — M25.571 ACUTE RIGHT ANKLE PAIN: Primary | ICD-10-CM

## 2023-01-06 DIAGNOSIS — Z74.09 DECREASED STRENGTH, ENDURANCE, AND MOBILITY: ICD-10-CM

## 2023-01-06 DIAGNOSIS — R07.9 CHEST PAIN, UNSPECIFIED TYPE: Primary | ICD-10-CM

## 2023-01-06 DIAGNOSIS — R53.1 DECREASED STRENGTH, ENDURANCE, AND MOBILITY: ICD-10-CM

## 2023-01-06 PROCEDURE — 97110 THERAPEUTIC EXERCISES: CPT

## 2023-01-06 PROCEDURE — 97140 MANUAL THERAPY 1/> REGIONS: CPT

## 2023-01-06 RX ORDER — ACETAMINOPHEN AND CODEINE PHOSPHATE 300; 30 MG/1; MG/1
1 TABLET ORAL EVERY 8 HOURS PRN
Qty: 20 TABLET | Refills: 0 | Status: SHIPPED | OUTPATIENT
Start: 2023-01-06 | End: 2024-01-09

## 2023-01-06 NOTE — PROGRESS NOTES
[facility]   Physical Therapy Treatment Note     Name: Kylie Urbano  Clinic Number: [id number]    Therapy Diagnosis:   Encounter Diagnoses   Name Primary?    Acute right ankle pain Yes    Decreased strength, endurance, and mobility     Gait abnormality      Physician: Kristina Cazares DPM    Visit Date: 1/6/2023    Physician Orders: PT Eval and Treat   Medical Diagnosis from Referral:   Achilles tendinitis of right lower extremity - Right Foot   Bursitis of posterior heel, right   Equinus contracture of right ankle   Evaluation Date: 12/27/2022  Authorization Period Expiration: 12/31/2023  Plan of Care Expiration: 3/27/2023  Progress Note Due: 1/27/2023  Visit # / Visits authorized: 1/25 (+1 Evaluation)  FOTO: 1/ 3      Precautions: Standard    PTA Visit #: 0/5     Time In: 1045 AM  Time Out: 1145  Total Billable Time: 60 minutes    SUBJECTIVE     Patient reports: that right heel pain is mildly decreased.  However, she reports of experiencing increased pain at the left anterior thigh/hip.  She was compliant with home exercise program.  Response to previous treatment: none yet  Functional change: walking in CAM boot on right LOWER EXTREMITY, pain with work duties, decreased ability to perform ACTIVITIES OF DAILY LIVING     Pain: 6/10  Location: right achilles tendon    OBJECTIVE     Objective Measures updated at progress report unless specified.       Treatment     Kylie received the treatments listed below:      THERAPEUTIC EXERCISES to develop strength, endurance, ROM, flexibility, posture, and core stabilization for (30) minutes including:     Intervention Performed Today     Nustep     5 minutes, Level one, boot doffed   Recumbent bike while wearing shoe x Level 1 x 5 minutes   Sitting ankle DF stretch with stretch-rite  x  10 second holds x 2 minutes   Therabands (yellow)    held   Ankle PF  Ankle Inv  Ankle Ev   Crutch gait training  x  10 minutes - single crutch gait training -  crutch at left UE            Plan for Next Visit: progress as tolerated     NEUROMUSCULAR RE-EDUCATION activities to improve: Coordination, Kinesthetic, Sense, and Proprioception for (15) minutes. The following activities were included: x = exercises performed     Neuromuscular Re-Education 1/6/2023    Toe Yoga X  x  isolating big toe extension x30  Isolating 4 digits extension x30   Ankle Alphabets  Capital A,B, and C x10 each   Seated heel raises with foot on 4 inch box x X20. Modified RANGE OF MOTION    Short foot in sititng (toe flexion to increase arch height x 10 second holds x 2 minutes   Short foot with ankle pronation/supination in sitting x 2x20         BOLD= new this visit  Plan for Next Visit: progress as tolerated      MANUAL THERAPY TECHNIQUES: Joint mobilizations, Soft tissue Mobilization, and mobilization with movement were applied to the area listed below for (15) minutes, including: x = exercises performed     Manual Intervention 1/6/2023    Soft Tissue Mobilization x Right achilles tendon, calcaneal insertion point, gastrocnemius    Joint Mobilizations  Talocrural and subtalar   Mobilization with movement     Functional Dry Needling      BOLD = new this visit  Plan for Next Visit: as needed        Patient Education and Home Exercises     Home Exercises Provided and Patient Education Provided     Education provided:   - Educated on EvenUps while wearing boot, Towel stretch into DF, ankle alphabets    Written Home Exercises Provided: Patient instructed to cont prior HEP. Exercises were reviewed and Kylie was able to demonstrate them prior to the end of the session.  Kylie demonstrated good  understanding of the education provided. See EMR under Patient Instructions for exercises provided during therapy sessions    ASSESSMENT   Patient still with significant right lateral ankle/calcaneus pain.  Significant hip ER during gait with significantly short step length.  There is also significant complaint  of pain at the left anterior thigh/hip.  Therefore, worked on single crutch gait with crutch at the left upper extremities.  She did report of pain relief when walking with the crutch.  The patient reports that she does have crutches at home.  Show the method to change the height for proper height of crutch to be changed at home.  Still significant weakness/neuromuscular control deficits when attempting to perform intrinsic foot strength exercises.  Therefore, worked on a bit of those exercises.  Will continue to work on progress into more ROM and strengthening as the patient is tolerant.    Kylie Is progressing well towards her goals.   Patient prognosis is Good.     Patient will continue to benefit from skilled outpatient physical therapy to address the deficits listed in the problem list box on initial evaluation, provide patient/family education and to maximize patient's level of independence in the home and community environment.     Patient's spiritual, cultural and educational needs considered and patient agreeable to plan of care and goals.     Anticipated barriers to physical therapy: BMI over 30, Headaches, High Blood Pressure, previous history of breast cancer    Goals: Reviewed:01/03/2022    Short Term Goals:  In 6 weeks  1.Pt to be educated on HEP.  2.Patient to demo increased PROM to DF +10 deg, Inv 30 deg, Ev 15 deg, PF 60 deg without pain, in order to assist with normalizing gait pattern and tolerate activities such as squatting for work related activities.  3.Patient to demo increased strength right ankle to 4/5, in order to improve gait when boot is removed  4.Patient to have decreased pain to 2/10, to improve QOL.  5.Patient to increase SL bal to 5 seconds, in order to improved gait tolerance of increased ROM at the right lower extremities.  6.Patient to improve score on the FOTO, to improve QOL.     Long Term Goals: In 12 weeks  1. Patient to perform daily activities including walking and  standing as  with varying postures without increased symptoms.  2. Patient to demonstrate increased ankle PROM to DF +10 deg, Inv 30 deg, Ev 15 deg, PF 60 deg without pain, in order to assist with normalizing gait pattern and tolerate activities such as squatting for work related activities.  3. Patient to demonstrate increased 4+/5 at the right ankle to improve gait mechanics and return to normal walking regimen  4. Patient to have decreased pain to 2/10, to improve QOL.  5. Patient to improve score on the FOTO to 36% or less, to improve QOL.  6.Patient to increase SL bal to 10 seconds, in order to tolerate variations of semi-static positions while standing as a .       PLAN     Continue Plan of Care (POC) and progress per patient tolerance. See treatment section for details on planned progressions next session.  Plan of Care Due: 3/27/2023    Ravinder Medellin, PT

## 2023-01-08 NOTE — PROGRESS NOTES
Subjective:     Patient ID: Kylie Urbano is a 57 y.o. female.    Chief Complaint: Follow-up (C/o pain to right achilles, Rates pain 9/10, x 1 week, Wears walking boot with socks, non-diabetic Pt, last seen on 11/17/22 with PCP Dr. Parks)    Kylie is a 57 y.o. female who presents to the clinic complaining of heel pain in the right foot, especially with the first step in the morning. The pain is described as Aching, Throbbing, and Tight. The onset of the pain was gradual and has worsened over the past several weeks. Kylie rates the pain as 9/10. She denies a history of trauma. Prior treatments include walking boot.    Patient Active Problem List   Diagnosis    Essential hypertension    Iron deficiency anemia    Hypothyroidism    Insulin resistance syndrome    Allergic rhinitis    Vitamin D deficiency    HSV infection    Uterine leiomyoma    Status post laparoscopic hysterectomy    Malignant neoplasm of upper-outer quadrant of right female breast    Breast cancer, right breast    Malignant neoplasm of upper-outer quadrant of right breast in female, estrogen receptor positive    Gastroesophageal reflux disease    Constipation    Anxiety    Morbid obesity with BMI of 40.0-44.9, adult    Prediabetes    Benign colon polyp    Acute right ankle pain    Decreased strength, endurance, and mobility    Gait abnormality    Chemotherapy-induced neuropathy       Medication List with Changes/Refills   New Medications    ACETAMINOPHEN-CODEINE 300-30MG (TYLENOL #3) 300-30 MG TAB    Take 1 tablet by mouth every 8 (eight) hours as needed.    CYCLOBENZAPRINE (FLEXERIL) 10 MG TABLET    Take 1 tablet (10 mg total) by mouth daily as needed for Muscle spasms.   Current Medications    ACYCLOVIR (ZOVIRAX) 400 MG TABLET    Take 1 tablet (400 mg total) by mouth 3 (three) times daily with meals.    ALPRAZOLAM (XANAX) 0.5 MG TABLET    Take 1 tablet (0.5 mg total) by mouth 2 (two) times daily as needed for Anxiety.    AMLODIPINE (NORVASC) 10  MG TABLET    Take 1 tablet by mouth once daily    ANASTROZOLE (ARIMIDEX) 1 MG TAB    Take 1 tablet by mouth once daily    BENAZEPRIL (LOTENSIN) 20 MG TABLET    Take 1 tablet (20 mg total) by mouth once daily.    FLUTICASONE PROPIONATE (FLONASE) 50 MCG/ACTUATION NASAL SPRAY    USE 2 SPRAY(S) IN EACH NOSTRIL ONCE DAILY AS NEEDED FOR  RHINITIS    IBUPROFEN (ADVIL,MOTRIN) 800 MG TABLET    Take 1 tab by mouth TID prn pain    METFORMIN (GLUCOPHAGE) 500 MG TABLET    TAKE 4 TABLETS BY MOUTH IN THE EVENING AS DIRECTED    METOPROLOL SUCCINATE (TOPROL-XL) 25 MG 24 HR TABLET    Take 3 tablets (75 mg total) by mouth once daily.    OMEPRAZOLE (PRILOSEC) 20 MG CAPSULE    TAKE 1 CAPSULE BY MOUTH ONCE DAILY AS NEEDED    SYNTHROID 175 MCG TABLET    Take 1 tablet (175 mcg total) by mouth once daily.       Review of patient's allergies indicates:   Allergen Reactions    Methylprednisone Anxiety and Palpitations    Amoxicillin Rash     Historical    Hydrocodone-acetaminophen Nausea And Vomiting     Historical.  Historical.    Sulfa (sulfonamide antibiotics) Rash       Past Surgical History:   Procedure Laterality Date    BREAST LUMPECTOMY Right 2017    BTL      COLONOSCOPY N/A 2/22/2019    Procedure: COLONOSCOPY;  Surgeon: Mariano Santamaria MD;  Location: [facility];  Service: Endoscopy;  Laterality: N/A;    DILATION AND CURETTAGE OF UTERUS      x 1    HYSTERECTOMY      Laproscopic robot assissted with BSO       Family History   Problem Relation Age of Onset    Diabetes Mother     Hypertension Mother     Heart failure Mother     Glaucoma Mother     Cancer Father         Stomach cancer    Cancer Sister         Ovarian cancer    Ovarian cancer Sister     No Known Problems Daughter     No Known Problems Son     No Known Problems Daughter     Breast cancer Paternal Cousin     Stroke Neg Hx     Heart disease Neg Hx         MI/CAD       Social History     Socioeconomic History    Marital status: Single    Number of children: 3   Occupational  History    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   Tobacco Use    Smoking status: Never    Smokeless tobacco: Never   Substance and Sexual Activity    Alcohol use: Yes     Comment: occasionally     Drug use: No    Sexual activity: Not Currently   Social History Narrative    She wears seatbelt.     Social Determinants of Health     Physical Activity: Insufficiently Active    Days of Exercise per Week: 3 days    Minutes of Exercise per Session: 30 min       There were no vitals filed for this visit.    Hemoglobin A1C   Date Value Ref Range Status   12/05/2022 6.0 (H) 4.0 - 5.6 % Final     Comment:     ADA Screening Guidelines:  5.7-6.4%  Consistent with prediabetes  >or=6.5%  Consistent with diabetes    High levels of fetal hemoglobin interfere with the HbA1C  assay. Heterozygous hemoglobin variants (HbS, HgC, etc)do  not significantly interfere with this assay.   However, presence of multiple variants may affect accuracy.     11/23/2021 6.4 (H) 4.0 - 5.6 % Final     Comment:     ADA Screening Guidelines:  5.7-6.4%  Consistent with prediabetes  >or=6.5%  Consistent with diabetes    High levels of fetal hemoglobin interfere with the HbA1C  assay. Heterozygous hemoglobin variants (HbS, HgC, etc)do  not significantly interfere with this assay.   However, presence of multiple variants may affect accuracy.     05/20/2021 6.1 (H) 4.0 - 5.6 % Final     Comment:     ADA Screening Guidelines:  5.7-6.4%  Consistent with prediabetes  >or=6.5%  Consistent with diabetes    High levels of fetal hemoglobin interfere with the HbA1C  assay. Heterozygous hemoglobin variants (HbS, HgC, etc)do  not significantly interfere with this assay.   However, presence of multiple variants may affect accuracy.         Review of Systems   Constitutional:  Negative for chills and fever.   Respiratory:  Negative for shortness of breath.    Cardiovascular:  Negative for chest pain,  palpitations, orthopnea, claudication and leg swelling.   Gastrointestinal:  Negative for diarrhea, nausea and vomiting.   Musculoskeletal:  Negative for joint pain.   Skin:  Negative for rash.   Neurological:  Negative for dizziness, tingling, sensory change, focal weakness and weakness.   Psychiatric/Behavioral: Negative.             Objective:      PHYSICAL EXAM: Apperance: Alert and orient in no distress,well developed, and with good attention to grooming and body habits  Patient presents ambulating in tennis shoe on left and walking boot on the right.   Lower Extremity Exam  VASCULAR: Dorsalis pedis pulses 2/4 bilateral and Posterior Tibial pulses 2/4 bilateral. Capillary fill time <4 seconds bilateral. No edema observed bilateral. Skin temperature of the lower extremities is warm to warm, proximal to distal.   DERMATOLOGICAL: No skin rashes, subcutaneous nodules, lesions, or ulcers observed bilateral.   NEUROLOGICAL: Light touch, sharp-dull, proprioception all present and equal bilaterally.    MUSCULOSKELETAL: Muscle strength 5/5 for all foot inverters, everters, plantarflexors, and dorsiflexors bilateral. Ankle joints bilateral shows decreased ROM. bilateral ankle ROM shows greater decrease in dorsiflexion with knee extended. Ankle joint ROM is pain free and without crepitus bilateral. Tnderness with palpation of right posterior 1/3 calcaneus at region of Achilles tendon insertion. Negative pain to palpation right plantar medial tubercle. Negative pain on medial-lateral compression of the calcaneus.     TEST RESULTS: Radiographs of right foot/ankle taken reveals prominent dorsal and plantar calcaneal spurs.  In thesis noted within the distal thickened Achilles tendon at and proximal to its insertion.  Multi articular degenerative change in the midfoot.  Pes planus and mild prominence degenerative change 1st MTP joint.          Assessment:       ICD-10-CM ICD-9-CM   1. Achilles tendinitis of right lower  extremity - Right Foot  M76.61 726.71   2. Plantar fasciitis - Right Foot  M72.2 728.71   3. Bursitis of posterior heel, right  M77.51 726.79   4. Equinus contracture of right ankle  M24.571 718.47       Plan:   Achilles tendinitis of right lower extremity - Right Foot  -     cyclobenzaprine (FLEXERIL) 10 MG tablet; Take 1 tablet (10 mg total) by mouth daily as needed for Muscle spasms.  Dispense: 20 tablet; Refill: 0  -     Ambulatory referral/consult to Physical/Occupational Therapy; Future; Expected date: 12/28/2022    Plantar fasciitis - Right Foot  -     cyclobenzaprine (FLEXERIL) 10 MG tablet; Take 1 tablet (10 mg total) by mouth daily as needed for Muscle spasms.  Dispense: 20 tablet; Refill: 0    Bursitis of posterior heel, right  -     cyclobenzaprine (FLEXERIL) 10 MG tablet; Take 1 tablet (10 mg total) by mouth daily as needed for Muscle spasms.  Dispense: 20 tablet; Refill: 0  -     Ambulatory referral/consult to Physical/Occupational Therapy; Future; Expected date: 12/28/2022    Equinus contracture of right ankle  -     Ambulatory referral/consult to Physical/Occupational Therapy; Future; Expected date: 12/28/2022    Other orders  -     acetaminophen-codeine 300-30mg (TYLENOL #3) 300-30 mg Tab; Take 1 tablet by mouth every 8 (eight) hours as needed.  Dispense: 20 tablet; Refill: 0      I counseled the patient on her conditions, regarding findings of my examination, my impressions, and usual treatment plan.   I explained to the patient that etiology and treatment options for heel pain including rest,  ice messages, stretching exercises, strappings/tappings, NSAID's, injections, new shoegear with orthotic inserts, and/or surgical treatment.   Patient agreed to muscle relaxer, walking boot.  I gave written and verbal instructions on heel cord stretching and this was demonstrated for the patient. Patient expressed understanding.  Prescribed Flexeril 10mg to be taken twice daily with food for the next 10 days  and then as needed for inflammation. Patient advised on the possible elevation of blood pressure or heart effects and caution to take pills as needed and to discontinue use if symptoms arise, patient agreed.  Patient was instructed to continue short walking boot and instructed on proper usage. This should be worn daily for a minimum of 2 weeks at all times when ambulating. Patient instructed not to drive with walking boot if on right foot.   Patient instructed on adequate icing techniques. Patient should ice the affected area at least once per day x 10 minutes for 10 days . I advised the  patient that extra icing would also be beneficial to ensure adequate anti inflammatory effect.   The patient and I reviewed the types of shoes she should be wearing, my recommendation includes generally the best time of the day for a shoe fitting is the afternoon, shoes with a wide toe box, very good cushion, and tennis shoes with removable inner soles. The patient and I reviewed my recommendations for over-the-counter orthotic inserts.   Work excuse paperwork completed.  Patient to return in 1 month.        Kristina Cazares DPM  [facility]

## 2023-01-11 NOTE — PROGRESS NOTES
Subjective:       Patient ID: Kylie Urbano is a 57 y.o. female.    Chief Complaint: breast cancer f/u    HPI Patient presents for breast cancer surveillance and review of mammogram.     Last visit was 7/20/2022    57-year-old female with a previous diagnosis of right-sided infiltrating ductal carcinoma ER/NH positive, HER-2 negative with biopsy-proven right axillary lymph node involvement.  Patient was consented for BRCA testing for participation and B-55 study with negative BRCA testing.    She received cycle 1 of dense dose Adriamycin/Cytoxan on 6/12/2017 and tolerated treatment exceptionally well.  She completed cycle 4 of dose dense Taxol on 9/19/2017.  She underwent lumpectomy with sentinel lymph node biopsy on 10/31/2017.   Final pathology demonstrated [id number].  The primary measured 0.8 cm and 2 sentinel lymph nodes were positive with 3 mm macrometastasis within first sentinel lymph node and 1 mm micrometastasis within the second sentinel lymph node.  She underwent a full right axillary lymph node dissection on 11/8/2017 which demonstrated 0 positive nodes.  She completed adjuvant radiation on 2/27/2018.      The patient had a hysterectomy approximately 6/20/14.  FSH was in the intermediate range which likely means the patient is perimenopausal.  Started Tamoxifen 20 mg daily in 3/2018 and has since been transitioned to Arimidex - states tolerating well.  Due off Arimidex 3/2027  Surveillance mammograms remains [facility] - 6/3/20    Primary Oncologist: Herbie Miramontes MD  Previous- Mason Martinez MD        ER/NH positive, HER-2 negative infiltrating ductal carcinoma the right breast with biopsy proven lymph node involvement clinically stage II/III  [id number].  Pathology following neoadjuvant therapy demonstrated 1 sentinel lymph node with micrometastasis (3 mm) and second lymph node with micrometastasis (1 mm)  --BRCA testing via B-55 Study is negative for mutation  --ddAC--> taxol   cycle 1---  6/12/2017  --Final cycle Cycle 4 on 9/19/2017  --Radiation completed on 2/27/2018  --Tamoxifen 20 mg by mouth daily started in 3/2018  --surveillance bilateral mammogram in  [facility]  --Transitioned to Arimidex 1 mg PO daily - to be completed in 3/2027     I have reviewed and updated the HPI, ROS, PMHx, Social Hx, Family Hx and treatment history.     Today's visit:  The patient has previously been followed in the clinic by Dr. Martinez, Dr. Miramontes, and CATRACHO Haque.        10/1/2019- DEXA scan normal.     12/20/2021- dexa normal- repeat 12/2023    Has previous history of elevated liver enzymes and elevated ldh. Had taken more tylenol with Covid infection. Off tylenol now. Referred pt to gastro for evaluation due to prior history of elevated liver enzymes and fatty liver.     Pt followed by gastro for elevated liver enzymes and fatty liver disease. Last visit 8/2020 for fibroscan and ultrasound- will refer back for f/u       Review of Systems   Constitutional: Negative.    HENT: Negative.     Eyes: Negative.    Respiratory: Negative.     Cardiovascular: Negative.  Negative for chest pain, palpitations and leg swelling.   Gastrointestinal: Negative.         No reflux   Endocrine: Negative.         Hot flashes and sleep disturbance   Genitourinary: Negative.    Musculoskeletal: Negative.    Skin: Negative.    Allergic/Immunologic: Negative.    Neurological:  Positive for numbness.        Tingling in hands and feet since chemo   Hematological: Negative.  Negative for adenopathy.   Psychiatric/Behavioral: Negative.          Relates mild anxiety     Pt denies any breast pain, nipple discharge or palpable mass    Feels anxious intermittently- having right foot pain and in boot- not walking     Objective:          Physical Exam   Constitutional: She is oriented to person, place, and time. She appears well-developed and well-nourished. No distress.   HENT:   Head: Normocephalic and atraumatic. Not macrocephalic.   Right Ear:  Tympanic membrane and ear canal normal.   Left Ear: Tympanic membrane and ear canal normal.   Nose: Nose normal. Right sinus exhibits no maxillary sinus tenderness and no frontal sinus tenderness. Left sinus exhibits no maxillary sinus tenderness and no frontal sinus tenderness.   Mouth/Throat: Uvula is midline, oropharynx is clear and moist and mucous membranes are normal. No oropharyngeal exudate.   Eyes: Pupils are equal, round, and reactive to light. Conjunctivae, EOM and lids are normal. Lids are everted and swept, no foreign bodies found.   Neck: Trachea normal and normal range of motion. Neck supple. No tracheal deviation present. No thyroid mass and no thyromegaly present.   Cardiovascular: regular rhythm, normal heart sounds, intact distal pulses and normal pulses. Exam reveals no gallop and no friction rub.   No murmur heard.  Pulmonary/Chest: Effort normal and breath sounds normal. No stridor. No respiratory distress. She has no decreased breath sounds. She has no wheezes. She has no rhonchi. She has no rales. She exhibits no tenderness.   No lymphadenopathy, susan breast no visible redness, puckering or retraction. No palpable mass bilaterally. No nipple discharge.   Abdominal: Soft. Normal appearance and bowel sounds are normal. She exhibits no distension. There is no hepatosplenomegaly. There is no tenderness. There is no guarding.   Musculoskeletal: Normal range of motion. She exhibits no edema or deformity.   Lymphadenopathy:        Head (right side): No submental and no submandibular adenopathy present.        Head (left side): No submental and no submandibular adenopathy present.        Right cervical: No superficial cervical and no deep cervical adenopathy present.       Left cervical: No superficial cervical and no deep cervical adenopathy present.     She has no axillary adenopathy.        Right: No supraclavicular adenopathy present.        Left: No supraclavicular adenopathy present.    Neurological: She is alert and oriented to person, place, and time. No cranial nerve deficit or sensory deficit. She exhibits normal muscle tone.   Skin: Skin is warm, dry and intact. Capillary refill takes less than 2 seconds. No rash noted. She is not diaphoretic. No pallor.   Psychiatric: She has a normal mood and affect. Her speech is normal and behavior is normal. Judgment and thought content normal. She is not actively hallucinating. Cognition and memory are normal. She is attentive.     Last mammo 7/1/2022- wnl    Last colonoscopy 2/22/2019- polyp noted- 5 year f/u recommended    Assessment:       1. Elevated liver enzymes    2. Malignant neoplasm of upper-outer quadrant of right breast in female, estrogen receptor positive    3. Malignant neoplasm of right breast in female, estrogen receptor positive, unspecified site of breast    4. Encounter for monitoring adjuvant hormonal therapy    5. Encounter for follow-up surveillance of breast cancer    6. Encounter for breast cancer screening using non-mammogram modality    7. Counseling and coordination of care    8. Counseling on health promotion and disease prevention            Plan:       1.      Negative mammogram and exam  2.      History of Right breast cancer- continue to take Arimidex daily  3.      Monitoring adjuvant therapy- Taking Arimidex without difficulty. Continue until 3/2027- Dexa 12/20/21- normal - due again 12/2023  4.      Encouraged walking to help with bone density and decreasing risk for breast cancer recurrence. (When right foot pain resolves) Taking calcium and vit d- walking 5 days a week 45 min each time- excellent. Discussed reducing risk for breast cancer with wt loss and exercise.    5.      Fatty liver disease- followed by gastro now. Has a diagnosis of borderline hepatomegaly and fatty liver - due for f/u in August 2022- was not scheduled- referral placed  6.      RTC in 6 months for f/u of breast cancer and adjuvant therapy-  7/2023 - susan mammo and exam-call for any changes otherwise-   7.      Chemo induced neuropathy- tried neurotin 100 mg po tid for 2 weeks and had no real change. Extensive discussion regarding potential side effects from med and admin instructions. Warned of drowsiness and dizziness-   8.      Lipids 12/5/2022- wnl  9.      Iron def anemia to be followed by hematology- has appt at the end of this month

## 2023-01-13 ENCOUNTER — HOSPITAL ENCOUNTER (OUTPATIENT)
Dept: CARDIOLOGY | Facility: HOSPITAL | Age: 58
Discharge: HOME | End: 2023-01-13
Attending: INTERNAL MEDICINE
Payer: COMMERCIAL

## 2023-01-13 ENCOUNTER — PATIENT MESSAGE (OUTPATIENT)
Dept: PRIMARY CARE CLINIC | Facility: CLINIC | Age: 58
End: 2023-01-13
Payer: COMMERCIAL

## 2023-01-13 ENCOUNTER — OFFICE VISIT (OUTPATIENT)
Dept: CARDIOLOGY | Facility: CLINIC | Age: 58
End: 2023-01-13
Payer: COMMERCIAL

## 2023-01-13 ENCOUNTER — CLINICAL SUPPORT (OUTPATIENT)
Dept: REHABILITATION | Facility: HOSPITAL | Age: 58
End: 2023-01-13
Payer: COMMERCIAL

## 2023-01-13 VITALS
SYSTOLIC BLOOD PRESSURE: 136 MMHG | HEART RATE: 100 BPM | DIASTOLIC BLOOD PRESSURE: 90 MMHG | WEIGHT: 282.63 LBS | OXYGEN SATURATION: 98 % | BODY MASS INDEX: 42.83 KG/M2 | HEIGHT: 68 IN

## 2023-01-13 DIAGNOSIS — E66.01 MORBID OBESITY WITH BMI OF 40.0-44.9, ADULT: ICD-10-CM

## 2023-01-13 DIAGNOSIS — K21.9 GASTROESOPHAGEAL REFLUX DISEASE, UNSPECIFIED WHETHER ESOPHAGITIS PRESENT: ICD-10-CM

## 2023-01-13 DIAGNOSIS — I10 PRIMARY HYPERTENSION: ICD-10-CM

## 2023-01-13 DIAGNOSIS — R06.00 DYSPNEA, UNSPECIFIED TYPE: ICD-10-CM

## 2023-01-13 DIAGNOSIS — E03.9 HYPOTHYROIDISM, UNSPECIFIED TYPE: ICD-10-CM

## 2023-01-13 DIAGNOSIS — R73.03 PREDIABETES: ICD-10-CM

## 2023-01-13 DIAGNOSIS — G47.30 SLEEP APNEA, UNSPECIFIED TYPE: ICD-10-CM

## 2023-01-13 DIAGNOSIS — E88.810 INSULIN RESISTANCE SYNDROME: ICD-10-CM

## 2023-01-13 DIAGNOSIS — M25.571 ACUTE RIGHT ANKLE PAIN: Primary | ICD-10-CM

## 2023-01-13 DIAGNOSIS — R07.9 CHEST PAIN, UNSPECIFIED TYPE: ICD-10-CM

## 2023-01-13 DIAGNOSIS — D50.9 IRON DEFICIENCY ANEMIA, UNSPECIFIED IRON DEFICIENCY ANEMIA TYPE: ICD-10-CM

## 2023-01-13 DIAGNOSIS — R26.9 GAIT ABNORMALITY: ICD-10-CM

## 2023-01-13 DIAGNOSIS — R53.1 DECREASED STRENGTH, ENDURANCE, AND MOBILITY: ICD-10-CM

## 2023-01-13 DIAGNOSIS — R00.2 PALPITATIONS: ICD-10-CM

## 2023-01-13 DIAGNOSIS — Z74.09 DECREASED STRENGTH, ENDURANCE, AND MOBILITY: ICD-10-CM

## 2023-01-13 DIAGNOSIS — R07.9 CHEST PAIN, UNSPECIFIED TYPE: Primary | ICD-10-CM

## 2023-01-13 DIAGNOSIS — R68.89 DECREASED STRENGTH, ENDURANCE, AND MOBILITY: ICD-10-CM

## 2023-01-13 PROCEDURE — 3075F PR MOST RECENT SYSTOLIC BLOOD PRESS GE 130-139MM HG: ICD-10-PCS | Mod: CPTII,S$GLB,, | Performed by: INTERNAL MEDICINE

## 2023-01-13 PROCEDURE — 1159F PR MEDICATION LIST DOCUMENTED IN MEDICAL RECORD: ICD-10-PCS | Mod: CPTII,S$GLB,, | Performed by: INTERNAL MEDICINE

## 2023-01-13 PROCEDURE — 3008F BODY MASS INDEX DOCD: CPT | Mod: CPTII,S$GLB,, | Performed by: INTERNAL MEDICINE

## 2023-01-13 PROCEDURE — 3008F PR BODY MASS INDEX (BMI) DOCUMENTED: ICD-10-PCS | Mod: CPTII,S$GLB,, | Performed by: INTERNAL MEDICINE

## 2023-01-13 PROCEDURE — 93010 EKG 12-LEAD: ICD-10-PCS | Mod: ,,, | Performed by: INTERNAL MEDICINE

## 2023-01-13 PROCEDURE — 99999 PR PBB SHADOW E&M-EST. PATIENT-LVL IV: ICD-10-PCS | Mod: PBBFAC,,, | Performed by: INTERNAL MEDICINE

## 2023-01-13 PROCEDURE — 99999 PR PBB SHADOW E&M-EST. PATIENT-LVL IV: CPT | Mod: PBBFAC,,, | Performed by: INTERNAL MEDICINE

## 2023-01-13 PROCEDURE — 1159F MED LIST DOCD IN RCRD: CPT | Mod: CPTII,S$GLB,, | Performed by: INTERNAL MEDICINE

## 2023-01-13 PROCEDURE — 97112 NEUROMUSCULAR REEDUCATION: CPT | Mod: CQ

## 2023-01-13 PROCEDURE — 93010 ELECTROCARDIOGRAM REPORT: CPT | Mod: ,,, | Performed by: INTERNAL MEDICINE

## 2023-01-13 PROCEDURE — 3080F DIAST BP >= 90 MM HG: CPT | Mod: CPTII,S$GLB,, | Performed by: INTERNAL MEDICINE

## 2023-01-13 PROCEDURE — 93005 ELECTROCARDIOGRAM TRACING: CPT

## 2023-01-13 PROCEDURE — 1160F PR REVIEW ALL MEDS BY PRESCRIBER/CLIN PHARMACIST DOCUMENTED: ICD-10-PCS | Mod: CPTII,S$GLB,, | Performed by: INTERNAL MEDICINE

## 2023-01-13 PROCEDURE — 1160F RVW MEDS BY RX/DR IN RCRD: CPT | Mod: CPTII,S$GLB,, | Performed by: INTERNAL MEDICINE

## 2023-01-13 PROCEDURE — 97110 THERAPEUTIC EXERCISES: CPT | Mod: CQ

## 2023-01-13 PROCEDURE — 99214 OFFICE O/P EST MOD 30 MIN: CPT | Mod: S$GLB,,, | Performed by: INTERNAL MEDICINE

## 2023-01-13 PROCEDURE — 97140 MANUAL THERAPY 1/> REGIONS: CPT | Mod: CQ

## 2023-01-13 PROCEDURE — 3080F PR MOST RECENT DIASTOLIC BLOOD PRESSURE >= 90 MM HG: ICD-10-PCS | Mod: CPTII,S$GLB,, | Performed by: INTERNAL MEDICINE

## 2023-01-13 PROCEDURE — 3075F SYST BP GE 130 - 139MM HG: CPT | Mod: CPTII,S$GLB,, | Performed by: INTERNAL MEDICINE

## 2023-01-13 PROCEDURE — 99214 PR OFFICE/OUTPT VISIT, EST, LEVL IV, 30-39 MIN: ICD-10-PCS | Mod: S$GLB,,, | Performed by: INTERNAL MEDICINE

## 2023-01-13 RX ORDER — METOPROLOL SUCCINATE 100 MG/1
100 TABLET, EXTENDED RELEASE ORAL NIGHTLY
Qty: 90 TABLET | Refills: 1 | Status: SHIPPED | OUTPATIENT
Start: 2023-01-13 | End: 2023-08-16 | Stop reason: SDUPTHER

## 2023-01-13 NOTE — PROGRESS NOTES
[facility]   Physical Therapy Treatment Note     Name: Kylie Urbano  Clinic Number: [id number]    Therapy Diagnosis:   Encounter Diagnoses   Name Primary?    Acute right ankle pain Yes    Decreased strength, endurance, and mobility     Gait abnormality      Physician: Kristina Cazares DPM    Visit Date: 1/13/2023    Physician Orders: PT Eval and Treat   Medical Diagnosis from Referral:   Achilles tendinitis of right lower extremity - Right Foot   Bursitis of posterior heel, right   Equinus contracture of right ankle   Evaluation Date: 12/27/2022  Authorization Period Expiration: 12/31/2023  Plan of Care Expiration: 3/27/2023  Progress Note Due: 1/27/2023  Visit # / Visits authorized: 2/25 (+1 Evaluation)  FOTO: 1/ 3      Precautions: Standard    PTA Visit #: 1/5     Time In: 1400   Time Out: 1445  Total Billable Time: 33 minutes    SUBJECTIVE     Patient reports: that right heel pain is mildly decreased.  However, she reports of experiencing increased pain at the left anterior thigh/hip.    She was compliant with home exercise program.    Response to previous treatment: none yet    Functional change: walking in CAM boot on right LOWER EXTREMITY, no working at this time, decreased standing and walking tolerance, sleeping better, better able to ambulate with crutch    Pain: 7/10  Location: right lateral achilles tendon    OBJECTIVE     Objective Measures updated at progress report unless specified.       Treatment     Kylie received the treatments listed below:      THERAPEUTIC EXERCISES to develop strength, endurance, ROM, flexibility, posture, and core stabilization for (8) minutes including:     Intervention Performed Today     Nustep   x  5 minutes, Level 3, boot doffed   Recumbent bike while wearing shoe  Level 1 x 5 minutes   Sitting ankle DF stretch with stretch-rite  x  10 second holds x 2 minutes   Therabands (yellow)    held   Ankle PF  Ankle Inv  Ankle Ev   Crutch gait  training    10 minutes - single crutch gait training - crutch at left UE            Plan for Next Visit: progress as tolerated     NEUROMUSCULAR RE-EDUCATION activities to improve: Coordination, Kinesthetic, Sense, and Proprioception for (16) minutes. The following activities were included: x = exercises performed     Neuromuscular Re-Education 1/13/2023    Toe Yoga X  x  isolating big toe extension x 30  Isolating 4 digits extension x 30 (unable)   Ankle Alphabets x X 2    Seated heel raises with foot on 4 inch box x X 20 Modified RANGE OF MOTION    Short foot in sititng (toe flexion to increase arch height x 10 second holds x 2 minutes   Short foot with ankle pronation/supination in sitting x 2x20          BOLD= new this visit  Plan for Next Visit: progress as tolerated      MANUAL THERAPY TECHNIQUES: Joint mobilizations, Soft tissue Mobilization, and mobilization with movement were applied to the area listed below for (9) minutes, including: x = exercises performed     Manual Intervention 1/13/2023    Soft Tissue Mobilization x Right achilles tendon, calcaneal insertion point, gastrocnemius    Joint Mobilizations  Talocrural and subtalar   Mobilization with movement     Functional Dry Needling      BOLD = new this visit  Plan for Next Visit: as needed        Patient Education and Home Exercises     Home Exercises Provided and Patient Education Provided     Education provided:   - Educated on EvenUps while wearing boot, Towel stretch into DF, ankle alphabets    Written Home Exercises Provided: Patient instructed to cont prior HEP. Exercises were reviewed and Kylie was able to demonstrate them prior to the end of the session.  Kylie demonstrated good  understanding of the education provided. See EMR under Patient Instructions for exercises provided during therapy sessions    ASSESSMENT   Patient still with significant right lateral ankle/calcaneus pain.  Significant hip ER during gait with significantly short  step length. Single crutch gait with crutch at the left upper extremity with much improved and less left lower extremity pain reported today. She reported no pain when walking with the crutch. She was able to demonstrate minimal improvement in ankle range of motion in all directions as she progressed through active range of motion but with no improvement in quality of toe yoga.      Kylie Is progressing well towards her goals.   Patient prognosis is Good.     Patient will continue to benefit from skilled outpatient physical therapy to address the deficits listed in the problem list box on initial evaluation, provide patient/family education and to maximize patient's level of independence in the home and community environment.     Patient's spiritual, cultural and educational needs considered and patient agreeable to plan of care and goals.     Anticipated barriers to physical therapy: BMI over 30, Headaches, High Blood Pressure, previous history of breast cancer    Goals: Reviewed:01/03/2022    Short Term Goals:  In 6 weeks  1.Pt to be educated on HEP.  2.Patient to demo increased PROM to DF +10 deg, Inv 30 deg, Ev 15 deg, PF 60 deg without pain, in order to assist with normalizing gait pattern and tolerate activities such as squatting for work related activities.  3.Patient to demo increased strength right ankle to 4/5, in order to improve gait when boot is removed  4.Patient to have decreased pain to 2/10, to improve QOL.  5.Patient to increase SL bal to 5 seconds, in order to improved gait tolerance of increased ROM at the right lower extremities.  6.Patient to improve score on the FOTO, to improve QOL.     Long Term Goals: In 12 weeks  1. Patient to perform daily activities including walking and standing as  with varying postures without increased symptoms.  2. Patient to demonstrate increased ankle PROM to DF +10 deg, Inv 30 deg, Ev 15 deg, PF 60 deg without pain, in order to assist with normalizing gait  pattern and tolerate activities such as squatting for work related activities.  3. Patient to demonstrate increased 4+/5 at the right ankle to improve gait mechanics and return to normal walking regimen  4. Patient to have decreased pain to 2/10, to improve QOL.  5. Patient to improve score on the FOTO to 36% or less, to improve QOL.  6.Patient to increase SL bal to 10 seconds, in order to tolerate variations of semi-static positions while standing as a .       PLAN     Continue Plan of Care (POC) and progress per patient tolerance. See treatment section for details on planned progressions next session.  Plan of Care Due: 3/27/2023    Prince Kamara, KEDAR

## 2023-01-13 NOTE — PROGRESS NOTES
Subjective:   Patient ID:  Kylie Urbano is a 57 y.o. female who presents for cardiac consult of No chief complaint on file.      HPI  The patient came in today for cardiac consult of No chief complaint on file.      Kylie Urbano is a 57 y.o. female pt with HTN, PreDM, obesity, GERD, anxiety, h/o breast CA, Vit D def, insulin resistance, hypothyroidism, Fe def anemia, anxiety presents for follow up CV eval.     Chief Complaint   Patient presents with    Tachycardia       Ems was called to home for pt that was tachycardic and tachypnea hr 140's RR 40's, recent death in family, chest wall pain       6/16/2022, 6:47 PM  History obtained from the patient                  History of Present Illness: Kylie Urbano is a 56 y.o. female patient with a PMHx of HTN and thyroid disease who presents to the Emergency Department for evaluation of dizziness which onset today PTA. Symptoms are constant and mild to moderate in severity. No mitigating or exacerbating factors reported. Associated sxs include weakness, chest tightness, tachycardia, and palpitations. Patient denies any chills, fever, nausea, diarrhea, vomiting, and all other sxs at this time. No prior Tx reported. No further complaints or concerns at this time.     Pt had recent ER eval for tachycardia, dizziness. She has been having intermittent episodes of palpitations, started last year March. She has occ chest pain along with SOB.     1/13/22  ECHO 7/2022 with normal bi V function. ECG stress test neg for ischemia , occ PVCs noted, HTN response. Bardy neg, rare ectopy noted, AVG HR 83 bpm. Sleep study not performed yet.   Iron levels low last visit - f/u hemeonc, iron levels improved. BP elevated 130s/90s. . BMI 42 - 282 lbs. She is wearing a boot on RLE had achilles injury. She has occ palpitations.     Patient feels no leg swelling, no PND, no dizziness, no syncope, no CNS symptoms.    Patient has fairly good exercise tolerance. Works for [employer], is  a cook.     Patient is compliant with medications.    FH - mother - CHF,  in 70s    Conclusion     The patient was monitored for a total of 6d 21h, underlying rhythm is Sinus.  The minimum heart rate was 66 bpm; the maximum 115 bpm; the average 83 bpm.  0 % of Atrial fibrillation/Atrial flutter with longest episode of 0 ms.  -- AV block with 0 %  There were 0 pauses, the longest pause was 0 ms at --.  1 episodes of VT were found with Longest VT at 6 beats .  0 supraventricular episodes were found. Longest SVT Episode 0 s  There were a total of 71917 PVCs with 2 morphologies and 39 couplets. Overall PVC [place] at 1.85 %  There were a total of 0 PSVCs with 0 morphologies and 0 couplets. Overall PSVC [place] at 0 %  There is a total of 1 patient events    Results for orders placed during the hospital encounter of 22    Echo    Interpretation Summary  · The left ventricle is normal in size with normal systolic function.  · The estimated ejection fraction is 60%.  · Normal left ventricular diastolic function.  · Normal right ventricular size with normal right ventricular systolic function.  · Normal central venous pressure (3 mmHg).  · The estimated PA systolic pressure is 24 mmHg.      Results for orders placed during the hospital encounter of 22    Exercise Stress - EKG    Interpretation Summary    The EKG portion of this study is negative for ischemia.    The patient reported no chest pain during the stress test.    During stress, occasional PVCs are noted.    The exercise capacity was moderately impaired.    Stress ECG: There was hypertensive blood pressure response with stress.      Sinus tachycardia   Cannot rule out Anterior infarct (cited on or before 2018)   Abnormal ECG   When compared with ECG of 24-MAR-2021 16:49,   Questionable change in initial forces of Lateral leads   Confirmed by ALEXANDRA SANCHEZ, LORRAINE ([id number]) on 2022 8:00:26 PM     Past Medical History:   Diagnosis Date     Allergic rhinitis, cause unspecified     Breast cancer 2017    Elevated liver function tests     in the past    Fatty liver 05/02/2017    on ultrasound    Hepatomegaly 05/02/2017    on ultrasound    History of sciatica     HSV infection     Type 1 and 2    HTN (hypertension)     Hypothyroidism     Insulin resistance     Iron deficiency anemia, unspecified     Obesity     PONV (postoperative nausea and vomiting)     Tension headache     Vitamin D deficiency        Past Surgical History:   Procedure Laterality Date    BREAST LUMPECTOMY Right 2017    BTL      COLONOSCOPY N/A 2/22/2019    Procedure: COLONOSCOPY;  Surgeon: Mariano Santamaria MD;  Location: [facility];  Service: Endoscopy;  Laterality: N/A;    DILATION AND CURETTAGE OF UTERUS      x 1    HYSTERECTOMY      Laproscopic robot assissted with BSO       Social History     Tobacco Use    Smoking status: Never    Smokeless tobacco: Never   Substance Use Topics    Alcohol use: Yes     Comment: occasionally     Drug use: No       Family History   Problem Relation Age of Onset    Diabetes Mother     Hypertension Mother     Heart failure Mother     Glaucoma Mother     Cancer Father         Stomach cancer    Cancer Sister         Ovarian cancer    Ovarian cancer Sister     No Known Problems Daughter     No Known Problems Son     No Known Problems Daughter     Breast cancer Paternal Cousin     Stroke Neg Hx     Heart disease Neg Hx         MI/CAD       Patient's Medications   New Prescriptions    No medications on file   Previous Medications    ACETAMINOPHEN-CODEINE 300-30MG (TYLENOL #3) 300-30 MG TAB    Take 1 tablet by mouth every 8 (eight) hours as needed.    ACYCLOVIR (ZOVIRAX) 400 MG TABLET    Take 1 tablet (400 mg total) by mouth 3 (three) times daily with meals.    ALPRAZOLAM (XANAX) 0.5 MG TABLET    Take 1 tablet (0.5 mg total) by mouth 2 (two) times daily as needed for Anxiety.    AMLODIPINE (NORVASC) 10 MG TABLET    Take 1 tablet by mouth once daily     ANASTROZOLE (ARIMIDEX) 1 MG TAB    Take 1 tablet by mouth once daily    BENAZEPRIL (LOTENSIN) 20 MG TABLET    Take 1 tablet (20 mg total) by mouth once daily.    CYCLOBENZAPRINE (FLEXERIL) 10 MG TABLET    Take 1 tablet (10 mg total) by mouth daily as needed for Muscle spasms.    FLUTICASONE PROPIONATE (FLONASE) 50 MCG/ACTUATION NASAL SPRAY    USE 2 SPRAY(S) IN EACH NOSTRIL ONCE DAILY AS NEEDED FOR  RHINITIS    IBUPROFEN (ADVIL,MOTRIN) 800 MG TABLET    Take 1 tab by mouth TID prn pain    METFORMIN (GLUCOPHAGE) 500 MG TABLET    TAKE 4 TABLETS BY MOUTH IN THE EVENING AS DIRECTED    METOPROLOL SUCCINATE (TOPROL-XL) 25 MG 24 HR TABLET    Take 3 tablets (75 mg total) by mouth once daily.    OMEPRAZOLE (PRILOSEC) 20 MG CAPSULE    TAKE 1 CAPSULE BY MOUTH ONCE DAILY AS NEEDED    SYNTHROID 175 MCG TABLET    Take 1 tablet (175 mcg total) by mouth once daily.   Modified Medications    No medications on file   Discontinued Medications    No medications on file       Review of Systems   Constitutional: Negative.    HENT: Negative.     Eyes: Negative.    Respiratory: Negative.     Cardiovascular:  Positive for palpitations.   Gastrointestinal: Negative.    Genitourinary: Negative.    Musculoskeletal: Negative.    Skin: Negative.    Neurological:  Positive for dizziness.   Endo/Heme/Allergies: Negative.    Psychiatric/Behavioral: Negative.     All 12 systems otherwise negative.    Wt Readings from Last 3 Encounters:   01/13/23 128.2 kg (282 lb 10.1 oz)   11/17/22 128.2 kg (282 lb 10.1 oz)   07/28/22 133.4 kg (294 lb)     Temp Readings from Last 3 Encounters:   11/17/22 98 °F (36.7 °C)   07/20/22 97.8 °F (36.6 °C)   06/16/22 98.2 °F (36.8 °C) (Oral)     BP Readings from Last 3 Encounters:   01/13/23 (!) 136/90   11/17/22 134/72   07/20/22 (!) 145/97     Pulse Readings from Last 3 Encounters:   01/13/23 100   11/17/22 81   07/20/22 92       BP (!) 136/90 (BP Location: Right arm, Patient Position: Sitting, BP Method: Large (Manual))   " Pulse 100   Ht 5' 8" (1.727 m)   Wt 128.2 kg (282 lb 10.1 oz)   LMP 01/01/2016 (LMP Unknown)   SpO2 98%   BMI 42.97 kg/m²     Objective:   Physical Exam  Vitals and nursing note reviewed.   Constitutional:       General: She is not in acute distress.     Appearance: She is well-developed. She is obese. She is not diaphoretic.   HENT:      Head: Normocephalic and atraumatic.      Nose: Nose normal.   Eyes:      General: No scleral icterus.     Conjunctiva/sclera: Conjunctivae normal.   Neck:      Thyroid: No thyromegaly.      Vascular: No JVD.   Cardiovascular:      Rate and Rhythm: Normal rate and regular rhythm.      Heart sounds: S1 normal and S2 normal. No murmur heard.    No friction rub. No gallop. No S3 or S4 sounds.   Pulmonary:      Effort: Pulmonary effort is normal. No respiratory distress.      Breath sounds: Normal breath sounds. No stridor. No wheezing or rales.   Chest:      Chest wall: No tenderness.   Abdominal:      General: Bowel sounds are normal. There is no distension.      Palpations: Abdomen is soft. There is no mass.      Tenderness: There is no abdominal tenderness. There is no rebound.   Genitourinary:     Comments: Deferred  Musculoskeletal:         General: No tenderness or deformity. Normal range of motion.      Cervical back: Normal range of motion and neck supple.   Lymphadenopathy:      Cervical: No cervical adenopathy.   Skin:     General: Skin is warm and dry.      Coloration: Skin is not pale.      Findings: No erythema or rash.   Neurological:      Mental Status: She is alert and oriented to person, place, and time.      Motor: No abnormal muscle tone.      Coordination: Coordination normal.   Psychiatric:         Behavior: Behavior normal.         Thought Content: Thought content normal.         Judgment: Judgment normal.       Lab Results   Component Value Date     09/20/2022    K 4.1 09/20/2022     09/20/2022    CO2 25 09/20/2022    BUN 13 09/20/2022    " CREATININE 0.8 09/20/2022     (H) 09/20/2022    HGBA1C 6.0 (H) 12/05/2022    MG 2.3 03/28/2020    AST 50 (H) 09/20/2022    ALT 63 (H) 09/20/2022    ALBUMIN 3.7 09/20/2022    PROT 7.1 09/20/2022    BILITOT 0.3 09/20/2022    WBC 5.31 09/20/2022    HGB 13.0 09/20/2022    HCT 40.5 09/20/2022    MCV 89 09/20/2022     09/20/2022    INR 0.9 06/17/2020    TSH 0.753 12/05/2022    CHOL 192 12/05/2022    HDL 51 12/05/2022    LDLCALC 126.4 12/05/2022    TRIG 73 12/05/2022    BNP 10 06/16/2022     Assessment:      1. Chest pain, unspecified type    2. Primary hypertension    3. Dyspnea, unspecified type    4. Palpitations    5. Iron deficiency anemia, unspecified iron deficiency anemia type    6. Morbid obesity with BMI of 40.0-44.9, adult    7. Hypothyroidism, unspecified type    8. Insulin resistance syndrome    9. Gastroesophageal reflux disease, unspecified whether esophagitis present    10. Prediabetes    11. Sleep apnea, unspecified type          Plan:     1. HTN, tachycardia with dizziness, CP/SOB - stable  - titrate meds  - cont BB - increased to 75mg --> 100mg QHS   - r/o TONY  - referral placed   - ECHO 7/2022 with normal bi V function.  - ECG stress test neg for ischemia , occ PVCs noted, HTN response.   - Bardy neg, rare ectopy noted, AVG HR 83 bpm.     2. H/O Breast CA, s/p lumpectomy, radiation/chemo  - cont tx/fu with heme/onc  - cont Arimidex    3. Hypothyroidism  - cont Synthroid     4. GERD  - cont PPI    5. Obesity/PreDM A1c 6.0; BMI 42 - 282 lbs.   - cont tx   - needs weight loss with diet/exercise     6. FE def anemia  - cont tx and monitor  - low iron levels - improved last labs      Thank you for allowing me to participate in this patient's care. Please do not hesitate to contact me with any questions or concerns. Consult note has been forwarded to the referral physician.

## 2023-01-18 ENCOUNTER — CLINICAL SUPPORT (OUTPATIENT)
Dept: REHABILITATION | Facility: HOSPITAL | Age: 58
End: 2023-01-18
Payer: COMMERCIAL

## 2023-01-18 ENCOUNTER — OFFICE VISIT (OUTPATIENT)
Dept: SURGERY | Facility: CLINIC | Age: 58
End: 2023-01-18
Payer: COMMERCIAL

## 2023-01-18 ENCOUNTER — OFFICE VISIT (OUTPATIENT)
Dept: PODIATRY | Facility: CLINIC | Age: 58
End: 2023-01-18
Payer: COMMERCIAL

## 2023-01-18 VITALS — HEIGHT: 68 IN | BODY MASS INDEX: 42.83 KG/M2 | WEIGHT: 282.63 LBS

## 2023-01-18 DIAGNOSIS — Z85.3 ENCOUNTER FOR FOLLOW-UP SURVEILLANCE OF BREAST CANCER: ICD-10-CM

## 2023-01-18 DIAGNOSIS — M24.571 EQUINUS CONTRACTURE OF RIGHT ANKLE: ICD-10-CM

## 2023-01-18 DIAGNOSIS — Z08 ENCOUNTER FOR FOLLOW-UP SURVEILLANCE OF BREAST CANCER: ICD-10-CM

## 2023-01-18 DIAGNOSIS — Z17.0 MALIGNANT NEOPLASM OF UPPER-OUTER QUADRANT OF RIGHT BREAST IN FEMALE, ESTROGEN RECEPTOR POSITIVE: ICD-10-CM

## 2023-01-18 DIAGNOSIS — R68.89 DECREASED STRENGTH, ENDURANCE, AND MOBILITY: ICD-10-CM

## 2023-01-18 DIAGNOSIS — Z79.899 ENCOUNTER FOR MONITORING ADJUVANT HORMONAL THERAPY: ICD-10-CM

## 2023-01-18 DIAGNOSIS — R74.8 ELEVATED LIVER ENZYMES: Primary | ICD-10-CM

## 2023-01-18 DIAGNOSIS — Z12.39 ENCOUNTER FOR BREAST CANCER SCREENING USING NON-MAMMOGRAM MODALITY: ICD-10-CM

## 2023-01-18 DIAGNOSIS — C50.411 MALIGNANT NEOPLASM OF UPPER-OUTER QUADRANT OF RIGHT BREAST IN FEMALE, ESTROGEN RECEPTOR POSITIVE: ICD-10-CM

## 2023-01-18 DIAGNOSIS — M76.61 ACHILLES TENDINITIS OF RIGHT LOWER EXTREMITY: Primary | ICD-10-CM

## 2023-01-18 DIAGNOSIS — Z71.89 COUNSELING AND COORDINATION OF CARE: ICD-10-CM

## 2023-01-18 DIAGNOSIS — Z74.09 DECREASED STRENGTH, ENDURANCE, AND MOBILITY: ICD-10-CM

## 2023-01-18 DIAGNOSIS — R53.1 DECREASED STRENGTH, ENDURANCE, AND MOBILITY: ICD-10-CM

## 2023-01-18 DIAGNOSIS — R26.9 GAIT ABNORMALITY: ICD-10-CM

## 2023-01-18 DIAGNOSIS — C50.911 MALIGNANT NEOPLASM OF RIGHT BREAST IN FEMALE, ESTROGEN RECEPTOR POSITIVE, UNSPECIFIED SITE OF BREAST: ICD-10-CM

## 2023-01-18 DIAGNOSIS — M77.51 BURSITIS OF POSTERIOR HEEL, RIGHT: ICD-10-CM

## 2023-01-18 DIAGNOSIS — Z17.0 MALIGNANT NEOPLASM OF RIGHT BREAST IN FEMALE, ESTROGEN RECEPTOR POSITIVE, UNSPECIFIED SITE OF BREAST: ICD-10-CM

## 2023-01-18 DIAGNOSIS — M25.571 ACUTE RIGHT ANKLE PAIN: Primary | ICD-10-CM

## 2023-01-18 DIAGNOSIS — Z51.81 ENCOUNTER FOR MONITORING ADJUVANT HORMONAL THERAPY: ICD-10-CM

## 2023-01-18 DIAGNOSIS — Z71.89 COUNSELING ON HEALTH PROMOTION AND DISEASE PREVENTION: ICD-10-CM

## 2023-01-18 PROCEDURE — 1159F MED LIST DOCD IN RCRD: CPT | Mod: CPTII,S$GLB,, | Performed by: NURSE PRACTITIONER

## 2023-01-18 PROCEDURE — 1159F PR MEDICATION LIST DOCUMENTED IN MEDICAL RECORD: ICD-10-PCS | Mod: CPTII,S$GLB,, | Performed by: PODIATRIST

## 2023-01-18 PROCEDURE — 99214 PR OFFICE/OUTPT VISIT, EST, LEVL IV, 30-39 MIN: ICD-10-PCS | Mod: S$GLB,,, | Performed by: NURSE PRACTITIONER

## 2023-01-18 PROCEDURE — 1160F PR REVIEW ALL MEDS BY PRESCRIBER/CLIN PHARMACIST DOCUMENTED: ICD-10-PCS | Mod: CPTII,S$GLB,, | Performed by: NURSE PRACTITIONER

## 2023-01-18 PROCEDURE — 99999 PR PBB SHADOW E&M-EST. PATIENT-LVL III: ICD-10-PCS | Mod: PBBFAC,,, | Performed by: NURSE PRACTITIONER

## 2023-01-18 PROCEDURE — 1159F MED LIST DOCD IN RCRD: CPT | Mod: CPTII,S$GLB,, | Performed by: PODIATRIST

## 2023-01-18 PROCEDURE — 3008F PR BODY MASS INDEX (BMI) DOCUMENTED: ICD-10-PCS | Mod: CPTII,S$GLB,, | Performed by: PODIATRIST

## 2023-01-18 PROCEDURE — 99214 OFFICE O/P EST MOD 30 MIN: CPT | Mod: S$GLB,,, | Performed by: NURSE PRACTITIONER

## 2023-01-18 PROCEDURE — 1160F RVW MEDS BY RX/DR IN RCRD: CPT | Mod: CPTII,S$GLB,, | Performed by: PODIATRIST

## 2023-01-18 PROCEDURE — 1159F PR MEDICATION LIST DOCUMENTED IN MEDICAL RECORD: ICD-10-PCS | Mod: CPTII,S$GLB,, | Performed by: NURSE PRACTITIONER

## 2023-01-18 PROCEDURE — 99999 PR PBB SHADOW E&M-EST. PATIENT-LVL III: CPT | Mod: PBBFAC,,, | Performed by: NURSE PRACTITIONER

## 2023-01-18 PROCEDURE — 97110 THERAPEUTIC EXERCISES: CPT

## 2023-01-18 PROCEDURE — 1160F PR REVIEW ALL MEDS BY PRESCRIBER/CLIN PHARMACIST DOCUMENTED: ICD-10-PCS | Mod: CPTII,S$GLB,, | Performed by: PODIATRIST

## 2023-01-18 PROCEDURE — 99999 PR PBB SHADOW E&M-EST. PATIENT-LVL IV: CPT | Mod: PBBFAC,,, | Performed by: PODIATRIST

## 2023-01-18 PROCEDURE — 99213 OFFICE O/P EST LOW 20 MIN: CPT | Mod: S$GLB,,, | Performed by: PODIATRIST

## 2023-01-18 PROCEDURE — 3008F BODY MASS INDEX DOCD: CPT | Mod: CPTII,S$GLB,, | Performed by: PODIATRIST

## 2023-01-18 PROCEDURE — 97140 MANUAL THERAPY 1/> REGIONS: CPT

## 2023-01-18 PROCEDURE — 97112 NEUROMUSCULAR REEDUCATION: CPT

## 2023-01-18 PROCEDURE — 99999 PR PBB SHADOW E&M-EST. PATIENT-LVL IV: ICD-10-PCS | Mod: PBBFAC,,, | Performed by: PODIATRIST

## 2023-01-18 PROCEDURE — 1160F RVW MEDS BY RX/DR IN RCRD: CPT | Mod: CPTII,S$GLB,, | Performed by: NURSE PRACTITIONER

## 2023-01-18 PROCEDURE — 99213 PR OFFICE/OUTPT VISIT, EST, LEVL III, 20-29 MIN: ICD-10-PCS | Mod: S$GLB,,, | Performed by: PODIATRIST

## 2023-01-18 NOTE — PROGRESS NOTES
Subjective:     Patient ID: Kylie Urbano is a 57 y.o. female.    Chief Complaint: Follow-up (Pt c/o right foot pain 7/10, non-diabetic pt wears walking boot, PCP Dr. Parks last seen 11-17-22)    Kylie is a 57 y.o. female who presents to the podiatry clinic for follow up of right foot pain. Patient states pain 7/10. Current symptoms include: inability to bear weight. Aggravating factors: any weight bearing. Symptoms have gradually improved. Treatment to date: prescription NSAIDS which are not very effective and PT which was not very effective. Patients rates pain 7/10 on pain scale. Patient states she is still in the boot and walking with crutch. Patient has no other pedal complaints at this time.     Patient Active Problem List   Diagnosis    Essential hypertension    Iron deficiency anemia    Hypothyroidism    Insulin resistance syndrome    Allergic rhinitis    Vitamin D deficiency    HSV infection    Uterine leiomyoma    Status post laparoscopic hysterectomy    Malignant neoplasm of upper-outer quadrant of right female breast    Breast cancer, right breast    Malignant neoplasm of upper-outer quadrant of right breast in female, estrogen receptor positive    Gastroesophageal reflux disease    Constipation    Anxiety    Morbid obesity with BMI of 40.0-44.9, adult    Prediabetes    Benign colon polyp    Acute right ankle pain    Decreased strength, endurance, and mobility    Gait abnormality    Chemotherapy-induced neuropathy       Medication List with Changes/Refills   Current Medications    ACETAMINOPHEN-CODEINE 300-30MG (TYLENOL #3) 300-30 MG TAB    Take 1 tablet by mouth every 8 (eight) hours as needed.    ACYCLOVIR (ZOVIRAX) 400 MG TABLET    Take 1 tablet (400 mg total) by mouth 3 (three) times daily with meals.    ALPRAZOLAM (XANAX) 0.5 MG TABLET    Take 1 tablet (0.5 mg total) by mouth 2 (two) times daily as needed for Anxiety.    AMLODIPINE (NORVASC) 10 MG TABLET    Take 1 tablet by mouth once daily     ANASTROZOLE (ARIMIDEX) 1 MG TAB    Take 1 tablet by mouth once daily    BENAZEPRIL (LOTENSIN) 20 MG TABLET    Take 1 tablet (20 mg total) by mouth once daily.    CYCLOBENZAPRINE (FLEXERIL) 10 MG TABLET    Take 1 tablet (10 mg total) by mouth daily as needed for Muscle spasms.    FLUTICASONE PROPIONATE (FLONASE) 50 MCG/ACTUATION NASAL SPRAY    USE 2 SPRAY(S) IN EACH NOSTRIL ONCE DAILY AS NEEDED FOR  RHINITIS    IBUPROFEN (ADVIL,MOTRIN) 800 MG TABLET    Take 1 tab by mouth TID prn pain    METFORMIN (GLUCOPHAGE) 500 MG TABLET    TAKE 4 TABLETS BY MOUTH IN THE EVENING AS DIRECTED    METOPROLOL SUCCINATE (TOPROL-XL) 100 MG 24 HR TABLET    Take 1 tablet (100 mg total) by mouth every evening.    OMEPRAZOLE (PRILOSEC) 20 MG CAPSULE    TAKE 1 CAPSULE BY MOUTH ONCE DAILY AS NEEDED    SYNTHROID 175 MCG TABLET    Take 1 tablet (175 mcg total) by mouth once daily.       Review of patient's allergies indicates:   Allergen Reactions    Methylprednisone Anxiety and Palpitations    Amoxicillin Rash     Historical    Hydrocodone-acetaminophen Nausea And Vomiting     Historical.  Historical.    Sulfa (sulfonamide antibiotics) Rash       Past Surgical History:   Procedure Laterality Date    BREAST LUMPECTOMY Right 2017    BTL      COLONOSCOPY N/A 2/22/2019    Procedure: COLONOSCOPY;  Surgeon: Mariano Santamaria MD;  Location: [facility];  Service: Endoscopy;  Laterality: N/A;    DILATION AND CURETTAGE OF UTERUS      x 1    HYSTERECTOMY      Laproscopic robot assissted with BSO       Family History   Problem Relation Age of Onset    Diabetes Mother     Hypertension Mother     Heart failure Mother     Glaucoma Mother     Cancer Father         Stomach cancer    Cancer Sister         Ovarian cancer    Ovarian cancer Sister     No Known Problems Daughter     No Known Problems Son     No Known Problems Daughter     Breast cancer Paternal Cousin     Stroke Neg Hx     Heart disease Neg Hx         MI/CAD       Social History     Socioeconomic  "History    Marital status: Single    Number of children: 3   Occupational History    Occupation: Charly     Employer: [employer]     Comment: [employer]   Tobacco Use    Smoking status: Never    Smokeless tobacco: Never   Substance and Sexual Activity    Alcohol use: Yes     Comment: occasionally     Drug use: No    Sexual activity: Not Currently   Social History Narrative    She wears seatbelt.     Social Determinants of Health     Physical Activity: Insufficiently Active    Days of Exercise per Week: 3 days    Minutes of Exercise per Session: 30 min       Vitals:    01/18/23 0922   Weight: 128.2 kg (282 lb 10.1 oz)   Height: 5' 8" (1.727 m)   PainSc:   7       Review of Systems   Constitutional:  Negative for chills and fever.   Respiratory:  Negative for shortness of breath.    Cardiovascular:  Negative for chest pain, palpitations, orthopnea, claudication and leg swelling.   Gastrointestinal:  Negative for diarrhea, nausea and vomiting.   Musculoskeletal:  Negative for joint pain.   Skin:  Negative for rash.   Neurological:  Negative for dizziness, tingling, sensory change, focal weakness and weakness.   Psychiatric/Behavioral: Negative.           Objective:      PHYSICAL EXAM: Apperance: Alert and orient in no distress,well developed, and with good attention to grooming and body habits  Patient presents ambulating in tennis shoe on left and walking boot on the right.   Lower Extremity Exam  VASCULAR: Dorsalis pedis pulses 2/4 bilateral and Posterior Tibial pulses 2/4 bilateral. Capillary fill time <4 seconds bilateral. No edema observed bilateral. Skin temperature of the lower extremities is warm to warm, proximal to distal.   DERMATOLOGICAL: No skin rashes, subcutaneous nodules, lesions, or ulcers observed bilateral.   NEUROLOGICAL: Light touch, sharp-dull, proprioception all present and equal bilaterally.    MUSCULOSKELETAL: Muscle strength 5/5 for all foot inverters, " everters, plantarflexors, and dorsiflexors bilateral. Ankle joints bilateral shows decreased ROM. bilateral ankle ROM shows greater decrease in dorsiflexion with knee extended. Ankle joint ROM is pain free and without crepitus bilateral. Tnderness with palpation of right posterior 1/3 calcaneus at region of Achilles tendon insertion. Negative pain to palpation right plantar medial tubercle. Negative pain on medial-lateral compression of the calcaneus.     TEST RESULTS: Radiographs of right foot/ankle taken reveals prominent dorsal and plantar calcaneal spurs.  In thesis noted within the distal thickened Achilles tendon at and proximal to its insertion.  Multi articular degenerative change in the midfoot.  Pes planus and mild prominence degenerative change 1st MTP joint.        Assessment:       ICD-10-CM ICD-9-CM   1. Achilles tendinitis of right lower extremity - Right Foot  M76.61 726.71   2. Bursitis of posterior heel, right  M77.51 726.79   3. Equinus contracture of right ankle  M24.571 718.47       Plan:   Achilles tendinitis of right lower extremity - Right Foot    Bursitis of posterior heel, right    Equinus contracture of right ankle      I counseled the patient on her conditions, regarding findings of my examination, my impressions, and usual treatment plan.   I explained to the patient that etiology and treatment options for heel pain including rest,  ice messages, stretching exercises, strappings/tappings, NSAID's, injections, new shoegear with orthotic inserts, and/or surgical treatment.   Patient agreed to continue physical therapy, muscle relaxer, walking boot.  I gave written and verbal instructions on heel cord stretching and this was demonstrated for the patient. Patient expressed understanding.  Prescribed Flexeril 10mg to be taken twice daily with food for the next 10 days and then as needed for inflammation. Patient advised on the possible elevation of blood pressure or heart effects and caution  to take pills as needed and to discontinue use if symptoms arise, patient agreed.  Patient was instructed to continue short walking boot and instructed on proper usage. This should be worn daily for a minimum of 2 weeks at all times when ambulating. Patient instructed not to drive with walking boot if on right foot.   Patient instructed on adequate icing techniques. Patient should ice the affected area at least once per day x 10 minutes for 10 days . I advised the  patient that extra icing would also be beneficial to ensure adequate anti inflammatory effect.   The patient and I reviewed the types of shoes she should be wearing, my recommendation includes generally the best time of the day for a shoe fitting is the afternoon, shoes with a wide toe box, very good cushion, and tennis shoes with removable inner soles. The patient and I reviewed my recommendations for over-the-counter orthotic inserts.   Work excuse paperwork completed.  Patient to return in 1 month and discuss surgical intervention with Dr. Love.          Kristina Cazares DPM  [facility]

## 2023-01-18 NOTE — PROGRESS NOTES
[facility]   Physical Therapy Treatment Note     Name: Kylie Urbano  Clinic Number: [id number]    Therapy Diagnosis:   Encounter Diagnoses   Name Primary?    Acute right ankle pain Yes    Decreased strength, endurance, and mobility     Gait abnormality      Physician: Kristina Cazares DPM    Visit Date: 1/18/2023    Physician Orders: PT Eval and Treat   Medical Diagnosis from Referral:   Achilles tendinitis of right lower extremity - Right Foot   Bursitis of posterior heel, right   Equinus contracture of right ankle   Evaluation Date: 12/27/2022  Authorization Period Expiration: 12/31/2023  Plan of Care Expiration: 3/27/2023  Progress Note Due: 1/27/2023  Visit # / Visits authorized: 3/25 (+1 Evaluation)  FOTO: 1/ 3      Precautions: Standard    PTA Visit #: 1/5     Time In: 8:00 AM  Time Out: 8:45 AM  Total Billable Time: 45 minutes    SUBJECTIVE     Patient reports: she is doing about the same today. Patient with early arrival because she thought her doctor's appointment was first.    She was compliant with home exercise program.    Response to previous treatment: good response to manual therapy with decreased stiffness    Functional change: walking in CAM boot on right LOWER EXTREMITY, no working at this time, decreased standing and walking tolerance, sleeping better, better able to ambulate with crutch    Pain: 7/10  Location: right lateral achilles tendon    OBJECTIVE     Objective Measures updated at progress report unless specified.       Treatment     Kylie received the treatments listed below:      THERAPEUTIC EXERCISES to develop strength, endurance, ROM, flexibility, posture, and core stabilization for (12) minutes including:     Intervention Performed Today     Nustep   x  5 minutes, Level 3, boot doffed   Recumbent bike while wearing shoe  Level 1 x 5 minutes   Sitting ankle DF stretch with stretch-rite  x  10 second holds x 2 minutes   Therabands (no resistance)    x    Ankle PLANTAR FLEXION x20  Ankle Inv x20  Ankle Ev x20   Crutch gait training    10 minutes - single crutch gait training - crutch at left UE            Plan for Next Visit: progress as tolerated     NEUROMUSCULAR RE-EDUCATION activities to improve: Coordination, Kinesthetic, Sense, and Proprioception for (25) minutes. The following activities were included: x = exercises performed     Neuromuscular Re-Education 1/18/2023    Toe Yoga X  x  isolating big toe extension x 30  Isolating 4 digits extension x 30 (unable)   Ankle Alphabets x X 2    Seated heel raises with foot on 4 inch box x X 30 Modified RANGE OF MOTION    Short foot in sititng (toe flexion to increase arch height x 10 second holds x 2 minutes   Short foot with ankle pronation/supination in sitting  2x20   [place] Pick Ups x  x1    BOLD= new this visit  Plan for Next Visit: progress as tolerated      MANUAL THERAPY TECHNIQUES: Joint mobilizations, Soft tissue Mobilization, and mobilization with movement were applied to the area listed below for (8) minutes, including: x = exercises performed     Manual Intervention 1/18/2023    Soft Tissue Mobilization x Right achilles tendon, calcaneal insertion point, gastrocnemius    Joint Mobilizations  Talocrural and subtalar   Mobilization with movement     Functional Dry Needling      BOLD = new this visit  Plan for Next Visit: as needed        Patient Education and Home Exercises     Home Exercises Provided and Patient Education Provided     Education provided:   - Educated on EvenUps while wearing boot, Towel stretch into DF, ankle alphabets    Written Home Exercises Provided: Patient instructed to cont prior HEP. Exercises were reviewed and Kylie was able to demonstrate them prior to the end of the session.  Kylie demonstrated good  understanding of the education provided. See EMR under Patient Instructions for exercises provided during therapy sessions    ASSESSMENT   Patient still ambulating with right LOWER  EXTREMITY in EXTERNAL ROTATION due to weakness and antalgic gait. Patient instructed to ask podiatrist about boot today for what the process will be to wean her off. Patient verbalized understanding. Patient with good form with all exercises performed today and only minor discomfort. Patient with tenderness in midportion of right achilles tenderness and swelling noted in lateral aspect of ankle that was noted to be less swollen by end of manual therapy. No adverse effects noted; continue progressing as tolerated.    Kylie Is progressing well towards her goals.   Patient prognosis is Good.     Patient will continue to benefit from skilled outpatient physical therapy to address the deficits listed in the problem list box on initial evaluation, provide patient/family education and to maximize patient's level of independence in the home and community environment.     Patient's spiritual, cultural and educational needs considered and patient agreeable to plan of care and goals.     Anticipated barriers to physical therapy: BMI over 30, Headaches, High Blood Pressure, previous history of breast cancer    Goals: Reviewed:01/18/2022    Short Term Goals:  In 6 weeks  1.Pt to be educated on HEP.  2.Patient to demo increased PROM to DF +10 deg, Inv 30 deg, Ev 15 deg, PF 60 deg without pain, in order to assist with normalizing gait pattern and tolerate activities such as squatting for work related activities.  3.Patient to demo increased strength right ankle to 4/5, in order to improve gait when boot is removed  4.Patient to have decreased pain to 2/10, to improve QOL.  5.Patient to increase SL bal to 5 seconds, in order to improved gait tolerance of increased ROM at the right lower extremities.  6.Patient to improve score on the FOTO, to improve QOL.     Long Term Goals: In 12 weeks  1. Patient to perform daily activities including walking and standing as  with varying postures without increased symptoms.  2. Patient to  demonstrate increased ankle PROM to DF +10 deg, Inv 30 deg, Ev 15 deg, PF 60 deg without pain, in order to assist with normalizing gait pattern and tolerate activities such as squatting for work related activities.  3. Patient to demonstrate increased 4+/5 at the right ankle to improve gait mechanics and return to normal walking regimen  4. Patient to have decreased pain to 2/10, to improve QOL.  5. Patient to improve score on the FOTO to 36% or less, to improve QOL.  6.Patient to increase SL bal to 10 seconds, in order to tolerate variations of semi-static positions while standing as a .       PLAN     Continue Plan of Care (POC) and progress per patient tolerance. See treatment section for details on planned progressions next session.  Plan of Care Due: 3/27/2023    Hannah Guzman PT

## 2023-01-18 NOTE — LETTER
January 18, 2023      [facility] Podiat[facility] 2nd Floor  [address]  [place] [address]  Phone: [phone]  Fax: [phone]       Patient: Kylie Urbano   YOB: 1965  Date of Visit: 01/18/2023    To Whom It May Concern:    Nick Urbano  was at [facility] on 01/18/2023. The patient may return to work on 02/13/2023 with no restrictions. If you have any questions or concerns, or if I can be of further assistance, please do not hesitate to contact me.    Sincerely,        Kayley Molina MA

## 2023-01-19 ENCOUNTER — PATIENT MESSAGE (OUTPATIENT)
Dept: HEPATOLOGY | Facility: CLINIC | Age: 58
End: 2023-01-19
Payer: COMMERCIAL

## 2023-01-20 ENCOUNTER — DOCUMENTATION ONLY (OUTPATIENT)
Dept: REHABILITATION | Facility: HOSPITAL | Age: 58
End: 2023-01-20

## 2023-01-20 ENCOUNTER — CLINICAL SUPPORT (OUTPATIENT)
Dept: REHABILITATION | Facility: HOSPITAL | Age: 58
End: 2023-01-20
Payer: COMMERCIAL

## 2023-01-20 DIAGNOSIS — M25.571 ACUTE RIGHT ANKLE PAIN: Primary | ICD-10-CM

## 2023-01-20 DIAGNOSIS — R68.89 DECREASED STRENGTH, ENDURANCE, AND MOBILITY: ICD-10-CM

## 2023-01-20 DIAGNOSIS — R26.9 GAIT ABNORMALITY: ICD-10-CM

## 2023-01-20 DIAGNOSIS — Z74.09 DECREASED STRENGTH, ENDURANCE, AND MOBILITY: ICD-10-CM

## 2023-01-20 DIAGNOSIS — R53.1 DECREASED STRENGTH, ENDURANCE, AND MOBILITY: ICD-10-CM

## 2023-01-20 PROCEDURE — 97110 THERAPEUTIC EXERCISES: CPT

## 2023-01-20 PROCEDURE — 97140 MANUAL THERAPY 1/> REGIONS: CPT

## 2023-01-20 NOTE — PROGRESS NOTES
PT/PTA met face to face to discuss pt's treatment plan and progress towards established goals. Pt will be seen by a physical therapist minimally every 6th visit or every 30 days.        Prince Kamara PTA

## 2023-01-20 NOTE — PROGRESS NOTES
[facility]   Physical Therapy Treatment Note     Name: Kylie Urbano  Clinic Number: [id number]    Therapy Diagnosis:   Encounter Diagnoses   Name Primary?    Acute right ankle pain Yes    Decreased strength, endurance, and mobility     Gait abnormality      Physician: Kristina aCzares DPM    Visit Date: 1/20/2023    Physician Orders: PT Eval and Treat   Medical Diagnosis from Referral:   Achilles tendinitis of right lower extremity - Right Foot   Bursitis of posterior heel, right   Equinus contracture of right ankle   Evaluation Date: 12/27/2022  Authorization Period Expiration: 12/31/2023  Plan of Care Expiration: 3/27/2023  Progress Note Due: 1/27/2023  Visit # / Visits authorized: 5/25 (+1 Evaluation)  FOTO: 1/ 3      Precautions: Standard    PTA Visit #: 1/5     Time In: 9:17 AM  Time Out: 10:00 AM  Total Billable Time: 42 minutes    SUBJECTIVE     Patient reports: she feels like PHYSICAL THERAPY is helping, but she is still so stiff in her right heel that she still feels like she cannot move much. Patient recalls that during a similar flare up one year ago, she did have to increase time spent walking and on her feet due to being short staffed at her work and that it may be the same issue this time around.    She was compliant with home exercise program.    Response to previous treatment: good response to manual therapy with decreased stiffness    Functional change: walking in CAM boot on right LOWER EXTREMITY, no working at this time, decreased standing and walking tolerance, sleeping better, better able to ambulate with crutch    Pain: 5-6/10  Location: right lateral achilles tendon    OBJECTIVE     Objective Measures updated at progress report unless specified.       Treatment     Kylie received the treatments listed below:      THERAPEUTIC EXERCISES to develop strength, endurance, ROM, flexibility, posture, and core stabilization for (8) minutes including:     Intervention  "Performed Today     Nustep     5 minutes, Level 3, boot doffed   Recumbent bike while wearing shoe x Level 2 x 5 minutes, boot doffed   Sitting ankle DF stretch with stretch-rite    10 second holds x 2 minutes   Therabands (no resistance)       Ankle PLANTAR FLEXION x20  Ankle Inv x20  Ankle Ev x20   Crutch gait training    10 minutes - single crutch gait training - crutch at left UE   DORSIFLEXION mobilization on 20" box with right LOWER EXTREMITY  x  3x30 second holds      Plan for Next Visit: progress as tolerated     NEUROMUSCULAR RE-EDUCATION activities to improve: Coordination, Kinesthetic, Sense, and Proprioception for (0) minutes. The following activities were included: x = exercises performed     Neuromuscular Re-Education 1/20/2023    Toe Yoga     isolating big toe extension x 30  Isolating 4 digits extension x 30 (unable)   Ankle Alphabets  X 2    Seated heel raises with foot on 4 inch box  X 30 Modified RANGE OF MOTION    Short foot in sititng (toe flexion to increase arch height  10 second holds x 2 minutes   Short foot with ankle pronation/supination in sitting  2x20   [place] Pick Ups   x1    BOLD= new this visit  Plan for Next Visit: progress as tolerated      MANUAL THERAPY TECHNIQUES: Joint mobilizations, Soft tissue Mobilization, and mobilization with movement were applied to the area listed below for (34) minutes, including: x = exercises performed     Manual Intervention 1/20/2023    Soft Tissue Mobilization x Right achilles tendon, calcaneal insertion point, gastrocnemius with tool   Joint Mobilizations x Right Talocrural and subtalar into DORSIFLEXION, INVERSION, and EVERSION, and also right calcaneal distractions grade 3 with HVLAT distraction at end   Mobilization with movement     Functional Dry Needling      BOLD = new this visit  Plan for Next Visit: as needed        Patient Education and Home Exercises     Home Exercises Provided and Patient Education Provided     Education provided:   - " Educated on EvenUps while wearing boot, Towel stretch into DF, ankle alphabets    Written Home Exercises Provided: Patient instructed to cont prior HEP. Exercises were reviewed and Kylie was able to demonstrate them prior to the end of the session.  Kylie demonstrated good  understanding of the education provided. See EMR under Patient Instructions for exercises provided during therapy sessions    ASSESSMENT   Increased time was spent today with right ankle joint mobilizations. Patient with some discomfort mostly into DORSIFLEXION and EVERSION, but after time was spent on these treatments patient ambulated with a more functional gait with more DORSIFLEXION upon heel strike, as well as less out toeing. Patient also verbalized noticing a difference (Decrease) in her heel pain. Patient was instructed to perform her self DORSIFLEXION mobilization stretch at home once a day to maintain this new range and allow her achilles tendon to accept her new load. Patient verbalized understanding. Continue to monitor symptoms and progress as tolerated.    Kylie Is progressing well towards her goals.   Patient prognosis is Good.     Patient will continue to benefit from skilled outpatient physical therapy to address the deficits listed in the problem list box on initial evaluation, provide patient/family education and to maximize patient's level of independence in the home and community environment.     Patient's spiritual, cultural and educational needs considered and patient agreeable to plan of care and goals.     Anticipated barriers to physical therapy: BMI over 30, Headaches, High Blood Pressure, previous history of breast cancer    Goals: Reviewed:01/20/2022    Short Term Goals:  In 6 weeks  1.Pt to be educated on HEP.  2.Patient to demo increased PROM to DF +10 deg, Inv 30 deg, Ev 15 deg, PF 60 deg without pain, in order to assist with normalizing gait pattern and tolerate activities such as squatting for work related  activities.  3.Patient to demo increased strength right ankle to 4/5, in order to improve gait when boot is removed  4.Patient to have decreased pain to 2/10, to improve QOL.  5.Patient to increase SL bal to 5 seconds, in order to improved gait tolerance of increased ROM at the right lower extremities.  6.Patient to improve score on the FOTO, to improve QOL.     Long Term Goals: In 12 weeks  1. Patient to perform daily activities including walking and standing as  with varying postures without increased symptoms.  2. Patient to demonstrate increased ankle PROM to DF +10 deg, Inv 30 deg, Ev 15 deg, PF 60 deg without pain, in order to assist with normalizing gait pattern and tolerate activities such as squatting for work related activities.  3. Patient to demonstrate increased 4+/5 at the right ankle to improve gait mechanics and return to normal walking regimen  4. Patient to have decreased pain to 2/10, to improve QOL.  5. Patient to improve score on the FOTO to 36% or less, to improve QOL.  6.Patient to increase SL bal to 10 seconds, in order to tolerate variations of semi-static positions while standing as a .       PLAN     Continue Plan of Care (POC) and progress per patient tolerance. See treatment section for details on planned progressions next session.  Plan of Care Due: 3/27/2023    Hannah Guzman PT

## 2023-01-23 ENCOUNTER — PATIENT OUTREACH (OUTPATIENT)
Dept: ADMINISTRATIVE | Facility: HOSPITAL | Age: 58
End: 2023-01-23
Payer: COMMERCIAL

## 2023-01-23 ENCOUNTER — CLINICAL SUPPORT (OUTPATIENT)
Dept: REHABILITATION | Facility: HOSPITAL | Age: 58
End: 2023-01-23
Payer: COMMERCIAL

## 2023-01-23 DIAGNOSIS — M25.571 ACUTE RIGHT ANKLE PAIN: Primary | ICD-10-CM

## 2023-01-23 DIAGNOSIS — R53.1 DECREASED STRENGTH, ENDURANCE, AND MOBILITY: ICD-10-CM

## 2023-01-23 DIAGNOSIS — R26.9 GAIT ABNORMALITY: ICD-10-CM

## 2023-01-23 DIAGNOSIS — Z74.09 DECREASED STRENGTH, ENDURANCE, AND MOBILITY: ICD-10-CM

## 2023-01-23 DIAGNOSIS — R68.89 DECREASED STRENGTH, ENDURANCE, AND MOBILITY: ICD-10-CM

## 2023-01-23 PROCEDURE — 97112 NEUROMUSCULAR REEDUCATION: CPT | Mod: CQ

## 2023-01-23 PROCEDURE — 97110 THERAPEUTIC EXERCISES: CPT | Mod: CQ

## 2023-01-23 PROCEDURE — 97140 MANUAL THERAPY 1/> REGIONS: CPT | Mod: CQ

## 2023-01-23 NOTE — PROGRESS NOTES
[facility]   Physical Therapy Treatment Note     Name: Kylie Urbano  Clinic Number: [id number]    Therapy Diagnosis:   Encounter Diagnoses   Name Primary?    Acute right ankle pain Yes    Decreased strength, endurance, and mobility     Gait abnormality      Physician: Kristina Cazares DPM    Visit Date: 1/23/2023    Physician Orders: PT Eval and Treat   Medical Diagnosis from Referral:   Achilles tendinitis of right lower extremity - Right Foot   Bursitis of posterior heel, right   Equinus contracture of right ankle   Evaluation Date: 12/27/2022  Authorization Period Expiration: 12/31/2023  Plan of Care Expiration: 3/27/2023  Progress Note Due: 1/27/2023  Visit # / Visits authorized: 6/25 (+1 Evaluation)  FOTO: 1/ 3      Precautions: Standard    PTA Visit #: 1/5     Time In: 9:20 AM  Time Out: 10:00 AM  Total Billable Time: 39 minutes    SUBJECTIVE     Patient reports: she may have done too much over the weekend resulting in soreness today. She reports she is ready to get ou of the boot.     She was compliant with home exercise program.    Response to previous treatment: good response to manual therapy with decreased stiffness    Functional change: walking in CAM boot on right LOWER EXTREMITY, no working at this time, decreased standing and walking tolerance, sleeping better, better able to ambulate with crutch    Pain: 4/10  Location: right lateral achilles tendon    OBJECTIVE     Objective Measures updated at progress report unless specified.       Treatment     Kylie received the treatments listed below:      THERAPEUTIC EXERCISES to develop strength, endurance, ROM, flexibility, posture, and core stabilization for (13) minutes including:     Intervention Performed Today     Nustep   x  5 minutes, Level 3, boot doffed   Recumbent bike while wearing shoe  Level 2 x 5 minutes, boot doffed   Sitting ankle DF stretch with stretch-rite    10 second holds x 2 minutes   Therabands (no  "resistance)  x  x  x Ankle PLANTAR FLEXION x20  Ankle Inv x20  Ankle Ev x20   Crutch gait training    10 minutes - single crutch gait training - crutch at left UE   DORSIFLEXION mobilization on 20" box with right LOWER EXTREMITY    3x30 second holds      Plan for Next Visit: progress as tolerated     NEUROMUSCULAR RE-EDUCATION activities to improve: Coordination, Kinesthetic, Sense, and Proprioception for (16) minutes. The following activities were included: x = exercises performed     Neuromuscular Re-Education 1/23/2023    Toe Yoga     isolating big toe extension x 30  Isolating 4 digits extension x 30 (unable)   Standing on airex pad  X  5 minutes with progressions with weight shifts laterally and forward /backward    Ankle Alphabets x X 2    Seated heel raises with foot on 4 inch box  X 30 Modified RANGE OF MOTION    Short foot in sititng (toe flexion to increase arch height x 10 second holds x 2 minutes   Short foot with ankle pronation/supination in sitting x 2x20   [place] Pick Ups   x1    BOLD= new this visit  Plan for Next Visit: progress as tolerated      MANUAL THERAPY TECHNIQUES: Joint mobilizations, Soft tissue Mobilization, and mobilization with movement were applied to the area listed below for (10) minutes, including: x = exercises performed     Manual Intervention 1/23/2023    Soft Tissue Mobilization x Right achilles tendon, calcaneal insertion point, gastrocnemius with tool   Joint Mobilizations x Right Talocrural and subtalar into DORSIFLEXION, INVERSION, and EVERSION, and also right calcaneal distractions grade 3 with HVLAT distraction at end   Mobilization with movement     Functional Dry Needling      BOLD = new this visit  Plan for Next Visit: as needed        Patient Education and Home Exercises     Home Exercises Provided and Patient Education Provided     Education provided:   - Educated on EvenUps while wearing boot, Towel stretch into DF, ankle alphabets    Written Home Exercises " Provided: Patient instructed to cont prior HEP. Exercises were reviewed and Kylie was able to demonstrate them prior to the end of the session.  Kylie demonstrated good  understanding of the education provided. See EMR under Patient Instructions for exercises provided during therapy sessions    ASSESSMENT   She came into the clinic today wearing her right boot carrying her right show. Exercises were performed out of her boot and the boot was put back on at the end of this session. She was progressed to standing on airex pad with slow conservative weight shifts forward /backward and laterally for ankle stability and balance challenges. She ambulates with short step with her left lower extremity due to right heel pain in the boot.     Kylie Is progressing well towards her goals.   Patient prognosis is Good.     Patient will continue to benefit from skilled outpatient physical therapy to address the deficits listed in the problem list box on initial evaluation, provide patient/family education and to maximize patient's level of independence in the home and community environment.     Patient's spiritual, cultural and educational needs considered and patient agreeable to plan of care and goals.     Anticipated barriers to physical therapy: BMI over 30, Headaches, High Blood Pressure, previous history of breast cancer    Goals: Reviewed:01/20/2022    Short Term Goals:  In 6 weeks  1.Pt to be educated on HEP.  2.Patient to demo increased PROM to DF +10 deg, Inv 30 deg, Ev 15 deg, PF 60 deg without pain, in order to assist with normalizing gait pattern and tolerate activities such as squatting for work related activities.  3.Patient to demo increased strength right ankle to 4/5, in order to improve gait when boot is removed  4.Patient to have decreased pain to 2/10, to improve QOL.  5.Patient to increase SL bal to 5 seconds, in order to improved gait tolerance of increased ROM at the right lower extremities.  6.Patient  to improve score on the FOTO, to improve QOL.     Long Term Goals: In 12 weeks  1. Patient to perform daily activities including walking and standing as  with varying postures without increased symptoms.  2. Patient to demonstrate increased ankle PROM to DF +10 deg, Inv 30 deg, Ev 15 deg, PF 60 deg without pain, in order to assist with normalizing gait pattern and tolerate activities such as squatting for work related activities.  3. Patient to demonstrate increased 4+/5 at the right ankle to improve gait mechanics and return to normal walking regimen  4. Patient to have decreased pain to 2/10, to improve QOL.  5. Patient to improve score on the FOTO to 36% or less, to improve QOL.  6.Patient to increase SL bal to 10 seconds, in order to tolerate variations of semi-static positions while standing as a .       PLAN     Continue Plan of Care (POC) and progress per patient tolerance. See treatment section for details on planned progressions next session.  Plan of Care Due: 3/27/2023    Prince Kamara, KEDAR

## 2023-01-23 NOTE — PROGRESS NOTES
Working HTN report:     Called to discuss scheduling appointment and to get updated BP reading. No answer, LVM

## 2023-01-24 ENCOUNTER — OFFICE VISIT (OUTPATIENT)
Dept: PULMONOLOGY | Facility: CLINIC | Age: 58
End: 2023-01-24
Payer: COMMERCIAL

## 2023-01-24 VITALS
WEIGHT: 282.63 LBS | HEART RATE: 92 BPM | BODY MASS INDEX: 42.83 KG/M2 | SYSTOLIC BLOOD PRESSURE: 124 MMHG | OXYGEN SATURATION: 96 % | DIASTOLIC BLOOD PRESSURE: 80 MMHG | RESPIRATION RATE: 16 BRPM | HEIGHT: 68 IN

## 2023-01-24 DIAGNOSIS — G47.30 SLEEP-DISORDERED BREATHING: Primary | ICD-10-CM

## 2023-01-24 DIAGNOSIS — I10 ESSENTIAL HYPERTENSION: ICD-10-CM

## 2023-01-24 DIAGNOSIS — Z87.898 HISTORY OF TACHYCARDIA: ICD-10-CM

## 2023-01-24 DIAGNOSIS — R06.83 SNORING: ICD-10-CM

## 2023-01-24 DIAGNOSIS — G47.30 SLEEP APNEA, UNSPECIFIED TYPE: ICD-10-CM

## 2023-01-24 DIAGNOSIS — E66.01 MORBID OBESITY WITH BMI OF 40.0-44.9, ADULT: ICD-10-CM

## 2023-01-24 PROCEDURE — 3074F PR MOST RECENT SYSTOLIC BLOOD PRESSURE < 130 MM HG: ICD-10-PCS | Mod: CPTII,S$GLB,, | Performed by: NURSE PRACTITIONER

## 2023-01-24 PROCEDURE — 1159F MED LIST DOCD IN RCRD: CPT | Mod: CPTII,S$GLB,, | Performed by: NURSE PRACTITIONER

## 2023-01-24 PROCEDURE — 99203 OFFICE O/P NEW LOW 30 MIN: CPT | Mod: S$GLB,,, | Performed by: NURSE PRACTITIONER

## 2023-01-24 PROCEDURE — 3079F PR MOST RECENT DIASTOLIC BLOOD PRESSURE 80-89 MM HG: ICD-10-PCS | Mod: CPTII,S$GLB,, | Performed by: NURSE PRACTITIONER

## 2023-01-24 PROCEDURE — 1159F PR MEDICATION LIST DOCUMENTED IN MEDICAL RECORD: ICD-10-PCS | Mod: CPTII,S$GLB,, | Performed by: NURSE PRACTITIONER

## 2023-01-24 PROCEDURE — 99999 PR PBB SHADOW E&M-EST. PATIENT-LVL V: CPT | Mod: PBBFAC,,, | Performed by: NURSE PRACTITIONER

## 2023-01-24 PROCEDURE — 99203 PR OFFICE/OUTPT VISIT, NEW, LEVL III, 30-44 MIN: ICD-10-PCS | Mod: S$GLB,,, | Performed by: NURSE PRACTITIONER

## 2023-01-24 PROCEDURE — 1160F PR REVIEW ALL MEDS BY PRESCRIBER/CLIN PHARMACIST DOCUMENTED: ICD-10-PCS | Mod: CPTII,S$GLB,, | Performed by: NURSE PRACTITIONER

## 2023-01-24 PROCEDURE — 3074F SYST BP LT 130 MM HG: CPT | Mod: CPTII,S$GLB,, | Performed by: NURSE PRACTITIONER

## 2023-01-24 PROCEDURE — 3008F PR BODY MASS INDEX (BMI) DOCUMENTED: ICD-10-PCS | Mod: CPTII,S$GLB,, | Performed by: NURSE PRACTITIONER

## 2023-01-24 PROCEDURE — 3008F BODY MASS INDEX DOCD: CPT | Mod: CPTII,S$GLB,, | Performed by: NURSE PRACTITIONER

## 2023-01-24 PROCEDURE — 99999 PR PBB SHADOW E&M-EST. PATIENT-LVL V: ICD-10-PCS | Mod: PBBFAC,,, | Performed by: NURSE PRACTITIONER

## 2023-01-24 PROCEDURE — 1160F RVW MEDS BY RX/DR IN RCRD: CPT | Mod: CPTII,S$GLB,, | Performed by: NURSE PRACTITIONER

## 2023-01-24 PROCEDURE — 3079F DIAST BP 80-89 MM HG: CPT | Mod: CPTII,S$GLB,, | Performed by: NURSE PRACTITIONER

## 2023-01-24 NOTE — ASSESSMENT & PLAN NOTE
Encouraged calorie reduction and 30 minutes of exercise daily. Discussed impact of obesity on general health.  Wt Readings from Last 9 Encounters:   01/24/23 128.2 kg (282 lb 10.1 oz)   01/18/23 128.2 kg (282 lb 10.1 oz)   01/13/23 128.2 kg (282 lb 10.1 oz)   11/17/22 128.2 kg (282 lb 10.1 oz)   07/28/22 133.4 kg (294 lb)   07/20/22 133.5 kg (294 lb 5 oz)   07/08/22 133.3 kg (293 lb 14 oz)   06/16/22 131.1 kg (289 lb)   04/05/22 131.5 kg (289 lb 14.5 oz)   Body mass index is 42.97 kg/m².

## 2023-01-24 NOTE — PROGRESS NOTES
Subjective:      Patient ID: Kylie Urbano is a 57 y.o. female.    Patient Active Problem List   Diagnosis    Essential hypertension    Iron deficiency anemia    Hypothyroidism    Insulin resistance syndrome    Allergic rhinitis    Vitamin D deficiency    HSV infection    Uterine leiomyoma    Status post laparoscopic hysterectomy    Malignant neoplasm of upper-outer quadrant of right female breast    Breast cancer, right breast    Malignant neoplasm of upper-outer quadrant of right breast in female, estrogen receptor positive    Gastroesophageal reflux disease    Constipation    Anxiety    Morbid obesity with BMI of 40.0-44.9, adult    Prediabetes    Benign colon polyp    Acute right ankle pain    Decreased strength, endurance, and mobility    Gait abnormality    Chemotherapy-induced neuropathy       she has been referred by Kalen Hicks MD for evaluation and management for   Chief Complaint   Patient presents with    Sleep Apnea       Chief Complaint: Sleep Apnea      HPI:  She presents for a sleep evaluation related to at risk obstructive sleep apnea referred by cardiologist, Dr. Kalen Hicks.  Patient has observed snoring.  Patient reports non restful sleep.  She reports 2-3 days a week morning headache.   She denies day time napping  She denies recent weight gain.  Cardiovascular risk factors: hypertension and obesity  Bed time is 0900  Wake time is 0430 - 0500  Sleep onset is within 15 Minutes.  Sleep maintenance difficulties related to non-restful sleep  Wake after sleep onset occurs none to one time a night.  Nocturia occurs none to one time a night.  Sleep aids :  NO  Dry mouth : YES  Sleep walking:  NO  Sleep talking :  NO  Sleep eating: NO  Vivid Dreams :  NO  Cataplexy :  NO    [place] Sleepiness Scale   EPWORTH SLEEPINESS SCALE 1/24/2023   Sitting and reading 0   Watching TV 2   Sitting, inactive in a public place (e.g. a theatre or a meeting) 0   As a passenger in a car for an hour without a  break 0   Lying down to rest in the afternoon when circumstances permit 2   Sitting and talking to someone 0   Sitting quietly after a lunch without alcohol 0   In a car, while stopped for a few minutes in traffic 0   Total score 4       Neck circumference is 17 inches.  Mallampati score 4    STOP - BANG Questionnaire:   1. Snoring : Do you snore loudly ?     YES  2. Tired : Do you often feel tired, fatigued, or sleepy during daytime?  NO  3. Observed: Has anyone observed you stop breathing during your sleep?    NO  4. Blood pressure : Do you have or are you being treated for high blood pressure?    YES  5. BMI :BMI more than 35 kg/m2?    YES  6. Age : Age over 50 yr old?    YES  7. Neck circumference: Neck circumference greater than 40 cm?    YES  8. Gender: Gender male?   NO    SCORE: 5    High risk of TONY: Yes 5 - 8  Intermediate risk of TONY: Yes 3 - 4  Low risk of TONY: Yes 0 - 2    Previous Report Reviewed: lab reports and office notes     Past Medical History: The following portions of the patient's history were reviewed and updated as appropriate:   She  has a past surgical history that includes BTL; Dilation and curettage of uterus; Hysterectomy; Breast lumpectomy (Right, 2017); and Colonoscopy (N/A, 2/22/2019).  Her family history includes Breast cancer in her paternal cousin; Cancer in her father and sister; Diabetes in her mother; Glaucoma in her mother; Heart failure in her mother; Hypertension in her mother; No Known Problems in her daughter, daughter, and son; Ovarian cancer in her sister.  She  reports that she has never smoked. She has never used smokeless tobacco. She reports current alcohol use. She reports that she does not use drugs.  She has a current medication list which includes the following prescription(s): acetaminophen-codeine 300-30mg, acyclovir, alprazolam, amlodipine, anastrozole, benazepril, cyclobenzaprine, fluticasone propionate, ibuprofen, metformin, metoprolol succinate,  "omeprazole, and synthroid.  She is allergic to methylprednisone, amoxicillin, hydrocodone-acetaminophen, and sulfa (sulfonamide antibiotics)..    Review of Systems   Constitutional:  Negative for fever, chills, weight loss, weight gain, activity change, appetite change, fatigue and night sweats.   HENT:  Negative for postnasal drip, rhinorrhea, sinus pressure, voice change and congestion.    Eyes:  Negative for redness and itching.   Respiratory:  Positive for snoring. Negative for cough, sputum production, chest tightness, shortness of breath, wheezing, orthopnea, asthma nighttime symptoms, dyspnea on extertion, use of rescue inhaler and somnolence.    Cardiovascular: Negative.  Negative for chest pain, palpitations and leg swelling.   Genitourinary:  Negative for difficulty urinating and hematuria.   Endocrine:  Negative for cold intolerance and heat intolerance.    Musculoskeletal:  Negative for arthralgias, gait problem, joint swelling and myalgias.   Skin: Negative.    Gastrointestinal:  Negative for nausea, vomiting, abdominal pain and acid reflux.   Neurological:  Negative for dizziness, weakness, light-headedness and headaches.   Hematological:  Negative for adenopathy. No excessive bruising.   All other systems reviewed and are negative.   Objective:   /80   Pulse 92   Resp 16   Ht 5' 8" (1.727 m)   Wt 128.2 kg (282 lb 10.1 oz)   LMP 01/01/2016 (LMP Unknown)   SpO2 96%   BMI 42.97 kg/m²   Physical Exam  Vitals and nursing note reviewed.   Constitutional:       General: She is not in acute distress.     Appearance: Normal appearance. She is well-developed. She is not ill-appearing or toxic-appearing.   HENT:      Head: Normocephalic.      Right Ear: External ear normal.      Left Ear: External ear normal.      Nose: Nose normal.      Mouth/Throat:      Pharynx: No oropharyngeal exudate.   Eyes:      Conjunctiva/sclera: Conjunctivae normal.   Cardiovascular:      Rate and Rhythm: Normal rate and " regular rhythm.      Heart sounds: Normal heart sounds.   Pulmonary:      Effort: Pulmonary effort is normal.      Breath sounds: Normal breath sounds. No stridor.   Abdominal:      Palpations: Abdomen is soft.   Musculoskeletal:         General: Normal range of motion.      Cervical back: Normal range of motion and neck supple.   Lymphadenopathy:      Cervical: No cervical adenopathy.   Skin:     General: Skin is warm and dry.   Neurological:      Mental Status: She is alert and oriented to person, place, and time.   Psychiatric:         Behavior: Behavior normal. Behavior is cooperative.         Thought Content: Thought content normal.         Judgment: Judgment normal.       Personal Diagnostic Review  Review of labs, xray's, cardiology reports.     Assessment:     1. Sleep-disordered breathing    2. Sleep apnea, unspecified type    3. Snoring    4. History of tachycardia    5. Morbid obesity with BMI of 40.0-44.9, adult    6. Essential hypertension      Orders Placed This Encounter   Procedures    Home Sleep Study     Standing Status:   Future     Standing Expiration Date:   1/24/2024     Scheduling Instructions:      2 night Home Sleep Study     Plan:   Discussed diagnosis, its evaluation, treatment and usual course. All questions answered.  Problem List Items Addressed This Visit       Morbid obesity with BMI of 40.0-44.9, adult     Encouraged calorie reduction and 30 minutes of exercise daily. Discussed impact of obesity on general health.  Wt Readings from Last 9 Encounters:   01/24/23 128.2 kg (282 lb 10.1 oz)   01/18/23 128.2 kg (282 lb 10.1 oz)   01/13/23 128.2 kg (282 lb 10.1 oz)   11/17/22 128.2 kg (282 lb 10.1 oz)   07/28/22 133.4 kg (294 lb)   07/20/22 133.5 kg (294 lb 5 oz)   07/08/22 133.3 kg (293 lb 14 oz)   06/16/22 131.1 kg (289 lb)   04/05/22 131.5 kg (289 lb 14.5 oz)   Body mass index is 42.97 kg/m².               Relevant Orders    Home Sleep Study    Essential hypertension     Stable and  controlled. Continue current treatment plan as previously prescribed with your PCP.               Other Visit Diagnoses       Sleep-disordered breathing    -  Primary    at high risk david proceed with 2 night home sleep study    Relevant Orders    Home Sleep Study    Sleep apnea, unspecified type        Snoring        Relevant Orders    Home Sleep Study    History of tachycardia        Relevant Orders    Home Sleep Study            Follow up for if DAVID, begin PAP nasal mask therapy then fu IDL.    Thank you for the opportunity to participate in the care of this patient.

## 2023-01-25 ENCOUNTER — CLINICAL SUPPORT (OUTPATIENT)
Dept: REHABILITATION | Facility: HOSPITAL | Age: 58
End: 2023-01-25
Payer: COMMERCIAL

## 2023-01-25 ENCOUNTER — LAB VISIT (OUTPATIENT)
Dept: LAB | Facility: HOSPITAL | Age: 58
End: 2023-01-25
Attending: NURSE PRACTITIONER
Payer: COMMERCIAL

## 2023-01-25 ENCOUNTER — TELEPHONE (OUTPATIENT)
Dept: SLEEP MEDICINE | Facility: CLINIC | Age: 58
End: 2023-01-25
Payer: COMMERCIAL

## 2023-01-25 DIAGNOSIS — R53.1 DECREASED STRENGTH, ENDURANCE, AND MOBILITY: ICD-10-CM

## 2023-01-25 DIAGNOSIS — R26.9 GAIT ABNORMALITY: ICD-10-CM

## 2023-01-25 DIAGNOSIS — R68.89 DECREASED STRENGTH, ENDURANCE, AND MOBILITY: ICD-10-CM

## 2023-01-25 DIAGNOSIS — D50.9 IRON DEFICIENCY ANEMIA, UNSPECIFIED IRON DEFICIENCY ANEMIA TYPE: Chronic | ICD-10-CM

## 2023-01-25 DIAGNOSIS — M25.571 ACUTE RIGHT ANKLE PAIN: Primary | ICD-10-CM

## 2023-01-25 DIAGNOSIS — Z74.09 DECREASED STRENGTH, ENDURANCE, AND MOBILITY: ICD-10-CM

## 2023-01-25 LAB
ALBUMIN SERPL BCP-MCNC: 3.5 G/DL (ref 3.5–5.2)
ALP SERPL-CCNC: 136 U/L (ref 55–135)
ALT SERPL W/O P-5'-P-CCNC: 55 U/L (ref 10–44)
ANION GAP SERPL CALC-SCNC: 13 MMOL/L (ref 8–16)
AST SERPL-CCNC: 39 U/L (ref 10–40)
BASOPHILS # BLD AUTO: 0.04 K/UL (ref 0–0.2)
BASOPHILS NFR BLD: 0.6 % (ref 0–1.9)
BILIRUB SERPL-MCNC: 0.3 MG/DL (ref 0.1–1)
BUN SERPL-MCNC: 10 MG/DL (ref 6–20)
CALCIUM SERPL-MCNC: 10 MG/DL (ref 8.7–10.5)
CHLORIDE SERPL-SCNC: 106 MMOL/L (ref 95–110)
CO2 SERPL-SCNC: 23 MMOL/L (ref 23–29)
CREAT SERPL-MCNC: 0.7 MG/DL (ref 0.5–1.4)
DIFFERENTIAL METHOD: NORMAL
EOSINOPHIL # BLD AUTO: 0.1 K/UL (ref 0–0.5)
EOSINOPHIL NFR BLD: 2.1 % (ref 0–8)
ERYTHROCYTE [DISTWIDTH] IN BLOOD BY AUTOMATED COUNT: 13.3 % (ref 11.5–14.5)
EST. GFR  (NO RACE VARIABLE): >60 ML/MIN/1.73 M^2
FERRITIN SERPL-MCNC: 129 NG/ML (ref 20–300)
GLUCOSE SERPL-MCNC: 98 MG/DL (ref 70–110)
HCT VFR BLD AUTO: 38.8 % (ref 37–48.5)
HGB BLD-MCNC: 13.1 G/DL (ref 12–16)
IMM GRANULOCYTES # BLD AUTO: 0.01 K/UL (ref 0–0.04)
IMM GRANULOCYTES NFR BLD AUTO: 0.2 % (ref 0–0.5)
IRON SERPL-MCNC: 60 UG/DL (ref 30–160)
LYMPHOCYTES # BLD AUTO: 2.1 K/UL (ref 1–4.8)
LYMPHOCYTES NFR BLD: 31.7 % (ref 18–48)
MCH RBC QN AUTO: 28.4 PG (ref 27–31)
MCHC RBC AUTO-ENTMCNC: 33.8 G/DL (ref 32–36)
MCV RBC AUTO: 84 FL (ref 82–98)
MONOCYTES # BLD AUTO: 0.5 K/UL (ref 0.3–1)
MONOCYTES NFR BLD: 7.5 % (ref 4–15)
NEUTROPHILS # BLD AUTO: 3.9 K/UL (ref 1.8–7.7)
NEUTROPHILS NFR BLD: 57.9 % (ref 38–73)
NRBC BLD-RTO: 0 /100 WBC
PLATELET # BLD AUTO: 339 K/UL (ref 150–450)
PMV BLD AUTO: 9.3 FL (ref 9.2–12.9)
POTASSIUM SERPL-SCNC: 3.9 MMOL/L (ref 3.5–5.1)
PROT SERPL-MCNC: 8.3 G/DL (ref 6–8.4)
RBC # BLD AUTO: 4.62 M/UL (ref 4–5.4)
SATURATED IRON: 16 % (ref 20–50)
SODIUM SERPL-SCNC: 142 MMOL/L (ref 136–145)
TOTAL IRON BINDING CAPACITY: 369 UG/DL (ref 250–450)
TRANSFERRIN SERPL-MCNC: 249 MG/DL (ref 200–375)
WBC # BLD AUTO: 6.66 K/UL (ref 3.9–12.7)

## 2023-01-25 PROCEDURE — 80053 COMPREHEN METABOLIC PANEL: CPT | Performed by: NURSE PRACTITIONER

## 2023-01-25 PROCEDURE — 36415 COLL VENOUS BLD VENIPUNCTURE: CPT | Performed by: NURSE PRACTITIONER

## 2023-01-25 PROCEDURE — 97140 MANUAL THERAPY 1/> REGIONS: CPT

## 2023-01-25 PROCEDURE — 84466 ASSAY OF TRANSFERRIN: CPT | Performed by: NURSE PRACTITIONER

## 2023-01-25 PROCEDURE — 85025 COMPLETE CBC W/AUTO DIFF WBC: CPT | Performed by: NURSE PRACTITIONER

## 2023-01-25 PROCEDURE — 97110 THERAPEUTIC EXERCISES: CPT

## 2023-01-25 PROCEDURE — 97112 NEUROMUSCULAR REEDUCATION: CPT

## 2023-01-25 PROCEDURE — 82728 ASSAY OF FERRITIN: CPT | Performed by: NURSE PRACTITIONER

## 2023-01-25 NOTE — PROGRESS NOTES
[facility]   Physical Therapy Treatment Note     Name: Kylie Urbano  Clinic Number: [id number]    Therapy Diagnosis:   Encounter Diagnoses   Name Primary?    Acute right ankle pain Yes    Decreased strength, endurance, and mobility     Gait abnormality      Physician: Kristina Cazares DPM    Visit Date: 1/25/2023    Physician Orders: PT Eval and Treat   Medical Diagnosis from Referral:   Achilles tendinitis of right lower extremity - Right Foot   Bursitis of posterior heel, right   Equinus contracture of right ankle   Evaluation Date: 12/27/2022  Authorization Period Expiration: 12/31/2023  Plan of Care Expiration: 3/27/2023  Progress Note Due: 1/27/2023  Visit # / Visits authorized: 7/25 (+1 Evaluation)  FOTO: 1/ 3      Precautions: Standard    PTA Visit #: 1/5     Time In: 9:18 AM  Time Out: 10:00 AM  Total Billable Time: 40 minutes    SUBJECTIVE     Patient reports: she definitely has noticed a difference since coming to PHYSICAL THERAPY and an improvement in her condition.    She was compliant with home exercise program.    Response to previous treatment: good response to manual therapy with decreased stiffness    Functional change: walking in CAM boot on right LOWER EXTREMITY, no working at this time, decreased standing and walking tolerance, sleeping better, better able to ambulate with crutch    Pain: 4-5/10  Location: right lateral achilles tendon    OBJECTIVE     Objective Measures updated at progress report unless specified.       Treatment     Kylie received the treatments listed below:      THERAPEUTIC EXERCISES to develop strength, endurance, ROM, flexibility, posture, and core stabilization for (9) minutes including:     Intervention Performed Today     Nustep     5 minutes, Level 3, boot doffed   Recumbent bike while wearing shoe x Level 2 x 5 minutes, boot doffed   Sitting ankle DF stretch with stretch-rite    10 second holds x 2 minutes   Therabands (no resistance)   "x  x  x Ankle PLANTAR FLEXION x20  Ankle Inv x20  Ankle Ev x20   Crutch gait training    10 minutes - single crutch gait training - crutch at left UE   DORSIFLEXION mobilization on 20" box with right LOWER EXTREMITY    3x30 second holds      Plan for Next Visit: progress as tolerated     NEUROMUSCULAR RE-EDUCATION activities to improve: Coordination, Kinesthetic, Sense, and Proprioception for (8) minutes. The following activities were included: x = exercises performed     Neuromuscular Re-Education 1/25/2023    Toe Yoga     isolating big toe extension x 30  Isolating 4 digits extension x 30 (unable)   Standing on airex pad  X  3 minutes with progressions with weight shifts laterally and forward /backward    Lunge into dorsiflexion on tan mat x 10x10 second holds with rest breaks   Ankle Alphabets  X 2    Seated heel raises with foot on 4 inch box  X 30 Modified RANGE OF MOTION    Short foot in sititng (toe flexion to increase arch height  10 second holds x 2 minutes   Short foot with ankle pronation/supination in sitting  2x20   [place] Pick Ups   x1    BOLD= new this visit  Plan for Next Visit: progress as tolerated      MANUAL THERAPY TECHNIQUES: Joint mobilizations, Soft tissue Mobilization, and mobilization with movement were applied to the area listed below for (23) minutes, including: x = exercises performed     Manual Intervention 1/25/2023    Soft Tissue Mobilization x Right achilles tendon, calcaneal insertion point, gastrocnemius with tool   Joint Mobilizations x Right Talocrural and subtalar into DORSIFLEXION, INVERSION, and EVERSION, and also right calcaneal distractions grade 3    Mobilization with movement     Functional Dry Needling      BOLD = new this visit  Plan for Next Visit: as needed        Patient Education and Home Exercises     Home Exercises Provided and Patient Education Provided     Education provided:   - Educated on EvenUps while wearing boot, Towel stretch into DF, ankle " alphabets    Written Home Exercises Provided: Patient instructed to cont prior HEP. Exercises were reviewed and Kylie was able to demonstrate them prior to the end of the session.  Kylie demonstrated good  understanding of the education provided. See EMR under Patient Instructions for exercises provided during therapy sessions    ASSESSMENT   Patient continues to exhibit heel stiffness more pronounced at beginning of session compared to end of session. Patient with some discomfort during manual therapy, but was able to tolerate all treatments. Plan to keep continue progressing as tolerated and focus on tolerating a standing position.    Kylie Is progressing well towards her goals.   Patient prognosis is Good.     Patient will continue to benefit from skilled outpatient physical therapy to address the deficits listed in the problem list box on initial evaluation, provide patient/family education and to maximize patient's level of independence in the home and community environment.     Patient's spiritual, cultural and educational needs considered and patient agreeable to plan of care and goals.     Anticipated barriers to physical therapy: BMI over 30, Headaches, High Blood Pressure, previous history of breast cancer    Goals: Reviewed:01/25/2022    Short Term Goals:  In 6 weeks  1.Pt to be educated on HEP.  2.Patient to demo increased PROM to DF +10 deg, Inv 30 deg, Ev 15 deg, PF 60 deg without pain, in order to assist with normalizing gait pattern and tolerate activities such as squatting for work related activities.  3.Patient to demo increased strength right ankle to 4/5, in order to improve gait when boot is removed  4.Patient to have decreased pain to 2/10, to improve QOL.  5.Patient to increase SL bal to 5 seconds, in order to improved gait tolerance of increased ROM at the right lower extremities.  6.Patient to improve score on the FOTO, to improve QOL.     Long Term Goals: In 12 weeks  1. Patient to  perform daily activities including walking and standing as  with varying postures without increased symptoms.  2. Patient to demonstrate increased ankle PROM to DF +10 deg, Inv 30 deg, Ev 15 deg, PF 60 deg without pain, in order to assist with normalizing gait pattern and tolerate activities such as squatting for work related activities.  3. Patient to demonstrate increased 4+/5 at the right ankle to improve gait mechanics and return to normal walking regimen  4. Patient to have decreased pain to 2/10, to improve QOL.  5. Patient to improve score on the FOTO to 36% or less, to improve QOL.  6.Patient to increase SL bal to 10 seconds, in order to tolerate variations of semi-static positions while standing as a .       PLAN     Continue Plan of Care (POC) and progress per patient tolerance. See treatment section for details on planned progressions next session.  Plan of Care Due: 3/27/2023    Hannah Guzman, PT

## 2023-01-31 ENCOUNTER — OFFICE VISIT (OUTPATIENT)
Dept: HEPATOLOGY | Facility: CLINIC | Age: 58
End: 2023-01-31
Payer: COMMERCIAL

## 2023-01-31 VITALS
HEIGHT: 68 IN | DIASTOLIC BLOOD PRESSURE: 82 MMHG | BODY MASS INDEX: 42.07 KG/M2 | SYSTOLIC BLOOD PRESSURE: 140 MMHG | HEART RATE: 97 BPM | WEIGHT: 277.56 LBS

## 2023-01-31 DIAGNOSIS — R74.8 ELEVATED LIVER ENZYMES: ICD-10-CM

## 2023-01-31 DIAGNOSIS — K76.0 FATTY LIVER: Primary | ICD-10-CM

## 2023-01-31 PROCEDURE — 99214 PR OFFICE/OUTPT VISIT, EST, LEVL IV, 30-39 MIN: ICD-10-PCS | Mod: S$GLB,,, | Performed by: NURSE PRACTITIONER

## 2023-01-31 PROCEDURE — 99999 PR PBB SHADOW E&M-EST. PATIENT-LVL V: ICD-10-PCS | Mod: PBBFAC,,, | Performed by: NURSE PRACTITIONER

## 2023-01-31 PROCEDURE — 1159F MED LIST DOCD IN RCRD: CPT | Mod: CPTII,S$GLB,, | Performed by: NURSE PRACTITIONER

## 2023-01-31 PROCEDURE — 3079F DIAST BP 80-89 MM HG: CPT | Mod: CPTII,S$GLB,, | Performed by: NURSE PRACTITIONER

## 2023-01-31 PROCEDURE — 3008F BODY MASS INDEX DOCD: CPT | Mod: CPTII,S$GLB,, | Performed by: NURSE PRACTITIONER

## 2023-01-31 PROCEDURE — 99999 PR PBB SHADOW E&M-EST. PATIENT-LVL V: CPT | Mod: PBBFAC,,, | Performed by: NURSE PRACTITIONER

## 2023-01-31 PROCEDURE — 3079F PR MOST RECENT DIASTOLIC BLOOD PRESSURE 80-89 MM HG: ICD-10-PCS | Mod: CPTII,S$GLB,, | Performed by: NURSE PRACTITIONER

## 2023-01-31 PROCEDURE — 1159F PR MEDICATION LIST DOCUMENTED IN MEDICAL RECORD: ICD-10-PCS | Mod: CPTII,S$GLB,, | Performed by: NURSE PRACTITIONER

## 2023-01-31 PROCEDURE — 3077F SYST BP >= 140 MM HG: CPT | Mod: CPTII,S$GLB,, | Performed by: NURSE PRACTITIONER

## 2023-01-31 PROCEDURE — 3077F PR MOST RECENT SYSTOLIC BLOOD PRESSURE >= 140 MM HG: ICD-10-PCS | Mod: CPTII,S$GLB,, | Performed by: NURSE PRACTITIONER

## 2023-01-31 PROCEDURE — 99214 OFFICE O/P EST MOD 30 MIN: CPT | Mod: S$GLB,,, | Performed by: NURSE PRACTITIONER

## 2023-01-31 PROCEDURE — 3008F PR BODY MASS INDEX (BMI) DOCUMENTED: ICD-10-PCS | Mod: CPTII,S$GLB,, | Performed by: NURSE PRACTITIONER

## 2023-01-31 NOTE — PROGRESS NOTES
Clinic Follow Up:  [facility] Follow Up Note    Reason for Follow Up:  The primary encounter diagnosis was Fatty liver. A diagnosis of Elevated liver enzymes was also pertinent to this visit.    PCP: Beverly Parks   [id number] [facility] / [place] [id number]    HPI:  This is a 57 y.o. female here for follow up of the above  Pt states that she has been feeling overall stable without any new complaints.   LFTs remain elevated but overall stable  Previous Fibroscan with Severe steatosis without fibrosis  No upper GI bleeding.  No ascites or BLE.  No overt confusion.      Review of Systems   Constitutional:  Positive for weight loss (purposeful). Negative for chills, fever and malaise/fatigue.   Respiratory:  Negative for cough.    Cardiovascular:  Negative for chest pain.   Gastrointestinal:         Per HPI   Musculoskeletal:  Negative for myalgias.   Skin:  Negative for itching and rash.   Neurological:  Negative for headaches.   Psychiatric/Behavioral:  The patient is not nervous/anxious.      Medical History:  Past Medical History:   Diagnosis Date    Allergic rhinitis, cause unspecified     Breast cancer 2017    Elevated liver function tests     in the past    Fatty liver 05/02/2017    on ultrasound    Hepatomegaly 05/02/2017    on ultrasound    History of sciatica     HSV infection     Type 1 and 2    HTN (hypertension)     Hypothyroidism     Insulin resistance     Iron deficiency anemia, unspecified     Obesity     PONV (postoperative nausea and vomiting)     Tension headache     Vitamin D deficiency        Surgical History:   Past Surgical History:   Procedure Laterality Date    BREAST LUMPECTOMY Right 2017    BTL      COLONOSCOPY N/A 2/22/2019    Procedure: COLONOSCOPY;  Surgeon: Mariano Santamaria MD;  Location: [facility];  Service: Endoscopy;  Laterality: N/A;    DILATION AND CURETTAGE OF UTERUS      x 1    HYSTERECTOMY      Laproscopic robot assissted with BSO       Family History:    Family History   Problem Relation Age of Onset    Diabetes Mother     Hypertension Mother     Heart failure Mother     Glaucoma Mother     Cancer Father         Stomach cancer    Cancer Sister         Ovarian cancer    Ovarian cancer Sister     No Known Problems Daughter     No Known Problems Son     No Known Problems Daughter     Breast cancer Paternal Cousin     Stroke Neg Hx     Heart disease Neg Hx         MI/CAD       Social History:   Social History     Tobacco Use    Smoking status: Never    Smokeless tobacco: Never   Substance Use Topics    Alcohol use: Yes     Comment: occasionally     Drug use: No       Allergies: Reviewed    Home Medications:  Current Outpatient Medications on File Prior to Visit   Medication Sig Dispense Refill    acetaminophen-codeine 300-30mg (TYLENOL #3) 300-30 mg Tab Take 1 tablet by mouth every 8 (eight) hours as needed. 20 tablet 0    acyclovir (ZOVIRAX) 400 MG tablet Take 1 tablet (400 mg total) by mouth 3 (three) times daily with meals. 30 tablet 0    ALPRAZolam (XANAX) 0.5 MG tablet Take 1 tablet (0.5 mg total) by mouth 2 (two) times daily as needed for Anxiety. 60 tablet 0    amLODIPine (NORVASC) 10 MG tablet Take 1 tablet by mouth once daily 90 tablet 3    anastrozole (ARIMIDEX) 1 mg Tab Take 1 tablet by mouth once daily 90 tablet 3    benazepriL (LOTENSIN) 20 MG tablet Take 1 tablet (20 mg total) by mouth once daily. 90 tablet 0    cyclobenzaprine (FLEXERIL) 10 MG tablet Take 1 tablet (10 mg total) by mouth daily as needed for Muscle spasms. 20 tablet 0    fluticasone propionate (FLONASE) 50 mcg/actuation nasal spray USE 2 SPRAY(S) IN EACH NOSTRIL ONCE DAILY AS NEEDED FOR  RHINITIS 16 g 5    ibuprofen (ADVIL,MOTRIN) 800 MG tablet Take 1 tab by mouth TID prn pain 90 tablet 1    metFORMIN (GLUCOPHAGE) 500 MG tablet TAKE 4 TABLETS BY MOUTH IN THE EVENING AS DIRECTED 120 tablet 2    metoprolol succinate (TOPROL-XL) 100 MG 24 hr tablet Take 1 tablet (100 mg total) by mouth every  "evening. 90 tablet 1    omeprazole (PRILOSEC) 20 MG capsule TAKE 1 CAPSULE BY MOUTH ONCE DAILY AS NEEDED 30 capsule 5    SYNTHROID 175 mcg tablet Take 1 tablet (175 mcg total) by mouth once daily. 90 tablet 1     No current facility-administered medications on file prior to visit.       Physical Exam:  Vital Signs:  BP (!) 140/82 (BP Location: Left arm, Patient Position: Sitting, BP Method: Large (Manual))   Pulse 97   Ht 5' 8" (1.727 m)   Wt 125.9 kg (277 lb 9 oz)   LMP 01/01/2016 (LMP Unknown)   BMI 42.20 kg/m²   Body mass index is 42.2 kg/m².  Physical Exam  Vitals reviewed.   Constitutional:       Appearance: She is well-developed. She is not ill-appearing.   HENT:      Head: Normocephalic.   Eyes:      General: No scleral icterus.  Cardiovascular:      Rate and Rhythm: Normal rate.   Pulmonary:      Effort: Pulmonary effort is normal.   Abdominal:      General: There is no distension.   Musculoskeletal:         General: Normal range of motion.      Cervical back: Normal range of motion.   Skin:     General: Skin is dry.   Neurological:      Mental Status: She is alert and oriented to person, place, and time.       Labs: Pertinent labs reviewed.      Assessment:  1. Fatty liver    2. Elevated liver enzymes        Recommendations:  Stable without new complaints  - plan for updated US and Fibroscan  - continued weight loss encouraged with nutrition and exercise  - If the imaging stable, can follow up yearly     Return to Clinic:  12 months with pre-clinic labs and imaging.           "

## 2023-02-01 ENCOUNTER — CLINICAL SUPPORT (OUTPATIENT)
Dept: REHABILITATION | Facility: HOSPITAL | Age: 58
End: 2023-02-01
Payer: COMMERCIAL

## 2023-02-01 DIAGNOSIS — Z74.09 DECREASED STRENGTH, ENDURANCE, AND MOBILITY: ICD-10-CM

## 2023-02-01 DIAGNOSIS — M25.571 ACUTE RIGHT ANKLE PAIN: Primary | ICD-10-CM

## 2023-02-01 DIAGNOSIS — R53.1 DECREASED STRENGTH, ENDURANCE, AND MOBILITY: ICD-10-CM

## 2023-02-01 DIAGNOSIS — R26.9 GAIT ABNORMALITY: ICD-10-CM

## 2023-02-01 DIAGNOSIS — R68.89 DECREASED STRENGTH, ENDURANCE, AND MOBILITY: ICD-10-CM

## 2023-02-01 PROCEDURE — 97112 NEUROMUSCULAR REEDUCATION: CPT

## 2023-02-01 PROCEDURE — 97110 THERAPEUTIC EXERCISES: CPT

## 2023-02-01 PROCEDURE — 97140 MANUAL THERAPY 1/> REGIONS: CPT

## 2023-02-01 NOTE — PROGRESS NOTES
[facility]   Physical Therapy Treatment Note + Progress Report    Name: Kylie Urbano  Clinic Number: [id number]    Therapy Diagnosis:   Encounter Diagnoses   Name Primary?    Acute right ankle pain Yes    Decreased strength, endurance, and mobility     Gait abnormality      Physician: Kristina Cazares DPM    Visit Date: 2/1/2023    Physician Orders: PT Eval and Treat   Medical Diagnosis from Referral:   Achilles tendinitis of right lower extremity - Right Foot   Bursitis of posterior heel, right   Equinus contracture of right ankle   Evaluation Date: 12/27/2022  Authorization Period Expiration: 12/31/2023  Plan of Care Expiration: 3/27/2023  Progress Note Due: 03/03/2023  Visit # / Visits authorized: 8/25 (+1 Evaluation)  FOTO: 2/3      Precautions: Standard    PTA Visit #: 1/5     Time In: 10:00 AM  Time Out: 10:40 AM  Total Billable Time: 40 minutes    SUBJECTIVE     Patient reports: PHYSICAL THERAPY has helped her condition tremendously and she has been spending less time walking in her boot. Patient reports there is still room to improve, but that she has gotten a lot better since coming here.    She was compliant with home exercise program.    Response to previous treatment: good response to manual therapy with decreased stiffness    Functional change: walking in CAM boot on right LOWER EXTREMITY (Decreased use), not working at this time, decreased standing and walking tolerance, sleeping better, no longer ambulating with crutch,     Pain: 2-3/10  Location: right lateral achilles tendon    OBJECTIVE     Objective Measures updated at progress report unless specified.     *denotes change since initial evaluation       CMS Impairment/Limitation/Restriction for FOTO Orthopedic Ankle Survey     Therapist reviewed FOTO scores for Kylie Urbano on 12/27/2022.   FOTO documents entered into EPIC - see Media section.     *           Gait/Balance: CAM walking boot at the right lower  "extremities during gait, externally rotated hip/toe out at the right lower extremities, decreased step length and walking velocity      Single Leg balance right = 3.81 seconds*  Single Leg Balance left = 12.1 seconds     Squat: Double leg: able to perform 1 rep to tan mat*                                        Strength:                                    L/E MMT Right Pain/Dysfunction with Movement   Ankle DF 4/5*     Ankle PF 4-/5*    Ankle Inversion 4/5* Pain Achilles region   Ankle Eversion 4/5*     Big Toe Extension 4/5*     Big Toe Flexion 4+/5*                                 Range of Motion:                Ankle/Foot Right Pain/Dysfunction with Movement   PROM       dorsiflexion  +15* deg Pain at the Achilles/posterior calcaneus   plantarflexion  50* deg  Pain posterior calcaneus/Achilles   inversion  22* deg     eversion  15* deg Pain at the Achilles region   Great toe extension  51        Treatment     Kylie received the treatments listed below:      THERAPEUTIC EXERCISES to develop strength, endurance, ROM, flexibility, posture, and core stabilization for (22) minutes including:     Intervention Performed Today     Nustep     5 minutes, Level 3, boot doffed   Recumbent bike while wearing shoe x Level 2 x 5 minutes, boot doffed   Shuttle squat single leg  x 3x10 bilateral 3 bands with focus on DORSIFLEXION RANGE OF MOTION    Sitting ankle DF stretch with stretch-rite    10 second holds x 2 minutes   Therabands (no resistance)       Ankle PLANTAR FLEXION x20  Ankle Inv x20  Ankle Ev x20   Crutch gait training    10 minutes - single crutch gait training - crutch at left UE   DORSIFLEXION mobilization on 20" box with right LOWER EXTREMITY    3x30 second holds   Objectives RE-assessed  x 12 minutes      Plan for Next Visit: progress as tolerated     NEUROMUSCULAR RE-EDUCATION activities to improve: Coordination, Kinesthetic, Sense, and Proprioception for (8) minutes. The following activities were included: x " = exercises performed     Neuromuscular Re-Education 2/1/2023    Toe Yoga     isolating big toe extension x 30  Isolating 4 digits extension x 30 (unable)   Stair Training x 5 laps ascending and descending leading with right LOWER EXTREMITY    Standing on airex pad   3 minutes with progressions with weight shifts laterally and forward /backward    Lunge into dorsiflexion on tan mat  10x10 second holds with rest breaks   Ankle Alphabets  X 2    Seated heel raises with foot on 4 inch box  X 30 Modified RANGE OF MOTION    Short foot in sititng (toe flexion to increase arch height  10 second holds x 2 minutes   Short foot with ankle pronation/supination in sitting  2x20   [place] Pick Ups   x1    BOLD= new this visit  Plan for Next Visit: progress as tolerated      MANUAL THERAPY TECHNIQUES: Joint mobilizations, Soft tissue Mobilization, and mobilization with movement were applied to the area listed below for (10) minutes, including: x = exercises performed     Manual Intervention 2/1/2023    Soft Tissue Mobilization x Right achilles tendon, calcaneal insertion point, gastrocnemius with tool   Joint Mobilizations x Right Talocrural and subtalar into DORSIFLEXION, INVERSION, and EVERSION, and also right calcaneal distractions grade 3    Mobilization with movement     Functional Dry Needling      BOLD = new this visit  Plan for Next Visit: as needed        Patient Education and Home Exercises     Home Exercises Provided and Patient Education Provided     Education provided:   - Educated on EvenUps while wearing boot, Towel stretch into DF, ankle alphabets    Written Home Exercises Provided: Patient instructed to cont prior HEP. Exercises were reviewed and Kylie was able to demonstrate them prior to the end of the session.  Kylie demonstrated good  understanding of the education provided. See EMR under Patient Instructions for exercises provided during therapy sessions    ASSESSMENT   Since beginning PHYSICAL THERAPY  patient has demonstrated moderate improvements in right ankle global RANGE OF MOTION as well as strength. When patient began PHYSICAL THERAPY, patient was able to maintain single leg stance outside of CAM boot, but today, patient was able to maintain this balance. Patient also demonstrated the ability to perform a double leg stance squat without boot on and has improved her functional limitations per FOTO score. Patient also presents with significant changes in pain tolerance. Patient would continue to benefit from skilled therapy to increase her right ankle global RANGE OF MOTION, strength, and overall function so patient may return to her normal work duties without pain and improve her quality of life.    Kylie Is progressing well towards her goals.   Patient prognosis is Good.     Patient will continue to benefit from skilled outpatient physical therapy to address the deficits listed in the problem list box on initial evaluation, provide patient/family education and to maximize patient's level of independence in the home and community environment.     Patient's spiritual, cultural and educational needs considered and patient agreeable to plan of care and goals.     Anticipated barriers to physical therapy: BMI over 30, Headaches, High Blood Pressure, previous history of breast cancer    Goals: Reviewed:02/01/2022    Short Term Goals:  In 6 weeks  1.Patient to be educated on HEP. MET 02/01/2023  2.Patient to demo increased PROM to DF +10 deg, Inv 30 deg, Ev 15 deg, PF 60 deg without pain, in order to assist with normalizing gait pattern and tolerate activities such as squatting for work related activities. MET 02/01/2023  3.Patient to demo increased strength right ankle to 4/5, in order to improve gait when boot is removed MET 02/01/2023  4.Patient to have decreased pain to 2/10, to improve QOL. MET 02/01/2023  5.Patient to increase SL bal to 5 seconds, in order to improved gait tolerance of increased ROM at the  right lower extremities. PROGRESSING 02/01/2023  6.Patient to improve score on the FOTO, to improve QOL. MET 02/01/2023     Long Term Goals: In 12 weeks  1. Patient to perform daily activities including walking and standing as  with varying postures without increased symptoms. PROGRESSING 02/01/2023  2. Patient to demonstrate increased ankle PROM to DF +10 deg, Inv 30 deg, Ev 15 deg, PF 60 deg without pain, in order to assist with normalizing gait pattern and tolerate activities such as squatting for work related activities. PROGRESSING 02/01/2023  3. Patient to demonstrate increased 4+/5 at the right ankle to improve gait mechanics and return to normal walking regimen PROGRESSING 02/01/2023  4. Patient to have decreased pain to 2/10, to improve QOL. MET 02/01/2023  5. Patient to improve score on the FOTO to 36% or less, to improve QOL. PROGRESSING 02/01/2023  6.Patient to increase SL bal to 10 seconds, in order to tolerate variations of semi-static positions while standing as a . PROGRESSING 02/01/2023       PLAN     Continue Plan of Care (POC) and progress per patient tolerance. See treatment section for details on planned progressions next session.  Plan of Care Due: 3/27/2023    Hannah Guzman, PT

## 2023-02-06 ENCOUNTER — CLINICAL SUPPORT (OUTPATIENT)
Dept: REHABILITATION | Facility: HOSPITAL | Age: 58
End: 2023-02-06
Payer: COMMERCIAL

## 2023-02-06 DIAGNOSIS — R68.89 DECREASED STRENGTH, ENDURANCE, AND MOBILITY: ICD-10-CM

## 2023-02-06 DIAGNOSIS — Z74.09 DECREASED STRENGTH, ENDURANCE, AND MOBILITY: ICD-10-CM

## 2023-02-06 DIAGNOSIS — R26.9 GAIT ABNORMALITY: ICD-10-CM

## 2023-02-06 DIAGNOSIS — R53.1 DECREASED STRENGTH, ENDURANCE, AND MOBILITY: ICD-10-CM

## 2023-02-06 DIAGNOSIS — M25.571 ACUTE RIGHT ANKLE PAIN: Primary | ICD-10-CM

## 2023-02-06 PROCEDURE — 97110 THERAPEUTIC EXERCISES: CPT

## 2023-02-06 PROCEDURE — 97112 NEUROMUSCULAR REEDUCATION: CPT

## 2023-02-06 PROCEDURE — 97140 MANUAL THERAPY 1/> REGIONS: CPT

## 2023-02-06 NOTE — PROGRESS NOTES
[facility]   Physical Therapy Treatment Note     Name: Kylie Urbano  Clinic Number: [id number]    Therapy Diagnosis:   Encounter Diagnoses   Name Primary?    Acute right ankle pain Yes    Decreased strength, endurance, and mobility     Gait abnormality      Physician: Kristina Cazares DPM    Visit Date: 2/6/2023    Physician Orders: PT Eval and Treat   Medical Diagnosis from Referral:   Achilles tendinitis of right lower extremity - Right Foot   Bursitis of posterior heel, right   Equinus contracture of right ankle   Evaluation Date: 12/27/2022  Authorization Period Expiration: 12/31/2023  Plan of Care Expiration: 3/27/2023  Progress Note Due: 03/03/2023  Visit # / Visits authorized: 9/25 (+1 Evaluation)  FOTO: 2/3      Precautions: Standard    PTA Visit #: 1/5     Time In: 10:45 AM  Time Out: 11:25 AM  Total Billable Time: 40 minutes    SUBJECTIVE     Patient reports: she continues to notice an improvement in her condition and is now trying to wean herself out the boot.    She was compliant with home exercise program.    Response to previous treatment: good response to manual therapy with decreased stiffness, minor soreness from added LOWER EXTREMITY strengthening exercises last visit    Functional change: walking in CAM boot on right LOWER EXTREMITY (Decreased use), not working at this time, decreased standing and walking tolerance, sleeping better, no longer ambulating with crutch    Pain: 2-3/10  Location: right lateral achilles tendon    OBJECTIVE     Objective Measures updated at progress report unless specified.       Treatment     Kylie received the treatments listed below:      THERAPEUTIC EXERCISES to develop strength, endurance, ROM, flexibility, posture, and core stabilization for (9) minutes including:     Intervention Performed Today     Nustep     5 minutes, Level 3, boot doffed   Recumbent bike while wearing shoe x Level 2 x 5 minutes, boot doffed   Shuttle squat  "single leg  x 3x10 bilateral 3 bands with focus on DORSIFLEXION RANGE OF MOTION    Sitting ankle DF stretch with stretch-rite    10 second holds x 2 minutes   Therabands (no resistance)       Ankle PLANTAR FLEXION x20  Ankle Inv x20  Ankle Ev x20   Crutch gait training    10 minutes - single crutch gait training - crutch at left UE   DORSIFLEXION mobilization on 20" box with right LOWER EXTREMITY    3x30 second holds   Objectives RE-assessed   12 minutes      Plan for Next Visit: progress as tolerated     NEUROMUSCULAR RE-EDUCATION activities to improve: Coordination, Kinesthetic, Sense, and Proprioception for (23) minutes. The following activities were included: x = exercises performed     Neuromuscular Re-Education 2/6/2023    Toe Yoga     isolating big toe extension x 30  Isolating 4 digits extension x 30 (unable)   Stair Training x 10 laps ascending and descending leading with right LOWER EXTREMITY    Standing on airex pad   3 minutes with progressions with weight shifts laterally and forward /backward    Lunge into dorsiflexion on tan mat  10x10 second holds with rest breaks   Ankle Alphabets  X 2    Seated heel raises with foot on 4 inch box  X 30 Modified RANGE OF MOTION    Short foot in sititng (toe flexion to increase arch height  10 second holds x 2 minutes   Short foot with ankle pronation/supination in sitting  2x20   [place] Pick Ups   x1   Single leg stance floor  Single leg stance airex X  x 3x30 seconds  3x30 seconds   Tandem walking on airex x 8 laps within parallel bars    BOLD= new this visit  Plan for Next Visit: progress as tolerated      MANUAL THERAPY TECHNIQUES: Joint mobilizations, Soft tissue Mobilization, and mobilization with movement were applied to the area listed below for (8) minutes, including: x = exercises performed     Manual Intervention 2/6/2023    Soft Tissue Mobilization x Right achilles tendon, calcaneal insertion point, gastrocnemius with tool   Joint Mobilizations x Right " Talocrural and subtalar into DORSIFLEXION, INVERSION, and EVERSION, and also right calcaneal distractions grade 3    Mobilization with movement     Functional Dry Needling      BOLD = new this visit  Plan for Next Visit: as needed        Patient Education and Home Exercises     Home Exercises Provided and Patient Education Provided     Education provided:   - Educated on EvenUps while wearing boot, Towel stretch into DF, ankle alphabets    Written Home Exercises Provided: Patient instructed to cont prior HEP. Exercises were reviewed and Kylie was able to demonstrate them prior to the end of the session.  Kylie demonstrated good  understanding of the education provided. See EMR under Patient Instructions for exercises provided during therapy sessions    ASSESSMENT   Patient tolerated treatment session well today. Single leg stance activities as well as weight bearing balance exercises were added to prepare patient's ankle to accept more load while boot nor shoes were donned. Patient performed exercises well but is noted to have a minor balance deficit on right LOWER EXTREMITY. Plan to continue progressions towards more functional related activities to prepare patient to return to work. No adverse effects noted.    Kylie Is progressing well towards her goals.   Patient prognosis is Good.     Patient will continue to benefit from skilled outpatient physical therapy to address the deficits listed in the problem list box on initial evaluation, provide patient/family education and to maximize patient's level of independence in the home and community environment.     Patient's spiritual, cultural and educational needs considered and patient agreeable to plan of care and goals.     Anticipated barriers to physical therapy: BMI over 30, Headaches, High Blood Pressure, previous history of breast cancer    Goals: Reviewed:02/06/2022    Short Term Goals:  In 6 weeks  1.Patient to be educated on HEP. MET 02/01/2023  2.Patient  to demo increased PROM to DF +10 deg, Inv 30 deg, Ev 15 deg, PF 60 deg without pain, in order to assist with normalizing gait pattern and tolerate activities such as squatting for work related activities. MET 02/01/2023  3.Patient to demo increased strength right ankle to 4/5, in order to improve gait when boot is removed MET 02/01/2023  4.Patient to have decreased pain to 2/10, to improve QOL. MET 02/01/2023  5.Patient to increase SL bal to 5 seconds, in order to improved gait tolerance of increased ROM at the right lower extremities. PROGRESSING 02/01/2023  6.Patient to improve score on the FOTO, to improve QOL. MET 02/01/2023     Long Term Goals: In 12 weeks  1. Patient to perform daily activities including walking and standing as  with varying postures without increased symptoms. PROGRESSING 02/01/2023  2. Patient to demonstrate increased ankle PROM to DF +10 deg, Inv 30 deg, Ev 15 deg, PF 60 deg without pain, in order to assist with normalizing gait pattern and tolerate activities such as squatting for work related activities. PROGRESSING 02/01/2023  3. Patient to demonstrate increased 4+/5 at the right ankle to improve gait mechanics and return to normal walking regimen PROGRESSING 02/01/2023  4. Patient to have decreased pain to 2/10, to improve QOL. MET 02/01/2023  5. Patient to improve score on the FOTO to 36% or less, to improve QOL. PROGRESSING 02/01/2023  6.Patient to increase SL bal to 10 seconds, in order to tolerate variations of semi-static positions while standing as a . PROGRESSING 02/01/2023       PLAN     Continue Plan of Care (POC) and progress per patient tolerance. See treatment section for details on planned progressions next session.  Plan of Care Due: 3/27/2023    Hannah Guzman, PT

## 2023-02-08 ENCOUNTER — CLINICAL SUPPORT (OUTPATIENT)
Dept: REHABILITATION | Facility: HOSPITAL | Age: 58
End: 2023-02-08
Payer: COMMERCIAL

## 2023-02-08 DIAGNOSIS — M25.571 ACUTE RIGHT ANKLE PAIN: Primary | ICD-10-CM

## 2023-02-08 DIAGNOSIS — R53.1 DECREASED STRENGTH, ENDURANCE, AND MOBILITY: ICD-10-CM

## 2023-02-08 DIAGNOSIS — Z74.09 DECREASED STRENGTH, ENDURANCE, AND MOBILITY: ICD-10-CM

## 2023-02-08 DIAGNOSIS — R68.89 DECREASED STRENGTH, ENDURANCE, AND MOBILITY: ICD-10-CM

## 2023-02-08 DIAGNOSIS — R26.9 GAIT ABNORMALITY: ICD-10-CM

## 2023-02-08 PROCEDURE — 97140 MANUAL THERAPY 1/> REGIONS: CPT

## 2023-02-08 PROCEDURE — 97112 NEUROMUSCULAR REEDUCATION: CPT

## 2023-02-08 PROCEDURE — 97110 THERAPEUTIC EXERCISES: CPT

## 2023-02-08 NOTE — PROGRESS NOTES
[facility]   Physical Therapy Treatment Note     Name: Kylie Urbano  Clinic Number: [id number]    Therapy Diagnosis:   Encounter Diagnoses   Name Primary?    Acute right ankle pain Yes    Decreased strength, endurance, and mobility     Gait abnormality      Physician: Kristina Cazares DPM    Visit Date: 2/8/2023    Physician Orders: PT Eval and Treat   Medical Diagnosis from Referral:   Achilles tendinitis of right lower extremity - Right Foot   Bursitis of posterior heel, right   Equinus contracture of right ankle   Evaluation Date: 12/27/2022  Authorization Period Expiration: 12/31/2023  Plan of Care Expiration: 3/27/2023  Progress Note Due: 03/03/2023  Visit # / Visits authorized: 10/25 (+1 Evaluation)  FOTO: 2/3      Precautions: Standard    PTA Visit #: 1/5     Time In: 12:35 PM  Time Out: 1:10 PM  Total Billable Time: 35 minutes    SUBJECTIVE     Patient reports: she drove for about 2 hours the other day and she thinks that aggravated her heel pain a little bit more than it has been recently.     She was compliant with home exercise program.    Response to previous treatment: good response to manual therapy with decreased stiffness, minor soreness from added LOWER EXTREMITY strengthening exercises last visit    Functional change: walking in CAM boot on right LOWER EXTREMITY (Decreased use), not working at this time, decreased standing and walking tolerance, sleeping better, no longer ambulating with crutch    Pain: 2-3/10  Location: right lateral achilles tendon    OBJECTIVE     Objective Measures updated at progress report unless specified.       Treatment     Kylie received the treatments listed below:      THERAPEUTIC EXERCISES to develop strength, endurance, ROM, flexibility, posture, and core stabilization for (15) minutes including:     Intervention Performed Today     Nustep     5 minutes, Level 3, boot doffed   Recumbent bike while wearing shoe x Level 2 x 5 minutes,  "boot doffed   Shuttle squat single leg  x 3x10 bilateral 3 bands with focus on DORSIFLEXION RANGE OF MOTION    Sitting ankle DF stretch with stretch-rite    10 second holds x 2 minutes   Therabands (no resistance)  x  X  X  x     Ankle PLANTAR FLEXION x20  Ankle Inv x20  Ankle Ev x20  Ankle PLANTAR FLEXION    Crutch gait training    10 minutes - single crutch gait training - crutch at left UE   DORSIFLEXION mobilization on 20" box with right LOWER EXTREMITY    3x30 second holds   Objectives RE-assessed   12 minutes      Plan for Next Visit: progress as tolerated     NEUROMUSCULAR RE-EDUCATION activities to improve: Coordination, Kinesthetic, Sense, and Proprioception for (10) minutes. The following activities were included: x = exercises performed     Neuromuscular Re-Education 2/8/2023    Toe Yoga     isolating big toe extension x 30  Isolating 4 digits extension x 30 (unable)   Stair Training x 10 laps ascending and descending leading with right LOWER EXTREMITY    Standing on airex pad   3 minutes with progressions with weight shifts laterally and forward /backward    Lunge into dorsiflexion on tan mat  10x10 second holds with rest breaks   Ankle Alphabets  X 2    Seated heel raises with foot on 4 inch box  X 30 Modified RANGE OF MOTION    Short foot in sititng (toe flexion to increase arch height  10 second holds x 2 minutes   Short foot with ankle pronation/supination in sitting  2x20   [place] Pick Ups   x1   Single leg stance floor  Single leg stance airex X  x 3x30 seconds  3x30 seconds   Tandem walking on airex  8 laps within parallel bars    BOLD= new this visit  Plan for Next Visit: progress as tolerated      MANUAL THERAPY TECHNIQUES: Joint mobilizations, Soft tissue Mobilization, and mobilization with movement were applied to the area listed below for (10) minutes, including: x = exercises performed     Manual Intervention 2/8/2023    Soft Tissue Mobilization x Right achilles tendon, calcaneal insertion " point, gastrocnemius with tool   Joint Mobilizations x Right Talocrural and subtalar into DORSIFLEXION, INVERSION, and EVERSION, and also right calcaneal distractions grade 3    Mobilization with movement     Functional Dry Needling      BOLD = new this visit  Plan for Next Visit: as needed        Patient Education and Home Exercises     Home Exercises Provided and Patient Education Provided     Education provided:   - Educated on EvenUps while wearing boot, Towel stretch into DF, ankle alphabets    Written Home Exercises Provided: Patient instructed to cont prior HEP. Exercises were reviewed and Kylie was able to demonstrate them prior to the end of the session.  Kylie demonstrated good  understanding of the education provided. See EMR under Patient Instructions for exercises provided during therapy sessions    ASSESSMENT   Patient tolerated treatment session well but did present with more noticeable antalgic gait and externally rotated right LOWER EXTREMITY. Patient with some discomfort during airex balance today. Was able to incorporate theraband ankle resistances and patient performed very well with this. Manual therapy was performed at end of visit and patient with decreased stiffness by end. No adverse effects noted; plan to continue progressing as tolerated.     Kylie Is progressing well towards her goals.   Patient prognosis is Good.     Patient will continue to benefit from skilled outpatient physical therapy to address the deficits listed in the problem list box on initial evaluation, provide patient/family education and to maximize patient's level of independence in the home and community environment.     Patient's spiritual, cultural and educational needs considered and patient agreeable to plan of care and goals.     Anticipated barriers to physical therapy: BMI over 30, Headaches, High Blood Pressure, previous history of breast cancer    Goals: Reviewed:02/08/2022    Short Term Goals:  In 6  weeks  1.Patient to be educated on HEP. MET 02/01/2023  2.Patient to demo increased PROM to DF +10 deg, Inv 30 deg, Ev 15 deg, PF 60 deg without pain, in order to assist with normalizing gait pattern and tolerate activities such as squatting for work related activities. MET 02/01/2023  3.Patient to demo increased strength right ankle to 4/5, in order to improve gait when boot is removed MET 02/01/2023  4.Patient to have decreased pain to 2/10, to improve QOL. MET 02/01/2023  5.Patient to increase SL bal to 5 seconds, in order to improved gait tolerance of increased ROM at the right lower extremities. PROGRESSING 02/01/2023  6.Patient to improve score on the FOTO, to improve QOL. MET 02/01/2023     Long Term Goals: In 12 weeks  1. Patient to perform daily activities including walking and standing as  with varying postures without increased symptoms. PROGRESSING 02/01/2023  2. Patient to demonstrate increased ankle PROM to DF +10 deg, Inv 30 deg, Ev 15 deg, PF 60 deg without pain, in order to assist with normalizing gait pattern and tolerate activities such as squatting for work related activities. PROGRESSING 02/01/2023  3. Patient to demonstrate increased 4+/5 at the right ankle to improve gait mechanics and return to normal walking regimen PROGRESSING 02/01/2023  4. Patient to have decreased pain to 2/10, to improve QOL. MET 02/01/2023  5. Patient to improve score on the FOTO to 36% or less, to improve QOL. PROGRESSING 02/01/2023  6.Patient to increase SL bal to 10 seconds, in order to tolerate variations of semi-static positions while standing as a . PROGRESSING 02/01/2023       PLAN     Continue Plan of Care (POC) and progress per patient tolerance. See treatment section for details on planned progressions next session.  Plan of Care Due: 3/27/2023    Hannah Guzman, PT

## 2023-02-10 ENCOUNTER — TELEPHONE (OUTPATIENT)
Dept: HEPATOLOGY | Facility: CLINIC | Age: 58
End: 2023-02-10
Payer: COMMERCIAL

## 2023-02-10 NOTE — TELEPHONE ENCOUNTER
Spoke with patient and rescheduled fibroscan and ultrasound. Patient voices understanding and will fast 8 hours prior to having the ultrasound completed and 4 hours prior to having the fibroscan completed.     ----- Message from Ludmila Serna sent at 2/10/2023 10:11 AM CST -----  Pt would like to reschedule the appts she have today to a later date. Call back number is [phone]. Thx .EL

## 2023-02-15 ENCOUNTER — CLINICAL SUPPORT (OUTPATIENT)
Dept: REHABILITATION | Facility: HOSPITAL | Age: 58
End: 2023-02-15
Payer: COMMERCIAL

## 2023-02-15 DIAGNOSIS — R68.89 DECREASED STRENGTH, ENDURANCE, AND MOBILITY: ICD-10-CM

## 2023-02-15 DIAGNOSIS — R53.1 DECREASED STRENGTH, ENDURANCE, AND MOBILITY: ICD-10-CM

## 2023-02-15 DIAGNOSIS — Z74.09 DECREASED STRENGTH, ENDURANCE, AND MOBILITY: ICD-10-CM

## 2023-02-15 DIAGNOSIS — R26.9 GAIT ABNORMALITY: ICD-10-CM

## 2023-02-15 DIAGNOSIS — M25.571 ACUTE RIGHT ANKLE PAIN: Primary | ICD-10-CM

## 2023-02-15 PROCEDURE — 97140 MANUAL THERAPY 1/> REGIONS: CPT

## 2023-02-15 PROCEDURE — 97110 THERAPEUTIC EXERCISES: CPT

## 2023-02-15 PROCEDURE — 97112 NEUROMUSCULAR REEDUCATION: CPT

## 2023-02-15 NOTE — PROGRESS NOTES
[facility]   Physical Therapy Treatment Note     Name: Kylie Urbano  Clinic Number: [id number]    Therapy Diagnosis:   Encounter Diagnoses   Name Primary?    Acute right ankle pain Yes    Decreased strength, endurance, and mobility     Gait abnormality      Physician: Kristina Cazares DPM    Visit Date: 2/15/2023    Physician Orders: PT Eval and Treat   Medical Diagnosis from Referral:   Achilles tendinitis of right lower extremity - Right Foot   Bursitis of posterior heel, right   Equinus contracture of right ankle   Evaluation Date: 12/27/2022  Authorization Period Expiration: 12/31/2023  Plan of Care Expiration: 3/27/2023  Progress Note Due: 03/03/2023  Visit # / Visits authorized: 11/25 (+1 Evaluation)  FOTO: 2/3      Precautions: Standard    PTA Visit #: 1/5     Time In: 2:45 PM  Time Out: 3:30 PM  Total Billable Time: 40 minutes    SUBJECTIVE     Patient reports: she returned to work this past Monday and has been experiencing increased heel pain because of this. Patient reports she is not able to wear her boot at work since it is not non-slip. Patient reports she is considering surgical options in the future as she feels like that may be the only way to get rid of her pain for good.    She was compliant with home exercise program.    Response to previous treatment: good response to manual therapy with decreased stiffness    Functional change: walking in CAM boot on right LOWER EXTREMITY (Decreased use), not working at this time, decreased standing and walking tolerance, sleeping better, no longer ambulating with crutch    Pain: 5-6/10  Location: right lateral achilles tendon    OBJECTIVE     Objective Measures updated at progress report unless specified.       Treatment     Kylie received the treatments listed below:      THERAPEUTIC EXERCISES to develop strength, endurance, ROM, flexibility, posture, and core stabilization for (9) minutes including:     Intervention Performed  "Today     Nustep     5 minutes, Level 3, boot doffed   Recumbent bike while wearing shoe x Level 2 x 5 minutes, boot doffed   Shuttle squat single leg  x 3x10 bilateral 3 bands with focus on DORSIFLEXION RANGE OF MOTION  (4 bands double leg today for 3 minutes)   Sitting ankle DF stretch with stretch-rite    10 second holds x 2 minutes   Therabands (no resistance)            Ankle PLANTAR FLEXION x20  Ankle Inv x20  Ankle Ev x20  Ankle PLANTAR FLEXION    Crutch gait training    10 minutes - single crutch gait training - crutch at left UE   DORSIFLEXION mobilization on 20" box with right LOWER EXTREMITY    3x30 second holds   Objectives RE-assessed   12 minutes      Plan for Next Visit: progress as tolerated     NEUROMUSCULAR RE-EDUCATION activities to improve: Coordination, Kinesthetic, Sense, and Proprioception for (8) minutes. The following activities were included: x = exercises performed     Neuromuscular Re-Education 2/15/2023    Toe Yoga     isolating big toe extension x 30  Isolating 4 digits extension x 30 (unable)   Stair Training  10 laps ascending and descending leading with right LOWER EXTREMITY    Standing on airex pad   3 minutes with progressions with weight shifts laterally and forward /backward    Lunge into dorsiflexion on tan mat  10x10 second holds with rest breaks   Ankle Alphabets  X 2    Seated heel raises with foot on 4 inch box  X 30 Modified RANGE OF MOTION    Short foot in sititng (toe flexion to increase arch height  10 second holds x 2 minutes   Short foot with ankle pronation/supination in sitting  2x20   [place] Pick Ups   x1   Single leg stance floor  Single leg stance airex X  x 3x30 seconds  3x30 seconds   Tandem walking on airex x 8 laps within parallel bars    BOLD= new this visit  Plan for Next Visit: progress as tolerated      MANUAL THERAPY TECHNIQUES: Joint mobilizations, Soft tissue Mobilization, and mobilization with movement were applied to the area listed below for (23) " minutes, including: x = exercises performed     Manual Intervention 2/15/2023    Soft Tissue Mobilization x Right achilles tendon, calcaneal insertion point, gastrocnemius with tool   Joint Mobilizations x Right Talocrural and subtalar into DORSIFLEXION, INVERSION, and EVERSION, and also right calcaneal distractions grade 2   Mobilization with movement     Functional Dry Needling      BOLD = new this visit  Plan for Next Visit: as needed        Patient Education and Home Exercises     Home Exercises Provided and Patient Education Provided     Education provided:   - Educated on EvenUps while wearing boot, Towel stretch into DF, ankle alphabets    Written Home Exercises Provided: Patient instructed to cont prior HEP. Exercises were reviewed and Kylie was able to demonstrate them prior to the end of the session.  Kylie demonstrated good  understanding of the education provided. See EMR under Patient Instructions for exercises provided during therapy sessions    ASSESSMENT   Patient tolerated treatment session fairly. Patient was unable to complete previous exercises performed last treatment due to increase in symptoms today. Patient presented with out toeing gait pattern on right LOWER EXTREMITY as well as antalgic gait. Mobilizations were decreased to grade 2 today for pain control. Patient instructed to pursue a heel lift shoe insert for time being while standing for work as well as to take sitting rest breaks. Patient verbalized understanding. Plan to continue monitoring symptoms and progressing as tolerated.     Kylie Is progressing well towards her goals.   Patient prognosis is Good.     Patient will continue to benefit from skilled outpatient physical therapy to address the deficits listed in the problem list box on initial evaluation, provide patient/family education and to maximize patient's level of independence in the home and community environment.     Patient's spiritual, cultural and educational needs  considered and patient agreeable to plan of care and goals.     Anticipated barriers to physical therapy: BMI over 30, Headaches, High Blood Pressure, previous history of breast cancer    Goals: Reviewed:02/15/2022    Short Term Goals:  In 6 weeks  1.Patient to be educated on HEP. MET 02/01/2023  2.Patient to demo increased PROM to DF +10 deg, Inv 30 deg, Ev 15 deg, PF 60 deg without pain, in order to assist with normalizing gait pattern and tolerate activities such as squatting for work related activities. MET 02/01/2023  3.Patient to demo increased strength right ankle to 4/5, in order to improve gait when boot is removed MET 02/01/2023  4.Patient to have decreased pain to 2/10, to improve QOL. MET 02/01/2023  5.Patient to increase SL bal to 5 seconds, in order to improved gait tolerance of increased ROM at the right lower extremities. PROGRESSING 02/01/2023  6.Patient to improve score on the FOTO, to improve QOL. MET 02/01/2023     Long Term Goals: In 12 weeks  1. Patient to perform daily activities including walking and standing as  with varying postures without increased symptoms. PROGRESSING 02/01/2023  2. Patient to demonstrate increased ankle PROM to DF +10 deg, Inv 30 deg, Ev 15 deg, PF 60 deg without pain, in order to assist with normalizing gait pattern and tolerate activities such as squatting for work related activities. PROGRESSING 02/01/2023  3. Patient to demonstrate increased 4+/5 at the right ankle to improve gait mechanics and return to normal walking regimen PROGRESSING 02/01/2023  4. Patient to have decreased pain to 2/10, to improve QOL. MET 02/01/2023  5. Patient to improve score on the FOTO to 36% or less, to improve QOL. PROGRESSING 02/01/2023  6.Patient to increase SL bal to 10 seconds, in order to tolerate variations of semi-static positions while standing as a . PROGRESSING 02/01/2023       PLAN     Continue Plan of Care (POC) and progress per patient tolerance. See treatment  section for details on planned progressions next session.  Plan of Care Due: 3/27/2023    Hannah Guzman PT

## 2023-02-21 ENCOUNTER — HOSPITAL ENCOUNTER (OUTPATIENT)
Dept: RADIOLOGY | Facility: HOSPITAL | Age: 58
Discharge: HOME | End: 2023-02-21
Attending: NURSE PRACTITIONER
Payer: COMMERCIAL

## 2023-02-21 ENCOUNTER — PROCEDURE VISIT (OUTPATIENT)
Dept: HEPATOLOGY | Facility: CLINIC | Age: 58
End: 2023-02-21
Attending: NURSE PRACTITIONER
Payer: COMMERCIAL

## 2023-02-21 ENCOUNTER — TELEPHONE (OUTPATIENT)
Dept: HEPATOLOGY | Facility: CLINIC | Age: 58
End: 2023-02-21

## 2023-02-21 VITALS
HEIGHT: 68 IN | SYSTOLIC BLOOD PRESSURE: 136 MMHG | HEART RATE: 96 BPM | WEIGHT: 275.38 LBS | DIASTOLIC BLOOD PRESSURE: 82 MMHG | BODY MASS INDEX: 41.74 KG/M2

## 2023-02-21 DIAGNOSIS — K76.0 FATTY LIVER: ICD-10-CM

## 2023-02-21 DIAGNOSIS — R74.8 ELEVATED LIVER ENZYMES: ICD-10-CM

## 2023-02-21 PROCEDURE — 76705 US ABDOMEN LIMITED: ICD-10-PCS | Mod: 26,,, | Performed by: RADIOLOGY

## 2023-02-21 PROCEDURE — 76981 PR US, ELASTOGRAPHY, PARENCHYMA: ICD-10-PCS | Mod: S$GLB,,, | Performed by: NURSE PRACTITIONER

## 2023-02-21 PROCEDURE — 76705 ECHO EXAM OF ABDOMEN: CPT | Mod: 26,,, | Performed by: RADIOLOGY

## 2023-02-21 PROCEDURE — 76705 ECHO EXAM OF ABDOMEN: CPT | Mod: TC

## 2023-02-21 PROCEDURE — 76981 USE PARENCHYMA: CPT | Mod: S$GLB,,, | Performed by: NURSE PRACTITIONER

## 2023-02-21 NOTE — TELEPHONE ENCOUNTER
Spoke with patient on [phone], in reference to results and further recommendations. Patient voices understanding.      ----- Message from CATRACHO Lanier sent at 2/21/2023  2:21 PM CST -----  Interpretation:   Mild steatosis without fibrosis

## 2023-02-21 NOTE — PROCEDURES
Fibroscan Procedure     Name: Kylie Urbano  Date of Procedure : 2023   :: CATRACHO Still  Diagnosis: NAFLD    Probe: XL    Fibroscan readin.7 KPa    Fibrosis:F 0-1     CAP readin dB/m    Steatosis: :S1       Interpretation:   Mild steatosis without fibrosis

## 2023-02-21 NOTE — PROGRESS NOTES
Patient presented in clinic today for fibroscan examination of their liver. Patient verbalized that they have fasted 4 hours prior to their examination. Patient denies being pregnant or having any active implantable metal devices; such as a pacemaker, defibrillator, or pump.     Janeth Fall, DELIAN, RN   Registered Nurse Lead- Hepatology   [facility]

## 2023-03-01 ENCOUNTER — TELEPHONE (OUTPATIENT)
Dept: FAMILY MEDICINE | Facility: CLINIC | Age: 58
End: 2023-03-01

## 2023-03-01 DIAGNOSIS — F41.9 ANXIETY: ICD-10-CM

## 2023-03-01 RX ORDER — ALPRAZOLAM 0.5 MG/1
0.5 TABLET ORAL 2 TIMES DAILY PRN
Qty: 60 TABLET | Refills: 0 | OUTPATIENT
Start: 2023-03-01

## 2023-03-01 NOTE — TELEPHONE ENCOUNTER
----- Message from Kiki Vera sent at 3/1/2023  1:19 PM CST -----  Type:  Patient Returning Call    Who Called:pt  Who Left Message for Patient:nurse  Does the patient know what this is regarding?:call  Would the patient rather a call back or a response via [facility]? [facility] or after 2:00  Best Call Back Number:[phone]  Additional Information: Patient wants to know why her script was denied.

## 2023-03-01 NOTE — TELEPHONE ENCOUNTER
No new care gaps identified.  [facility] Embedded Care Gaps. Reference number: [id number]. 3/01/2023   5:37:24 AM CST

## 2023-03-01 NOTE — TELEPHONE ENCOUNTER
----- Message from Kiki Vera sent at 3/1/2023  1:19 PM CST -----  Type:  Patient Returning Call    Who Called:pt  Who Left Message for Patient:nurse  Does the patient know what this is regarding?:call  Would the patient rather a call back or a response via [facility]? [facility] or after 2:00  Best Call Back Number:[phone]  Additional Information: Patient wants to know why her script was denied.

## 2023-03-01 NOTE — TELEPHONE ENCOUNTER
----- Message from Socorro Coronado sent at 3/1/2023  9:56 AM CST -----  Contact: self/[phone]  Type:  Patient Returning Call    Who Called:Kylie Urbano  Who Left Message for Patient:nurse  Does the patient know what this is regarding?:medication  Would the patient rather a call back or a response via [facility]? Call back  Best Call Back Number:[phone]  Additional Information: patient is at work and would like a call back after 2:00 pm today. Thanks/ar

## 2023-06-01 DIAGNOSIS — Z17.0 MALIGNANT NEOPLASM OF UPPER-OUTER QUADRANT OF RIGHT BREAST IN FEMALE, ESTROGEN RECEPTOR POSITIVE: ICD-10-CM

## 2023-06-01 DIAGNOSIS — C50.411 MALIGNANT NEOPLASM OF UPPER-OUTER QUADRANT OF RIGHT BREAST IN FEMALE, ESTROGEN RECEPTOR POSITIVE: ICD-10-CM

## 2023-06-01 RX ORDER — ANASTROZOLE 1 MG/1
1 TABLET ORAL DAILY
Qty: 90 TABLET | Refills: 3 | OUTPATIENT
Start: 2023-06-01

## 2023-06-02 RX ORDER — ANASTROZOLE 1 MG/1
1 TABLET ORAL DAILY
Qty: 90 TABLET | Refills: 3 | Status: SHIPPED | OUTPATIENT
Start: 2023-06-02

## 2023-06-21 ENCOUNTER — TELEPHONE (OUTPATIENT)
Dept: SURGERY | Facility: CLINIC | Age: 58
End: 2023-06-21
Payer: COMMERCIAL

## 2023-06-21 DIAGNOSIS — Z17.0 MALIGNANT NEOPLASM OF UPPER-OUTER QUADRANT OF RIGHT BREAST IN FEMALE, ESTROGEN RECEPTOR POSITIVE: Primary | ICD-10-CM

## 2023-06-21 DIAGNOSIS — Z79.811 LONG TERM (CURRENT) USE OF AROMATASE INHIBITORS: ICD-10-CM

## 2023-06-21 DIAGNOSIS — C50.411 MALIGNANT NEOPLASM OF UPPER-OUTER QUADRANT OF RIGHT BREAST IN FEMALE, ESTROGEN RECEPTOR POSITIVE: Primary | ICD-10-CM

## 2023-06-21 NOTE — TELEPHONE ENCOUNTER
Tried contacting pt to inform that bone density has been added the day of her mammo appt, and exam appt w Mrs. Bergeron, but received recording that states that pt can not receive any calls at this time. Pt active on portal/ [facility] [employer]

## 2023-06-26 DIAGNOSIS — F41.9 ANXIETY: ICD-10-CM

## 2023-06-26 DIAGNOSIS — B00.9 HSV INFECTION: ICD-10-CM

## 2023-06-26 RX ORDER — ACYCLOVIR 400 MG/1
400 TABLET ORAL
Qty: 30 TABLET | Refills: 0 | Status: SHIPPED | OUTPATIENT
Start: 2023-06-26 | End: 2023-10-02 | Stop reason: SDUPTHER

## 2023-06-26 RX ORDER — ALPRAZOLAM 0.5 MG/1
0.5 TABLET ORAL 2 TIMES DAILY PRN
Qty: 60 TABLET | Refills: 0 | Status: SHIPPED | OUTPATIENT
Start: 2023-06-26 | End: 2023-08-16 | Stop reason: SDUPTHER

## 2023-06-26 NOTE — TELEPHONE ENCOUNTER
Care Due:                  Date            Visit Type   Department     Provider  --------------------------------------------------------------------------------                                EP -                              PRIMARY      [facility]  Last Visit: 11-      CARE ([facility])   MEDICINE       Beverly Parks                              MYCHART                              ANNUAL                              CHECKUP/PHY  [facility]  Next Visit: 11-      S            MEDICINE       Beverly Parks                                                            Last  Test          Frequency    Reason                     Performed    Due Date  --------------------------------------------------------------------------------    HBA1C.......  6 months...  metFORMIN................  12- 06-    [employer] Embedded Care Due Messages. Reference number: [id number].   6/26/2023 9:40:56 AM CDT

## 2023-06-28 NOTE — PROGRESS NOTES
Subjective:       Patient ID: Kylie Urbano is a 57 y.o. female.    Chief Complaint: breast cancer f/u    HPI Patient presents for breast cancer surveillance and review of mammogram.     Last visit was 1/18/2023    57-year-old female with a previous diagnosis of right-sided infiltrating ductal carcinoma ER/KS positive, HER-2 negative with biopsy-proven right axillary lymph node involvement.  Patient was consented for BRCA testing for participation and B-55 study with negative BRCA testing.    She received cycle 1 of dense dose Adriamycin/Cytoxan on 6/12/2017 and tolerated treatment exceptionally well.  She completed cycle 4 of dose dense Taxol on 9/19/2017.  She underwent lumpectomy with sentinel lymph node biopsy on 10/31/2017.   Final pathology demonstrated [id number].  The primary measured 0.8 cm and 2 sentinel lymph nodes were positive with 3 mm macrometastasis within first sentinel lymph node and 1 mm micrometastasis within the second sentinel lymph node.  She underwent a full right axillary lymph node dissection on 11/8/2017 which demonstrated 0 positive nodes.  She completed adjuvant radiation on 2/27/2018.      The patient had a hysterectomy approximately 6/20/14.  FSH was in the intermediate range which likely means the patient is perimenopausal.  Started Tamoxifen 20 mg daily in 3/2018 and has since been transitioned to Arimidex - states tolerating well.  Due off Arimidex 3/2027  Surveillance mammograms remains [facility] - 6/3/20    Primary Oncologist: Herbie Miramontes MD  Previous- Mason Martinez MD        ER/KS positive, HER-2 negative infiltrating ductal carcinoma the right breast with biopsy proven lymph node involvement clinically stage II/III  [id number].  Pathology following neoadjuvant therapy demonstrated 1 sentinel lymph node with micrometastasis (3 mm) and second lymph node with micrometastasis (1 mm)  --BRCA testing via B-55 Study is negative for mutation  --ddAC--> taxol   cycle 1---  6/12/2017  --Final cycle Cycle 4 on 9/19/2017  --Radiation completed on 2/27/2018  --Tamoxifen 20 mg by mouth daily started in 3/2018  --surveillance bilateral mammogram in  [facility]  --Transitioned to Arimidex 1 mg PO daily - to be completed in 3/2027     I have reviewed and updated the HPI, ROS, PMHx, Social Hx, Family Hx and treatment history.     Today's visit:  The patient has previously been followed in the clinic by Dr. Martinez, Dr. Miramontes, and CATRACHO Haque.        10/1/2019- DEXA scan normal.     12/20/2021- dexa normal- repeat 12/2023    Has previous history of elevated liver enzymes and elevated ldh. Had taken more tylenol with Covid infection. Off tylenol now. Referred pt to gastro for evaluation due to prior history of elevated liver enzymes and fatty liver.     Pt followed by gastro for elevated liver enzymes and fatty liver disease. Last visit 8/2020 for fibroscan and ultrasound- will refer back for f/u     Interval History:     Persistent symptoms/side effects of treatment:- feet has intermittent tingling    Functional changes: ROM, neuropathy, weakness or fatigue- stable    Psychosocial challenges- anxiety- not necessarily related to cancer related events- would like to est care with psychologist    Lymphedema- none    Diet/Nutrition- walking intermittently    Sexual Dysfunction or challenges - vaginal dryness but not active now      Review of Systems   Constitutional: Negative.    HENT: Negative.     Eyes: Negative.    Respiratory: Negative.     Cardiovascular: Negative.  Negative for chest pain, palpitations and leg swelling.   Gastrointestinal: Negative.         No reflux   Endocrine: Negative.         Hot flashes and sleep disturbance   Genitourinary: Negative.    Musculoskeletal: Negative.    Skin: Negative.    Allergic/Immunologic: Negative.    Neurological:  Positive for numbness.        Tingling in hands and feet since chemo   Hematological: Negative.  Negative for adenopathy.    Psychiatric/Behavioral: Negative.          Relates mild anxiety     Pt denies any breast pain, nipple discharge or palpable mass      Objective:          Physical Exam   Constitutional: She is oriented to person, place, and time. She appears well-developed and well-nourished. No distress.   HENT:   Head: Normocephalic and atraumatic. Not macrocephalic.   Right Ear: Tympanic membrane and ear canal normal.   Left Ear: Tympanic membrane and ear canal normal.   Nose: Nose normal. Right sinus exhibits no maxillary sinus tenderness and no frontal sinus tenderness. Left sinus exhibits no maxillary sinus tenderness and no frontal sinus tenderness.   Mouth/Throat: Uvula is midline, oropharynx is clear and moist and mucous membranes are normal. No oropharyngeal exudate.   Eyes: Pupils are equal, round, and reactive to light. Conjunctivae, EOM and lids are normal. Lids are everted and swept, no foreign bodies found.   Neck: Trachea normal and normal range of motion. Neck supple. No tracheal deviation present. No thyroid mass and no thyromegaly present.   Cardiovascular: regular rhythm, normal heart sounds, intact distal pulses and normal pulses. Exam reveals no gallop and no friction rub.   No murmur heard.  Pulmonary/Chest: Effort normal and breath sounds normal. No stridor. No respiratory distress. She has no decreased breath sounds. She has no wheezes. She has no rhonchi. She has no rales. She exhibits no tenderness.   No lymphadenopathy, susan breast no visible redness, puckering or retraction. No palpable mass bilaterally. No nipple discharge.   Abdominal: Soft. Normal appearance and bowel sounds are normal. She exhibits no distension. There is no hepatosplenomegaly. There is no tenderness. There is no guarding.   Musculoskeletal: Normal range of motion. She exhibits no edema or deformity.   Lymphadenopathy:        Head (right side): No submental and no submandibular adenopathy present.        Head (left side): No  submental and no submandibular adenopathy present.        Right cervical: No superficial cervical and no deep cervical adenopathy present.       Left cervical: No superficial cervical and no deep cervical adenopathy present.     She has no axillary adenopathy.        Right: No supraclavicular adenopathy present.        Left: No supraclavicular adenopathy present.   Neurological: She is alert and oriented to person, place, and time. No cranial nerve deficit or sensory deficit. She exhibits normal muscle tone.   Skin: Skin is warm, dry and intact. Capillary refill takes less than 2 seconds. No rash noted. She is not diaphoretic. No pallor.   Psychiatric: She has a normal mood and affect. Her speech is normal and behavior is normal. Judgment and thought content normal. She is not actively hallucinating. Cognition and memory are normal. She is attentive.     Mammo today- results pending    Last colonoscopy 2/22/2019- polyp noted- 5 year f/u recommended    Assessment:       1. Malignant neoplasm of upper-outer quadrant of right breast in female, estrogen receptor positive    2. Malignant neoplasm of right breast in female, estrogen receptor positive, unspecified site of breast    3. Encounter for follow-up surveillance of breast cancer    4. Encounter for monitoring adjuvant hormonal therapy    5. Long term (current) use of aromatase inhibitors    6. Counseling on health promotion and disease prevention    7. Counseling and coordination of care              Plan:       1.      Mammogram today- results pending  - negative clinical exam  2.      History of Right breast cancer- continue to take Arimidex daily  3.      Monitoring adjuvant therapy- Taking Arimidex without difficulty. Continue until 3/2027- Dexa 12/20/21- normal - dexa normal today  4.      Encouraged walking to help with bone density and decreasing risk for breast cancer recurrence.  Taking calcium and vit d- walking intermittently. Discussed reducing risk  for breast cancer with wt loss and exercise.    5.      Fatty liver disease- followed by gastro now. Has a diagnosis of borderline hepatomegaly and fatty liver - due for f/u in August 2022- was not scheduled- referral placed  6.      RTC in 6 months for f/u of breast cancer and adjuvant therapy- Jan 2024 for exam and Dexa-   7.      Chemo induced neuropathy- tried neurotin 100 mg po tid for 2 weeks and had no real change. Extensive discussion regarding potential side effects from med and admin instructions. Warned of drowsiness and dizziness-   8.      Lipids 12/5/2022- wnl  9.      Iron def anemia to be followed by hematology-

## 2023-07-12 ENCOUNTER — OFFICE VISIT (OUTPATIENT)
Dept: SURGERY | Facility: CLINIC | Age: 58
End: 2023-07-12
Payer: COMMERCIAL

## 2023-07-12 ENCOUNTER — HOSPITAL ENCOUNTER (OUTPATIENT)
Dept: RADIOLOGY | Facility: HOSPITAL | Age: 58
Discharge: HOME | End: 2023-07-12
Attending: NURSE PRACTITIONER
Payer: COMMERCIAL

## 2023-07-12 VITALS
SYSTOLIC BLOOD PRESSURE: 148 MMHG | RESPIRATION RATE: 14 BRPM | BODY MASS INDEX: 41.96 KG/M2 | HEIGHT: 68 IN | DIASTOLIC BLOOD PRESSURE: 83 MMHG | HEART RATE: 81 BPM | WEIGHT: 276.88 LBS

## 2023-07-12 VITALS — WEIGHT: 275.38 LBS | BODY MASS INDEX: 41.74 KG/M2 | HEIGHT: 68 IN

## 2023-07-12 DIAGNOSIS — Z71.89 COUNSELING AND COORDINATION OF CARE: ICD-10-CM

## 2023-07-12 DIAGNOSIS — Z79.811 LONG TERM (CURRENT) USE OF AROMATASE INHIBITORS: ICD-10-CM

## 2023-07-12 DIAGNOSIS — Z51.81 ENCOUNTER FOR MONITORING ADJUVANT HORMONAL THERAPY: ICD-10-CM

## 2023-07-12 DIAGNOSIS — C50.411 MALIGNANT NEOPLASM OF UPPER-OUTER QUADRANT OF RIGHT BREAST IN FEMALE, ESTROGEN RECEPTOR POSITIVE: Primary | ICD-10-CM

## 2023-07-12 DIAGNOSIS — Z85.3 ENCOUNTER FOR FOLLOW-UP SURVEILLANCE OF BREAST CANCER: ICD-10-CM

## 2023-07-12 DIAGNOSIS — C50.911 MALIGNANT NEOPLASM OF RIGHT BREAST IN FEMALE, ESTROGEN RECEPTOR POSITIVE, UNSPECIFIED SITE OF BREAST: ICD-10-CM

## 2023-07-12 DIAGNOSIS — Z17.0 MALIGNANT NEOPLASM OF UPPER-OUTER QUADRANT OF RIGHT BREAST IN FEMALE, ESTROGEN RECEPTOR POSITIVE: Primary | ICD-10-CM

## 2023-07-12 DIAGNOSIS — Z71.89 COUNSELING ON HEALTH PROMOTION AND DISEASE PREVENTION: ICD-10-CM

## 2023-07-12 DIAGNOSIS — Z17.0 MALIGNANT NEOPLASM OF UPPER-OUTER QUADRANT OF RIGHT BREAST IN FEMALE, ESTROGEN RECEPTOR POSITIVE: ICD-10-CM

## 2023-07-12 DIAGNOSIS — C50.411 MALIGNANT NEOPLASM OF UPPER-OUTER QUADRANT OF RIGHT BREAST IN FEMALE, ESTROGEN RECEPTOR POSITIVE: ICD-10-CM

## 2023-07-12 DIAGNOSIS — Z08 ENCOUNTER FOR FOLLOW-UP SURVEILLANCE OF BREAST CANCER: ICD-10-CM

## 2023-07-12 DIAGNOSIS — Z79.899 ENCOUNTER FOR MONITORING ADJUVANT HORMONAL THERAPY: ICD-10-CM

## 2023-07-12 DIAGNOSIS — Z17.0 MALIGNANT NEOPLASM OF RIGHT BREAST IN FEMALE, ESTROGEN RECEPTOR POSITIVE, UNSPECIFIED SITE OF BREAST: ICD-10-CM

## 2023-07-12 DIAGNOSIS — F41.9 ANXIETY: ICD-10-CM

## 2023-07-12 PROCEDURE — 3008F BODY MASS INDEX DOCD: CPT | Mod: CPTII,S$GLB,, | Performed by: NURSE PRACTITIONER

## 2023-07-12 PROCEDURE — 4010F ACE/ARB THERAPY RXD/TAKEN: CPT | Mod: CPTII,S$GLB,, | Performed by: NURSE PRACTITIONER

## 2023-07-12 PROCEDURE — 77067 SCR MAMMO BI INCL CAD: CPT | Mod: TC

## 2023-07-12 PROCEDURE — 99214 PR OFFICE/OUTPT VISIT, EST, LEVL IV, 30-39 MIN: ICD-10-PCS | Mod: S$GLB,,, | Performed by: NURSE PRACTITIONER

## 2023-07-12 PROCEDURE — 3079F DIAST BP 80-89 MM HG: CPT | Mod: CPTII,S$GLB,, | Performed by: NURSE PRACTITIONER

## 2023-07-12 PROCEDURE — 1159F MED LIST DOCD IN RCRD: CPT | Mod: CPTII,S$GLB,, | Performed by: NURSE PRACTITIONER

## 2023-07-12 PROCEDURE — 3008F PR BODY MASS INDEX (BMI) DOCUMENTED: ICD-10-PCS | Mod: CPTII,S$GLB,, | Performed by: NURSE PRACTITIONER

## 2023-07-12 PROCEDURE — 99999 PR PBB SHADOW E&M-EST. PATIENT-LVL IV: ICD-10-PCS | Mod: PBBFAC,,, | Performed by: NURSE PRACTITIONER

## 2023-07-12 PROCEDURE — 1159F PR MEDICATION LIST DOCUMENTED IN MEDICAL RECORD: ICD-10-PCS | Mod: CPTII,S$GLB,, | Performed by: NURSE PRACTITIONER

## 2023-07-12 PROCEDURE — 3079F PR MOST RECENT DIASTOLIC BLOOD PRESSURE 80-89 MM HG: ICD-10-PCS | Mod: CPTII,S$GLB,, | Performed by: NURSE PRACTITIONER

## 2023-07-12 PROCEDURE — 77067 SCR MAMMO BI INCL CAD: CPT | Mod: 26,,, | Performed by: RADIOLOGY

## 2023-07-12 PROCEDURE — 77063 BREAST TOMOSYNTHESIS BI: CPT | Mod: 26,,, | Performed by: RADIOLOGY

## 2023-07-12 PROCEDURE — 77063 MAMMO DIGITAL SCREENING BILAT WITH TOMO: ICD-10-PCS | Mod: 26,,, | Performed by: RADIOLOGY

## 2023-07-12 PROCEDURE — 77067 MAMMO DIGITAL SCREENING BILAT WITH TOMO: ICD-10-PCS | Mod: 26,,, | Performed by: RADIOLOGY

## 2023-07-12 PROCEDURE — 4010F PR ACE/ARB THEARPY RXD/TAKEN: ICD-10-PCS | Mod: CPTII,S$GLB,, | Performed by: NURSE PRACTITIONER

## 2023-07-12 PROCEDURE — 3077F PR MOST RECENT SYSTOLIC BLOOD PRESSURE >= 140 MM HG: ICD-10-PCS | Mod: CPTII,S$GLB,, | Performed by: NURSE PRACTITIONER

## 2023-07-12 PROCEDURE — 3077F SYST BP >= 140 MM HG: CPT | Mod: CPTII,S$GLB,, | Performed by: NURSE PRACTITIONER

## 2023-07-12 PROCEDURE — 99999 PR PBB SHADOW E&M-EST. PATIENT-LVL IV: CPT | Mod: PBBFAC,,, | Performed by: NURSE PRACTITIONER

## 2023-07-12 PROCEDURE — 99214 OFFICE O/P EST MOD 30 MIN: CPT | Mod: S$GLB,,, | Performed by: NURSE PRACTITIONER

## 2023-08-16 DIAGNOSIS — F41.9 ANXIETY: ICD-10-CM

## 2023-08-16 DIAGNOSIS — M54.50 LUMBAR PAIN WITH RADIATION DOWN RIGHT LEG: ICD-10-CM

## 2023-08-16 DIAGNOSIS — M79.604 LUMBAR PAIN WITH RADIATION DOWN RIGHT LEG: ICD-10-CM

## 2023-08-16 RX ORDER — METOPROLOL SUCCINATE 100 MG/1
100 TABLET, EXTENDED RELEASE ORAL NIGHTLY
Qty: 90 TABLET | Refills: 1 | Status: SHIPPED | OUTPATIENT
Start: 2023-08-16 | End: 2024-02-28 | Stop reason: SDUPTHER

## 2023-08-16 NOTE — TELEPHONE ENCOUNTER
Refill Routing Note   Medication(s) are not appropriate for processing by [facility] for the following reason(s):      Medication outside of protocol    [facility] action(s):  Route Care Due:  None identified              Appointments  past 12m or future 3m with PCP    Date Provider   Last Visit   11/17/2022 Beverly Parks MD   Next Visit   11/15/2023 Beverly Parks MD   ED visits in past 90 days: 0        Note composed:3:02 PM 08/16/2023

## 2023-08-16 NOTE — TELEPHONE ENCOUNTER
No care due was identified.  [facility] Embedded Care Due Messages. Reference number: [id number].   8/16/2023 2:29:43 PM CDT

## 2023-08-19 RX ORDER — ALPRAZOLAM 0.5 MG/1
0.5 TABLET ORAL 2 TIMES DAILY PRN
Qty: 60 TABLET | Refills: 0 | Status: SHIPPED | OUTPATIENT
Start: 2023-08-19 | End: 2023-10-02 | Stop reason: SDUPTHER

## 2023-08-19 RX ORDER — IBUPROFEN 800 MG/1
TABLET ORAL
Qty: 90 TABLET | Refills: 0 | Status: SHIPPED | OUTPATIENT
Start: 2023-08-19

## 2023-10-02 DIAGNOSIS — B00.9 HSV INFECTION: ICD-10-CM

## 2023-10-02 DIAGNOSIS — F41.9 ANXIETY: ICD-10-CM

## 2023-10-03 RX ORDER — ACYCLOVIR 400 MG/1
400 TABLET ORAL
Qty: 270 TABLET | Refills: 0 | Status: SHIPPED | OUTPATIENT
Start: 2023-10-03

## 2023-10-03 RX ORDER — ALPRAZOLAM 0.5 MG/1
0.5 TABLET ORAL 2 TIMES DAILY PRN
Qty: 60 TABLET | Refills: 0 | Status: SHIPPED | OUTPATIENT
Start: 2023-10-03 | End: 2023-11-14 | Stop reason: SDUPTHER

## 2023-10-03 NOTE — TELEPHONE ENCOUNTER
No care due was identified.  [facility] Embedded Care Due Messages. Reference number: [id number].   10/02/2023 8:13:44 PM CDT

## 2023-10-03 NOTE — TELEPHONE ENCOUNTER
Provider Staff:  Action required for this patient     Please see care gap opportunities below in Care Due Message.    Thanks!  [facility]     Appointments      Date Provider   Last Visit   11/17/2022 Beverly Parks MD   Next Visit   10/2/2023 Beverly Parks MD     Refill Decision Note   Kylie rUbano  is requesting a refill authorization.  Brief Assessment and Rationale for Refill:  Approve     Medication Therapy Plan:         Comments:     Note composed:10:05 AM 10/03/2023

## 2023-10-03 NOTE — TELEPHONE ENCOUNTER
Care Due:                  Date            Visit Type   Department     Provider  --------------------------------------------------------------------------------                                EP -                              PRIMARY      [facility]  Last Visit: 11-      CARE ([facility])   MEDICINE       Beverly Parks                              MYCHART                              ANNUAL                              CHECKUP/PHY  [facility]  Next Visit: 11-      S            MEDICINE       Beverly Parks                                                            Last  Test          Frequency    Reason                     Performed    Due Date  --------------------------------------------------------------------------------    HBA1C.......  6 months...  metFORMIN................  12- 06-    TSH.........  12 months..  SYNTHROID................  12- 12-    [facility] Embedded Care Due Messages. Reference number: [id number].   10/02/2023 8:13:11 PM CDT

## 2023-11-01 DIAGNOSIS — I10 ESSENTIAL HYPERTENSION: Chronic | ICD-10-CM

## 2023-11-01 RX ORDER — BENAZEPRIL HYDROCHLORIDE 20 MG/1
20 TABLET ORAL DAILY
Qty: 90 TABLET | Refills: 1 | Status: SHIPPED | OUTPATIENT
Start: 2023-11-01

## 2023-11-01 NOTE — TELEPHONE ENCOUNTER
Care Due:                  Date            Visit Type   Department     Provider  --------------------------------------------------------------------------------                                EP -                              PRIMARY      [facility] FAM[facility]  Last Visit: 11-      CARE ([facility])   MEDICINE       Beverly Parks                              MYCHART                              ANNUAL                              CHECKUP/PHY  [facility]  Next Visit: 11-      S            MEDICINE       Beverly Parks                                                            Last  Test          Frequency    Reason                     Performed    Due Date  --------------------------------------------------------------------------------    CBC.........  12 months..  acyclovir, ibuprofen.....  01- 01-    CMP.........  12 months..  acyclovir, benazepriL,     01- 01-                             ibuprofen, metFORMIN.....    [facility] Embedded Care Due Messages. Reference number: [id number].   11/01/2023 2:55:57 PM CDT

## 2023-11-14 DIAGNOSIS — F41.9 ANXIETY: ICD-10-CM

## 2023-11-14 DIAGNOSIS — K21.9 GASTROESOPHAGEAL REFLUX DISEASE: ICD-10-CM

## 2023-11-14 RX ORDER — LEVOTHYROXINE SODIUM 175 UG/1
175 TABLET ORAL DAILY
Qty: 90 TABLET | Refills: 0 | Status: SHIPPED | OUTPATIENT
Start: 2023-11-14 | End: 2024-02-26

## 2023-11-14 NOTE — TELEPHONE ENCOUNTER
No care due was identified.  [facility] Embedded Care Due Messages. Reference number: [id number].   11/14/2023 4:56:44 PM CST

## 2023-11-15 NOTE — TELEPHONE ENCOUNTER
Refill Routing Note   Medication(s) are not appropriate for processing by [facility] for the following reason(s):        Outside of protocol    [facility] action(s):  Route  Approve        Medication Therapy Plan: PPI PRN usage outside of [facility] protocol      Appointments  past 12m or future 3m with PCP    Date Provider   Last Visit   11/17/2022 Beverly Parks MD   Next Visit   1/9/2024 Beverly Parks MD   ED visits in past 90 days: 0        Note composed:6:11 PM 11/14/2023

## 2023-11-17 RX ORDER — OMEPRAZOLE 20 MG/1
CAPSULE, DELAYED RELEASE ORAL
Qty: 30 CAPSULE | Refills: 5 | Status: SHIPPED | OUTPATIENT
Start: 2023-11-17

## 2023-11-17 RX ORDER — ALPRAZOLAM 0.5 MG/1
0.5 TABLET ORAL 2 TIMES DAILY PRN
Qty: 60 TABLET | Refills: 0 | Status: SHIPPED | OUTPATIENT
Start: 2023-11-17 | End: 2024-01-17 | Stop reason: SDUPTHER

## 2023-12-19 ENCOUNTER — DOCUMENTATION ONLY (OUTPATIENT)
Dept: SURGERY | Facility: CLINIC | Age: 58
End: 2023-12-19
Payer: COMMERCIAL

## 2024-01-04 ENCOUNTER — PATIENT MESSAGE (OUTPATIENT)
Dept: SURGERY | Facility: CLINIC | Age: 59
End: 2024-01-04
Payer: COMMERCIAL

## 2024-01-08 NOTE — PROGRESS NOTES
Subjective:       Patient ID: Kylie Urbano is a 58 y.o. female.    Chief Complaint: breast cancer f/u    HPI Patient presents for breast cancer surveillance and review of mammogram.     Last visit was 7/12/23    58-year-old female with a previous diagnosis of right-sided infiltrating ductal carcinoma ER/VT positive, HER-2 negative with biopsy-proven right axillary lymph node involvement.  Patient was consented for BRCA testing for participation and B-55 study with negative BRCA testing.    She received cycle 1 of dense dose Adriamycin/Cytoxan on 6/12/2017 and tolerated treatment exceptionally well.  She completed cycle 4 of dose dense Taxol on 9/19/2017.  She underwent lumpectomy with sentinel lymph node biopsy on 10/31/2017.   Final pathology demonstrated [id number].  The primary measured 0.8 cm and 2 sentinel lymph nodes were positive with 3 mm macrometastasis within first sentinel lymph node and 1 mm micrometastasis within the second sentinel lymph node.  She underwent a full right axillary lymph node dissection on 11/8/2017 which demonstrated 0 positive nodes.  She completed adjuvant radiation on 2/27/2018.      The patient had a hysterectomy approximately 6/20/14.  FSH was in the intermediate range which likely means the patient is perimenopausal.  Started Tamoxifen 20 mg daily in 3/2018 and has since been transitioned to Arimidex - states tolerating well.  Due off Arimidex 3/2027  Surveillance mammograms remains [facility] - 6/3/20    Primary Oncologist: Herbie Miramontes MD  Previous- Mason Martinez MD  Surgeon: Jet Mcclendon  Rad Onc: Arik Wynn     ER/VT positive, HER-2 negative infiltrating ductal carcinoma the right breast with biopsy proven lymph node involvement clinically stage II/III  [id number].  Pathology following neoadjuvant therapy demonstrated 1 sentinel lymph node with micrometastasis (3 mm) and second lymph node with micrometastasis (1 mm)  --BRCA testing via B-55 Study is negative for  mutation  --ddAC--> taxol   cycle 1--- 6/12/2017  --Final cycle Cycle 4 on 9/19/2017  --Radiation completed on 2/27/2018  --Tamoxifen 20 mg by mouth daily started in 3/2018  --surveillance bilateral mammogram in  [facility]  --Transitioned to Arimidex 1 mg PO daily - to be completed in 3/2027     I have reviewed and updated the HPI, ROS, PMHx, Social Hx, Family Hx and treatment history.     7/12/2023- DEXA-  [facility]    Pt followed by gastro for elevated liver enzymes and fatty liver disease.     Interval History:     Persistent symptoms/side effects of treatment:- feet has intermittent tingling- stable    Functional changes: ROM, neuropathy, weakness or fatigue- stable    Psychosocial challenges- anxiety- not necessarily related to cancer related events- states doing well    Lymphedema- none    Diet/Nutrition- walking intermittently    Sexual Dysfunction or challenges - vaginal dryness but not active now      Review of Systems   Constitutional: Negative.    HENT: Negative.     Eyes: Negative.    Respiratory: Negative.     Cardiovascular: Negative.  Negative for chest pain, palpitations and leg swelling.   Gastrointestinal: Negative.    Endocrine: Negative.         Hot flashes and sleep disturbance   Genitourinary: Negative.    Musculoskeletal: Negative.    Skin: Negative.    Allergic/Immunologic: Negative.    Neurological:  Positive for numbness.        Tingling in hands and feet since chemo   Hematological: Negative.  Negative for adenopathy.   Psychiatric/Behavioral: Negative.          Relates mild anxiety       Pt denies any breast pain, nipple discharge or palpable mass      Objective:          Physical Exam   Constitutional: She is oriented to person, place, and time. She appears well-developed and well-nourished. No distress.   HENT:   Head: Normocephalic and atraumatic. Not macrocephalic.   Right Ear: Tympanic membrane and ear canal normal.   Left Ear: Tympanic membrane and ear canal normal.   Nose: Nose normal. Right  sinus exhibits no maxillary sinus tenderness and no frontal sinus tenderness. Left sinus exhibits no maxillary sinus tenderness and no frontal sinus tenderness.   Mouth/Throat: Uvula is midline, oropharynx is clear and moist and mucous membranes are normal. No oropharyngeal exudate.   Eyes: Pupils are equal, round, and reactive to light. Conjunctivae, EOM and lids are normal. Lids are everted and swept, no foreign bodies found.   Neck: Trachea normal and normal range of motion. Neck supple. No tracheal deviation present. No thyroid mass and no thyromegaly present.   No lymphadenopathy, susan breast no visible redness, puckering or retraction. No palpable mass bilaterally. No nipple discharge.   Lymphadenopathy:        Head (right side): No submental and no submandibular adenopathy present.        Head (left side): No submental and no submandibular adenopathy present.        Right cervical: No superficial cervical and no deep cervical adenopathy present.       Left cervical: No superficial cervical and no deep cervical adenopathy present.     She has no axillary adenopathy.        Right: No supraclavicular adenopathy present.        Left: No supraclavicular adenopathy present.   Neurological: She is alert and oriented to person, place, and time. No cranial nerve deficit or sensory deficit. She exhibits normal muscle tone.   Skin: Skin is warm, dry and intact. Capillary refill takes less than 2 seconds. No rash noted. She is not diaphoretic. No pallor.   Psychiatric: She has a normal mood and affect. Her speech is normal and behavior is normal. Judgment and thought content normal. She is not actively hallucinating. Cognition and memory are normal. She is attentive.     Mammo 7/12/2023- wnl    Dexa- 7/12/2023- wnl    Last colonoscopy 2/22/2019- polyp noted- 5 year f/u recommended- 2/2024    Assessment:       1. Malignant neoplasm of upper-outer quadrant of right breast in female, estrogen receptor positive    2.  Malignant neoplasm of right breast in female, estrogen receptor positive, unspecified site of breast    3. Encounter for follow-up surveillance of breast cancer    4. Counseling and coordination of care    5. Counseling on health promotion and disease prevention    6. Encounter for screening breast examination            Plan:       1.      Mammogram 7/12/2023- wnl  - negative clinical exam  2.      History of Right breast cancer- continue to take Arimidex daily  3.      Monitoring adjuvant therapy- Taking Arimidex without difficulty. Continue until 3/2027-   -Dexa 7/12/23- normal   4.      Encouraged walking to help with bone density and decreasing risk for breast cancer recurrence.  Taking calcium and vit d- walking intermittently. Discussed reducing risk for breast cancer with wt loss and exercise.    5.      Fatty liver disease- followed by gastro now. Has a diagnosis of borderline hepatomegaly and fatty liver -   6.      RTC in 6 months for f/u of breast cancer and adjuvant therapy-  bilateral screening mammo and exam-   7.      Chemo induced neuropathy- tried neurotin 100 mg po tid for 2 weeks and had no real change. Extensive discussion regarding potential side effects from med and admin instructions. Warned of drowsiness and dizziness-   8.      Lipids 1/9/2024- mild elevation cholesterol  9.      Iron def anemia to be followed by hematology-

## 2024-01-09 ENCOUNTER — OFFICE VISIT (OUTPATIENT)
Dept: FAMILY MEDICINE | Facility: CLINIC | Age: 59
End: 2024-01-09
Attending: FAMILY MEDICINE
Payer: COMMERCIAL

## 2024-01-09 ENCOUNTER — LAB VISIT (OUTPATIENT)
Dept: LAB | Facility: HOSPITAL | Age: 59
End: 2024-01-09
Attending: FAMILY MEDICINE
Payer: COMMERCIAL

## 2024-01-09 VITALS
TEMPERATURE: 97 F | BODY MASS INDEX: 43.1 KG/M2 | OXYGEN SATURATION: 98 % | HEIGHT: 68 IN | DIASTOLIC BLOOD PRESSURE: 84 MMHG | RESPIRATION RATE: 18 BRPM | HEART RATE: 91 BPM | SYSTOLIC BLOOD PRESSURE: 138 MMHG | WEIGHT: 284.38 LBS

## 2024-01-09 DIAGNOSIS — Z78.0 POSTMENOPAUSAL: ICD-10-CM

## 2024-01-09 DIAGNOSIS — E55.9 VITAMIN D DEFICIENCY: ICD-10-CM

## 2024-01-09 DIAGNOSIS — Z00.00 ANNUAL PHYSICAL EXAM: Primary | ICD-10-CM

## 2024-01-09 DIAGNOSIS — E03.9 HYPOTHYROIDISM, UNSPECIFIED TYPE: ICD-10-CM

## 2024-01-09 DIAGNOSIS — K63.5 BENIGN COLON POLYP: ICD-10-CM

## 2024-01-09 DIAGNOSIS — E66.01 MORBID OBESITY WITH BMI OF 40.0-44.9, ADULT: ICD-10-CM

## 2024-01-09 DIAGNOSIS — I10 ESSENTIAL HYPERTENSION: ICD-10-CM

## 2024-01-09 DIAGNOSIS — C50.411 MALIGNANT NEOPLASM OF UPPER-OUTER QUADRANT OF RIGHT BREAST IN FEMALE, ESTROGEN RECEPTOR POSITIVE: ICD-10-CM

## 2024-01-09 DIAGNOSIS — E88.810 INSULIN RESISTANCE SYNDROME: ICD-10-CM

## 2024-01-09 DIAGNOSIS — T45.1X5A CHEMOTHERAPY-INDUCED NEUROPATHY: ICD-10-CM

## 2024-01-09 DIAGNOSIS — Z17.0 MALIGNANT NEOPLASM OF UPPER-OUTER QUADRANT OF RIGHT BREAST IN FEMALE, ESTROGEN RECEPTOR POSITIVE: ICD-10-CM

## 2024-01-09 DIAGNOSIS — Z00.00 ANNUAL PHYSICAL EXAM: ICD-10-CM

## 2024-01-09 DIAGNOSIS — J30.2 SEASONAL ALLERGIC RHINITIS, UNSPECIFIED TRIGGER: ICD-10-CM

## 2024-01-09 DIAGNOSIS — D50.9 IRON DEFICIENCY ANEMIA, UNSPECIFIED IRON DEFICIENCY ANEMIA TYPE: ICD-10-CM

## 2024-01-09 DIAGNOSIS — G62.0 CHEMOTHERAPY-INDUCED NEUROPATHY: ICD-10-CM

## 2024-01-09 DIAGNOSIS — R73.03 PREDIABETES: ICD-10-CM

## 2024-01-09 LAB
ALBUMIN SERPL BCP-MCNC: 4 G/DL (ref 3.5–5.2)
ALP SERPL-CCNC: 124 U/L (ref 55–135)
ALT SERPL W/O P-5'-P-CCNC: 46 U/L (ref 10–44)
ANION GAP SERPL CALC-SCNC: 15 MMOL/L (ref 8–16)
AST SERPL-CCNC: 39 U/L (ref 10–40)
BASOPHILS # BLD AUTO: 0.04 K/UL (ref 0–0.2)
BASOPHILS NFR BLD: 0.5 % (ref 0–1.9)
BILIRUB SERPL-MCNC: 0.4 MG/DL (ref 0.1–1)
BUN SERPL-MCNC: 15 MG/DL (ref 6–20)
CALCIUM SERPL-MCNC: 10.1 MG/DL (ref 8.7–10.5)
CHLORIDE SERPL-SCNC: 103 MMOL/L (ref 95–110)
CHOLEST SERPL-MCNC: 203 MG/DL (ref 120–199)
CHOLEST/HDLC SERPL: 4 {RATIO} (ref 2–5)
CO2 SERPL-SCNC: 22 MMOL/L (ref 23–29)
CREAT SERPL-MCNC: 0.8 MG/DL (ref 0.5–1.4)
DIFFERENTIAL METHOD BLD: NORMAL
EOSINOPHIL # BLD AUTO: 0.2 K/UL (ref 0–0.5)
EOSINOPHIL NFR BLD: 2.2 % (ref 0–8)
ERYTHROCYTE [DISTWIDTH] IN BLOOD BY AUTOMATED COUNT: 14 % (ref 11.5–14.5)
EST. GFR  (NO RACE VARIABLE): >60 ML/MIN/1.73 M^2
ESTIMATED AVG GLUCOSE: 126 MG/DL (ref 68–131)
GLUCOSE SERPL-MCNC: 86 MG/DL (ref 70–110)
HBA1C MFR BLD: 6 % (ref 4–5.6)
HCT VFR BLD AUTO: 40.3 % (ref 37–48.5)
HDLC SERPL-MCNC: 51 MG/DL (ref 40–75)
HDLC SERPL: 25.1 % (ref 20–50)
HGB BLD-MCNC: 13.2 G/DL (ref 12–16)
IMM GRANULOCYTES # BLD AUTO: 0.01 K/UL (ref 0–0.04)
IMM GRANULOCYTES NFR BLD AUTO: 0.1 % (ref 0–0.5)
LDLC SERPL CALC-MCNC: 137.6 MG/DL (ref 63–159)
LYMPHOCYTES # BLD AUTO: 2.7 K/UL (ref 1–4.8)
LYMPHOCYTES NFR BLD: 37.1 % (ref 18–48)
MCH RBC QN AUTO: 28.4 PG (ref 27–31)
MCHC RBC AUTO-ENTMCNC: 32.8 G/DL (ref 32–36)
MCV RBC AUTO: 87 FL (ref 82–98)
MONOCYTES # BLD AUTO: 0.6 K/UL (ref 0.3–1)
MONOCYTES NFR BLD: 8.3 % (ref 4–15)
NEUTROPHILS # BLD AUTO: 3.8 K/UL (ref 1.8–7.7)
NEUTROPHILS NFR BLD: 51.8 % (ref 38–73)
NONHDLC SERPL-MCNC: 152 MG/DL
NRBC BLD-RTO: 0 /100 WBC
PLATELET # BLD AUTO: 325 K/UL (ref 150–450)
PMV BLD AUTO: 10.7 FL (ref 9.2–12.9)
POTASSIUM SERPL-SCNC: 4 MMOL/L (ref 3.5–5.1)
PROT SERPL-MCNC: 8.7 G/DL (ref 6–8.4)
RBC # BLD AUTO: 4.64 M/UL (ref 4–5.4)
SODIUM SERPL-SCNC: 140 MMOL/L (ref 136–145)
TRIGL SERPL-MCNC: 72 MG/DL (ref 30–150)
WBC # BLD AUTO: 7.38 K/UL (ref 3.9–12.7)

## 2024-01-09 PROCEDURE — 3008F BODY MASS INDEX DOCD: CPT | Mod: CPTII,S$GLB,, | Performed by: FAMILY MEDICINE

## 2024-01-09 PROCEDURE — 3079F DIAST BP 80-89 MM HG: CPT | Mod: CPTII,S$GLB,, | Performed by: FAMILY MEDICINE

## 2024-01-09 PROCEDURE — 1159F MED LIST DOCD IN RCRD: CPT | Mod: CPTII,S$GLB,, | Performed by: FAMILY MEDICINE

## 2024-01-09 PROCEDURE — 84443 ASSAY THYROID STIM HORMONE: CPT | Performed by: FAMILY MEDICINE

## 2024-01-09 PROCEDURE — 83525 ASSAY OF INSULIN: CPT | Performed by: FAMILY MEDICINE

## 2024-01-09 PROCEDURE — 80061 LIPID PANEL: CPT | Performed by: FAMILY MEDICINE

## 2024-01-09 PROCEDURE — 82306 VITAMIN D 25 HYDROXY: CPT | Performed by: FAMILY MEDICINE

## 2024-01-09 PROCEDURE — 1160F RVW MEDS BY RX/DR IN RCRD: CPT | Mod: CPTII,S$GLB,, | Performed by: FAMILY MEDICINE

## 2024-01-09 PROCEDURE — 3075F SYST BP GE 130 - 139MM HG: CPT | Mod: CPTII,S$GLB,, | Performed by: FAMILY MEDICINE

## 2024-01-09 PROCEDURE — 85025 COMPLETE CBC W/AUTO DIFF WBC: CPT | Performed by: FAMILY MEDICINE

## 2024-01-09 PROCEDURE — 36415 COLL VENOUS BLD VENIPUNCTURE: CPT | Mod: PO | Performed by: FAMILY MEDICINE

## 2024-01-09 PROCEDURE — 99999 PR PBB SHADOW E&M-EST. PATIENT-LVL IV: CPT | Mod: PBBFAC,,, | Performed by: FAMILY MEDICINE

## 2024-01-09 PROCEDURE — 80053 COMPREHEN METABOLIC PANEL: CPT | Performed by: FAMILY MEDICINE

## 2024-01-09 PROCEDURE — 99396 PREV VISIT EST AGE 40-64: CPT | Mod: S$GLB,,, | Performed by: FAMILY MEDICINE

## 2024-01-09 PROCEDURE — 81001 URINALYSIS AUTO W/SCOPE: CPT | Performed by: FAMILY MEDICINE

## 2024-01-09 PROCEDURE — 83036 HEMOGLOBIN GLYCOSYLATED A1C: CPT | Performed by: FAMILY MEDICINE

## 2024-01-09 RX ORDER — FLUTICASONE PROPIONATE 50 MCG
SPRAY, SUSPENSION (ML) NASAL
Qty: 16 G | Refills: 11 | Status: SHIPPED | OUTPATIENT
Start: 2024-01-09

## 2024-01-09 RX ORDER — SEMAGLUTIDE 0.25 MG/.5ML
0.25 INJECTION, SOLUTION SUBCUTANEOUS
Qty: 2 ML | Refills: 0 | Status: SHIPPED | OUTPATIENT
Start: 2024-01-09

## 2024-01-09 NOTE — PROGRESS NOTES
CHIEF COMPLAINT: Annual wellness examination.     HISTORY OF PRESENT ILLNESS: Ms. Urbano comes in today fasting and with taking blood pressure medication for annual wellness examination. She reports no acute problems today.    END OF LIFE DECISION: She has no living will and is not sure about life support (depends on the situation).    Current Outpatient Medications   Medication Sig    acyclovir (ZOVIRAX) 400 MG tablet Take 1 tablet (400 mg total) by mouth 3 (three) times daily with meals.    ALPRAZolam (XANAX) 0.5 MG tablet Take 1 tablet (0.5 mg total) by mouth 2 (two) times daily as needed for Anxiety.    amLODIPine (NORVASC) 10 MG tablet Take 1 tablet by mouth once daily    anastrozole (ARIMIDEX) 1 mg Tab Take 1 tablet (1 mg total) by mouth once daily.    benazepriL (LOTENSIN) 20 MG tablet Take 1 tablet (20 mg total) by mouth once daily.    fluticasone propionate (FLONASE) 50 mcg/actuation nasal spray USE 2 SPRAY(S) IN EACH NOSTRIL ONCE DAILY AS NEEDED FOR  RHINITIS    ibuprofen (ADVIL,MOTRIN) 800 MG tablet Take 1 tab by mouth TID prn pain    metFORMIN (GLUCOPHAGE) 500 MG tablet TAKE 4 TABLETS BY MOUTH IN THE EVENING AS DIRECTED    metoprolol succinate (TOPROL-XL) 100 MG 24 hr tablet Take 1 tablet (100 mg total) by mouth every evening.    omeprazole (PRILOSEC) 20 MG capsule TAKE 1 CAPSULE BY MOUTH ONCE DAILY AS NEEDED    SYNTHROID 175 mcg tablet Take 1 tablet (175 mcg total) by mouth once daily.     SCREENINGS:  Cholesterol: December 5, 2022.  FFS/Colonoscopy: February 22, 2019 - benign colon polyp, diverticulosis, hemorrhoids - repeat in 5 years.  Mammogram: July 12, 2023 - okay.   GYN Exam (breast only): July 17, 2023 with oncology. Pap smear no longer needed.   Dexa Scan: July 12, 2023 - okay.   Eye Exam: February 9, 2021 with Dr. Scherer.  PPD: Negative in the past.  Immunizations: Tdap - April 22, 2010. Td - November 17, 2020.  Gardasil - N./A.  Zostavax - N./A.  Shingrix - November 17, 2020, May 20,  2021.  Pneumovax - November 23, 2021.   Prevnar-13 shot - November 17, 2020.  Seasonal Flu - December 28, 2023.   Covid-19 (Pfizer) vaccine series - March 3, 2021, March 24, 2021, December 7, 2021.     ROS:  GENERAL: Denies fever, chills, fatigue or unusual weight change. Appetite normal. Weight 128.2 kg (282 lb 10.1 oz) at November 17, 2022 visit. Monitors diet by intermittent fasting but does not exercise. Reports interested in trying GLP-1 RA for weight management.  SKIN: Denies rashes, itching, changes in mole, color or texture of skin or easy bruising.  HEAD: Denies recent head trauma or headaches.  EYES: Denies change in vision, diplopia, redness or discharge. Wears readers.  EARS: Denies ear pain, discharge, vertigo or decreased hearing.  NOSE: Reports occasional nasal congestion, post-nasal drip; uses Flonase nasal spray with help and requests refill.  MOUTH & THROAT: Denies hoarseness or change in voice. Denies excessive gum bleeding or mouth sores. Denies sore throat.  NODES: Denies swollen glands.  CHEST: Denies SHARIF, wheezing, cough, or sputum production.Saw JACQUES Ernst with pulmonary on January 24, 2023 for sleep-disordered breathing, sleep apnea, unspecified type, snoring, history of tachycardia, morbid obesity with BMI of 40.0-44.9, adult, essential hypertension.  CARDIOVASCULAR: Denies chest pain, PND, orthopnea or reduced exercise tolerance. Denies palpitations. Saw Dr. Hicks, cardiologist, on January 13, 2023 for hypertension follow up at which time Metoprolol succinate was increased to 100 mg daily with 9-month follow up advised. Reports performs home blood pressure checks 2 times per week with level of 125/82 this morning.  ABDOMEN: Denies diarrhea, constipation, nausea, vomiting, abdominal pain, or blood in stool. Saw CATRACHO Melvin with GI on January 31, 2023 for fatty liver, elevated liver enzymes.  URINARY: Denies flank pain, dysuria or hematuria.  GENITOURINARY: Denies flank pain,  "dysuria, frequency or hematuria. Performs monthly breast self exams. Saw NP Raquel Bergeron with  hematologist/oncologist on July 13, 2023 for surveillance of right breast cancer with follow up scheduled for January 10, 2024. She states she completed chemotherapy in September 2017 and completed radiation therapy in February 2018; she continues Arimidex daily therapy.  ENDOCRINE: Denies diabetes, cholesterol problems.   HEME/LYMPH: Denies bleeding problems. Reports does not take iron therapy.  PERIPHERAL VASCULAR:Denies claudication or cyanosis.  MUSCULOSKELETAL: Denies edema. Reports "aging" joint pain, stiffness but not today.  NEUROLOGIC: Denies history of seizures, tremors, paralysis, alteration of gait or coordination. Reports continues to have neuropathy in fingers/toes s/p chemotherapy.  PSYCHIATRIC: Denies mood swings, depression, anxiety, homicidal or suicidal thoughts. Denies sleep problems.    PE:   VS: Blood Pressure 138/84   Pulse 91   Temperature 97 °F (36.1 °C) (Tympanic)   Respiration 18   Height 5' 8" (1.727 m)   Weight 129 kg (284 lb 6.3 oz)   Last Menstrual Period 01/01/2016 (LMP Unknown)   Oxygen Saturation 98%   Body Mass Index 43.24 kg/m²   APPEARANCE: Well nourished, well developed female, obese and pleasant, alert and oriented in no acute distress.  HEAD: Non tender. Full range of motion.  EYES: PERRL, conjunctiva pink, lids no edema.  EARS: External canal patent, no swelling or redness. TM's shiny and clear.  NOSE: Mucosa and turbinates not congested. No discharge. Non tender sinuses.  THROAT: No pharyngeal erythema or exudate. No stridor.  NECK: Supple, no mass, thyroid not enlarged.  NODES: No cervical lymph node enlargement.  CHEST: Normal respiratory effort. Lungs clear to auscultation.  CARDIOVASCULAR: Normal S1, S2. No rubs, murmurs or gallops. PMI not displaced. No carotid bruit. Pedal pulses palpable bilaterally. No edema.  ABDOMEN: Bowel sounds present. Not distended. Soft. " No tenderness, masses or organomegaly.  BREAST EXAM: Not examined as already performed by oncologist.  PELVIC EXAM: Not examined as patient has had RAH/BSO due to non cancerous reasons.  RECTAL EXAM: Not examined per patient request.  MUSCULOSKELETAL: No joint deformities or stiffness. She is ambulatory without problems.  SKIN: No rashes or suspicious lesions, normal color and turgor.  NEUROLOGIC: Cranial Nerves: II-XII grossly intact. DTR's: Knees, Ankles 2+ and equal bilaterally. Gait & Posture: Normal gait and fine motion.  PSYCHIATRIC: Patient alert, oriented x 3. Mood/Affect normal without acute anxiety and depression noted. Judgment/insight good as she makes appropriate decisions during today's examination.    Protective Sensation (w/ 10 gram monofilament):  Right: Intact  Left: Intact    Visual Inspection:  Normal -  Bilateral    Pedal Pulses:   Right: Present  Left: Present    Posterior tibialis:   Right:Present  Left: Present    ASSESSMENT:    ICD-10-CM ICD-9-CM    1. Annual physical exam  Z00.00 V70.0 Lipid Panel      Comprehensive Metabolic Panel      CBC Auto Differential      TSH      Urinalysis      Hemoglobin A1C      Insulin, Random      Vitamin D      2. Essential hypertension  I10 401.9       3. Prediabetes  R73.03 790.29       4. Insulin resistance syndrome  E88.810 277.7       5. Hypothyroidism, unspecified type  E03.9 244.9       6. Vitamin D deficiency  E55.9 268.9       7. Iron deficiency anemia, unspecified iron deficiency anemia type  D50.9 280.9       8. Benign colon polyp  K63.5 211.3       9. Malignant neoplasm of upper-outer quadrant of right breast in female, estrogen receptor positive  C50.411 174.4     Z17.0 V86.0       10. Chemotherapy-induced neuropathy  G62.0 357.6     T45.1X5A E933.1       11. Seasonal allergic rhinitis, unspecified trigger  J30.2 477.9 fluticasone propionate (FLONASE) 50 mcg/actuation nasal spray      12. Morbid obesity with BMI of 40.0-44.9, adult  E66.01 278.01  semaglutide, weight loss, (WEGOVY) 0.25 mg/0.5 mL PnIj    Z68.41 V85.41       13. Postmenopausal  Z78.0 V49.81         PLAN:  1. Age-appropriate counseling-appropriate low-sodium, low-cholesterol, low carbohydrate diet and exercise daily, monthly breast self exam, annual wellness examination.   2. Patient advised to call for results.  3. Continue current medications.  4. Keep follow up with specialists.  5. Prescription refill as noted above.  6. Try Wegovy 0.25 mg weekly as directed; mediation precautions discussed with patient.  If insurance-covered/affordable and tolerated, patient advised to call in 1 month for Wegovy titration with 3-month surveillance appointment advised.   7. Follow up in about 6 months (around 7/9/2024) for hypertension and prediabetes follow up.

## 2024-01-10 ENCOUNTER — OFFICE VISIT (OUTPATIENT)
Dept: SURGERY | Facility: CLINIC | Age: 59
End: 2024-01-10
Payer: COMMERCIAL

## 2024-01-10 VITALS — WEIGHT: 286.63 LBS | BODY MASS INDEX: 43.44 KG/M2 | HEIGHT: 68 IN

## 2024-01-10 DIAGNOSIS — Z12.39 ENCOUNTER FOR SCREENING BREAST EXAMINATION: ICD-10-CM

## 2024-01-10 DIAGNOSIS — Z17.0 MALIGNANT NEOPLASM OF RIGHT BREAST IN FEMALE, ESTROGEN RECEPTOR POSITIVE, UNSPECIFIED SITE OF BREAST: ICD-10-CM

## 2024-01-10 DIAGNOSIS — Z71.89 COUNSELING AND COORDINATION OF CARE: ICD-10-CM

## 2024-01-10 DIAGNOSIS — C50.911 MALIGNANT NEOPLASM OF RIGHT BREAST IN FEMALE, ESTROGEN RECEPTOR POSITIVE, UNSPECIFIED SITE OF BREAST: ICD-10-CM

## 2024-01-10 DIAGNOSIS — Z85.3 ENCOUNTER FOR FOLLOW-UP SURVEILLANCE OF BREAST CANCER: ICD-10-CM

## 2024-01-10 DIAGNOSIS — C50.411 MALIGNANT NEOPLASM OF UPPER-OUTER QUADRANT OF RIGHT BREAST IN FEMALE, ESTROGEN RECEPTOR POSITIVE: Primary | ICD-10-CM

## 2024-01-10 DIAGNOSIS — Z17.0 MALIGNANT NEOPLASM OF UPPER-OUTER QUADRANT OF RIGHT BREAST IN FEMALE, ESTROGEN RECEPTOR POSITIVE: Primary | ICD-10-CM

## 2024-01-10 DIAGNOSIS — Z08 ENCOUNTER FOR FOLLOW-UP SURVEILLANCE OF BREAST CANCER: ICD-10-CM

## 2024-01-10 DIAGNOSIS — Z71.89 COUNSELING ON HEALTH PROMOTION AND DISEASE PREVENTION: ICD-10-CM

## 2024-01-10 LAB
25(OH)D3+25(OH)D2 SERPL-MCNC: 29 NG/ML (ref 30–96)
BACTERIA #/AREA URNS AUTO: NORMAL /HPF
BILIRUB UR QL STRIP: NEGATIVE
CLARITY UR REFRACT.AUTO: CLEAR
COLOR UR AUTO: YELLOW
GLUCOSE UR QL STRIP: NEGATIVE
HGB UR QL STRIP: NEGATIVE
INSULIN COLLECTION INTERVAL: NORMAL
INSULIN SERPL-ACNC: 10.4 UU/ML
KETONES UR QL STRIP: NEGATIVE
LEUKOCYTE ESTERASE UR QL STRIP: ABNORMAL
MICROSCOPIC COMMENT: NORMAL
NITRITE UR QL STRIP: NEGATIVE
PH UR STRIP: 6 [PH] (ref 5–8)
PROT UR QL STRIP: NEGATIVE
RBC #/AREA URNS AUTO: 2 /HPF (ref 0–4)
SP GR UR STRIP: 1.02 (ref 1–1.03)
SQUAMOUS #/AREA URNS AUTO: 1 /HPF
TSH SERPL DL<=0.005 MIU/L-ACNC: 1.6 UIU/ML (ref 0.4–4)
URN SPEC COLLECT METH UR: ABNORMAL
WBC #/AREA URNS AUTO: 3 /HPF (ref 0–5)

## 2024-01-10 PROCEDURE — 3008F BODY MASS INDEX DOCD: CPT | Mod: CPTII,S$GLB,, | Performed by: NURSE PRACTITIONER

## 2024-01-10 PROCEDURE — 1160F RVW MEDS BY RX/DR IN RCRD: CPT | Mod: CPTII,S$GLB,, | Performed by: NURSE PRACTITIONER

## 2024-01-10 PROCEDURE — 3044F HG A1C LEVEL LT 7.0%: CPT | Mod: CPTII,S$GLB,, | Performed by: NURSE PRACTITIONER

## 2024-01-10 PROCEDURE — 1159F MED LIST DOCD IN RCRD: CPT | Mod: CPTII,S$GLB,, | Performed by: NURSE PRACTITIONER

## 2024-01-10 PROCEDURE — 99999 PR PBB SHADOW E&M-EST. PATIENT-LVL III: CPT | Mod: PBBFAC,,, | Performed by: NURSE PRACTITIONER

## 2024-01-10 PROCEDURE — 99214 OFFICE O/P EST MOD 30 MIN: CPT | Mod: S$GLB,,, | Performed by: NURSE PRACTITIONER

## 2024-01-17 DIAGNOSIS — F41.9 ANXIETY: ICD-10-CM

## 2024-01-17 DIAGNOSIS — I10 ESSENTIAL HYPERTENSION: Chronic | ICD-10-CM

## 2024-01-17 NOTE — TELEPHONE ENCOUNTER
No care due was identified.  [facility] Embedded Care Due Messages. Reference number: [id number].   1/17/2024 5:22:44 PM CST

## 2024-01-19 RX ORDER — AMLODIPINE BESYLATE 10 MG/1
10 TABLET ORAL DAILY
Qty: 90 TABLET | Refills: 3 | Status: SHIPPED | OUTPATIENT
Start: 2024-01-19

## 2024-01-19 RX ORDER — ALPRAZOLAM 0.5 MG/1
0.5 TABLET ORAL 2 TIMES DAILY PRN
Qty: 60 TABLET | Refills: 0 | Status: SHIPPED | OUTPATIENT
Start: 2024-01-19 | End: 2024-02-26 | Stop reason: SDUPTHER

## 2024-02-26 DIAGNOSIS — F41.9 ANXIETY: ICD-10-CM

## 2024-02-26 RX ORDER — LEVOTHYROXINE SODIUM 175 UG/1
175 TABLET ORAL
Qty: 90 TABLET | Refills: 3 | Status: SHIPPED | OUTPATIENT
Start: 2024-02-26

## 2024-02-26 NOTE — TELEPHONE ENCOUNTER
Refill Decision Note   Kylie Urbano  is requesting a refill authorization.  Brief Assessment and Rationale for Refill:  Approve     Medication Therapy Plan:         Comments:     Note composed:12:17 PM 02/26/2024

## 2024-02-26 NOTE — TELEPHONE ENCOUNTER
No care due was identified.  Health [facility] Embedded Care Due Messages. Reference number: [id number].   2/26/2024 10:07:52 AM CST

## 2024-02-27 ENCOUNTER — PATIENT MESSAGE (OUTPATIENT)
Dept: FAMILY MEDICINE | Facility: CLINIC | Age: 59
End: 2024-02-27
Payer: COMMERCIAL

## 2024-02-27 NOTE — TELEPHONE ENCOUNTER
No care due was identified.  Health [facility] Embedded Care Due Messages. Reference number: [id number].   2/26/2024 7:53:22 PM CST

## 2024-02-28 RX ORDER — METOPROLOL SUCCINATE 100 MG/1
100 TABLET, EXTENDED RELEASE ORAL NIGHTLY
Qty: 90 TABLET | Refills: 1 | Status: SHIPPED | OUTPATIENT
Start: 2024-02-28 | End: 2025-02-27

## 2024-02-28 RX ORDER — ALPRAZOLAM 0.5 MG/1
0.5 TABLET ORAL 2 TIMES DAILY PRN
Qty: 60 TABLET | Refills: 0 | Status: SHIPPED | OUTPATIENT
Start: 2024-02-28 | End: 2024-04-30 | Stop reason: SDUPTHER

## 2024-02-28 NOTE — TELEPHONE ENCOUNTER
No care due was identified.  [facility] Embedded Care Due Messages. Reference number: [id number].   2/28/2024 9:06:19 AM CST

## 2024-04-03 ENCOUNTER — PATIENT MESSAGE (OUTPATIENT)
Dept: PULMONOLOGY | Facility: CLINIC | Age: 59
End: 2024-04-03
Payer: COMMERCIAL

## 2024-04-12 NOTE — TELEPHONE ENCOUNTER
Made call to patient to schedule f/u appt with Dr Wynn. No answer, voicemail was full. Will try again later.   Referred by Dr. Sotomayor for Follow-up (Routine.)     History Of Present Illness:    Farida Traylor is a very pleasant 66 year old female with a history of PAH, MARLON and HTN, she is here for a follow up visit. The patient is seen in collaboration with Dr. Faulkner. Mrs. Garza states she has been bloated following up with GI. The Dizziness has improved. She denies chest pain or heart palpitations, states her breathing has been stable. Currently wears Oxygen all of the time.       Review of Systems   HENT: Negative.     Eyes: Negative.    Cardiovascular:  Positive for dyspnea on exertion.   Respiratory:  Positive for shortness of breath.    Endocrine: Negative.    Hematologic/Lymphatic: Negative.    Skin: Negative.    Musculoskeletal:  Positive for muscle weakness and myalgias.   Gastrointestinal: Negative.    Neurological:  Positive for dizziness.   Psychiatric/Behavioral: Negative.     Allergic/Immunologic: Negative.       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), Anxiety disorder, unspecified (01/28/2016), Asymptomatic varicose veins of bilateral lower extremities (07/16/2019), Atypical squamous cells of undetermined significance on cytologic smear of cervix (ASC-US) (06/26/2013), Candidiasis, unspecified (12/10/2019), Cellulitis of left finger (07/31/2018), Changes in skin texture (03/09/2021), Contusion of left lesser toe(s) with damage to nail, initial encounter (02/22/2018), Contusion of right hand, sequela  (08/04/2020), Corns and callosities (05/25/2021), Disorder of pigmentation, unspecified (09/28/2016), Disorder of the skin and subcutaneous tissue, unspecified (08/27/2020), Dorsalgia, unspecified (09/23/2021), Dorsalgia, unspecified (01/29/2019), Encounter for general adult medical examination without abnormal findings (06/08/2020), Encounter for gynecological examination (general) (routine) without abnormal findings (12/14/2021), Encounter for gynecological examination (general) (routine) without abnormal findings (12/11/2020), Encounter for other screening for malignant neoplasm of breast, Encounter for screening for malignant neoplasm of cervix, Encounter for screening for malignant neoplasm of cervix (11/04/2014), Encounter for screening for malignant neoplasm of cervix, Hepatomegaly, not elsewhere classified (04/21/2021), History of falling (12/28/2018), Inappropriate diet and eating habits (05/13/2021), Irregular menstruation, unspecified, Localized edema (07/29/2015), Localized swelling, mass and lump, left lower limb (09/11/2020), Localized swelling, mass and lump, left lower limb (09/24/2020), Localized swelling, mass and lump, unspecified lower limb (09/24/2020), Melanocytic nevi, unspecified (09/10/2019), Multiple births, unspecified, all liveborn ([facility]), Other allergic rhinitis (01/02/2020), Other allergic rhinitis (01/02/2020), Other conditions influencing health status (06/26/2013), Other conditions influencing health status, Other conditions influencing health status (02/05/2019), Other conditions influencing health status (12/14/2021), Other conditions influencing health status (02/14/2019), Other conditions influencing health status (01/14/2020), Other conditions influencing health status (06/05/2013), Other conditions influencing health status (06/05/2013), Other hypertrophic disorders of the skin (07/29/2015), Other shoulder lesions, right shoulder (11/11/2019), Other specified disorders of  bone, thigh (09/09/2020), Other specified noninflammatory disorders of vagina (03/12/2019), Other specified postprocedural states (05/04/2017), Other specified soft tissue disorders (10/08/2020), Other specified soft tissue disorders (08/04/2020), Pain in left thigh (08/27/2020), Pain in right foot (03/09/2021), Pain in right hip (12/28/2018), Pain in right knee (03/12/2021), Pain in right knee (03/19/2021), Pain in right leg (05/25/2021), Pain in right leg (01/12/2022), Pain in unspecified joint, 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 (02/22/2016), Personal history of contraception, Personal history of diseases of the skin and subcutaneous tissue (12/22/2020), 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, Personal history of other (healed) physical injury and trauma (03/01/2017), Personal history of other benign neoplasm (09/10/2019), Personal history of other benign neoplasm (05/06/2014), Personal history of other benign neoplasm (12/11/2020), Personal history of other diseases of the circulatory system (04/14/2021), Personal history of other diseases of the circulatory system (04/14/2021), Personal history of other diseases of the circulatory system (04/14/2021), Personal history of other diseases of the circulatory system (02/01/2017), Personal history of other diseases of the circulatory system (04/14/2021), Personal history of other diseases of the digestive system (03/24/2021), Personal history of other diseases of the female genital tract (12/28/2016), Personal history of other diseases of the female genital tract (05/19/2022), Personal history of other diseases of the female genital tract, Personal history of other diseases of the female genital tract, Personal history of other diseases of the musculoskeletal system and connective tissue, Personal history of other diseases of the musculoskeletal system and connective tissue (11/18/2019),  Personal history of other diseases of the respiratory system (05/27/2021), Personal history of other diseases of the respiratory system (04/12/2019), Personal history of other diseases of the respiratory system (01/04/2020), Personal history of other endocrine, nutritional and metabolic disease (02/01/2017), Personal history of other endocrine, nutritional and metabolic disease (04/18/2016), Personal history of other endocrine, nutritional and metabolic disease (05/26/2020), Personal history of other infectious and parasitic diseases, Personal history of other medical treatment (12/11/2020), Personal history of other medical treatment (12/14/2021), Personal history of other medical treatment, Personal history of other specified conditions (08/21/2019), Personal history of other specified conditions (02/02/2017), Personal history of other specified conditions (12/07/2020), Personal history of other specified conditions (04/14/2021), Personal history of other specified conditions (12/05/2014), Personal history of other specified conditions (08/25/2021), Personal history of other specified conditions, Personal history of other specified conditions, Personal history of other specified conditions (01/12/2018), Personal history of transient ischemic attack (TIA), and cerebral infarction without residual deficits (12/30/2015), Personal history of urinary (tract) infections (09/02/2021), Personal history of urinary (tract) infections (05/23/2019), Personal history of urinary (tract) infections (09/02/2021), Personal history of urinary calculi (09/12/2019), Polyphagia (05/13/2021), Primary central sleep apnea (10/21/2017), Pure hypercholesterolemia, unspecified, Residual hemorrhoidal skin tags (07/06/2017), Slurred speech (01/02/2015), Tinea pedis (05/25/2021), Unspecified abdominal pain (04/13/2021), Unspecified injury of right wrist, hand and finger(s), sequela (08/04/2020), Unspecified internal derangement of unspecified  knee (03/01/2017), Unspecified symptoms and signs involving the genitourinary system (12/16/2021), Unspecified symptoms and signs involving the genitourinary system (02/05/2019), Unspecified symptoms and signs involving the genitourinary system (09/03/2020), Urinary tract infection, site not specified (01/17/2020), Urinary tract infection, site not specified (01/10/2022), and Xerosis cutis (09/10/2019).    Past Surgical History:  She has a past surgical history that includes Other surgical history (07/31/2013); Other surgical history (09/10/2019); Other surgical history (11/30/2018); Mouth surgery (11/04/2014); Knee surgery (05/04/2017); Other surgical history (06/08/2020); MR angio head wo IV contrast (02/09/2016); Cardiac catheterization (Right, 01/04/2024); and Cardiac catheterization.      Social History:  She reports that she has never smoked. She has never used smokeless tobacco. She reports current alcohol use. She reports that she does not use drugs.    Family History:  Family History   Problem Relation Name Age of Onset    Hypertension Mother      Breast cancer Mother      Other (cardiac disorder) Mother      Cardiomyopathy Mother      Diabetes Mother      Other (chronic kidney disease) Mother      Stroke Brother      Brain Aneurysm Brother          Allergies:  Grass pollen, Other, Pollen extracts, and Tree and shrub pollen    Outpatient Medications:  Current Outpatient Medications   Medication Instructions    Adempas 2.5 mg, oral, 3 times daily    albuterol 90 mcg/actuation inhaler 1-2 puffs, inhalation, Every 4 hours PRN, Every 4 to 6 hours as needed    aspirin 325 mg tablet 1 tablet, oral, Daily    blood pressure monitor (Blood Pressure Kit) kit 1 kit, miscellaneous, As needed    buPROPion SR (Wellbutrin SR) 150 mg 12 hr tablet TAKE 1 TABLET BY MOUTH EVERY MORNING AND AFTERNOON    busPIRone (Buspar) 30 mg tablet 1 tablet, oral, 2 times daily    fluticasone propion-salmeteroL (Advair Diskus) 250-50  mcg/dose diskus inhaler 1 puff, inhalation, 2 times daily RT    hydrALAZINE (APRESOLINE) 50 mg, oral, 2 times daily, 1 tablet in the AM and 3 tablets in the PM.    hydrOXYzine pamoate (Vistaril) 50 mg capsule TAKE 3 CAPSULES BY MOUTH EVERY NIGHT AT BEDTIME AND TAKE 1 CAPSULE BY MOUTH EVERY MORNING    lubiprostone (AMITIZA) 8 mcg, oral, 2 times daily with meals    memantine (NAMENDA) 10 mg, oral, 2 times daily    methenamine hippurate (Hiprex) 1 gram tablet 1 tablet, oral, 2 times daily    montelukast (SINGULAIR) 10 mg, oral, Daily, *EMERGENCY REFILL*    omeprazole (PRILOSEC) 40 mg, oral, 2 times daily    Opsumit 10 mg, oral, Daily    sertraline (Zoloft) 100 mg tablet 2 tablets, oral, Daily    simvastatin (ZOCOR) 80 mg, oral, Nightly    trospium (SANCTURA) 20 mg, oral, 2 times daily        Last Recorded Vitals:  Vitals:    04/12/24 1124   BP: 145/70   Pulse: 69   SpO2: 94%   Weight: 135 kg (298 lb 1 oz)       Physical Exam:  Physical Exam  Vitals reviewed.   HENT:      Head: Normocephalic.      Nose: Nose normal.   Eyes:      Pupils: Pupils are equal, round, and reactive to light.   Cardiovascular:      Rate and Rhythm: Normal rate and regular rhythm with a 2/6 CARLITO   Pulmonary:      Effort: Pulmonary effort is normal.      Breath sounds: diminished in the bases   Abdominal:      General: Abdomen is flat.      Palpations: Abdomen is soft.   Musculoskeletal:         General: Normal range of motion.      Cervical back: Normal range of motion.   Skin:     General: Skin is warm and dry.   Neurological:      General: No focal deficit present.      Mental Status: He is alert and oriented to person, place, and time.   Psychiatric:         Mood and Affect: Mood normal.            Last Labs:  CBC -  Lab Results   Component Value Date    WBC 5.0 01/04/2024    HGB 14.0 01/04/2024    HCT 42.9 01/04/2024    MCV 88 01/04/2024     01/04/2024       CMP -  Lab Results   Component Value Date    CALCIUM 8.7 01/04/2024    PHOS 3.9  02/13/2023    PROT 6.4 10/05/2023    ALBUMIN 4.1 10/05/2023    AST 12 10/05/2023    ALT 10 10/05/2023    ALKPHOS 68 10/05/2023    BILITOT 0.4 10/05/2023       LIPID PANEL -   Lab Results   Component Value Date    CHOL 184 10/05/2023    TRIG 175 (H) 10/05/2023    HDL 51.5 10/05/2023    CHHDL 3.6 10/05/2023    LDLF 94 12/15/2022    VLDL 35 10/05/2023    NHDL 133 10/05/2023       RENAL FUNCTION PANEL -   Lab Results   Component Value Date    GLUCOSE 98 01/04/2024     01/04/2024    K 3.7 01/04/2024     01/04/2024    CO2 27 01/04/2024    ANIONGAP 12 01/04/2024    BUN 9 01/04/2024    CREATININE 0.83 03/29/2024    CALCIUM 8.7 01/04/2024    PHOS 3.9 02/13/2023    ALBUMIN 4.1 10/05/2023        Lab Results   Component Value Date    BNP 37 03/08/2023    HGBA1C 5.5 12/15/2022       Last Cardiology Tests:  ECG:    Echo:  Echocardiogram 1/18/2023  1. Left ventricular systolic function is normal with a 60-65% estimated ejection fraction.  2. Technically difficult study precludes definitive valvular assessment, albeit no gross abnormalities evident.  3. There is no evidence of a patent foramen ovale.  Echocardiogram 10/7/2020   1. The left ventricular systolic function is hyperdynamic with a 65-70%  estimated ejection fraction.  2. Moderately enlarged right ventricle.  3. There is mild to moderate tricuspid regurgitation.  4. Moderately elevated pulmonary artery pressure, RVSP 62 mmHg.  Echo 11/2018:   1. Suboptimal image quality due to body habitus.   2. The left ventricular systolic function is normal with a 60-65% estimated  ejection fraction.   3. RV and RA appear mildly dilated.   4. Mildly elevated RVSP.   Ejection Fractions  LVEF 60-65%    Cath:  Right heart cath 10/11/2022  1. Normal cardiac output.  2. MPAP 31, PAOP 11, PVR 2.6, CO 7.7.  3. Pulmonary arterial hypertension.      Right heart cath 4/12/2021  1. Normal cardiac output.  2. RA 15 mmHg, RV 35/15, PA 44/26 (35 mmHg), PAOP 2 mmHg.  3. Thermo CO 7.4  l/min, CI 3.4 l/min/m2.  4. PVR 4.5.  5. Pulmonary arterial hypertension.   Stress Test:  Stress test 5/2019:  1. Normal myocardial perfusion imaging in response to pharmacologic  stress, no evidence of ischemia or prior infarct.  2. Well-maintained left ventricular function, estimated to be greater  than 65%.  Cardiac Imaging:  Zio monitor 6/3/2019   sinus rhythm first degree AV block   12 SVT   max heart rate 182 bpm   min heart rate 42 bpm   average heart rate 60 bpm     Assessment/Plan   Very pleasant 66 year-old female with PAH, asthma, sleep apnea, and HTN, she continues to do well from a cardiac standpoint. Breathing has been stable. Dizziness has improved.  She currently is seeing pulmonary for her pulmonary hypertension. Blood pressure and heart rate are well controlled today.     Plan   -continue Hydralazine  -continue Aspirin and Simvastatin   -follow up in six months   -call with any questions         ANNETTE Orellana-CNP

## 2024-04-22 DIAGNOSIS — I10 ESSENTIAL HYPERTENSION: Chronic | ICD-10-CM

## 2024-04-22 RX ORDER — BENAZEPRIL HYDROCHLORIDE 20 MG/1
20 TABLET ORAL
Qty: 90 TABLET | Refills: 2 | Status: SHIPPED | OUTPATIENT
Start: 2024-04-22

## 2024-04-22 NOTE — TELEPHONE ENCOUNTER
Care Due:                  Date            Visit Type   Department     Provider  --------------------------------------------------------------------------------                                EP -                              PRIMARY      [facility]  Last Visit: 01-      CARE ([facility])   MEDICINE       Beverly Parks                              EP -                              PRIMARY      [facility]  Next Visit: 07-      CARE ([facility])   MEDICINE       Beverly Parks                                                            Last  Test          Frequency    Reason                     Performed    Due Date  --------------------------------------------------------------------------------    HBA1C.......  6 months...  metFORMIN, semaglutide,..  01-   07-    [facility] Embedded Care Due Messages. Reference number: [id number].   4/22/2024 5:34:47 AM CDT

## 2024-04-23 NOTE — TELEPHONE ENCOUNTER
Provider Staff:  Action required for this patient    Requires labs      Please see care gap opportunities below in Care Due Message.    Thanks!  [facility]     Appointments      Date Provider   Last Visit   1/9/2024 Beverly Parks MD   Next Visit   7/11/2024 Beverly Parks MD     Refill Decision Note   Kylie Urbano  is requesting a refill authorization.  Brief Assessment and Rationale for Refill:  Approve     Medication Therapy Plan:         Comments:     Note composed:11:09 PM 04/22/2024

## 2024-04-30 DIAGNOSIS — B00.9 HSV INFECTION: ICD-10-CM

## 2024-04-30 DIAGNOSIS — F41.9 ANXIETY: ICD-10-CM

## 2024-04-30 DIAGNOSIS — M79.604 LUMBAR PAIN WITH RADIATION DOWN RIGHT LEG: ICD-10-CM

## 2024-04-30 DIAGNOSIS — M54.50 LUMBAR PAIN WITH RADIATION DOWN RIGHT LEG: ICD-10-CM

## 2024-04-30 RX ORDER — ALPRAZOLAM 0.5 MG/1
0.5 TABLET ORAL 2 TIMES DAILY PRN
Qty: 60 TABLET | Refills: 0 | Status: SHIPPED | OUTPATIENT
Start: 2024-04-30 | End: 2024-06-19 | Stop reason: SDUPTHER

## 2024-04-30 RX ORDER — ACYCLOVIR 400 MG/1
400 TABLET ORAL
Qty: 270 TABLET | Refills: 0 | Status: SHIPPED | OUTPATIENT
Start: 2024-04-30

## 2024-04-30 RX ORDER — IBUPROFEN 800 MG/1
TABLET ORAL
Qty: 90 TABLET | Refills: 0 | Status: SHIPPED | OUTPATIENT
Start: 2024-04-30

## 2024-04-30 NOTE — TELEPHONE ENCOUNTER
No care due was identified.  [facility] Embedded Care Due Messages. Reference number: [id number].   4/30/2024 1:26:13 PM CDT

## 2024-06-19 DIAGNOSIS — C50.411 MALIGNANT NEOPLASM OF UPPER-OUTER QUADRANT OF RIGHT BREAST IN FEMALE, ESTROGEN RECEPTOR POSITIVE: ICD-10-CM

## 2024-06-19 DIAGNOSIS — Z17.0 MALIGNANT NEOPLASM OF UPPER-OUTER QUADRANT OF RIGHT BREAST IN FEMALE, ESTROGEN RECEPTOR POSITIVE: ICD-10-CM

## 2024-06-19 DIAGNOSIS — F41.9 ANXIETY: ICD-10-CM

## 2024-06-19 RX ORDER — ANASTROZOLE 1 MG/1
1 TABLET ORAL DAILY
Qty: 90 TABLET | Refills: 3 | Status: SHIPPED | OUTPATIENT
Start: 2024-06-19

## 2024-06-19 RX ORDER — ALPRAZOLAM 0.5 MG/1
0.5 TABLET ORAL 2 TIMES DAILY PRN
Qty: 60 TABLET | Refills: 0 | Status: SHIPPED | OUTPATIENT
Start: 2024-06-19

## 2024-06-19 NOTE — PROGRESS NOTES
Subjective:       Patient ID: Kylie Urbano is a 58 y.o. female.    Chief Complaint: breast cancer f/u    HPI Patient presents for breast cancer surveillance and review of mammogram.     Last visit was 1/10/2024    58-year-old female with a previous diagnosis of right-sided infiltrating ductal carcinoma ER/MI positive, HER-2 negative with biopsy-proven right axillary lymph node involvement.  Patient was consented for BRCA testing for participation and B-55 study with negative BRCA testing.    She received cycle 1 of dense dose Adriamycin/Cytoxan on 6/12/2017 and tolerated treatment exceptionally well.  She completed cycle 4 of dose dense Taxol on 9/19/2017.  She underwent lumpectomy with sentinel lymph node biopsy on 10/31/2017.   Final pathology demonstrated [id number].  The primary measured 0.8 cm and 2 sentinel lymph nodes were positive with 3 mm macrometastasis within first sentinel lymph node and 1 mm micrometastasis within the second sentinel lymph node.  She underwent a full right axillary lymph node dissection on 11/8/2017 which demonstrated 0 positive nodes.  She completed adjuvant radiation on 2/27/2018.      The patient had a hysterectomy approximately 6/20/14.  FSH was in the intermediate range which likely means the patient is perimenopausal.  Started Tamoxifen 20 mg daily in 3/2018 and has since been transitioned to Arimidex - states tolerating well.  Due off Arimidex 3/2027  Surveillance mammograms remains MAGALIE - 6/3/20    Primary Oncologist: Herbie Miramontes MD  Previous- Mason Martinez MD  Surgeon: Jet Mcclendon  Rad Onc: Arik Wynn     ER/MI positive, HER-2 negative infiltrating ductal carcinoma the right breast with biopsy proven lymph node involvement clinically stage II/III  [id number].  Pathology following neoadjuvant therapy demonstrated 1 sentinel lymph node with micrometastasis (3 mm) and second lymph node with micrometastasis (1 mm)  --BRCA testing via B-55 Study is negative for  mutation  --ddAC--> taxol   cycle 1--- 6/12/2017  --Final cycle Cycle 4 on 9/19/2017  --Radiation completed on 2/27/2018  --Tamoxifen 20 mg by mouth daily started in 3/2018  --surveillance bilateral mammogram in  wnl  --Transitioned to Arimidex 1 mg PO daily - to be completed in 3/2027     I have reviewed and updated the HPI, ROS, PMHx, Social Hx, Family Hx and treatment history.     7/12/2023- DEXA-  wnl    Pt followed by gastro for elevated liver enzymes and fatty liver disease.     Interval History:     Persistent symptoms/side effects of treatment:- feet has intermittent tingling- not improving over the past 6 months    Functional changes: ROM, neuropathy, weakness or fatigue- stable    Psychosocial challenges- anxiety- not necessarily related to cancer related events- states doing well    Lymphedema- none    Diet/Nutrition- walking intermittently    Sexual Dysfunction or challenges - vaginal dryness but not active now      Review of Systems   Constitutional: Negative.    HENT: Negative.     Eyes: Negative.    Respiratory: Negative.     Cardiovascular: Negative.  Negative for chest pain, palpitations and leg swelling.   Gastrointestinal: Negative.    Endocrine: Negative.         Hot flashes and sleep disturbance   Genitourinary: Negative.    Musculoskeletal: Negative.    Skin: Negative.    Allergic/Immunologic: Negative.    Neurological:  Positive for numbness (susan foot numbness and neuropathy after chemo).        Tingling in hands and feet since chemo   Hematological: Negative.  Negative for adenopathy.   Psychiatric/Behavioral: Negative.          Relates mild anxiety       Pt denies any breast pain, nipple discharge or palpable mass      Objective:          Physical Exam   Constitutional: She is oriented to person, place, and time. She appears well-developed and well-nourished. No distress.   Head: Normocephalic and atraumatic. Not macrocephalic.     Neck: Trachea normal and normal range of motion. Neck  supple. No tracheal deviation present. No thyroid mass and no thyromegaly present.   No lymphadenopathy, susan breast no visible redness, puckering or retraction. No palpable mass bilaterally. No nipple discharge.   Lymphadenopathy:        Head (right side): No submental and no submandibular adenopathy present.        Head (left side): No submental and no submandibular adenopathy present.        Right cervical: No superficial cervical and no deep cervical adenopathy present.       Left cervical: No superficial cervical and no deep cervical adenopathy present.     She has no axillary adenopathy.        Right: No supraclavicular adenopathy present.        Left: No supraclavicular adenopathy present.   Neurological: She is alert and oriented to person, place, and time. No cranial nerve deficit or sensory deficit. She exhibits normal muscle tone.   Skin: Skin is warm, dry and intact. Capillary refill takes less than 2 seconds. No rash noted. She is not diaphoretic. No pallor.   Psychiatric: She has a normal mood and affect. Her speech is normal and behavior is normal. Judgment and thought content normal. She is not actively hallucinating. Cognition and memory are normal. She is attentive.     Mammo today- results pending    Dexa- 7/12/2023- wnl    Last colonoscopy 2/22/2019- polyp noted- 5 year f/u recommended- 2/2024    Assessment:       1. Malignant neoplasm of upper-outer quadrant of right breast in female, estrogen receptor positive    2. Encounter for follow-up surveillance of breast cancer    3. Counseling and coordination of care    4. Counseling on health promotion and disease prevention    5. Encounter for screening breast examination    6. Neuropathy due to chemotherapeutic drug    7. Chemotherapy-induced neuropathy    8. Encounter for medication counseling              Plan:       1.      Mammogram 7/12/2023- wnl   Mammo today- results pending  - negative clinical exam  2.      History of Right breast cancer-  continue to take Arimidex daily  3.      Monitoring adjuvant therapy- Taking Arimidex without difficulty. Continue until 3/2027-   -Dexa 7/12/23- normal - repeat 7/2025  4.      Encouraged walking to help with bone density and decreasing risk for breast cancer recurrence.  Taking calcium and vit d- walking intermittently. Discussed reducing risk for breast cancer with wt loss and exercise.    5.      Fatty liver disease- followed by gastro now. Has a diagnosis of borderline hepatomegaly and fatty liver -   6.      RTC in 6 months for f/u of breast cancer and adjuvant therapy-  exam only-   7.      Chemo induced neuropathy- will neurotin 100 mg po bid for 4 weeks. Extensive discussion regarding potential side effects from med and admin instructions. Warned of drowsiness and dizziness- encouraged to try over the weekend or at night when not driving or working  8.      Lipids 1/9/2024- mild elevation cholesterol  9.      Iron def anemia to be followed by hematology-

## 2024-06-19 NOTE — TELEPHONE ENCOUNTER
No care due was identified.  [facility] Embedded Care Due Messages. Reference number: [id number].   6/19/2024 6:10:27 AM CDT

## 2024-06-20 DIAGNOSIS — K21.9 GASTROESOPHAGEAL REFLUX DISEASE: ICD-10-CM

## 2024-06-20 RX ORDER — OMEPRAZOLE 20 MG/1
20 CAPSULE, DELAYED RELEASE ORAL DAILY PRN
Qty: 90 CAPSULE | Refills: 1 | Status: SHIPPED | OUTPATIENT
Start: 2024-06-20

## 2024-06-20 NOTE — TELEPHONE ENCOUNTER
No care due was identified.  [facility] Embedded Care Due Messages. Reference number: [id number].   6/20/2024 5:36:30 AM CDT

## 2024-06-20 NOTE — TELEPHONE ENCOUNTER
Refill Routing Note   Medication(s) are not appropriate for processing by [facility] for the following reason(s):        New or recently adjusted medication    [facility] action(s):  Approve        Medication Therapy Plan: per epic adherence data does not indicate PRN usage;      Appointments  past 12m or future 3m with PCP    Date Provider   Last Visit   1/9/2024 Beverly Parks MD   Next Visit   7/11/2024 Beverly Parks MD   ED visits in past 90 days: 0        Note composed:10:54 AM 06/20/2024

## 2024-07-10 ENCOUNTER — HOSPITAL ENCOUNTER (OUTPATIENT)
Dept: RADIOLOGY | Facility: HOSPITAL | Age: 59
Discharge: HOME | End: 2024-07-10
Attending: NURSE PRACTITIONER
Payer: COMMERCIAL

## 2024-07-10 ENCOUNTER — OFFICE VISIT (OUTPATIENT)
Dept: SURGERY | Facility: CLINIC | Age: 59
End: 2024-07-10
Payer: COMMERCIAL

## 2024-07-10 VITALS
WEIGHT: 286.63 LBS | HEIGHT: 68 IN | WEIGHT: 274.25 LBS | HEIGHT: 68 IN | RESPIRATION RATE: 14 BRPM | BODY MASS INDEX: 43.44 KG/M2 | BODY MASS INDEX: 41.57 KG/M2

## 2024-07-10 DIAGNOSIS — Z17.0 MALIGNANT NEOPLASM OF RIGHT BREAST IN FEMALE, ESTROGEN RECEPTOR POSITIVE, UNSPECIFIED SITE OF BREAST: ICD-10-CM

## 2024-07-10 DIAGNOSIS — Z71.89 COUNSELING ON HEALTH PROMOTION AND DISEASE PREVENTION: ICD-10-CM

## 2024-07-10 DIAGNOSIS — Z85.3 ENCOUNTER FOR FOLLOW-UP SURVEILLANCE OF BREAST CANCER: ICD-10-CM

## 2024-07-10 DIAGNOSIS — Z17.0 MALIGNANT NEOPLASM OF UPPER-OUTER QUADRANT OF RIGHT BREAST IN FEMALE, ESTROGEN RECEPTOR POSITIVE: Primary | ICD-10-CM

## 2024-07-10 DIAGNOSIS — T45.1X5A NEUROPATHY DUE TO CHEMOTHERAPEUTIC DRUG: ICD-10-CM

## 2024-07-10 DIAGNOSIS — Z12.39 ENCOUNTER FOR SCREENING BREAST EXAMINATION: ICD-10-CM

## 2024-07-10 DIAGNOSIS — G62.0 NEUROPATHY DUE TO CHEMOTHERAPEUTIC DRUG: ICD-10-CM

## 2024-07-10 DIAGNOSIS — Z08 ENCOUNTER FOR FOLLOW-UP SURVEILLANCE OF BREAST CANCER: ICD-10-CM

## 2024-07-10 DIAGNOSIS — C50.411 MALIGNANT NEOPLASM OF UPPER-OUTER QUADRANT OF RIGHT BREAST IN FEMALE, ESTROGEN RECEPTOR POSITIVE: Primary | ICD-10-CM

## 2024-07-10 DIAGNOSIS — G62.0 CHEMOTHERAPY-INDUCED NEUROPATHY: ICD-10-CM

## 2024-07-10 DIAGNOSIS — T45.1X5A CHEMOTHERAPY-INDUCED NEUROPATHY: ICD-10-CM

## 2024-07-10 DIAGNOSIS — C50.911 MALIGNANT NEOPLASM OF RIGHT BREAST IN FEMALE, ESTROGEN RECEPTOR POSITIVE, UNSPECIFIED SITE OF BREAST: ICD-10-CM

## 2024-07-10 DIAGNOSIS — Z71.89 COUNSELING AND COORDINATION OF CARE: ICD-10-CM

## 2024-07-10 DIAGNOSIS — Z71.89 ENCOUNTER FOR MEDICATION COUNSELING: ICD-10-CM

## 2024-07-10 PROCEDURE — 99999 PR PBB SHADOW E&M-EST. PATIENT-LVL II: CPT | Mod: PBBFAC,,, | Performed by: NURSE PRACTITIONER

## 2024-07-10 PROCEDURE — 99214 OFFICE O/P EST MOD 30 MIN: CPT | Mod: S$GLB,,, | Performed by: NURSE PRACTITIONER

## 2024-07-10 PROCEDURE — 4010F ACE/ARB THERAPY RXD/TAKEN: CPT | Mod: CPTII,S$GLB,, | Performed by: NURSE PRACTITIONER

## 2024-07-10 PROCEDURE — 3044F HG A1C LEVEL LT 7.0%: CPT | Mod: CPTII,S$GLB,, | Performed by: NURSE PRACTITIONER

## 2024-07-10 PROCEDURE — 77063 BREAST TOMOSYNTHESIS BI: CPT | Mod: TC

## 2024-07-10 PROCEDURE — 3008F BODY MASS INDEX DOCD: CPT | Mod: CPTII,S$GLB,, | Performed by: NURSE PRACTITIONER

## 2024-07-10 PROCEDURE — 77063 BREAST TOMOSYNTHESIS BI: CPT | Mod: 26,,, | Performed by: RADIOLOGY

## 2024-07-10 PROCEDURE — 77067 SCR MAMMO BI INCL CAD: CPT | Mod: TC

## 2024-07-10 PROCEDURE — 77067 SCR MAMMO BI INCL CAD: CPT | Mod: 26,,, | Performed by: RADIOLOGY

## 2024-07-10 RX ORDER — TRAMADOL HYDROCHLORIDE 50 MG/1
50 TABLET ORAL
COMMUNITY
Start: 2024-04-01

## 2024-07-10 RX ORDER — GABAPENTIN 100 MG/1
100 CAPSULE ORAL 2 TIMES DAILY
Qty: 60 CAPSULE | Refills: 11 | Status: SHIPPED | OUTPATIENT
Start: 2024-07-10 | End: 2025-07-10

## 2024-07-11 ENCOUNTER — OFFICE VISIT (OUTPATIENT)
Dept: FAMILY MEDICINE | Facility: CLINIC | Age: 59
End: 2024-07-11
Attending: FAMILY MEDICINE
Payer: COMMERCIAL

## 2024-07-11 VITALS
DIASTOLIC BLOOD PRESSURE: 72 MMHG | RESPIRATION RATE: 18 BRPM | TEMPERATURE: 98 F | HEIGHT: 68 IN | OXYGEN SATURATION: 99 % | BODY MASS INDEX: 41.46 KG/M2 | SYSTOLIC BLOOD PRESSURE: 128 MMHG | HEART RATE: 71 BPM | WEIGHT: 273.56 LBS

## 2024-07-11 DIAGNOSIS — R73.03 PREDIABETES: ICD-10-CM

## 2024-07-11 DIAGNOSIS — E03.9 HYPOTHYROIDISM, UNSPECIFIED TYPE: Chronic | ICD-10-CM

## 2024-07-11 DIAGNOSIS — E66.01 MORBID OBESITY WITH BMI OF 40.0-44.9, ADULT: ICD-10-CM

## 2024-07-11 DIAGNOSIS — C50.411 MALIGNANT NEOPLASM OF UPPER-OUTER QUADRANT OF RIGHT BREAST IN FEMALE, ESTROGEN RECEPTOR POSITIVE: ICD-10-CM

## 2024-07-11 DIAGNOSIS — T45.1X5A CHEMOTHERAPY-INDUCED NEUROPATHY: ICD-10-CM

## 2024-07-11 DIAGNOSIS — G62.0 CHEMOTHERAPY-INDUCED NEUROPATHY: ICD-10-CM

## 2024-07-11 DIAGNOSIS — Z17.0 MALIGNANT NEOPLASM OF UPPER-OUTER QUADRANT OF RIGHT BREAST IN FEMALE, ESTROGEN RECEPTOR POSITIVE: ICD-10-CM

## 2024-07-11 DIAGNOSIS — E55.9 VITAMIN D DEFICIENCY: ICD-10-CM

## 2024-07-11 DIAGNOSIS — I10 ESSENTIAL HYPERTENSION: Primary | ICD-10-CM

## 2024-07-11 PROCEDURE — 3074F SYST BP LT 130 MM HG: CPT | Mod: CPTII,S$GLB,, | Performed by: FAMILY MEDICINE

## 2024-07-11 PROCEDURE — 99214 OFFICE O/P EST MOD 30 MIN: CPT | Mod: S$GLB,,, | Performed by: FAMILY MEDICINE

## 2024-07-11 PROCEDURE — 99999 PR PBB SHADOW E&M-EST. PATIENT-LVL V: CPT | Mod: PBBFAC,,, | Performed by: FAMILY MEDICINE

## 2024-07-11 PROCEDURE — 3078F DIAST BP <80 MM HG: CPT | Mod: CPTII,S$GLB,, | Performed by: FAMILY MEDICINE

## 2024-07-11 PROCEDURE — 1159F MED LIST DOCD IN RCRD: CPT | Mod: CPTII,S$GLB,, | Performed by: FAMILY MEDICINE

## 2024-07-11 PROCEDURE — 4010F ACE/ARB THERAPY RXD/TAKEN: CPT | Mod: CPTII,S$GLB,, | Performed by: FAMILY MEDICINE

## 2024-07-11 PROCEDURE — 3008F BODY MASS INDEX DOCD: CPT | Mod: CPTII,S$GLB,, | Performed by: FAMILY MEDICINE

## 2024-07-11 PROCEDURE — G2211 COMPLEX E/M VISIT ADD ON: HCPCS | Mod: S$GLB,,, | Performed by: FAMILY MEDICINE

## 2024-07-11 PROCEDURE — 1160F RVW MEDS BY RX/DR IN RCRD: CPT | Mod: CPTII,S$GLB,, | Performed by: FAMILY MEDICINE

## 2024-07-11 PROCEDURE — 3044F HG A1C LEVEL LT 7.0%: CPT | Mod: CPTII,S$GLB,, | Performed by: FAMILY MEDICINE

## 2024-07-11 NOTE — PROGRESS NOTES
Kylie Urbano    Chief Complaint   Patient presents with    Hypertension    Pre-diabetes    Follow-up       History of Present Illness:   Ms. Urbano comes in today for hypertension and prediabetes follow-up.  She states she is fasting and has taken medication today.  She takes Amlodipine 10 mg daily, Benazepril 20 mg daily, Toprol- mg daily for blood pressure control; metformin 500 mg-4 pills daily for prediabetes; Synthroid 175 mcg daily for hypothyroidism; and Wegovy 0.25 mg weekly for weight management.  She states she exercises 3 times a week, 30 minutes each time with walking and monitors her diet.  She states she has not been performing home blood pressure checks for 3 weeks but prior checks range 125-131/82.      She reports no acute problems today. She denies having fever, chills, fatigue, appetite changes; shortness of breath, cough, wheezing; chest pain, palpitations, leg swelling; abdominal pain, nausea, vomiting, diarrhea, constipation; unusual urinary symptoms; polydipsia, polyphagia, polyuria, hot or cold intolerance; back pain; acute visual changes, headache; anxiety, depression, homicidal or suicidal thoughts.     She saw JACQUES Ernst with pulmonary on January 24, 2023 for sleep-disordered breathing, sleep apnea, unspecified type, snoring, history of tachycardia, morbid obesity with BMI of 40.0-44.9, adult, essential hypertension.    She saw Dr. Hicks, cardiologist, on January 13, 2023 for hypertension follow up at which time Metoprolol succinate was increased to 100 mg daily with 9-month follow up advised.    She saw JACQUES Bergeron with hematology/oncology on yesterday for malignant neoplasm of upper-outer quadrant of right breast in female, estrogen receptor positive, encounter for follow-up surveillance of breast cancer, counseling and coordination of care, counseling on health promotion and disease prevention, encounter for screening breast examination, neuropathy due to  chemotherapeutic drug, chemotherapy-induced neuropathy, encounter for medication counseling.    She saw FNP Nat Melvin with GI on January 31, 2023 for fatty liver, elevated liver enzymes.      Labs:                    WBC                      7.38                01/09/2024                 HGB                      13.2                01/09/2024                 HCT                      40.3                01/09/2024                 PLT                      325                 01/09/2024                 CHOL                     203 (H)             01/09/2024                 TRIG                     72                  01/09/2024                 HDL                      51                  01/09/2024                 ALT                      46 (H)              01/09/2024                 AST                      39                  01/09/2024                 NA                       140                 01/09/2024                 K                        4.0                 01/09/2024                 CL                       103                 01/09/2024                 CREATININE               0.8                 01/09/2024                 BUN                      15                  01/09/2024                 CO2                      22 (L)              01/09/2024                 TSH                      1.601               01/09/2024                 INR                      0.9                 06/17/2020                 GLUF                     82                  04/06/2005                 HGBA1C                   6.0 (H)             01/09/2024             LDLCALC                  137.6               01/09/2024                 Vit D, 25-Hydroxy  29 Low     01/09/2024        Current Outpatient Medications   Medication Sig    acyclovir (ZOVIRAX) 400 MG tablet Take 1 tablet (400 mg total) by mouth 3 (three) times daily with meals.    ALPRAZolam (XANAX) 0.5 MG tablet Take 1 tablet (0.5 mg total) by mouth 2 (two) times  daily as needed for Anxiety.    amLODIPine (NORVASC) 10 MG tablet Take 1 tablet (10 mg total) by mouth once daily.    anastrozole (ARIMIDEX) 1 mg Tab Take 1 tablet (1 mg total) by mouth once daily.    benazepriL (LOTENSIN) 20 MG tablet Take 1 tablet by mouth once daily    fluticasone propionate (FLONASE) 50 mcg/actuation nasal spray USE 2 SPRAY(S) IN EACH NOSTRIL ONCE DAILY AS NEEDED FOR  RHINITIS    gabapentin (NEURONTIN) 100 MG capsule Take 1 capsule (100 mg total) by mouth 2 (two) times daily.    ibuprofen (ADVIL,MOTRIN) 800 MG tablet Take 1 tab by mouth TID prn pain    metFORMIN (GLUCOPHAGE) 500 MG tablet TAKE 4 TABLETS BY MOUTH IN THE EVENING AS DIRECTED    metoprolol succinate (TOPROL-XL) 100 MG 24 hr tablet Take 1 tablet (100 mg total) by mouth every evening.    omeprazole (PRILOSEC) 20 MG capsule Take 1 capsule (20 mg total) by mouth daily as needed.    semaglutide, weight loss, (WEGOVY) 0.25 mg/0.5 mL PnIj Inject 0.25 mg into the skin every 7 days.    SYNTHROID 175 mcg tablet Take 1 tablet by mouth once daily    traMADoL (ULTRAM) 50 mg tablet Take 50 mg by mouth.     No current facility-administered medications for this visit.         Review of Systems   Constitutional:  Negative for activity change, appetite change, chills, fatigue and fever.        Wt 129 kg (284 lb 6.3 oz) at January 9, 2024 visit.   Eyes:  Negative for visual disturbance.   Respiratory:  Negative for cough, shortness of breath and wheezing.         See history of present illness.   Cardiovascular:  Negative for chest pain, palpitations and leg swelling.        See history of present illness.   Gastrointestinal:  Negative for abdominal pain, constipation, diarrhea, nausea and vomiting.        See history of present illness.   Endocrine: Negative for cold intolerance, heat intolerance, polydipsia, polyphagia and polyuria.        See history of present illness.   Genitourinary:  Negative for difficulty urinating.   Musculoskeletal:   Negative for back pain.   Neurological:  Positive for numbness. Negative for headaches.   Hematological:         See history of present illness.   Psychiatric/Behavioral:  Negative for dysphoric mood and suicidal ideas. The patient is not nervous/anxious.         Negative for homicidal ideas.       Objective:  Physical Exam  Vitals reviewed.   Constitutional:       General: She is not in acute distress.     Appearance: Normal appearance. She is well-developed. She is obese. She is not ill-appearing, toxic-appearing or diaphoretic.      Comments: Pleasant.   Neck:      Thyroid: No thyromegaly.   Cardiovascular:      Rate and Rhythm: Normal rate and regular rhythm.      Pulses:           Dorsalis pedis pulses are 3+ on the right side and 3+ on the left side.      Heart sounds: Normal heart sounds. No murmur heard.  Pulmonary:      Effort: Pulmonary effort is normal. No respiratory distress.      Breath sounds: Normal breath sounds. No wheezing.   Abdominal:      General: Bowel sounds are normal. There is no distension.      Palpations: Abdomen is soft. There is no mass.      Tenderness: There is no abdominal tenderness. There is no guarding or rebound.   Musculoskeletal:         General: No swelling or tenderness. Normal range of motion.      Cervical back: Normal range of motion and neck supple. No tenderness.      Comments: She is ambulatory without problems.   Feet:      Right foot:      Protective Sensation: 5 sites tested.  5 sites sensed.      Skin integrity: No ulcer or skin breakdown.      Left foot:      Protective Sensation: 5 sites tested.  5 sites sensed.      Skin integrity: No ulcer or skin breakdown.   Lymphadenopathy:      Cervical: No cervical adenopathy.   Neurological:      General: No focal deficit present.      Mental Status: She is alert and oriented to person, place, and time.      Deep Tendon Reflexes: Reflexes are normal and symmetric.   Psychiatric:         Mood and Affect: Mood normal.          Behavior: Behavior normal.         Thought Content: Thought content normal.         Judgment: Judgment normal.         ASSESSMENT:  1. Essential hypertension    2. Prediabetes    3. Hypothyroidism, unspecified type    4. Vitamin D deficiency    5. Malignant neoplasm of upper-outer quadrant of right breast in female, estrogen receptor positive    6. Chemotherapy-induced neuropathy    7. Morbid obesity with BMI of 40.0-44.9, adult        PLAN:  Kylie was seen today for hypertension, pre-diabetes and follow-up.    Diagnoses and all orders for this visit:    Essential hypertension  -     Comprehensive Metabolic Panel; Future    Prediabetes  -     Hemoglobin A1C; Future  -     Comprehensive Metabolic Panel; Future    Hypothyroidism, unspecified type    Vitamin D deficiency    Malignant neoplasm of upper-outer quadrant of right breast in female, estrogen receptor positive    Chemotherapy-induced neuropathy    Morbid obesity with BMI of 40.0-44.9, adult      Patient advised to call for results.  Continue current medications, follow low sodium, low cholesterol, low carb diet, daily walks.  Keep follow up with specialists.  Flu shot this fall.  Follow up in about 26 weeks (around 1/9/2025) for physical.

## 2024-08-12 ENCOUNTER — PATIENT MESSAGE (OUTPATIENT)
Dept: SURGERY | Facility: CLINIC | Age: 59
End: 2024-08-12
Payer: COMMERCIAL

## 2024-08-19 NOTE — TELEPHONE ENCOUNTER
Care Due:                  Date            Visit Type   Department     Provider  --------------------------------------------------------------------------------                                EP -                              PRIMARY      [facility]  Last Visit: 07-      CARE ([facility])   MEDICINE       Beverly Parks  Next Visit: None Scheduled  None         None Found                                                            Last  Test          Frequency    Reason                     Performed    Due Date  --------------------------------------------------------------------------------    HBA1C.......  6 months...  semaglutide,.............  01-   07-    [facility] Embedded Care Due Messages. Reference number: [id number].   8/19/2024 12:20:49 PM CDT

## 2024-08-20 DIAGNOSIS — F41.9 ANXIETY: ICD-10-CM

## 2024-08-20 RX ORDER — METOPROLOL SUCCINATE 100 MG/1
100 TABLET, EXTENDED RELEASE ORAL NIGHTLY
Qty: 90 TABLET | Refills: 3 | Status: SHIPPED | OUTPATIENT
Start: 2024-08-20

## 2024-08-20 NOTE — TELEPHONE ENCOUNTER
Provider Staff:  Action required for this patient    Requires labs      Please see care gap opportunities below in Care Due Message.    Thanks!  [facility]     Appointments      Date Provider   Last Visit   7/11/2024 Beverly Parks MD   Next Visit   Visit date not found Beverly Parks MD     Refill Decision Note   Kylie Urbano  is requesting a refill authorization.  Brief Assessment and Rationale for Refill:  Approve     Medication Therapy Plan:         Comments:     Note composed:10:35 AM 08/20/2024

## 2024-08-20 NOTE — TELEPHONE ENCOUNTER
Tried reaching patient to inform her that the following RX was sent to the pharmacy.     metoprolol succinate (TOPROL-XL) 100 MG 24 hr tablet       Unable to reach, phone just ring.

## 2024-08-20 NOTE — TELEPHONE ENCOUNTER
No care due was identified.  [facility] Embedded Care Due Messages. Reference number: [id number].   8/20/2024 3:32:55 PM CDT

## 2024-08-23 RX ORDER — ALPRAZOLAM 0.5 MG/1
0.5 TABLET ORAL 2 TIMES DAILY PRN
Qty: 60 TABLET | Refills: 0 | Status: SHIPPED | OUTPATIENT
Start: 2024-08-23

## 2024-11-07 ENCOUNTER — PATIENT MESSAGE (OUTPATIENT)
Dept: FAMILY MEDICINE | Facility: CLINIC | Age: 59
End: 2024-11-07
Payer: COMMERCIAL

## 2024-11-07 DIAGNOSIS — F41.9 ANXIETY: ICD-10-CM

## 2024-11-07 RX ORDER — ALPRAZOLAM 0.5 MG/1
0.5 TABLET ORAL 2 TIMES DAILY PRN
Qty: 60 TABLET | Refills: 0 | Status: SHIPPED | OUTPATIENT
Start: 2024-11-07

## 2024-11-07 NOTE — TELEPHONE ENCOUNTER
Care Due:                  Date            Visit Type   Department     Provider  --------------------------------------------------------------------------------                                EP -                              PRIMARY      [facility]  Last Visit: 07-      CARE ([facility])   MEDICINE       Beverly Parks  Next Visit: None Scheduled  None         None Found                                                            Last  Test          Frequency    Reason                     Performed    Due Date  --------------------------------------------------------------------------------    CBC.........  12 months..  acyclovir, ibuprofen.....  01- 01-    CMP.........  12 months..  acyclovir, benazepriL,     01- 01-                             ibuprofen................    HBA1C.......  6 months...  semaglutide,.............  01-   07-    TSH.........  12 months..  SYNTHROID................  01-   01-    [facility] Embedded Care Due Messages. Reference number: [id number].   11/07/2024 3:58:26 PM CST

## 2024-12-17 DIAGNOSIS — F41.9 ANXIETY: ICD-10-CM

## 2024-12-17 DIAGNOSIS — M79.604 LUMBAR PAIN WITH RADIATION DOWN RIGHT LEG: ICD-10-CM

## 2024-12-17 DIAGNOSIS — K21.9 GASTROESOPHAGEAL REFLUX DISEASE: ICD-10-CM

## 2024-12-17 DIAGNOSIS — M54.50 LUMBAR PAIN WITH RADIATION DOWN RIGHT LEG: ICD-10-CM

## 2024-12-17 NOTE — TELEPHONE ENCOUNTER
No care due was identified.  [facility] Embedded Care Due Messages. Reference number: [id number].   12/17/2024 3:53:18 PM CST

## 2024-12-24 RX ORDER — ALPRAZOLAM 0.5 MG/1
0.5 TABLET ORAL 2 TIMES DAILY PRN
Qty: 60 TABLET | Refills: 0 | Status: SHIPPED | OUTPATIENT
Start: 2024-12-24

## 2024-12-24 RX ORDER — IBUPROFEN 800 MG/1
TABLET ORAL
Qty: 90 TABLET | Refills: 0 | Status: SHIPPED | OUTPATIENT
Start: 2024-12-24

## 2024-12-24 RX ORDER — OMEPRAZOLE 20 MG/1
20 CAPSULE, DELAYED RELEASE ORAL DAILY PRN
Qty: 90 CAPSULE | Refills: 0 | Status: SHIPPED | OUTPATIENT
Start: 2024-12-24

## 2024-12-24 NOTE — TELEPHONE ENCOUNTER
Advise pt due for physical w/me on/around 1/9/2025.  Please schedule. Thanks.    I have put the following orders and/or medications to this note.  Please advise pt and assist.    No orders of the defined types were placed in this encounter.      Medications Ordered This Encounter   Medications    ALPRAZolam (XANAX) 0.5 MG tablet     Sig: Take 1 tablet (0.5 mg total) by mouth 2 (two) times daily as needed for Anxiety.     Dispense:  60 tablet     Refill:  0    ibuprofen (ADVIL,MOTRIN) 800 MG tablet     Sig: Take 1 tab by mouth TID prn pain     Dispense:  90 tablet     Refill:  0    omeprazole (PRILOSEC) 20 MG capsule     Sig: Take 1 capsule (20 mg total) by mouth daily as needed.     Dispense:  90 capsule     Refill:  0

## 2024-12-26 NOTE — TELEPHONE ENCOUNTER
Patient contacted and informed of her 3 prescriptions that were sent to her pharmacy. She verbally understood the information given. Patient did schedule to be seen for 1/9/25 per Dr. Parks for her physical. Patient verbally understood the information given.

## 2025-01-07 ENCOUNTER — TELEPHONE (OUTPATIENT)
Dept: FAMILY MEDICINE | Facility: CLINIC | Age: 60
End: 2025-01-07
Payer: COMMERCIAL

## 2025-01-07 NOTE — TELEPHONE ENCOUNTER
----- Message from Lyn sent at 1/7/2025  4:39 PM CST -----  Contact: [phone]  Type:  Sooner Apoointment Request    Caller is requesting a sooner appointment.  Caller declined first available appointment listed below.  Caller will not accept being placed on the waitlist and is requesting a message be sent to doctor.  Name of Caller:ARNEL NEWMAN [[id number]]  When is the first available appointment?RESCHEDULE APPOINTMENT..... need to be seen on 01/20/2025  Symptoms:physical  Would the patient rather a call back or a [facility]? Call back  Best Call Back Number:[phone]  Additional Information: [id number] [phone]

## 2025-01-15 DIAGNOSIS — I10 ESSENTIAL HYPERTENSION: ICD-10-CM

## 2025-01-15 DIAGNOSIS — R73.03 PREDIABETES: ICD-10-CM

## 2025-01-26 DIAGNOSIS — I10 ESSENTIAL HYPERTENSION: Chronic | ICD-10-CM

## 2025-01-26 RX ORDER — AMLODIPINE BESYLATE 10 MG/1
10 TABLET ORAL
Qty: 90 TABLET | Refills: 1 | Status: SHIPPED | OUTPATIENT
Start: 2025-01-26

## 2025-01-26 NOTE — TELEPHONE ENCOUNTER
Care Due:                  Date            Visit Type   Department     Provider  --------------------------------------------------------------------------------                                EP -                              PRIMARY      [facility]  Last Visit: 07-      CARE ([facility])   MEDICINE       Beverly Parks                              EP -                              PRIMARY      [facility]  Next Visit: 01-      CARE ([facility])   MEDICINE       Beverly Parks                                                            Last  Test          Frequency    Reason                     Performed    Due Date  --------------------------------------------------------------------------------    CBC.........  12 months..  acyclovir, ibuprofen.....  01- 01-    CMP.........  12 months..  acyclovir, benazepriL,     01- 01-                             ibuprofen................    HBA1C.......  6 months...  semaglutide,.............  01-   07-    TSH.........  12 months..  SYNTHROID................  01-   01-    [facility] Embedded Care Due Messages. Reference number: [id number].   1/26/2025 6:35:55 AM CST

## 2025-01-27 RX ORDER — BENAZEPRIL HYDROCHLORIDE 20 MG/1
20 TABLET ORAL
Qty: 90 TABLET | Refills: 0 | Status: SHIPPED | OUTPATIENT
Start: 2025-01-27

## 2025-01-27 NOTE — TELEPHONE ENCOUNTER
Refill Routing Note   Medication(s) are not appropriate for processing by [facility] for the following reason(s):        Required labs outdated    [facility] action(s):  Defer  Approve   Requires labs : Yes             Appointments  past 12m or future 3m with PCP    Date Provider   Last Visit   7/11/2024 Beverly Parks MD   Next Visit   1/26/2025 Beverly Parks MD   ED visits in past 90 days: 0        Note composed:10:45 PM 01/26/2025

## 2025-01-30 ENCOUNTER — OFFICE VISIT (OUTPATIENT)
Dept: FAMILY MEDICINE | Facility: CLINIC | Age: 60
End: 2025-01-30
Attending: FAMILY MEDICINE
Payer: COMMERCIAL

## 2025-01-30 ENCOUNTER — HOSPITAL ENCOUNTER (OUTPATIENT)
Dept: RADIOLOGY | Facility: HOSPITAL | Age: 60
Discharge: HOME | End: 2025-01-30
Attending: FAMILY MEDICINE
Payer: COMMERCIAL

## 2025-01-30 VITALS
SYSTOLIC BLOOD PRESSURE: 138 MMHG | OXYGEN SATURATION: 97 % | HEIGHT: 68 IN | WEIGHT: 280.44 LBS | HEART RATE: 73 BPM | BODY MASS INDEX: 42.5 KG/M2 | DIASTOLIC BLOOD PRESSURE: 88 MMHG | TEMPERATURE: 98 F | RESPIRATION RATE: 18 BRPM

## 2025-01-30 DIAGNOSIS — M25.551 RIGHT HIP PAIN: ICD-10-CM

## 2025-01-30 DIAGNOSIS — R73.03 PREDIABETES: ICD-10-CM

## 2025-01-30 DIAGNOSIS — I10 ESSENTIAL HYPERTENSION: ICD-10-CM

## 2025-01-30 DIAGNOSIS — K21.9 GASTROESOPHAGEAL REFLUX DISEASE, UNSPECIFIED WHETHER ESOPHAGITIS PRESENT: ICD-10-CM

## 2025-01-30 DIAGNOSIS — Z17.0 MALIGNANT NEOPLASM OF UPPER-OUTER QUADRANT OF RIGHT BREAST IN FEMALE, ESTROGEN RECEPTOR POSITIVE: ICD-10-CM

## 2025-01-30 DIAGNOSIS — E66.01 MORBID OBESITY WITH BMI OF 40.0-44.9, ADULT: ICD-10-CM

## 2025-01-30 DIAGNOSIS — E03.9 HYPOTHYROIDISM, UNSPECIFIED TYPE: Chronic | ICD-10-CM

## 2025-01-30 DIAGNOSIS — E88.810 INSULIN RESISTANCE SYNDROME: ICD-10-CM

## 2025-01-30 DIAGNOSIS — E55.9 VITAMIN D DEFICIENCY: ICD-10-CM

## 2025-01-30 DIAGNOSIS — J30.2 SEASONAL ALLERGIC RHINITIS, UNSPECIFIED TRIGGER: ICD-10-CM

## 2025-01-30 DIAGNOSIS — Z78.0 POSTMENOPAUSAL: ICD-10-CM

## 2025-01-30 DIAGNOSIS — C50.411 MALIGNANT NEOPLASM OF UPPER-OUTER QUADRANT OF RIGHT BREAST IN FEMALE, ESTROGEN RECEPTOR POSITIVE: ICD-10-CM

## 2025-01-30 DIAGNOSIS — Z00.00 ANNUAL PHYSICAL EXAM: Primary | ICD-10-CM

## 2025-01-30 PROCEDURE — 73502 X-RAY EXAM HIP UNI 2-3 VIEWS: CPT | Mod: 26,RT,, | Performed by: RADIOLOGY

## 2025-01-30 PROCEDURE — 73502 X-RAY EXAM HIP UNI 2-3 VIEWS: CPT | Mod: TC,FY,PO,RT

## 2025-01-30 RX ORDER — TIRZEPATIDE 2.5 MG/.5ML
2.5 INJECTION, SOLUTION SUBCUTANEOUS
Qty: 2 ML | Refills: 0 | Status: SHIPPED | OUTPATIENT
Start: 2025-01-30

## 2025-01-30 NOTE — PROGRESS NOTES
CHIEF COMPLAINT: Annual wellness examination.     HISTORY OF PRESENT ILLNESS: Ms. Urbano comes in today not fasting and with taking blood pressure medication for annual wellness examination.     END OF LIFE DECISION: She has no living will and is not sure about life support (depends on the situation).    Current Outpatient Medications   Medication Sig    acyclovir (ZOVIRAX) 400 MG tablet Take 1 tablet (400 mg total) by mouth 3 (three) times daily with meals.    ALPRAZolam (XANAX) 0.5 MG tablet Take 1 tablet (0.5 mg total) by mouth 2 (two) times daily as needed for Anxiety.    amLODIPine (NORVASC) 10 MG tablet Take 1 tablet by mouth once daily    anastrozole (ARIMIDEX) 1 mg Tab Take 1 tablet (1 mg total) by mouth once daily.    benazepriL (LOTENSIN) 20 MG tablet Take 1 tablet by mouth once daily    fluticasone propionate (FLONASE) 50 mcg/actuation nasal spray USE 2 SPRAY(S) IN EACH NOSTRIL ONCE DAILY AS NEEDED FOR  RHINITIS    gabapentin (NEURONTIN) 100 MG capsule Take 1 capsule (100 mg total) by mouth 2 (two) times daily.    ibuprofen (ADVIL,MOTRIN) 800 MG tablet Take 1 tab by mouth TID prn pain    metFORMIN (GLUCOPHAGE) 500 MG tablet TAKE 4 TABLETS BY MOUTH IN THE EVENING AS DIRECTED    metoprolol succinate (TOPROL-XL) 100 MG 24 hr tablet Take 1 tablet by mouth in the evening    omeprazole (PRILOSEC) 20 MG capsule Take 1 capsule (20 mg total) by mouth daily as needed.    SYNTHROID 175 mcg tablet Take 1 tablet by mouth once daily     SCREENINGS:  Cholesterol: January 9, 2024.  FFS/Colonoscopy: February 22, 2019 - benign colon polyp, diverticulosis, hemorrhoids - repeat in 5 years.  Mammogram: July 10, 2024 - benign   GYN Exam (breast only): July 10, 2024 with oncology. Pap smear no longer needed.   Dexa Scan: July 12, 2023 - okay.   Eye Exam: February 9, 2021 with Dr. Scherer.  PPD: Negative in the past.  Immunizations: Tdap - April 22, 2010. Td - November 17, 2020.  Gardasil - N./A.  Zostavax - N./A.  Shingrix -  "November 17, 2020, May 20, 2021.  Pneumovax - November 23, 2021.   Prevnar-13 shot - November 17, 2020.  Season[facility] December 28, 2023. [facility].  Covid-19 (Pfizer) vaccine series - March 3, 2021, March 24, 2021, December 7, 2021. December 2024 at [facility] per patient.     ROS:  GENERAL: Denies fever, chills, fatigue or unusual weight change. Appetite normal. Weight 129 kg (284 lb 6.3 oz) at January 9, 2024 visit. Not monitors diet or exercises. Reports interested in trying GLP-1 RA for weight management but insurance not covered We[employer].  SKIN: Denies rashes, itching, changes in mole, color or texture of skin or easy bruising.  HEAD: Denies recent head trauma or headaches.  EYES: Denies change in vision, diplopia, redness or discharge. Wears readers.  EARS: Denies ear pain, discharge, vertigo or decreased hearing.  NOSE: Reports occasional nasal congestion, post-nasal drip but not today.  MOUTH & THROAT: Denies hoarseness or change in voice. Denies excessive gum bleeding or mouth sores. Denies sore throat.  NODES: Denies swollen glands.  CHEST: Denies SHARIF, wheezing, cough, or sputum production.Saw JACQUES Ernst with pulmonary on January 24, 2023 for sleep-disordered breathing, sleep apnea, unspecified type, snoring, history of tachycardia, morbid obesity with BMI of 40.0-44.9, adult, essential hypertension.  CARDIOVASCULAR: Denies chest pain, PND, orthopnea or reduced exercise tolerance. Denies palpitations. Saw Dr. Hicks, cardiologist, on January 13, 2023 for hypertension follow up at which time Metoprolol succinate was increased to 100 mg daily with 9-month follow up advised. Reports not lately performs home blood pressure checks as levels are usually "good."  ABDOMEN: Denies diarrhea, constipation, nausea, vomiting, abdominal pain, or blood in stool. Saw CATRACHO Melvin with GI on January 31, 2023 for fatty liver, elevated liver enzymes.  URINARY: Denies flank pain, dysuria " "or hematuria.  GENITOURINARY: Denies flank pain, dysuria, frequency or hematuria. Performs monthly breast self exams. Saw NP Raquel Bergeron with  hematologist/oncologist on July 10, 2024 for surveillance of right breast cancer. She states she completed chemotherapy in September 2017 and completed radiation therapy in February 2018; she continues Arimidex daily therapy.  ENDOCRINE: Denies diabetes, cholesterol problems.   HEME/LYMPH: Denies bleeding problems. Reports does not take iron therapy.  PERIPHERAL VASCULAR:Denies claudication or cyanosis.  MUSCULOSKELETAL: Denies edema. Reports "aging" joint pain, stiffness but not today.  NEUROLOGIC: Denies history of seizures, tremors, paralysis, alteration of gait or coordination. Reports continues to have neuropathy in fingers/toes/bottom of feet s/p chemotherapy.Reports intermittent numbness with rare pain at right lateral this for 3 months; reports occasionally takes Ibuprofen with help.  PSYCHIATRIC: Denies mood swings, depression, anxiety, homicidal or suicidal thoughts. Denies sleep problems.    PE:   VS: /88 Comment: Rechecked by Dr. Parks.  Pulse 73   Temp 98.3 °F (36.8 °C) (Tympanic)   Resp 18   Ht 5' 8" (1.727 m)   Wt 127.2 kg (280 lb 6.8 oz)   LMP 01/01/2016 (LMP Unknown)   SpO2 97%   BMI 42.64 kg/m²   APPEARANCE: Well nourished, well developed female, obese and pleasant, alert and oriented in no acute distress.  HEAD: Non tender. Full range of motion.  EYES: PERRL, conjunctiva pink, lids no edema.  EARS: External canal patent, no swelling or redness. TM's shiny and clear.  NOSE: Mucosa and turbinates not congested. No discharge. Non tender sinuses.  THROAT: No pharyngeal erythema or exudate. No stridor.  NECK: Supple, no mass, thyroid not enlarged.  NODES: No cervical lymph node enlargement.  CHEST: Normal respiratory effort. Lungs clear to auscultation.  CARDIOVASCULAR: Normal S1, S2. No rubs, murmurs or gallops. PMI not displaced. No carotid " bruit. Pedal pulses palpable bilaterally. No edema.  ABDOMEN: Bowel sounds present. Not distended. Soft. No tenderness, masses or organomegaly.  BREAST EXAM: Not examined as already performed by oncologist.  PELVIC EXAM: Not examined as patient has had RAH/BSO due to non cancerous reasons.  RECTAL EXAM: Not examined per patient request.  MUSCULOSKELETAL: No joint deformities or stiffness. She is ambulatory without problems. Non tender right hip/thigh with full range of motion noted.  SKIN: No rashes or suspicious lesions, normal color and turgor.  NEUROLOGIC: Cranial Nerves: II-XII grossly intact. DTR's: Knees, Ankles 2+ and equal bilaterally. Gait & Posture: Normal gait and fine motion.  PSYCHIATRIC: Patient alert, oriented x 3. Mood/Affect normal without acute anxiety and depression noted. Judgment/insight good as she makes appropriate decisions during today's examination.    Protective Sensation (w/ 10 gram monofilament):  Right: Intact  Left: Intact    Visual Inspection:  Normal -  Bilateral    Pedal Pulses:   Right: Present  Left: Present    Posterior tibialis:   Right:Present  Left: Present    ASSESSMENT:    ICD-10-CM ICD-9-CM    1. Annual physical exam  Z00.00 V70.0 Lipid Panel      Comprehensive Metabolic Panel      CBC Auto Differential      TSH      Insulin, Random      Hemoglobin A1C      Vitamin D      Vitamin B12      2. Essential hypertension - stable on medication I10 401.9       3. Hypothyroidism, unspecified type - on medication E03.9 244.9       4. Insulin resistance syndrome - on medication E88.810 277.7       5. Prediabetes - on medication R73.03 790.29       6. Vitamin D deficiency  E55.9 268.9       7. Seasonal allergic rhinitis, unspecified trigger  J30.2 477.9       8. Gastroesophageal reflux disease, unspecified whether esophagitis present  K21.9 530.81       9. Malignant neoplasm of upper-outer quadrant of right breast in female, estrogen receptor positive - stable and managed by heme/onc  C50.411 174.4     Z17.0 V86.0       10. Right hip pain  M25.551 719.45 X-Ray Hip 2 or 3 views Right with Pelvis when performed      11. Morbid obesity with BMI of 40.0-44.9, adult  E66.01 278.01 tirzepatide, weight loss, (ZEPBOUND) 2.5 mg/0.5 mL PnIj    Z68.41 V85.41       12. Postmenopausal  Z78.0 V49.81           PLAN:  1. Age-appropriate counseling-appropriate low-sodium, low-cholesterol, low carbohydrate diet and exercise daily, monthly breast self exam, annual wellness examination.   2. Patient advised to call for results.  3. Continue current medications.  4. Keep follow up with specialists.  5. Prescription refill as noted above.  6. Try Zepbound 2.5 mg weekly as directed; mediation precautions discussed with patient.  If insurance-covered/affordable and tolerated, patient advised to call in 1 month for Zepound titration with 3-month surveillance appointment advised.   7. If vitamin D is low, can take OTC Vitamin D3 5000 units daily.  8. Follow up in about 26 weeks (around 7/31/2025) for hypertension and prediabetes follow up.

## 2025-02-03 ENCOUNTER — LAB VISIT (OUTPATIENT)
Dept: LAB | Facility: HOSPITAL | Age: 60
End: 2025-02-03
Attending: FAMILY MEDICINE
Payer: COMMERCIAL

## 2025-02-03 DIAGNOSIS — Z00.00 ANNUAL PHYSICAL EXAM: ICD-10-CM

## 2025-02-03 LAB
25(OH)D3+25(OH)D2 SERPL-MCNC: 27 NG/ML (ref 30–96)
ALBUMIN SERPL BCP-MCNC: 3.9 G/DL (ref 3.5–5.2)
ALP SERPL-CCNC: 115 U/L (ref 40–150)
ALT SERPL W/O P-5'-P-CCNC: 35 U/L (ref 10–44)
ANION GAP SERPL CALC-SCNC: 12 MMOL/L (ref 8–16)
AST SERPL-CCNC: 33 U/L (ref 10–40)
BASOPHILS # BLD AUTO: 0.05 K/UL (ref 0–0.2)
BASOPHILS NFR BLD: 0.7 % (ref 0–1.9)
BILIRUB SERPL-MCNC: 0.4 MG/DL (ref 0.1–1)
BUN SERPL-MCNC: 11 MG/DL (ref 6–20)
CALCIUM SERPL-MCNC: 10.1 MG/DL (ref 8.7–10.5)
CHLORIDE SERPL-SCNC: 106 MMOL/L (ref 95–110)
CHOLEST SERPL-MCNC: 171 MG/DL (ref 120–199)
CHOLEST/HDLC SERPL: 3.6 {RATIO} (ref 2–5)
CO2 SERPL-SCNC: 23 MMOL/L (ref 23–29)
CREAT SERPL-MCNC: 0.8 MG/DL (ref 0.5–1.4)
DIFFERENTIAL METHOD BLD: NORMAL
EOSINOPHIL # BLD AUTO: 0.2 K/UL (ref 0–0.5)
EOSINOPHIL NFR BLD: 2.3 % (ref 0–8)
ERYTHROCYTE [DISTWIDTH] IN BLOOD BY AUTOMATED COUNT: 13.8 % (ref 11.5–14.5)
EST. GFR  (NO RACE VARIABLE): >60 ML/MIN/1.73 M^2
ESTIMATED AVG GLUCOSE: 126 MG/DL (ref 68–131)
GLUCOSE SERPL-MCNC: 107 MG/DL (ref 70–110)
HBA1C MFR BLD: 6 % (ref 4–5.6)
HCT VFR BLD AUTO: 42.8 % (ref 37–48.5)
HDLC SERPL-MCNC: 48 MG/DL (ref 40–75)
HDLC SERPL: 28.1 % (ref 20–50)
HGB BLD-MCNC: 14 G/DL (ref 12–16)
IMM GRANULOCYTES # BLD AUTO: 0.02 K/UL (ref 0–0.04)
IMM GRANULOCYTES NFR BLD AUTO: 0.3 % (ref 0–0.5)
INSULIN COLLECTION INTERVAL: NORMAL
INSULIN SERPL-ACNC: 10 UU/ML
LDLC SERPL CALC-MCNC: 109.2 MG/DL (ref 63–159)
LYMPHOCYTES # BLD AUTO: 2.3 K/UL (ref 1–4.8)
LYMPHOCYTES NFR BLD: 30.3 % (ref 18–48)
MCH RBC QN AUTO: 28.1 PG (ref 27–31)
MCHC RBC AUTO-ENTMCNC: 32.7 G/DL (ref 32–36)
MCV RBC AUTO: 86 FL (ref 82–98)
MONOCYTES # BLD AUTO: 0.6 K/UL (ref 0.3–1)
MONOCYTES NFR BLD: 7.8 % (ref 4–15)
NEUTROPHILS # BLD AUTO: 4.4 K/UL (ref 1.8–7.7)
NEUTROPHILS NFR BLD: 58.6 % (ref 38–73)
NONHDLC SERPL-MCNC: 123 MG/DL
NRBC BLD-RTO: 0 /100 WBC
PLATELET # BLD AUTO: 332 K/UL (ref 150–450)
PMV BLD AUTO: 10.5 FL (ref 9.2–12.9)
POTASSIUM SERPL-SCNC: 4.1 MMOL/L (ref 3.5–5.1)
PROT SERPL-MCNC: 9 G/DL (ref 6–8.4)
RBC # BLD AUTO: 4.99 M/UL (ref 4–5.4)
SODIUM SERPL-SCNC: 141 MMOL/L (ref 136–145)
TRIGL SERPL-MCNC: 69 MG/DL (ref 30–150)
TSH SERPL DL<=0.005 MIU/L-ACNC: 2.42 UIU/ML (ref 0.4–4)
VIT B12 SERPL-MCNC: 634 PG/ML (ref 210–950)
WBC # BLD AUTO: 7.55 K/UL (ref 3.9–12.7)

## 2025-02-03 PROCEDURE — 85025 COMPLETE CBC W/AUTO DIFF WBC: CPT | Performed by: FAMILY MEDICINE

## 2025-02-03 PROCEDURE — 84443 ASSAY THYROID STIM HORMONE: CPT | Performed by: FAMILY MEDICINE

## 2025-02-03 PROCEDURE — 83525 ASSAY OF INSULIN: CPT | Performed by: FAMILY MEDICINE

## 2025-02-03 PROCEDURE — 82306 VITAMIN D 25 HYDROXY: CPT | Performed by: FAMILY MEDICINE

## 2025-02-03 PROCEDURE — 82607 VITAMIN B-12: CPT | Performed by: FAMILY MEDICINE

## 2025-02-03 PROCEDURE — 80061 LIPID PANEL: CPT | Performed by: FAMILY MEDICINE

## 2025-02-03 PROCEDURE — 80053 COMPREHEN METABOLIC PANEL: CPT | Performed by: FAMILY MEDICINE

## 2025-02-03 PROCEDURE — 83036 HEMOGLOBIN GLYCOSYLATED A1C: CPT | Performed by: FAMILY MEDICINE

## 2025-02-10 DIAGNOSIS — F41.9 ANXIETY: ICD-10-CM

## 2025-02-10 DIAGNOSIS — I10 ESSENTIAL HYPERTENSION: Chronic | ICD-10-CM

## 2025-02-11 DIAGNOSIS — R77.9 HIGH SERUM PROTEIN LEVEL: Primary | ICD-10-CM

## 2025-02-11 RX ORDER — BENAZEPRIL HYDROCHLORIDE 20 MG/1
20 TABLET ORAL DAILY
Qty: 90 TABLET | Refills: 3 | Status: SHIPPED | OUTPATIENT
Start: 2025-02-11

## 2025-02-11 RX ORDER — ALPRAZOLAM 0.5 MG/1
0.5 TABLET ORAL 2 TIMES DAILY PRN
Qty: 60 TABLET | Refills: 0 | Status: SHIPPED | OUTPATIENT
Start: 2025-02-11

## 2025-02-11 NOTE — TELEPHONE ENCOUNTER
Refill Decision Note   Kylie Urbano  is requesting a refill authorization.  Brief Assessment and Rationale for Refill:  Approve     Medication Therapy Plan:        Comments:     Note composed:8:47 AM 02/11/2025

## 2025-02-11 NOTE — TELEPHONE ENCOUNTER
No care due was identified.  [facility] Embedded Care Due Messages. Reference number: [id number].   2/10/2025 9:55:59 PM CST  
Please see the attached refill request.  LV: 1/30/25  NV: 8/12/25  
room air

## 2025-02-11 NOTE — TELEPHONE ENCOUNTER
No care due was identified.  [facility] Embedded Care Due Messages. Reference number: [id number].   2/10/2025 9:54:42 PM CST

## 2025-02-12 ENCOUNTER — TELEPHONE (OUTPATIENT)
Dept: FAMILY MEDICINE | Facility: CLINIC | Age: 60
End: 2025-02-12
Payer: COMMERCIAL

## 2025-02-12 NOTE — TELEPHONE ENCOUNTER
----- Message from Beverly Parks MD sent at 2/12/2025 12:06 PM CST -----  Contact: ARNEL NEWMAN [[id number]]  Fast is not needed  ----- Message -----  From: Basil Argueta MA  Sent: 2/12/2025  11:19 AM CST  To: Beverly Parks MD    Pt asked if she needs to fast for labs. Please advise.     Schedule for Monday.  ----- Message -----  From: Yaa Mendez  Sent: 2/12/2025  11:14 AM CST  To: Charly Ugalde Staff    .Type:  Patient Returning Call    Who Called:ARNEL NEWMAN [[id number]]  Who Left Message for Patient:Basil Argueta MA  Does the patient know what this is regarding?:No  Would the patient rather a call back or a response via [facility]? Call  Best Call Back Number:.[phone] ([place])   Additional Information: Patient would like all back regarding this situation.

## 2025-02-13 ENCOUNTER — TELEPHONE (OUTPATIENT)
Dept: FAMILY MEDICINE | Facility: CLINIC | Age: 60
End: 2025-02-13
Payer: COMMERCIAL

## 2025-02-13 NOTE — TELEPHONE ENCOUNTER
----- Message from Med Assistant Gracia sent at 2/12/2025 11:18 AM CST -----  Contact: ARNEL NEWMAN [[id number]]  Pt asked if she needs to fast for labs. Please advise.     Schedule for Monday.  ----- Message -----  From: Yaa Mendez  Sent: 2/12/2025  11:14 AM CST  To: Charly Ugalde Staff    .Type:  Patient Returning Call    Who Called:ARNEL NEWMAN [[id number]]  Who Left Message for Patient:Basil Argueta MA  Does the patient know what this is regarding?:No  Would the patient rather a call back or a response via [facility]? Call  Best Call Back Number:.[phone] (home)   Additional Information: Patient would like all back regarding this situation.

## 2025-02-13 NOTE — TELEPHONE ENCOUNTER
Patient contacted and informed of the following information from Dr. Parks. Patient does not need to fast for lab work. Patient verbally understood the information given.

## 2025-02-16 DIAGNOSIS — J30.2 SEASONAL ALLERGIC RHINITIS, UNSPECIFIED TRIGGER: ICD-10-CM

## 2025-02-16 NOTE — TELEPHONE ENCOUNTER
No care due was identified.  [facility] Embedded Care Due Messages. Reference number: [id number].   2/16/2025 1:37:23 PM CST

## 2025-02-17 ENCOUNTER — LAB VISIT (OUTPATIENT)
Dept: LAB | Facility: HOSPITAL | Age: 60
End: 2025-02-17
Attending: FAMILY MEDICINE
Payer: COMMERCIAL

## 2025-02-17 DIAGNOSIS — R73.03 PREDIABETES: ICD-10-CM

## 2025-02-17 DIAGNOSIS — I10 ESSENTIAL HYPERTENSION: ICD-10-CM

## 2025-02-17 DIAGNOSIS — R77.9 HIGH SERUM PROTEIN LEVEL: ICD-10-CM

## 2025-02-17 LAB
ALBUMIN SERPL BCP-MCNC: 3.7 G/DL (ref 3.5–5.2)
ALP SERPL-CCNC: 101 U/L (ref 40–150)
ALT SERPL W/O P-5'-P-CCNC: 39 U/L (ref 10–44)
ANION GAP SERPL CALC-SCNC: 13 MMOL/L (ref 8–16)
AST SERPL-CCNC: 51 U/L (ref 10–40)
BILIRUB SERPL-MCNC: 0.2 MG/DL (ref 0.1–1)
BUN SERPL-MCNC: 15 MG/DL (ref 6–20)
CALCIUM SERPL-MCNC: 9.8 MG/DL (ref 8.7–10.5)
CHLORIDE SERPL-SCNC: 107 MMOL/L (ref 95–110)
CO2 SERPL-SCNC: 21 MMOL/L (ref 23–29)
CREAT SERPL-MCNC: 0.8 MG/DL (ref 0.5–1.4)
CRP SERPL-MCNC: 3.8 MG/L (ref 0–8.2)
ERYTHROCYTE [SEDIMENTATION RATE] IN BLOOD BY WESTERGREN METHOD: 79 MM/HR (ref 0–36)
EST. GFR  (NO RACE VARIABLE): >60 ML/MIN/1.73 M^2
GLUCOSE SERPL-MCNC: 125 MG/DL (ref 70–110)
POTASSIUM SERPL-SCNC: 3.9 MMOL/L (ref 3.5–5.1)
PROT SERPL-MCNC: 7.9 G/DL (ref 6–8.4)
RHEUMATOID FACT SERPL-ACNC: <13 IU/ML (ref 0–15)
SODIUM SERPL-SCNC: 141 MMOL/L (ref 136–145)

## 2025-02-17 PROCEDURE — 36415 COLL VENOUS BLD VENIPUNCTURE: CPT | Mod: PO | Performed by: FAMILY MEDICINE

## 2025-02-17 PROCEDURE — 86038 ANTINUCLEAR ANTIBODIES: CPT | Performed by: FAMILY MEDICINE

## 2025-02-17 PROCEDURE — 80053 COMPREHEN METABOLIC PANEL: CPT | Performed by: FAMILY MEDICINE

## 2025-02-17 PROCEDURE — 84165 PROTEIN E-PHORESIS SERUM: CPT | Performed by: FAMILY MEDICINE

## 2025-02-17 PROCEDURE — 80074 ACUTE HEPATITIS PANEL: CPT | Performed by: FAMILY MEDICINE

## 2025-02-17 PROCEDURE — 86431 RHEUMATOID FACTOR QUANT: CPT | Performed by: FAMILY MEDICINE

## 2025-02-17 PROCEDURE — 85651 RBC SED RATE NONAUTOMATED: CPT | Performed by: FAMILY MEDICINE

## 2025-02-17 PROCEDURE — 86140 C-REACTIVE PROTEIN: CPT | Performed by: FAMILY MEDICINE

## 2025-02-17 PROCEDURE — 86200 CCP ANTIBODY: CPT | Performed by: FAMILY MEDICINE

## 2025-02-17 PROCEDURE — 83036 HEMOGLOBIN GLYCOSYLATED A1C: CPT | Performed by: FAMILY MEDICINE

## 2025-02-17 RX ORDER — FLUTICASONE PROPIONATE 50 MCG
SPRAY, SUSPENSION (ML) NASAL
Qty: 48 G | Refills: 3 | Status: SHIPPED | OUTPATIENT
Start: 2025-02-17

## 2025-02-17 NOTE — TELEPHONE ENCOUNTER
Refill Decision Note   Kylie Urbano  is requesting a refill authorization.  Brief Assessment and Rationale for Refill:  Approve     Medication Therapy Plan:         Alert overridden per protocol: Yes   Comments:     Note composed:8:40 AM 02/17/2025

## 2025-02-18 ENCOUNTER — TELEPHONE (OUTPATIENT)
Dept: FAMILY MEDICINE | Facility: CLINIC | Age: 60
End: 2025-02-18
Payer: COMMERCIAL

## 2025-02-18 DIAGNOSIS — R70.0 ELEVATED SED RATE: Primary | ICD-10-CM

## 2025-02-18 LAB
ALBUMIN SERPL ELPH-MCNC: 3.95 G/DL (ref 3.35–5.55)
ALPHA1 GLOB SERPL ELPH-MCNC: 0.23 G/DL (ref 0.17–0.41)
ALPHA2 GLOB SERPL ELPH-MCNC: 0.84 G/DL (ref 0.43–0.99)
ANA SER QL IF: NORMAL
B-GLOBULIN SERPL ELPH-MCNC: 0.94 G/DL (ref 0.5–1.1)
CCP AB SER IA-ACNC: 0.7 U/ML
ESTIMATED AVG GLUCOSE: 131 MG/DL (ref 68–131)
GAMMA GLOB SERPL ELPH-MCNC: 1.55 G/DL (ref 0.67–1.58)
HAV IGM SERPL QL IA: NORMAL
HBA1C MFR BLD: 6.2 % (ref 4–5.6)
HBV CORE IGM SERPL QL IA: NORMAL
HBV SURFACE AG SERPL QL IA: NORMAL
HCV AB SERPL QL IA: NORMAL
PROT SERPL-MCNC: 7.5 G/DL (ref 6–8.4)

## 2025-02-18 NOTE — TELEPHONE ENCOUNTER
----- Message from Ludmila sent at 2/18/2025  7:50 AM CST -----  .Type:  Test ResultsWho Called: .Kylie Urbano Name of Test (Lab/Mammo/Etc): labDate of Test: 02/17/2025Ordering Provider: CharlyWhere the test was performed: Darrenould the patient rather a call back or a response via [facility]? Call [facility] Call Back Number: .[phone]  Additional Information:

## 2025-02-18 NOTE — TELEPHONE ENCOUNTER
Pt is requesting to discuss lab results from 2/17. Advised pt that all results are not final yet. Please advise.

## 2025-02-19 ENCOUNTER — RESULTS FOLLOW-UP (OUTPATIENT)
Dept: FAMILY MEDICINE | Facility: CLINIC | Age: 60
End: 2025-02-19

## 2025-02-19 ENCOUNTER — LAB VISIT (OUTPATIENT)
Dept: LAB | Facility: HOSPITAL | Age: 60
End: 2025-02-19
Attending: FAMILY MEDICINE
Payer: COMMERCIAL

## 2025-02-19 DIAGNOSIS — E88.810 INSULIN RESISTANCE SYNDROME: ICD-10-CM

## 2025-02-19 DIAGNOSIS — R77.9 HIGH SERUM PROTEIN LEVEL: ICD-10-CM

## 2025-02-19 LAB
PATHOLOGIST INTERPRETATION SPE: NORMAL
PROT 24H UR-MRATE: NORMAL MG/SPEC (ref 0–100)
PROT UR-MCNC: <7 MG/DL (ref 0–15)
URINE COLLECTION DURATION: 24 HR
URINE VOLUME: 2390 ML

## 2025-02-19 PROCEDURE — 84166 PROTEIN E-PHORESIS/URINE/CSF: CPT | Performed by: FAMILY MEDICINE

## 2025-02-19 PROCEDURE — 84166 PROTEIN E-PHORESIS/URINE/CSF: CPT | Mod: 26,,, | Performed by: PATHOLOGY

## 2025-02-19 PROCEDURE — 84156 ASSAY OF PROTEIN URINE: CPT | Performed by: FAMILY MEDICINE

## 2025-02-19 RX ORDER — METFORMIN HYDROCHLORIDE 500 MG/1
TABLET ORAL
Qty: 360 TABLET | Refills: 1 | Status: SHIPPED | OUTPATIENT
Start: 2025-02-19

## 2025-02-19 NOTE — TELEPHONE ENCOUNTER
No care due was identified.  [facility] Embedded Care Due Messages. Reference number: [id number].   2/19/2025 11:28:45 AM CST

## 2025-02-19 NOTE — TELEPHONE ENCOUNTER
Refill Decision Note   Kylie Urbano  is requesting a refill authorization.  Brief Assessment and Rationale for Refill:  Approve     Medication Therapy Plan:         Comments:     Note composed:2:48 PM 02/19/2025

## 2025-02-19 NOTE — TELEPHONE ENCOUNTER
Pt advised of lab results information via telephone. Pt verbalized understanding. Pt advised that all results are still not final at this time. She states she has not seen a rheumatologist before. She says she wouldn't mind seeing one if that is what you suggest. Please advise.

## 2025-02-19 NOTE — TELEPHONE ENCOUNTER
Advise pt - So far, lab results are within acceptable range and show stable findings including stable prediabetes, borderline high liver enzyme, and high sed rate (inflammatory marker), which may be due arthritis. Urine is not yet ready.  Has she ever seen rheumatologist before? Thanks.

## 2025-02-20 LAB
PATHOLOGIST INTERPRETATION UPE: NORMAL
PROT PATTERN UR ELPH-IMP: NORMAL

## 2025-02-20 NOTE — TELEPHONE ENCOUNTER
Tell patient urine result looks okay.     I have put the following orders and/or medications to this note.  Please advise pt and assist.    Orders Placed This Encounter   Procedures    Ambulatory referral/consult to Rheumatology     Standing Status:   Future     Expected Date:   2/27/2025     Expiration Date:   3/20/2026     Referral Priority:   Routine     Referral Type:   Consultation     Referral Reason:   Specialty Services Required     Requested Specialty:   Rheumatology     Number of Visits Requested:   1

## 2025-03-13 ENCOUNTER — PATIENT MESSAGE (OUTPATIENT)
Dept: RHEUMATOLOGY | Facility: CLINIC | Age: 60
End: 2025-03-13
Payer: COMMERCIAL

## 2025-03-14 RX ORDER — LEVOTHYROXINE SODIUM 175 UG/1
175 TABLET ORAL
Qty: 90 TABLET | Refills: 3 | Status: SHIPPED | OUTPATIENT
Start: 2025-03-14

## 2025-03-14 NOTE — TELEPHONE ENCOUNTER
No care due was identified.  [facility] Embedded Care Due Messages. Reference number: [id number].   3/14/2025 5:34:28 AM CDT

## 2025-03-14 NOTE — TELEPHONE ENCOUNTER
Refill Decision Note   Kylie Urbano  is requesting a refill authorization.  Brief Assessment and Rationale for Refill:  Approve     Medication Therapy Plan:         Comments:     Note composed:10:27 AM 03/14/2025

## 2025-03-17 ENCOUNTER — TELEPHONE (OUTPATIENT)
Dept: SURGERY | Facility: CLINIC | Age: 60
End: 2025-03-17
Payer: COMMERCIAL

## 2025-03-17 NOTE — TELEPHONE ENCOUNTER
Call placed to pt.in ref to canceled appt w/ Raquel Mattoon. Pt verbalized understanding to her new appt details.

## 2025-03-23 DIAGNOSIS — F41.9 ANXIETY: ICD-10-CM

## 2025-03-23 DIAGNOSIS — E03.9 HYPOTHYROIDISM, UNSPECIFIED TYPE: Primary | Chronic | ICD-10-CM

## 2025-03-23 NOTE — TELEPHONE ENCOUNTER
No care due was identified.  [facility] Embedded Care Due Messages. Reference number: [id number].   3/23/2025 3:41:48 PM CDT

## 2025-04-01 RX ORDER — LEVOTHYROXINE SODIUM 175 UG/1
175 TABLET ORAL DAILY
Qty: 90 TABLET | Refills: 3 | Status: SHIPPED | OUTPATIENT
Start: 2025-04-01

## 2025-04-01 RX ORDER — ALPRAZOLAM 0.5 MG/1
0.5 TABLET ORAL 2 TIMES DAILY PRN
Qty: 60 TABLET | Refills: 0 | Status: SHIPPED | OUTPATIENT
Start: 2025-04-01

## 2025-04-23 ENCOUNTER — OFFICE VISIT (OUTPATIENT)
Dept: SURGERY | Facility: CLINIC | Age: 60
End: 2025-04-23
Payer: COMMERCIAL

## 2025-04-23 VITALS
SYSTOLIC BLOOD PRESSURE: 156 MMHG | DIASTOLIC BLOOD PRESSURE: 93 MMHG | WEIGHT: 278.25 LBS | BODY MASS INDEX: 42.3 KG/M2 | HEART RATE: 87 BPM

## 2025-04-23 DIAGNOSIS — N60.81 SEBACEOUS CYST OF SKIN OF RIGHT BREAST: ICD-10-CM

## 2025-04-23 DIAGNOSIS — Z17.0 MALIGNANT NEOPLASM OF UPPER-OUTER QUADRANT OF RIGHT BREAST IN FEMALE, ESTROGEN RECEPTOR POSITIVE: ICD-10-CM

## 2025-04-23 DIAGNOSIS — C50.411 MALIGNANT NEOPLASM OF UPPER-OUTER QUADRANT OF RIGHT BREAST IN FEMALE, ESTROGEN RECEPTOR POSITIVE: ICD-10-CM

## 2025-04-23 DIAGNOSIS — Z12.31 ENCOUNTER FOR SCREENING MAMMOGRAM FOR MALIGNANT NEOPLASM OF BREAST: Primary | ICD-10-CM

## 2025-04-23 DIAGNOSIS — T45.1X5A CHEMOTHERAPY-INDUCED NEUROPATHY: ICD-10-CM

## 2025-04-23 DIAGNOSIS — G62.0 CHEMOTHERAPY-INDUCED NEUROPATHY: ICD-10-CM

## 2025-04-23 PROCEDURE — 3077F SYST BP >= 140 MM HG: CPT | Mod: CPTII,S$GLB,,

## 2025-04-23 PROCEDURE — 3008F BODY MASS INDEX DOCD: CPT | Mod: CPTII,S$GLB,,

## 2025-04-23 PROCEDURE — 1159F MED LIST DOCD IN RCRD: CPT | Mod: CPTII,S$GLB,,

## 2025-04-23 PROCEDURE — 3080F DIAST BP >= 90 MM HG: CPT | Mod: CPTII,S$GLB,,

## 2025-04-23 PROCEDURE — 1160F RVW MEDS BY RX/DR IN RCRD: CPT | Mod: CPTII,S$GLB,,

## 2025-04-23 PROCEDURE — 3044F HG A1C LEVEL LT 7.0%: CPT | Mod: CPTII,S$GLB,,

## 2025-04-23 PROCEDURE — 4010F ACE/ARB THERAPY RXD/TAKEN: CPT | Mod: CPTII,S$GLB,,

## 2025-04-23 PROCEDURE — 99999 PR PBB SHADOW E&M-EST. PATIENT-LVL V: CPT | Mod: PBBFAC,,,

## 2025-04-23 PROCEDURE — 99214 OFFICE O/P EST MOD 30 MIN: CPT | Mod: S$GLB,,,

## 2025-04-23 NOTE — PROGRESS NOTES
[facility]  History & Physical    Subjective     History of Present Illness:  Patient is a 59 y.o. female who presents for continued surveillance after previous treatment of right breast infiltrating ductal carcinoma.  She is doing well today.  She does have a superficial cyst of her right breast that she we would like examined and possibly excised.  She denies any other masses, breast pain, axillary masses, nipple discharge, nipple dimpling.  She is overall feeling well today.    Primary Oncologist: Herbie Miramontes MD  Previous- Mason Martinez MD  Surgeon: Jet Mcclendon  Rad Onc: Arik Wynn     ER/MS positive, HER-2 negative infiltrating ductal carcinoma the right breast with biopsy proven lymph node involvement clinically stage II/III  [id number].  Pathology following neoadjuvant therapy demonstrated 1 sentinel lymph node with micrometastasis (3 mm) and second lymph node with micrometastasis (1 mm)  --BRCA testing via B-55 Study is negative for mutation  --ddAC--> taxol   cycle 1--- 6/12/2017  --Final cycle Cycle 4 on 9/19/2017  --Radiation completed on 2/27/2018  --Tamoxifen 20 mg by mouth daily started in 3/2018  --surveillance bilateral mammogram in  [facility]  --Transitioned to Arimidex 1 mg PO daily - to be completed in 3/2027    Chief Complaint   Patient presents with    Follow-up     Breast cancer surveillance       Review of patient's allergies indicates:   Allergen Reactions    Methylprednisone Anxiety and Palpitations    Amoxicillin Rash     Historical    Hydrocodone-acetaminophen Nausea And Vomiting     Historical.  Historical.    Sulfa (sulfonamide antibiotics) Rash       Current Medications[1]    Past Medical History:   Diagnosis Date    Allergic rhinitis, cause unspecified     Breast cancer 2017    Elevated liver function tests     in the past    Fatty liver 05/02/2017    on ultrasound    Hepatomegaly 05/02/2017    on ultrasound    History of sciatica     HSV infection     Type 1 and 2     HTN (hypertension)     Hypothyroidism     Insulin resistance     Iron deficiency anemia, unspecified     Obesity     PONV (postoperative nausea and vomiting)     Tension headache     Vitamin D deficiency      Past Surgical History:   Procedure Laterality Date    BREAST LUMPECTOMY Right 2017    BTL      COLONOSCOPY N/A 2/22/2019    Procedure: COLONOSCOPY;  Surgeon: Mariano Santamaria MD;  [facility];  Service: Endoscopy;  Laterality: N/A;    DILATION AND CURETTAGE OF UTERUS      x 1    HYSTERECTOMY      Laproscopic robot assissted with BSO     Family History   Problem Relation Name Age of Onset    Diabetes Mother      Hypertension Mother      Heart failure Mother      Glaucoma Mother      Cancer Father          Stomach cancer    Cancer Sister          Ovarian cancer    Ovarian cancer Sister      No Known Problems Daughter      No Known Problems Daughter      No Known Problems Son      Breast cancer Paternal Cousin      Stroke Neg Hx      Heart disease Neg Hx          MI/CAD    Thyroid cancer Neg Hx          MEN2     Social History[2]     Review of Systems:  Review of Systems   Constitutional:  Negative for chills, fatigue, fever and unexpected weight change.   Respiratory:  Negative for cough, shortness of breath, wheezing and stridor.    Cardiovascular:  Negative for chest pain, palpitations and leg swelling.   Gastrointestinal:  Negative for abdominal distention, abdominal pain, constipation, diarrhea, nausea and vomiting.   Genitourinary:  Negative for difficulty urinating, dysuria, frequency, hematuria and urgency.   Skin:  Negative for color change, pallor, rash and wound.        Subcutaneous mass of right breast and right abdominal   Hematological:  Does not bruise/bleed easily.          Objective     Vital Signs (Most Recent)  Pulse: 87 (04/23/25 0850)  BP: (!) 156/93 (04/23/25 0850)     126.2 kg (278 lb 3.5 oz)     Physical Exam:  Physical Exam  Vitals reviewed.   Constitutional:       General: She  is not in acute distress.     Appearance: She is well-developed. She is not toxic-appearing.   HENT:      Head: Normocephalic and atraumatic.      Right Ear: External ear normal.      Left Ear: External ear normal.      Nose: Nose normal.      Mouth/Throat:      Mouth: Mucous membranes are moist.   Eyes:      Extraocular Movements: Extraocular movements intact.      Conjunctiva/sclera: Conjunctivae normal.   Cardiovascular:      Rate and Rhythm: Normal rate.   Pulmonary:      Effort: Pulmonary effort is normal. No respiratory distress.   Chest:   Breasts:     Right: Skin change present. No swelling, bleeding, inverted nipple, mass, nipple discharge or tenderness.      Left: No swelling, bleeding, inverted nipple, mass, nipple discharge, skin change or tenderness.       Abdominal:       Musculoskeletal:      Cervical back: Neck supple.   Lymphadenopathy:      Head:      Right side of head: No submental, submandibular or occipital adenopathy.      Left side of head: No submental, submandibular or occipital adenopathy.      Cervical: No cervical adenopathy.      Upper Body:      Right upper body: No supraclavicular or axillary adenopathy.      Left upper body: No supraclavicular or axillary adenopathy.   Skin:     General: Skin is warm and dry.   Neurological:      Mental Status: She is alert and oriented to person, place, and time.   Psychiatric:         Behavior: Behavior normal.         Most recent imaging:  Mammogram 07/2024  Findings:  This procedure was performed using tomosynthesis. Computer-aided detection was utilized in the interpretation of this examination.     There are scattered areas of fibroglandular density. There is no evidence of suspicious masses, calcifications, or other abnormal findings.Benign right breast calcifications.      Impression:  Bilateral  There is no mammographic evidence of malignancy.     BI-RADS Category:   Overall: 2 - Benign        Recommendation:  Routine screening mammogram in  1 year is recommended.       Assessment and Plan   Kylie has a normal breast exam today. We discussed the possible signs and symptoms of breast cancer as lump, masses, new asymmetries, skin changes and nipple changes. She is encouraged to contact me if any new breast concerns arise.        Encouraged healthy diet and exercise to maintain healthy weight to reduce breast cancer risk. Discussed that the underlying cause of increased breast cancer risk in patients with excess body weight is excess estrogen produced and stored in excess adipose tissue.    The patient is due for her next breast imaging in July which will consist of bilateral mammogram.    The patient will return to clinic in July for breast examination in conjunction with imaging.  After that, she will follow up every 6 months until she is 5 years out from her breast cancer treatment.  We will be seeing her sooner than 6 months to try to have her only come twice a year, once in conjunction with her imaging.    The patient would like to have the sebaceous cyst of her right breast excised.  Discussed that this can be done in clinic under local anesthetic.  She is amenable to this.  We will get her scheduled for procedure visited in the near future.    All questions answered. The patient will call with any questions or concerns.      Angélica Harrington PA-C  [facility]    I spent a total of 30 minutes on the day of the visit.  This includes face to face time and non-face to face time preparing to see the patient (eg, review of tests), obtaining and/or reviewing separately obtained history, documenting clinical information in the electronic or other health record, independently interpreting results and communicating results to the patient/family/caregiver, or care coordinator.             [1]   Current Outpatient Medications   Medication Sig Dispense Refill    acyclovir (ZOVIRAX) 400 MG tablet Take 1 tablet (400 mg total) by mouth 3 (three) times  daily with meals. 270 tablet 0    ALPRAZolam (XANAX) 0.5 MG tablet Take 1 tablet (0.5 mg total) by mouth 2 (two) times daily as needed for Anxiety. 60 tablet 0    amLODIPine (NORVASC) 10 MG tablet Take 1 tablet by mouth once daily 90 tablet 1    anastrozole (ARIMIDEX) 1 mg Tab Take 1 tablet (1 mg total) by mouth once daily. 90 tablet 3    benazepriL (LOTENSIN) 20 MG tablet Take 1 tablet (20 mg total) by mouth once daily. 90 tablet 3    fluticasone propionate (FLONASE) 50 mcg/actuation nasal spray USE 2 SPRAY(S) IN EACH NOSTRIL ONCE DAILY AS NEEDED FOR  RHINITIS 48 g 3    gabapentin (NEURONTIN) 100 MG capsule Take 1 capsule (100 mg total) by mouth 2 (two) times daily. 60 capsule 11    ibuprofen (ADVIL,MOTRIN) 800 MG tablet Take 1 tab by mouth TID prn pain 90 tablet 0    metFORMIN (GLUCOPHAGE) 500 MG tablet TAKE 4 TABLETS BY MOUTH IN THE EVENING AS DIRECTED 360 tablet 1    metoprolol succinate (TOPROL-XL) 100 MG 24 hr tablet Take 1 tablet by mouth in the evening 90 tablet 3    omeprazole (PRILOSEC) 20 MG capsule Take 1 capsule (20 mg total) by mouth daily as needed. 90 capsule 0    SYNTHROID 175 mcg tablet Take 1 tablet (175 mcg total) by mouth once daily. 90 tablet 3    tirzepatide, weight loss, (ZEPBOUND) 2.5 mg/0.5 mL PnIj Inject 2.5 mg into the skin every 7 days. 2 mL 0     No current facility-administered medications for this visit.   [2]   Social History  Tobacco Use    Smoking status: Never    Smokeless tobacco: Never   Substance Use Topics    Alcohol use: Yes     Comment: occasionally     Drug use: No

## 2025-05-14 ENCOUNTER — OFFICE VISIT (OUTPATIENT)
Dept: SURGERY | Facility: CLINIC | Age: 60
End: 2025-05-14
Payer: COMMERCIAL

## 2025-05-14 VITALS — WEIGHT: 284.19 LBS | HEIGHT: 68 IN | BODY MASS INDEX: 43.07 KG/M2

## 2025-05-14 DIAGNOSIS — N60.81 SEBACEOUS CYST OF SKIN OF RIGHT BREAST: Primary | ICD-10-CM

## 2025-05-14 DIAGNOSIS — L72.3 SEBACEOUS CYST: ICD-10-CM

## 2025-05-14 PROCEDURE — 99999 PR PBB SHADOW E&M-EST. PATIENT-LVL IV: CPT | Mod: PBBFAC,,,

## 2025-05-14 NOTE — PROCEDURES
Exc, Cyst    Date/Time: 5/14/2025 3:30 PM    Performed by: Angélica Harrington PA-C  Authorized by: Angélica Harrington PA-C    Consent Done?:  Yes (Verbal)  Timeout: prior to procedure the correct patient, procedure, and site was verified    Prep: patient was prepped and draped in usual sterile fashion    Local anesthesia used?: Yes    Anesthesia:  Local infiltration  Local anesthetic:  Lidocaine 1% with epinephrine  Anesthetic total (ml):  10  Assistants?: No    Indications:  Cyst  Body area:  Abdomen  Laterality:  Right  Position:  Supine  Anesthesia:  Local infiltration  Local anesthetic:  Lidocaine 1% with epinephrine  Excision type:  Tumor  Excision depth:  Subcutaneous  Excision size (cm):  1  Scalpel size:  15  Incision type:  Elliptical  Specimens?: No     Hemostasis was obtained.  Wound closure:  Intermediate layered  Wound repair size (cm):  1.5  Sutures:  3-0 Vicryl and other (comments)  Post-op diagnosis:  Same as pre-op diagnosis.   Patient tolerated the procedure well with no immediate complications.   Patient was discharged and will follow up for wound check.  Comments:      Angélica Harrington PA-C  [facility]

## 2025-05-14 NOTE — PROCEDURES
Exc, Cyst    Date/Time: 5/14/2025 3:30 PM    Performed by: Angélica Harrington PA-C  Authorized by: Angélica Harrington PA-C    Consent Done?:  Yes (Written)  Timeout: prior to procedure the correct patient, procedure, and site was verified    Prep: patient was prepped and draped in usual sterile fashion    Local anesthesia used?: Yes    Anesthesia:  Local infiltration  Local anesthetic:  Lidocaine 1% with epinephrine  Anesthetic total (ml):  10  Assistants?: No     I was present for the entire procedure.  Indications:  Cyst (Right breast)  Body area:  Right breast  Laterality:  Right  Position:  Supine  Anesthesia:  Local infiltration  Local anesthetic:  Lidocaine 1% with epinephrine  Excision type:  Tumor  Excision depth:  Subcutaneous  Excision size (cm):  1.5  Scalpel size:  15  Incision type:  Elliptical  Specimens?: No     Hemostasis was obtained.  Estimated blood loss (cc):  3  Wound closure:  Intermediate layered  Wound repair size (cm):  2  Sutures:  3-0 Vicryl and 4-0 Monocryl  Dressings: Dermabond.  Post-op diagnosis:  Same as pre-op diagnosis.   Patient tolerated the procedure well with no immediate complications.   Patient was discharged and will follow up for wound check.  Comments:      Angélica Harrington PA-C  [facility]

## 2025-05-21 ENCOUNTER — TELEPHONE (OUTPATIENT)
Dept: HEMATOLOGY/ONCOLOGY | Facility: CLINIC | Age: 60
End: 2025-05-21
Payer: COMMERCIAL

## 2025-05-21 NOTE — TELEPHONE ENCOUNTER
Contacted pt in regards to appt that was scheduled for today at 3:40. Pt stated she was unaware of appt. R/s to next avail 6/5 7:20 @ TG. Pt verbalized understanding and confirmed appt. Pt requested to change [employer] alert notification d/t change of phone #. Updated notifications to ensure pt will receive texts for appt reminders. Call ended well.

## 2025-05-28 ENCOUNTER — OFFICE VISIT (OUTPATIENT)
Dept: OPHTHALMOLOGY | Facility: CLINIC | Age: 60
End: 2025-05-28
Payer: COMMERCIAL

## 2025-05-28 ENCOUNTER — OFFICE VISIT (OUTPATIENT)
Dept: SURGERY | Facility: CLINIC | Age: 60
End: 2025-05-28
Payer: COMMERCIAL

## 2025-05-28 ENCOUNTER — TELEPHONE (OUTPATIENT)
Dept: OPHTHALMOLOGY | Facility: CLINIC | Age: 60
End: 2025-05-28
Payer: COMMERCIAL

## 2025-05-28 VITALS
BODY MASS INDEX: 42.97 KG/M2 | WEIGHT: 282.63 LBS | DIASTOLIC BLOOD PRESSURE: 93 MMHG | SYSTOLIC BLOOD PRESSURE: 158 MMHG | HEART RATE: 85 BPM

## 2025-05-28 DIAGNOSIS — H40.033 ANATOMICAL NARROW ANGLE BORDERLINE GLAUCOMA OF BOTH EYES: Primary | ICD-10-CM

## 2025-05-28 DIAGNOSIS — R73.03 PREDIABETES: ICD-10-CM

## 2025-05-28 DIAGNOSIS — Q75.2 OCULAR HYPERTELORISM: ICD-10-CM

## 2025-05-28 DIAGNOSIS — H52.03 HYPEROPIA WITH PRESBYOPIA, BILATERAL: ICD-10-CM

## 2025-05-28 DIAGNOSIS — H52.4 HYPEROPIA WITH PRESBYOPIA, BILATERAL: ICD-10-CM

## 2025-05-28 DIAGNOSIS — L72.3 SEBACEOUS CYST: ICD-10-CM

## 2025-05-28 DIAGNOSIS — H52.222 REGULAR ASTIGMATISM OF LEFT EYE: ICD-10-CM

## 2025-05-28 DIAGNOSIS — H35.363 FAMILIAL DRUSEN OF BOTH EYES: ICD-10-CM

## 2025-05-28 DIAGNOSIS — N60.81 SEBACEOUS CYST OF SKIN OF RIGHT BREAST: Primary | ICD-10-CM

## 2025-05-28 PROCEDURE — 3044F HG A1C LEVEL LT 7.0%: CPT | Mod: CPTII,S$GLB,,

## 2025-05-28 PROCEDURE — 3080F DIAST BP >= 90 MM HG: CPT | Mod: CPTII,S$GLB,,

## 2025-05-28 PROCEDURE — 92134 CPTRZ OPH DX IMG PST SGM RTA: CPT | Mod: S$GLB,,, | Performed by: OPTOMETRIST

## 2025-05-28 PROCEDURE — 92015 DETERMINE REFRACTIVE STATE: CPT | Mod: S$GLB,,, | Performed by: OPTOMETRIST

## 2025-05-28 PROCEDURE — 4010F ACE/ARB THERAPY RXD/TAKEN: CPT | Mod: CPTII,S$GLB,, | Performed by: OPTOMETRIST

## 2025-05-28 PROCEDURE — 99204 OFFICE O/P NEW MOD 45 MIN: CPT | Mod: S$GLB,,, | Performed by: OPTOMETRIST

## 2025-05-28 PROCEDURE — 4010F ACE/ARB THERAPY RXD/TAKEN: CPT | Mod: CPTII,S$GLB,,

## 2025-05-28 PROCEDURE — 3044F HG A1C LEVEL LT 7.0%: CPT | Mod: CPTII,S$GLB,, | Performed by: OPTOMETRIST

## 2025-05-28 PROCEDURE — 99999 PR PBB SHADOW E&M-EST. PATIENT-LVL III: CPT | Mod: PBBFAC,,,

## 2025-05-28 PROCEDURE — 1159F MED LIST DOCD IN RCRD: CPT | Mod: CPTII,S$GLB,,

## 2025-05-28 PROCEDURE — 99024 POSTOP FOLLOW-UP VISIT: CPT | Mod: S$GLB,,,

## 2025-05-28 PROCEDURE — 1160F RVW MEDS BY RX/DR IN RCRD: CPT | Mod: CPTII,S$GLB,,

## 2025-05-28 PROCEDURE — 3077F SYST BP >= 140 MM HG: CPT | Mod: CPTII,S$GLB,,

## 2025-05-28 PROCEDURE — 99999 PR PBB SHADOW E&M-EST. PATIENT-LVL III: CPT | Mod: PBBFAC,,, | Performed by: OPTOMETRIST

## 2025-05-28 NOTE — PROGRESS NOTES
HPI     Blurred Vision            Comments: Patient lost specs a couple days ago she states vision blurred   and feels eye strain without her specs.           Comments    1.Hyperopia ou            Last edited by Janeth Friedman on 5/28/2025  7:59 AM.            Assessment /Plan     For exam results, see Encounter Report.    Anatomical narrow angle borderline glaucoma of both eyes  Ocular hypertension  -     Ambulatory referral/consult to Ophthalmology; Future; Expected date: 06/28/2025    Elevated IOP today OU with narrow angles  Stable gOCT today with no evidence of NFL loss  Will hold off on gtts for now    Discussed LPI vs phaco  consult Dr. Masters    Prediabetes  There was no diabetic retinopathy present in either eye today.   Recommended that pt continue care with PCP and/or specialists regarding prediabetes.  Follow-up dilated eye exam recommended in 12 months, sooner with any vision changes or new concerns.    Familial drusen of both eyes  -     Posterior Segment OCT Retina-Both eyes  OS>OD  AREDS2 vitamins recommended  Monitor 12 months      Hyperopia with presbyopia, bilateral  Regular astigmatism of left eye  Eyeglass Final Rx       Eyeglass Final Rx         Sphere Cylinder Axis Add    Right +1.75 Sphere  +2.25    Left +1.75 +0.50 150 +2.25      Expiration Date: 5/28/2026                      RTC next available within 4 weeks for Narrow Angle Eval with Dr. Masters or PRN.   Discussed above and all questions were answered.

## 2025-05-28 NOTE — PROGRESS NOTES
History & Physical    Subjective     History of Present Illness:  Patient is a 59 y.o. female presents for wound check after sebaceous cyst excision x2. She is doing well and offers no complaints.     Chief Complaint   Patient presents with    Wound Check     S/p sebaceous cyst excision       Review of patient's allergies indicates:   Allergen Reactions    Methylprednisone Anxiety and Palpitations    Amoxicillin Rash     Historical    Hydrocodone-acetaminophen Nausea And Vomiting     Historical.  Historical.    Sulfa (sulfonamide antibiotics) Rash       Current Medications[1]    Past Medical History:   Diagnosis Date    Allergic rhinitis, cause unspecified     Breast cancer 2017    Elevated liver function tests     in the past    Fatty liver 05/02/2017    on ultrasound    Hepatomegaly 05/02/2017    on ultrasound    History of sciatica     HSV infection     Type 1 and 2    HTN (hypertension)     Hypothyroidism     Insulin resistance     Iron deficiency anemia, unspecified     Obesity     PONV (postoperative nausea and vomiting)     Tension headache     Vitamin D deficiency      Past Surgical History:   Procedure Laterality Date    BREAST LUMPECTOMY Right 2017    BTL      COLONOSCOPY N/A 2/22/2019    Procedure: COLONOSCOPY;  Surgeon: Mariano Santamaria MD;  Location: [facility];  Service: Endoscopy;  Laterality: N/A;    DILATION AND CURETTAGE OF UTERUS      x 1    HYSTERECTOMY      Laproscopic robot assissted with BSO     Family History   Problem Relation Name Age of Onset    Diabetes Mother      Hypertension Mother      Heart failure Mother      Glaucoma Mother      Other (Glaucoma) Mother      Cancer Father          Stomach cancer    Cancer Sister          Ovarian cancer    Ovarian cancer Sister      No Known Problems Daughter      No Known Problems Daughter      No Known Problems Son      Breast cancer Paternal Cousin      Stroke Neg Hx      Heart disease Neg Hx          MI/CAD    Thyroid cancer Neg Hx          MEN2      Social History[2]     Review of Systems:  Review of Systems   Constitutional:  Negative for chills and fever.   Skin:  Negative for color change and wound.          Objective     Vital Signs (Most Recent)  Pulse: 85 (05/28/25 0911)  BP: (!) 158/93 (05/28/25 0911)     128.2 kg (282 lb 10.1 oz)     Physical Exam:  Physical Exam  Vitals reviewed.   Constitutional:       General: She is not in acute distress.     Appearance: She is well-developed. She is not toxic-appearing.   HENT:      Head: Normocephalic and atraumatic.      Right Ear: External ear normal.      Left Ear: External ear normal.      Nose: Nose normal.      Mouth/Throat:      Mouth: Mucous membranes are moist.   Eyes:      Extraocular Movements: Extraocular movements intact.      Conjunctiva/sclera: Conjunctivae normal.   Cardiovascular:      Rate and Rhythm: Normal rate.   Pulmonary:      Effort: Pulmonary effort is normal. No respiratory distress.   Musculoskeletal:      Cervical back: Neck supple.   Skin:     General: Skin is warm and dry.          Neurological:      Mental Status: She is alert and oriented to person, place, and time.   Psychiatric:         Behavior: Behavior normal.            Assessment and Plan   60 y/o female s/p sebaceous cyst excision who has recovered well.    - No further restrictions or interventions  - RTC as scheduled for breast exam      Angélica Harrington PA-C  [facility]         [1]   Current Outpatient Medications   Medication Sig Dispense Refill    acyclovir (ZOVIRAX) 400 MG tablet Take 1 tablet (400 mg total) by mouth 3 (three) times daily with meals. 270 tablet 0    ALPRAZolam (XANAX) 0.5 MG tablet Take 1 tablet (0.5 mg total) by mouth 2 (two) times daily as needed for Anxiety. 60 tablet 0    amLODIPine (NORVASC) 10 MG tablet Take 1 tablet by mouth once daily 90 tablet 1    anastrozole (ARIMIDEX) 1 mg Tab Take 1 tablet (1 mg total) by mouth once daily. 90 tablet 3    benazepriL (LOTENSIN) 20 MG tablet  Take 1 tablet (20 mg total) by mouth once daily. 90 tablet 3    fluticasone propionate (FLONASE) 50 mcg/actuation nasal spray USE 2 SPRAY(S) IN EACH NOSTRIL ONCE DAILY AS NEEDED FOR  RHINITIS 48 g 3    gabapentin (NEURONTIN) 100 MG capsule Take 1 capsule (100 mg total) by mouth 2 (two) times daily. 60 capsule 11    ibuprofen (ADVIL,MOTRIN) 800 MG tablet Take 1 tab by mouth TID prn pain 90 tablet 0    metFORMIN (GLUCOPHAGE) 500 MG tablet TAKE 4 TABLETS BY MOUTH IN THE EVENING AS DIRECTED 360 tablet 1    metoprolol succinate (TOPROL-XL) 100 MG 24 hr tablet Take 1 tablet by mouth in the evening 90 tablet 3    omeprazole (PRILOSEC) 20 MG capsule Take 1 capsule (20 mg total) by mouth daily as needed. 90 capsule 0    SYNTHROID 175 mcg tablet Take 1 tablet (175 mcg total) by mouth once daily. 90 tablet 3    tirzepatide, weight loss, (ZEPBOUND) 2.5 mg/0.5 mL PnIj Inject 2.5 mg into the skin every 7 days. 2 mL 0     No current facility-administered medications for this visit.   [2]   Social History  Tobacco Use    Smoking status: Never    Smokeless tobacco: Never   Substance Use Topics    Alcohol use: Yes     Comment: occasionally     Drug use: No

## 2025-05-28 NOTE — TELEPHONE ENCOUNTER
Called patient to let her know Nancy will call her to get her scheduled when she returns back to the office but no answer.      ----- Message from Елена sent at 5/28/2025  2:49 PM CDT -----  Referral in system for  4-6 weeks for Narrow Angle Eval with Dr. Masters pls contact patient to work in or advise work in slot [phone]

## 2025-06-02 DIAGNOSIS — F41.9 ANXIETY: ICD-10-CM

## 2025-06-03 ENCOUNTER — TELEPHONE (OUTPATIENT)
Dept: SURGERY | Facility: CLINIC | Age: 60
End: 2025-06-03
Payer: COMMERCIAL

## 2025-06-03 ENCOUNTER — DOCUMENTATION ONLY (OUTPATIENT)
Dept: HEMATOLOGY/ONCOLOGY | Facility: CLINIC | Age: 60
End: 2025-06-03
Payer: COMMERCIAL

## 2025-06-05 ENCOUNTER — PATIENT MESSAGE (OUTPATIENT)
Dept: SURGERY | Facility: CLINIC | Age: 60
End: 2025-06-05
Payer: COMMERCIAL

## 2025-06-06 RX ORDER — ALPRAZOLAM 0.5 MG/1
0.5 TABLET ORAL 2 TIMES DAILY PRN
Qty: 60 TABLET | Refills: 0 | Status: SHIPPED | OUTPATIENT
Start: 2025-06-06

## 2025-06-18 DIAGNOSIS — M54.50 LUMBAR PAIN WITH RADIATION DOWN RIGHT LEG: ICD-10-CM

## 2025-06-18 DIAGNOSIS — M79.604 LUMBAR PAIN WITH RADIATION DOWN RIGHT LEG: ICD-10-CM

## 2025-06-18 RX ORDER — IBUPROFEN 800 MG/1
TABLET, FILM COATED ORAL
Qty: 90 TABLET | Refills: 0 | Status: SHIPPED | OUTPATIENT
Start: 2025-06-18

## 2025-06-18 NOTE — TELEPHONE ENCOUNTER
No care due was identified.  [facility] Embedded Care Due Messages. Reference number: [id number].   6/18/2025 3:38:44 PM CDT

## 2025-06-18 NOTE — TELEPHONE ENCOUNTER
Refill Routing Note   Medication(s) are not appropriate for processing by [facility] for the following reason(s):        Outside of protocol: Ibuprofen    [facility] action(s):  Route             Appointments  past 12m or future 3m with PCP    Date Provider   Last Visit   1/30/2025 Beverly Parks MD   Next Visit   8/12/2025 Beverly Parks MD   ED visits in past 90 days: 0        Note composed:3:48 PM 06/18/2025

## 2025-06-23 DIAGNOSIS — Z17.0 MALIGNANT NEOPLASM OF UPPER-OUTER QUADRANT OF RIGHT BREAST IN FEMALE, ESTROGEN RECEPTOR POSITIVE: ICD-10-CM

## 2025-06-23 DIAGNOSIS — C50.411 MALIGNANT NEOPLASM OF UPPER-OUTER QUADRANT OF RIGHT BREAST IN FEMALE, ESTROGEN RECEPTOR POSITIVE: ICD-10-CM

## 2025-06-23 RX ORDER — ANASTROZOLE 1 MG/1
1 TABLET ORAL DAILY
Qty: 90 TABLET | Refills: 3 | Status: SHIPPED | OUTPATIENT
Start: 2025-06-23 | End: 2025-06-24 | Stop reason: SDUPTHER

## 2025-06-23 NOTE — TELEPHONE ENCOUNTER
Received call from pt in regards to need med refill for Anastrozole. Pt voiced that she was following up with Raquel Bergeron NP. Pt is now following up with gen surg for mammo but needs an appt with hem onc provider for med refill. Informed pt that I would send a med refill request to Dr. Miramontes but she will need to r/s f/u appt. Appt scheduled for 6/24 at 840 via vv. Pt verbalized understanding and confirmed appt.

## 2025-06-24 ENCOUNTER — OFFICE VISIT (OUTPATIENT)
Dept: OPHTHALMOLOGY | Facility: CLINIC | Age: 60
End: 2025-06-24
Payer: COMMERCIAL

## 2025-06-24 ENCOUNTER — DOCUMENTATION ONLY (OUTPATIENT)
Dept: OPHTHALMOLOGY | Facility: CLINIC | Age: 60
End: 2025-06-24
Payer: COMMERCIAL

## 2025-06-24 ENCOUNTER — OFFICE VISIT (OUTPATIENT)
Dept: HEMATOLOGY/ONCOLOGY | Facility: CLINIC | Age: 60
End: 2025-06-24
Payer: COMMERCIAL

## 2025-06-24 DIAGNOSIS — Z17.0 MALIGNANT NEOPLASM OF UPPER-OUTER QUADRANT OF RIGHT BREAST IN FEMALE, ESTROGEN RECEPTOR POSITIVE: Primary | ICD-10-CM

## 2025-06-24 DIAGNOSIS — H40.051 OCULAR HYPERTENSION OF RIGHT EYE: ICD-10-CM

## 2025-06-24 DIAGNOSIS — C50.411 MALIGNANT NEOPLASM OF UPPER-OUTER QUADRANT OF RIGHT BREAST IN FEMALE, ESTROGEN RECEPTOR POSITIVE: Primary | ICD-10-CM

## 2025-06-24 DIAGNOSIS — Z79.811 AROMATASE INHIBITOR USE: ICD-10-CM

## 2025-06-24 DIAGNOSIS — H35.3132 NONEXUDATIVE AGE-RELATED MACULAR DEGENERATION, BILATERAL, INTERMEDIATE DRY STAGE: ICD-10-CM

## 2025-06-24 DIAGNOSIS — H25.12 NUCLEAR SCLEROSIS OF LEFT EYE: ICD-10-CM

## 2025-06-24 DIAGNOSIS — H40.053 OCULAR HYPERTENSION, BILATERAL: ICD-10-CM

## 2025-06-24 DIAGNOSIS — H25.11 NUCLEAR SCLEROSIS OF RIGHT EYE: ICD-10-CM

## 2025-06-24 DIAGNOSIS — I10 ESSENTIAL HYPERTENSION: ICD-10-CM

## 2025-06-24 DIAGNOSIS — H40.033 ANATOMICAL NARROW ANGLE OF BOTH EYES: Primary | ICD-10-CM

## 2025-06-24 PROCEDURE — 98005 SYNCH AUDIO-VIDEO EST LOW 20: CPT | Mod: 95,,, | Performed by: INTERNAL MEDICINE

## 2025-06-24 PROCEDURE — 92134 CPTRZ OPH DX IMG PST SGM RTA: CPT | Mod: S$GLB,,, | Performed by: STUDENT IN AN ORGANIZED HEALTH CARE EDUCATION/TRAINING PROGRAM

## 2025-06-24 PROCEDURE — 92136 OPHTHALMIC BIOMETRY: CPT | Mod: RT,S$GLB,, | Performed by: STUDENT IN AN ORGANIZED HEALTH CARE EDUCATION/TRAINING PROGRAM

## 2025-06-24 PROCEDURE — 1160F RVW MEDS BY RX/DR IN RCRD: CPT | Mod: CPTII,S$GLB,, | Performed by: STUDENT IN AN ORGANIZED HEALTH CARE EDUCATION/TRAINING PROGRAM

## 2025-06-24 PROCEDURE — 99204 OFFICE O/P NEW MOD 45 MIN: CPT | Mod: S$GLB,,, | Performed by: STUDENT IN AN ORGANIZED HEALTH CARE EDUCATION/TRAINING PROGRAM

## 2025-06-24 PROCEDURE — 1159F MED LIST DOCD IN RCRD: CPT | Mod: CPTII,S$GLB,, | Performed by: STUDENT IN AN ORGANIZED HEALTH CARE EDUCATION/TRAINING PROGRAM

## 2025-06-24 PROCEDURE — 3044F HG A1C LEVEL LT 7.0%: CPT | Mod: CPTII,S$GLB,, | Performed by: STUDENT IN AN ORGANIZED HEALTH CARE EDUCATION/TRAINING PROGRAM

## 2025-06-24 PROCEDURE — 4010F ACE/ARB THERAPY RXD/TAKEN: CPT | Mod: CPTII,S$GLB,, | Performed by: STUDENT IN AN ORGANIZED HEALTH CARE EDUCATION/TRAINING PROGRAM

## 2025-06-24 PROCEDURE — 4010F ACE/ARB THERAPY RXD/TAKEN: CPT | Mod: CPTII,95,, | Performed by: INTERNAL MEDICINE

## 2025-06-24 PROCEDURE — 99999 PR PBB SHADOW E&M-EST. PATIENT-LVL III: CPT | Mod: PBBFAC,,, | Performed by: STUDENT IN AN ORGANIZED HEALTH CARE EDUCATION/TRAINING PROGRAM

## 2025-06-24 PROCEDURE — 3044F HG A1C LEVEL LT 7.0%: CPT | Mod: CPTII,95,, | Performed by: INTERNAL MEDICINE

## 2025-06-24 RX ORDER — ANASTROZOLE 1 MG/1
1 TABLET ORAL DAILY
Qty: 90 TABLET | Refills: 3 | Status: SHIPPED | OUTPATIENT
Start: 2025-06-24

## 2025-06-24 RX ORDER — PREDNISOLONE ACETATE 10 MG/ML
1 SUSPENSION/ DROPS OPHTHALMIC EVERY 4 HOURS
Qty: 5 ML | Refills: 1 | Status: SHIPPED | OUTPATIENT
Start: 2025-06-24 | End: 2026-06-24

## 2025-06-24 NOTE — PROGRESS NOTES
Short Stay Record    Diagnosis: Nuclear Sclerotic Cataract right, Anatomically Narrow Angle right, and Ocular Hypertension right    CC: Blurry Vision     HPI:  Kylie Urbano is a 59 y.o. female who presents for evaluation prior to ophthalmic surgery. No current complaints.     Past Medical History:   Diagnosis Date    Allergic rhinitis, cause unspecified     Breast cancer 2017    Elevated liver function tests     in the past    Fatty liver 05/02/2017    on ultrasound    Hepatomegaly 05/02/2017    on ultrasound    History of sciatica     HSV infection     Type 1 and 2    HTN (hypertension)     Hypothyroidism     Insulin resistance     Iron deficiency anemia, unspecified     Obesity     PONV (postoperative nausea and vomiting)     Tension headache     Vitamin D deficiency      Past Surgical History:   Procedure Laterality Date    BREAST LUMPECTOMY Right 2017    BTL      COLONOSCOPY N/A 2/22/2019    Procedure: COLONOSCOPY;  Surgeon: Mariano Santamaria MD;  Location: [facility];  Service: Endoscopy;  Laterality: N/A;    DILATION AND CURETTAGE OF UTERUS      x 1    HYSTERECTOMY      Laproscopic robot assissted with BSO     Social History     Tobacco Use    Smoking status: Never    Smokeless tobacco: Never   Substance Use Topics    Alcohol use: Yes     Comment: occasionally      Family History   Problem Relation Name Age of Onset    Diabetes Mother      Hypertension Mother      Heart failure Mother      Glaucoma Mother      Other (Glaucoma) Mother      Cancer Father          Stomach cancer    Cancer Sister          Ovarian cancer    Ovarian cancer Sister      No Known Problems Daughter      No Known Problems Daughter      No Known Problems Son      Breast cancer Paternal Cousin      Stroke Neg Hx      Heart disease Neg Hx          MI/CAD    Thyroid cancer Neg Hx          MEN2     Review of patient's allergies indicates:   Allergen Reactions    Methylprednisone Anxiety and Palpitations    Amoxicillin Rash      Historical    Hydrocodone-acetaminophen Nausea And Vomiting     Historical.  Historical.    Sulfa (sulfonamide antibiotics) Rash       Current Medications[1]    Review of Systems:  10 Pt ROS negative except as stated in HPI    Physical Exam:  General Appearance:    A&Ox3, no distress, appears stated age   Head:    Normocephalic, without obvious abnormality, atraumatic   Eyes:    PERRL, EOM's intact   Back:     Symmetric, no curvature   Lungs:     respirations unlabored   Chest Wall:    No tenderness or deformity    Heart:  Abdomen:  Extremities:  Skin:    S1 and S2 present    Soft, non-tender    Extremities normal, atraumatic    Skin color, texture, turgor normal     Patient is stable for ophthalmic surgery under local and MAC.       _           [1]   Current Outpatient Medications:     acyclovir (ZOVIRAX) 400 MG tablet, Take 1 tablet (400 mg total) by mouth 3 (three) times daily with meals., Disp: 270 tablet, Rfl: 0    ALPRAZolam (XANAX) 0.5 MG tablet, Take 1 tablet (0.5 mg total) by mouth 2 (two) times daily as needed for Anxiety., Disp: 60 tablet, Rfl: 0    amLODIPine (NORVASC) 10 MG tablet, Take 1 tablet by mouth once daily, Disp: 90 tablet, Rfl: 1    anastrozole (ARIMIDEX) 1 mg Tab, Take 1 tablet (1 mg total) by mouth once daily., Disp: 90 tablet, Rfl: 3    benazepriL (LOTENSIN) 20 MG tablet, Take 1 tablet (20 mg total) by mouth once daily., Disp: 90 tablet, Rfl: 3    fluticasone propionate (FLONASE) 50 mcg/actuation nasal spray, USE 2 SPRAY(S) IN EACH NOSTRIL ONCE DAILY AS NEEDED FOR  RHINITIS, Disp: 48 g, Rfl: 3    gabapentin (NEURONTIN) 100 MG capsule, Take 1 capsule (100 mg total) by mouth 2 (two) times daily., Disp: 60 capsule, Rfl: 11    ibuprofen (ADVIL,MOTRIN) 800 MG tablet, Take 1 tab by mouth TID prn pain, Disp: 90 tablet, Rfl: 0    metFORMIN (GLUCOPHAGE) 500 MG tablet, TAKE 4 TABLETS BY MOUTH IN THE EVENING AS DIRECTED, Disp: 360 tablet, Rfl: 1    metoprolol succinate (TOPROL-XL) 100 MG 24 hr tablet,  Take 1 tablet by mouth in the evening, Disp: 90 tablet, Rfl: 3    omeprazole (PRILOSEC) 20 MG capsule, Take 1 capsule (20 mg total) by mouth daily as needed., Disp: 90 capsule, Rfl: 0    prednisoLONE acetate (PRED FORTE) 1 % DrpS, Place 1 drop into the right eye every 4 (four) hours., Disp: 5 mL, Rfl: 1    SYNTHROID 175 mcg tablet, Take 1 tablet (175 mcg total) by mouth once daily., Disp: 90 tablet, Rfl: 3    tirzepatide, weight loss, (ZEPBOUND) 2.5 mg/0.5 mL PnIj, Inject 2.5 mg into the skin every 7 days., Disp: 2 mL, Rfl: 0

## 2025-06-24 NOTE — PROGRESS NOTES
HPI    C/o blurred vision , glare, trouble driving at night , and difficulty   reading over the past several years    Which eye is most affected? OU    Activities of daily living impacted: Yes    Do you have difficulty, even with glasses, with the following activities?    Reading small print such as labels on medicine bottles, a telephone book,   or food labels?  yes  Reading a newspaper or book?  yes  Seeing steps, stairs, or curbs?  no  Reading traffic signs, street signs, or store signs?  no  Writing checks or filling out forms?  no  Watching television?  no  Driving at night?  yes    Do you have any upcoming procedures or surgeries?  no    Have you had any eye surgeries including LASIK or PRK?  no (if so, what kind)    Do you have VA insurance?   no (if so, notify MD for potential toric lens)       1. POAG: Narrow angle  2. Hyperopia ou  Last edited by Leatha Crandall on 6/24/2025  8:12 AM.            Assessment /Plan     For exam results, see Encounter Report.    Anatomical narrow angle of both eyes  -  IOP elevated. Angles narrow on gonioscopy but open with dynamic gonioscopy. Explained r/b/a to observation vs. LPI vs. CEIOL. Patient elects for CEIOL.  - Plan for CEIOL OD then OS      Ocular hypertension, bilateral- IOP today Elevated. Goal IOP <23. Will monitor with serial GOCTs.   Plan for Streamline Goniotomy w/ CEIOL       Nuclear sclerosis of right eye-  Cataract OU with anatomical narrow angles and risk of angle closure OU   Patient reports decreased vision consistent with the clinical amount of lenticular opacity, which reaches the level of visual significance and affects activities of daily living including reading and glare. Risks, benefits, and alternatives to cataract surgery were discussed.  Discussion of risks included possibility of infection as well as permanent vision loss.The pt expressed a desire to proceed with surgery with the potential for some reasonable degree of visual improvement.  Recommended regular use of artificial tears and good lid hygiene to optimize surgical outcome.     Discussed IOL options and refractive outcomes for this patient.    [place] right eye, Topical with Streamline Goniotomy    Additional considerations:   None    Will aim for Monofocal - Distance IOL   *Considering vivity lens     Post op gtts:   PF      Discussed that vision may be limited by:  Astigmatism >1D    Explained that patient may need glasses after surgery.    RTC for POD#1      Nuclear sclerosis of left eye- Follow    Nonexudative age-related macular degeneration, bilateral, intermediate dry stage  -  Moderate age related macular degeneration OU with elevated risk findings. Discussed clinical condition  - Recommend avoidance of tobacco products.   - Encouraged UV light protection.   - Patient instructed in the use of daily Amsler Grid, AREDS II formula.   - Patient advised to call immediately if any Amsler Grid / visual changes occur. Regular follow up recommended.     Essential hypertension- Strict BP control

## 2025-06-24 NOTE — PROGRESS NOTES
Subjective:       Patient ID: Kylie Urbano is a 59 y.o. female.    Chief Complaint: Results and Breast Cancer    HPI:  59-year-old female Stage IIA (T1c, N1, M0) continues with Arimidex 1 mg daily.  Had previously been followed by nurse practitioner Rubio but has not been seen since 2022 seen in virtual visit before refill of prescription  Past Medical History:   Diagnosis Date    Allergic rhinitis, cause unspecified     Breast cancer 2017    Elevated liver function tests     in the past    Fatty liver 05/02/2017    on ultrasound    Hepatomegaly 05/02/2017    on ultrasound    History of sciatica     HSV infection     Type 1 and 2    HTN (hypertension)     Hypothyroidism     Insulin resistance     Iron deficiency anemia, unspecified     Obesity     PONV (postoperative nausea and vomiting)     Tension headache     Vitamin D deficiency      Family History   Problem Relation Name Age of Onset    Diabetes Mother      Hypertension Mother      Heart failure Mother      Glaucoma Mother      Other (Glaucoma) Mother      Cancer Father          Stomach cancer    Cancer Sister          Ovarian cancer    Ovarian cancer Sister      No Known Problems Daughter      No Known Problems Daughter      No Known Problems Son      Breast cancer Paternal Cousin      Stroke Neg Hx      Heart disease Neg Hx          MI/CAD    Thyroid cancer Neg Hx          MEN2     Social History[1]  Past Surgical History:   Procedure Laterality Date    BREAST LUMPECTOMY Right 2017    BTL      COLONOSCOPY N/A 2/22/2019    Procedure: COLONOSCOPY;  Surgeon: Mariano Santamaria MD;  Location: [facility];  Service: Endoscopy;  Laterality: N/A;    DILATION AND CURETTAGE OF UTERUS      x 1    HYSTERECTOMY      Laproscopic robot assissted with BSO       Labs:  Lab Results   Component Value Date    WBC 7.55 02/03/2025    HGB 14.0 02/03/2025    HCT 42.8 02/03/2025    MCV 86 02/03/2025     02/03/2025     BMP  Lab Results   Component Value Date      "02/17/2025    K 3.9 02/17/2025     02/17/2025    CO2 21 (L) 02/17/2025    BUN 15 02/17/2025    CREATININE 0.8 02/17/2025    CALCIUM 9.8 02/17/2025    ANIONGAP 13 02/17/2025    ESTGFRAFRICA >60 06/16/2022    EGFRNONAA >60 06/16/2022     Lab Results   Component Value Date    ALT 39 02/17/2025    AST 51 (H) 02/17/2025    ALKPHOS 101 02/17/2025    BILITOT 0.2 02/17/2025       Lab Results   Component Value Date    IRON 60 01/25/2023    TIBC 369 01/25/2023    FERRITIN 129 01/25/2023     Lab Results   Component Value Date    PXRCGGLX43 634 02/03/2025     No results found for: "FOLATE"  Lab Results   Component Value Date    TSH 2.424 02/03/2025         Review of Systems   Constitutional:  Negative for activity change, appetite change, chills, diaphoresis, fatigue, fever and unexpected weight change.   HENT:  Negative for congestion, dental problem, drooling, ear discharge, ear pain, facial swelling, hearing loss, mouth sores, nosebleeds, postnasal drip, rhinorrhea, sinus pressure, sneezing, sore throat, tinnitus, trouble swallowing and voice change.    Eyes:  Negative for photophobia, pain, discharge, redness, itching and visual disturbance.   Respiratory:  Negative for cough, choking, chest tightness, shortness of breath, wheezing and stridor.    Cardiovascular:  Negative for chest pain, palpitations and leg swelling.   Gastrointestinal:  Negative for abdominal distention, abdominal pain, anal bleeding, blood in stool, constipation, diarrhea, nausea, rectal pain and vomiting.   Endocrine: Negative for cold intolerance, heat intolerance, polydipsia, polyphagia and polyuria.   Genitourinary:  Negative for decreased urine volume, difficulty urinating, dyspareunia, dysuria, enuresis, flank pain, frequency, genital sores, hematuria, menstrual problem, pelvic pain, urgency, vaginal bleeding, vaginal discharge and vaginal pain.   Musculoskeletal:  Negative for arthralgias, back pain, gait problem, joint swelling, myalgias, " neck pain and neck stiffness.   Skin:  Negative for color change, pallor and rash.   Allergic/Immunologic: Negative for environmental allergies, food allergies and immunocompromised state.   Neurological:  Negative for dizziness, tremors, seizures, syncope, facial asymmetry, speech difficulty, weakness, light-headedness, numbness and headaches.   Hematological:  Negative for adenopathy. Does not bruise/bleed easily.   Psychiatric/Behavioral:  Negative for agitation, behavioral problems, confusion, decreased concentration, dysphoric mood, hallucinations, self-injury, sleep disturbance and suicidal ideas. The patient is not nervous/anxious and is not hyperactive.        Objective:      Physical Exam  Constitutional:       Appearance: Normal appearance.   Neurological:      Mental Status: She is alert.             Assessment:      1. Malignant neoplasm of upper-outer quadrant of right breast in female, estrogen receptor positive    2. Aromatase inhibitor use           Med Onc Chart Routing      Follow up with physician . Return to clinic over the next 2-3 months to be seen in an in-person visit after mammogram and bone density completed   Follow up with [facility]    Infusion scheduling note    Injection scheduling note    Labs    Imaging    Pharmacy appointment    Other referrals                 Plan:     The patient location is:  Home  The chief complaint leading to consultation is:  Breast cancer    Visit type: audiovisual    Face to Face time with patient: 20 minutes of total time spent on the encounter, which includes face to face time and non-face to face time preparing to see the patient (eg, review of tests), Obtaining and/or reviewing separately obtained history, Documenting clinical information in the electronic or other health record, Independently interpreting results (not separately reported) and communicating results to the patient/family/caregiver, or Care coordination (not separately reported).         Each  patient to whom he or she provides medical services by telemedicine is:  (1) informed of the relationship between the physician and patient and the respective role of any other health care provider with respect to management of the patient; and (2) notified that he or she may decline to receive medical services by telemedicine and may withdraw from such care at any time.    Notes:    Last seen 2022.  Would recommend that patient have refill of Arimidex with see back after mammogram and bone density completed for an in-person visit to review refill prescription      Herbie Miramontes Jr, MD FACP         [1]   Social History  Socioeconomic History    Marital status: Single    Number of children: 3   Occupational History    Occupation: [employer]     Employer: [employer]     Comment: [employer]   Tobacco Use    Smoking status: Never    Smokeless tobacco: Never   Substance and Sexual Activity    Alcohol use: Yes     Comment: occasionally     Drug use: No    Sexual activity: Not Currently   Social History Narrative    She wears seatbelt.     Social Drivers of Health     Financial Resource Strain: Low Risk  (6/24/2025)    Overall Financial Resource Strain (CARDIA)     Difficulty of Paying Living Expenses: Not hard at all   Food Insecurity: No Food Insecurity (6/24/2025)    Hunger Vital Sign     Worried About Running Out of Food in the Last Year: Never true     Ran Out of Food in the Last Year: Never true   Transportation Needs: No Transportation Needs (6/24/2025)    PRAPARE - Transportation     Lack of Transportation (Medical): No     Lack of Transportation (Non-Medical): No   Physical Activity: Inactive (6/24/2025)    Exercise Vital Sign     Days of Exercise per Week: 0 days     Minutes of Exercise per Session: 0 min   Stress: No Stress Concern Present (6/24/2025)    [employer] - Occupational Stress Questionnaire     Feeling of Stress : Not at all   Housing  Stability: Patient Declined (6/24/2025)    Housing Stability Vital Sign     Unable to Pay for Housing in the Last Year: Patient declined     Homeless in the Last Year: Patient declined

## 2025-06-28 DIAGNOSIS — K21.9 GASTROESOPHAGEAL REFLUX DISEASE: ICD-10-CM

## 2025-06-28 NOTE — TELEPHONE ENCOUNTER
No care due was identified.  [facility] Embedded Care Due Messages. Reference number: [id number].   6/28/2025 5:34:36 AM CDT

## 2025-06-29 RX ORDER — OMEPRAZOLE 20 MG/1
20 CAPSULE, DELAYED RELEASE ORAL DAILY PRN
Qty: 30 CAPSULE | Refills: 0 | Status: SHIPPED | OUTPATIENT
Start: 2025-06-29

## 2025-06-29 NOTE — TELEPHONE ENCOUNTER
Refill Routing Note   Medication(s) are not appropriate for processing by [facility] for the following reason(s):        Outside of protocol    [facility] action(s):  Route      Medication Therapy Plan: PRN usage outside of [facility] protocol      Appointments  past 12m or future 3m with PCP    Date Provider   Last Visit   1/30/2025 Beverly Parks MD   Next Visit   8/12/2025 Beverly Parks MD   ED visits in past 90 days: 0        Note composed:1:08 PM 06/29/2025

## 2025-07-08 ENCOUNTER — TELEPHONE (OUTPATIENT)
Dept: OPHTHALMOLOGY | Facility: CLINIC | Age: 60
End: 2025-07-08
Payer: COMMERCIAL

## 2025-07-08 NOTE — TELEPHONE ENCOUNTER
Dr. Masters called the patient today to discuss IOL options. The patient wanted to cancel her surgery for tomorrow. The patient will call us back to reschedule her surgery.

## 2025-07-15 NOTE — PROGRESS NOTES
[facility] [place]    CHIEF COMPLAINT: hx R breast cancer -- survivorship    DIAGNOSIS:   This is a 59 y.o. female with a history of right breast infiltrating ductal carcinoma ER/NM positive, HER-2 negative     TREATMENT:   Primary Oncologist: Herbie Miramontes MD  Previous- Mason Martinez MD  Surgeon: Jet Mcclendon  Rad Onc: Arik Wynn     ER/NM positive, HER-2 negative infiltrating ductal carcinoma the right breast with biopsy proven R axillary lymph node involvement clinically stage II/III.   --BRCA testing via B-55 Study is negative for mutation    --Neoadjuvant chemo -ddAC--> taxol   cycle 1--- 6/12/2017. Final cycle Cycle 4 on 9/19/2017    She underwent lumpectomy with sentinel lymph node biopsy on 10/31/2017. Final pathology demonstrated [id number].  The primary measured 0.8 cm and 2 sentinel lymph nodes were positive with 3 mm macrometastasis within first sentinel lymph node and 1 mm micrometastasis within the second sentinel lymph node.  She underwent a full right axillary lymph node dissection on 11/8/2017 which demonstrated 0 positive nodes.  She completed adjuvant radiation on 2/27/2018.      --Tamoxifen 20 mg by mouth daily started in 3/2018  --Transitioned to Arimidex 1 mg PO daily - to be completed in 3/2027  -note: hysterectomy approximately 6/20/14     HISTORY OF PRESENT ILLNESS:   Kylie Urbano is a 59 y.o. female comes in for oncological follow up.  She denies change in her breast self-exam specifically denying any breast masses, skin changes, nipple inversion, nipple discharge, axillary masses. The patient denies constitutional symptoms of night sweats, chills, weight loss, new headaches, visual changes, new back or bony pain, chest pain, or shortness of breath.  She denies any new family history of breast or ovarian cancer. She is feeling well today. She underwent mammogram before her appointment today.   Occasional breast soreness R>L worse after frequent arm ROM.  Soreness located primarily to R inner breast and is infrequent. Denies frequent caffeine use.   Tolerating arimidex, followed by Dr. Miramontes. Still struggles with neuropathy - takes gabapentin but has not noticed much difference. Neuropathy stable. Planning to discuss further with Dr. Miramontes on other options.   Of note, had sebaceous cyst excision of R breast - uncomplicated procedure 05/2025.     MEDICATIONS/ALLERGIES:     Current Outpatient Medications   Medication Sig    acyclovir (ZOVIRAX) 400 MG tablet Take 1 tablet (400 mg total) by mouth 3 (three) times daily with meals.    ALPRAZolam (XANAX) 0.5 MG tablet Take 1 tablet (0.5 mg total) by mouth 2 (two) times daily as needed for Anxiety.    amLODIPine (NORVASC) 10 MG tablet Take 1 tablet by mouth once daily    anastrozole (ARIMIDEX) 1 mg Tab Take 1 tablet (1 mg total) by mouth once daily.    benazepriL (LOTENSIN) 20 MG tablet Take 1 tablet (20 mg total) by mouth once daily.    fluticasone propionate (FLONASE) 50 mcg/actuation nasal spray USE 2 SPRAY(S) IN EACH NOSTRIL ONCE DAILY AS NEEDED FOR  RHINITIS    ibuprofen (ADVIL,MOTRIN) 800 MG tablet Take 1 tab by mouth TID prn pain    metFORMIN (GLUCOPHAGE) 500 MG tablet TAKE 4 TABLETS BY MOUTH IN THE EVENING AS DIRECTED    metoprolol succinate (TOPROL-XL) 100 MG 24 hr tablet Take 1 tablet by mouth in the evening    omeprazole (PRILOSEC) 20 MG capsule Take 1 capsule (20 mg total) by mouth daily as needed.    prednisoLONE acetate (PRED FORTE) 1 % DrpS Place 1 drop into the right eye every 4 (four) hours.    SYNTHROID 175 mcg tablet Take 1 tablet (175 mcg total) by mouth once daily.    tirzepatide, weight loss, (ZEPBOUND) 2.5 mg/0.5 mL PnIj Inject 2.5 mg into the skin every 7 days.    gabapentin (NEURONTIN) 100 MG capsule Take 1 capsule (100 mg total) by mouth 2 (two) times daily.     No current facility-administered medications for this visit.      Review of patient's allergies indicates:   Allergen Reactions     "Methylprednisone Anxiety and Palpitations    Amoxicillin Rash     Historical    Hydrocodone-acetaminophen Nausea And Vomiting     Historical.  Historical.    Sulfa (sulfonamide antibiotics) Rash       REVIEW OF SYSTEMS:   I have reviewed 12 systems, including 2 points per system.   Review of Systems   Constitutional:  Negative for chills, fever and weight loss.   Eyes:  Negative for blurred vision, double vision and pain.   Respiratory:  Negative for cough, shortness of breath and wheezing.    Cardiovascular:  Negative for chest pain, palpitations and leg swelling.   Gastrointestinal:  Negative for abdominal pain and blood in stool.   Musculoskeletal:  Negative for back pain.   Neurological:  Positive for tingling (susan foot numbness/tingling/neuropathy after chemo). Negative for dizziness, weakness and headaches.   Breast: +breast soreness R>L. Pt denies any breast masses, nipple inversion, nipple discharge, skin changes, or axillary masses    PHYSICAL EXAM:   BP (!) 150/89   Pulse 96   Resp 16   Ht 5' 8" (1.727 m)   Wt 129.1 kg (284 lb 9.8 oz)   LMP 01/01/2016 (LMP Unknown)   BMI 43.28 kg/m²     Physical Exam  Vitals reviewed. Exam conducted with a chaperone present.   Constitutional:       General: She is not in acute distress.     Appearance: Normal appearance. She is not ill-appearing or toxic-appearing.   HENT:      Head: Normocephalic and atraumatic.      Right Ear: External ear normal.      Left Ear: External ear normal.      Nose: Nose normal.   Cardiovascular:      Rate and Rhythm: Normal rate.   Pulmonary:      Effort: Pulmonary effort is normal. No respiratory distress.   Chest:   Breasts:     Right: No swelling, bleeding, inverted nipple, mass, nipple discharge, skin change or tenderness.      Left: No swelling, bleeding, inverted nipple, mass, nipple discharge, skin change or tenderness.   Abdominal:      General: Abdomen is flat. There is no distension.      Palpations: Abdomen is soft.      " Tenderness: There is no abdominal tenderness.   Musculoskeletal:         General: Normal range of motion.      Cervical back: Normal range of motion and neck supple.   Lymphadenopathy:      Cervical: No cervical adenopathy.      Upper Body:      Right upper body: No supraclavicular or axillary adenopathy.      Left upper body: No supraclavicular or axillary adenopathy.   Skin:     General: Skin is warm and dry.   Neurological:      Mental Status: She is alert and oriented to person, place, and time.   Psychiatric:         Mood and Affect: Mood normal.         Behavior: Behavior normal.         IMAGING:   All images and reports were personally reviewed.     Mammo Digital Screening Bilat w/ Harish (07/2025)  Results pending today, will f/u     Mammo Digital Screening Bilat w/ Harish (07/2024)  Findings:  This procedure was performed using tomosynthesis. Computer-aided detection was utilized in the interpretation of this examination.    There are scattered areas of fibroglandular density. There is no evidence of suspicious masses, calcifications, or other abnormal findings.Benign right breast calcifications.    Impression  Bilateral  There is no mammographic evidence of malignancy.    BI-RADS Category:  Overall: 2 - Benign      Recommendation:  Routine screening mammogram in 1 year is recommended.    ASSESSMENT:   This is a 59 y.o. female with history of ER/SC positive, HER-2 negative infiltrating ductal carcinoma the right breast (diagnosed 2017) s/p neoadj chemotherapy, lumpectomy with SNLB, later full R axillary LN dissection, adjuvant radiation, endocrine therapy - without evidence of recurrence by exam, history or imaging.       PLAN:   -Kylie has a normal breast exam today. We discussed the possible signs and symptoms of breast cancer as lump, masses, new asymmetries, skin changes and nipple changes. Continue monthly self breast exams. She is encouraged to contact me if any new breast concerns arise.       -Encouraged healthy diet and exercise to maintain healthy weight to reduce breast cancer risk. Discussed that the underlying cause of increased breast cancer risk in patients with excess body weight is excess estrogen produced and stored in excess adipose tissue.    -Taking Arimidex without difficulty. Continue until 3/2027- f/u with heme/onc. F/u with heme/onc for management of neuropathy.     Discussed breast pain is very common - usually breast pain can lesson or disappear within several months- not a symptom of cancer usually. Discussed conservative treatment:   Well fitted bra can help support the breast and decrease pulling  Wt loss can reduce estrogen levels  Regular exercise can reduce estrogen levels  Low fat diet decreases prolactin hormone levels  Caffeine reduction  Vit e 400 units daily  Evening primrose oil supplement 500 mg three times a day  Heat or ice, topicals such as voltaran gel    -The patient underwent genetic testing and was found not to have any genetic mutations.     -The patient is due for her next breast imaging: Bilateral screening Mammogram in 1 year . Mammo pending today, will f/u/     -The patient will return to clinic in 6 months for breast examination per pt request.    -The patient is in agreement with the plan. Questions were encouraged and answered to patient's satisfaction.The patient will call with any questions or concerns.      Tapan Mercado PA-C  Breast Surgery  [facility] I spent a total of 30 minutes on the day of the visit.  This includes face to face time and non-face to face time preparing to see the patient (eg, review of tests), obtaining and/or reviewing separately obtained history, documenting clinical information in the electronic or other health record, independently interpreting results and communicating results to the patient/family/caregiver, or care coordinator.

## 2025-07-17 ENCOUNTER — OFFICE VISIT (OUTPATIENT)
Dept: SURGERY | Facility: CLINIC | Age: 60
End: 2025-07-17
Payer: COMMERCIAL

## 2025-07-17 ENCOUNTER — HOSPITAL ENCOUNTER (OUTPATIENT)
Dept: RADIOLOGY | Facility: HOSPITAL | Age: 60
Discharge: HOME | End: 2025-07-17
Payer: COMMERCIAL

## 2025-07-17 VITALS
WEIGHT: 284.63 LBS | SYSTOLIC BLOOD PRESSURE: 150 MMHG | BODY MASS INDEX: 43.14 KG/M2 | RESPIRATION RATE: 16 BRPM | BODY MASS INDEX: 42.83 KG/M2 | DIASTOLIC BLOOD PRESSURE: 89 MMHG | WEIGHT: 282.63 LBS | HEIGHT: 68 IN | HEIGHT: 68 IN | HEART RATE: 96 BPM

## 2025-07-17 DIAGNOSIS — Z12.31 ENCOUNTER FOR SCREENING MAMMOGRAM FOR MALIGNANT NEOPLASM OF BREAST: ICD-10-CM

## 2025-07-17 DIAGNOSIS — Z17.0 MALIGNANT NEOPLASM OF RIGHT BREAST IN FEMALE, ESTROGEN RECEPTOR POSITIVE, UNSPECIFIED SITE OF BREAST: ICD-10-CM

## 2025-07-17 DIAGNOSIS — Z71.89 COUNSELING ON HEALTH PROMOTION AND DISEASE PREVENTION: ICD-10-CM

## 2025-07-17 DIAGNOSIS — G62.0 NEUROPATHY DUE TO CHEMOTHERAPEUTIC DRUG: ICD-10-CM

## 2025-07-17 DIAGNOSIS — C50.411 MALIGNANT NEOPLASM OF UPPER-OUTER QUADRANT OF RIGHT BREAST IN FEMALE, ESTROGEN RECEPTOR POSITIVE: Primary | ICD-10-CM

## 2025-07-17 DIAGNOSIS — T45.1X5A NEUROPATHY DUE TO CHEMOTHERAPEUTIC DRUG: ICD-10-CM

## 2025-07-17 DIAGNOSIS — Z17.0 MALIGNANT NEOPLASM OF UPPER-OUTER QUADRANT OF RIGHT BREAST IN FEMALE, ESTROGEN RECEPTOR POSITIVE: Primary | ICD-10-CM

## 2025-07-17 DIAGNOSIS — Z71.89 COUNSELING AND COORDINATION OF CARE: ICD-10-CM

## 2025-07-17 DIAGNOSIS — Z85.3 ENCOUNTER FOR FOLLOW-UP SURVEILLANCE OF BREAST CANCER: ICD-10-CM

## 2025-07-17 DIAGNOSIS — Z08 ENCOUNTER FOR FOLLOW-UP SURVEILLANCE OF BREAST CANCER: ICD-10-CM

## 2025-07-17 DIAGNOSIS — C50.911 MALIGNANT NEOPLASM OF RIGHT BREAST IN FEMALE, ESTROGEN RECEPTOR POSITIVE, UNSPECIFIED SITE OF BREAST: ICD-10-CM

## 2025-07-17 DIAGNOSIS — Z12.39 ENCOUNTER FOR SCREENING BREAST EXAMINATION: ICD-10-CM

## 2025-07-17 PROCEDURE — 99214 OFFICE O/P EST MOD 30 MIN: CPT | Mod: S$GLB,,,

## 2025-07-17 PROCEDURE — 3044F HG A1C LEVEL LT 7.0%: CPT | Mod: CPTII,S$GLB,,

## 2025-07-17 PROCEDURE — 77067 SCR MAMMO BI INCL CAD: CPT | Mod: 26,,, | Performed by: RADIOLOGY

## 2025-07-17 PROCEDURE — 77063 BREAST TOMOSYNTHESIS BI: CPT | Mod: 26,,, | Performed by: RADIOLOGY

## 2025-07-17 PROCEDURE — 99999 PR PBB SHADOW E&M-EST. PATIENT-LVL IV: CPT | Mod: PBBFAC,,,

## 2025-07-17 PROCEDURE — 3079F DIAST BP 80-89 MM HG: CPT | Mod: CPTII,S$GLB,,

## 2025-07-17 PROCEDURE — 1159F MED LIST DOCD IN RCRD: CPT | Mod: CPTII,S$GLB,,

## 2025-07-17 PROCEDURE — 4010F ACE/ARB THERAPY RXD/TAKEN: CPT | Mod: CPTII,S$GLB,,

## 2025-07-17 PROCEDURE — 3077F SYST BP >= 140 MM HG: CPT | Mod: CPTII,S$GLB,,

## 2025-07-17 PROCEDURE — 3008F BODY MASS INDEX DOCD: CPT | Mod: CPTII,S$GLB,,

## 2025-07-17 PROCEDURE — 77067 SCR MAMMO BI INCL CAD: CPT | Mod: TC

## 2025-07-17 NOTE — PATIENT INSTRUCTIONS
Breast pain is very common - usually breast pain can lesson or disappear within several months- not a symptom of cancer usually  Well fitted bra can help support the breast and decrease pulling  Weight loss can reduce estrogen levels  Regular exercise can reduce estrogen levels  Low fat diet decreases prolactin hormone levels  Caffeine reduction  Vit e 400 units daily  Evening primrose oil supplement 500 mg three times a day  Heat or ice, topicals such as voltaran gel  Over the counter tylenol or ibuprofen

## 2025-07-18 ENCOUNTER — TELEPHONE (OUTPATIENT)
Dept: RADIOLOGY | Facility: HOSPITAL | Age: 60
End: 2025-07-18
Payer: COMMERCIAL

## 2025-07-23 ENCOUNTER — HOSPITAL ENCOUNTER (OUTPATIENT)
Dept: RADIOLOGY | Facility: HOSPITAL | Age: 60
Discharge: HOME | End: 2025-07-23
Payer: COMMERCIAL

## 2025-07-23 DIAGNOSIS — R92.8 ABNORMAL MAMMOGRAM: ICD-10-CM

## 2025-07-23 PROCEDURE — 77065 DX MAMMO INCL CAD UNI: CPT | Mod: 26,RT,, | Performed by: RADIOLOGY

## 2025-07-23 PROCEDURE — 77061 BREAST TOMOSYNTHESIS UNI: CPT | Mod: 26,RT,, | Performed by: RADIOLOGY

## 2025-07-23 PROCEDURE — 77065 DX MAMMO INCL CAD UNI: CPT | Mod: TC,RT

## 2025-07-25 ENCOUNTER — APPOINTMENT (OUTPATIENT)
Dept: RADIOLOGY | Facility: HOSPITAL | Age: 60
End: 2025-07-25
Attending: INTERNAL MEDICINE
Payer: COMMERCIAL

## 2025-07-25 DIAGNOSIS — Z79.811 AROMATASE INHIBITOR USE: ICD-10-CM

## 2025-07-25 PROCEDURE — 77080 DXA BONE DENSITY AXIAL: CPT | Mod: TC

## 2025-07-25 PROCEDURE — 77080 DXA BONE DENSITY AXIAL: CPT | Mod: 26,,, | Performed by: STUDENT IN AN ORGANIZED HEALTH CARE EDUCATION/TRAINING PROGRAM

## 2025-07-27 DIAGNOSIS — I10 ESSENTIAL HYPERTENSION: Chronic | ICD-10-CM

## 2025-07-27 NOTE — TELEPHONE ENCOUNTER
No care due was identified.  [facility] Embedded Care Due Messages. Reference number: [id number].   7/27/2025 6:34:52 AM CDT

## 2025-07-28 RX ORDER — AMLODIPINE BESYLATE 10 MG/1
10 TABLET ORAL
Qty: 90 TABLET | Refills: 0 | Status: SHIPPED | OUTPATIENT
Start: 2025-07-28

## 2025-07-28 NOTE — TELEPHONE ENCOUNTER
Refill Routing Note   Medication(s) are not appropriate for processing by [facility] for the following reason(s):        Required vitals abnormal  07/17/25 (!) 150/89   05/28/25 (!) 158/93 04/23/25 (!) 156/93       OR action(s):  Defer        Medication Therapy Plan: 07/17/25 (!) 150/89 05/28/25 (!) 158/93 04/23/25 (!) 156/93      Appointments  past 12m or future 3m with PCP    Date Provider   Last Visit   1/30/2025 Beverly Parks MD   Next Visit   8/12/2025 Beverly Parks MD   ED visits in past 90 days: 0        Note composed:5:55 AM 07/28/2025

## 2025-07-29 DIAGNOSIS — I10 ESSENTIAL HYPERTENSION: Chronic | ICD-10-CM

## 2025-07-29 DIAGNOSIS — F41.9 ANXIETY: ICD-10-CM

## 2025-07-29 RX ORDER — ALPRAZOLAM 0.5 MG/1
0.5 TABLET ORAL 2 TIMES DAILY PRN
Qty: 60 TABLET | Refills: 0 | Status: SHIPPED | OUTPATIENT
Start: 2025-07-29

## 2025-07-29 RX ORDER — AMLODIPINE BESYLATE 10 MG/1
10 TABLET ORAL
Qty: 90 TABLET | Refills: 0 | OUTPATIENT
Start: 2025-07-29

## 2025-07-29 NOTE — TELEPHONE ENCOUNTER
No care due was identified.  [facility] Embedded Care Due Messages. Reference number: [id number].   7/29/2025 11:36:39 AM CDT

## 2025-08-08 ENCOUNTER — PATIENT MESSAGE (OUTPATIENT)
Dept: SURGERY | Facility: HOSPITAL | Age: 60
End: 2025-08-08
Payer: COMMERCIAL

## 2025-08-08 DIAGNOSIS — Z17.0 MALIGNANT NEOPLASM OF UPPER-OUTER QUADRANT OF RIGHT BREAST IN FEMALE, ESTROGEN RECEPTOR POSITIVE: Primary | ICD-10-CM

## 2025-08-08 DIAGNOSIS — C50.411 MALIGNANT NEOPLASM OF UPPER-OUTER QUADRANT OF RIGHT BREAST IN FEMALE, ESTROGEN RECEPTOR POSITIVE: Primary | ICD-10-CM

## 2025-08-08 DIAGNOSIS — Z12.39 ENCOUNTER FOR SCREENING FOR MALIGNANT NEOPLASM OF BREAST, UNSPECIFIED SCREENING MODALITY: ICD-10-CM

## 2025-08-12 ENCOUNTER — TELEPHONE (OUTPATIENT)
Dept: SURGERY | Facility: CLINIC | Age: 60
End: 2025-08-12
Payer: COMMERCIAL

## 2025-08-27 RX ORDER — METOPROLOL SUCCINATE 100 MG/1
100 TABLET, EXTENDED RELEASE ORAL NIGHTLY
Qty: 90 TABLET | Refills: 1 | Status: SHIPPED | OUTPATIENT
Start: 2025-08-27

## (undated) DEVICE — SPONGE LAP 18X18 PREWASHED

## (undated) DEVICE — Device

## (undated) DEVICE — GLOVE SURG BIOGEL LATEX SZ 7.5

## (undated) DEVICE — GAUZE SPONGE 4X4 12PLY

## (undated) DEVICE — BLADE SURG CARBON STEEL SZ11

## (undated) DEVICE — SEE MEDLINE ITEM 157117

## (undated) DEVICE — NDL SAFETY 25G X 1.5 ECLIPSE

## (undated) DEVICE — SEE MEDLINE ITEM 154981

## (undated) DEVICE — GAUZE SPONGE PEANUT STRL

## (undated) DEVICE — SEE MEDLINE ITEM 146420

## (undated) DEVICE — SUT 2/0 30IN SILK BLK BRAI

## (undated) DEVICE — STOCKINETTE IMPERVIOUS MEDIUM

## (undated) DEVICE — SEE MEDLINE ITEM 152739

## (undated) DEVICE — SUT VICRYL 3-0 27 CT-1

## (undated) DEVICE — APPLICATOR CHLORAPREP ORN 26ML

## (undated) DEVICE — SUT CTD VICRYL 3-0 UND SUTU

## (undated) DEVICE — SEE MEDLINE ITEM 146345

## (undated) DEVICE — SHEET THYROID W/ISO-BAC

## (undated) DEVICE — ADHESIVE MASTISOL VIAL 48/BX

## (undated) DEVICE — SUT SILK 2-0 STRANDS 30IN

## (undated) DEVICE — COVER OVERHEAD SURG LT BLUE

## (undated) DEVICE — ELECTRODE REM PLYHSV RETURN 9

## (undated) DEVICE — SEE MEDLINE ITEM 157137

## (undated) DEVICE — STAPLER SKIN PROXIMATE WIDE

## (undated) DEVICE — SUT VICRYL 3-0 27 SH

## (undated) DEVICE — SYR 3CC LUER LOC

## (undated) DEVICE — DRAPE MOBILE C-ARM

## (undated) DEVICE — SYR 10CC LUER LOCK

## (undated) DEVICE — SOL NS 1000CC

## (undated) DEVICE — DECANTER VIAL ASEPTIC TRANSFER

## (undated) DEVICE — MANIFOLD 4 PORT

## (undated) DEVICE — CLOSURE SKIN STERI STRIP 1/2X4

## (undated) DEVICE — TAPE CLOTH SOFT MEDIPORE 4IN

## (undated) DEVICE — SEE MEDLINE ITEM 152622

## (undated) DEVICE — SUT MONOCRYL 4.0 PS2 CP496G

## (undated) DEVICE — DRAIN SIL FLAT FULL FLUTD 10FR

## (undated) DEVICE — SEE MEDLINE ITEM 157027

## (undated) DEVICE — SUT CTD VICRYL 2-0 UND SUTU

## (undated) DEVICE — GLOVE PROTEXIS HYDROGEL SZ7.5

## (undated) DEVICE — DRESSING TRANS 4X4 TEGADERM

## (undated) DEVICE — DRESSING XEROFORM 1X8IN

## (undated) DEVICE — SUTURE SILK 2-0 PSL 486H

## (undated) DEVICE — CLIPPER BLADE MOD 4406 (CAREF)

## (undated) DEVICE — SUT VICRYL 4-0 ANTIBACT

## (undated) DEVICE — SUT 4/0 18IN COATED VICRYL

## (undated) DEVICE — APPLIER CLIP LIAGCLIP 9.375IN

## (undated) DEVICE — SUT VICRYL PLUS 4-0 P3 18IN

## (undated) DEVICE — SUT CTD VICRYL 0 UND BR

## (undated) DEVICE — SUT VICRYL 3-0 OB 36 CT-1

## (undated) DEVICE — EVACUATOR WOUND BULB 100CC